# Patient Record
Sex: FEMALE | Race: BLACK OR AFRICAN AMERICAN | NOT HISPANIC OR LATINO | Employment: UNEMPLOYED | URBAN - METROPOLITAN AREA
[De-identification: names, ages, dates, MRNs, and addresses within clinical notes are randomized per-mention and may not be internally consistent; named-entity substitution may affect disease eponyms.]

---

## 2014-07-02 LAB
C TRACH DNA SPEC QL PROBE+SIG AMP: NEGATIVE
N GONORRHOEA DNA SPEC QL PROBE+SIG AMP: NEGATIVE
SPECIMEN DESCRIP: NORMAL
SPECIMEN DESCRIPTION: NORMAL

## 2017-01-27 ENCOUNTER — TELEPHONE (OUTPATIENT)
Dept: FAMILY MEDICINE | Facility: CLINIC | Age: 62
End: 2017-01-27

## 2017-01-27 ENCOUNTER — RADIANT APPOINTMENT (OUTPATIENT)
Dept: MAMMOGRAPHY | Facility: CLINIC | Age: 62
End: 2017-01-27
Attending: FAMILY MEDICINE
Payer: COMMERCIAL

## 2017-01-27 DIAGNOSIS — N76.0 BV (BACTERIAL VAGINOSIS): Primary | ICD-10-CM

## 2017-01-27 DIAGNOSIS — B96.89 BV (BACTERIAL VAGINOSIS): Primary | ICD-10-CM

## 2017-01-27 PROCEDURE — G0202 SCR MAMMO BI INCL CAD: HCPCS | Mod: TC

## 2017-02-06 ENCOUNTER — TELEPHONE (OUTPATIENT)
Dept: FAMILY MEDICINE | Facility: CLINIC | Age: 62
End: 2017-02-06

## 2017-02-06 DIAGNOSIS — E11.9 TYPE 2 DIABETES MELLITUS WITHOUT COMPLICATION, WITHOUT LONG-TERM CURRENT USE OF INSULIN (H): Primary | ICD-10-CM

## 2017-02-06 NOTE — TELEPHONE ENCOUNTER
Referred to nutrition. Please notify patient.     Romel Mccall MD  [facility], [place]

## 2017-02-06 NOTE — TELEPHONE ENCOUNTER
Patient calling to state that her per her psychologist-  She needs a referral to a diabetes educator that specializes in eating disorders.  The last DM stated that eating disorders was out of her scope   Patient went through the [facility] for eating disorders- 90 day program   feels that she has problems on occasion with her eating disorder.    Please advise,    Tessie Allred RN  [facility]  [phone]

## 2017-02-06 NOTE — TELEPHONE ENCOUNTER
Informed patient of message    If referral is not for appropriate office, will call back for more specific referral    LIBRA Benjamin

## 2017-02-06 NOTE — TELEPHONE ENCOUNTER
Reason for Call:  Pt wants to discuss referral with dietician for her eating disorder.     Detailed comments: wants a nurse to call her back.     Phone Number Patient can be reached at: Cell number on file:    Telephone Information:   Mobile [phone]       Best Time: anytime    Can we leave a detailed message on this number? YES    Call taken on 2/6/2017 at 8:28 AM by [facility] Bagley

## 2017-02-15 ENCOUNTER — OFFICE VISIT (OUTPATIENT)
Dept: OBGYN | Facility: CLINIC | Age: 62
End: 2017-02-15
Payer: COMMERCIAL

## 2017-02-15 VITALS
SYSTOLIC BLOOD PRESSURE: 122 MMHG | DIASTOLIC BLOOD PRESSURE: 80 MMHG | BODY MASS INDEX: 26.84 KG/M2 | HEIGHT: 67 IN | WEIGHT: 171 LBS

## 2017-02-15 DIAGNOSIS — N76.0 VAGINITIS AND VULVOVAGINITIS: Primary | ICD-10-CM

## 2017-02-15 PROCEDURE — 99203 OFFICE O/P NEW LOW 30 MIN: CPT | Performed by: OBSTETRICS & GYNECOLOGY

## 2017-02-15 PROCEDURE — 87205 SMEAR GRAM STAIN: CPT | Performed by: OBSTETRICS & GYNECOLOGY

## 2017-02-15 NOTE — MR AVS SNAPSHOT
"              After Visit Summary   2/15/2017    Grecia Kilgore    MRN: [id number]           Patient Information     Date Of Birth          1955        Visit Information        Provider Department      2/15/2017 1:45 PM Tristan Bryant MD [facility]        Today's Diagnoses     Vaginitis and vulvovaginitis    -  1       Follow-ups after your visit        Your next 10 appointments already scheduled     Feb 15, 2017  1:45 PM CST   SHORT with Tristan Bryant MD   [facility] ([facility])    [address]  [place] [address]   [phone]            Mar 08, 2017  1:45 PM CST   SHORT with Tristan Bryant MD   [facility] ([facility])    [address]  [place] [address] [phone]              Who to contact     If you have questions or need follow up information about today's clinic visit or your schedule please contact [facility] directly at [phone].  Normal or non-critical lab and imaging results will be communicated to you by Weiloshart, letter or phone within 4 business days after the clinic has received the results. If you do not hear from us within 7 days, please contact the clinic through Weiloshart or phone. If you have a critical or abnormal lab result, we will notify you by phone as soon as possible.  Submit refill requests through AudioBeta or call your pharmacy and they will forward the refill request to us. Please allow 3 business days for your refill to be completed.          Additional Information About Your Visit        WeilosharPorous Power Information     AudioBeta lets you send messages to your doctor, view your test results, renew your prescriptions, schedule appointments and more. To sign up, go to [email or web address] . Click on \"Log in\" on the left side of the screen, which will take you to the Welcome page. Then click on \"Sign " "up Now\" on the right side of the page.     You will be asked to enter the access code listed below, as well as some personal information. Please follow the directions to create your username and password.     Your access code is: [email or web address]  Expires: 2017  1:39 PM     Your access code will  in 90 days. If you need help or a new code, please call your [facility] or [phone].        Care EveryWhere ID     This is your Care EveryWhere ID. This could be used by other organizations to access your [place] medical records  [id number]        Your Vitals Were     Height Last Period BMI (Body Mass Index)             5' 7\" (1.702 m) 1985 26.78 kg/m2          Blood Pressure from Last 3 Encounters:   02/15/17 122/80   16 134/89   16 133/83    Weight from Last 3 Encounters:   02/15/17 171 lb (77.6 kg)   16 169 lb 12.8 oz (77 kg)   16 176 lb 12.8 oz (80.2 kg)              Today, you had the following     No orders found for display       Primary Care Provider Office Phone # Fax #    Romel Mccall [phone]       [facility] [address]  [place] [address]        Thank you!     Thank you for choosing [facility]  for your care. Our goal is always to provide you with excellent care. Hearing back from our patients is one way we can continue to improve our services. Please take a few minutes to complete the written survey that you may receive in the mail after your visit with us. Thank you!             Your Updated Medication List - Protect others around you: Learn how to safely use, store and throw away your medicines at www.disposemymeds.org.          This list is accurate as of: 2/15/17  1:39 PM.  Always use your most recent med list.                   Brand Name Dispense Instructions for use    albuterol 108 (90 BASE) MCG/ACT Inhaler    PROAIR HFA/PROVENTIL HFA/VENTOLIN HFA    1 Inhaler    Inhale 2 puffs into " the lungs every 6 hours as needed for shortness of breath / dyspnea       Azelastine HCl 0.15 % Soln     30 mL    Spray 2 sprays into both nostrils 2 times daily       DEPAKOTE PO      Take 500 mg by mouth 2 times daily       dextromethorphan 30 MG/5ML liquid    DELSYM    148 mL    Take 5 mLs (30 mg) by mouth 2 times daily       hydrOXYzine 25 MG tablet    ATARAX    30 tablet    Take 1 tablet (25 mg) by mouth every 6 hours as needed for itching (and nausea)       lisinopril 5 MG tablet    PRINIVIL/ZESTRIL    90 tablet    Take 1 tablet (5 mg) by mouth daily       metFORMIN 500 MG tablet    GLUCOPHAGE    270 tablet    Take 1 tablet (500 mg) by mouth 2 times daily (with meals)       omega 3 1000 MG Caps     90 capsule    Take 1 g by mouth daily       oxyCODONE-acetaminophen 5-325 MG per tablet    PERCOCET    50 tablet    Take 1-2 tablets by mouth every 4 hours as needed for pain (moderate to severe)       simvastatin 20 MG tablet    ZOCOR    90 tablet    Take 1 tablet (20 mg) by mouth At Bedtime

## 2017-02-16 LAB
BACTERIA SPEC CULT: NORMAL
GRAM STN SPEC: ABNORMAL
Lab: NORMAL
MICRO REPORT STATUS: ABNORMAL
MICRO REPORT STATUS: NORMAL
SPECIMEN SOURCE: ABNORMAL
SPECIMEN SOURCE: NORMAL

## 2017-02-21 RX ORDER — METRONIDAZOLE 250 MG/1
250 TABLET ORAL 3 TIMES DAILY
Qty: 21 TABLET | Refills: 0 | Status: SHIPPED | OUTPATIENT
Start: 2017-02-21 | End: 2017-03-23

## 2017-02-24 NOTE — TELEPHONE ENCOUNTER
Just as an update: scheduling has tried to reach out to Grecia 3 times since none of the diabetes educators specialize in eating disorders.  Unsure if she would like to be seen for only diabetes education.  Awaiting a return call from patient.    Chelsy Mcgill RD, LD, CDE

## 2017-02-24 NOTE — TELEPHONE ENCOUNTER
I would suggest waiting for her to call us back about that.    Romel Mccall MD  [facility], [place]

## 2017-03-08 ENCOUNTER — OFFICE VISIT (OUTPATIENT)
Dept: OBGYN | Facility: CLINIC | Age: 62
End: 2017-03-08
Payer: COMMERCIAL

## 2017-03-08 VITALS
BODY MASS INDEX: 26.53 KG/M2 | HEIGHT: 67 IN | WEIGHT: 169 LBS | SYSTOLIC BLOOD PRESSURE: 120 MMHG | DIASTOLIC BLOOD PRESSURE: 80 MMHG

## 2017-03-08 DIAGNOSIS — N76.0 VAGINITIS AND VULVOVAGINITIS: Primary | ICD-10-CM

## 2017-03-08 LAB
GRAM STN SPEC: NORMAL
MICRO REPORT STATUS: NORMAL
SPECIMEN SOURCE: NORMAL

## 2017-03-08 PROCEDURE — 87653 STREP B DNA AMP PROBE: CPT | Performed by: OBSTETRICS & GYNECOLOGY

## 2017-03-08 PROCEDURE — 99212 OFFICE O/P EST SF 10 MIN: CPT | Performed by: OBSTETRICS & GYNECOLOGY

## 2017-03-08 PROCEDURE — 87205 SMEAR GRAM STAIN: CPT | Performed by: OBSTETRICS & GYNECOLOGY

## 2017-03-08 NOTE — MR AVS SNAPSHOT
"              After Visit Summary   3/8/2017    Grecia Kilgore    MRN: [id number]           Patient Information     Date Of Birth          1955        Visit Information        Provider Department      3/8/2017 1:45 PM Tristan Bryant MD [facility]        Today's Diagnoses     Vaginitis and vulvovaginitis    -  1       Follow-ups after your visit        Who to contact     If you have questions or need follow up information about today's clinic visit or your schedule please contact [facility] directly at [phone].  Normal or non-critical lab and imaging results will be communicated to you by Pombaihart, letter or phone within 4 business days after the clinic has received the results. If you do not hear from us within 7 days, please contact the clinic through Pombaihart or phone. If you have a critical or abnormal lab result, we will notify you by phone as soon as possible.  Submit refill requests through [employer] or call your pharmacy and they will forward the refill request to us. Please allow 3 business days for your refill to be completed.          Additional Information About Your Visit        MyChart Information     [employer] lets you send messages to your doctor, view your test results, renew your prescriptions, schedule appointments and more. To sign up, go to [email or web address] . Click on \"Log in\" on the left side of the screen, which will take you to the Welcome page. Then click on \"Sign up Now\" on the right side of the page.     You will be asked to enter the access code listed below, as well as some personal information. Please follow the directions to create your username and password.     Your access code is: [email or web address]  Expires: 2017  1:39 PM     Your access code will  in 90 days. If you need help or a new code, please call your [facility] or [phone].        Care EveryWhere ID     This is your Care EveryWhere ID. This could be used " "by other organizations to access your [place] medical records  [id number]        Your Vitals Were     Height Last Period BMI (Body Mass Index)             5' 7\" (1.702 m) 01/01/1985 26.47 kg/m2          Blood Pressure from Last 3 Encounters:   03/08/17 120/80   02/15/17 122/80   12/19/16 134/89    Weight from Last 3 Encounters:   03/08/17 169 lb (76.7 kg)   02/15/17 171 lb (77.6 kg)   12/19/16 169 lb 12.8 oz (77 kg)              We Performed the Following     Gram stain     Group B strep PCR        Primary Care Provider Office Phone # Fax #    Romel Mccall [phone]       [facility] [address]  [place] [address]        Thank you!     Thank you for choosing [facility]  for your care. Our goal is always to provide you with excellent care. Hearing back from our patients is one way we can continue to improve our services. Please take a few minutes to complete the written survey that you may receive in the mail after your visit with us. Thank you!             Your Updated Medication List - Protect others around you: Learn how to safely use, store and throw away your medicines at www.disposemymeds.org.          This list is accurate as of: 3/8/17  3:29 PM.  Always use your most recent med list.                   Brand Name Dispense Instructions for use    albuterol 108 (90 BASE) MCG/ACT Inhaler    PROAIR HFA/PROVENTIL HFA/VENTOLIN HFA    1 Inhaler    Inhale 2 puffs into the lungs every 6 hours as needed for shortness of breath / dyspnea       Azelastine HCl 0.15 % Soln     30 mL    Spray 2 sprays into both nostrils 2 times daily       DEPAKOTE PO      Take 500 mg by mouth 2 times daily       dextromethorphan 30 MG/5ML liquid    DELSYM    148 mL    Take 5 mLs (30 mg) by mouth 2 times daily       hydrOXYzine 25 MG tablet    ATARAX    30 tablet    Take 1 tablet (25 mg) by mouth every 6 hours as needed for itching (and nausea)       lisinopril 5 MG " tablet    PRINIVIL/ZESTRIL    90 tablet    Take 1 tablet (5 mg) by mouth daily       metFORMIN 500 MG tablet    GLUCOPHAGE    270 tablet    Take 1 tablet (500 mg) by mouth 2 times daily (with meals)       metroNIDAZOLE 250 MG tablet    FLAGYL    21 tablet    Take 1 tablet (250 mg) by mouth 3 times daily       omega 3 1000 MG Caps     90 capsule    Take 1 g by mouth daily       oxyCODONE-acetaminophen 5-325 MG per tablet    PERCOCET    50 tablet    Take 1-2 tablets by mouth every 4 hours as needed for pain (moderate to severe)       simvastatin 20 MG tablet    ZOCOR    90 tablet    Take 1 tablet (20 mg) by mouth At Bedtime

## 2017-03-08 NOTE — PROGRESS NOTES
The patient is seen in follow-up at this time for recurrent vaginitis. She had a culture treated with antibiotics recently. She has a concern about anal incontinence and also the possibility of passing gas through the vagina. On previous examination we could not demonstrate any fistula. Examination today shows normally from genitalia and vaginal exam with no sign of fistula. There is no erythema or any stool material present. Vaginal culture is performed in follow-up at this time. We will contact her with results. We do refer her back to her colorectal surgeon for the anal incontinence issues and possible workup of the fistula.

## 2017-03-09 LAB
GP B STREP DNA SPEC QL NAA+PROBE: NORMAL
SPECIMEN SOURCE: NORMAL

## 2017-03-10 ENCOUNTER — TRANSFERRED RECORDS (OUTPATIENT)
Dept: HEALTH INFORMATION MANAGEMENT | Facility: CLINIC | Age: 62
End: 2017-03-10

## 2017-03-16 NOTE — TELEPHONE ENCOUNTER
Dr Mccall- Patient calling back about diabetes educator specializing in eating disorder. [facility] does not have any diabetes educators that specialize in eating disorder. Maybe she should check back with the [facility] program to see  If they offer this service. I do not know where else she could go.    Silvana Sauceda  Referral Coordinator

## 2017-03-16 NOTE — TELEPHONE ENCOUNTER
Please discuss it with her.    Romel Mccall MD  [facility], [place]

## 2017-03-22 NOTE — TELEPHONE ENCOUNTER
Called patient and gave her the number to the [facility] in [facility].  This is all I could find for out patient diabetic education specializing in eating disorder. I had to leave a VM told her to call me if she had questions.    Silvana Sauceda  Referral Coordinator

## 2017-03-23 ENCOUNTER — OFFICE VISIT (OUTPATIENT)
Dept: FAMILY MEDICINE | Facility: CLINIC | Age: 62
End: 2017-03-23
Payer: COMMERCIAL

## 2017-03-23 VITALS
HEIGHT: 67 IN | WEIGHT: 168 LBS | DIASTOLIC BLOOD PRESSURE: 82 MMHG | SYSTOLIC BLOOD PRESSURE: 118 MMHG | OXYGEN SATURATION: 100 % | HEART RATE: 60 BPM | TEMPERATURE: 98.2 F | BODY MASS INDEX: 26.37 KG/M2

## 2017-03-23 DIAGNOSIS — F41.0 PANIC DISORDER: Primary | ICD-10-CM

## 2017-03-23 DIAGNOSIS — J01.90 ACUTE SINUSITIS WITH SYMPTOMS > 10 DAYS: ICD-10-CM

## 2017-03-23 DIAGNOSIS — F32.1 MAJOR DEPRESSIVE DISORDER, SINGLE EPISODE, MODERATE (H): ICD-10-CM

## 2017-03-23 PROCEDURE — 99213 OFFICE O/P EST LOW 20 MIN: CPT | Performed by: FAMILY MEDICINE

## 2017-03-23 RX ORDER — AZITHROMYCIN 250 MG/1
TABLET, FILM COATED ORAL
Qty: 6 TABLET | Refills: 0 | Status: SHIPPED | OUTPATIENT
Start: 2017-03-23 | End: 2017-05-02

## 2017-03-23 ASSESSMENT — ANXIETY QUESTIONNAIRES
7. FEELING AFRAID AS IF SOMETHING AWFUL MIGHT HAPPEN: NEARLY EVERY DAY
IF YOU CHECKED OFF ANY PROBLEMS ON THIS QUESTIONNAIRE, HOW DIFFICULT HAVE THESE PROBLEMS MADE IT FOR YOU TO DO YOUR WORK, TAKE CARE OF THINGS AT HOME, OR GET ALONG WITH OTHER PEOPLE: VERY DIFFICULT
GAD7 TOTAL SCORE: 18
3. WORRYING TOO MUCH ABOUT DIFFERENT THINGS: NEARLY EVERY DAY
1. FEELING NERVOUS, ANXIOUS, OR ON EDGE: MORE THAN HALF THE DAYS
6. BECOMING EASILY ANNOYED OR IRRITABLE: MORE THAN HALF THE DAYS
2. NOT BEING ABLE TO STOP OR CONTROL WORRYING: NEARLY EVERY DAY
5. BEING SO RESTLESS THAT IT IS HARD TO SIT STILL: NEARLY EVERY DAY

## 2017-03-23 ASSESSMENT — PATIENT HEALTH QUESTIONNAIRE - PHQ9: 5. POOR APPETITE OR OVEREATING: MORE THAN HALF THE DAYS

## 2017-03-23 NOTE — PROGRESS NOTES
SUBJECTIVE:                                                    Grecia Kilgore is a 62 year old female who presents to clinic today for the following health issues:      Acute Illness   Acute illness concerns:  Cough, sinus pain   Onset:  One month     Fever: no     Chills/Sweats: no     Headache (location?): YES    Sinus Pressure:YES    Conjunctivitis:  no    Ear Pain: YES: left    Rhinorrhea: YES    Congestion: YES    Sore Throat: no      Cough: YES-productive of yellow sputum, productive of green sputum    Wheeze: YES    Decreased Appetite: YES    Nausea: YES    Vomiting: no     Diarrhea:  no     Dysuria/Freq.: no     Fatigue/Achiness: YES    Sick/Strep Exposure: no      Therapies Tried and outcome: inhalers       Patient said her psychologist and psychiatrist was no longer see her. She is not sure what happened. They got her started on his ability. She is on Depakote as well. They did not provide a referral either. Comes in today to get a referral to another psychology and psychiatry group for management of her mood disorder.     Problem list and histories reviewed & adjusted, as indicated.  Additional history: as documented    Patient Active Problem List   Diagnosis     Allergic rhinitis     External hemorrhoids     Vitamin D deficiency     Fibromyalgia     Osteoarthritis, knee     Positive PPD     Panic disorder     Genital herpes     Advanced directives, counseling/discussion     DCIS (ductal carcinoma in situ) of breast     Heart palpitations     Anxiety     Major depressive disorder, single episode, moderate (H)     Mild persistent asthma without complication     Hyperlipidemia LDL goal <100     Type 2 diabetes mellitus without complication, without long-term current use of insulin (H)     Past Surgical History:   Procedure Laterality Date     ARTHRODESIS TOE(S)  9/16/2013    Procedure: ARTHRODESIS TOE(S);  BILATERAL GREAT TOE FUSION (C-ARM);  Surgeon: Mary Guan MD;  Location: [facility]      ARTHRODESIS TOE(S) Left 12/19/2016    Procedure: ARTHRODESIS TOE(S);  Surgeon: Mary Guan MD;  Location: [facility]     ARTHROSCOPY KNEE Left 2/2/11     BIOPSY BREAST  2/7/2014    Procedure: BIOPSY BREAST;  Re-excision Left Breast Cavity for Margins ;  Surgeon: Lisset Amador DO;  Location: [facility] OR     BIOPSY BREAST SEED LOCALIZATION  1/22/2014    Procedure: BIOPSY BREAST SEED LOCALIZATION;  Left Breast Seed Localized Excisional Biopsy ;  Surgeon: Lisset Amador DO;  Location: [facility] OR     COLONOSCOPY  2005    nl - repeat 2015     FOOT SURGERY Bilateral 2013     HEMORRHOIDECTOMY BANDING      multiple times     HYSTERECTOMY  7/1996    fibroids     REMOVE HARDWARE FOOT Left 2015     REPAIR TENDON FOOT Left 12/19/2016    Procedure: REPAIR TENDON FOOT;  Surgeon: Mary Guan MD;  Location: [facility]       Social History   Substance Use Topics     Smoking status: Never Smoker     Smokeless tobacco: Never Used     Alcohol use No     Family History   Problem Relation Age of Onset     DIABETES Mother      Breast Cancer Mother      Alcohol/Drug Father      CANCER Father      DIABETES Maternal Grandmother      CEREBROVASCULAR DISEASE Maternal Grandmother      Cancer - colorectal Maternal Grandfather          Current Outpatient Prescriptions   Medication Sig Dispense Refill     azithromycin (ZITHROMAX) 250 MG tablet Two tablets first day, then one tablet daily for four days. 6 tablet 0     omega 3 1000 MG CAPS Take 1 g by mouth daily 90 capsule      metFORMIN (GLUCOPHAGE) 500 MG tablet Take 1 tablet (500 mg) by mouth 2 times daily (with meals) 270 tablet 3     albuterol (PROAIR HFA, PROVENTIL HFA, VENTOLIN HFA) 108 (90 BASE) MCG/ACT inhaler Inhale 2 puffs into the lungs every 6 hours as needed for shortness of breath / dyspnea 1 Inhaler 6     simvastatin (ZOCOR) 20 MG tablet Take 1 tablet (20 mg) by mouth At Bedtime 90 tablet 3     lisinopril (PRINIVIL,ZESTRIL) 5 MG tablet Take 1 tablet (5 mg) by mouth daily 90  "tablet 3     Azelastine HCl 0.15 % SOLN Spray 2 sprays into both nostrils 2 times daily 30 mL 11     Divalproex Sodium (DEPAKOTE PO) Take 500 mg by mouth 2 times daily       Allergies   Allergen Reactions     Codeine Nausea     Morphine Itching     Nitrofuran Derivatives Hives     Phenothiazines      sedation     Primatene Mist [Epinephrine Bitartrate] Itching     neck itch with primatene mist     Vicodin [Hydrocodone-Acetaminophen] Nausea     Latex Itching and Rash       Reviewed and updated as needed this visit by clinical staff  Allergies  Meds       Reviewed and updated as needed this visit by Provider         ROS:  INTEGUMENTARY/SKIN: NEGATIVE for worrisome rashes, moles or lesions  GI: NEGATIVE for nausea, abdominal pain, heartburn, or change in bowel habits    OBJECTIVE:                                                    /82 (BP Location: Left arm, Cuff Size: Adult Regular)  Pulse 60  Temp 98.2  F (36.8  C) (Oral)  Ht 5' 7\" (1.702 m)  Wt 168 lb (76.2 kg)  LMP 01/01/1985  SpO2 100%  BMI 26.31 kg/m2  Body mass index is 26.31 kg/(m^2).   GENERAL: healthy, alert, well nourished, well hydrated, no distress  HENT: ear canals- normal; TMs- normal; Nose- normal; right-sided maxillary and frontal sinus tenderness noted on exam. Mouth- no ulcers, no lesions  NECK: no tenderness, no adenopathy, no asymmetry, no masses, no stiffness; thyroid- normal to palpation  RESP: lungs clear to auscultation - no rales, no rhonchi, no wheezes  CV: regular rates and rhythm, normal S1 S2, no S3 or S4 and no murmur, no click or rub -  PSYCH: Alert and oriented times 3; speech- coherent , normal rate and volume; able to articulate logical thoughts, able to abstract reason, no tangential thoughts, no hallucinations or delusions, affect- normal         ASSESSMENT/PLAN:                                                      1. Panic disorder  Patient is given a referral to psychiatry  - MENTAL HEALTH REFERRAL    2. Major " depressive disorder, single episode, moderate (H)  As above  - MENTAL HEALTH REFERRAL    3. Acute sinusitis with symptoms > 10 days  Starting patient on azithromycin for acute sinus infection. Recommended to stay well hydrated. If symptoms are not improving in the next few days, instructed to notify us back  - azithromycin (ZITHROMAX) 250 MG tablet; Two tablets first day, then one tablet daily for four days.  Dispense: 6 tablet; Refill: 0      Follow up with Provider - as needed     Romel Mccall MD  [facility]

## 2017-03-23 NOTE — MR AVS SNAPSHOT
After Visit Summary   3/23/2017    Grecia Kilgore    MRN: [id number]           Patient Information     Date Of Birth          1955        Visit Information        Provider Department      3/23/2017 1:00 PM Romel Mccall MD [facility] [place]        Today's Diagnoses     Panic disorder    -  1    Major depressive disorder, single episode, moderate (H)           Follow-ups after your visit        Additional Services     MENTAL HEALTH REFERRAL       Your provider has referred you to: [facility]: Psychiatry Clinic [facility] [phone]   [email or web address]    All scheduling is subject to the client's specific insurance plan & benefits, provider/location availability, and provider clinical specialities.  Please arrive 15 minutes early for your first appointment and bring your completed paperwork.    Please be aware that coverage of these services is subject to the terms and limitations of your health insurance plan.  Call member services at your health plan with any benefit or coverage questions.                  Who to contact     If you have questions or need follow up information about today's clinic visit or your schedule please contact [facility] [place] directly at [phone].  Normal or non-critical lab and imaging results will be communicated to you by MyChart, letter or phone within 4 business days after the clinic has received the results. If you do not hear from us within 7 days, please contact the clinic through [employer] or phone. If you have a critical or abnormal lab result, we will notify you by phone as soon as possible.  Submit refill requests through [employer] or call your pharmacy and they will forward the refill request to us. Please allow 3 business days for your refill to be completed.          Additional Information About Your Visit        [employer] Information     [employer] lets you send messages to your doctor, view your  "test results, renew your prescriptions, schedule appointments and more. To sign up, go to [email or web address] . Click on \"Log in\" on the left side of the screen, which will take you to the Welcome page. Then click on \"Sign up Now\" on the right side of the page.     You will be asked to enter the access code listed below, as well as some personal information. Please follow the directions to create your username and password.     Your access code is: [email or web address]  Expires: 2017  2:39 PM     Your access code will  in 90 days. If you need help or a new code, please call your [facility] clinic or [phone].        Care EveryWhere ID     This is your Care EveryWhere ID. This could be used by other organizations to access your [facility] medical records  [id number]        Your Vitals Were     Pulse Temperature Height Last Period Pulse Oximetry BMI (Body Mass Index)    60 98.2  F (36.8  C) (Oral) 5' 7\" (1.702 m) 1985 100% 26.31 kg/m2       Blood Pressure from Last 3 Encounters:   17 118/82   17 120/80   02/15/17 122/80    Weight from Last 3 Encounters:   17 168 lb (76.2 kg)   17 169 lb (76.7 kg)   02/15/17 171 lb (77.6 kg)              We Performed the Following     MENTAL HEALTH REFERRAL        Primary Care Provider Office Phone # Fax #    Romel Mccall [phone]       [facility] [place] [address] [place] [address] [address]  [place] [address]        Thank you!     Thank you for choosing [facility]  for your care. Our goal is always to provide you with excellent care. Hearing back from our patients is one way we can continue to improve our services. Please take a few minutes to complete the written survey that you may receive in the mail after your visit with us. Thank you!             Your Updated Medication List - Protect others around you: Learn how to safely use, store and throw away your medicines at www.disposemymeds.org.          This " list is accurate as of: 3/23/17  1:22 PM.  Always use your most recent med list.                   Brand Name Dispense Instructions for use    albuterol 108 (90 BASE) MCG/ACT Inhaler    PROAIR HFA/PROVENTIL HFA/VENTOLIN HFA    1 Inhaler    Inhale 2 puffs into the lungs every 6 hours as needed for shortness of breath / dyspnea       Azelastine HCl 0.15 % Soln     30 mL    Spray 2 sprays into both nostrils 2 times daily       DEPAKOTE PO      Take 500 mg by mouth 2 times daily       dextromethorphan 30 MG/5ML liquid    DELSYM    148 mL    Take 5 mLs (30 mg) by mouth 2 times daily       hydrOXYzine 25 MG tablet    ATARAX    30 tablet    Take 1 tablet (25 mg) by mouth every 6 hours as needed for itching (and nausea)       lisinopril 5 MG tablet    PRINIVIL/ZESTRIL    90 tablet    Take 1 tablet (5 mg) by mouth daily       metFORMIN 500 MG tablet    GLUCOPHAGE    270 tablet    Take 1 tablet (500 mg) by mouth 2 times daily (with meals)       metroNIDAZOLE 250 MG tablet    FLAGYL    21 tablet    Take 1 tablet (250 mg) by mouth 3 times daily       omega 3 1000 MG Caps     90 capsule    Take 1 g by mouth daily       oxyCODONE-acetaminophen 5-325 MG per tablet    PERCOCET    50 tablet    Take 1-2 tablets by mouth every 4 hours as needed for pain (moderate to severe)       simvastatin 20 MG tablet    ZOCOR    90 tablet    Take 1 tablet (20 mg) by mouth At Bedtime

## 2017-03-24 ASSESSMENT — PATIENT HEALTH QUESTIONNAIRE - PHQ9: SUM OF ALL RESPONSES TO PHQ QUESTIONS 1-9: 21

## 2017-03-24 ASSESSMENT — ANXIETY QUESTIONNAIRES: GAD7 TOTAL SCORE: 18

## 2017-03-24 ASSESSMENT — ASTHMA QUESTIONNAIRES: ACT_TOTALSCORE: 20

## 2017-03-28 ENCOUNTER — TRANSFERRED RECORDS (OUTPATIENT)
Dept: FAMILY MEDICINE | Facility: CLINIC | Age: 62
End: 2017-03-28

## 2017-03-31 ENCOUNTER — TRANSFERRED RECORDS (OUTPATIENT)
Dept: FAMILY MEDICINE | Facility: CLINIC | Age: 62
End: 2017-03-31

## 2017-03-31 NOTE — PROGRESS NOTES
Records received from [facility] and given to Dr Mccall for review.  María Elena Vazquez,

## 2017-04-07 ENCOUNTER — HOSPITAL ENCOUNTER (EMERGENCY)
Facility: CLINIC | Age: 62
Discharge: HOME | End: 2017-04-07
Attending: EMERGENCY MEDICINE | Admitting: EMERGENCY MEDICINE
Payer: COMMERCIAL

## 2017-04-07 ENCOUNTER — TELEPHONE (OUTPATIENT)
Dept: NURSING | Facility: CLINIC | Age: 62
End: 2017-04-07

## 2017-04-07 ENCOUNTER — APPOINTMENT (OUTPATIENT)
Dept: CT IMAGING | Facility: CLINIC | Age: 62
End: 2017-04-07
Attending: EMERGENCY MEDICINE
Payer: COMMERCIAL

## 2017-04-07 VITALS
TEMPERATURE: 98.2 F | RESPIRATION RATE: 16 BRPM | OXYGEN SATURATION: 99 % | HEART RATE: 62 BPM | DIASTOLIC BLOOD PRESSURE: 90 MMHG | SYSTOLIC BLOOD PRESSURE: 142 MMHG

## 2017-04-07 DIAGNOSIS — R10.32 LLQ ABDOMINAL PAIN: ICD-10-CM

## 2017-04-07 DIAGNOSIS — R31.29 MICROSCOPIC HEMATURIA: ICD-10-CM

## 2017-04-07 LAB
ALBUMIN UR-MCNC: NEGATIVE MG/DL
ANION GAP SERPL CALCULATED.3IONS-SCNC: 7 MMOL/L (ref 3–14)
APPEARANCE UR: CLEAR
BASOPHILS # BLD AUTO: 0 10E9/L (ref 0–0.2)
BASOPHILS NFR BLD AUTO: 0.6 %
BILIRUB UR QL STRIP: NEGATIVE
BUN SERPL-MCNC: 11 MG/DL (ref 7–30)
CALCIUM SERPL-MCNC: 8.9 MG/DL (ref 8.5–10.1)
CHLORIDE SERPL-SCNC: 109 MMOL/L (ref 94–109)
CO2 SERPL-SCNC: 27 MMOL/L (ref 20–32)
COLOR UR AUTO: YELLOW
CREAT SERPL-MCNC: 0.99 MG/DL (ref 0.52–1.04)
DIFFERENTIAL METHOD BLD: NORMAL
EOSINOPHIL # BLD AUTO: 0.1 10E9/L (ref 0–0.7)
EOSINOPHIL NFR BLD AUTO: 1.6 %
ERYTHROCYTE [DISTWIDTH] IN BLOOD BY AUTOMATED COUNT: 12.2 % (ref 10–15)
GFR SERPL CREATININE-BSD FRML MDRD: 57 ML/MIN/1.7M2
GLUCOSE SERPL-MCNC: 97 MG/DL (ref 70–99)
GLUCOSE UR STRIP-MCNC: NEGATIVE MG/DL
HCT VFR BLD AUTO: 41.3 % (ref 35–47)
HGB BLD-MCNC: 13.3 G/DL (ref 11.7–15.7)
HGB UR QL STRIP: ABNORMAL
HYALINE CASTS #/AREA URNS LPF: 1 /LPF (ref 0–2)
IMM GRANULOCYTES # BLD: 0 10E9/L (ref 0–0.4)
IMM GRANULOCYTES NFR BLD: 0.3 %
KETONES UR STRIP-MCNC: NEGATIVE MG/DL
LEUKOCYTE ESTERASE UR QL STRIP: NEGATIVE
LYMPHOCYTES # BLD AUTO: 2.7 10E9/L (ref 0.8–5.3)
LYMPHOCYTES NFR BLD AUTO: 42.3 %
MCH RBC QN AUTO: 29.6 PG (ref 26.5–33)
MCHC RBC AUTO-ENTMCNC: 32.2 G/DL (ref 31.5–36.5)
MCV RBC AUTO: 92 FL (ref 78–100)
MONOCYTES # BLD AUTO: 0.4 10E9/L (ref 0–1.3)
MONOCYTES NFR BLD AUTO: 6 %
MUCOUS THREADS #/AREA URNS LPF: PRESENT /LPF
NEUTROPHILS # BLD AUTO: 3.2 10E9/L (ref 1.6–8.3)
NEUTROPHILS NFR BLD AUTO: 49.2 %
NITRATE UR QL: NEGATIVE
NRBC # BLD AUTO: 0 10*3/UL
NRBC BLD AUTO-RTO: 0 /100
PH UR STRIP: 8 PH (ref 5–7)
PLATELET # BLD AUTO: 204 10E9/L (ref 150–450)
POTASSIUM SERPL-SCNC: 4.1 MMOL/L (ref 3.4–5.3)
RBC # BLD AUTO: 4.5 10E12/L (ref 3.8–5.2)
RBC #/AREA URNS AUTO: 21 /HPF (ref 0–2)
SODIUM SERPL-SCNC: 143 MMOL/L (ref 133–144)
SP GR UR STRIP: 1.02 (ref 1–1.03)
SQUAMOUS #/AREA URNS AUTO: 1 /HPF (ref 0–1)
URN SPEC COLLECT METH UR: ABNORMAL
UROBILINOGEN UR STRIP-MCNC: 4 MG/DL (ref 0–2)
WBC # BLD AUTO: 6.5 10E9/L (ref 4–11)
WBC #/AREA URNS AUTO: 1 /HPF (ref 0–2)

## 2017-04-07 PROCEDURE — 96374 THER/PROPH/DIAG INJ IV PUSH: CPT

## 2017-04-07 PROCEDURE — 25000128 H RX IP 250 OP 636: Performed by: EMERGENCY MEDICINE

## 2017-04-07 PROCEDURE — 74176 CT ABD & PELVIS W/O CONTRAST: CPT

## 2017-04-07 PROCEDURE — 99285 EMERGENCY DEPT VISIT HI MDM: CPT | Mod: 25

## 2017-04-07 PROCEDURE — 85025 COMPLETE CBC W/AUTO DIFF WBC: CPT | Performed by: EMERGENCY MEDICINE

## 2017-04-07 PROCEDURE — 80048 BASIC METABOLIC PNL TOTAL CA: CPT | Performed by: EMERGENCY MEDICINE

## 2017-04-07 PROCEDURE — 81001 URINALYSIS AUTO W/SCOPE: CPT | Performed by: INTERNAL MEDICINE

## 2017-04-07 PROCEDURE — 96375 TX/PRO/DX INJ NEW DRUG ADDON: CPT

## 2017-04-07 PROCEDURE — 96361 HYDRATE IV INFUSION ADD-ON: CPT

## 2017-04-07 RX ORDER — OXYCODONE AND ACETAMINOPHEN 5; 325 MG/1; MG/1
1-2 TABLET ORAL EVERY 4 HOURS PRN
Qty: 15 TABLET | Refills: 0 | Status: SHIPPED | OUTPATIENT
Start: 2017-04-07 | End: 2017-05-02

## 2017-04-07 RX ORDER — ONDANSETRON 2 MG/ML
4 INJECTION INTRAMUSCULAR; INTRAVENOUS EVERY 30 MIN PRN
Status: DISCONTINUED | OUTPATIENT
Start: 2017-04-07 | End: 2017-04-07 | Stop reason: HOSPADM

## 2017-04-07 RX ORDER — LIDOCAINE 40 MG/G
CREAM TOPICAL
Status: DISCONTINUED | OUTPATIENT
Start: 2017-04-07 | End: 2017-04-07 | Stop reason: HOSPADM

## 2017-04-07 RX ORDER — ONDANSETRON 4 MG/1
4 TABLET, ORALLY DISINTEGRATING ORAL EVERY 8 HOURS PRN
Qty: 10 TABLET | Refills: 0 | Status: SHIPPED | OUTPATIENT
Start: 2017-04-07 | End: 2017-04-10

## 2017-04-07 RX ORDER — HYDROMORPHONE HYDROCHLORIDE 1 MG/ML
0.5 INJECTION, SOLUTION INTRAMUSCULAR; INTRAVENOUS; SUBCUTANEOUS
Status: COMPLETED | OUTPATIENT
Start: 2017-04-07 | End: 2017-04-07

## 2017-04-07 RX ORDER — SODIUM CHLORIDE 9 MG/ML
1000 INJECTION, SOLUTION INTRAVENOUS CONTINUOUS
Status: DISCONTINUED | OUTPATIENT
Start: 2017-04-07 | End: 2017-04-07 | Stop reason: HOSPADM

## 2017-04-07 RX ADMIN — HYDROMORPHONE HYDROCHLORIDE 0.5 MG: 1 INJECTION, SOLUTION INTRAMUSCULAR; INTRAVENOUS; SUBCUTANEOUS at 17:36

## 2017-04-07 RX ADMIN — SODIUM CHLORIDE 1000 ML: 9 INJECTION, SOLUTION INTRAVENOUS at 17:34

## 2017-04-07 RX ADMIN — ONDANSETRON 4 MG: 2 INJECTION INTRAMUSCULAR; INTRAVENOUS at 18:26

## 2017-04-07 NOTE — ED AVS SNAPSHOT
[facility] Emergency Department    [address]    [facility] [address]    Phone:  [phone]    Fax:  [phone]                                       Grecia Kilgore   MRN: [id number]    Department:  [facility] Emergency Department   Date of Visit:  4/7/2017           After Visit Summary Signature Page     I have received my discharge instructions, and my questions have been answered. I have discussed any challenges I see with this plan with the nurse or doctor.    ..........................................................................................................................................  Patient/Patient Representative Signature      ..........................................................................................................................................  Patient Representative Print Name and Relationship to Patient    ..................................................               ................................................  Date                                            Time    ..........................................................................................................................................  Reviewed by Signature/Title    ...................................................              ..............................................  Date                                                            Time

## 2017-04-07 NOTE — ED NOTES
Patient presents to the ED for evaluation of LLQ pain. Patient states that she experienced the pain 2 days ago, but it that the pain subsided after having a bm. The pain returned today. Patient states that she also feels some pressure in her vagina. Rates pain 3/10. No nausea or vomiting. ABCDs intact.

## 2017-04-07 NOTE — TELEPHONE ENCOUNTER
Call Type: Triage Call    Presenting Problem: Left side abdominal pain.  Second episode this  week.  Hx of diverticulitis. Hx of kidney stones. Is already on her  way to [facility] ER.  Triage Note:  Guideline Title: Abdominal or Pelvic Pain  Recommended Disposition: See ED Immediately  Original Inclination: Wanted to speak with a nurse  Override Disposition:  Intended Action: Follow advice given  Physician Contacted: No  Pain described as deep, boring, or  tearing ?  YES  Following a therapeutic OR elective  ? NO  New or worsening signs and symptoms that may indicate shock ? NO  Pregnant AND injury to abdomen ? NO  Pregnant AND abdominal pain/cramping OR back pain ? NO  Pregnant, less than 20 weeks gestation AND vaginal bleeding ? NO  Pregnant, more than 20 weeks gestation AND vaginal bleeding ? NO  Pregnant, gestation less than 20 weeks AND signs of labor ? NO  Pregnant, 20-37 weeks gestation AND signs of labor ? NO  Pregnant, gestation more than 37 weeks AND signs of labor ? NO  Pregnant AND heartburn ? NO  Following ingestion of toxic or caustic substance ? NO  Over 50 years of age AND new onset (first episode) unbearable back or abdominal  pain ? NO  Known abdominal aneurysm (swollen or ballooning aorta) AND sudden onset of  unbearable abdominal pain ? NO  Unbearable abdominal/pelvic pain ? NO  Food or foreign body feels stuck in esophagus. ? NO  GI bleeding, more than streaks or scant amount of blood or passing black tarry  stool(s) ? NO  Chest discomfort associated with shortness of breath, sweating, odd heartbeats or  different heart rate, nausea, vomiting, lightheadedness, or fainting lasting 5 or  more minutes now or within the last hour ? NO  Chest pain spreading to the shoulders, neck, jaw, in one or both arms, stomach or  back lasting 5 or more minutes now or within the last hour. Pain is NOT  associated with taking a deep breath or a productive cough, movement, or touch to  a localized area. ?  NO  Pressure, fullness, squeezing sensation or pain anywhere in the chest lasting 5 or  more minutes now or within the last hour. Pain is NOT associated with taking a  deep breath or a productive cough, movement, or touch to a localized area on the  chest. ? NO  Injury to abdomen or pelvis ? NO  Recent childbirth or miscarriage ? NO  Physician Instructions:  Care Advice: Another adult should drive.  Do not give the patient anything to eat or drink.  Do not push on abdomen.

## 2017-04-07 NOTE — ED AVS SNAPSHOT
[facility] Emergency Department    [address]    [facility] [address]    Phone:  [phone]    Fax:  [phone]                                       Grecia Kilgore   MRN: [id number]    Department:  [facility] Emergency Department   Date of Visit:  4/7/2017           Patient Information     Date Of Birth          1955        Your diagnoses for this visit were:     LLQ abdominal pain     Microscopic hematuria        You were seen by Mahsa Desir MD and Sammy Patten MD.      Follow-up Information     Follow up with Romel Mccall MD. Go in 3 days.    Specialty:  Family Practice    Contact information:    [facility] [address]  [place] [address]  [phone]          Follow up with [facility]. Go in 1 week.    Contact information:    [address]  [address]  [facility] [address] [phone]        Discharge Instructions         *Abdominal Pain, Unknown Cause (Female)    The exact cause of your abdominal (stomach) pain is not certain. This does not mean that this is something to worry about, or the right tests were not done. Everyone likes to know the exact cause of the problem, but sometimes with abdominal pain, there is no clear-cut cause, and this could be a good thing. The good news is that your symptoms can be treated, and you will feel better.   Your condition does not seem serious now; however, sometimes the signs of a serious problem may take more time to appear. For this reason, it is important for you to watch for any new symptoms, problems, or worsening of your condition.  Over the next few days, the abdominal pain may come and go, or be continuous. Other common symptoms can include nausea and vomiting. Sometimes it can be difficult to tell if you feel nauseous, you may just feel bad and not associate that feeling with nausea. Constipation, diarrhea, and a fever may go along  with the pain.  The pain may continue even if treated correctly over the following days. Depending on how things go, sometimes the cause can become clear and may require further or different treatment. Additional evaluations, medications, or tests may be needed.  Home care  Your health care provider may prescribe medications for pain, symptoms, or an infection.  Follow the health care provider's instructions for taking these medications.  General care    Rest until your next exam. No strenuous activities.    Try to find positions that ease discomfort. A small pillow placed on the abdomen may help relieve pain.    Something warm on your abdomen (such as a heating pad) may help, but be careful not to burn yourself.  Diet    Do not force yourself to eat, especially if having cramps, vomiting, or diarrhea.    Water is important so you do not get dehydrated. Soup may also be good. Sports drinks may also help, especially if they are not too acidic. Make sure you don't drink sugary drinks as this can make things worse. Take liquids in small amounts. Do not guzzle them.    Caffeine sometimes makes the pain and cramping worse.    Avoid dairy products if you have vomiting or diarrhea.    Don't eat large amounts at a time. Wait a few minutes between bites.    Eat a diet low in fiber (called a low-residue diet). Foods allowed include refined breads, white rice, fruit and vegetable juices without pulp, tender meats. These foods will pass more easily through the intestine.    Avoid fried or fatty foods, dairy, alcohol and spicy foods until your symptoms go away.  Follow-up care  Follow up with your health care provider as instructed, or if your pain does not begin to improve in the next 24 hours.  When to seek medical care  Seek prompt medical care if any of the following occur:    Pain gets worse or moves to the right lower abdomen    New or worsening vomiting or diarrhea    Swelling of the abdomen    Unable to pass stool for  more than three days    New fever over 101  F (38.3 C), or rising fever    Blood in vomit or bowel movements (dark red or black color)    Jaundice (yellow color of eyes and skin)    Weakness, dizziness    Chest, arm, back, neck or jaw pain    Unexpected vaginal bleeding or missed period  Call 911  Call emergency services if any of the following occur:    Trouble breathing    Confusion    Fainting or loss of consciousness    Rapid heart rate    Seizure    [id number] [facility], [address] [place] [address], [place] [address]. All rights reserved. This information is not intended as a substitute for professional medical care. Always follow your healthcare professional's instructions.      Opioid Medication Information    You have been given a prescription for an opioid (narcotic) pain medicine and/or have received a pain medicine while here in the Emergency Department. These medicines can make you drowsy or impaired. You must not drive, operate dangerous equipment, or engage in any other dangerous activities while taking these medications. If you drive while taking these medications, you could be arrested for DUI, or driving under the influence. Do not drink any alcohol while you are taking these medications.   Opioid pain medications can cause addiction. If you have a history of chemical dependency of any type, you are at a higher risk of becoming addicted to pain medications.  Only take these prescribed medications to treat your pain when all other options have been tried. Take it for as short a time and as few doses as possible. Store your pain pills in a secure place, as they are frequently stolen and provide a dangerous opportunity for children or visitors in your house to start abusing these powerful medications. We will not replace any lost or stolen medicine.  As soon as your pain is better, you should flush all your remaining medication.   Many prescription pain medications contain Tylenol  (acetaminophen),  including Vicodin , Tylenol #3 , Norco , Lortab , and Percocet .  You should not take any extra pills of Tylenol  if you are using these prescription medications or you can get very sick.  Do not ever take more than 4000 mg of acetaminophen in any 24 hour period.  All opioids tend to cause constipation. Drink plenty of water and eat foods that have a lot of fiber, such as fruits, vegetables, prune juice, apple juice and high fiber cereal.  Take a laxative if you don t move your bowels at least every other day. Miralax , Milk of Magnesia, Colace , or Senna  can be used to keep you regular.            24 Hour Appointment Hotline       To make an appointment at any [facility], call [phone] ([phone]). If you don't have a family doctor or clinic, we will help you find one. [facility] clinics are conveniently located to serve the needs of you and your family.             Review of your medicines      START taking        Dose / Directions Last dose taken    ondansetron 4 MG ODT tab   Commonly known as:  ZOFRAN ODT   Dose:  4 mg   Quantity:  10 tablet        Take 1 tablet (4 mg) by mouth every 8 hours as needed   Refills:  0        oxyCODONE-acetaminophen 5-325 MG per tablet   Commonly known as:  PERCOCET   Dose:  1-2 tablet   Quantity:  15 tablet        Take 1-2 tablets by mouth every 4 hours as needed for pain   Refills:  0          Our records show that you are taking the medicines listed below. If these are incorrect, please call your family doctor or clinic.        Dose / Directions Last dose taken    albuterol 108 (90 BASE) MCG/ACT Inhaler   Commonly known as:  PROAIR HFA/PROVENTIL HFA/VENTOLIN HFA   Dose:  2 puff   Quantity:  1 Inhaler        Inhale 2 puffs into the lungs every 6 hours as needed for shortness of breath / dyspnea   Refills:  6        Azelastine HCl 0.15 % Soln   Dose:  2 spray   Quantity:  30 mL        Spray 2 sprays into both nostrils 2 times daily   Refills:  11        azithromycin  250 MG tablet   Commonly known as:  ZITHROMAX   Quantity:  6 tablet        Two tablets first day, then one tablet daily for four days.   Refills:  0        DEPAKOTE PO   Dose:  500 mg        Take 500 mg by mouth 2 times daily   Refills:  0        lisinopril 5 MG tablet   Commonly known as:  PRINIVIL/ZESTRIL   Dose:  5 mg   Quantity:  90 tablet        Take 1 tablet (5 mg) by mouth daily   Refills:  3        metFORMIN 500 MG tablet   Commonly known as:  GLUCOPHAGE   Dose:  500 mg   Quantity:  270 tablet        Take 1 tablet (500 mg) by mouth 2 times daily (with meals)   Refills:  3        omega 3 1000 MG Caps   Dose:  1 g   Quantity:  90 capsule        Take 1 g by mouth daily   Refills:  0        simvastatin 20 MG tablet   Commonly known as:  ZOCOR   Dose:  20 mg   Quantity:  90 tablet        Take 1 tablet (20 mg) by mouth At Bedtime   Refills:  3                Prescriptions were sent or printed at these locations (2 Prescriptions)                   Other Prescriptions                Printed at Department/Unit printer (2 of 2)         oxyCODONE-acetaminophen (PERCOCET) 5-325 MG per tablet               ondansetron (ZOFRAN ODT) 4 MG ODT tab                Procedures and tests performed during your visit     Basic metabolic panel    CBC with platelets differential    CT Abdomen Pelvis w/o Contrast    Peripheral IV: Standard    Pulse oximetry nursing    UA with Microscopic      Orders Needing Specimen Collection     None      Pending Results     Date and Time Order Name Status Description    4/7/2017 1757 CT Abdomen Pelvis w/o Contrast Preliminary             Pending Culture Results     No orders found from 4/5/2017 to 4/8/2017.            Test Results From Your Hospital Stay        4/7/2017  5:01 PM      Component Results     Component Value Ref Range & Units Status    Color Urine Yellow  Final    Appearance Urine Clear  Final    Glucose Urine Negative NEG mg/dL Final    Bilirubin Urine Negative NEG Final    Ketones  Urine Negative NEG mg/dL Final    Specific Gravity Urine 1.016 1.003 - 1.035 Final    Blood Urine Small (A) NEG Final    pH Urine 8.0 (H) 5.0 - 7.0 pH Final    Protein Albumin Urine Negative NEG mg/dL Final    Urobilinogen mg/dL 4.0 (H) 0.0 - 2.0 mg/dL Final    Nitrite Urine Negative NEG Final    Leukocyte Esterase Urine Negative NEG Final    Source Midstream Urine  Final    WBC Urine 1 0 - 2 /HPF Final    RBC Urine 21 (H) 0 - 2 /HPF Final    Squamous Epithelial /HPF Urine 1 0 - 1 /HPF Final    Mucous Urine Present (A) NEG /LPF Final    Hyaline Casts 1 0 - 2 /LPF Final         4/7/2017  5:37 PM      Component Results     Component Value Ref Range & Units Status    WBC 6.5 4.0 - 11.0 10e9/L Final    RBC Count 4.50 3.8 - 5.2 10e12/L Final    Hemoglobin 13.3 11.7 - 15.7 g/dL Final    Hematocrit 41.3 35.0 - 47.0 % Final    MCV 92 78 - 100 fl Final    MCH 29.6 26.5 - 33.0 pg Final    MCHC 32.2 31.5 - 36.5 g/dL Final    RDW 12.2 10.0 - 15.0 % Final    Platelet Count 204 150 - 450 10e9/L Final    Diff Method Automated Method  Final    % Neutrophils 49.2 % Final    % Lymphocytes 42.3 % Final    % Monocytes 6.0 % Final    % Eosinophils 1.6 % Final    % Basophils 0.6 % Final    % Immature Granulocytes 0.3 % Final    Nucleated RBCs 0 0 /100 Final    Absolute Neutrophil 3.2 1.6 - 8.3 10e9/L Final    Absolute Lymphocytes 2.7 0.8 - 5.3 10e9/L Final    Absolute Monocytes 0.4 0.0 - 1.3 10e9/L Final    Absolute Eosinophils 0.1 0.0 - 0.7 10e9/L Final    Absolute Basophils 0.0 0.0 - 0.2 10e9/L Final    Abs Immature Granulocytes 0.0 0 - 0.4 10e9/L Final    Absolute Nucleated RBC 0.0  Final         4/7/2017  5:57 PM      Component Results     Component Value Ref Range & Units Status    Sodium 143 133 - 144 mmol/L Final    Potassium 4.1 3.4 - 5.3 mmol/L Final    Chloride 109 94 - 109 mmol/L Final    Carbon Dioxide 27 20 - 32 mmol/L Final    Anion Gap 7 3 - 14 mmol/L Final    Glucose 97 70 - 99 mg/dL Final    Urea Nitrogen 11 7 - 30 mg/dL  Final    Creatinine 0.99 0.52 - 1.04 mg/dL Final    GFR Estimate 57 (L) >60 mL/min/1.7m2 Final    Non  GFR Calc    GFR Estimate If Black 69 >60 mL/min/1.7m2 Final    African American GFR Calc    Calcium 8.9 8.5 - 10.1 mg/dL Final         4/7/2017  7:07 PM      Narrative     CT ABDOMEN AND PELVIS WITHOUT CONTRAST 4/7/2017 6:51 PM     HISTORY: Abdominal pain.    Radiation dose for this scan was reduced using automated exposure  control, adjustment of the mA and/or kV according to patient size, or  iterative reconstruction technique.    FINDINGS: There are probable right renal cysts, not well delineated  without contrast enhancement. Nonobstructing stone is noted in the mid  pole of the left kidney. No hydronephrosis or ureteral stone is  demonstrated. There is a normal-appearing appendix. No evidence of  bowel obstruction or pericolonic inflammatory stranding. Small hepatic  cyst is noted adjacent to the gallbladder fossa. The liver and spleen  are otherwise unremarkable for the unenhanced technique. Sigmoid  diverticulosis is noted without evidence of diverticulitis. Pelvic  contents appear within normal limits for the unenhanced technique.        Impression     IMPRESSION:  1. Left renal midpole papillary nonobstructing stone, unchanged from  10/26/2015. No apparent hydronephrosis or ureteral stone.  2. No unenhanced CT evidence of an acute inflammatory process in the  abdomen or pelvis.                Clinical Quality Measure: Blood Pressure Screening     Your blood pressure was checked while you were in the emergency department today. The last reading we obtained was  BP: (!) 140/99 . Please read the guidelines below about what these numbers mean and what you should do about them.  If your systolic blood pressure (the top number) is less than 120 and your diastolic blood pressure (the bottom number) is less than 80, then your blood pressure is normal. There is nothing more that you need to do about  "it.  If your systolic blood pressure (the top number) is 120-139 or your diastolic blood pressure (the bottom number) is 80-89, your blood pressure may be higher than it should be. You should have your blood pressure rechecked within a year by a primary care provider.  If your systolic blood pressure (the top number) is 140 or greater or your diastolic blood pressure (the bottom number) is 90 or greater, you may have high blood pressure. High blood pressure is treatable, but if left untreated over time it can put you at risk for heart attack, stroke, or kidney failure. You should have your blood pressure rechecked by a primary care provider within the next 4 weeks.  If your provider in the emergency department today gave you specific instructions to follow-up with your doctor or provider even sooner than that, you should follow that instruction and not wait for up to 4 weeks for your follow-up visit.        Thank you for choosing [facility]       Thank you for choosing [facility] for your care. Our goal is always to provide you with excellent care. Hearing back from our patients is one way we can continue to improve our services. Please take a few minutes to complete the written survey that you may receive in the mail after you visit with us. Thank you!        [employer] Information     [employer] lets you send messages to your doctor, view your test results, renew your prescriptions, schedule appointments and more. To sign up, go to [email or web address] . Click on \"Log in\" on the left side of the screen, which will take you to the Welcome page. Then click on \"Sign up Now\" on the right side of the page.     You will be asked to enter the access code listed below, as well as some personal information. Please follow the directions to create your username and password.     Your access code is: [email or web address]  Expires: 2017  2:39 PM     Your access code will  in 90 days. If you need help or a new code, please call " your [facility] clinic or [phone].        Care EveryWhere ID     This is your Care EveryWhere ID. This could be used by other organizations to access your [facility] medical records  [id number]        After Visit Summary       This is your record. Keep this with you and show to your community pharmacist(s) and doctor(s) at your next visit.

## 2017-04-08 NOTE — DISCHARGE INSTRUCTIONS
*Abdominal Pain, Unknown Cause (Female)    The exact cause of your abdominal (stomach) pain is not certain. This does not mean that this is something to worry about, or the right tests were not done. Everyone likes to know the exact cause of the problem, but sometimes with abdominal pain, there is no clear-cut cause, and this could be a good thing. The good news is that your symptoms can be treated, and you will feel better.   Your condition does not seem serious now; however, sometimes the signs of a serious problem may take more time to appear. For this reason, it is important for you to watch for any new symptoms, problems, or worsening of your condition.  Over the next few days, the abdominal pain may come and go, or be continuous. Other common symptoms can include nausea and vomiting. Sometimes it can be difficult to tell if you feel nauseous, you may just feel bad and not associate that feeling with nausea. Constipation, diarrhea, and a fever may go along with the pain.  The pain may continue even if treated correctly over the following days. Depending on how things go, sometimes the cause can become clear and may require further or different treatment. Additional evaluations, medications, or tests may be needed.  Home care  Your health care provider may prescribe medications for pain, symptoms, or an infection.  Follow the health care provider's instructions for taking these medications.  General care    Rest until your next exam. No strenuous activities.    Try to find positions that ease discomfort. A small pillow placed on the abdomen may help relieve pain.    Something warm on your abdomen (such as a heating pad) may help, but be careful not to burn yourself.  Diet    Do not force yourself to eat, especially if having cramps, vomiting, or diarrhea.    Water is important so you do not get dehydrated. Soup may also be good. Sports drinks may also help, especially if they are not too acidic. Make sure you  don't drink sugary drinks as this can make things worse. Take liquids in small amounts. Do not guzzle them.    Caffeine sometimes makes the pain and cramping worse.    Avoid dairy products if you have vomiting or diarrhea.    Don't eat large amounts at a time. Wait a few minutes between bites.    Eat a diet low in fiber (called a low-residue diet). Foods allowed include refined breads, white rice, fruit and vegetable juices without pulp, tender meats. These foods will pass more easily through the intestine.    Avoid fried or fatty foods, dairy, alcohol and spicy foods until your symptoms go away.  Follow-up care  Follow up with your health care provider as instructed, or if your pain does not begin to improve in the next 24 hours.  When to seek medical care  Seek prompt medical care if any of the following occur:    Pain gets worse or moves to the right lower abdomen    New or worsening vomiting or diarrhea    Swelling of the abdomen    Unable to pass stool for more than three days    New fever over 101  F (38.3 C), or rising fever    Blood in vomit or bowel movements (dark red or black color)    Jaundice (yellow color of eyes and skin)    Weakness, dizziness    Chest, arm, back, neck or jaw pain    Unexpected vaginal bleeding or missed period  Call 911  Call emergency services if any of the following occur:    Trouble breathing    Confusion    Fainting or loss of consciousness    Rapid heart rate    Seizure    [id number] [facility], [address] [place] [address], [place] [address]. All rights reserved. This information is not intended as a substitute for professional medical care. Always follow your healthcare professional's instructions.      Opioid Medication Information    You have been given a prescription for an opioid (narcotic) pain medicine and/or have received a pain medicine while here in the Emergency Department. These medicines can make you drowsy or impaired. You must not drive, operate dangerous  equipment, or engage in any other dangerous activities while taking these medications. If you drive while taking these medications, you could be arrested for DUI, or driving under the influence. Do not drink any alcohol while you are taking these medications.   Opioid pain medications can cause addiction. If you have a history of chemical dependency of any type, you are at a higher risk of becoming addicted to pain medications.  Only take these prescribed medications to treat your pain when all other options have been tried. Take it for as short a time and as few doses as possible. Store your pain pills in a secure place, as they are frequently stolen and provide a dangerous opportunity for children or visitors in your house to start abusing these powerful medications. We will not replace any lost or stolen medicine.  As soon as your pain is better, you should flush all your remaining medication.   Many prescription pain medications contain Tylenol  (acetaminophen), including Vicodin , Tylenol #3 , Norco , Lortab , and Percocet .  You should not take any extra pills of Tylenol  if you are using these prescription medications or you can get very sick.  Do not ever take more than 4000 mg of acetaminophen in any 24 hour period.  All opioids tend to cause constipation. Drink plenty of water and eat foods that have a lot of fiber, such as fruits, vegetables, prune juice, apple juice and high fiber cereal.  Take a laxative if you don t move your bowels at least every other day. Miralax , Milk of Magnesia, Colace , or Senna  can be used to keep you regular.

## 2017-04-08 NOTE — ED PROVIDER NOTES
CHIEF COMPLAINT:  Left lower quadrant pain.      HISTORY OF PRESENT ILLNESS:  The patient Grecia Kilgore is a 62-year-old female who presents with a 2-day history of intermittent left lower quadrant pain radiating to the left lower flank.  She notes that the pain started very briefly in the left lower quadrant on 04/05/2017.  It seemed to basically go away after she had a bowel movement.  She said the bowel movement looked normal.  There was no diarrhea, no nausea, vomiting, no fever and no blood in stools.  She did not have any pain or recurrence yesterday.  Today, however, the pain returned 30 minutes prior to arrival when she was pushing a box of DVDs on the floor.  She did not lift the box and did not have any trauma to the left lower quadrant.  After she pushed the box the pain returned in the same area.  She felt the urge to have a bowel movement and was having vaginal pressure, urgency and hesitancy.  She called the Nurse Line and was advised to come to the hospital.      After the patient checked into Triage she felt the urge to go to the bathroom and had a large bowel movement.  However, after this the pain did not go away, and she still had urinary urgency.  At this point on my examination she rates her pain as a 5 out of 10.  She has nausea, but no vomiting and denies fever.  She denies a history of kidney stones, although she has been told that she has a left-sided stone within her kidney.  She has no history of diverticulosis and no history of diverticulitis.  She denies other concerns at this point.      REVIEW OF SYSTEMS:  Please see History of Present Illness above, other systems negative.      ALLERGIES:     1.  Codeine.   2.  Morphine.   4.  Phenothiazine.   5.  Primatene Mist.   6.  Vicodin.   7.  Latex.      CURRENT MEDICATIONS:     1.  Albuterol.   2.  Depakote.   3.  Lisinopril.   4.  Metformin.   5.  Omega 3 tablets.   6.  Zocor.      PAST MEDICAL HISTORY:     1.  Allergic rhinitis.   2.   Migraines.   3.  High cholesterol.   4.  History of hepatitis A.   5.  Chronic back pain.   6.  External hemorrhoids.   7.  Depression.   8.  Asthma.   9.  Pancreatitis.   10.  Ovarian cyst.   11.  Liver nodule.   12.  Left DCIS.   13.  G6PD deficiency.   14.  Diverticulosis.   15.  Diabetes Type 2.   16.  Hypertension.      PAST SURGICAL HISTORY:     1.  Hysterectomy.   2.  Foot surgery.   3.  Hemorrhoidectomy.   4.  Knee arthroscopy.   5.  Breast biopsy.      SOCIAL HISTORY:  The patient denies smoking and denies the use of alcohol.  She is here with family.      PHYSICAL EXAMINATION:   VITAL SIGNS:  Blood pressure 157/109, pulse 62, respiratory rate 16, O2 saturations 100% on room air, temperature 98.2.   GENERAL:  The patient is sitting quietly and appears comfortable.   HEENT:  Pupils equal, round and reactive to light, extraocular movements intact, mucous membranes moist, oropharynx clear.   NECK:  Soft and supple, no lymphadenopathy.   RESPIRATORY:  Lungs clear to auscultation bilaterally.     CHEST:  No chest wall tenderness.   HEART:  Regular rate and rhythm, no murmurs, rubs or gallops.   ABDOMEN:  There is tenderness in the left lower quadrant to mild suprapubic pressure with good bowel sounds, no distention.   BACK:  No bilateral CVAT noted.   EXTREMITIES:  Warm and well-perfused, good distal pulses, no cyanosis, edema or rash.   NEUROLOGIC:  Alert and conversant, no focal deficits.      LABORATORY DATA/IMAGIN.  CBC within normal range.     2.  Electrolytes normal.     3.  Urine contains small blood, negative LE, negative nitrites, 1 WBC/hpf, 21 RBC/hpf.     4.  CT scan shows a left renal mid pole papillary nonobstructing stone unchanged from 10/21/2015, no hydronephrosis or ureteral stone.      EMERGENCY DEPARTMENT COURSE:  The patient had an IV started, and labs were sent.  Initially she did receive Dilaudid IV which did not help with her pain.  We planned to give her 1 mg of IV Dilaudid and 4  mg of IV Zofran.  However, the patient stated that her pain went away before the Nurse was able to give her those IV medications, so she did not receive them.  CT scan was unremarkable other than the finding of the known nonobstructing stone and diverticulosis.        I did discuss evaluation so far with the patient.  The only thing we had found was microscopic hematuria.  Because we could not find a natural reason for this I did recommend that the patient follow up with a Urologist to evaluate this.  She was comfortable and pain-free upon discharge with no reason for admission and further work-up.  She was comfortable with the plan.      The patient was sent home with Percocet and Zofran p.r.n.  I did ask that she stop the Motrin and Tylenol.  She is to push fluids.  The patient was advised to come back with increased fever, severe pain, bloody urine, bloody diarrhea or other concerns.  All her questions were answered, and she was given a copy of her diagnostic results.  She was discharged with her family.      EMERGENCY DEPARTMENT DIAGNOSES:     1.  Left lower quadrant pain, unknown etiology.   2.  Microscopic hematuria.         LIA NIEVES MD             D: 2017 19:32   T: 2017 20:52   MT: [id number]      Name:     EDIL STEVENSON   MRN:      [id number]        Account:      [id number]   :      1955           Visit Date:   2017      Document: [id number]       cc: Romel Mccall MD

## 2017-04-10 ENCOUNTER — TELEPHONE (OUTPATIENT)
Dept: FAMILY MEDICINE | Facility: CLINIC | Age: 62
End: 2017-04-10

## 2017-04-10 DIAGNOSIS — R31.9 HEMATURIA: Primary | ICD-10-CM

## 2017-04-10 NOTE — TELEPHONE ENCOUNTER
Patient informed with referral information    Your provider has referred you to: [facility] [place] [phone]   [email or web address]    Christiana Garcia RN

## 2017-04-10 NOTE — TELEPHONE ENCOUNTER
Name of caller: TEJAS  Relationship to Patient: SELF    Reason for Call:  PT WOULD LIKE TO SPEAK TO DR SALVADOR'S NURSE REGARDING HER LAST ER VISIT ON 4/7/2019.  PT WOULD LIKE A CALL BACK TODAY FROM NURSE.    Best phone number to reach pt at is: [phone]  Ok to leave a message with medical info? YES    Pharmacy preferred (if calling for a refill): [facility]

## 2017-04-12 ENCOUNTER — TRANSFERRED RECORDS (OUTPATIENT)
Dept: HEALTH INFORMATION MANAGEMENT | Facility: CLINIC | Age: 62
End: 2017-04-12

## 2017-05-02 ENCOUNTER — OFFICE VISIT (OUTPATIENT)
Dept: UROLOGY | Facility: CLINIC | Age: 62
End: 2017-05-02
Payer: COMMERCIAL

## 2017-05-02 VITALS — OXYGEN SATURATION: 98 % | HEIGHT: 67 IN | HEART RATE: 52 BPM | BODY MASS INDEX: 25.11 KG/M2 | WEIGHT: 160 LBS

## 2017-05-02 DIAGNOSIS — R31.29 MICROHEMATURIA: Primary | ICD-10-CM

## 2017-05-02 LAB
ALBUMIN UR-MCNC: ABNORMAL MG/DL
APPEARANCE UR: CLEAR
BILIRUB UR QL STRIP: NEGATIVE
COLOR UR AUTO: YELLOW
GLUCOSE UR STRIP-MCNC: NEGATIVE MG/DL
HGB UR QL STRIP: NEGATIVE
KETONES UR STRIP-MCNC: NEGATIVE MG/DL
LEUKOCYTE ESTERASE UR QL STRIP: NEGATIVE
NITRATE UR QL: NEGATIVE
PH UR STRIP: 7.5 PH (ref 5–7)
SP GR UR STRIP: 1.02 (ref 1–1.03)
URN SPEC COLLECT METH UR: ABNORMAL
UROBILINOGEN UR STRIP-ACNC: 2 EU/DL (ref 0.2–1)

## 2017-05-02 PROCEDURE — 99203 OFFICE O/P NEW LOW 30 MIN: CPT | Performed by: UROLOGY

## 2017-05-02 PROCEDURE — 81003 URINALYSIS AUTO W/O SCOPE: CPT | Performed by: UROLOGY

## 2017-05-02 ASSESSMENT — PAIN SCALES - GENERAL: PAINLEVEL: NO PAIN (1)

## 2017-05-02 NOTE — MR AVS SNAPSHOT
"              After Visit Summary   5/2/2017    Grecia Kilgore    MRN: [id number]           Patient Information     Date Of Birth          1955        Visit Information        Provider Department      5/2/2017 2:20 PM Prabhu Pacheco MD [facility]        Today's Diagnoses     Microhematuria    -  1       Follow-ups after your visit        Follow-up notes from your care team     Return for cystoscopy, cytology.      Your next 10 appointments already scheduled     Jun 13, 2017  1:20 PM CDT   Cystoscopy with Prabhu Pacheco MD, UB CYF   [facility] ([facility])    [address]  [address]  [facility] [address] [phone]              Who to contact     If you have questions or need follow up information about today's clinic visit or your schedule please contact [facility]OLOG[facility] directly at [phone].  Normal or non-critical lab and imaging results will be communicated to you by [employer], letter or phone within 4 business days after the clinic has received the results. If you do not hear from us within 7 days, please contact the clinic through [employer] or phone. If you have a critical or abnormal lab result, we will notify you by phone as soon as possible.  Submit refill requests through [employer] or call your pharmacy and they will forward the refill request to us. Please allow 3 business days for your refill to be completed.          Additional Information About Your Visit        [employer] Information     [employer] lets you send messages to your doctor, view your test results, renew your prescriptions, schedule appointments and more. To sign up, go to [email or web address] . Click on \"Log in\" on the left side of the screen, which will take you to the Welcome page. Then click on \"Sign up Now\" on the right side of the page.     You will be " "asked to enter the access code listed below, as well as some personal information. Please follow the directions to create your username and password.     Your access code is: [email or web address]  Expires: 2017  2:39 PM     Your access code will  in 90 days. If you need help or a new code, please call your [place] clinic or [phone].        Care EveryWhere ID     This is your Care EveryWhere ID. This could be used by other organizations to access your [place] medical records  [id number]        Your Vitals Were     Pulse Height Last Period Pulse Oximetry BMI (Body Mass Index)       52 1.702 m (5' 7\") 1985 98% 25.06 kg/m2        Blood Pressure from Last 3 Encounters:   17 142/90   17 118/82   17 120/80    Weight from Last 3 Encounters:   17 72.6 kg (160 lb)   17 76.2 kg (168 lb)   17 76.7 kg (169 lb)              We Performed the Following     UA without Microscopic          Today's Medication Changes          These changes are accurate as of: 17  3:18 PM.  If you have any questions, ask your nurse or doctor.               Stop taking these medicines if you haven't already. Please contact your care team if you have questions.     azithromycin 250 MG tablet   Commonly known as:  ZITHROMAX   Stopped by:  Prabhu Pacheco MD           oxyCODONE-acetaminophen 5-325 MG per tablet   Commonly known as:  PERCOCET   Stopped by:  Prabhu Pacheco MD                    Primary Care Provider Office Phone # Fax #    Romel Mccall [phone]       [facility] [place] [address] [place] [address] [address]  [place] [address]        Thank you!     Thank you for choosing [facility] [place]  for your care. Our goal is always to provide you with excellent care. Hearing back from our patients is one way we can continue to improve our services. Please take a few minutes to complete the written survey that you may receive " in the mail after your visit with us. Thank you!             Your Updated Medication List - Protect others around you: Learn how to safely use, store and throw away your medicines at www.disposemymeds.org.          This list is accurate as of: 5/2/17  3:18 PM.  Always use your most recent med list.                   Brand Name Dispense Instructions for use    albuterol 108 (90 BASE) MCG/ACT Inhaler    PROAIR HFA/PROVENTIL HFA/VENTOLIN HFA    1 Inhaler    Inhale 2 puffs into the lungs every 6 hours as needed for shortness of breath / dyspnea       Azelastine HCl 0.15 % Soln     30 mL    Spray 2 sprays into both nostrils 2 times daily       DEPAKOTE PO      Take 500 mg by mouth 2 times daily       lisinopril 5 MG tablet    PRINIVIL/ZESTRIL    90 tablet    Take 1 tablet (5 mg) by mouth daily       metFORMIN 500 MG tablet    GLUCOPHAGE    270 tablet    Take 1 tablet (500 mg) by mouth 2 times daily (with meals)       omega 3 1000 MG Caps     90 capsule    Take 1 g by mouth daily       simvastatin 20 MG tablet    ZOCOR    90 tablet    Take 1 tablet (20 mg) by mouth At Bedtime

## 2017-05-02 NOTE — NURSING NOTE
L side abd pain in April.  Pt had bm and pain went away.  Pt had CT.  Pt does have a stone on L side.  Pt had an eating disorder.  OMAR Lopez, CMA
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
## 2017-06-07 ENCOUNTER — TRANSFERRED RECORDS (OUTPATIENT)
Dept: HEALTH INFORMATION MANAGEMENT | Facility: CLINIC | Age: 62
End: 2017-06-07

## 2017-06-13 ENCOUNTER — OFFICE VISIT (OUTPATIENT)
Dept: UROLOGY | Facility: CLINIC | Age: 62
End: 2017-06-13
Payer: COMMERCIAL

## 2017-06-13 VITALS — HEART RATE: 58 BPM | OXYGEN SATURATION: 98 % | HEIGHT: 67 IN

## 2017-06-13 DIAGNOSIS — R31.9 HEMATURIA: Primary | ICD-10-CM

## 2017-06-13 LAB
ALBUMIN UR-MCNC: NEGATIVE MG/DL
APPEARANCE UR: CLEAR
BILIRUB UR QL STRIP: ABNORMAL
COLOR UR AUTO: YELLOW
GLUCOSE UR STRIP-MCNC: NEGATIVE MG/DL
HGB UR QL STRIP: NEGATIVE
KETONES UR STRIP-MCNC: NEGATIVE MG/DL
LEUKOCYTE ESTERASE UR QL STRIP: NEGATIVE
NITRATE UR QL: NEGATIVE
PH UR STRIP: 5.5 PH (ref 5–7)
SP GR UR STRIP: 1.02 (ref 1–1.03)
URN SPEC COLLECT METH UR: ABNORMAL
UROBILINOGEN UR STRIP-ACNC: 2 EU/DL (ref 0.2–1)

## 2017-06-13 PROCEDURE — 99212 OFFICE O/P EST SF 10 MIN: CPT | Mod: 25 | Performed by: UROLOGY

## 2017-06-13 PROCEDURE — 52000 CYSTOURETHROSCOPY: CPT | Performed by: UROLOGY

## 2017-06-13 PROCEDURE — 88112 CYTOPATH CELL ENHANCE TECH: CPT | Performed by: UROLOGY

## 2017-06-13 PROCEDURE — 81003 URINALYSIS AUTO W/O SCOPE: CPT | Performed by: UROLOGY

## 2017-06-13 RX ORDER — CIPROFLOXACIN 500 MG/1
500 TABLET, FILM COATED ORAL ONCE
Qty: 1 TABLET | Refills: 1 | Status: SHIPPED | OUTPATIENT
Start: 2017-06-13 | End: 2017-06-13

## 2017-06-13 ASSESSMENT — PAIN SCALES - GENERAL: PAINLEVEL: NO PAIN (0)
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
## 2017-06-13 NOTE — MR AVS SNAPSHOT
"              After Visit Summary   6/13/2017    Grecia Kilgore    MRN: [id number]           Patient Information     Date Of Birth          1955        Visit Information        Provider Department      6/13/2017 1:20 PM Prabhu Pacheco MD; UB CYF [facility] [place]        Today's Diagnoses     Hematuria    -  1      Care Instructions         AFTER YOUR CYSTOSCOPY         You have just completed a cystoscopy, or \"cysto\", which allowed your physician to learn more about your bladder (or to remove a stent placed after surgery). We suggest that you continue to avoid caffeine, fruit juice, and alcohol for the next 24 hours, however, you are encouraged to return to your normal activities.       A few things that are considered normal after your cystoscopy:    * small amount of bleeding (or spotting) that clears within the next 24 hours    * slight burning sensation with urination    * sensation to of needing to avoid more frequently    * the feeling of \"air\" in your urine    * mild discomfort that is relieved with Tylonol        Please contact our office promptly if you:    * develop a fever above 101 degrees    * are unable to urinate    * develop bright red blood that does not stop    * severe pain or swelling        And of course, please contact our office with any concerns or questions [phone]                Follow-ups after your visit        Who to contact     If you have questions or need follow up information about today's clinic visit or your schedule please contact [facility] [place] directly at [phone].  Normal or non-critical lab and imaging results will be communicated to you by MyChart, letter or phone within 4 business days after the clinic has received the results. If you do not hear from us within 7 days, please contact the clinic through MyChart or phone. If you have a critical or abnormal lab result, we " "will notify you by phone as soon as possible.  Submit refill requests through [employer] or call your pharmacy and they will forward the refill request to us. Please allow 3 business days for your refill to be completed.          Additional Information About Your Visit        [employer] Information     [employer] lets you send messages to your doctor, view your test results, renew your prescriptions, schedule appointments and more. To sign up, go to [email or web address] . Click on \"Log in\" on the left side of the screen, which will take you to the Welcome page. Then click on \"Sign up Now\" on the right side of the page.     You will be asked to enter the access code listed below, as well as some personal information. Please follow the directions to create your username and password.     Your access code is: [email or web address]  Expires: 2017  2:32 PM     Your access code will  in 90 days. If you need help or a new code, please call your [facility] clinic or [phone].        Care EveryWhere ID     This is your Care EveryWhere ID. This could be used by other organizations to access your [facility] medical records  [id number]        Your Vitals Were     Pulse Height Last Period Pulse Oximetry          58 1.702 m (5' 7\") 1985 98%         Blood Pressure from Last 3 Encounters:   17 142/90   17 118/82   17 120/80    Weight from Last 3 Encounters:   17 72.6 kg (160 lb)   17 76.2 kg (168 lb)   17 76.7 kg (169 lb)              We Performed the Following     Cytology non gyn [FTF0942]     UA without Microscopic        Primary Care Provider Office Phone # Fax #    Romel Mccall [phone]       [facility] [place] [address] [place] [address] [address]  [place] [address]        Thank you!     Thank you for choosing [facility] [place]  for your care. Our goal is always to provide you with excellent care. Hearing back from our patients " is one way we can continue to improve our services. Please take a few minutes to complete the written survey that you may receive in the mail after your visit with us. Thank you!             Your Updated Medication List - Protect others around you: Learn how to safely use, store and throw away your medicines at www.disposemymeds.org.          This list is accurate as of: 6/13/17  2:32 PM.  Always use your most recent med list.                   Brand Name Dispense Instructions for use    albuterol 108 (90 BASE) MCG/ACT Inhaler    PROAIR HFA/PROVENTIL HFA/VENTOLIN HFA    1 Inhaler    Inhale 2 puffs into the lungs every 6 hours as needed for shortness of breath / dyspnea       Azelastine HCl 0.15 % Soln     30 mL    Spray 2 sprays into both nostrils 2 times daily       DEPAKOTE PO      Take 500 mg by mouth 2 times daily       lisinopril 5 MG tablet    PRINIVIL/ZESTRIL    90 tablet    Take 1 tablet (5 mg) by mouth daily       metFORMIN 500 MG tablet    GLUCOPHAGE    270 tablet    Take 1 tablet (500 mg) by mouth 2 times daily (with meals)       omega 3 1000 MG Caps     90 capsule    Take 1 g by mouth daily       simvastatin 20 MG tablet    ZOCOR    90 tablet    Take 1 tablet (20 mg) by mouth At Bedtime       ZOLOFT PO      Take 25 mg by mouth daily

## 2017-06-13 NOTE — NURSING NOTE
Pt has had diahreirra for the last 2 days, possible from new meds.  Pt feels nauseous now.  Pt doesn't know wt..... Hx of eating disorder.  Pt is on a new depression med and she doesn't remember what it is called.  Prior to the start of the procedure and with procedural staff participation, I verbally confirmed the patient s identity using two indicators, relevant allergies, that the procedure was appropriate and matched the consent or emergent situation, and that the correct equipment/implants were available. Immediately prior to starting the procedure I conducted the Time Out with the procedural staff and re-confirmed the patient s name, procedure, and site/side. (The Joint Commission universal protocol was followed.)  Yes    Sedation (Moderate or Deep): None  Pt has signed the consent form stating that we will be doing a CYSTOSCOPY (with or without stent removal) today, and that it is the correct procedure. I verbally confirmed the patient s identity using two indicators, relevant allergies, and that the correct equipment was available. Post-op information given to the pt as needed at check-out. I have sent an appropriate antibiotic to the pharmacy in our building as recommended by the MD. OMAR Lopez CMA

## 2017-06-13 NOTE — PATIENT INSTRUCTIONS
"     AFTER YOUR CYSTOSCOPY         You have just completed a cystoscopy, or \"cysto\", which allowed your physician to learn more about your bladder (or to remove a stent placed after surgery). We suggest that you continue to avoid caffeine, fruit juice, and alcohol for the next 24 hours, however, you are encouraged to return to your normal activities.       A few things that are considered normal after your cystoscopy:    * small amount of bleeding (or spotting) that clears within the next 24 hours    * slight burning sensation with urination    * sensation to of needing to avoid more frequently    * the feeling of \"air\" in your urine    * mild discomfort that is relieved with Tylonol        Please contact our office promptly if you:    * develop a fever above 101 degrees    * are unable to urinate    * develop bright red blood that does not stop    * severe pain or swelling        And of course, please contact our office with any concerns or questions [phone]        "

## 2017-06-14 ENCOUNTER — TRANSFERRED RECORDS (OUTPATIENT)
Dept: HEALTH INFORMATION MANAGEMENT | Facility: CLINIC | Age: 62
End: 2017-06-14

## 2017-06-15 LAB — COPATH REPORT: NORMAL

## 2017-06-16 ENCOUNTER — HOSPITAL ENCOUNTER (EMERGENCY)
Facility: CLINIC | Age: 62
Discharge: LEFT BEFORE DISCHARGE | End: 2017-06-16
Admitting: EMERGENCY MEDICINE
Payer: COMMERCIAL

## 2017-06-16 ENCOUNTER — HOSPITAL ENCOUNTER (EMERGENCY)
Facility: CLINIC | Age: 62
Discharge: HOME | End: 2017-06-16
Attending: EMERGENCY MEDICINE | Admitting: EMERGENCY MEDICINE
Payer: COMMERCIAL

## 2017-06-16 VITALS
TEMPERATURE: 98.6 F | DIASTOLIC BLOOD PRESSURE: 96 MMHG | OXYGEN SATURATION: 99 % | RESPIRATION RATE: 16 BRPM | HEART RATE: 71 BPM | SYSTOLIC BLOOD PRESSURE: 139 MMHG

## 2017-06-16 VITALS
TEMPERATURE: 98.2 F | OXYGEN SATURATION: 100 % | RESPIRATION RATE: 18 BRPM | DIASTOLIC BLOOD PRESSURE: 104 MMHG | HEART RATE: 69 BPM | SYSTOLIC BLOOD PRESSURE: 156 MMHG

## 2017-06-16 DIAGNOSIS — J06.9 UPPER RESPIRATORY TRACT INFECTION, UNSPECIFIED TYPE: ICD-10-CM

## 2017-06-16 LAB
DEPRECATED S PYO AG THROAT QL EIA: NORMAL
MICRO REPORT STATUS: NORMAL
SPECIMEN SOURCE: NORMAL

## 2017-06-16 PROCEDURE — 99283 EMERGENCY DEPT VISIT LOW MDM: CPT

## 2017-06-16 PROCEDURE — 87081 CULTURE SCREEN ONLY: CPT | Performed by: EMERGENCY MEDICINE

## 2017-06-16 PROCEDURE — 25000125 ZZHC RX 250: Performed by: EMERGENCY MEDICINE

## 2017-06-16 PROCEDURE — 87880 STREP A ASSAY W/OPTIC: CPT | Performed by: EMERGENCY MEDICINE

## 2017-06-16 RX ORDER — PREDNISONE 20 MG/1
40 TABLET ORAL ONCE
Status: COMPLETED | OUTPATIENT
Start: 2017-06-16 | End: 2017-06-16

## 2017-06-16 RX ORDER — PREDNISONE 20 MG/1
40 TABLET ORAL DAILY
Qty: 10 TABLET | Refills: 0 | Status: SHIPPED | OUTPATIENT
Start: 2017-06-16 | End: 2017-06-21

## 2017-06-16 RX ADMIN — PREDNISONE 40 MG: 20 TABLET ORAL at 23:37

## 2017-06-16 ASSESSMENT — ENCOUNTER SYMPTOMS
FEVER: 0
DYSURIA: 0
DIARRHEA: 0
COUGH: 1
SORE THROAT: 1
RHINORRHEA: 1
HEMATURIA: 0

## 2017-06-16 NOTE — ED AVS SNAPSHOT
[facility] Emergency Department    [address]    [facility] [address]    Phone:  [phone]    Fax:  [phone]                                       Grecia Kilgore   MRN: [id number]    Department:  [facility] Emergency Department   Date of Visit:  6/16/2017           After Visit Summary Signature Page     I have received my discharge instructions, and my questions have been answered. I have discussed any challenges I see with this plan with the nurse or doctor.    ..........................................................................................................................................  Patient/Patient Representative Signature      ..........................................................................................................................................  Patient Representative Print Name and Relationship to Patient    ..................................................               ................................................  Date                                            Time    ..........................................................................................................................................  Reviewed by Signature/Title    ...................................................              ..............................................  Date                                                            Time

## 2017-06-16 NOTE — ED NOTES
Pt feels as tho there is something stuck in her throat. Pt c/o sore, scratchy throat, and feeling as it is difficult to swallow. Resp even and non-labored. Skin w/d. VSS. Pt is concerned about B/P at triage. History of HTN.

## 2017-06-16 NOTE — ED AVS SNAPSHOT
[facility]    [address] [place] [address]    Phone:  [phone]    Fax:  [phone]                                       Grecia Kilgore   MRN: [id number]    Department:  [facility] Emergency Department   Date of Visit:  6/16/2017           Patient Information     Date Of Birth          1955        Your diagnoses for this visit were:     Upper respiratory tract infection, unspecified type        You were seen by Colleen Simons MD.      Follow-up Information     Follow up with Romel Mccall MD In 3 days.    Specialty:  Family Practice    Contact information:    [facility] [place]  [address] [place] [address] [address]  [place] [address]  [phone]          Discharge Instructions       Please follow up closely with your regular physician. Please return to the ED if your symptoms worsen or if you develop new or concerning symptoms.     Discharge Instructions  Sore Throat  You were seen today for a sore throat.  Most sore throats are caused by a virus. Antibiotics do not help with viral infections, but you can fight off the virus on your own.  In this case, your sore throat would be treated with medications for your pain and fever.    Strep throat is a kind of sore throat caused by Group A streptococcus bacteria.  This type of sore throat is treated with antibiotics.  If you had a rapid test done today for strep throat and it did not show infection, we always do a culture. If the culture shows you have strep throat, we will call you and get you a prescription for antibiotics.    Return to the Emergency Department if:    If you have difficulty breathing.    If you are drooling because you are unable to swallow.    You become dehydrated due to difficulty drinking. Signs of dehydration include weakness, dry mouth, and urinating less than 3 times per day.    If you develop swelling of the neck or tongue.    If you develop a high fever with  "either headache or stiff neck.    Treatment:      Pain relief -- Non-prescription pain medications, such as Tylenol  (acetaminophen) or Motrin , Advil  (ibuprofen) are usually recommended for pain.  Do not use a medicine that you are allergic to, or if your doctor has told you not to use it.   If you have been given a narcotic such as Vicodin  (hydrocodone with acetaminophen), Percocet  (oxycodone with acetaminophen), codeine, do not drive for four hours after you have taken it.  If the narcotic contains Tylenol  (acetaminophen), do not take Tylenol  with it. All narcotics will cause constipation, so eat a high fiber diet.      If you have been placed on antibiotics, watch for signs of allergic reaction.  These include rash, lip swelling, difficulty breathing, wheezing, and dizziness.  If you develop any of these symptoms, stop the antibiotic immediately and go to an Emergency Department or Urgent Care for evaluation.    Probiotics: If you have been given an antibiotic, you may want to also take a probiotic pill or eat yogurt with live cultures. Probiotics have \"good bacteria\" to help your intestines stay healthy. Studies have shown that probiotics help prevent diarrhea and other intestine problems (including C. diff infection) when you take antibiotics. You can buy these without a prescription in the pharmacy section of the store.   If you were given a prescription for medicine here today, be sure to read all of the information (including the package insert) that comes with your prescription.  This will include important information about the medicine, its side effects, and any warnings that you need to know about.  The pharmacist who fills the prescription can provide more information and answer questions you may have about the medicine.  If you have questions or concerns that the pharmacist cannot address, please call or return to the Emergency Department.             Remember that you can always come back to the " Emergency Department if you are not able to see your regular doctor in the amount of time listed above, if you get any new symptoms, or if there is anything that worries you.            24 Hour Appointment Hotline       To make an appointment at any [facility], call [phone] ([phone]). If you don't have a family doctor or clinic, we will help you find one. [facility] clinics are conveniently located to serve the needs of you and your family.             Review of your medicines      START taking        Dose / Directions Last dose taken    predniSONE 20 MG tablet   Commonly known as:  DELTASONE   Dose:  40 mg   Quantity:  10 tablet        Take 2 tablets (40 mg) by mouth daily for 5 days   Refills:  0          Our records show that you are taking the medicines listed below. If these are incorrect, please call your family doctor or clinic.        Dose / Directions Last dose taken    albuterol 108 (90 BASE) MCG/ACT Inhaler   Commonly known as:  PROAIR HFA/PROVENTIL HFA/VENTOLIN HFA   Dose:  2 puff   Quantity:  1 Inhaler        Inhale 2 puffs into the lungs every 6 hours as needed for shortness of breath / dyspnea   Refills:  6        Azelastine HCl 0.15 % Soln   Dose:  2 spray   Quantity:  30 mL        Spray 2 sprays into both nostrils 2 times daily   Refills:  11        DEPAKOTE PO   Dose:  500 mg        Take 500 mg by mouth 2 times daily   Refills:  0        lisinopril 5 MG tablet   Commonly known as:  PRINIVIL/ZESTRIL   Dose:  5 mg   Quantity:  90 tablet        Take 1 tablet (5 mg) by mouth daily   Refills:  3        metFORMIN 500 MG tablet   Commonly known as:  GLUCOPHAGE   Dose:  500 mg   Quantity:  270 tablet        Take 1 tablet (500 mg) by mouth 2 times daily (with meals)   Refills:  3        omega 3 1000 MG Caps   Dose:  1 g   Quantity:  90 capsule        Take 1 g by mouth daily   Refills:  0        simvastatin 20 MG tablet   Commonly known as:  ZOCOR   Dose:  20 mg   Quantity:  90 tablet         Take 1 tablet (20 mg) by mouth At Bedtime   Refills:  3        ZOLOFT PO   Dose:  25 mg        Take 25 mg by mouth daily   Refills:  0                Prescriptions were sent or printed at these locations (1 Prescription)                   Other Prescriptions                Printed at Department/Unit printer (1 of 1)         predniSONE (DELTASONE) 20 MG tablet                Procedures and tests performed during your visit     Beta strep group A culture    Rapid strep screen      Orders Needing Specimen Collection     None      Pending Results     Date and Time Order Name Status Description    6/16/2017 2232 Beta strep group A culture In process             Pending Culture Results     Date and Time Order Name Status Description    6/16/2017 2232 Beta strep group A culture In process             Pending Results Instructions     If you had any lab results that were not finalized at the time of your Discharge, you can call the ED Lab Result RN at [phone]. You will be contacted by this team for any positive Lab results or changes in treatment. The nurses are available 7 days a week from 10A to 6:30P.  You can leave a message 24 hours per day and they will return your call.        Test Results From Your Hospital Stay        6/16/2017 10:44 PM      Component Results     Component    Specimen Description    Throat    Rapid Strep A Screen    NEGATIVE: No Group A streptococcal antigen detected by immunoassay, await   culture report.      Micro Report Status    FINAL 06/16/2017 6/16/2017 10:45 PM                Clinical Quality Measure: Blood Pressure Screening     Your blood pressure was checked while you were in the emergency department today. The last reading we obtained was  BP: (!) 139/96 . Please read the guidelines below about what these numbers mean and what you should do about them.  If your systolic blood pressure (the top number) is less than 120 and your diastolic blood pressure (the bottom number) is  "less than 80, then your blood pressure is normal. There is nothing more that you need to do about it.  If your systolic blood pressure (the top number) is 120-139 or your diastolic blood pressure (the bottom number) is 80-89, your blood pressure may be higher than it should be. You should have your blood pressure rechecked within a year by a primary care provider.  If your systolic blood pressure (the top number) is 140 or greater or your diastolic blood pressure (the bottom number) is 90 or greater, you may have high blood pressure. High blood pressure is treatable, but if left untreated over time it can put you at risk for heart attack, stroke, or kidney failure. You should have your blood pressure rechecked by a primary care provider within the next 4 weeks.  If your provider in the emergency department today gave you specific instructions to follow-up with your doctor or provider even sooner than that, you should follow that instruction and not wait for up to 4 weeks for your follow-up visit.        Thank you for choosing [facility]       Thank you for choosing [facility] for your care. Our goal is always to provide you with excellent care. Hearing back from our patients is one way we can continue to improve our services. Please take a few minutes to complete the written survey that you may receive in the mail after you visit with us. Thank you!        [employer] Information     [employer] lets you send messages to your doctor, view your test results, renew your prescriptions, schedule appointments and more. To sign up, go to [email or web address] . Click on \"Log in\" on the left side of the screen, which will take you to the Welcome page. Then click on \"Sign up Now\" on the right side of the page.     You will be asked to enter the access code listed below, as well as some personal information. Please follow the directions to create your username and password.     Your access code is: [email or web address]  Expires: 9/11/2017  " 2:32 PM     Your access code will  in 90 days. If you need help or a new code, please call your [facility] clinic or [phone].        Care EveryWhere ID     This is your Care EveryWhere ID. This could be used by other organizations to access your [facility] medical records  [id number]        After Visit Summary       This is your record. Keep this with you and show to your community pharmacist(s) and doctor(s) at your next visit.

## 2017-06-17 NOTE — DISCHARGE INSTRUCTIONS
Please follow up closely with your regular physician. Please return to the ED if your symptoms worsen or if you develop new or concerning symptoms.     Discharge Instructions  Sore Throat  You were seen today for a sore throat.  Most sore throats are caused by a virus. Antibiotics do not help with viral infections, but you can fight off the virus on your own.  In this case, your sore throat would be treated with medications for your pain and fever.    Strep throat is a kind of sore throat caused by Group A streptococcus bacteria.  This type of sore throat is treated with antibiotics.  If you had a rapid test done today for strep throat and it did not show infection, we always do a culture. If the culture shows you have strep throat, we will call you and get you a prescription for antibiotics.    Return to the Emergency Department if:    If you have difficulty breathing.    If you are drooling because you are unable to swallow.    You become dehydrated due to difficulty drinking. Signs of dehydration include weakness, dry mouth, and urinating less than 3 times per day.    If you develop swelling of the neck or tongue.    If you develop a high fever with either headache or stiff neck.    Treatment:      Pain relief -- Non-prescription pain medications, such as Tylenol  (acetaminophen) or Motrin , Advil  (ibuprofen) are usually recommended for pain.  Do not use a medicine that you are allergic to, or if your doctor has told you not to use it.   If you have been given a narcotic such as Vicodin  (hydrocodone with acetaminophen), Percocet  (oxycodone with acetaminophen), codeine, do not drive for four hours after you have taken it.  If the narcotic contains Tylenol  (acetaminophen), do not take Tylenol  with it. All narcotics will cause constipation, so eat a high fiber diet.      If you have been placed on antibiotics, watch for signs of allergic reaction.  These include rash, lip swelling, difficulty breathing,  "wheezing, and dizziness.  If you develop any of these symptoms, stop the antibiotic immediately and go to an Emergency Department or Urgent Care for evaluation.    Probiotics: If you have been given an antibiotic, you may want to also take a probiotic pill or eat yogurt with live cultures. Probiotics have \"good bacteria\" to help your intestines stay healthy. Studies have shown that probiotics help prevent diarrhea and other intestine problems (including C. diff infection) when you take antibiotics. You can buy these without a prescription in the pharmacy section of the store.   If you were given a prescription for medicine here today, be sure to read all of the information (including the package insert) that comes with your prescription.  This will include important information about the medicine, its side effects, and any warnings that you need to know about.  The pharmacist who fills the prescription can provide more information and answer questions you may have about the medicine.  If you have questions or concerns that the pharmacist cannot address, please call or return to the Emergency Department.             Remember that you can always come back to the Emergency Department if you are not able to see your regular doctor in the amount of time listed above, if you get any new symptoms, or if there is anything that worries you.          "

## 2017-06-17 NOTE — ED NOTES
Pt reports phlegm in her throat she is unable to clear for 3 days. Pt states it feels scratchy and sore. Pt states she has a cough starting last night as well.

## 2017-06-17 NOTE — ED PROVIDER NOTES
History     Chief Complaint:  Pharyngitis    HPI   Grecia CLAIRE Kilgore is a 62 year old female with history of asthma who presents to the emergency department today for evaluation of a sore throat that began two days ago. Patient also reports a new cough that began last night, in addition to concurrent runny nose, congestion and on-and-off ear pains. Patient notes that congestion typically precedes sore throat, stating that she has had very similar symptoms in the past, typically several times per year. With symptom onset in the past, she has followed with ENT for evaluation of this with unremarkable workup, per patient. Steroid course has provided relief. Patient reports poor relief with Benadryl at home and symptomatic care. She denies fevers, urinary or bowel movement symptoms, and recent known ill contacts. No other concerns were voiced at this time.      Allergies:  Morphine  Nitrofuran Derivatives  Phenothiazines  Primatene Mist  Vicodin  Latex      Medications:    Ativan  Allegra  Metformin  Albuterol  Lisinopril  Depakote  Mirtazapine  Zoloft      Past Medical History:    Allergic rhinitis             Migraine                      Pure hypercholesterolemia                 Hepatitis A                   Chronic back pain                   Laxative abuse                        External hemorrhoids              Depression  Mild persistent asthma                        Pancreatitis                 Ovarian cyst                Liver nodule                DCIS (ductal carcinoma in situ)                      G6PD deficiency  Nephrolithiasis                        Diverticulosis               Diabetes mellitus type 2   Panic Disorder  Osteoarthritis  Fibromyalgia     Past Surgical History:    Hysterectomy              Foot surgery                Hemorrhoidectomy banding                                     Arthroscopy knee                    Arthrodesis toe(s)                                                                                      Biopsy breast                                                                                   Family History:    DM  Breast cancer  CBVD  Cancer  Alcohol/Drug      Social History:  Relationship status: Single  Tobacco use: Negative  Alcohol use: Negative  The patient presents alone.    Review of Systems   Constitutional: Negative for fever.   HENT: Positive for congestion, ear pain, rhinorrhea and sore throat.    Respiratory: Positive for cough.    Gastrointestinal: Negative for diarrhea.   Genitourinary: Negative for dysuria and hematuria.   All other systems reviewed and are negative.    Physical Exam   First Vitals:  BP: (!) 138/98  Pulse: 71  Temp: 98.6  F (37  C)  Resp: 20  SpO2: 99 %    Physical Exam  Constitutional: The patient is oriented to person, place, and time. Alert and cooperative.  HENT:   Right Ear: External ear normal. TM normal appearing.  Left Ear: External ear normal. TM normal appearing.   Nose: Nasal congestion.  Mouth/Throat: Mild erythema in the posterior oropharynx. No tonsillar swelling or exudates. Uvula is midline.   Eyes: Conjunctivae, EOM and lids are normal. Pupils are equal, round, and reactive to light.   Neck: Trachea normal. Normal range of motion. Neck supple.   Cardiovascular: Normal rate, regular rhythm, normal heart sounds, and intact distal pulses.    Pulmonary/Chest: Effort normal and breath sounds equal bilaterally. No crackles or wheezing.   Abdominal: Soft. No tenderness. No rebound and no guarding.   Musculoskeletal: Normal range of motion.  No extremity tenderness or edema.  Neurological: Alert and Oriented. Moves all extremities equally.   Skin: Skin is dry. No rash noted.          Emergency Department Course     Laboratory:  Laboratory findings were communicated with the patient who voiced understanding of the findings.    Rapid strep screen: Negative  Beta strep group A culture: Pending    Interventions:  2330 Prednisone 40 mg Oral      Emergency Department Course:  Nursing notes and vitals reviewed.  I performed an exam of the patient as documented above.     At 2308 the patient was rechecked and we discussed labs and plan of care. Symptomatic care was discussed for home.    I personally reviewed the treatment plan with the Patient and answered all related questions prior to discharge.    Impression & Plan      Medical Decision Making:  Grecia Kilgore is a 62 year old female with a history of diabetes, hyperlipidemia, fibromyalgia, anxiety, and asthma who presents to the ED for evaluation of sore throat and cough. Upon presentation to the ED, the patient is nontoxic appearing. She is mildly hypertensive, though her vital signs are otherwise within normal limits and stable. On exam, she is well appearing. She is alert, oriented, and neuro exam is nonfocal. The posterior oropharynx is with mild erythema. There is no significant tonsillar swelling or exudates. Uvula is midline. Lungs are clear to auscultation bilaterally. Abdomen is soft and nontender throughout. The rest of her exam is as mentioned above. Rapid strep was obtained and is negative. The patient does note that she has had multiple episodes of these symptoms yearly. She states that she has been seen by ENT in the past without clear etiology of these symptoms. She does note that when these symptoms happen she is typically treated with steroids and this seems to resolve her symptoms. The patient has no findings to suggest bronchospasm on my evaluation. She is afebrile and with clear lungs bilaterally, therefore I have low suspicion for pneumonia and do not feel that chest xray is indicated at this time. Overall, I am suspicion for URI/bronchitis. Overall, given that the patient is well appearing, I do feel that the patient can be discharged to home. The patient was given a prescription for a burst of Prednisone. The patient was instructed to follow up closely with their PCP. The  patient states understanding and agreement with the plan. Return instructions were given. The patient was stable/improved at time of discharge.     Diagnosis:    ICD-10-CM    1. Upper respiratory tract infection, unspecified type J06.9        Disposition:   Discharged to home. Plan for follow up with Romel Mccall    Discharge Medications:  New Prescriptions    PREDNISONE (DELTASONE) 20 MG TABLET    Take 2 tablets (40 mg) by mouth daily for 5 days       Scribe Disclosure:  Brock RASHEED, am serving as a scribe at 10:27 PM on 6/16/2017 to document services personally performed by Colleen Simons MD, based on my observations and the provider's statements to me.  [facility] EMERGENCY DEPARTMENT       Colleen Simons MD  06/17/17 0580

## 2017-06-18 LAB
BACTERIA SPEC CULT: NORMAL
MICRO REPORT STATUS: NORMAL
SPECIMEN SOURCE: NORMAL

## 2017-06-19 ENCOUNTER — OFFICE VISIT (OUTPATIENT)
Dept: FAMILY MEDICINE | Facility: CLINIC | Age: 62
End: 2017-06-19
Payer: COMMERCIAL

## 2017-06-19 VITALS
TEMPERATURE: 98.1 F | DIASTOLIC BLOOD PRESSURE: 70 MMHG | WEIGHT: 165 LBS | BODY MASS INDEX: 25.9 KG/M2 | HEART RATE: 67 BPM | SYSTOLIC BLOOD PRESSURE: 144 MMHG | HEIGHT: 67 IN | OXYGEN SATURATION: 99 %

## 2017-06-19 DIAGNOSIS — J01.90 ACUTE RHINOSINUSITIS: Primary | ICD-10-CM

## 2017-06-19 DIAGNOSIS — E11.9 TYPE 2 DIABETES MELLITUS WITHOUT COMPLICATION, WITHOUT LONG-TERM CURRENT USE OF INSULIN (H): ICD-10-CM

## 2017-06-19 DIAGNOSIS — J45.30 MILD PERSISTENT ASTHMA WITHOUT COMPLICATION: ICD-10-CM

## 2017-06-19 LAB — HBA1C MFR BLD: 5.6 % (ref 4.3–6)

## 2017-06-19 PROCEDURE — 99214 OFFICE O/P EST MOD 30 MIN: CPT | Performed by: INTERNAL MEDICINE

## 2017-06-19 PROCEDURE — 83036 HEMOGLOBIN GLYCOSYLATED A1C: CPT | Performed by: INTERNAL MEDICINE

## 2017-06-19 PROCEDURE — 36415 COLL VENOUS BLD VENIPUNCTURE: CPT | Performed by: INTERNAL MEDICINE

## 2017-06-19 RX ORDER — LORAZEPAM 0.5 MG/1
TABLET ORAL
Refills: 0 | COMMUNITY
Start: 2017-05-31 | End: 2017-07-05

## 2017-06-19 NOTE — MR AVS SNAPSHOT
"              After Visit Summary   6/19/2017    Grecia Kilgore    MRN: [id number]           Patient Information     Date Of Birth          1955        Visit Information        Provider Department      6/19/2017 1:40 PM Angela Montaño MD [facility]        Today's Diagnoses     Screening for diabetic peripheral neuropathy    -  1      Care Instructions    Try using netti pot or clarence sinus med rinse twice daily, continue nasal sprays.  You can try afrin nasal spray for a couple days, this can elevate your blood pressure, so be aware of that.      You can take the albuterol inhaler 2 puffs every 4 hours as needed for coughing/wheezing to see if that helps.     It is okay to try an ativan if you are feeling very anxious.     If your symptoms have not improved by Friday, please give me a call.           Follow-ups after your visit        Who to contact     If you have questions or need follow up information about today's clinic visit or your schedule please contact [facility] directly at [phone].  Normal or non-critical lab and imaging results will be communicated to you by Sensewarehart, letter or phone within 4 business days after the clinic has received the results. If you do not hear from us within 7 days, please contact the clinic through GraphSQLt or phone. If you have a critical or abnormal lab result, we will notify you by phone as soon as possible.  Submit refill requests through orderTopia or call your pharmacy and they will forward the refill request to us. Please allow 3 business days for your refill to be completed.          Additional Information About Your Visit        Sensewarehart Information     orderTopia lets you send messages to your doctor, view your test results, renew your prescriptions, schedule appointments and more. To sign up, go to [email or web address] . Click on \"Log in\" on the left side of the screen, which will take you to the Welcome page. Then " "click on \"Sign up Now\" on the right side of the page.     You will be asked to enter the access code listed below, as well as some personal information. Please follow the directions to create your username and password.     Your access code is: [email or web address]  Expires: 2017  2:32 PM     Your access code will  in 90 days. If you need help or a new code, please call your [facility] clinic or [phone].        Care EveryWhere ID     This is your Care EveryWhere ID. This could be used by other organizations to access your [facility] medical records  [id number]        Your Vitals Were     Pulse Temperature Height Last Period Pulse Oximetry BMI (Body Mass Index)    67 98.1  F (36.7  C) (Tympanic) 5' 7\" (1.702 m) 1985 99% 25.84 kg/m2       Blood Pressure from Last 3 Encounters:   17 144/70   17 (!) 139/96   17 (!) 156/104    Weight from Last 3 Encounters:   17 165 lb (74.8 kg)   17 160 lb (72.6 kg)   17 168 lb (76.2 kg)              Today, you had the following     No orders found for display         Today's Medication Changes          These changes are accurate as of: 17  1:57 PM.  If you have any questions, ask your nurse or doctor.               Stop taking these medicines if you haven't already. Please contact your care team if you have questions.     DEPAKOTE PO   Stopped by:  Angela Montaño MD                    Primary Care Provider Office Phone # Fax #    Romel Mccall [phone]       [facility] [place] [address] [place] [address] [address]  [place] [address]        Thank you!     Thank you [facility]  [facility] your [facility]. Our goal is always to provide you with excellent care. Hearing back from our patients is one way we can continue to improve our services. Please take a few minutes to complete the written survey that you may receive in the mail after your visit with us. Thank you!             Your Updated " Medication List - Protect others around you: Learn how to safely use, store and throw away your medicines at www.disposemymeds.org.          This list is accurate as of: 6/19/17  1:57 PM.  Always use your most recent med list.                   Brand Name Dispense Instructions for use    albuterol 108 (90 BASE) MCG/ACT Inhaler    PROAIR HFA/PROVENTIL HFA/VENTOLIN HFA    1 Inhaler    Inhale 2 puffs into the lungs every 6 hours as needed for shortness of breath / dyspnea       Azelastine HCl 0.15 % Soln     30 mL    Spray 2 sprays into both nostrils 2 times daily       lisinopril 5 MG tablet    PRINIVIL/ZESTRIL    90 tablet    Take 1 tablet (5 mg) by mouth daily       LORazepam 0.5 MG tablet    ATIVAN     TAKE 1 TABLET DAILY AS NEEDED.       metFORMIN 500 MG tablet    GLUCOPHAGE    270 tablet    Take 1 tablet (500 mg) by mouth 2 times daily (with meals)       omega 3 1000 MG Caps     90 capsule    Take 1 g by mouth daily       predniSONE 20 MG tablet    DELTASONE    10 tablet    Take 2 tablets (40 mg) by mouth daily for 5 days       simvastatin 20 MG tablet    ZOCOR    90 tablet    Take 1 tablet (20 mg) by mouth At Bedtime       ZOLOFT PO      Take 25 mg by mouth daily

## 2017-06-19 NOTE — PATIENT INSTRUCTIONS
Try using netti pot or clarence sinus med rinse twice daily, continue nasal sprays.  You can try afrin nasal spray for a couple days, this can elevate your blood pressure, so be aware of that.      You can take the albuterol inhaler 2 puffs every 4 hours as needed for coughing/wheezing to see if that helps.     It is okay to try an ativan if you are feeling very anxious.     If your symptoms have not improved by Friday, please give me a call.

## 2017-06-19 NOTE — PROGRESS NOTES
"  SUBJECTIVE:                                                    Grecia Kilgore is a 62 year old female who presents to clinic today for the following health issues:      ED/UC Followup:    Facility:  [facility]   Date of visit: 6/16/17  Reason for visit: URI  Current Status: Not any better      Grecia was seen in the ED three days ago and diagnosed with likely URI and given 5-day course of prednisone for presumed asthma flare.  She is not feeling any better today. She has sinus congestion and drainage, R>L, but no sinus pain or headaches. Also a sore throat. She has a lot of postnasal drip and feels like mucus is getting caught in her throat making it hard to breath, feels like she is choking.  She gets very anxious about physical symptoms as well which makes it even harder to breath. She has used her albuterol inhaler only once during this illness and it didn't seem to help. She also uses a steroid nasal spray. She has ativan prescribed by her psychiatrist but hasn't taken any during this illness. No fevers or chills.     Reviewed and updated as needed this visit by clinical staff  Tobacco  Allergies  Meds         ROS:  Constitutional, HEENT, cardiovascular, pulmonary, and psych systems are negative, except as otherwise noted.    OBJECTIVE:                                                    /70 (BP Location: Left arm, Patient Position: Chair, Cuff Size: Adult Regular)  Pulse 67  Temp 98.1  F (36.7  C) (Tympanic)  Ht 5' 7\" (1.702 m)  Wt 165 lb (74.8 kg)  LMP 01/01/1985  SpO2 99%  BMI 25.84 kg/m2  Body mass index is 25.84 kg/(m^2).    Gen: anxious appearing, pleasant woman, no distress  HEENT: PERRL, no conjunctival injection, no posterior pharynx erythema, MMM.  TM normal b/l.  No sinus tenderness.   Neck: supple, no LAD  Pulm: good aeration, CTAB, no wheezes or rales  CV: RRR, normal S1 and S2, no murmurs         ASSESSMENT/PLAN:                                                      1. Acute " rhinosinusitis, h/o asthma   Symptoms have been going on for 5 days and without severe symptoms, does not meet criteria for bacterial sinusitis. She did not have wheezes in the ED and does not have any today, no improvement with the prednisone after three days so she can stop taking. Advised adding nasal saline irrigation. Continue nasal spray. Use afrin sparingly.  If symptoms not better by the end of this week let me know. Use albuterol. Okay to use ativan for panic symptoms.     2. Type 2 diabetes mellitus without complication, without long-term current use of insulin (H)  Due for a1c check.   - HEMOGLOBIN A1C    F/U as needed for persistent or worsening symptoms.       Angela Montaño MD  [facility]

## 2017-06-19 NOTE — LETTER
[facility]          [address] [facility] [place] [address]                            [phone]  Fax: [phone]  June 20, 2017     Grecia Kilgore  [id number]  [facility] [address]      Dear Grecia,    The results of your recent tests were reviewed and are as follows:    Results for orders placed or performed in visit on 06/19/17   HEMOGLOBIN A1C   Result Value Ref Range    Hemoglobin A1C 5.6 4.3 - 6.0 %     Diabetes is very well controlled, keep up the good work!  I will send this result to Dr. Mccall.       Thank you for choosing [facility].  We appreciate the opportunity to serve you and look forward to supporting your healthcare needs in the future.    If you have any questions or concerns, please call me or my staff at [phone].      Sincerely,    Angela Montaño MD

## 2017-06-20 ASSESSMENT — ASTHMA QUESTIONNAIRES: ACT_TOTALSCORE: 25

## 2017-06-28 ENCOUNTER — TRANSFERRED RECORDS (OUTPATIENT)
Dept: HEALTH INFORMATION MANAGEMENT | Facility: CLINIC | Age: 62
End: 2017-06-28

## 2017-06-29 ENCOUNTER — TELEPHONE (OUTPATIENT)
Dept: NURSING | Facility: CLINIC | Age: 62
End: 2017-06-29

## 2017-06-30 NOTE — TELEPHONE ENCOUNTER
Grecia has been having some heart racing sensation, she has been on prednisone, and  Knows she is sensitive to this medication. Since taking it she has  Been noticing the visual sensation of seeing her pulse more than she has noticed before . This makes her anxious . She has no feeling of pain, no shortness of breath, the feeling is intermittent . She  Stopped use of prednisone 3 days ago. Counted her pulse with her tonight, and got a HR of 100  In a minute, she was also anxious at the time. Right before she noticed the feeling she had climbed a flight of stairs. Of note she also has been using her Albuterol inhaler to help with her cough. She  Can sit down and wait a short time , and the  Feeling improves with sitting. Discussed symptoms that should prompt her to seek care. She should notice a decline in this  With time if related to the  Prednisone, as it  Diminishes in her system .

## 2017-07-05 ENCOUNTER — OFFICE VISIT (OUTPATIENT)
Dept: FAMILY MEDICINE | Facility: CLINIC | Age: 62
End: 2017-07-05
Payer: COMMERCIAL

## 2017-07-05 VITALS
HEIGHT: 67 IN | HEART RATE: 56 BPM | DIASTOLIC BLOOD PRESSURE: 80 MMHG | TEMPERATURE: 98.7 F | OXYGEN SATURATION: 100 % | BODY MASS INDEX: 25.58 KG/M2 | SYSTOLIC BLOOD PRESSURE: 136 MMHG | RESPIRATION RATE: 16 BRPM | WEIGHT: 163 LBS

## 2017-07-05 DIAGNOSIS — J45.20 MILD INTERMITTENT ASTHMA WITHOUT COMPLICATION: Primary | ICD-10-CM

## 2017-07-05 DIAGNOSIS — Z91.09 ENVIRONMENTAL ALLERGIES: ICD-10-CM

## 2017-07-05 PROCEDURE — 99213 OFFICE O/P EST LOW 20 MIN: CPT | Performed by: FAMILY MEDICINE

## 2017-07-05 RX ORDER — MONTELUKAST SODIUM 10 MG/1
10 TABLET ORAL AT BEDTIME
Qty: 90 TABLET | Refills: 3 | Status: SHIPPED | OUTPATIENT
Start: 2017-07-05 | End: 2017-11-15

## 2017-07-05 ASSESSMENT — ANXIETY QUESTIONNAIRES
GAD7 TOTAL SCORE: 17
6. BECOMING EASILY ANNOYED OR IRRITABLE: MORE THAN HALF THE DAYS
3. WORRYING TOO MUCH ABOUT DIFFERENT THINGS: NEARLY EVERY DAY
5. BEING SO RESTLESS THAT IT IS HARD TO SIT STILL: MORE THAN HALF THE DAYS
IF YOU CHECKED OFF ANY PROBLEMS ON THIS QUESTIONNAIRE, HOW DIFFICULT HAVE THESE PROBLEMS MADE IT FOR YOU TO DO YOUR WORK, TAKE CARE OF THINGS AT HOME, OR GET ALONG WITH OTHER PEOPLE: VERY DIFFICULT
2. NOT BEING ABLE TO STOP OR CONTROL WORRYING: NEARLY EVERY DAY
7. FEELING AFRAID AS IF SOMETHING AWFUL MIGHT HAPPEN: SEVERAL DAYS
1. FEELING NERVOUS, ANXIOUS, OR ON EDGE: NEARLY EVERY DAY

## 2017-07-05 ASSESSMENT — PATIENT HEALTH QUESTIONNAIRE - PHQ9: 5. POOR APPETITE OR OVEREATING: NEARLY EVERY DAY

## 2017-07-05 NOTE — PROGRESS NOTES
SUBJECTIVE:                                                    Grecia Kilgore is a 62 year old female who presents to clinic today for the following health issues:    Asthma Follow-Up      ACT Total Scores 6/19/2017   ACT TOTAL SCORE (Goal Greater than or Equal to 20) 25   In the past 12 months, how many times did you visit the emergency room for your asthma without being admitted to the hospital? 0   In the past 12 months, how many times were you hospitalized overnight because of your asthma? 0    Patient reports that her allergies are not controlled. Reports of itchy eyes and nose. Reports of running nose and runny eyes. She has been taking a lot of Benadryl without much improvement.      Problem list and histories reviewed & adjusted, as indicated.  Additional history: as documented    Patient Active Problem List   Diagnosis     Allergic rhinitis     External hemorrhoids     Vitamin D deficiency     Fibromyalgia     Osteoarthritis, knee     Positive PPD     Panic disorder     Genital herpes     Advanced directives, counseling/discussion     DCIS (ductal carcinoma in situ) of breast     Heart palpitations     Anxiety     Major depressive disorder, single episode, moderate (H)     Hyperlipidemia LDL goal <100     Type 2 diabetes mellitus without complication, without long-term current use of insulin (H)     Mild intermittent asthma without complication     Past Surgical History:   Procedure Laterality Date     ARTHRODESIS TOE(S)  9/16/2013    Procedure: ARTHRODESIS TOE(S);  BILATERAL GREAT TOE FUSION (C-ARM);  Surgeon: Mary Guan MD;  Location: [facility]     ARTHRODESIS TOE(S) Left 12/19/2016    Procedure: ARTHRODESIS TOE(S);  Surgeon: Mary Guan MD;  Location: [facility]     ARTHROSCOPY KNEE Left 2/2/11     BIOPSY BREAST  2/7/2014    Procedure: BIOPSY BREAST;  Re-excision Left Breast Cavity for Margins ;  Surgeon: Lisset Amador DO;  Location: [facility] OR     BIOPSY BREAST SEED LOCALIZATION   1/22/2014    Procedure: BIOPSY BREAST SEED LOCALIZATION;  Left Breast Seed Localized Excisional Biopsy ;  Surgeon: Lisset Amador DO;  Location: [facility] OR     COLONOSCOPY  2005    nl - repeat 2015     FOOT SURGERY Bilateral 2013     HEMORRHOIDECTOMY BANDING      multiple times     HYSTERECTOMY  7/1996    fibroids     REMOVE HARDWARE FOOT Left 2015     REPAIR TENDON FOOT Left 12/19/2016    Procedure: REPAIR TENDON FOOT;  Surgeon: Mary Guan MD;  Location:  [facility]       Social History   Substance Use Topics     Smoking status: Never Smoker     Smokeless tobacco: Never Used     Alcohol use No     Family History   Problem Relation Age of Onset     DIABETES Mother      Breast Cancer Mother      Alcohol/Drug Father      CANCER Father      DIABETES Maternal Grandmother      CEREBROVASCULAR DISEASE Maternal Grandmother      Cancer - colorectal Maternal Grandfather          Current Outpatient Prescriptions   Medication Sig Dispense Refill     montelukast (SINGULAIR) 10 MG tablet Take 1 tablet (10 mg) by mouth At Bedtime 90 tablet 3     omega 3 1000 MG CAPS Take 1 g by mouth daily 90 capsule      metFORMIN (GLUCOPHAGE) 500 MG tablet Take 1 tablet (500 mg) by mouth 2 times daily (with meals) 270 tablet 3     albuterol (PROAIR HFA, PROVENTIL HFA, VENTOLIN HFA) 108 (90 BASE) MCG/ACT inhaler Inhale 2 puffs into the lungs every 6 hours as needed for shortness of breath / dyspnea 1 Inhaler 6     simvastatin (ZOCOR) 20 MG tablet Take 1 tablet (20 mg) by mouth At Bedtime 90 tablet 3     lisinopril (PRINIVIL,ZESTRIL) 5 MG tablet Take 1 tablet (5 mg) by mouth daily 90 tablet 3     Azelastine HCl 0.15 % SOLN Spray 2 sprays into both nostrils 2 times daily 30 mL 11     Allergies   Allergen Reactions     Codeine Nausea     Morphine Itching     Nitrofuran Derivatives Hives     Phenothiazines      sedation     Primatene Mist [Epinephrine Bitartrate] Itching     neck itch with primatene mist     Vicodin [Hydrocodone-Acetaminophen]  "Nausea     Latex Itching and Rash       Reviewed and updated as needed this visit by clinical staff  Tobacco  Allergies  Meds  Soc Hx      Reviewed and updated as needed this visit by Provider         ROS:  C: NEGATIVE for fever, chills, change in weight  CV: NEGATIVE for chest pain, palpitations or peripheral edema    OBJECTIVE:                                                    /80 (Cuff Size: Adult Regular)  Pulse 56  Temp 98.7  F (37.1  C) (Tympanic)  Resp 16  Ht 5' 7\" (1.702 m)  Wt 163 lb (73.9 kg)  LMP 01/01/1985  SpO2 100%  BMI 25.53 kg/m2  Body mass index is 25.53 kg/(m^2).   GENERAL: healthy, alert, well nourished, well hydrated, no distress  Eyes: No injection. Teary left eye noted..  HENT: ear canals- normal; TMs- normal; Nose- clear discharge bilaterally. Mouth- no ulcers, no lesions  NECK: no tenderness, no adenopathy, no asymmetry, no masses, no stiffness; thyroid- normal to palpation  RESP: lungs clear to auscultation - no rales, no rhonchi, no wheezes  CV: regular rates and rhythm, normal S1 S2, no S3 or S4 and no murmur, no click or rub -  ABDOMEN: soft, no tenderness, no  hepatosplenomegaly, no masses, normal bowel sounds         ASSESSMENT/PLAN:                                                        ICD-10-CM    1. Mild intermittent asthma without complication J45.20 montelukast (SINGULAIR) 10 MG tablet     Asthma is well controlled. Symptoms of allergies are not well controlled. Recommending to use Singulair 10 mg daily for asthma maintenance and better allergy control. Follow-up in 2-4 weeks if no improvement noted    Follow up with Provider - as needed     Romel Mccall MD  [facility]    "

## 2017-07-05 NOTE — MR AVS SNAPSHOT
After Visit Summary   7/5/2017    Grecia Kilgore    MRN: [id number]           Patient Information     Date Of Birth          1955        Visit Information        Provider Department      7/5/2017 1:20 PM Romel Mccall MD [facility] [place]        Today's Diagnoses     Mild intermittent asthma without complication    -  1      Care Instructions      Preventive Health Recommendations  Female Ages 50 - 64    Yearly exam: See your health care provider every year in order to  o Review health changes.   o Discuss preventive care.    o Review your medicines if your doctor has prescribed any.      Get a Pap test every three years (unless you have an abnormal result and your provider advises testing more often).    If you get Pap tests with HPV test, you only need to test every 5 years, unless you have an abnormal result.     You do not need a Pap test if your uterus was removed (hysterectomy) and you have not had cancer.    You should be tested each year for STDs (sexually transmitted diseases) if you're at risk.     Have a mammogram every 1 to 2 years.    Have a colonoscopy at age 50, or have a yearly FIT test (stool test). These exams screen for colon cancer.      Have a cholesterol test every 5 years, or more often if advised.    Have a diabetes test (fasting glucose) every three years. If you are at risk for diabetes, you should have this test more often.     If you are at risk for osteoporosis (brittle bone disease), think about having a bone density scan (DEXA).    Shots: Get a flu shot each year. Get a tetanus shot every 10 years.    Nutrition:     Eat at least 5 servings of fruits and vegetables each day.    Eat whole-grain bread, whole-wheat pasta and brown rice instead of white grains and rice.    Talk to your provider about Calcium and Vitamin D.     Lifestyle    Exercise at least 150 minutes a week (30 minutes a day, 5 days a week). This will help you control your weight and  "prevent disease.    Limit alcohol to one drink per day.    No smoking.     Wear sunscreen to prevent skin cancer.     See your dentist every six months for an exam and cleaning.    See your eye doctor every 1 to 2 years.            Follow-ups after your visit        Follow-up notes from your care team     Return in 4 months (on 2017) for Physical Exam.      Who to contact     If you have questions or need follow up information about today's clinic visit or your schedule please contact [facility] directly at [phone].  Normal or non-critical lab and imaging results will be communicated to you by [employer], letter or phone within 4 business days after the clinic has received the results. If you do not hear from us within 7 days, please contact the clinic through [employer] or phone. If you have a critical or abnormal lab result, we will notify you by phone as soon as possible.  Submit refill requests through "[employer] or call your pharmacy and they will forward the refill request to us. Please allow 3 business days for your refill to be completed.          Additional Information About Your Visit        [employer] Information     "[employer] lets you send messages to your doctor, view your test results, renew your prescriptions, schedule appointments and more. To sign up, go to [email or web address] . Click on \"Log in\" on the left side of the screen, which will take you to the Welcome page. Then click on \"Sign up Now\" on the right side of the page.     You will be asked to enter the access code listed below, as well as some personal information. Please follow the directions to create your username and password.     Your access code is: [email or web address]  Expires: 2017  2:32 PM     Your access code will  in 90 days. If you need help or a new code, please call your [facility] or [phone].        Care EveryWhere ID     This is your Care EveryWhere ID. This could be used by other " "organizations to access your [facility] medical records  [id number]        Your Vitals Were     Pulse Temperature Respirations Height Last Period Pulse Oximetry    56 98.7  F (37.1  C) (Tympanic) 16 5' 7\" (1.702 m) 01/01/1985 100%    BMI (Body Mass Index)                   25.53 kg/m2            Blood Pressure from Last 3 Encounters:   07/05/17 136/80   06/19/17 144/70   06/16/17 (!) 139/96    Weight from Last 3 Encounters:   07/05/17 163 lb (73.9 kg)   06/19/17 165 lb (74.8 kg)   05/02/17 160 lb (72.6 kg)              We Performed the Following     DEPRESSION ACTION PLAN (DAP)          Today's Medication Changes          These changes are accurate as of: 7/5/17  1:31 PM.  If you have any questions, ask your nurse or doctor.               Start taking these medicines.        Dose/Directions    montelukast 10 MG tablet   Commonly known as:  SINGULAIR   Used for:  Mild intermittent asthma without complication   Started by:  Romel Mccall MD        Dose:  10 mg   Take 1 tablet (10 mg) by mouth At Bedtime   Quantity:  90 tablet   Refills:  3         Stop taking these medicines if you haven't already. Please contact your care team if you have questions.     LORazepam 0.5 MG tablet   Commonly known as:  ATIVAN   Stopped by:  Romel Mccall MD           ZOLOFT PO   Stopped by:  Romel Mccall MD                Where to get your medicines      These medications were [facility] to [facility] [place], [address] [place] [address]     Phone:  [phone]     montelukast 10 MG tablet                Primary Care Provider Office Phone # Fax #    Romel Mccall [phone]       [address]  [place] [address]        Equal Access to Services     BRANNON MCKEON AH: Nai Romero, waaxda luqadaha, qaybta kaalmada iraida, aron jordan ah. So [facility] " [phone].    ATENCIÓN: Si lucrecia mccallum, tiene a patel disposición servicios gratuitos de asistencia lingüística. Roland emanuel [phone].    We comply with applicable federal civil rights laws and [place] laws. We do not discriminate on the basis of race, color, national origin, age, disability sex, sexual orientation or gender identity.            Thank you!     Thank you for choosing [facility] [place]  for your care. Our goal is always to provide you with excellent care. Hearing back from our patients is one way we can continue to improve our services. Please take a few minutes to complete the written survey that you may receive in the mail after your visit with us. Thank you!             Your Updated Medication List - Protect others around you: Learn how to safely use, store and throw away your medicines at www.disposemymeds.org.          This list is accurate as of: 7/5/17  1:31 PM.  Always use your most recent med list.                   Brand Name Dispense Instructions for use Diagnosis    albuterol 108 (90 BASE) MCG/ACT Inhaler    PROAIR HFA/PROVENTIL HFA/VENTOLIN HFA    1 Inhaler    Inhale 2 puffs into the lungs every 6 hours as needed for shortness of breath / dyspnea    Mild intermittent asthma without complication       Azelastine HCl 0.15 % Soln     30 mL    Spray 2 sprays into both nostrils 2 times daily    Chronic rhinitis       lisinopril 5 MG tablet    PRINIVIL/ZESTRIL    90 tablet    Take 1 tablet (5 mg) by mouth daily    Type 2 diabetes mellitus without complication, without long-term current use of insulin (H)       metFORMIN 500 MG tablet    GLUCOPHAGE    270 tablet    Take 1 tablet (500 mg) by mouth 2 times daily (with meals)    Type 2 diabetes mellitus without complication, without long-term current use of insulin (H)       montelukast 10 MG tablet    SINGULAIR    90 tablet    Take 1 tablet (10 mg) by mouth At Bedtime    Mild intermittent asthma without complication       omega 3  1000 MG Caps     90 capsule    Take 1 g by mouth daily        simvastatin 20 MG tablet    ZOCOR    90 tablet    Take 1 tablet (20 mg) by mouth At Bedtime    Hyperlipidemia LDL goal <100

## 2017-07-05 NOTE — NURSING NOTE
"Chief Complaint   Patient presents with     Physical       Initial /80 (Cuff Size: Adult Regular)  Pulse 56  Temp 98.7  F (37.1  C) (Tympanic)  Resp 16  Ht 5' 7\" (1.702 m)  Wt 163 lb (73.9 kg)  LMP 01/01/1985  SpO2 100%  BMI 25.53 kg/m2 Estimated body mass index is 25.53 kg/(m^2) as calculated from the following:    Height as of this encounter: 5' 7\" (1.702 m).    Weight as of this encounter: 163 lb (73.9 kg).  Medication Reconciliation: complete   Lyn Dudley, CMA    "

## 2017-07-06 ASSESSMENT — ANXIETY QUESTIONNAIRES: GAD7 TOTAL SCORE: 17

## 2017-07-06 ASSESSMENT — PATIENT HEALTH QUESTIONNAIRE - PHQ9: SUM OF ALL RESPONSES TO PHQ QUESTIONS 1-9: 17

## 2017-07-31 ENCOUNTER — HOSPITAL ENCOUNTER (EMERGENCY)
Facility: CLINIC | Age: 62
Discharge: HOME | End: 2017-08-01
Attending: EMERGENCY MEDICINE | Admitting: EMERGENCY MEDICINE
Payer: COMMERCIAL

## 2017-07-31 DIAGNOSIS — R44.2 HYPNAGOGIC HALLUCINATIONS: ICD-10-CM

## 2017-07-31 PROCEDURE — 99283 EMERGENCY DEPT VISIT LOW MDM: CPT

## 2017-07-31 NOTE — ED AVS SNAPSHOT
[facility] Emergency Department    [address] [place] [address]    Phone:  [phone]    Fax:  [phone]                                       Grecia Kilgore   MRN: [id number]    Department:  [facility] Emergency Department   Date of Visit:  7/31/2017           Patient Information     Date Of Birth          1955        Your diagnoses for this visit were:     Hypnagogic hallucinations        You were seen by Prabhu Kahn MD.      Follow-up Information     Follow up with PCP and Neurology as needed.        Discharge Instructions       See print off for hypnagognic hallucinations.      [email or web address]    Future Appointments        Provider Department Dept Phone Center    11/15/2017 1:00 PM [place]el Mccall [place] [facility] [place] [phone]       24 Hour Appointment Hotline       To make an appointment at any [facility], call [phone] ([phone]). If you don't have a family doctor or clinic, we will help you find one. [facility] are conveniently located to serve the needs of you and your family.             Review of your medicines      Our records show that you are taking the medicines listed below. If these are incorrect, please call your family doctor or clinic.        Dose / Directions Last dose taken    albuterol 108 (90 BASE) MCG/ACT Inhaler   Commonly known as:  PROAIR HFA/PROVENTIL HFA/VENTOLIN HFA   Dose:  2 puff   Quantity:  1 Inhaler        Inhale 2 puffs into the lungs every 6 hours as needed for shortness of breath / dyspnea   Refills:  6        Azelastine HCl 0.15 % Soln   Dose:  2 spray   Quantity:  30 mL        Spray 2 sprays into both nostrils 2 times daily   Refills:  11        lisinopril 5 MG tablet   Commonly known as:  PRINIVIL/ZESTRIL   Dose:  5 mg   Quantity:  90 tablet        Take 1 tablet (5 mg) by mouth daily   Refills:  3        metFORMIN 500 MG tablet   Commonly  known as:  GLUCOPHAGE   Dose:  500 mg   Quantity:  270 tablet        Take 1 tablet (500 mg) by mouth 2 times daily (with meals)   Refills:  3        montelukast 10 MG tablet   Commonly known as:  SINGULAIR   Dose:  10 mg   Quantity:  90 tablet        Take 1 tablet (10 mg) by mouth At Bedtime   Refills:  3        omega 3 1000 MG Caps   Dose:  1 g   Quantity:  90 capsule        Take 1 g by mouth daily   Refills:  0        simvastatin 20 MG tablet   Commonly known as:  ZOCOR   Dose:  20 mg   Quantity:  90 tablet        Take 1 tablet (20 mg) by mouth At Bedtime   Refills:  3                Orders Needing Specimen Collection     None      Pending Results     No orders found for last 3 day(s).            Pending Culture Results     No orders found for last 3 day(s).            Pending Results Instructions     If you had any lab results that were not finalized at the time of your Discharge, you can call the ED Lab Result RN at [phone]. You will be contacted by this team for any positive Lab results or changes in treatment. The nurses are available 7 days a week from 10A to 6:30P.  You can leave a message 24 hours per day and they will return your call.        Test Results From Your Hospital Stay               Clinical Quality Measure: Blood Pressure Screening     Your blood pressure was checked while you were in the emergency department today. The last reading we obtained was  BP: (!) 163/100 . Please read the guidelines below about what these numbers mean and what you should do about them.  If your systolic blood pressure (the top number) is less than 120 and your diastolic blood pressure (the bottom number) is less than 80, then your blood pressure is normal. There is nothing more that you need to do about it.  If your systolic blood pressure (the top number) is 120-139 or your diastolic blood pressure (the bottom number) is 80-89, your blood pressure may be higher than it should be. You should have your blood  "pressure rechecked within a year by a primary care provider.  If your systolic blood pressure (the top number) is 140 or greater or your diastolic blood pressure (the bottom number) is 90 or greater, you may have high blood pressure. High blood pressure is treatable, but if left untreated over time it can put you at risk for heart attack, stroke, or kidney failure. You should have your blood pressure rechecked by a primary care provider within the next 4 weeks.  If your provider in the emergency department today gave you specific instructions to follow-up with your doctor or provider even sooner than that, you should follow that instruction and not wait for up to 4 weeks for your follow-up visit.        Thank you for choosing [facility]       Thank you for choosing [facility] for your care. Our goal is always to provide you with excellent care. Hearing back from our patients is one way we can continue to improve our services. Please take a few minutes to complete the written survey that you may receive in the mail after you visit with us. Thank you!        [facility] [employer] [employer] Information     [employer] lets you send messages to your doctor, view your test results, renew your prescriptions, schedule appointments and more. To sign up, go to [email or web address] . Click on \"Log in\" on the left side of the screen, which will take you to the Welcome page. Then click on \"Sign up Now\" on the right side of the page.     You will be asked to enter the access code listed below, as well as some personal information. Please follow the directions to create your username and password.     Your access code is: [email or web address]  Expires: 2017  2:32 PM     Your access code will  in 90 days. If you need help or a new code, please call your [facility] clinic or [phone].        Care EveryWhere ID     This is your Care EveryWhere ID. This could be used by other organizations to access your [facility] medical records  [id number]        Equal " Access to Services     BRANNON [facility] Hospital[facility]OSÉ : Nai Romero, howard salinas, qaaron moore. So [facility] [phone].    ATENCIÓN: Si habla español, tiene a patel disposición servicios gratuitos de asistencia lingüística. Llame al [phone].    We comply with applicable federal civil rights laws and [place] laws. We do not discriminate on the basis of race, color, national origin, age, disability sex, sexual orientation or gender identity.            After Visit Summary       This is your record. Keep this with you and show to your community pharmacist(s) and doctor(s) at your next visit.

## 2017-07-31 NOTE — ED AVS SNAPSHOT
[facility] Emergency Department    [address]    [facility] [address]    Phone:  [phone]    Fax:  [phone]                                       Grecia Kilgore   MRN: [id number]    Department:  [facility] Emergency Department   Date of Visit:  7/31/2017           After Visit Summary Signature Page     I have received my discharge instructions, and my questions have been answered. I have discussed any challenges I see with this plan with the nurse or doctor.    ..........................................................................................................................................  Patient/Patient Representative Signature      ..........................................................................................................................................  Patient Representative Print Name and Relationship to Patient    ..................................................               ................................................  Date                                            Time    ..........................................................................................................................................  Reviewed by Signature/Title    ...................................................              ..............................................  Date                                                            Time

## 2017-08-01 VITALS
SYSTOLIC BLOOD PRESSURE: 140 MMHG | HEART RATE: 70 BPM | TEMPERATURE: 97.3 F | OXYGEN SATURATION: 98 % | RESPIRATION RATE: 18 BRPM | DIASTOLIC BLOOD PRESSURE: 80 MMHG

## 2017-08-01 ASSESSMENT — ENCOUNTER SYMPTOMS
FEVER: 0
NAUSEA: 0
VOMITING: 0

## 2017-08-01 NOTE — ED PROVIDER NOTES
History     Chief Complaint:  Tingling    HPI   Grecia CLAIRE Kilgore is a 62 year old female who presents with facial tingling. The patient states that she had just fallen asleep when she heard several loud banging noises that jolted her awake. She states that the left side of her face started tingling and went up into her scalp. She states that this has happened 3 times before in the past two months. She has concerns for a neurologic problem. Denies fever, vomiting, trauma.     Allergies:  Morphine  Nitrofuran Derivatives  Phenothiazines  Primatene Mist  Vicodin  Latex      Medications:    Ativan  Allegra  Metformin  Albuterol  Lisinopril  Depakote  Mirtazapine  Zoloft      Past Medical History:    Allergic rhinitis             Chronic back pain                   DCIS (ductal carcinoma in situ)                      Depression  Diabetes mellitus type 2   Diverticulosis               External hemorrhoids              Fibromyalgia  G6PD deficiency  Hepatitis A                   Laxative abuse                        Liver nodule                Migraine                      Mild persistent asthma                        Nephrolithiasis                        Osteoarthritis  Ovarian cyst                Pancreatitis                 Panic Disorder  Pure hypercholesterolemia                     Past Surgical History:    Hysterectomy              Foot surgery                Hemorrhoidectomy banding                                     Arthroscopy knee                    Arthrodesis toe(s)                                                                                     Biopsy breast                                                                                    Family History:    DM  Breast cancer  CBVD  Cancer  Alcohol/Drug      Social History:  Relationship status: Single  Tobacco use: Negative  Alcohol use: Negative  The patient presents alone.    Review of Systems   Constitutional: Negative for fever.    Gastrointestinal: Negative for nausea and vomiting.   Neurological:        Positive for facial tingling.    All other systems reviewed and are negative.    Physical Exam     Patient Vitals for the past 24 hrs:   BP Temp Temp src Pulse Resp SpO2   08/01/17 0023 140/80 - - - - -   08/01/17 0015 - - - - - 98 %   08/01/17 0011 (!) 163/100 - - - - 99 %   07/31/17 2340 (!) 155/104 97.3  F (36.3  C) Temporal 70 18 100 %           Physical Exam  Nursing note and vitals reviewed.  Constitutional: Cooperative. Sitting up comfortably on the edge of the bed.   HENT:   Mouth/Throat: Mucous membranes are normal.   Eyes: Pupils are equal, round, and reactive to light. EOMI  Cardiovascular: Normal rate, regular rhythm and normal heart sounds.  No murmur.  Pulmonary/Chest: Effort normal and breath sounds normal. No respiratory distress.  Abd: Soft, nontender. Normal appearance.   Musculoskeletal: Normal range of motion.   Neurological: Alert. GCS 15. Oriented x4. Cranial nerves II-XII intact. Normal finger nose finger. 2 + patellar reflexes. Strength and sensation is normal. Gait normal.   Skin: Skin is warm and dry.   Psychiatric: Normal mood and affect.      Emergency Department Course     Emergency Department Course:  Nursing notes and vitals reviewed.  I performed an exam of the patient as documented above.  Findings and plan explained to the patient. Patient discharged home with instructions regarding supportive care, medications, and reasons to return. The importance of close follow-up was reviewed.    Impression & Plan      Medical Decision Making:  Grecia Kilgore is a 62 year old female who presents with a constellation of symptoms consistent with hypnagogic hallucinations with auditory sensation and tactile sensations that occurred in the setting of going to sleep. These are very short-lived and have resolved. She is quite anxious about this with a mildly elevated blood pressure. This has improved without intervention.   No abnormal neurologic findings on exam. No concern for infectious etiology. I doubt acute neurologic event such as stroke, intracranial mass lesion, or intracranial hemorrhage. No evidence of intoxication or metabolic abnormality based on exam and history. No indication for further laboratory evaluation or advanced imaging. Plan of care will be supportive with reassurance. She will follow up with her PCP as well as neurology if needed.     Diagnosis:    ICD-10-CM    1. Hypnagogic hallucinations R44.2      Disposition:  discharged to home    Scribe disclosure:  I, Ben Molina, am serving as a scribe on 8/1/2017 at 12:05 AM to personally document services performed by Dr. Kahn based on my observations and the provider's statements to me.    [facility] EMERGENCY DEPARTMENT       Prabhu Kahn MD  08/01/17 0048

## 2017-08-01 NOTE — ED NOTES
Numbness in left face on and off for 1 month but tonight had it go ont the left side of head as welol.    Pt A&O x 3, CMS x 3, ABCD's adequate in triage

## 2017-08-01 NOTE — DISCHARGE INSTRUCTIONS
See print off for hypnagognic hallucinations.      [email or web address]

## 2017-08-03 ENCOUNTER — TRANSFERRED RECORDS (OUTPATIENT)
Dept: HEALTH INFORMATION MANAGEMENT | Facility: CLINIC | Age: 62
End: 2017-08-03

## 2017-08-10 ENCOUNTER — OFFICE VISIT (OUTPATIENT)
Dept: OBGYN | Facility: CLINIC | Age: 62
End: 2017-08-10
Payer: COMMERCIAL

## 2017-08-10 VITALS — BODY MASS INDEX: 26.78 KG/M2 | WEIGHT: 171 LBS | DIASTOLIC BLOOD PRESSURE: 76 MMHG | SYSTOLIC BLOOD PRESSURE: 150 MMHG

## 2017-08-10 DIAGNOSIS — Z11.8 SCREENING FOR CHLAMYDIAL DISEASE: ICD-10-CM

## 2017-08-10 DIAGNOSIS — N89.8 VAGINAL DISCHARGE: Primary | ICD-10-CM

## 2017-08-10 DIAGNOSIS — B96.89 BV (BACTERIAL VAGINOSIS): ICD-10-CM

## 2017-08-10 DIAGNOSIS — R10.2 PELVIC PAIN IN FEMALE: ICD-10-CM

## 2017-08-10 DIAGNOSIS — Z11.3 SCREEN FOR STD (SEXUALLY TRANSMITTED DISEASE): ICD-10-CM

## 2017-08-10 DIAGNOSIS — N76.0 BV (BACTERIAL VAGINOSIS): ICD-10-CM

## 2017-08-10 LAB
ALBUMIN UR-MCNC: NEGATIVE MG/DL
APPEARANCE UR: CLEAR
BACTERIA #/AREA URNS HPF: ABNORMAL /HPF
BILIRUB UR QL STRIP: NEGATIVE
COLOR UR AUTO: YELLOW
GLUCOSE UR STRIP-MCNC: NEGATIVE MG/DL
HGB UR QL STRIP: NEGATIVE
KETONES UR STRIP-MCNC: NEGATIVE MG/DL
LEUKOCYTE ESTERASE UR QL STRIP: NEGATIVE
NITRATE UR QL: NEGATIVE
NON-SQ EPI CELLS #/AREA URNS LPF: ABNORMAL /LPF
PH UR STRIP: 7 PH (ref 5–7)
RBC #/AREA URNS AUTO: ABNORMAL /HPF (ref 0–2)
SP GR UR STRIP: 1.02 (ref 1–1.03)
URN SPEC COLLECT METH UR: ABNORMAL
UROBILINOGEN UR STRIP-ACNC: 2 EU/DL (ref 0.2–1)
WBC #/AREA URNS AUTO: ABNORMAL /HPF (ref 0–2)

## 2017-08-10 PROCEDURE — 99213 OFFICE O/P EST LOW 20 MIN: CPT | Performed by: OBSTETRICS & GYNECOLOGY

## 2017-08-10 PROCEDURE — 87102 FUNGUS ISOLATION CULTURE: CPT | Performed by: OBSTETRICS & GYNECOLOGY

## 2017-08-10 PROCEDURE — 86780 TREPONEMA PALLIDUM: CPT | Performed by: OBSTETRICS & GYNECOLOGY

## 2017-08-10 PROCEDURE — 36415 COLL VENOUS BLD VENIPUNCTURE: CPT | Performed by: OBSTETRICS & GYNECOLOGY

## 2017-08-10 PROCEDURE — 87491 CHLMYD TRACH DNA AMP PROBE: CPT | Performed by: OBSTETRICS & GYNECOLOGY

## 2017-08-10 PROCEDURE — 87389 HIV-1 AG W/HIV-1&-2 AB AG IA: CPT | Performed by: OBSTETRICS & GYNECOLOGY

## 2017-08-10 PROCEDURE — 81001 URINALYSIS AUTO W/SCOPE: CPT | Performed by: OBSTETRICS & GYNECOLOGY

## 2017-08-10 PROCEDURE — 86803 HEPATITIS C AB TEST: CPT | Performed by: OBSTETRICS & GYNECOLOGY

## 2017-08-10 PROCEDURE — 87653 STREP B DNA AMP PROBE: CPT | Performed by: OBSTETRICS & GYNECOLOGY

## 2017-08-10 PROCEDURE — 87205 SMEAR GRAM STAIN: CPT | Performed by: OBSTETRICS & GYNECOLOGY

## 2017-08-10 PROCEDURE — 87591 N.GONORRHOEAE DNA AMP PROB: CPT | Performed by: OBSTETRICS & GYNECOLOGY

## 2017-08-10 RX ORDER — SERTRALINE HYDROCHLORIDE 25 MG/1
TABLET, FILM COATED ORAL
Refills: 0 | COMMUNITY
Start: 2017-06-20 | End: 2017-08-10

## 2017-08-10 RX ORDER — MELOXICAM 15 MG/1
TABLET ORAL
Refills: 0 | COMMUNITY
Start: 2017-01-05 | End: 2019-02-19

## 2017-08-10 NOTE — MR AVS SNAPSHOT
"              After Visit Summary   8/10/2017    Grecia Kilgore    MRN: [id number]           Patient Information     Date Of Birth          1955        Visit Information        Provider Department      8/10/2017 3:15 PM Tristan Bryant MD [facility]        Today's Diagnoses     Vaginal discharge    -  1    Screen for STD (sexually transmitted disease)        Screening for chlamydial disease        Pelvic pain in female           Follow-ups after your visit        Your next 10 appointments already scheduled     Nov 15, 2017  1:00 PM CST   PHYSICAL with Romel Mccall MD   [facility] ([facility])    [address] [place] [id number]   [phone]              Who to contact     If you have questions or need follow up information about today's clinic visit or your schedule please contact [facility] directly at [phone].  Normal or non-critical lab and imaging results will be communicated to you by MyChart, letter or phone within 4 business days after the clinic has received the results. If you do not hear from us within 7 days, please contact the clinic through [employer] or phone. If you have a critical or abnormal lab result, we will notify you by phone as soon as possible.  Submit refill requests through [employer] or call your pharmacy and they will forward the refill request to us. Please allow 3 business days for your refill to be completed.          Additional Information About Your Visit        [employer] Information     [employer] lets you send messages to your doctor, view your test results, renew your prescriptions, schedule appointments and more. To sign up, go to [email or web address] . Click on \"Log in\" on the left side of the screen, which will take you to the Welcome page. Then click on \"Sign up Now\" on the right side of the page.     You will be asked to enter the access code listed below, as well as " some personal information. Please follow the directions to create your username and password.     Your access code is: [email or web address]  Expires: 2017  2:32 PM     Your access code will  in 90 days. If you need help or a new code, please call your [place] clinic or [phone].        Care EveryWhere ID     This is your Care EveryWhere ID. This could be used by other organizations to access your [place] medical records  [id number]        Your Vitals Were     Last Period Breastfeeding? BMI (Body Mass Index)             1985 No 26.78 kg/m2          Blood Pressure from Last 3 Encounters:   08/10/17 150/76   17 140/80   17 136/80    Weight from Last 3 Encounters:   08/10/17 77.6 kg (171 lb)   17 73.9 kg (163 lb)   17 74.8 kg (165 lb)              We Performed the Following     Anti Treponema     CHLAMYDIA TRACHOMATIS PCR     Gram stain     Group B strep PCR     Hepatitis C Antibody     HIV Antigen Antibody Combo     NEISSERIA GONORRHOEA PCR     UA with Microscopic     Yeast culture        Primary Care Provider Office Phone # Fax #    Romel Mccall [phone]       [address] [place] [address]        Equal Access to Services     BRANNON MCKEON AH: Hadii aad ku hadasho Soomaali, waaxda luqadaha, qaybta kaalmada adeegyada, waxay idiin hayderekn orly jordan . So [facility] [phone].    ATENCIÓN: Si habla español, tiene a patel disposición servicios gratuitos de asistencia lingüística. Llame al [phone].    We comply with applicable federal civil rights laws and [place] laws. We do not discriminate on the basis of race, color, national origin, age, disability sex, sexual orientation or gender identity.            Thank you!     Thank you for choosing [facility]  for your care. Our goal is always to provide you with excellent care. Hearing back from our patients is one way we can continue to improve our services. Please take a  few minutes to complete the written survey that you may receive in the mail after your visit with us. Thank you!             Your Updated Medication List - Protect others around you: Learn how to safely use, store and throw away your medicines at www.disposemymeds.org.          This list is accurate as of: 8/10/17  3:54 PM.  Always use your most recent med list.                   Brand Name Dispense Instructions for use Diagnosis    albuterol 108 (90 BASE) MCG/ACT Inhaler    PROAIR HFA/PROVENTIL HFA/VENTOLIN HFA    1 Inhaler    Inhale 2 puffs into the lungs every 6 hours as needed for shortness of breath / dyspnea    Mild intermittent asthma without complication       Azelastine HCl 0.15 % Soln     30 mL    Spray 2 sprays into both nostrils 2 times daily    Chronic rhinitis       lisinopril 5 MG tablet    PRINIVIL/ZESTRIL    90 tablet    Take 1 tablet (5 mg) by mouth daily    Type 2 diabetes mellitus without complication, without long-term current use of insulin (H)       meloxicam 15 MG tablet    MOBIC     TAKE 1 TABLET BY MOUTH DAILY AS NEEDED.        metFORMIN 500 MG tablet    GLUCOPHAGE    270 tablet    Take 1 tablet (500 mg) by mouth 2 times daily (with meals)    Type 2 diabetes mellitus without complication, without long-term current use of insulin (H)       montelukast 10 MG tablet    SINGULAIR    90 tablet    Take 1 tablet (10 mg) by mouth At Bedtime    Mild intermittent asthma without complication       omega 3 1000 MG Caps     90 capsule    Take 1 g by mouth daily        simvastatin 20 MG tablet    ZOCOR    90 tablet    Take 1 tablet (20 mg) by mouth At Bedtime    Hyperlipidemia LDL goal <100

## 2017-08-10 NOTE — PROGRESS NOTES
SUBJECTIVE:                                                   Grecia Kilgore is a 62 year old female who presents to clinic today for the following health issue(s):  Patient presents with:  Vaginal Discharge: also having left sided pelvic pain      HPI: The patient is seen at this time for abnormal vaginal discharge and left-sided pelvic pain. The patient had some dyspareunia. She is concerned about recent contacts and cultures will be taken.      Patient's last menstrual period was 1985..   Patient is sexually active, .  Using hysterectomy for contraception.    reports that she has never smoked. She has never used smokeless tobacco.      STD testing offered?  Accepted    Health maintenance updated:  yes    Today's PHQ-2 Score:   PHQ-2 (  [employer]) 2017   Q1: Little interest or pleasure in doing things 3   Q2: Feeling down, depressed or hopeless 3   PHQ-2 Score 6     Today's PHQ-9 Score:   PHQ-9 SCORE 2017   Total Score -   Total Score 17     Today's WILL-7 Score:   WILL-7 SCORE 2017   Total Score -   Total Score 17       Problem list and histories reviewed & adjusted, as indicated.  Additional history: as documented.    Patient Active Problem List   Diagnosis     Allergic rhinitis     External hemorrhoids     Vitamin D deficiency     Fibromyalgia     Osteoarthritis, knee     Positive PPD     Panic disorder     Genital herpes     Advanced directives, counseling/discussion     DCIS (ductal carcinoma in situ) of breast     Heart palpitations     Anxiety     Major depressive disorder, single episode, moderate (H)     Hyperlipidemia LDL goal <100     Type 2 diabetes mellitus without complication, without long-term current use of insulin (H)     Mild intermittent asthma without complication     Past Surgical History:   Procedure Laterality Date     ARTHRODESIS TOE(S)  2013    Procedure: ARTHRODESIS TOE(S);  BILATERAL GREAT TOE FUSION (C-ARM);  Surgeon: Mary Guan MD;   Location: [facility]     ARTHRODESIS TOE(S) Left 12/19/2016    Procedure: ARTHRODESIS TOE(S);  Surgeon: Mary Guan MD;  Location: [facility]     ARTHROSCOPY KNEE Left 2/2/11     BIOPSY BREAST  2/7/2014    Procedure: BIOPSY BREAST;  Re-excision Left Breast Cavity for Margins ;  Surgeon: Lisset Amador DO;  Location: [facility]     BIOPSY BREAST SEED LOCALIZATION  1/22/2014    Procedure: BIOPSY BREAST SEED LOCALIZATION;  Left Breast Seed Localized Excisional Biopsy ;  Surgeon: Lisset Amador DO;  Location: [facility] OR     COLONOSCOPY  2005    nl - repeat 2015     FOOT SURGERY Bilateral 2013     HEMORRHOIDECTOMY BANDING      multiple times     HYSTERECTOMY  7/1996    fibroids     REMOVE HARDWARE FOOT Left 2015     REPAIR TENDON FOOT Left 12/19/2016    Procedure: REPAIR TENDON FOOT;  Surgeon: Mary Guan MD;  Location: [facility]      Social History   Substance Use Topics     Smoking status: Never Smoker     Smokeless tobacco: Never Used     Alcohol use No      Problem (# of Occurrences) Relation (Name,Age of Onset)    Alcohol/Drug (1) Father    Breast Cancer (1) Mother    CANCER (1) Father    CEREBROVASCULAR DISEASE (1) Maternal Grandmother    Cancer - colorectal (1) Maternal Grandfather    DIABETES (2) Mother, Maternal Grandmother            Current Outpatient Prescriptions   Medication Sig     meloxicam (MOBIC) 15 MG tablet TAKE 1 TABLET BY MOUTH DAILY AS NEEDED.     sertraline (ZOLOFT) 25 MG tablet TAKE 0.5 TAB BY MOUTH EVERY DAY FOR 7 DAYS THEN TAKE 1 TAB DAILY     montelukast (SINGULAIR) 10 MG tablet Take 1 tablet (10 mg) by mouth At Bedtime     omega 3 1000 MG CAPS Take 1 g by mouth daily     metFORMIN (GLUCOPHAGE) 500 MG tablet Take 1 tablet (500 mg) by mouth 2 times daily (with meals)     albuterol (PROAIR HFA, PROVENTIL HFA, VENTOLIN HFA) 108 (90 BASE) MCG/ACT inhaler Inhale 2 puffs into the lungs every 6 hours as needed for shortness of breath / dyspnea     simvastatin (ZOCOR) 20 MG tablet Take 1 tablet (20  mg) by mouth At Bedtime     lisinopril (PRINIVIL,ZESTRIL) 5 MG tablet Take 1 tablet (5 mg) by mouth daily     Azelastine HCl 0.15 % SOLN Spray 2 sprays into both nostrils 2 times daily     No current facility-administered medications for this visit.      Allergies   Allergen Reactions     Codeine Nausea     Morphine Itching     Nitrofuran Derivatives Hives     Phenothiazines      sedation     Primatene Mist [Epinephrine Bitartrate] Itching     neck itch with primatene mist     Vicodin [Hydrocodone-Acetaminophen] Nausea     Latex Itching and Rash       ROS:  12 point review of systems negative other than symptoms noted below.  Genitourinary: No Periods and Painful Cecilia  Neurologic: Headaches  Psychiatric: Anxiety, Depression and Difficulty Sleeping    OBJECTIVE:     Wt 171 lb (77.6 kg)  LMP 01/01/1985  Breastfeeding? No  BMI 26.78 kg/m2  Body mass index is 26.78 kg/(m^2).    Exam:  Constitutional:  Appearance: Well nourished, well developed alert, in no acute distress  Gastrointestinal:  Abdominal Examination:  Abdomen nontender to palpation, tone normal without rigidity or guarding, no masses present, umbilicus without lesions; Liver/Spleen:  No hepatomegaly present, liver nontender to palpation; Hernias:  No hernias present  Lymphatic: Lymph Nodes:  No other lymphadenopathy present  Skin:General Inspection:  No rashes present, no lesions present, no areas of discoloration; Genitalia and Groin:  No rashes present, no lesions present, no areas of discoloration, no masses present.  Neurologic/Psychiatric:  Mental Status:  Oriented X3   Pelvic Exam:  External Genitalia:     Normal appearance for age, no discharge present, no tenderness present, no inflammatory lesions present, color normal  Vagina:     Normal vaginal vault without central or paravaginal defects, no discharge present, no inflammatory lesions present, no masses present  Bladder:     Nontender to palpation  Urethra:   Urethral Body:  Urethra  palpation normal, urethra structural support normal   Urethral Meatus:  No erythema or lesions present  Cervix:     Surgically absent  Uterus:     Surgically absent  Adnexa:     No adnexal tenderness present, no adnexal masses present  Perineum:     Perineum within normal limits, no evidence of trauma, no rashes or skin lesions present  Anus:     Anus within normal limits, no hemorrhoids present  Inguinal Lymph Nodes:     No lymphadenopathy present  Pubic Hair:     Normal pubic hair distribution for age  Genitalia and Groin:     No rashes present, no lesions present, no areas of discoloration, no masses present       In-Clinic Test Results:      ASSESSMENT/PLAN:                                                        ICD-10-CM    1. Vaginal discharge N89.8 Gram stain     Yeast culture     Group B strep PCR       The patient is very concerned about recent exposure. She had multiple questions about a diagnosis of herpes in the past. There is no sign of vaginal infection at this time. There is no sign of herpes. We have asked her to use some tub soaks and we'll convey all of her results when available.        Tristan Bryant MD  [facility]

## 2017-08-11 ENCOUNTER — TRANSFERRED RECORDS (OUTPATIENT)
Dept: HEALTH INFORMATION MANAGEMENT | Facility: CLINIC | Age: 62
End: 2017-08-11

## 2017-08-11 LAB
GRAM STN SPEC: NORMAL
HCV AB SERPL QL IA: NORMAL
HIV 1+2 AB+HIV1 P24 AG SERPL QL IA: NORMAL
Lab: NORMAL
MICRO REPORT STATUS: NORMAL
SPECIMEN SOURCE: NORMAL

## 2017-08-12 ENCOUNTER — TRANSFERRED RECORDS (OUTPATIENT)
Dept: HEALTH INFORMATION MANAGEMENT | Facility: CLINIC | Age: 62
End: 2017-08-12

## 2017-08-12 LAB
GP B STREP DNA SPEC QL NAA+PROBE: NORMAL
SPECIMEN SOURCE: NORMAL
T PALLIDUM IGG+IGM SER QL: NEGATIVE

## 2017-08-13 LAB
C TRACH DNA SPEC QL NAA+PROBE: NORMAL
N GONORRHOEA DNA SPEC QL NAA+PROBE: NORMAL
SPECIMEN SOURCE: NORMAL
SPECIMEN SOURCE: NORMAL

## 2017-08-15 LAB
Lab: NORMAL
MICRO REPORT STATUS: NORMAL
SPECIMEN SOURCE: NORMAL
YEAST SPEC QL CULT: NORMAL

## 2017-08-16 RX ORDER — METRONIDAZOLE 500 MG/1
500 TABLET ORAL 2 TIMES DAILY
Qty: 14 TABLET | Refills: 0 | Status: SHIPPED | OUTPATIENT
Start: 2017-08-16 | End: 2017-10-09

## 2017-09-06 ENCOUNTER — TRANSFERRED RECORDS (OUTPATIENT)
Dept: HEALTH INFORMATION MANAGEMENT | Facility: CLINIC | Age: 62
End: 2017-09-06

## 2017-09-11 ENCOUNTER — TRANSFERRED RECORDS (OUTPATIENT)
Dept: FAMILY MEDICINE | Facility: CLINIC | Age: 62
End: 2017-09-11

## 2017-09-11 NOTE — PROGRESS NOTES
Records received from [facility].  Placed in MD blanka Chamberlain for review.  Kimmy Bragg TC

## 2017-10-02 ENCOUNTER — TRANSFERRED RECORDS (OUTPATIENT)
Dept: HEALTH INFORMATION MANAGEMENT | Facility: CLINIC | Age: 62
End: 2017-10-02

## 2017-10-09 ENCOUNTER — OFFICE VISIT (OUTPATIENT)
Dept: FAMILY MEDICINE | Facility: CLINIC | Age: 62
End: 2017-10-09
Payer: COMMERCIAL

## 2017-10-09 VITALS
DIASTOLIC BLOOD PRESSURE: 72 MMHG | OXYGEN SATURATION: 100 % | HEART RATE: 57 BPM | BODY MASS INDEX: 27.1 KG/M2 | SYSTOLIC BLOOD PRESSURE: 120 MMHG | WEIGHT: 173 LBS | TEMPERATURE: 98.7 F

## 2017-10-09 DIAGNOSIS — J01.00 ACUTE MAXILLARY SINUSITIS, RECURRENCE NOT SPECIFIED: Primary | ICD-10-CM

## 2017-10-09 PROBLEM — G47.33 OSA (OBSTRUCTIVE SLEEP APNEA): Status: ACTIVE | Noted: 2017-10-09

## 2017-10-09 PROCEDURE — 99213 OFFICE O/P EST LOW 20 MIN: CPT | Performed by: FAMILY MEDICINE

## 2017-10-09 RX ORDER — AZITHROMYCIN 250 MG/1
TABLET, FILM COATED ORAL
Qty: 6 TABLET | Refills: 0 | Status: SHIPPED | OUTPATIENT
Start: 2017-10-09 | End: 2017-11-15

## 2017-10-09 RX ORDER — MIRTAZAPINE 15 MG/1
TABLET, FILM COATED ORAL
Refills: 2 | COMMUNITY
Start: 2017-09-12 | End: 2019-10-01

## 2017-10-09 NOTE — MR AVS SNAPSHOT
"              After Visit Summary   10/9/2017    Grecia Kilgore    MRN: [id number]           Patient Information     Date Of Birth          1955        Visit Information        Provider Department      10/9/2017 12:40 PM Romel Mccall MD [facility]        Today's Diagnoses     Acute maxillary sinusitis, recurrence not specified    -  1       Follow-ups after your visit        Follow-up notes from your care team     Return in about 5 days (around 10/14/2017), or if symptoms worsen or fail to improve.      Your next 10 appointments already scheduled     Nov 15, 2017  1:00 PM CST   PHYSICAL with Romel Mccall MD   [facility]    [address] [place] [id number] [phone]              Who to contact     If you have questions or need follow up information about today's clinic visit or your schedule please contact [facility] directly at [phone].  Normal or non-critical lab and imaging results will be communicated to you by [employer], letter or phone within 4 business days after the clinic has received the results. If you do not hear from us within 7 days, please contact the clinic through [employer] or phone. If you have a critical or abnormal lab result, we will notify you by phone as soon as possible.  Submit refill requests through [employer] or call your pharmacy and they will forward the refill request to us. Please allow 3 business days for your refill to be completed.          Additional Information About Your Visit        [employer] Information     [employer] lets you send messages to your doctor, view your test results, renew your prescriptions, schedule appointments and more. To sign up, go to [email or web address] . Click on \"Log in\" on the left side of the screen, which will take you to the Welcome page. Then click on \"Sign up Now\" on the right side of the page.     You will be asked to enter the " access code listed below, as well as some personal information. Please follow the directions to create your username and password.     Your access code is: [email or web address]  Expires: 2018 12:59 PM     Your access code will  in 90 days. If you need help or a new code, please call your [facility] clinic or [phone].        Care EveryWhere ID     This is your Care EveryWhere ID. This could be used by other organizations to access your [facility] medical records  [id number]        Your Vitals Were     Pulse Temperature Last Period Pulse Oximetry BMI (Body Mass Index)       57 98.7  F (37.1  C) (Tympanic) 1985 100% 27.1 kg/m2        Blood Pressure from Last 3 Encounters:   10/09/17 120/72   08/10/17 150/76   17 140/80    Weight from Last 3 Encounters:   10/09/17 173 lb (78.5 kg)   08/10/17 171 lb (77.6 kg)   17 163 lb (73.9 kg)              Today, you had the following     No orders found for display         Today's Medication Changes          These changes are accurate as of: 10/9/17 12:59 PM.  If you have any questions, ask your nurse or doctor.               Start taking these medicines.        Dose/Directions    azithromycin 250 MG tablet   Commonly known as:  ZITHROMAX   Used for:  Acute maxillary sinusitis, recurrence not specified   Started by:  Rmoel Mccall MD        Two tablets first day, then one tablet daily for four days.   Quantity:  6 tablet   Refills:  0         Stop taking these medicines if you haven't already. Please contact your care team if you have questions.     metroNIDAZOLE 500 MG tablet   Commonly known as:  FLAGYL   Stopped by:  Romel Mccall MD                Where to get your medicines      These medications were sent to [facility], [place] [id number] [address]     Phone:  [phone]     azithromycin 250 MG tablet                Primary Care Provider Office Phone # Fax #    Romel  MD Cal [phone]       [address]  [place] [address]        Equal Access to Services     BRANNON MCKEON : Hadestela aad ku hadaramishenry Nick, wameganda luqandi, qaybta kaalmada iraida, aron bebejaimee hu latreyalphonso sendy. So [facility] [phone].    ATENCIÓN: Si habla español, tiene a patel disposición servicios gratuitos de asistencia lingüística. Llame al [phone].    We comply with applicable federal civil rights laws and [place] laws. We do not discriminate on the basis of race, color, national origin, age, disability, sex, sexual orientation, or gender identity.            Thank you!     Thank you for choosing [facility] [place]  for your care. Our goal is always to provide you with excellent care. Hearing back from our patients is one way we can continue to improve our services. Please take a few minutes to complete the written survey that you may receive in the mail after your visit with us. Thank you!             Your Updated Medication List - Protect others around you: Learn how to safely use, store and throw away your medicines at www.disposemymeds.org.          This list is accurate as of: 10/9/17 12:59 PM.  Always use your most recent med list.                   Brand Name Dispense Instructions for use Diagnosis    albuterol 108 (90 BASE) MCG/ACT Inhaler    PROAIR HFA/PROVENTIL HFA/VENTOLIN HFA    1 Inhaler    Inhale 2 puffs into the lungs every 6 hours as needed for shortness of breath / dyspnea    Mild intermittent asthma without complication       Azelastine HCl 0.15 % Soln     30 mL    Spray 2 sprays into both nostrils 2 times daily    Chronic rhinitis       azithromycin 250 MG tablet    ZITHROMAX    6 tablet    Two tablets first day, then one tablet daily for four days.    Acute maxillary sinusitis, recurrence not specified       lisinopril 5 MG tablet    PRINIVIL/ZESTRIL    90 tablet    Take 1 tablet (5 mg) by mouth daily    Type 2 diabetes mellitus  without complication, without long-term current use of insulin (H)       meloxicam 15 MG tablet    MOBIC     TAKE 1 TABLET BY MOUTH DAILY AS NEEDED.        metFORMIN 500 MG tablet    GLUCOPHAGE    270 tablet    Take 1 tablet (500 mg) by mouth 2 times daily (with meals)    Type 2 diabetes mellitus without complication, without long-term current use of insulin (H)       mirtazapine 15 MG tablet    REMERON          montelukast 10 MG tablet    SINGULAIR    90 tablet    Take 1 tablet (10 mg) by mouth At Bedtime    Mild intermittent asthma without complication       omega 3 1000 MG Caps     90 capsule    Take 1 g by mouth daily        simvastatin 20 MG tablet    ZOCOR    90 tablet    Take 1 tablet (20 mg) by mouth At Bedtime    Hyperlipidemia LDL goal <100

## 2017-10-09 NOTE — NURSING NOTE
"Chief Complaint   Patient presents with     Cough       Initial /72  Pulse 57  Temp 98.7  F (37.1  C) (Tympanic)  Wt 173 lb (78.5 kg)  LMP 01/01/1985  SpO2 100%  BMI 27.1 kg/m2 Estimated body mass index is 27.1 kg/(m^2) as calculated from the following:    Height as of 7/5/17: 5' 7\" (1.702 m).    Weight as of this encounter: 173 lb (78.5 kg).  Medication Reconciliation: complete   Lisa Jacob CMA      "

## 2017-10-09 NOTE — PROGRESS NOTES
SUBJECTIVE:   Grecia Kilgore is a 62 year old female who presents to clinic today for the following health issues:      Acute Illness   Acute illness concerns: uri  Onset: x 4-5 days    Fever: no    Chills/Sweats: no    Headache (location?): YES    Sinus Pressure:YES    Conjunctivitis:  no    Ear Pain: no    Rhinorrhea: no    Congestion: YES    Sore Throat: no     Cough: YES    Wheeze: no    Decreased Appetite: no    Nausea: no    Vomiting: no    Diarrhea:  no    Dysuria/Freq.: no    Fatigue/Achiness: no    Sick/Strep Exposure: no     Therapies Tried and outcome: lemon honey teas cough drops        Problem list and histories reviewed & adjusted, as indicated.  Additional history: as documented    Patient Active Problem List   Diagnosis     Allergic rhinitis     External hemorrhoids     Vitamin D deficiency     Fibromyalgia     Osteoarthritis, knee     Positive PPD     Panic disorder     Genital herpes     Advanced directives, counseling/discussion     DCIS (ductal carcinoma in situ) of breast     Heart palpitations     Anxiety     Major depressive disorder, single episode, moderate (H)     Hyperlipidemia LDL goal <100     Type 2 diabetes mellitus without complication, without long-term current use of insulin (H)     Mild intermittent asthma without complication     BOO (obstructive sleep apnea)     Past Surgical History:   Procedure Laterality Date     ARTHRODESIS TOE(S)  9/16/2013    Procedure: ARTHRODESIS TOE(S);  BILATERAL GREAT TOE FUSION (C-ARM);  Surgeon: Mary Guan MD;  Location: [facility]     ARTHRODESIS TOE(S) Left 12/19/2016    Procedure: ARTHRODESIS TOE(S);  Surgeon: Mary Guan MD;  Location: [facility]     ARTHROSCOPY KNEE Left 2/2/11     BIOPSY BREAST  2/7/2014    Procedure: BIOPSY BREAST;  Re-excision Left Breast Cavity for Margins ;  Surgeon: Lisset Amador DO;  Location:  OR     BIOPSY BREAST SEED LOCALIZATION  1/22/2014    Procedure: BIOPSY BREAST SEED LOCALIZATION;  Left  Breast Seed Localized Excisional Biopsy ;  Surgeon: Lisset Amador DO;  Location: [facility] OR     COLONOSCOPY  2005    nl - repeat 2015     FOOT SURGERY Bilateral 2013     HEMORRHOIDECTOMY BANDING      multiple times     HYSTERECTOMY  7/1996    fibroids     REMOVE HARDWARE FOOT Left 2015     REPAIR TENDON FOOT Left 12/19/2016    Procedure: REPAIR TENDON FOOT;  Surgeon: Mary Guan MD;  [facility]       Social History   Substance Use Topics     Smoking status: Never Smoker     Smokeless tobacco: Never Used     Alcohol use No     Family History   Problem Relation Age of Onset     DIABETES Mother      Breast Cancer Mother      Alcohol/Drug Father      CANCER Father      DIABETES Maternal Grandmother      CEREBROVASCULAR DISEASE Maternal Grandmother      Cancer - colorectal Maternal Grandfather          Current Outpatient Prescriptions   Medication Sig Dispense Refill     mirtazapine (REMERON) 15 MG tablet   2     azithromycin (ZITHROMAX) 250 MG tablet Two tablets first day, then one tablet daily for four days. 6 tablet 0     meloxicam (MOBIC) 15 MG tablet TAKE 1 TABLET BY MOUTH DAILY AS NEEDED.  0     montelukast (SINGULAIR) 10 MG tablet Take 1 tablet (10 mg) by mouth At Bedtime 90 tablet 3     omega 3 1000 MG CAPS Take 1 g by mouth daily 90 capsule      metFORMIN (GLUCOPHAGE) 500 MG tablet Take 1 tablet (500 mg) by mouth 2 times daily (with meals) 270 tablet 3     albuterol (PROAIR HFA, PROVENTIL HFA, VENTOLIN HFA) 108 (90 BASE) MCG/ACT inhaler Inhale 2 puffs into the lungs every 6 hours as needed for shortness of breath / dyspnea 1 Inhaler 6     simvastatin (ZOCOR) 20 MG tablet Take 1 tablet (20 mg) by mouth At Bedtime 90 tablet 3     lisinopril (PRINIVIL,ZESTRIL) 5 MG tablet Take 1 tablet (5 mg) by mouth daily 90 tablet 3     Azelastine HCl 0.15 % SOLN Spray 2 sprays into both nostrils 2 times daily 30 mL 11     Allergies   Allergen Reactions     Codeine Nausea     Morphine Itching     Nitrofuran  Derivatives Hives     Phenothiazines      sedation     Primatene Mist [Epinephrine Bitartrate] Itching     neck itch with primatene mist     Vicodin [Hydrocodone-Acetaminophen] Nausea     Latex Itching and Rash         Reviewed and updated as needed this visit by clinical staff     Reviewed and updated as needed this visit by Provider         ROS:  INTEGUMENTARY/SKIN: NEGATIVE for worrisome rashes, moles or lesions  GI: NEGATIVE for nausea, abdominal pain, heartburn, or change in bowel habits    OBJECTIVE:                                                    /72  Pulse 57  Temp 98.7  F (37.1  C) (Tympanic)  Wt 173 lb (78.5 kg)  LMP 01/01/1985  SpO2 100%  BMI 27.1 kg/m2  Body mass index is 27.1 kg/(m^2).   GENERAL: healthy, alert, well nourished, well hydrated, no distress  HENT: ear canals- normal; TMs- normal; Nose- congested turbinates b/l.  maxillary sinus tenderness noted on the right. Mouth- no ulcers, no lesions  NECK: no tenderness, no adenopathy, no asymmetry, no masses, no stiffness; thyroid- normal to palpation  RESP: lungs clear to auscultation - no rales, no rhonchi, no wheezes  CV: regular rates and rhythm, normal S1 S2, no S3 or S4 and no murmur, no click or rub -         ASSESSMENT/PLAN:                                                        ICD-10-CM    1. Acute maxillary sinusitis, recurrence not specified J01.00 azithromycin (ZITHROMAX) 250 MG tablet     Recommended to start Z-pack. Stay well hydrated. Use Astelin nasal spray.   Follow up with Provider - if not improving in a few days.      Romel Mccall MD  [facility]

## 2017-10-18 ENCOUNTER — TRANSFERRED RECORDS (OUTPATIENT)
Dept: HEALTH INFORMATION MANAGEMENT | Facility: CLINIC | Age: 62
End: 2017-10-18

## 2017-11-13 ENCOUNTER — TRANSFERRED RECORDS (OUTPATIENT)
Dept: HEALTH INFORMATION MANAGEMENT | Facility: CLINIC | Age: 62
End: 2017-11-13

## 2017-11-15 ENCOUNTER — OFFICE VISIT (OUTPATIENT)
Dept: FAMILY MEDICINE | Facility: CLINIC | Age: 62
End: 2017-11-15
Payer: COMMERCIAL

## 2017-11-15 VITALS
WEIGHT: 160.4 LBS | SYSTOLIC BLOOD PRESSURE: 143 MMHG | OXYGEN SATURATION: 99 % | DIASTOLIC BLOOD PRESSURE: 90 MMHG | BODY MASS INDEX: 25.18 KG/M2 | TEMPERATURE: 97.4 F | HEART RATE: 63 BPM | HEIGHT: 67 IN

## 2017-11-15 DIAGNOSIS — Z00.00 ROUTINE GENERAL MEDICAL EXAMINATION AT A HEALTH CARE FACILITY: Primary | ICD-10-CM

## 2017-11-15 DIAGNOSIS — N89.8 VAGINAL DISCHARGE: ICD-10-CM

## 2017-11-15 DIAGNOSIS — B96.89 BV (BACTERIAL VAGINOSIS): ICD-10-CM

## 2017-11-15 DIAGNOSIS — Z23 NEED FOR PROPHYLACTIC VACCINATION AND INOCULATION AGAINST INFLUENZA: ICD-10-CM

## 2017-11-15 DIAGNOSIS — J45.20 MILD INTERMITTENT ASTHMA WITHOUT COMPLICATION: ICD-10-CM

## 2017-11-15 DIAGNOSIS — E78.5 HYPERLIPIDEMIA LDL GOAL <100: ICD-10-CM

## 2017-11-15 DIAGNOSIS — E11.9 TYPE 2 DIABETES MELLITUS WITHOUT COMPLICATION, WITHOUT LONG-TERM CURRENT USE OF INSULIN (H): ICD-10-CM

## 2017-11-15 DIAGNOSIS — N76.0 BV (BACTERIAL VAGINOSIS): ICD-10-CM

## 2017-11-15 LAB
ALBUMIN UR-MCNC: ABNORMAL MG/DL
APPEARANCE UR: CLEAR
BACTERIA #/AREA URNS HPF: ABNORMAL /HPF
BILIRUB UR QL STRIP: NEGATIVE
COLOR UR AUTO: YELLOW
GLUCOSE UR STRIP-MCNC: NEGATIVE MG/DL
HBA1C MFR BLD: 6 % (ref 4.3–6)
HGB BLD-MCNC: 12.8 G/DL (ref 11.7–15.7)
HGB UR QL STRIP: NEGATIVE
KETONES UR STRIP-MCNC: NEGATIVE MG/DL
LEUKOCYTE ESTERASE UR QL STRIP: NEGATIVE
MUCOUS THREADS #/AREA URNS LPF: PRESENT /LPF
NITRATE UR QL: NEGATIVE
NON-SQ EPI CELLS #/AREA URNS LPF: ABNORMAL /LPF
PH UR STRIP: 5.5 PH (ref 5–7)
RBC #/AREA URNS AUTO: ABNORMAL /HPF
SOURCE: ABNORMAL
SP GR UR STRIP: 1.02 (ref 1–1.03)
SPECIMEN SOURCE: ABNORMAL
UROBILINOGEN UR STRIP-ACNC: 1 EU/DL (ref 0.2–1)
WBC #/AREA URNS AUTO: ABNORMAL /HPF
WET PREP SPEC: ABNORMAL

## 2017-11-15 PROCEDURE — 80061 LIPID PANEL: CPT | Performed by: FAMILY MEDICINE

## 2017-11-15 PROCEDURE — 85018 HEMOGLOBIN: CPT | Performed by: FAMILY MEDICINE

## 2017-11-15 PROCEDURE — 82043 UR ALBUMIN QUANTITATIVE: CPT | Performed by: FAMILY MEDICINE

## 2017-11-15 PROCEDURE — 90471 IMMUNIZATION ADMIN: CPT | Performed by: FAMILY MEDICINE

## 2017-11-15 PROCEDURE — 83036 HEMOGLOBIN GLYCOSYLATED A1C: CPT | Performed by: FAMILY MEDICINE

## 2017-11-15 PROCEDURE — 81001 URINALYSIS AUTO W/SCOPE: CPT | Performed by: FAMILY MEDICINE

## 2017-11-15 PROCEDURE — 87210 SMEAR WET MOUNT SALINE/INK: CPT | Performed by: FAMILY MEDICINE

## 2017-11-15 PROCEDURE — 99396 PREV VISIT EST AGE 40-64: CPT | Mod: 25 | Performed by: FAMILY MEDICINE

## 2017-11-15 PROCEDURE — 84443 ASSAY THYROID STIM HORMONE: CPT | Performed by: FAMILY MEDICINE

## 2017-11-15 PROCEDURE — 90686 IIV4 VACC NO PRSV 0.5 ML IM: CPT | Performed by: FAMILY MEDICINE

## 2017-11-15 PROCEDURE — 36415 COLL VENOUS BLD VENIPUNCTURE: CPT | Performed by: FAMILY MEDICINE

## 2017-11-15 PROCEDURE — 80053 COMPREHEN METABOLIC PANEL: CPT | Performed by: FAMILY MEDICINE

## 2017-11-15 RX ORDER — METRONIDAZOLE 500 MG/1
500 TABLET ORAL 2 TIMES DAILY
Qty: 14 TABLET | Refills: 0 | Status: SHIPPED | OUTPATIENT
Start: 2017-11-15 | End: 2017-11-15

## 2017-11-15 RX ORDER — LORAZEPAM 0.5 MG/1
0.25-0.5 TABLET ORAL EVERY 8 HOURS PRN
COMMUNITY
Start: 2017-11-15 | End: 2019-09-11

## 2017-11-15 RX ORDER — METRONIDAZOLE 250 MG/1
500 TABLET ORAL 2 TIMES DAILY
Qty: 28 TABLET | Refills: 0 | Status: SHIPPED | OUTPATIENT
Start: 2017-11-15 | End: 2017-11-22

## 2017-11-15 RX ORDER — MONTELUKAST SODIUM 10 MG/1
10 TABLET ORAL AT BEDTIME
Qty: 90 TABLET | Refills: 3 | Status: SHIPPED | OUTPATIENT
Start: 2017-11-15 | End: 2018-11-23

## 2017-11-15 RX ORDER — ALBUTEROL SULFATE 90 UG/1
2 AEROSOL, METERED RESPIRATORY (INHALATION) EVERY 6 HOURS PRN
Qty: 1 INHALER | Refills: 6 | Status: SHIPPED | OUTPATIENT
Start: 2017-11-15 | End: 2018-11-23

## 2017-11-15 RX ORDER — LISINOPRIL 5 MG/1
5 TABLET ORAL DAILY
Qty: 90 TABLET | Refills: 3 | Status: SHIPPED | OUTPATIENT
Start: 2017-11-15 | End: 2018-08-09

## 2017-11-15 RX ORDER — SIMVASTATIN 20 MG
20 TABLET ORAL AT BEDTIME
Qty: 90 TABLET | Refills: 3 | Status: SHIPPED | OUTPATIENT
Start: 2017-11-15 | End: 2017-11-17

## 2017-11-15 ASSESSMENT — ANXIETY QUESTIONNAIRES
6. BECOMING EASILY ANNOYED OR IRRITABLE: MORE THAN HALF THE DAYS
GAD7 TOTAL SCORE: 18
1. FEELING NERVOUS, ANXIOUS, OR ON EDGE: NEARLY EVERY DAY
7. FEELING AFRAID AS IF SOMETHING AWFUL MIGHT HAPPEN: NEARLY EVERY DAY
3. WORRYING TOO MUCH ABOUT DIFFERENT THINGS: MORE THAN HALF THE DAYS
2. NOT BEING ABLE TO STOP OR CONTROL WORRYING: NEARLY EVERY DAY
5. BEING SO RESTLESS THAT IT IS HARD TO SIT STILL: NEARLY EVERY DAY
IF YOU CHECKED OFF ANY PROBLEMS ON THIS QUESTIONNAIRE, HOW DIFFICULT HAVE THESE PROBLEMS MADE IT FOR YOU TO DO YOUR WORK, TAKE CARE OF THINGS AT HOME, OR GET ALONG WITH OTHER PEOPLE: VERY DIFFICULT

## 2017-11-15 ASSESSMENT — PATIENT HEALTH QUESTIONNAIRE - PHQ9
SUM OF ALL RESPONSES TO PHQ QUESTIONS 1-9: 17
5. POOR APPETITE OR OVEREATING: MORE THAN HALF THE DAYS

## 2017-11-15 NOTE — LETTER
November 17, 2017      Grecia P Kilgore  [id number]  [place] [address]        Dear ,    I have reviewed your recent labs. Here are the results:    -Liver and gallbladder tests are normal. (ALT,AST, Alk phos, bilirubin), kidney function is normal (Cr, GFR), Sodium is normal, Potassium is normal, Calcium is normal, Glucose is normal (diabetes screening test).     Cholesterol profile suggests that we should increase the dose of simvastatin from 20-40 mg daily. I will order the new dose to the pharmacy. Please follow up with me in 3 months for recheck    -TSH (thyroid stimulating hormone) level is normal which indicates normal thyroid function.  -Hemoglobin is normal.  There is no evidence of anemia.  -A1C (test of diabetes control the last 2-3 months) is a bit higher than before but still at your goal!   Continue the same dose of metformin once a day.  Please recheck your A1C test in 3 months.     -Microalbumin (urine protein) test is normal.  ADVISE: recheck annually  -Urine is normal.      If you have any questions or concerns, please call the clinic at the number listed above.       Sincerely,        Romel Mccall MD

## 2017-11-15 NOTE — NURSING NOTE
"Chief Complaint   Patient presents with     Physical       Initial LMP 01/01/1985 Estimated body mass index is 27.1 kg/(m^2) as calculated from the following:    Height as of 7/5/17: 5' 7\" (1.702 m).    Weight as of 10/9/17: 173 lb (78.5 kg).  Medication Reconciliation: complete   Sabrina Carbajal CMA    "
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
## 2017-11-15 NOTE — MR AVS SNAPSHOT
After Visit Summary   11/15/2017    Grecia Kilgore    MRN: [id number]           Patient Information     Date Of Birth          1955        Visit Information        Provider Department      11/15/2017 1:00 PM Romel Mccall MD [facility]        Today's Diagnoses     Routine general medical examination at a health care facility    -  1    Need for prophylactic vaccination and inoculation against influenza        Type 2 diabetes mellitus without complication, without long-term current use of insulin (H)        Hyperlipidemia LDL goal <100        Mild intermittent asthma without complication          Care Instructions      Preventive Health Recommendations  Female Ages 50 - 64    Yearly exam: See your health care provider every year in order to  o Review health changes.   o Discuss preventive care.    o Review your medicines if your doctor has prescribed any.      Get a Pap test every three years (unless you have an abnormal result and your provider advises testing more often).    If you get Pap tests with HPV test, you only need to test every 5 years, unless you have an abnormal result.     You do not need a Pap test if your uterus was removed (hysterectomy) and you have not had cancer.    You should be tested each year for STDs (sexually transmitted diseases) if you're at risk.     Have a mammogram every 1 to 2 years.    Have a colonoscopy at age 50, or have a yearly FIT test (stool test). These exams screen for colon cancer.      Have a cholesterol test every 5 years, or more often if advised.    Have a diabetes test (fasting glucose) every three years. If you are at risk for diabetes, you should have this test more often.     If you are at risk for osteoporosis (brittle bone disease), think about having a bone density scan (DEXA).    Shots: Get a flu shot each year. Get a tetanus shot every 10 years.    Nutrition:     Eat at least 5 servings of fruits and vegetables each  "day.    Eat whole-grain bread, whole-wheat pasta and brown rice instead of white grains and rice.    Talk to your provider about Calcium and Vitamin D.     Lifestyle    Exercise at least 150 minutes a week (30 minutes a day, 5 days a week). This will help you control your weight and prevent disease.    Limit alcohol to one drink per day.    No smoking.     Wear sunscreen to prevent skin cancer.     See your dentist every six months for an exam and cleaning.    See your eye doctor every 1 to 2 years.            Follow-ups after your visit        Follow-up notes from your care team     Return in about 6 months (around 5/15/2018) for Diabetes Recheck.      Who to contact     If you have questions or need follow up information about today's clinic visit or your schedule please contact [facility] [place] directly at [phone].  Normal or non-critical lab and imaging results will be communicated to you by citysocializerhart, letter or phone within 4 business days after the clinic has received the results. If you do not hear from us within 7 days, please contact the clinic through citysocializerhart or phone. If you have a critical or abnormal lab result, we will notify you by phone as soon as possible.  Submit refill requests through [employer] or call your pharmacy and they will forward the refill request to us. Please allow 3 business days for your refill to be completed.          Additional Information About Your Visit        [employer] Information     [employer] lets you send messages to your doctor, view your test results, renew your prescriptions, schedule appointments and more. To sign up, go to [email or web address] . Click on \"Log in\" on the left side of the screen, which will take you to the Welcome page. Then click on \"Sign up Now\" on the right side of the page.     You will be asked to enter the access code listed below, as well as some personal information. Please follow the directions to create your username and " "password.     Your access code is: [email or web address]  Expires: 2018 11:59 AM     Your access code will  in 90 days. If you need help or a new code, please call your [place] clinic or [phone].        Care EveryWhere ID     This is your Care EveryWhere ID. This could be used by other organizations to access your [place] medical records  [id number]        Your Vitals Were     Pulse Temperature Height Last Period Pulse Oximetry BMI (Body Mass Index)    63 97.4  F (36.3  C) (Tympanic) 5' 7\" (1.702 m) 1985 99% 25.12 kg/m2       Blood Pressure from Last 3 Encounters:   11/15/17 143/90   10/09/17 120/72   08/10/17 150/76    Weight from Last 3 Encounters:   11/15/17 160 lb 6.4 oz (72.8 kg)   10/09/17 173 lb (78.5 kg)   08/10/17 171 lb (77.6 kg)              We Performed the Following     Albumin Random Urine Quantitative with Creat Ratio     Comprehensive metabolic panel     FLU VAC, SPLIT VIRUS IM > 3 YO (QUADRIVALENT) [93085]     FOOT EXAM     Hemoglobin A1c     Hemoglobin     Lipid Profile     TSH with free T4 reflex     UA reflex to Microscopic     Vaccine Administration, Initial [91447]          Today's Medication Changes          These changes are accurate as of: 11/15/17  1:49 PM.  If you have any questions, ask your nurse or doctor.               These medicines have changed or have updated prescriptions.        Dose/Directions    metFORMIN 500 MG tablet   Commonly known as:  GLUCOPHAGE   This may have changed:  when to take this   Used for:  Type 2 diabetes mellitus without complication, without long-term current use of insulin (H)   Changed by:  Romel Mccall MD        Dose:  500 mg   Take 1 tablet (500 mg) by mouth daily (with breakfast)   Quantity:  90 tablet   Refills:  3         Stop taking these medicines if you haven't already. Please contact your care team if you have questions.     Azelastine HCl 0.15 % Soln   Stopped by:  Romel Mccall MD           omega 3 1000 MG Caps   Stopped by:  " Romel Mccall MD                Where to get your medicines      These medications were sent to [facility] - [place] - [address] AT [address] [facility]  [address] [place] [address]     Phone:  [phone]     albuterol 108 (90 BASE) MCG/ACT Inhaler    lisinopril 5 MG tablet    metFORMIN 500 MG tablet    montelukast 10 MG tablet    simvastatin 20 MG tablet                Primary Care Provider Office Phone # Fax #    Romel Mccall [phone]       [address]  [place] [address]        Equal Access to Services     [facility] Center[facility] : Hadii aad ku hadasho Soomaali, waaxda luqadaha, qaybta kaalmada adeegyada, waxzbigniew spencein tate kaba. So [facility] [phone].    ATENCIÓN: Si habla español, tiene a patel disposición servicios gratuitos de asistencia lingüística. Llame al [phone].    We comply with applicable federal civil rights laws and [place] laws. We do not discriminate on the basis of race, color, national origin, age, disability, sex, sexual orientation, or gender identity.            Thank you!     [facility] you [facility]  [facility]. Our goal is always to provide you with excellent care. Hearing back from our patients is one way we can continue to improve our services. Please take a few minutes to complete the written survey that you may receive in the mail after your visit with us. Thank you!             Your Updated Medication List - Protect others around you: Learn how to safely use, store and throw away your medicines at www.disposemymeds.org.          This list is accurate as of: 11/15/17  1:49 PM.  Always use your most recent med list.                   Brand Name Dispense Instructions for use Diagnosis    albuterol 108 (90 BASE) MCG/ACT Inhaler    PROAIR HFA/PROVENTIL HFA/VENTOLIN HFA    1 Inhaler    Inhale 2 puffs into the lungs every 6 hours as needed for shortness of  breath / dyspnea    Mild intermittent asthma without complication       ATIVAN 0.5 MG tablet   Generic drug:  LORazepam      Take 0.5-1 tablets (0.25-0.5 mg) by mouth every 8 hours as needed for anxiety Take 30 minutes prior to departure.  Do not operate a vehicle after taking this medication        lisinopril 5 MG tablet    PRINIVIL/ZESTRIL    90 tablet    Take 1 tablet (5 mg) by mouth daily    Type 2 diabetes mellitus without complication, without long-term current use of insulin (H)       meloxicam 15 MG tablet    MOBIC     TAKE 1 TABLET BY MOUTH DAILY AS NEEDED.        metFORMIN 500 MG tablet    GLUCOPHAGE    90 tablet    Take 1 tablet (500 mg) by mouth daily (with breakfast)    Type 2 diabetes mellitus without complication, without long-term current use of insulin (H)       mirtazapine 15 MG tablet    REMERON          montelukast 10 MG tablet    SINGULAIR    90 tablet    Take 1 tablet (10 mg) by mouth At Bedtime    Mild intermittent asthma without complication       simvastatin 20 MG tablet    ZOCOR    90 tablet    Take 1 tablet (20 mg) by mouth At Bedtime    Hyperlipidemia LDL goal <100

## 2017-11-15 NOTE — Clinical Note
Please call the patient and notified that her wet prep shows bacterial vaginosis. Metronidazole ordered Also notify her that she left the exam room before the nurse could recheck the blood pressure. Please have her come back for nurse only blood pressure recheck in 1-2 weeks.

## 2017-11-15 NOTE — Clinical Note
Please abstract the following data from this visit with this patient into the appropriate field in Epic:  Diabetic eye exam : Normal

## 2017-11-16 ENCOUNTER — TRANSFERRED RECORDS (OUTPATIENT)
Dept: HEALTH INFORMATION MANAGEMENT | Facility: CLINIC | Age: 62
End: 2017-11-16

## 2017-11-16 LAB
ALBUMIN SERPL-MCNC: 3.9 G/DL (ref 3.4–5)
ALP SERPL-CCNC: 88 U/L (ref 40–150)
ALT SERPL W P-5'-P-CCNC: 42 U/L (ref 0–50)
ANION GAP SERPL CALCULATED.3IONS-SCNC: 9 MMOL/L (ref 3–14)
AST SERPL W P-5'-P-CCNC: 27 U/L (ref 0–45)
BILIRUB SERPL-MCNC: 0.6 MG/DL (ref 0.2–1.3)
BUN SERPL-MCNC: 12 MG/DL (ref 7–30)
CALCIUM SERPL-MCNC: 9.2 MG/DL (ref 8.5–10.1)
CHLORIDE SERPL-SCNC: 112 MMOL/L (ref 94–109)
CHOLEST SERPL-MCNC: 196 MG/DL
CO2 SERPL-SCNC: 23 MMOL/L (ref 20–32)
CREAT SERPL-MCNC: 0.93 MG/DL (ref 0.52–1.04)
CREAT UR-MCNC: 236 MG/DL
GFR SERPL CREATININE-BSD FRML MDRD: 61 ML/MIN/1.7M2
GLUCOSE SERPL-MCNC: 95 MG/DL (ref 70–99)
HDLC SERPL-MCNC: 44 MG/DL
LDLC SERPL CALC-MCNC: 123 MG/DL
MICROALBUMIN UR-MCNC: 27 MG/L
MICROALBUMIN/CREAT UR: 11.4 MG/G CR (ref 0–25)
NONHDLC SERPL-MCNC: 152 MG/DL
POTASSIUM SERPL-SCNC: 3.9 MMOL/L (ref 3.4–5.3)
PROT SERPL-MCNC: 7.5 G/DL (ref 6.8–8.8)
SODIUM SERPL-SCNC: 144 MMOL/L (ref 133–144)
TRIGL SERPL-MCNC: 146 MG/DL
TSH SERPL DL<=0.005 MIU/L-ACNC: 0.66 MU/L (ref 0.4–4)

## 2017-11-16 ASSESSMENT — ASTHMA QUESTIONNAIRES: ACT_TOTALSCORE: 25

## 2017-11-16 ASSESSMENT — ANXIETY QUESTIONNAIRES: GAD7 TOTAL SCORE: 18

## 2017-11-17 RX ORDER — SIMVASTATIN 40 MG
40 TABLET ORAL AT BEDTIME
Qty: 90 TABLET | Refills: 1 | Status: SHIPPED | OUTPATIENT
Start: 2017-11-17 | End: 2018-08-09

## 2018-01-04 ENCOUNTER — TELEPHONE (OUTPATIENT)
Dept: NURSING | Facility: CLINIC | Age: 63
End: 2018-01-04

## 2018-01-04 NOTE — TELEPHONE ENCOUNTER
Hi  As you may be aware [facility] is conducting a personalized medicine hypertension clinical trial (PGEN.)  We are enrolling patients with new onset hypertension or uncontrolled hypertension on one blood pressure medicine.       During a recent office visit this patient was identified as a potential candidate via a BPA alert  that detects multiple high blood pressure readings at recent clinic visits.     Would you be ok with our team will be reaching out to this patient to invite her to participate?  At that visit the patient would see a pharmacist or provider and have blood pressure retested.  If blood pressure is high again at that visit we would make a diagnosis of hypertension and we would offer enrollment in the study.       Details of there study can be found by typing <dot>[email or web address].  For your convenience I have copied and pasted the materials below      Please respond to this message with your preference and we would perform all the outreach and scheduling.  No other action would be needed on your part after you send us back your response.     Thanks     Zoraida TurkD     and     Viki Leon MD  [facility] principle investigator  =========================================  PGen Hypertension Study Summary     Thank you for your interest in the [facility] hypertension research study. This study is designed to test whether genetically guided blood pressure medication management is better than the standard of care.  Both groups will use medications that have been used for many years to treat high blood pressure ( diuretics, beta blockers, calcium channel blockers, AceI /ARB).  The ORDER of medications chosen may be different however. All participants will receive the genetic testing for free along with free research related visits.  Participants will not be given the results of their genetic test results until the END of the study.Participants will also receive compensation totaling about $100 for completing  all study visits and surveys.      If you would like to enroll or know more about using your genetics to determine which hypertensive medications may work best for you please contact the research coordinator as [phone] or email [email or web address]    Who may qualify for the study:  1) New diagnosis of high blood pressure but not yet on blood pressure medication.  2) Existing diagnosis of hypertension but blood pressure is  uncontrolled with 1 or less class of medications.   3) Age between 30 and 70  4) BMI between 19-50    Who should NOT be in the study:    1) All patient taking more than 1 class of hypertensive medication are excluded.   2) Documented instance of following conditions    A. Cardiac Disease  i. ICD-10 Code for Coronary Artery Disease - CAD: 410.* -414.*, I25.*  ii. ICD-10 Code for Heart Failure - 428.*, I50.*  iii. ICD-10 Code for Congenital Cardiac Disease - 745.*, -747.*, Q20.*, -Q28.*   B. Kidney Disease        i. ICD-10 Code for Chronic Kidney Disease - CKD:ICD9 585.*and ICD10 N18.*   C. Vascular Disease        i. ICD-10 Code for Peripheral Vascular Disease - 443.9, I73.9        ii. ICD-10 Code for Pulmonary Hypertension - 416.0, 416.8, I27.0, I27.2   D. Secondary Hypertension                     i. ICD-10 Code for Hypertension Secondary to Other Diseases - I15.0-2, I15.8-9  3) Any patient who has chronic medical conditions that  their doctor/provider feels would make them unsafe to participate in a research study where initial choice of blood pressure  medication could be from 1 of these 4 BP classes of medicines: diuretics, beta blockers, calcium channel blockers, AceI /ARB.    Study visit occur at the following clinic locations  [place]:  1) [place]  2) [place]  3) [place]  4) [place]  5) [place]  6) [place]  7) [place]    [place]:  1) [facility])  2) [place]  3) [place]    [place]:  1) [place]    Patient Expectations:    1) Take Hypertension medications  2)  Attend scheduled study visits  3) Complete online surveys sent between visits     If enrolled patient is asked to attend 5 to 8 study visits over the next 12 months.

## 2018-01-05 NOTE — TELEPHONE ENCOUNTER
I would be okay with your team reaching out to the patient.    Romel Mccall MD  [facility] [place], [place]

## 2018-01-26 ENCOUNTER — NURSE TRIAGE (OUTPATIENT)
Dept: NURSING | Facility: CLINIC | Age: 63
End: 2018-01-26

## 2018-01-26 ENCOUNTER — TELEPHONE (OUTPATIENT)
Dept: FAMILY MEDICINE | Facility: CLINIC | Age: 63
End: 2018-01-26

## 2018-01-26 NOTE — TELEPHONE ENCOUNTER
Huddle with provider would advise patient be seen due to history.  Patient notified and agreed.  Will go to Urgent care today.  Montse Wharton RN - Triage  [facility]

## 2018-01-26 NOTE — TELEPHONE ENCOUNTER
"Clinic Action Needed:Yes, Patient would like a call back today.   [facility] Triage Call  Presenting Problem:    \"I want to come in and do a swab tomorrow when I have my mammogram.\" Has mammogram appointment tomorrow at 11am.   States that she has been having white watery vaginal discharge for 3 weeks.   \"I know what this is. I get the swab and then I get metronidazole. I get this all the time.\"   Denies fever, abdominal pain, back pain.   Denies any vaginal itching, rash  \"I just want to come in and do the swab.\"     Protocol and care advice reviewed.   Caller states understanding of the recommended disposition. Transferred caller to scheduling to make clinic appointment for today, no appointments available.  Caller wants a message sent to the clinic to see if she can get an order for a vaginal swab. States that she can come in today to do the swab \"I've done it myself in the bathroom.\" Or she would prefer to do the swab tomorrow when she has her mammogram.   Will send a message to the clinic per Patient request.     Guideline Used:Vaginal symptoms    Routed to:Dr. Mccall/nurse jordan.    Mary Chase RN/ [facility]        "

## 2018-01-27 ENCOUNTER — RADIANT APPOINTMENT (OUTPATIENT)
Dept: MAMMOGRAPHY | Facility: CLINIC | Age: 63
End: 2018-01-27
Attending: FAMILY MEDICINE
Payer: COMMERCIAL

## 2018-01-27 ENCOUNTER — OFFICE VISIT (OUTPATIENT)
Dept: URGENT CARE | Facility: URGENT CARE | Age: 63
End: 2018-01-27
Payer: COMMERCIAL

## 2018-01-27 VITALS
SYSTOLIC BLOOD PRESSURE: 114 MMHG | OXYGEN SATURATION: 100 % | HEART RATE: 60 BPM | WEIGHT: 162 LBS | BODY MASS INDEX: 25.37 KG/M2 | DIASTOLIC BLOOD PRESSURE: 62 MMHG | TEMPERATURE: 98 F

## 2018-01-27 DIAGNOSIS — H66.91 ACUTE OTITIS MEDIA, RIGHT: ICD-10-CM

## 2018-01-27 DIAGNOSIS — B96.89 BACTERIAL VAGINOSIS: ICD-10-CM

## 2018-01-27 DIAGNOSIS — N30.00 ACUTE CYSTITIS WITHOUT HEMATURIA: ICD-10-CM

## 2018-01-27 DIAGNOSIS — Z12.31 VISIT FOR SCREENING MAMMOGRAM: ICD-10-CM

## 2018-01-27 DIAGNOSIS — N89.8 VAGINAL DISCHARGE: Primary | ICD-10-CM

## 2018-01-27 DIAGNOSIS — N76.0 BACTERIAL VAGINOSIS: ICD-10-CM

## 2018-01-27 LAB
ALBUMIN UR-MCNC: 30 MG/DL
AMORPH CRY #/AREA URNS HPF: ABNORMAL /HPF
APPEARANCE UR: CLEAR
BACTERIA #/AREA URNS HPF: ABNORMAL /HPF
BILIRUB UR QL STRIP: ABNORMAL
COLOR UR AUTO: YELLOW
GLUCOSE UR STRIP-MCNC: NEGATIVE MG/DL
HGB UR QL STRIP: NEGATIVE
KETONES UR STRIP-MCNC: ABNORMAL MG/DL
LEUKOCYTE ESTERASE UR QL STRIP: NEGATIVE
MUCOUS THREADS #/AREA URNS LPF: PRESENT /LPF
NITRATE UR QL: NEGATIVE
NON-SQ EPI CELLS #/AREA URNS LPF: ABNORMAL /LPF
PH UR STRIP: 5.5 PH (ref 5–7)
RBC #/AREA URNS AUTO: ABNORMAL /HPF
SOURCE: ABNORMAL
SP GR UR STRIP: >1.03 (ref 1–1.03)
SPECIMEN SOURCE: ABNORMAL
UROBILINOGEN UR STRIP-ACNC: 1 EU/DL (ref 0.2–1)
WBC #/AREA URNS AUTO: ABNORMAL /HPF
WET PREP SPEC: ABNORMAL

## 2018-01-27 PROCEDURE — 87210 SMEAR WET MOUNT SALINE/INK: CPT | Performed by: PHYSICIAN ASSISTANT

## 2018-01-27 PROCEDURE — 77067 SCR MAMMO BI INCL CAD: CPT | Mod: TC

## 2018-01-27 PROCEDURE — 81001 URINALYSIS AUTO W/SCOPE: CPT | Performed by: PHYSICIAN ASSISTANT

## 2018-01-27 PROCEDURE — 77063 BREAST TOMOSYNTHESIS BI: CPT | Mod: TC

## 2018-01-27 PROCEDURE — 99214 OFFICE O/P EST MOD 30 MIN: CPT | Performed by: PHYSICIAN ASSISTANT

## 2018-01-27 RX ORDER — LACTOBACILLUS RHAMNOSUS GG 10B CELL
1 CAPSULE ORAL DAILY
Qty: 28 CAPSULE | Refills: 0 | Status: SHIPPED | OUTPATIENT
Start: 2018-01-27 | End: 2019-02-19

## 2018-01-27 RX ORDER — METRONIDAZOLE 250 MG/1
500 TABLET ORAL 2 TIMES DAILY
Qty: 28 TABLET | Refills: 0 | Status: SHIPPED | OUTPATIENT
Start: 2018-01-27 | End: 2018-02-03

## 2018-01-27 NOTE — PATIENT INSTRUCTIONS
With distilled water 2-3x daily   Increase fluids, rest  Tylenol or ibuprofen for pain  Cool mist humidifier  Bacterial Vaginosis    You have a vaginal infection called bacterial vaginosis (BV). Both good and bad bacteria are present in a healthy vagina. BV occurs when these bacteria get out of balance. The number of bad bacteria increase. And the number of good bacteria decrease.  BV may or may not cause symptoms. If symptoms do occur, they can include:    Thin, gray, milky-white, or sometimes green discharge    Unpleasant odor or  fishy  smell    Itching, burning, or pain in or around the vagina  It is not known what causes BV, but certain factors can make the problem more likely. This can include:    Douching    Having sex with a new partner    Having sex with more than one partner  BV will sometimes go away on its own. But treatment is usually recommended. This is because untreated BV can increase the risk of more serious health problems such as:    Pelvic inflammatory disease (PID)     delivery (giving birth to a baby early if you re pregnant)    HIV and certain other sexually transmitted diseases (STDs)    Infection after surgery on the reproductive organs  Home care  General care    BV is most often treated with medicines called antibiotics. These may be given as pills or as a vaginal cream. If antibiotics are prescribed, be sure to use them exactly as directed. Also, be sure to complete all of the medicine, even if your symptoms go away.    Avoid douching or having sex during treatment.    If you have sex with a female partner, ask your healthcare provider if she should also be treated.  Prevention    Limit or avoid douching.    Avoid having sex. If you do have sex, then take steps to lower your risk:    Use condoms when having sex.    Limit the number of partners you have sex with.  Follow-up care  Follow up with your healthcare provider, or as advised.  When to seek medical advice  Call your  healthcare provider right away if:    You have a fever of 100.4 F (38 C) or higher, or as directed by your provider.    Your symptoms worsen, or they don t go away within a few days of starting treatment.    You have new pain in the lower belly or pelvic region.    You have side effects that bother you or a reaction to the pills or cream you re prescribed.    You or any partners you have sex with have new symptoms, such as a rash, joint pain, or sores.  Date Last Reviewed: 7/30/2015 2000-2017 [employer]. [address] [place] [address]. All rights reserved. This information is not intended as a substitute for professional medical care. Always follow your healthcare professional's instructions.        Middle Ear Infection (Adult)  You have an infection of the middle ear, the space behind the eardrum. This is also called acute otitis media (AOM). Sometimes it is caused by the common cold. This is because congestion can block the internal passage (eustachian tube) that drains fluid from the middle ear. When the middle ear fills with fluid, bacteria can grow there and cause an infection. Oral antibiotics are used to treat this illness, not ear drops. Symptoms usually start to improve within 1 to 2 days of treatment.    Home care  The following are general care guidelines:    Finish all of the antibiotic medicine given, even though you may feel better after the first few days.    You may use over-the-counter medicine, such as acetaminophen or ibuprofen, to control pain and fever, unless something else was prescribed. If you have chronic liver or kidney disease or have ever had a stomach ulcer or gastrointestinal bleeding, talk with your healthcare provider before using these medicines. Do not give aspirin to anyone under 18 years of age who has a fever. It may cause severe illness or death.  Follow-up care  Follow up with your healthcare provider, or as advised, in 2 weeks if all symptoms have  "not gotten better, or if hearing doesn't go back to normal within 1 month.  When to seek medical advice  Call your healthcare provider right away if any of these occur:    Ear pain gets worse or does not improve after 3 days of treatment    Unusual drowsiness or confusion    Neck pain, stiff neck, or headache    Fluid or blood draining from the ear canal    Fever of 100.4 F (38 C) or as advised     Seizure  Date Last Reviewed: 6/1/2016 2000-2017 [employer]. [address] [place] [address]. All rights reserved. This information is not intended as a substitute for professional medical care. Always follow your healthcare professional's instructions.           * BLADDER INFECTION,Female (Adult)    A bladder infection (\"cystitis\" or \"UTI\") usually causes a constant urge to urinate and a burning when passing urine. Urine may be cloudy, smelly or dark. There may be pain in the lower abdomen. A bladder infection occurs when bacteria from the vaginal area enter the bladder opening (urethra). This can occur from sexual intercourse, wearing tight clothing, dehydration and other factors.  HOME CARE:  1. Drink lots of fluids (at least 6-8 glasses a day, unless you must restrict fluids for other medical reasons). This will force the medicine into your urinary system and flush the bacteria out of your body. Cranberry juice has been shown to help clear out the bacteria.  2. Avoid sexual intercourse until your symptoms are gone.  3. A bladder infection is treated with antibiotics. You may also be given Pyridium (generic = phenazopyridine) to reduce the burning sensation. This medicine will cause your urine to become a bright orange color. The orange urine may stain clothing. You may wear a pad or panty-liner to protect clothing.  PREVENTING FUTURE INFECTIONS:  1. Always wipe from front to back after a bowel movement.  2. Keep the genital area clean and dry.  3. Drink plenty of fluids each day to avoid " dehydration.  4. Urinate right after intercourse to flush out the bladder.  5. Wear cotton underwear and cotton-lined panty hose; avoid tight-fitting pants.  6. If you are on birth control pills and are having frequent bladder infections, discuss with your doctor.  FOLLOW UP: Return to this facility or see your doctor if ALL symptoms are not gone after three days of treatment.  GET PROMPT MEDICAL ATTENTION if any of the following occur:    Fever over 101 F (38.3 C)    No improvement by the third day of treatment    Increasing back or abdominal pain    Repeated vomiting; unable to keep medicine down    Weakness, dizziness or fainting    Vaginal discharge    Pain, redness or swelling in the labia (outer vaginal area)    4082-7709 [employer]. [address] [place] [address], [place] [address]. All rights reserved. This information is not intended as a substitute for professional medical care. Always follow your healthcare professional's instructions.  This information has been modified by your health care provider with permission from the publisher.    Patient Education    Butylparaben, Cetyl Alcohol, Methylparaben, Propylene Glycol, Propylparaben, Sodium Lauryl Sulfate, Stearyl Alcohol, Water Topical emulsion, Metronidazole Topical gel    Metronidazole Oral capsule    Metronidazole Oral tablet    Metronidazole Oral tablet, extended-release    Metronidazole Solution for injection    Metronidazole Topical cream    Metronidazole Topical gel    Metronidazole Topical lotion    Metronidazole Vaginal gel  Metronidazole Oral tablet  What is this medicine?  METRONIDAZOLE (me troe NI da zole) is an antiinfective. It is used to treat certain kinds of bacterial and protozoal infections. It will not work for colds, flu, or other viral infections.  This medicine may be used for other purposes; ask your health care provider or pharmacist if you have questions.  What should I tell my health care provider before I take this  medicine?  They need to know if you have any of these conditions:    anemia or other blood disorders    disease of the nervous system    fungal or yeast infection    if you drink alcohol containing drinks    liver disease    seizures    an unusual or allergic reaction to metronidazole, or other medicines, foods, dyes, or preservatives    pregnant or trying to get pregnant    breast-feeding  How should I use this medicine?  Take this medicine by mouth with a full glass of water. Follow the directions on the prescription label. Take your medicine at regular intervals. Do not take your medicine more often than directed. Take all of your medicine as directed even if you think you are better. Do not skip doses or stop your medicine early.  Talk to your pediatrician regarding the use of this medicine in children. Special care may be needed.  Overdosage: If you think you have taken too much of this medicine contact a poison control center or emergency room at once.  NOTE: This medicine is only for you. Do not share this medicine with others.  What if I miss a dose?  If you miss a dose, take it as soon as you can. If it is almost time for your next dose, take only that dose. Do not take double or extra doses.  What may interact with this medicine?  Do not take this medicine with any of the following medications:    alcohol or any product that contains alcohol    amprenavir oral solution    cisapride    disulfiram    dofetilide    dronedarone    paclitaxel injection    pimozide    ritonavir oral solution    sertraline oral solution    sulfamethoxazole-trimethoprim injection    thioridazine    ziprasidone  This medicine may also interact with the following medications:    birth control pills    cimetidine    lithium    other medicines that prolong the QT interval (cause an abnormal heart rhythm)    phenobarbital    phenytoin    warfarin  This list may not describe all possible interactions. Give your health care provider a  list of all the medicines, herbs, non-prescription drugs, or dietary supplements you use. Also tell them if you smoke, drink alcohol, or use illegal drugs. Some items may interact with your medicine.  What should I watch for while using this medicine?  Tell your doctor or health care professional if your symptoms do not improve or if they get worse.  You may get drowsy or dizzy. Do not drive, use machinery, or do anything that needs mental alertness until you know how this medicine affects you. Do not stand or sit up quickly, especially if you are an older patient. This reduces the risk of dizzy or fainting spells.  Avoid alcoholic drinks while you are taking this medicine and for three days afterward. Alcohol may make you feel dizzy, sick, or flushed.  If you are being treated for a sexually transmitted disease, avoid sexual contact until you have finished your treatment. Your sexual partner may also need treatment.  What side effects may I notice from receiving this medicine?  Side effects that you should report to your doctor or health care professional as soon as possible:    allergic reactions like skin rash or hives, swelling of the face, lips, or tongue    confusion, clumsiness    difficulty speaking    discolored or sore mouth    dizziness    fever, infection    numbness, tingling, pain or weakness in the hands or feet    trouble passing urine or change in the amount of urine    redness, blistering, peeling or loosening of the skin, including inside the mouth    seizures    unusually weak or tired    vaginal irritation, dryness, or discharge  Side effects that usually do not require medical attention (report to your doctor or health care professional if they continue or are bothersome):    diarrhea    headache    irritability    metallic taste    nausea    stomach pain or cramps    trouble sleeping  This list may not describe all possible side effects. Call your doctor for medical advice about side effects.  You may report side effects to FDA at 6-745-VGH-4498.  Where should I keep my medicine?  Keep out of the reach of children.  Store at room temperature below 25 degrees C (77 degrees F). Protect from light. Keep container tightly closed. Throw away any unused medicine after the expiration date.  NOTE:This sheet is a summary. It may not cover all possible information. If you have questions about this medicine, talk to your doctor, pharmacist, or health care provider. Copyright  2016 [employer]
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
## 2018-01-27 NOTE — MR AVS SNAPSHOT
After Visit Summary   2018    Grecia Kilgore    MRN: [id number]           Patient Information     Date Of Birth          1955        Visit Information        Provider Department      2018 9:20 AM Stuart rGacia PA-C [facility]        Today's Diagnoses     Vaginal discharge    -  1    Acute cystitis without hematuria        Acute otitis media, right        Bacterial vaginosis          Care Instructions        With distilled water 2-3x daily   Increase fluids, rest  Tylenol or ibuprofen for pain  Cool mist humidifier  Bacterial Vaginosis    You have a vaginal infection called bacterial vaginosis (BV). Both good and bad bacteria are present in a healthy vagina. BV occurs when these bacteria get out of balance. The number of bad bacteria increase. And the number of good bacteria decrease.  BV may or may not cause symptoms. If symptoms do occur, they can include:    Thin, gray, milky-white, or sometimes green discharge    Unpleasant odor or  fishy  smell    Itching, burning, or pain in or around the vagina  It is not known what causes BV, but certain factors can make the problem more likely. This can include:    Douching    Having sex with a new partner    Having sex with more than one partner  BV will sometimes go away on its own. But treatment is usually recommended. This is because untreated BV can increase the risk of more serious health problems such as:    Pelvic inflammatory disease (PID)     delivery (giving birth to a baby early if you re pregnant)    HIV and certain other sexually transmitted diseases (STDs)    Infection after surgery on the reproductive organs  Home care  General care    BV is most often treated with medicines called antibiotics. These may be given as pills or as a vaginal cream. If antibiotics are prescribed, be sure to use them exactly as directed. Also, be sure to complete all of the medicine, even if your symptoms go  away.    Avoid douching or having sex during treatment.    If you have sex with a female partner, ask your healthcare provider if she should also be treated.  Prevention    Limit or avoid douching.    Avoid having sex. If you do have sex, then take steps to lower your risk:    Use condoms when having sex.    Limit the number of partners you have sex with.  Follow-up care  Follow up with your healthcare provider, or as advised.  When to seek medical advice  Call your healthcare provider right away if:    You have a fever of 100.4 F (38 C) or higher, or as directed by your provider.    Your symptoms worsen, or they don t go away within a few days of starting treatment.    You have new pain in the lower belly or pelvic region.    You have side effects that bother you or a reaction to the pills or cream you re prescribed.    You or any partners you have sex with have new symptoms, such as a rash, joint pain, or sores.  Date Last Reviewed: 7/30/2015 2000-2017 [employer]. [address] [place] [address]. All rights reserved. This information is not intended as a substitute for professional medical care. Always follow your healthcare professional's instructions.        Middle Ear Infection (Adult)  You have an infection of the middle ear, the space behind the eardrum. This is also called acute otitis media (AOM). Sometimes it is caused by the common cold. This is because congestion can block the internal passage (eustachian tube) that drains fluid from the middle ear. When the middle ear fills with fluid, bacteria can grow there and cause an infection. Oral antibiotics are used to treat this illness, not ear drops. Symptoms usually start to improve within 1 to 2 days of treatment.    Home care  The following are general care guidelines:    Finish all of the antibiotic medicine given, even though you may feel better after the first few days.    You may use over-the-counter medicine, such as  "acetaminophen or ibuprofen, to control pain and fever, unless something else was prescribed. If you have chronic liver or kidney disease or have ever had a stomach ulcer or gastrointestinal bleeding, talk with your healthcare provider before using these medicines. Do not give aspirin to anyone under 18 years of age who has a fever. It may cause severe illness or death.  Follow-up care  Follow up with your healthcare provider, or as advised, in 2 weeks if all symptoms have not gotten better, or if hearing doesn't go back to normal within 1 month.  When to seek medical advice  Call your healthcare provider right away if any of these occur:    Ear pain gets worse or does not improve after 3 days of treatment    Unusual drowsiness or confusion    Neck pain, stiff neck, or headache    Fluid or blood draining from the ear canal    Fever of 100.4 F (38 C) or as advised     Seizure  Date Last Reviewed: 6/1/2016 2000-2017 [employer]. [address] [place] [address]. All rights reserved. This information is not intended as a substitute for professional medical care. Always follow your healthcare professional's instructions.           * BLADDER INFECTION,Female (Adult)    A bladder infection (\"cystitis\" or \"UTI\") usually causes a constant urge to urinate and a burning when passing urine. Urine may be cloudy, smelly or dark. There may be pain in the lower abdomen. A bladder infection occurs when bacteria from the vaginal area enter the bladder opening (urethra). This can occur from sexual intercourse, wearing tight clothing, dehydration and other factors.  HOME CARE:  1. Drink lots of fluids (at least 6-8 glasses a day, unless you must restrict fluids for other medical reasons). This will force the medicine into your urinary system and flush the bacteria out of your body. Cranberry juice has been shown to help clear out the bacteria.  2. Avoid sexual intercourse until your symptoms are gone.  3. A " bladder infection is treated with antibiotics. You may also be given Pyridium (generic = phenazopyridine) to reduce the burning sensation. This medicine will cause your urine to become a bright orange color. The orange urine may stain clothing. You may wear a pad or panty-liner to protect clothing.  PREVENTING FUTURE INFECTIONS:  1. Always wipe from front to back after a bowel movement.  2. Keep the genital area clean and dry.  3. Drink plenty of fluids each day to avoid dehydration.  4. Urinate right after intercourse to flush out the bladder.  5. Wear cotton underwear and cotton-lined panty hose; avoid tight-fitting pants.  6. If you are on birth control pills and are having frequent bladder infections, discuss with your doctor.  FOLLOW UP: Return to this facility or see your doctor if ALL symptoms are not gone after three days of treatment.  GET PROMPT MEDICAL ATTENTION if any of the following occur:    Fever over 101 F (38.3 C)    No improvement by the third day of treatment    Increasing back or abdominal pain    Repeated vomiting; unable to keep medicine down    Weakness, dizziness or fainting    Vaginal discharge    Pain, redness or swelling in the labia (outer vaginal area)    1717-6437 [employer]. [address] [place] [address]. All rights reserved. This information is not intended as a substitute for professional medical care. Always follow your healthcare professional's instructions.  This information has been modified by your health care provider with permission from the publisher.    Patient Education    Butylparaben, Cetyl Alcohol, Methylparaben, Propylene Glycol, Propylparaben, Sodium Lauryl Sulfate, Stearyl Alcohol, Water Topical emulsion, Metronidazole Topical gel    Metronidazole Oral capsule    Metronidazole Oral tablet    Metronidazole Oral tablet, extended-release    Metronidazole Solution for injection    Metronidazole Topical cream    Metronidazole Topical  gel    Metronidazole Topical lotion    Metronidazole Vaginal gel  Metronidazole Oral tablet  What is this medicine?  METRONIDAZOLE (me troe NI da zole) is an antiinfective. It is used to treat certain kinds of bacterial and protozoal infections. It will not work for colds, flu, or other viral infections.  This medicine may be used for other purposes; ask your health care provider or pharmacist if you have questions.  What should I tell my health care provider before I take this medicine?  They need to know if you have any of these conditions:    anemia or other blood disorders    disease of the nervous system    fungal or yeast infection    if you drink alcohol containing drinks    liver disease    seizures    an unusual or allergic reaction to metronidazole, or other medicines, foods, dyes, or preservatives    pregnant or trying to get pregnant    breast-feeding  How should I use this medicine?  Take this medicine by mouth with a full glass of water. Follow the directions on the prescription label. Take your medicine at regular intervals. Do not take your medicine more often than directed. Take all of your medicine as directed even if you think you are better. Do not skip doses or stop your medicine early.  Talk to your pediatrician regarding the use of this medicine in children. Special care may be needed.  Overdosage: If you think you have taken too much of this medicine contact a poison control center or emergency room at once.  NOTE: This medicine is only for you. Do not share this medicine with others.  What if I miss a dose?  If you miss a dose, take it as soon as you can. If it is almost time for your next dose, take only that dose. Do not take double or extra doses.  What may interact with this medicine?  Do not take this medicine with any of the following medications:    alcohol or any product that contains alcohol    amprenavir oral  solution    cisapride    disulfiram    dofetilide    dronedarone    paclitaxel injection    pimozide    ritonavir oral solution    sertraline oral solution    sulfamethoxazole-trimethoprim injection    thioridazine    ziprasidone  This medicine may also interact with the following medications:    birth control pills    cimetidine    lithium    other medicines that prolong the QT interval (cause an abnormal heart rhythm)    phenobarbital    phenytoin    warfarin  This list may not describe all possible interactions. Give your health care provider a list of all the medicines, herbs, non-prescription drugs, or dietary supplements you use. Also tell them if you smoke, drink alcohol, or use illegal drugs. Some items may interact with your medicine.  What should I watch for while using this medicine?  Tell your doctor or health care professional if your symptoms do not improve or if they get worse.  You may get drowsy or dizzy. Do not drive, use machinery, or do anything that needs mental alertness until you know how this medicine affects you. Do not stand or sit up quickly, especially if you are an older patient. This reduces the risk of dizzy or fainting spells.  Avoid alcoholic drinks while you are taking this medicine and for three days afterward. Alcohol may make you feel dizzy, sick, or flushed.  If you are being treated for a sexually transmitted disease, avoid sexual contact until you have finished your treatment. Your sexual partner may also need treatment.  What side effects may I notice from receiving this medicine?  Side effects that you should report to your doctor or health care professional as soon as possible:    allergic reactions like skin rash or hives, swelling of the face, lips, or tongue    confusion, clumsiness    difficulty speaking    discolored or sore mouth    dizziness    fever, infection    numbness, tingling, pain or weakness in the hands or feet    trouble passing urine or change in the  amount of urine    redness, blistering, peeling or loosening of the skin, including inside the mouth    seizures    unusually weak or tired    vaginal irritation, dryness, or discharge  Side effects that usually do not require medical attention (report to your doctor or health care professional if they continue or are bothersome):    diarrhea    headache    irritability    metallic taste    nausea    stomach pain or cramps    trouble sleeping  This list may not describe all possible side effects. Call your doctor for medical advice about side effects. You may report side effects to FDA at 3-287-OVK-0561.  Where should I keep my medicine?  Keep out of the reach of children.  Store at room temperature below 25 degrees C (77 degrees F). Protect from light. Keep container tightly closed. Throw away any unused medicine after the expiration date.  NOTE:This sheet is a summary. It may not cover all possible information. If you have questions about this medicine, talk to your doctor, pharmacist, or health care provider. Copyright  2016 [employer]                Follow-ups after your visit        Your next 10 appointments already scheduled     Jan 27, 2018 11:30 AM CST   (Arrive by 11:15 AM)   MA SCREENING DIGITAL BILATERAL with EAMA1   [facility] ([facility]    [address] ,[address]  [facility] [place] [address] [phone]           Do not use any powder, lotion or deodorant under your arms or on your breast. If you do, we will ask you to remove it before your exam.  Wear comfortable, two-piece clothing.  If you have any allergies, tell your care team.  Bring any previous mammograms from other facilities or have them mailed to the breast center. Three-dimensional (3D) mammograms are available at [facility] locations in [facility], [facility], [facility], [place], [place], and [place]. [facility] locations include [place] [facility] in [place].  "Benefits of 3D mammograms include: - Improved rate of cancer detection - Decreases your chance of having to go back for more tests, which means fewer: - \"False-positive\" results (This means that there is an abnormal area but it isn't cancer.) - Invasive testing procedures, such as a biopsy or surgery - Can provide clearer images of the breast if you have dense breast tissue. 3D mammography is an optional exam that anyone can have with a 2D mammogram. It doesn't replace or take the place of a 2D mammogram. 2D mammograms remain an effective screening test for all women.  Not all insurance companies cover the cost of a 3D mammogram. Check with your insurance.              Who to contact     If you have questions or need follow up information about today's clinic visit or your schedule please contact [facility] directly at [phone].  Normal or non-critical lab and imaging results will be communicated to you by [employer], letter or phone within 4 business days after the clinic has received the results. If you do not hear from us within 7 days, please contact the clinic through [employer] or phone. If you have a critical or abnormal lab result, we will notify you by phone as soon as possible.  Submit refill requests through [employer] or call your pharmacy and they will forward the refill request to us. Please allow 3 business days for your refill to be completed.          Additional Information About Your Visit        [employer] Information     [employer] lets you send messages to your doctor, view your test results, renew your prescriptions, schedule appointments and more. To sign up, go to [email or web address] . Click on \"Log in\" on the left side of the screen, which will take you to the Welcome page. Then click on \"Sign up Now\" on the right side of the page.     You will be asked to enter the access code listed below, as well as some personal information. Please follow the directions to create your username " and password.     Your access code is: [email or web address]  Expires: 2018 10:50 AM     Your access code will  in 90 days. If you need help or a new code, please call your [facility] clinic or [phone].        Care EveryWhere ID     This is your Care EveryWhere ID. This could be used by other organizations to access your [facility] medical records  [id number]        Your Vitals Were     Pulse Temperature Last Period Pulse Oximetry BMI (Body Mass Index)       60 98  F (36.7  C) (Oral) 1985 100% 25.37 kg/m2        Blood Pressure from Last 3 Encounters:   18 114/62   11/15/17 143/90   10/09/17 120/72    Weight from Last 3 Encounters:   18 162 lb (73.5 kg)   11/15/17 160 lb 6.4 oz (72.8 kg)   10/09/17 173 lb (78.5 kg)              We Performed the Following     UA reflex to Microscopic     Urine Microscopic     Wet prep          Today's Medication Changes          These changes are accurate as of 18 10:50 AM.  If you have any questions, ask your nurse or doctor.               Start taking these medicines.        Dose/Directions    amoxicillin-clavulanate 875-125 MG per tablet   Commonly known as:  AUGMENTIN   Used for:  Acute cystitis without hematuria, Acute otitis media, right   Started by:  Stuart Gracia PA-C        Dose:  1 tablet   Take 1 tablet by mouth 2 times daily for 7 days   Quantity:  14 tablet   Refills:  0       [facility] Caps   Used for:  Bacterial vaginosis, Acute cystitis without hematuria, Acute otitis media, right   Started by:  Stuart Gracia PA-C        Dose:  1 tablet   Take 1 tablet by mouth daily for 28 days   Quantity:  28 capsule   Refills:  0       metroNIDAZOLE 250 MG tablet   Commonly known as:  FLAGYL   Used for:  Bacterial vaginosis   Started by:  Stuart Gracia PA-C        Dose:  500 mg   Take 2 tablets (500 mg) by mouth 2 times daily for 7 days   Quantity:  28 tablet   Refills:  0            Where to get  your medicines      These medications were sent to [id number] - [place] - [address]  [address] [place] [address]     Phone:  [phone]     amoxicillin-clavulanate 875-125 MG per tablet    [facility] Caps    metroNIDAZOLE 250 MG tablet                Primary Care Provider Office Phone # Fax #    Romel Mccall [phone]       [address]  [place] [address]        Equal Access to Services     [facility]: Hadii aad ku hadasho Soomaali, waaxda luqadaha, qaybta kaalmada adeegyada, waxay bebein hayderekn adejayce jordan . So [facility] [phone].    ATENCIÓN: Si habla español, tiene a patel disposición servicios gratuitos de asistencia lingüística. [facility] [phone].    We comply with applicable federal civil rights laws and [place] laws. We do not discriminate on the basis of race, color, national origin, age, disability, sex, sexual orientation, or gender identity.            Thank you!     Thank you for choosing [facility]  for your care. Our goal is always to provide you with excellent care. Hearing back from our patients is one way we can continue to improve our services. Please take a few minutes to complete the written survey that you may receive in the mail after your visit with us. Thank you!             Your Updated Medication List - Protect others around you: Learn how to safely use, store and throw away your medicines at www.disposemymeds.org.          This list is accurate as of 1/27/18 10:50 AM.  Always use your most recent med list.                   Brand Name Dispense Instructions for use Diagnosis    albuterol 108 (90 BASE) MCG/ACT Inhaler    PROAIR HFA/PROVENTIL HFA/VENTOLIN HFA    1 Inhaler    Inhale 2 puffs into the lungs every 6 hours as needed for shortness of breath / dyspnea    Mild intermittent asthma without complication        amoxicillin-clavulanate 875-125 MG per tablet    AUGMENTIN    14 tablet    Take 1 tablet by mouth 2 times daily for 7 days    Acute cystitis without hematuria, Acute otitis media, right       ATIVAN 0.5 MG tablet   Generic drug:  LORazepam      Take 0.5-1 tablets (0.25-0.5 mg) by mouth every 8 hours as needed for anxiety Take 30 minutes prior to departure.  Do not operate a vehicle after taking this medication        [facility] Caps     28 capsule    Take 1 tablet by mouth daily for 28 days    Bacterial vaginosis, Acute cystitis without hematuria, Acute otitis media, right       lisinopril 5 MG tablet    PRINIVIL/ZESTRIL    90 tablet    Take 1 tablet (5 mg) by mouth daily    Type 2 diabetes mellitus without complication, without long-term current use of insulin (H)       meloxicam 15 MG tablet    MOBIC     TAKE 1 TABLET BY MOUTH DAILY AS NEEDED.        metFORMIN 500 MG tablet    GLUCOPHAGE    90 tablet    Take 1 tablet (500 mg) by mouth daily (with breakfast)    Type 2 diabetes mellitus without complication, without long-term current use of insulin (H)       metroNIDAZOLE 250 MG tablet    FLAGYL    28 tablet    Take 2 tablets (500 mg) by mouth 2 times daily for 7 days    Bacterial vaginosis       mirtazapine 15 MG tablet    REMERON          montelukast 10 MG tablet    SINGULAIR    90 tablet    Take 1 tablet (10 mg) by mouth At Bedtime    Mild intermittent asthma without complication       simvastatin 40 MG tablet    ZOCOR    90 tablet    Take 1 tablet (40 mg) by mouth At Bedtime    Hyperlipidemia LDL goal <100

## 2018-01-30 ENCOUNTER — HOSPITAL ENCOUNTER (EMERGENCY)
Facility: CLINIC | Age: 63
Discharge: HOME | End: 2018-01-30
Attending: EMERGENCY MEDICINE | Admitting: EMERGENCY MEDICINE
Payer: COMMERCIAL

## 2018-01-30 ENCOUNTER — APPOINTMENT (OUTPATIENT)
Dept: GENERAL RADIOLOGY | Facility: CLINIC | Age: 63
End: 2018-01-30
Attending: EMERGENCY MEDICINE
Payer: COMMERCIAL

## 2018-01-30 VITALS
BODY MASS INDEX: 25.9 KG/M2 | TEMPERATURE: 98 F | DIASTOLIC BLOOD PRESSURE: 100 MMHG | HEART RATE: 62 BPM | HEIGHT: 67 IN | WEIGHT: 165 LBS | RESPIRATION RATE: 16 BRPM | SYSTOLIC BLOOD PRESSURE: 149 MMHG | OXYGEN SATURATION: 100 %

## 2018-01-30 DIAGNOSIS — L03.011 CELLULITIS OF FINGER OF RIGHT HAND: ICD-10-CM

## 2018-01-30 PROCEDURE — 76882 US LMTD JT/FCL EVL NVASC XTR: CPT

## 2018-01-30 PROCEDURE — 73140 X-RAY EXAM OF FINGER(S): CPT | Mod: RT

## 2018-01-30 PROCEDURE — 69210 REMOVE IMPACTED EAR WAX UNI: CPT | Mod: RT

## 2018-01-30 PROCEDURE — 25000132 ZZH RX MED GY IP 250 OP 250 PS 637: Performed by: EMERGENCY MEDICINE

## 2018-01-30 PROCEDURE — 99284 EMERGENCY DEPT VISIT MOD MDM: CPT | Mod: 25

## 2018-01-30 RX ORDER — SULFAMETHOXAZOLE/TRIMETHOPRIM 800-160 MG
1 TABLET ORAL 2 TIMES DAILY
Qty: 20 TABLET | Refills: 0 | Status: SHIPPED | OUTPATIENT
Start: 2018-01-30 | End: 2018-02-09

## 2018-01-30 RX ORDER — SULFAMETHOXAZOLE/TRIMETHOPRIM 800-160 MG
1 TABLET ORAL ONCE
Status: COMPLETED | OUTPATIENT
Start: 2018-01-30 | End: 2018-01-30

## 2018-01-30 RX ADMIN — SULFAMETHOXAZOLE AND TRIMETHOPRIM 1 TABLET: 800; 160 TABLET ORAL at 04:05

## 2018-01-30 ASSESSMENT — ENCOUNTER SYMPTOMS
WOUND: 1
COLOR CHANGE: 1

## 2018-01-30 NOTE — ED NOTES
Was picking up what she thought was styrofoam but was porcelain   Now area on finger swelling noted and red  Right pointer  Also needs right ear looked at  Was in urgent care  Yesterday but they tried several things and did not work   Here for eval

## 2018-01-30 NOTE — ED PROVIDER NOTES
History     Chief Complaint:  Hand Injury    The history is provided by the patient.      Grecia Kilgore is a 63 year old female who presents with a hand injury. Patient was cleaning up storage unit 3 days ago, she went to  what she thought was styrofoam but now believes it was porcelain because it seemed like a small piece went into her finger. Since that time has had pain and has been squeezing it hoping to get something out. She has redness and the finger became much more swollen this evening prompting visit to the emergency department. Currently rates pain at 6/10 in severity, worse when touching the area. Patient also notes when she was at urgent care 1/27/18 they irrigated her right ear because they believed the cerumen was scabbed to the ear drum. They were unable to get all the wax out and recommended patient use drops at home; she wants the ear rechecked. She denies other complaint. Tetanus last updated 2010.     Allergies:  Codeine  Morphine  Nitrofuran Derivatives  Phenothiazines  Primatene Mist [Epinephrine Bitartrate]  Vicodin [Hydrocodone-Acetaminophen]  Latex     Medications:    Augmentin  Flagyl  Lactobacillus  Simvastatin  Lisinopril  Albuterol inhaler  Metformin  Singulair   Ativan   Remeron   Mobic     Past Medical History:    Allergic rhinitis  Anxiety  Chronic back pain  Ductal carcinoma in situ  Depression  Diabetes mellitus type 2  Diverticulosis   Eating disorder  External hemorrhoids  G6PD deficiency   Genital herpes  Hepatitis A  HTN  HLD  Laxative abuse   Liver nodule  Migraine  Asthma  Nephrolithiasis   BOO  Ovarian cyst  Panic disorder   Pancreatitis   PTSD  Pure hypercholesterolemia   STD   OA, knee  Positive PPD    Past Surgical History:    Arthrodesis toe, bilateral great toe  Arthrodesis toe, left  Arthroscopy knee, left  Biopsy breast seed localization  Colonoscopy   Foot surgery   Hemorrhoidectomy banding, multiple  Hysterectomy   Remove hardware foot, left   Repair  "tendon foot, left     Family History:    Diabetes  Breast cancer  Alcohol/drug  Cancer    Social History:  Presents alone   Tobacco use: Never  Alcohol use: Negative   PCP: Romel Mccall    Marital Status:  Single    Review of Systems   HENT: Positive for ear discharge and ear pain.    Skin: Positive for color change and wound.   All other systems reviewed and are negative.    Physical Exam     Patient Vitals for the past 24 hrs:   BP Temp Temp src Pulse Resp SpO2 Height Weight   01/30/18 0110 (!) 149/100 98  F (36.7  C) Temporal 62 16 100 % 1.702 m (5' 7\") 74.8 kg (165 lb)      Physical Exam  Constitutional:  Appears well-developed and well-nourished. Alert. Conversant. Non toxic.       HENT:   Head: Atraumatic.   Right ear: Pinna is normal.  Canal occluded by whitish light flaky cerumen.  I removed this carefully using a ear curette.  The canal was then clear.  No foreign body.  TM visualized in its entirety and normal, other than minimal pink erythema with some clear serous fluid behind the eardrum..  No perforation or bleeding.  Left ear: Pinna, canal, TM are normal.  Nose: Nose unremarkable   Mouth/Throat: Oropharynx is clear and moist.   Eyes: Conjunctivae normal. EOM are grossly normal. Pupils are equal, round, and reactive to light. No scleral icterus.   Neck: Normal range of motion. No tracheal deviation present.   Cardiovascular: Normal rate, regular rhythm. Symmetric radial artery pulses.  Pulmonary/Chest: Effort normal. No stridor. No respiratory distress.  Musculoskeletal: Unremarkable except for right index finger. No other abnormality noted.   Right hand, second digit: There is soft tissue swelling over the radial border of the finger pad of the distal phalanges.  Intact flexion and extension of the DIP, PIP MCP.  Intact flexor and extensor function function.  Nail plate is intact.  There is a 1 mm defect in the skin on the radial side of the finger pad where the patient says she has been dealing with a " needle to remove a small piece of porcelain became lodged under the.  There is no visualized foreign body.  No purulent drainage.  No palpable fluctuance.  No vesicles or rashes.  No ascending lymphangitis.  Neurological: Alert and oriented to person, place, and time. Normal strength. CN II-VII intact. No sensory deficit. GCS eye subscore is 4. GCS verbal subscore is 5. GCS motor subscore is 6. Normal coordination . Intact distal sensory and motor function.  Skin: Skin is warm and dry. No rash noted. No pallor. Normal capillary refill.  Psychiatric:  normal mood and affect.    Emergency Department Course   Imaging:  Radiographic findings were communicated with the patient who voiced understanding of the findings.    XR Fingers, 2-3 views, right:  IMPRESSION: No acute fracture or dislocation. Small ossific fragment at the dorsum of the DIP joint may be from an old injury. No radiopaque foreign body.    Imaging independently reviewed and agree with radiologist interpretation.      Limited Bedside ED Ultrasound of Soft Tissue:    PROCEDURE: PERFORMED BY: Dr. Dillon Diop  INDICATIONS/SYMPTOM: Skin redness, evaluate for abscess, cellulitis or foreign body  PROBE: High frequency linear probe  BODY LOCATION: Soft tissue located on extremity- right index finger pad     FINDINGS: no hyperechoic FB, or hypoechoic fluid collection  INTERPRETATION:  The soft tissue and muscle layers were evaluated.     Findings indicate no FB    IMAGE DOCUMENTATION: Images were archived to PACs system.    Procedures:    Cerumen Disimpaction Procedure Note:     Procedure:  Cerumen disimpaction  Indications: Otalgia, cerumen impaction  Physician: Dillon Diop MD  Consent:  Informed verbal consent.  Location: Right ear  Procedure note:  The patient was placed in the right lateral decubitus position.  Using direct visualization the cerumen was slowly removed piecemeal from the ear using an ear curette.  Patients hearing was improved.   I was able to get all wax removed.  The TM was visualized and normal.             Interventions:  0405: Bactrim DS/Septra -160 1 tablet PO      Emergency Department Course:  Past medical records, nursing notes, and vitals reviewed.  0132: I performed an exam of the patient and obtained history, as documented above.   Cerumen disimpaction as above.  The patient was sent for a XR while in the emergency department, findings above.   Bedside US as above.   0340: I rechecked the patient. Findings and plan explained to the Patient. Patient discharged home with instructions regarding supportive care, medications, and reasons to return. The importance of close follow-up was reviewed.      Impression & Plan    Medical Decision Making:  Grecia Kilgore is a 63 year old female who presents for evaluation of wound with concern for possible foreign body after puncturing finger with a piece of porcelain.  She says she has been digging at the wound with a tweezers and there is a small 1 mm defect in the skin present.  The history, physical exam and supporting data are consistent with cellulitis.  X-ray and bedside ultrasound both negative for any retained foreign body. There do not appear at this time to be any complication of cellulitis including necrotizing fascitis, lymphangitis, lymphadenitis, abscess, osteomyelitis, sepsis, or shock.  This does indicate swelling on the radial side of her finger pad, at this point no evidence for a felon, paronychia, herpetic vita.  The patient is not immunosuppressed.  Supportive outpatient management is indicated with antibiotics.  Close follow-up of primary care physician to ensure no progression and rapid resolution.  Area of cellulitis was outlined.  Cellulitis precautions for home.       Patient also complained of impacted cerumen. See procedure note above for specifics of cerumen removal.  No evidence for otitis externa, foreign body in the ear canal, TM perforation, otitis  media.    Diagnosis:    ICD-10-CM    1. Cellulitis of finger of right hand L03.011        Disposition:  Discharged to home with plan as outlined.    Discharge Medications:  Discharge Medication List as of 1/30/2018  3:46 AM      START taking these medications    Details   sulfamethoxazole-trimethoprim (BACTRIM DS) 800-160 MG per tablet Take 1 tablet by mouth 2 times daily for 10 days, Disp-20 tablet, R-0, Local Print               I, Jaswant Davison, stuart serving as a scribe at 1:32 AM on 1/30/2018 to document services personally performed by Dillon Diop MD based on my observations and the provider's statements to me.    1/30/2018   [facility] EMERGENCY DEPARTMENT       Dillon Diop MD  01/30/18 0721

## 2018-01-30 NOTE — DISCHARGE INSTRUCTIONS
Cellulitis  Cellulitis is an infection of the deep layers of skin. A break in the skin, such as a cut or scratch, can let bacteria under the skin. If the bacteria get to deep layers of the skin, it can be serious. If not treated, cellulitis can get into the bloodstream and lymph nodes. The infection can then spread throughout the body. This causes serious illness.  Cellulitis causes the affected skin to become red, swollen, warm, and sore. The reddened areas have a visible border. An open sore may leak fluid (pus). You may have a fever, chills, and pain.  Cellulitis is treated with antibiotics taken for 7 to 10 days. An open sore may be cleaned and covered with cool wet gauze. Symptoms should get better 1 to 2 days after treatment is started. Make sure to take all the antibiotics for the full number of days until they are gone. Keep taking the medicine even if your symptoms go away.  Home care  Follow these tips:    Limit the use of the part of your body with cellulitis.     If the infection is on your leg, keep your leg raised while sitting. This will help to reduce swelling.    Take all of the antibiotic medicine exactly as directed until it is gone. Do not miss any doses, especially during the first 7 days. Don t stop taking the medicine when your symptoms get better.    Keep the affected area clean and dry.    Wash your hands with soap and warm water before and after touching your skin. Anyone else who touches your skin should also wash his or her hands. Don't share towels.  Follow-up care  Follow up with your healthcare provider, or as advised. If your infection does not go away on the first antibiotic, your healthcare provider will prescribe a different one.  When to seek medical advice  Call your healthcare provider right away if any of these occur:    Red areas that spread    Swelling or pain that gets worse    Fluid leaking from the skin (pus)    Fever higher of 100.4  F (38.0  C) or higher after 2 days  on antibiotics  Date Last Reviewed: 9/1/2016 2000-2017 [employer]. [address] [place] [address], [place] [address]. All rights reserved. This information is not intended as a substitute for professional medical care. Always follow your healthcare professional's instructions.

## 2018-01-30 NOTE — ED AVS SNAPSHOT
[facility] Emergency Department    [address]    [facility] [address]    Phone:  [phone]    Fax:  [phone]                                       Grecia Kilgore   MRN: [id number]    Department:  [facility] Emergency Department   Date of Visit:  1/30/2018           After Visit Summary Signature Page     I have received my discharge instructions, and my questions have been answered. I have discussed any challenges I see with this plan with the nurse or doctor.    ..........................................................................................................................................  Patient/Patient Representative Signature      ..........................................................................................................................................  Patient Representative Print Name and Relationship to Patient    ..................................................               ................................................  Date                                            Time    ..........................................................................................................................................  Reviewed by Signature/Title    ...................................................              ..............................................  Date                                                            Time

## 2018-01-30 NOTE — ED AVS SNAPSHOT
[facility] Emergency Department    [address]    [facility] [address]    Phone:  [phone]    Fax:  [phone]                                       Grecia Kilgroe   MRN: [id number]    Department:  [facility] Emergency Department   Date of Visit:  1/30/2018           Patient Information     Date Of Birth          1955        Your diagnoses for this visit were:     Cellulitis of finger of right hand        You were seen by Dillon Diop MD.      Follow-up Information     Follow up with Kaylyn Lopez MD In 2 days.    Specialty:  Orthopedics    Why:  if your finger is not completely improved    Contact information:    [facility]THOP[facility]  [address]  [facility] [address]  [phone]          Discharge Instructions         Cellulitis  Cellulitis is an infection of the deep layers of skin. A break in the skin, such as a cut or scratch, can let bacteria under the skin. If the bacteria get to deep layers of the skin, it can be serious. If not treated, cellulitis can get into the bloodstream and lymph nodes. The infection can then spread throughout the body. This causes serious illness.  Cellulitis causes the affected skin to become red, swollen, warm, and sore. The reddened areas have a visible border. An open sore may leak fluid (pus). You may have a fever, chills, and pain.  Cellulitis is treated with antibiotics taken for 7 to 10 days. An open sore may be cleaned and covered with cool wet gauze. Symptoms should get better 1 to 2 days after treatment is started. Make sure to take all the antibiotics for the full number of days until they are gone. Keep taking the medicine even if your symptoms go away.  Home care  Follow these tips:    Limit the use of the part of your body with cellulitis.     If the infection is on your leg, keep your leg raised while sitting. This will help to reduce swelling.    Take all of the antibiotic medicine  exactly as directed until it is gone. Do not miss any doses, especially during the first 7 days. Don t stop taking the medicine when your symptoms get better.    Keep the affected area clean and dry.    Wash your hands with soap and warm water before and after touching your skin. Anyone else who touches your skin should also wash his or her hands. Don't share towels.  Follow-up care  Follow up with your healthcare provider, or as advised. If your infection does not go away on the first antibiotic, your healthcare provider will prescribe a different one.  When to seek medical advice  Call your healthcare provider right away if any of these occur:    Red areas that spread    Swelling or pain that gets worse    Fluid leaking from the skin (pus)    Fever higher of 100.4  F (38.0  C) or higher after 2 days on antibiotics  Date Last Reviewed: 9/1/2016 2000-2017 [employer]. [address] [place] [address]. All rights reserved. This information is not intended as a substitute for professional medical care. Always follow your healthcare professional's instructions.          24 Hour Appointment Hotline       To make an appointment at any [facility], call [phone] ([phone]). If you don't have a family doctor or clinic, we will help you find one. [facility] clinics are conveniently located to serve the needs of you and your family.             Review of your medicines      START taking        Dose / Directions Last dose taken    sulfamethoxazole-trimethoprim 800-160 MG per tablet   Commonly known as:  BACTRIM DS   Dose:  1 tablet   Quantity:  20 tablet        Take 1 tablet by mouth 2 times daily for 10 days   Refills:  0          Our records show that you are taking the medicines listed below. If these are incorrect, please call your family doctor or clinic.        Dose / Directions Last dose taken    albuterol 108 (90 BASE) MCG/ACT Inhaler   Commonly known as:  PROAIR HFA/PROVENTIL  HFA/VENTOLIN HFA   Dose:  2 puff   Quantity:  1 Inhaler        Inhale 2 puffs into the lungs every 6 hours as needed for shortness of breath / dyspnea   Refills:  6        amoxicillin-clavulanate 875-125 MG per tablet   Commonly known as:  AUGMENTIN   Dose:  1 tablet   Quantity:  14 tablet        Take 1 tablet by mouth 2 times daily for 7 days   Refills:  0        ATIVAN 0.5 MG tablet   Dose:  0.25-0.5 mg   Generic drug:  LORazepam        Take 0.5-1 tablets (0.25-0.5 mg) by mouth every 8 hours as needed for anxiety Take 30 minutes prior to departure.  Do not operate a vehicle after taking this medication   Refills:  0        CULTUREE DIGESTIVE HEALTH Caps   Dose:  1 tablet   Quantity:  28 capsule        Take 1 tablet by mouth daily for 28 days   Refills:  0        lisinopril 5 MG tablet   Commonly known as:  PRINIVIL/ZESTRIL   Dose:  5 mg   Quantity:  90 tablet        Take 1 tablet (5 mg) by mouth daily   Refills:  3        meloxicam 15 MG tablet   Commonly known as:  MOBIC        TAKE 1 TABLET BY MOUTH DAILY AS NEEDED.   Refills:  0        metFORMIN 500 MG tablet   Commonly known as:  GLUCOPHAGE   Dose:  500 mg   Quantity:  90 tablet        Take 1 tablet (500 mg) by mouth daily (with breakfast)   Refills:  3        metroNIDAZOLE 250 MG tablet   Commonly known as:  FLAGYL   Dose:  500 mg   Quantity:  28 tablet        Take 2 tablets (500 mg) by mouth 2 times daily for 7 days   Refills:  0        mirtazapine 15 MG tablet   Commonly known as:  REMERON        Refills:  2        montelukast 10 MG tablet   Commonly known as:  SINGULAIR   Dose:  10 mg   Quantity:  90 tablet        Take 1 tablet (10 mg) by mouth At Bedtime   Refills:  3        simvastatin 40 MG tablet   Commonly known as:  ZOCOR   Dose:  40 mg   Quantity:  90 tablet        Take 1 tablet (40 mg) by mouth At Bedtime   Refills:  1                Prescriptions were sent or printed at these locations (1 Prescription)                   Other Prescriptions                 Printed at Department/Unit printer (1 of 1)         sulfamethoxazole-trimethoprim (BACTRIM DS) 800-160 MG per tablet                Procedures and tests performed during your visit     Fingers XR, 2-3 views, right    POC US SOFT TISSUE      Orders Needing Specimen Collection     None      Pending Results     Date and Time Order Name Status Description    1/30/2018 0301 POC US SOFT TISSUE In process     1/30/2018 0155 Fingers XR, 2-3 views, right Preliminary             Pending Culture Results     No orders found from 1/28/2018 to 1/31/2018.            Pending Results Instructions     If you had any lab results that were not finalized at the time of your Discharge, you can call the ED Lab Result RN at [phone]. You will be contacted by this team for any positive Lab results or changes in treatment. The nurses are available 7 days a week from 10A to 6:30P.  You can leave a message 24 hours per day and they will return your call.        Test Results From Your Hospital Stay        1/30/2018  2:26 AM      Narrative     XR FINGER RIGHT 3 VIEWS   1/30/2018 2:22 AM     HISTORY: Razor blade to radial finger pad, now swelling.    COMPARISON: None.         Impression     IMPRESSION: No acute fracture or dislocation. Small ossific fragment  at the dorsum of the DIP joint may be from an old injury. No  radiopaque foreign body.         1/30/2018  3:01 AM      Result not yet available     Exam Begun                Clinical Quality Measure: Blood Pressure Screening     Your blood pressure was checked while you were in the emergency department today. The last reading we obtained was  BP: (!) 149/100 . Please read the guidelines below about what these numbers mean and what you should do about them.  If your systolic blood pressure (the top number) is less than 120 and your diastolic blood pressure (the bottom number) is less than 80, then your blood pressure is normal. There is nothing more that you need to do about  "it.  If your systolic blood pressure (the top number) is 120-139 or your diastolic blood pressure (the bottom number) is 80-89, your blood pressure may be higher than it should be. You should have your blood pressure rechecked within a year by a primary care provider.  If your systolic blood pressure (the top number) is 140 or greater or your diastolic blood pressure (the bottom number) is 90 or greater, you may have high blood pressure. High blood pressure is treatable, but if left untreated over time it can put you at risk for heart attack, stroke, or kidney failure. You should have your blood pressure rechecked by a primary care provider within the next 4 weeks.  If your provider in the emergency department today gave you specific instructions to follow-up with your doctor or provider even sooner than that, you should follow that instruction and not wait for up to 4 weeks for your follow-up visit.        Thank you for choosing [facility]       Thank you for choosing [facility] for your care. Our goal is always to provide you with excellent care. Hearing back from our patients is one way we can continue to improve our services. Please take a few minutes to complete the written survey that you may receive in the mail after you visit with us. Thank you!        [employer] Information     [employer] lets you send messages to your doctor, view your test results, renew your prescriptions, schedule appointments and more. To sign up, go to [email or web address] . Click on \"Log in\" on the left side of the screen, which will take you to the Welcome page. Then click on \"Sign up Now\" on the right side of the page.     You will be asked to enter the access code listed below, as well as some personal information. Please follow the directions to create your username and password.     Your access code is: [email or web address]  Expires: 2018 10:50 AM     Your access code will  in 90 days. If you need help or a new code, please call " your [facility] clinic or [phone].        Care EveryWhere ID     This is your Care EveryWhere ID. This could be used by other organizations to access your [facility] medical records  [id number]        Equal Access to Services     BRANNON KABA: Nai Romero, wameganda luqadaha, qaybta kaalmada iraida, aron kaba. So [facility] [phone].    ATENCIÓN: Si habla español, tiene a patel disposición servicios gratuitos de asistencia lingüística. Llame al [phone].    We comply with applicable federal civil rights laws and [place] laws. We do not discriminate on the basis of race, color, national origin, age, disability, sex, sexual orientation, or gender identity.            After Visit Summary       This is your record. Keep this with you and show to your community pharmacist(s) and doctor(s) at your next visit.

## 2018-02-02 DIAGNOSIS — E11.9 TYPE 2 DIABETES MELLITUS WITHOUT COMPLICATION, WITHOUT LONG-TERM CURRENT USE OF INSULIN (H): ICD-10-CM

## 2018-02-02 RX ORDER — SIMVASTATIN 20 MG
TABLET ORAL
Qty: 90 TABLET | Refills: 1 | OUTPATIENT
Start: 2018-02-02

## 2018-02-02 RX ORDER — LISINOPRIL 5 MG/1
TABLET ORAL
Qty: 90 TABLET | Refills: 1 | OUTPATIENT
Start: 2018-02-02

## 2018-02-02 NOTE — TELEPHONE ENCOUNTER
"Requested Prescriptions   Pending Prescriptions Disp Refills     lisinopril (PRINIVIL/ZESTRIL) 5 MG tablet [Pharmacy Med Name: LISINOPRIL 5 MG TABLET]  Last Written Prescription Date:  11-  Last Fill Quantity: 90 tablet,  # refills: 3   [place] [facility] [place]  11-   Future Office Visit:      90 tablet 1     Sig: TAKE 1 TABLET (5 MG) BY MOUTH DAILY    ACE Inhibitors (Including Combos) Protocol Failed    2/2/2018  1:15 AM       Failed - Blood pressure under 140/90    BP Readings from Last 3 Encounters:   01/30/18 (!) 149/100   01/27/18 114/62   11/15/17 143/90                Passed - Recent or future visit with authorizing provider's specialty    Patient had office visit in the last year or has a visit in the next 30 days with authorizing provider.  See \"Patient Info\" tab in inbasket, or \"Choose Columns\" in Meds & Orders section of the refill encounter.            Passed - Patient is age 18 or older       Passed - No active pregnancy on record       Passed - Normal serum creatinine on file in past 12 months    Recent Labs   Lab Test  11/15/17   1357   CR  0.93            Passed - Normal serum potassium on file in past 12 months    Recent Labs   Lab Test  11/15/17   1357   POTASSIUM  3.9            Passed - No positive pregnancy test in past 12 months             .     simvastatin (ZOCOR) 20 MG tablet [Pharmacy Med Name: SIMVASTATIN 20 MG TABLET]  Last Written Prescription Date:  11-  Last Fill Quantity: 90 tablet,  # refills: 1   [place] [facility] [place]   11-  Future Office Visit:     3 90 tablet 1     Sig: TAKE 1 TABLET (20 MG) BY MOUTH AT BEDTIME    Statins Protocol Passed    2/2/2018  1:15 AM       Passed - LDL on file in past 12 months    Recent Labs   Lab Test  11/15/17   1357   LDL  123*            Passed - No abnormal creatine kinase in past 12 months    No lab results found.         Passed - Recent or future visit with authorizing provider    Patient had " "office visit in the last year or has a visit in the next 30 days with authorizing provider.  See \"Patient Info\" tab in inbasket, or \"Choose Columns\" in Meds & Orders section of the refill encounter.            Passed - Patient is age 18 or older       Passed - No active pregnancy on record       Passed - No positive pregnancy test in past 12 months          "

## 2018-02-24 ENCOUNTER — APPOINTMENT (OUTPATIENT)
Dept: GENERAL RADIOLOGY | Facility: CLINIC | Age: 63
End: 2018-02-24
Attending: EMERGENCY MEDICINE
Payer: COMMERCIAL

## 2018-02-24 ENCOUNTER — HOSPITAL ENCOUNTER (EMERGENCY)
Facility: CLINIC | Age: 63
Discharge: HOME | End: 2018-02-24
Attending: EMERGENCY MEDICINE | Admitting: EMERGENCY MEDICINE
Payer: COMMERCIAL

## 2018-02-24 VITALS
SYSTOLIC BLOOD PRESSURE: 130 MMHG | DIASTOLIC BLOOD PRESSURE: 90 MMHG | TEMPERATURE: 97.4 F | HEIGHT: 67 IN | RESPIRATION RATE: 16 BRPM | WEIGHT: 162 LBS | HEART RATE: 74 BPM | OXYGEN SATURATION: 100 % | BODY MASS INDEX: 25.43 KG/M2
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
## 2018-02-24 DIAGNOSIS — S43.402A SPRAIN OF LEFT SHOULDER, UNSPECIFIED SHOULDER SPRAIN TYPE, INITIAL ENCOUNTER: ICD-10-CM

## 2018-02-24 PROCEDURE — 99284 EMERGENCY DEPT VISIT MOD MDM: CPT

## 2018-02-24 PROCEDURE — 73562 X-RAY EXAM OF KNEE 3: CPT | Mod: 50

## 2018-02-24 PROCEDURE — 73030 X-RAY EXAM OF SHOULDER: CPT | Mod: LT

## 2018-02-24 PROCEDURE — 25000132 ZZH RX MED GY IP 250 OP 250 PS 637: Performed by: EMERGENCY MEDICINE

## 2018-02-24 RX ORDER — ACETAMINOPHEN 325 MG/1
650 TABLET ORAL ONCE
Status: COMPLETED | OUTPATIENT
Start: 2018-02-24 | End: 2018-02-24

## 2018-02-24 RX ORDER — IBUPROFEN 600 MG/1
600 TABLET, FILM COATED ORAL ONCE
Status: COMPLETED | OUTPATIENT
Start: 2018-02-24 | End: 2018-02-24

## 2018-02-24 RX ADMIN — ACETAMINOPHEN 650 MG: 325 TABLET, FILM COATED ORAL at 19:57

## 2018-02-24 RX ADMIN — IBUPROFEN 600 MG: 600 TABLET ORAL at 19:57

## 2018-02-24 ASSESSMENT — ENCOUNTER SYMPTOMS
COUGH: 0
ARTHRALGIAS: 1
FEVER: 0
HEADACHES: 1
BACK PAIN: 1

## 2018-02-24 NOTE — ED AVS SNAPSHOT
[facility] Emergency Department    [address] [place] [address]    Phone:  [phone]    Fax:  [phone]                                       Grecia Kilgore   MRN: [id number]    Department:  [facility] Emergency Department   Date of Visit:  2/24/2018           Patient Information     Date Of Birth          1955        Your diagnoses for this visit were:     Fall, initial encounter     Sprain of left shoulder, unspecified shoulder sprain type, initial encounter        You were seen by Mahsa Desir MD.      Follow-up Information     Follow up with Romel Mccall MD. Schedule an appointment as soon as possible for a visit in 2 days.    Specialty:  Family Practice    Contact information:    [address] [place] [address] [address]  [place] [address]  [phone]          Discharge Instructions       Get extra rest and drink plenty of fluids. You may take acetaminophen or ibuprofen according to package directions, with food, for fevers/aches.     If you have any worsening/severe symptoms seek medical care right away.       Mechanical Fall  You have had a fall today. It appears that the cause is what we call mechanical. That means that you slipped, tripped or lost your balance. If your fall had been due to fainting or a seizure, other tests might be required.  It is normal to feel sore and tight in your muscles and back the next day, and not just the muscles you injured at first. Remember, all the parts of your body are connected, so while initially one area hurts, the next day another may hurt. Also, when you injure yourself, it causes inflammation, which then causes the muscles to tighten up and hurt more. After the initial worsening, it should gradually improve over the next few days. However, report more severe pain.  Even without a definite head injury, you can still get a concussion from your head suddenly jerking forward, backward or sideways when falling.  Concussions and even bleeding can still occur, especially if you have had a recent injury or take blood thinner medicine. It is not unusual to have a mild headache and feel tired and even nauseous or dizzy.   Home care  1. Rest today and resume your normal activities when you are feeling back to normal.  2. If you were injured during the fall, follow the advice from your doctor regarding care of your injury.  3. At first, do not try to stretch out the sore spots. If there is a strain, stretching may make it worse.  Massage may help relax the muscles without stretching them.  4. You can use an ice pack or cold compress on and off to the sore spots 10 to 20 at a time, as often as you feel comfortable. This may help reduce the inflammation, swelling and pain.  5. If you have any scrapes or abrasions, they usually heal within 10 days. It is important to keep the abrasions clean while they initially start to heal. However, an infection may occur even with proper care, so watch for early signs of infection.  Medications    Talk to your doctor before taking new medicines, especially if you have other medical problems or are taking other medicines.    If you need anything for pain, you can take acetaminophen or ibuprofen, unless you were given a different pain medicine to use. Talk with your doctor before using these medicines if you have chronic liver or kidney disease, or ever had a stomach ulcer or gastrointestinal bleeding, or are taking blood thinner medicines.    Be careful if you are given prescription pain medicines, narcotics, or medicine for muscle spasm. They can make you sleepy, dizzy and can affect your coordination, reflexes and judgment. Do not drive or do work where you can injure yourself when taking them.  Fall prevention    Was there anything that caused your fall that can be fixed, removed, or replaced?    Make your home safe by keeping walkways clear of objects you may trip over.    Use non-slip pads  under rugs. Don't use small area rugs or throw rugs.    Do not walk in poorly lit areas.    Do not stand on chairs or wobbly ladders.    Use caution when reaching overhead or looking upward. This position can cause a loss of balance.    Be sure your shoes fit properly, have non-slip bottoms and are in good condition.    Be cautious when going up and down curbs, and walking on uneven sidewalks.    If your balance is poor, consider using a cane or walker.    Stay as active as you can. Balance, flexibility, strength, and endurance all come from exercise. They all play a role in preventing falls.    If you have pets, know where they are before you stand up or walk so you don't trip over them.    Limit alcohol intake. Alcohol can cause balance problems and increase the risk of falls.    Use night lights.  Follow-up  Follow up with your doctor or as advised by our staff. If X-rays or CT scans were done, you will be notified if there is a change in the reading, especially if it affects treatment.  Call 911  Call 911 if any of these occur:    Trouble breathing    Confused or difficulty arousing    Fainting or loss of consciousness    Rapid or very slow heart rate    Seizure    Difficulty with speech or vision, weakness of an arm or leg    Difficulty walking or talking, loss of balance, numbness or weakness in one side of your body, facial droop  When to seek medical advice  Call your healthcare provider right away if any of these occur:    Repeated mechanical falls, or unexplained falls    Dizziness    Severe headache    Blood in vomit, stools (black or red color)  Date Last Reviewed: 11/5/2015 2000-2017 [employer]. [address] [place] [address], [place] [address]. All rights reserved. This information is not intended as a substitute for professional medical care. Always follow your healthcare professional's instructions.      Shoulder Sprain  A sprain is a stretching or tearing of the ligaments that hold a joint  together. A sprain may take up to 8 weeks to fully heal, depending on how severe it is. Moderate to severe shoulder sprains are treated with a sling or shoulder immobilizer. Minor sprains can be treated without any special support.  Home care  The following guidelines will help you care for your injury at home:    If a sling was given to you, leave it in place for the time advised by your healthcare provider. If you aren t sure how long to wear it, ask for advice. If the sling becomes loose, adjust it so that your forearm is level with the ground. Your shoulder should feel well supported.    Put an ice pack on the injured area for 20 minutes every 1 to 2 hours the first day. You can make your own ice pack by putting ice cubes in a plastic bag. A bag of frozen peas or something similar works well too. Wrap the bag in a thin towel. Continue with ice packs 3 to 4 times a day for the next 2 to 3 days. Then use the pack as needed to ease pain and swelling.    You may use acetaminophen or ibuprofen to control pain, unless another pain medicine was prescribed. If you have chronic liver or kidney disease, talk with your healthcare provider before using these medicines. Also talk with your provider if you ve had a stomach ulcer or gastrointestinal bleeding.    Shoulder joints become stiff if left in a sling for too long. You should start range of motion exercises about 7 to 10 days after the injury. Talk with your provider to find out what type of exercises to do and how soon to start.  Follow-up care  Follow up with your healthcare provider, or as advised.  Any X-rays you had today don t show any broken bones, breaks, or fractures. Sometimes fractures don t show up on the first X-ray. Bruises and sprains can sometimes hurt as much as a fracture. These injuries can take time to heal completely. If your symptoms don t improve or they get worse, talk with your provider. You may need a repeat X-ray or other treatments.  When to  seek medical advice  Call your healthcare provider right away if any of these occur:    Shoulder pain or swelling in your arm that gets worse    Fingers become cold, blue, numb, or tingly    Large amount of bruising of the shoulder or upper arm    Fever or chills  Date Last Reviewed: 8/1/2016 2000-2017 [employer]. [address] [place] [address]. All rights reserved. This information is not intended as a substitute for professional medical care. Always follow your healthcare professional's instructions.            24 Hour Appointment Hotline       To make an appointment at any [facility], call [phone] ([phone]). If you don't have a family doctor or clinic, we will help you find one. [facility] clinics are conveniently located to serve the needs of you and your family.             Review of your medicines      Our records show that you are taking the medicines listed below. If these are incorrect, please call your family doctor or clinic.        Dose / Directions Last dose taken    albuterol 108 (90 BASE) MCG/ACT Inhaler   Commonly known as:  PROAIR HFA/PROVENTIL HFA/VENTOLIN HFA   Dose:  2 puff   Quantity:  1 Inhaler        Inhale 2 puffs into the lungs every 6 hours as needed for shortness of breath / dyspnea   Refills:  6        ATIVAN 0.5 MG tablet   Dose:  0.25-0.5 mg   Generic drug:  LORazepam        Take 0.5-1 tablets (0.25-0.5 mg) by mouth every 8 hours as needed for anxiety Take 30 minutes prior to departure.  Do not operate a vehicle after taking this medication   Refills:  0        CULTUREE [facility] HEALTH Caps   Dose:  1 tablet   Quantity:  28 capsule        Take 1 tablet by mouth daily for 28 days   Refills:  0        lisinopril 5 MG tablet   Commonly known as:  PRINIVIL/ZESTRIL   Dose:  5 mg   Quantity:  90 tablet        Take 1 tablet (5 mg) by mouth daily   Refills:  3        meloxicam 15 MG tablet   Commonly known as:  MOBIC        TAKE 1 TABLET BY MOUTH  DAILY AS NEEDED.   Refills:  0        metFORMIN 500 MG tablet   Commonly known as:  GLUCOPHAGE   Dose:  500 mg   Quantity:  90 tablet        Take 1 tablet (500 mg) by mouth daily (with breakfast)   Refills:  3        mirtazapine 15 MG tablet   Commonly known as:  REMERON        Refills:  2        montelukast 10 MG tablet   Commonly known as:  SINGULAIR   Dose:  10 mg   Quantity:  90 tablet        Take 1 tablet (10 mg) by mouth At Bedtime   Refills:  3        simvastatin 40 MG tablet   Commonly known as:  ZOCOR   Dose:  40 mg   Quantity:  90 tablet        Take 1 tablet (40 mg) by mouth At Bedtime   Refills:  1                Procedures and tests performed during your visit     XR Knee Bilateral 3 Views    XR Shoulder Left G/E 3 Views      Orders Needing Specimen Collection     None      Pending Results     Date and Time Order Name Status Description    2/24/2018 1949 XR Knee Bilateral 3 Views Preliminary     2/24/2018 1949 XR Shoulder Left G/E 3 Views Preliminary             Pending Culture Results     No orders found from 2/22/2018 to 2/25/2018.            Pending Results Instructions     If you had any lab results that were not finalized at the time of your Discharge, you can call the ED Lab Result RN at [phone]. You will be contacted by this team for any positive Lab results or changes in treatment. The nurses are available 7 days a week from 10A to 6:30P.  You can leave a message 24 hours per day and they will return your call.        Test Results From Your Hospital Stay        2/24/2018  9:04 PM      Narrative     SHOULDER LEFT THREE OR MORE VIEWS   2/24/2018 8:41 PM     HISTORY: Trauma, pain.     COMPARISON: None.     FINDINGS:   There is no fracture.  The humeral head is well located  within the glenoid fossa.  Glenohumeral, acromioclavicular, and  coracoclavicular spaces are well maintained.  Visualized portions of  the adjacent lung are clear. Radiopaque linear foreign body is  projected over the left  side of the chest on the externally rotated  view of the shoulder likely representing a surgical clip. Another  radiopaque linear foreign body is projected over the soft tissues just  medial to the shoulder anteriorly on the axillary Y-view. This may or  may not be within the patient and could be external to the patient.        Impression     IMPRESSION:   1. Radiopaque foreign bodies as described are likely of no clinical  significance. Recommend clinical correlation.  2. Otherwise negative left shoulder x-rays. No fracture or  malalignment.         2/24/2018  8:55 PM      Narrative     KNEE BILATERAL THREE VIEW   2/24/2018 8:42 PM     HISTORY: Trauma, pain.     COMPARISON: None.    FINDINGS: No fracture is identified. Joint spaces are grossly  well-maintained. Ossified intra-articular free bodies are projected  over the bilateral knees with the ossified intra-articular free body  in the right knee projected over the posterior intercondylar notch.  One ossified intra-articular free body in the left knee is projected  over the intercondylar notch posteriorly and another is projected over  the patellofemoral compartment of the knee laterally. Small  osteophytes are seen in the patellofemoral compartments of the  bilateral knees.        Impression     IMPRESSION:  1. Ossified intra-articular free bodies are seen in the bilateral  knees.  2. No fracture or malalignment.  3. Mild degenerative changes of the bilateral patellofemoral  compartments of the knees, worse on the left knee.                Clinical Quality Measure: Blood Pressure Screening     Your blood pressure was checked while you were in the emergency department today. The last reading we obtained was  BP: (!) 163/113 . Please read the guidelines below about what these numbers mean and what you should do about them.  If your systolic blood pressure (the top number) is less than 120 and your diastolic blood pressure (the bottom number) is less than 80, then  "your blood pressure is normal. There is nothing more that you need to do about it.  If your systolic blood pressure (the top number) is 120-139 or your diastolic blood pressure (the bottom number) is 80-89, your blood pressure may be higher than it should be. You should have your blood pressure rechecked within a year by a primary care provider.  If your systolic blood pressure (the top number) is 140 or greater or your diastolic blood pressure (the bottom number) is 90 or greater, you may have high blood pressure. High blood pressure is treatable, but if left untreated over time it can put you at risk for heart attack, stroke, or kidney failure. You should have your blood pressure rechecked by a primary care provider within the next 4 weeks.  If your provider in the emergency department today gave you specific instructions to follow-up with your doctor or provider even sooner than that, you should follow that instruction and not wait for up to 4 weeks for your follow-up visit.        Thank you for choosing [facility]       Thank you for choosing [facility] for your care. Our goal is always to provide you with excellent care. Hearing back from our patients is one way we can continue to improve our services. Please take a few minutes to complete the written survey that you may receive in the mail after you visit with us. Thank you!        [employer] Information     [employer] lets you send messages to your doctor, view your test results, renew your prescriptions, schedule appointments and more. To sign up, go to [email or web address] . Click on \"Log in\" on the left side of the screen, which will take you to the Welcome page. Then click on \"Sign up Now\" on the right side of the page.     You will be asked to enter the access code listed below, as well as some personal information. Please follow the directions to create your username and password.     Your access code is: [email or web address]  Expires: 4/27/2018 10:50 AM     Your " access code will  in 90 days. If you need help or a new code, please call your [facility] clinic or [phone].        Care EveryWhere ID     This is your Care EveryWhere ID. This could be used by other organizations to access your [facility] medical records  [id number]        Equal Access to Services     BRANNON MCKEON : Hadii aad ku hadasho Sokelleyali, waaxda luqadaha, qaybta kaalmada iraida, aron kaba. So [facility] [phone].    ATENCIÓN: Si habla español, tiene a patel disposición servicios gratuitos de asistencia lingüística. Llame al [phone].    We comply with applicable federal civil rights laws and [place] laws. We do not discriminate on the basis of race, color, national origin, age, disability, sex, sexual orientation, or gender identity.            After Visit Summary       This is your record. Keep this with you and show to your community pharmacist(s) and doctor(s) at your next visit.

## 2018-02-24 NOTE — ED AVS SNAPSHOT
[facility] Emergency Department    [address]    [facility] [address]    Phone:  [phone]    Fax:  [phone]                                       Grecia Kilgore   MRN: [id number]    Department:  [facility] Emergency Department   Date of Visit:  2/24/2018           After Visit Summary Signature Page     I have received my discharge instructions, and my questions have been answered. I have discussed any challenges I see with this plan with the nurse or doctor.    ..........................................................................................................................................  Patient/Patient Representative Signature      ..........................................................................................................................................  Patient Representative Print Name and Relationship to Patient    ..................................................               ................................................  Date                                            Time    ..........................................................................................................................................  Reviewed by Signature/Title    ...................................................              ..............................................  Date                                                            Time

## 2018-02-25 NOTE — DISCHARGE INSTRUCTIONS
Get extra rest and drink plenty of fluids. You may take acetaminophen or ibuprofen according to package directions, with food, for fevers/aches.     If you have any worsening/severe symptoms seek medical care right away.       Mechanical Fall  You have had a fall today. It appears that the cause is what we call mechanical. That means that you slipped, tripped or lost your balance. If your fall had been due to fainting or a seizure, other tests might be required.  It is normal to feel sore and tight in your muscles and back the next day, and not just the muscles you injured at first. Remember, all the parts of your body are connected, so while initially one area hurts, the next day another may hurt. Also, when you injure yourself, it causes inflammation, which then causes the muscles to tighten up and hurt more. After the initial worsening, it should gradually improve over the next few days. However, report more severe pain.  Even without a definite head injury, you can still get a concussion from your head suddenly jerking forward, backward or sideways when falling. Concussions and even bleeding can still occur, especially if you have had a recent injury or take blood thinner medicine. It is not unusual to have a mild headache and feel tired and even nauseous or dizzy.   Home care  1. Rest today and resume your normal activities when you are feeling back to normal.  2. If you were injured during the fall, follow the advice from your doctor regarding care of your injury.  3. At first, do not try to stretch out the sore spots. If there is a strain, stretching may make it worse.  Massage may help relax the muscles without stretching them.  4. You can use an ice pack or cold compress on and off to the sore spots 10 to 20 at a time, as often as you feel comfortable. This may help reduce the inflammation, swelling and pain.  5. If you have any scrapes or abrasions, they usually heal within 10 days. It is important to keep  the abrasions clean while they initially start to heal. However, an infection may occur even with proper care, so watch for early signs of infection.  Medications    Talk to your doctor before taking new medicines, especially if you have other medical problems or are taking other medicines.    If you need anything for pain, you can take acetaminophen or ibuprofen, unless you were given a different pain medicine to use. Talk with your doctor before using these medicines if you have chronic liver or kidney disease, or ever had a stomach ulcer or gastrointestinal bleeding, or are taking blood thinner medicines.    Be careful if you are given prescription pain medicines, narcotics, or medicine for muscle spasm. They can make you sleepy, dizzy and can affect your coordination, reflexes and judgment. Do not drive or do work where you can injure yourself when taking them.  Fall prevention    Was there anything that caused your fall that can be fixed, removed, or replaced?    Make your home safe by keeping walkways clear of objects you may trip over.    Use non-slip pads under rugs. Don't use small area rugs or throw rugs.    Do not walk in poorly lit areas.    Do not stand on chairs or wobbly ladders.    Use caution when reaching overhead or looking upward. This position can cause a loss of balance.    Be sure your shoes fit properly, have non-slip bottoms and are in good condition.    Be cautious when going up and down curbs, and walking on uneven sidewalks.    If your balance is poor, consider using a cane or walker.    Stay as active as you can. Balance, flexibility, strength, and endurance all come from exercise. They all play a role in preventing falls.    If you have pets, know where they are before you stand up or walk so you don't trip over them.    Limit alcohol intake. Alcohol can cause balance problems and increase the risk of falls.    Use night lights.  Follow-up  Follow up with your doctor or as advised by  our staff. If X-rays or CT scans were done, you will be notified if there is a change in the reading, especially if it affects treatment.  Call 911  Call 911 if any of these occur:    Trouble breathing    Confused or difficulty arousing    Fainting or loss of consciousness    Rapid or very slow heart rate    Seizure    Difficulty with speech or vision, weakness of an arm or leg    Difficulty walking or talking, loss of balance, numbness or weakness in one side of your body, facial droop  When to seek medical advice  Call your healthcare provider right away if any of these occur:    Repeated mechanical falls, or unexplained falls    Dizziness    Severe headache    Blood in vomit, stools (black or red color)  Date Last Reviewed: 11/5/2015 2000-2017 [employer]. [address] [place] [address]. All rights reserved. This information is not intended as a substitute for professional medical care. Always follow your healthcare professional's instructions.      Shoulder Sprain  A sprain is a stretching or tearing of the ligaments that hold a joint together. A sprain may take up to 8 weeks to fully heal, depending on how severe it is. Moderate to severe shoulder sprains are treated with a sling or shoulder immobilizer. Minor sprains can be treated without any special support.  Home care  The following guidelines will help you care for your injury at home:    If a sling was given to you, leave it in place for the time advised by your healthcare provider. If you aren t sure how long to wear it, ask for advice. If the sling becomes loose, adjust it so that your forearm is level with the ground. Your shoulder should feel well supported.    Put an ice pack on the injured area for 20 minutes every 1 to 2 hours the first day. You can make your own ice pack by putting ice cubes in a plastic bag. A bag of frozen peas or something similar works well too. Wrap the bag in a thin towel. Continue with ice packs  3 to 4 times a day for the next 2 to 3 days. Then use the pack as needed to ease pain and swelling.    You may use acetaminophen or ibuprofen to control pain, unless another pain medicine was prescribed. If you have chronic liver or kidney disease, talk with your healthcare provider before using these medicines. Also talk with your provider if you ve had a stomach ulcer or gastrointestinal bleeding.    Shoulder joints become stiff if left in a sling for too long. You should start range of motion exercises about 7 to 10 days after the injury. Talk with your provider to find out what type of exercises to do and how soon to start.  Follow-up care  Follow up with your healthcare provider, or as advised.  Any X-rays you had today don t show any broken bones, breaks, or fractures. Sometimes fractures don t show up on the first X-ray. Bruises and sprains can sometimes hurt as much as a fracture. These injuries can take time to heal completely. If your symptoms don t improve or they get worse, talk with your provider. You may need a repeat X-ray or other treatments.  When to seek medical advice  Call your healthcare provider right away if any of these occur:    Shoulder pain or swelling in your arm that gets worse    Fingers become cold, blue, numb, or tingly    Large amount of bruising of the shoulder or upper arm    Fever or chills  Date Last Reviewed: 8/1/2016 2000-2017 [employer]. [address] [place] [address], [place] [address]. All rights reserved. This information is not intended as a substitute for professional medical care. Always follow your healthcare professional's instructions.

## 2018-02-25 NOTE — ED PROVIDER NOTES
History     Chief Complaint:  Fall    HPI   Grecia Kilgore is a 63 year old female who presents to the emergency department today with fall. The patient slipped and fell on ice at [employer]. She hit the 7-up machine with her right face and fell onto her right knee. The patient laid all the way down. She then noticed right and left knee pain, left shoulder pain, and right and left hip. Later she noticed a headache and lower back pain. She rates her pain as 8/10. She has but ice packs on her back, but has not had pain medication. Of note, she sometimes takes aspirin. She denies alcohol use on a daily basis. She denies cough or cold symptoms. The patient denies dental pain or jaw malalignment. She denies losing consciousness.      On review symptoms, she notes recent treatment of UTI and ear infections, which have completely resolved.     Allergies:  Codeine  Morphine  Nitrofuran Derivatives  Phenothiazines  Primatene Mist   Vicodin   Latex     Medications:    Culturella  Zocor  Prinivil  Albuterol  Glucophage  Singulair  Ativan  Remeron  Mobic    Past Medical History:    Allergic rhinitis  Anxiety  Chronic back pain  DCIS  Depression  Diabetes type 2  Diverticulitis  Eating disorder  External hemorrhoids  G6PD deficiency   Hepatitis A   High cholesterol  Laxative abuse  Liver nodule  Migraine  Mild persistent asthma  Nephrolithiasis  Ovarian cyst  Pancreatitis  PTSD  Pure hypercholesterolemia  STD Genital herpes    Past Surgical History:    Arthrodesis toe  Arthroscopy knee  Biopsy breast  Breast seed localization  Colonoscopy  Foot surgery  Hemorrhoidectomy  Remove hardware foot  Repair tendon foot    Family History:    Diabetes  Breast cancer  Alcohol  Cancer  Diabetes  Cardiovascular disease    Social History:  The patient was alone.  Smoking Status: Never smoker  Smokeless Tobacco: Never  Alcohol Use: No   Marital Status:  Single    Review of Systems   Constitutional: Negative for fever.   HENT: Negative for  "congestion and dental problem.    Respiratory: Negative for cough.    Musculoskeletal: Positive for arthralgias (bilatral knee pain, lleft shoulder pain, neck, bilateral hip, and right lower leg pain) and back pain (lower).   Neurological: Positive for headaches (due to hitting her head).   All other systems reviewed and are negative.    Physical Exam     Patient Vitals for the past 24 hrs:   BP Temp Pulse Resp SpO2 Height Weight   02/24/18 2136 - - - 16 - - -   02/24/18 2115 130/90 - - - - - -   02/24/18 1917 (!) 163/113 97.4  F (36.3  C) 74 16 100 % 1.702 m (5' 7\") 73.5 kg (162 lb)      Physical Exam  Constitutional:  Well developed, well nourished, anxious, GCS = 15   Eyes:  PERRL, conjunctiva normal, EOMI  HENT:  No focal tenderness over face, no tooth sensitivity or malocclusion. No contusions, abrasions or other gross deformities noted over the scalp or face.  Respiratory:  No respiratory distress, normal breath sounds, no wheezing.   vb Cardiovascular:  RRR, no murmur.    GI:  Abdomen is nondistended, soft, nontender to palpation  Musculoskeletal: C-spine: no midline tenderness to palpation or pain with range of motion. L-spine had bilateral Paraspinous muscle tenderness. No midline Lumbar tenderness. Left shoulder: Left trapezius and deltoid tenderness, mild pain with abduction, no gross deformity, range of motion intact. Bilateral knees: no gross deformities, full flexion and straight leg raise intact, mild pain with range of motion, left greater than right, no focal tenderness to palpation. Bilateral hips: no gross deformities, no pain with passive range of motion, mild lateral soft tissue tenderness over hips left greater than right, pelvis stable.    Integument: No contusions, abrasions.   Neurologic: Alert & oriented x 3, CN 2-12 normal, normal motor function, normal sensory function, no focal deficits noted   Psychiatric:  Normal affect.   Anxious, otherwise comfortable appearing   Emergency Department " Course   Imaging:  Radiology findings were communicated with the patient who voiced understanding of the findings.  XR Knee Bilateral 3 Views  IMPRESSION:  1. Ossified intra-articular free bodies are seen in the bilateral knees.  2. No fracture or malalignment.  3. Mild degenerative changes of the bilateral patellofemoral compartments of the knees, worse on the left knee.  Report per radiology      XR Shoulder Left G/E 3 views  IMPRESSION:   1. Radiopaque foreign bodies as described are likely of no clinical significance. Recommend clinical correlation.  2. Otherwise negative left shoulder x-rays. No fracture or malalignment.  Report per radiology      Interventions:  1957: Tylenol 650 mg PO  1957: Advil 600mg PO     Emergency Department Course:  Nursing notes and vitals reviewed.  1933: I performed an exam of the patient as documented above.   The patient was sent for a Bilateral Knee XR, Left shoulder XR while in the emergency department, results above.    2118: Patient rechecked and updated the patient on her imaging results.   2125: Findings and plan explained to the Patient. Patient discharged home with instructions regarding supportive care, medications, and reasons to return. The importance of close follow-up was reviewed.   I personally reviewed the imaging results with the Patient and answered all related questions prior to discharge.   Impression & Plan    Medical Decision Making:  Grecia Kilgore is a 63 year old female who presents for evaluation of a fall.  This was clearly a mechanical fall, not syncope.  There were no prodromal symptoms so I doubt stroke, cardiac arrhythmia or other serious etiology.  Detailed exam shows no gross deformities, main areas of pain in her left shoulder and bilateral knees.  Her areas of pain appeared consistent with muscle strain. based on this I did not do basic blood work and urinalysis.  Results as above.  I had the tech ambulate the patient and she did well.  Based  on this I would send home for outpatient management.  I would not do workup for syncope, stroke, ACS, PE at this point.  I doubt serious underlying fractures, intracerebral issues, spinal fractures.  Discussed supportive measures at length, close follow-up, indications return.  She was comfortable with plan.    Diagnosis:    ICD-10-CM    1. Fall, initial encounter W19.XXXA    2. Sprain of left shoulder, unspecified shoulder sprain type, initial encounter S43.402A        Disposition:  discharged to home    Scribe Disclosure:  I, Gabbie Brody, am serving as a scribe at 7:33 PM on 2/24/2018 to document services personally performed by Mahsa Desir MD based on my observations and the provider's statements to me.    2/24/2018   [facility] EMERGENCY DEPARTMENT       Mahsa Desir MD  02/24/18 [id number]

## 2018-02-25 NOTE — ED NOTES
Patient slipped and fell at [employer].   States she landed on her bilateral knees and then hit forehead on a soda machine.  Complaining of pain to low back, bilateral knees, head, left shoulder, neck, right hip, and right lower leg.       ABCs intact.  Alert and oriented x 3.

## 2018-03-01 ENCOUNTER — OFFICE VISIT (OUTPATIENT)
Dept: OBGYN | Facility: CLINIC | Age: 63
End: 2018-03-01
Payer: COMMERCIAL

## 2018-03-01 VITALS
BODY MASS INDEX: 26.21 KG/M2 | SYSTOLIC BLOOD PRESSURE: 118 MMHG | WEIGHT: 167 LBS | TEMPERATURE: 98.5 F | HEIGHT: 67 IN | DIASTOLIC BLOOD PRESSURE: 76 MMHG

## 2018-03-01 DIAGNOSIS — N89.8 VAGINAL DISCHARGE: ICD-10-CM

## 2018-03-01 DIAGNOSIS — L29.3 PRURITUS OF GENITALIA: ICD-10-CM

## 2018-03-01 DIAGNOSIS — N89.8 ITCHING OF VAGINA: ICD-10-CM

## 2018-03-01 DIAGNOSIS — N76.0 BV (BACTERIAL VAGINOSIS): ICD-10-CM

## 2018-03-01 DIAGNOSIS — B96.89 BV (BACTERIAL VAGINOSIS): ICD-10-CM

## 2018-03-01 DIAGNOSIS — R39.9 UTI SYMPTOMS: Primary | ICD-10-CM

## 2018-03-01 DIAGNOSIS — Z11.3 SCREEN FOR STD (SEXUALLY TRANSMITTED DISEASE): ICD-10-CM

## 2018-03-01 LAB
ALBUMIN UR-MCNC: NEGATIVE MG/DL
APPEARANCE UR: CLEAR
BILIRUB UR QL STRIP: NEGATIVE
COLOR UR AUTO: YELLOW
GLUCOSE UR STRIP-MCNC: NEGATIVE MG/DL
GRAM STN SPEC: ABNORMAL
HGB UR QL STRIP: NEGATIVE
KETONES UR STRIP-MCNC: NEGATIVE MG/DL
LEUKOCYTE ESTERASE UR QL STRIP: NEGATIVE
NITRATE UR QL: NEGATIVE
PH UR STRIP: 7 PH (ref 5–7)
SOURCE: NORMAL
SP GR UR STRIP: 1.02 (ref 1–1.03)
SPECIMEN SOURCE: ABNORMAL
UROBILINOGEN UR STRIP-ACNC: 0.2 EU/DL (ref 0.2–1)

## 2018-03-01 PROCEDURE — 87205 SMEAR GRAM STAIN: CPT | Performed by: OBSTETRICS & GYNECOLOGY

## 2018-03-01 PROCEDURE — 99213 OFFICE O/P EST LOW 20 MIN: CPT | Performed by: OBSTETRICS & GYNECOLOGY

## 2018-03-01 PROCEDURE — 87102 FUNGUS ISOLATION CULTURE: CPT | Performed by: OBSTETRICS & GYNECOLOGY

## 2018-03-01 PROCEDURE — 87653 STREP B DNA AMP PROBE: CPT | Performed by: OBSTETRICS & GYNECOLOGY

## 2018-03-01 PROCEDURE — 81003 URINALYSIS AUTO W/O SCOPE: CPT | Performed by: OBSTETRICS & GYNECOLOGY

## 2018-03-01 ASSESSMENT — ANXIETY QUESTIONNAIRES
1. FEELING NERVOUS, ANXIOUS, OR ON EDGE: NEARLY EVERY DAY
2. NOT BEING ABLE TO STOP OR CONTROL WORRYING: NEARLY EVERY DAY
7. FEELING AFRAID AS IF SOMETHING AWFUL MIGHT HAPPEN: NEARLY EVERY DAY
6. BECOMING EASILY ANNOYED OR IRRITABLE: MORE THAN HALF THE DAYS
GAD7 TOTAL SCORE: 19
3. WORRYING TOO MUCH ABOUT DIFFERENT THINGS: NEARLY EVERY DAY
5. BEING SO RESTLESS THAT IT IS HARD TO SIT STILL: MORE THAN HALF THE DAYS

## 2018-03-01 ASSESSMENT — PATIENT HEALTH QUESTIONNAIRE - PHQ9: 5. POOR APPETITE OR OVEREATING: NEARLY EVERY DAY

## 2018-03-01 NOTE — PROGRESS NOTES
SUBJECTIVE:                                                   Grecia Kilgore is a 63 year old female who presents to clinic today for the following health issue(s):  Patient presents with:  UTI: has been having pressure and urinary frequency.   Vaginal Problem: has been having discharge        HPI: The patient has a very complicated history of both vaginal and urinary tract infections with urgent care visits antibiotic treatment and self treatment at home.  She is very confused by her symptomatology at this time and is frustrated.  Unfortunately she uses many over-the-counter remedies for the vagina.      Patient's last menstrual period was 1985..   Patient is sexually active, .  Using hysterectomy for contraception.    reports that she has never smoked. She has never used smokeless tobacco.    STD testing offered?  Accepted    Health maintenance updated:  yes    Today's PHQ-2 Score:   PHQ-2 (  [employer]) 3/1/2018   Q1: Little interest or pleasure in doing things 3   Q2: Feeling down, depressed or hopeless 3   PHQ-2 Score 6     Today's PHQ-9 Score:   PHQ-9 SCORE 3/1/2018   Total Score -   Total Score 20     Today's WILL-7 Score:   WILL-7 SCORE 3/1/2018   Total Score -   Total Score 19       Problem list and histories reviewed & adjusted, as indicated.  Additional history: as documented.    Patient Active Problem List   Diagnosis     Allergic rhinitis     External hemorrhoids     Vitamin D deficiency     Fibromyalgia     Osteoarthritis, knee     Positive PPD     Panic disorder     Genital herpes     Advanced directives, counseling/discussion     DCIS (ductal carcinoma in situ) of breast     Heart palpitations     Anxiety     Major depressive disorder, single episode, moderate (H)     Hyperlipidemia LDL goal <100     Type 2 diabetes mellitus without complication, without long-term current use of insulin (H)     Mild intermittent asthma without complication     BOO (obstructive sleep apnea)     Past  Surgical History:   Procedure Laterality Date     ARTHRODESIS TOE(S)  9/16/2013    Procedure: ARTHRODESIS TOE(S);  BILATERAL GREAT TOE FUSION (C-ARM);  Surgeon: Mary Guan MD;  Location: [facility]     ARTHRODESIS TOE(S) Left 12/19/2016    Procedure: ARTHRODESIS TOE(S);  Surgeon: Mary Guan MD;  Location: [facility]     ARTHROSCOPY KNEE Left 2/2/11     BIOPSY BREAST  2/7/2014    Procedure: BIOPSY BREAST;  Re-excision Left Breast Cavity for Margins ;  Surgeon: Lisset Amador DO;  Location: [facility] OR     BIOPSY BREAST SEED LOCALIZATION  1/22/2014    Procedure: BIOPSY BREAST SEED LOCALIZATION;  Left Breast Seed Localized Excisional Biopsy ;  Surgeon: Lisset Amador DO;  Location: [facility] OR     COLONOSCOPY  2005    nl - repeat 2015     FOOT SURGERY Bilateral 2013     HEMORRHOIDECTOMY BANDING      multiple times     HYSTERECTOMY  7/1996    fibroids     REMOVE HARDWARE FOOT Left 2015     REPAIR TENDON FOOT Left 12/19/2016    Procedure: REPAIR TENDON FOOT;  Surgeon: Mary Guan MD;  Location: [facility]      Social History   Substance Use Topics     Smoking status: Never Smoker     Smokeless tobacco: Never Used     Alcohol use No      Problem (# of Occurrences) Relation (Name,Age of Onset)    Alcohol/Drug (1) Father    Breast Cancer (1) Mother    CANCER (1) Father    CEREBROVASCULAR DISEASE (1) Maternal Grandmother    Cancer - colorectal (1) Maternal Grandfather    DIABETES (2) Mother, Maternal Grandmother            Current Outpatient Prescriptions   Medication Sig     simvastatin (ZOCOR) 40 MG tablet Take 1 tablet (40 mg) by mouth At Bedtime     lisinopril (PRINIVIL/ZESTRIL) 5 MG tablet Take 1 tablet (5 mg) by mouth daily     albuterol (PROAIR HFA/PROVENTIL HFA/VENTOLIN HFA) 108 (90 BASE) MCG/ACT Inhaler Inhale 2 puffs into the lungs every 6 hours as needed for shortness of breath / dyspnea     metFORMIN (GLUCOPHAGE) 500 MG tablet Take 1 tablet (500 mg) by mouth daily (with breakfast)     montelukast  "(SINGULAIR) 10 MG tablet Take 1 tablet (10 mg) by mouth At Bedtime     LORazepam (ATIVAN) 0.5 MG tablet Take 0.5-1 tablets (0.25-0.5 mg) by mouth every 8 hours as needed for anxiety Take 30 minutes prior to departure.  Do not operate a vehicle after taking this medication     mirtazapine (REMERON) 15 MG tablet      meloxicam (MOBIC) 15 MG tablet TAKE 1 TABLET BY MOUTH DAILY AS NEEDED.     No current facility-administered medications for this visit.      Allergies   Allergen Reactions     Codeine Nausea     Morphine Itching     Nitrofuran Derivatives Hives     Phenothiazines      sedation     Primatene Mist [Epinephrine Bitartrate] Itching     neck itch with primatene mist     Vicodin [Hydrocodone-Acetaminophen] Nausea     Latex Itching and Rash       ROS:  12 point review of systems negative other than symptoms noted below.  Constitutional: Weight Loss  Genitourinary: Hot Flashes and Vaginal Discharge  Psychiatric: Anxiety, Depression, Difficulty Sleeping and Negron    OBJECTIVE:     /76  Temp 98.5  F (36.9  C)  Ht 5' 7\" (1.702 m)  Wt 167 lb (75.8 kg)  LMP 01/01/1985  BMI 26.16 kg/m2  Body mass index is 26.16 kg/(m^2).    Exam:  Constitutional:  Appearance: Well nourished, well developed alert, in no acute distress  Gastrointestinal:  Abdominal Examination:  Abdomen nontender to palpation, tone normal without rigidity or guarding, no masses present, umbilicus without lesions; Liver/Spleen:  No hepatomegaly present, liver nontender to palpation; Hernias:  No hernias present  Lymphatic: Lymph Nodes:  No other lymphadenopathy present  Skin:General Inspection:  No rashes present, no lesions present, no areas of discoloration; Genitalia and Groin:  No rashes present, no lesions present, no areas of discoloration, no masses present.  Neurologic/Psychiatric:  Mental Status:  Oriented X3   Pelvic Exam:  External Genitalia:     Normal appearance for age, no discharge present, no tenderness present, no inflammatory " lesions present, color normal  Vagina:     Normal vaginal vault without central or paravaginal defects, no discharge present, no inflammatory lesions present, no masses present  Bladder:     Nontender to palpation  Urethra:   Urethral Body:  Urethra palpation normal, urethra structural support normal   Urethral Meatus:  No erythema or lesions present  Cervix:     Surgically absent  Uterus:     Surgically absent  Adnexa:     Surgically absent  Perineum:     Perineum within normal limits, no evidence of trauma, no rashes or skin lesions present  Anus:     Anus within normal limits, no hemorrhoids present  Inguinal Lymph Nodes:     No lymphadenopathy present     In-Clinic Test Results:  Results for orders placed or performed in visit on 03/01/18 (from the past 24 hour(s))   UA without Microscopic   Result Value Ref Range    Color Urine Yellow     Appearance Urine Clear     Glucose Urine Negative NEG^Negative mg/dL    Bilirubin Urine Negative NEG^Negative    Ketones Urine Negative NEG^Negative mg/dL    Specific Gravity Urine 1.020 1.003 - 1.035    Blood Urine Negative NEG^Negative    pH Urine 7.0 5.0 - 7.0 pH    Protein Albumin Urine Negative NEG^Negative mg/dL    Urobilinogen Urine 0.2 0.2 - 1.0 EU/dL    Nitrite Urine Negative NEG^Negative    Leukocyte Esterase Urine Negative NEG^Negative    Source Midstream Urine        ASSESSMENT/PLAN:                                                        ICD-10-CM    1. UTI symptoms R39.9 UA without Microscopic     CANCELED: UA without Microscopic       The patient has a completely clean urinalysis at this time.  We will have her continue to soak and we will follow-up her vaginal culture when available.  I have asked her to curtail the use of all of her over-the-counter medications.        Tristan Bryant MD  [facility] [place]

## 2018-03-01 NOTE — MR AVS SNAPSHOT
"              After Visit Summary   3/1/2018    Grecia Kilgore    MRN: [id number]           Patient Information     Date Of Birth          1955        Visit Information        Provider Department      3/1/2018 2:30 PM Tristan Bryant MD [facility]        Today's Diagnoses     UTI symptoms    -  1    Screen for STD (sexually transmitted disease)        Vaginal discharge           Follow-ups after your visit        Your next 10 appointments already scheduled     Mar 05, 2018 11:00 AM CST   Office Visit with Romel Mccall MD   [facility]    [address] [place] [id number] [phone]           Bring a current list of meds and any records pertaining to this visit. For Physicals, please bring immunization records and any forms needing to be filled out. Please arrive 10 minutes early to complete paperwork.              Who to contact     If you have questions or need follow up information about today's clinic visit or your schedule please contact [facility] directly at [phone].  Normal or non-critical lab and imaging results will be communicated to you by Mobile Theoryhart, letter or phone within 4 business days after the clinic has received the results. If you do not hear from us within 7 days, please contact the clinic through Smartdatet or phone. If you have a critical or abnormal lab result, we will notify you by phone as soon as possible.  Submit refill requests through Appfolio or call your pharmacy and they will forward the refill request to us. Please allow 3 business days for your refill to be completed.          Additional Information About Your Visit        MyChart Information     Appfolio lets you send messages to your doctor, view your test results, renew your prescriptions, schedule appointments and more. To sign up, go to [email or web address] . Click on \"Log in\" on the left side of the " "screen, which will take you to the Welcome page. Then click on \"Sign up Now\" on the right side of the page.     You will be asked to enter the access code listed below, as well as some personal information. Please follow the directions to create your username and password.     Your access code is: [email or web address]  Expires: 2018 10:50 AM     Your access code will  in 90 days. If you need help or a new code, please call your [facility] clinic or [phone].        Care EveryWhere ID     This is your Care EveryWhere ID. This could be used by other organizations to access your [facility] medical records  [id number]        Your Vitals Were     Temperature Height Last Period BMI (Body Mass Index)          98.5  F (36.9  C) 5' 7\" (1.702 m) 1985 26.16 kg/m2         Blood Pressure from Last 3 Encounters:   18 118/76   18 130/90   18 (!) 149/100    Weight from Last 3 Encounters:   18 167 lb (75.8 kg)   18 162 lb (73.5 kg)   18 165 lb (74.8 kg)              We Performed the Following     Gram stain     Group B strep PCR     UA without Microscopic     Yeast culture        Primary Care Provider Office Phone # Fax #    Romel Mccall [phone]       [address]  [place] [address]        Equal Access to Services     [facility]: Hadii aad ku hadasho Sokelleyali, waaxda luqadaha, qaybta kaalmada adejayceyastorm, aron jordan . So [facility] [phone].    ATENCIÓN: Si habla meloañol, tiene a patel disposición servicios gratuitos de asistencia lingüística. Llame al [phone].    We comply with applicable federal civil rights laws and [place] laws. We do not discriminate on the basis of race, color, national origin, age, disability, sex, sexual orientation, or gender identity.            Thank you!     Thank you for choosing [facility]  for your care. Our goal is always to provide you with excellent care. Hearing " back from our patients is one way we can continue to improve our services. Please take a few minutes to complete the written survey that you may receive in the mail after your visit with us. Thank you!             Your Updated Medication List - Protect others around you: Learn how to safely use, store and throw away your medicines at www.disposemymeds.org.          This list is accurate as of 3/1/18 11:59 PM.  Always use your most recent med list.                   Brand Name Dispense Instructions for use Diagnosis    albuterol 108 (90 BASE) MCG/ACT Inhaler    PROAIR HFA/PROVENTIL HFA/VENTOLIN HFA    1 Inhaler    Inhale 2 puffs into the lungs every 6 hours as needed for shortness of breath / dyspnea    Mild intermittent asthma without complication       ATIVAN 0.5 MG tablet   Generic drug:  LORazepam      Take 0.5-1 tablets (0.25-0.5 mg) by mouth every 8 hours as needed for anxiety Take 30 minutes prior to departure.  Do not operate a vehicle after taking this medication        lisinopril 5 MG tablet    PRINIVIL/ZESTRIL    90 tablet    Take 1 tablet (5 mg) by mouth daily    Type 2 diabetes mellitus without complication, without long-term current use of insulin (H)       meloxicam 15 MG tablet    MOBIC     TAKE 1 TABLET BY MOUTH DAILY AS NEEDED.        metFORMIN 500 MG tablet    GLUCOPHAGE    90 tablet    Take 1 tablet (500 mg) by mouth daily (with breakfast)    Type 2 diabetes mellitus without complication, without long-term current use of insulin (H)       mirtazapine 15 MG tablet    REMERON          montelukast 10 MG tablet    SINGULAIR    90 tablet    Take 1 tablet (10 mg) by mouth At Bedtime    Mild intermittent asthma without complication       simvastatin 40 MG tablet    ZOCOR    90 tablet    Take 1 tablet (40 mg) by mouth At Bedtime    Hyperlipidemia LDL goal <100

## 2018-03-02 LAB
GP B STREP DNA SPEC QL NAA+PROBE: NEGATIVE
SPECIMEN SOURCE: NORMAL

## 2018-03-02 ASSESSMENT — PATIENT HEALTH QUESTIONNAIRE - PHQ9: SUM OF ALL RESPONSES TO PHQ QUESTIONS 1-9: 20

## 2018-03-02 ASSESSMENT — ANXIETY QUESTIONNAIRES: GAD7 TOTAL SCORE: 19

## 2018-03-05 LAB
SPECIMEN SOURCE: NORMAL
YEAST SPEC QL CULT: NORMAL

## 2018-03-05 NOTE — PROGRESS NOTES
SUBJECTIVE:   Grecia Kilgore is a 63 year old female who presents to clinic today for the following health issues:      Concern - Fall   Onset: 02/24/17    Description: Patient slipped at [employer]     Body aches - trouble sleeping during the night - discomfort - headache comes and goes - Throbbing sensation on the lower back - face hit the 7 up machine - swelling on bilateral knee -     Pt state that she noticed on the left upper quadrant that there is a lump - big and moving -never noticed it previously.  Wonders if it is related to the fall.    Intensity: 8/10- constant     Progression of Symptoms:  worsening    Accompanying Signs & Symptoms:  Denies blurry vision - nausea - vomiting -     Previous history of similar problem:   No     Precipitating factors:   Worsened by: Walking, sleeping, and sitting     Alleviating factors:  Improved by: None     Therapies Tried and outcome: Ice - shows mild improvement         Patient has history of type 2 diabetes.  Well controlled.   Lab Results   Component Value Date    A1C 6.0 11/15/2017    A1C 5.6 06/19/2017    A1C 5.8 11/11/2016    A1C 5.9 07/08/2016    A1C 5.4 03/16/2016       Patient has history of eating disorder and depression for which she sees a psychiatrist at [facility].    PHQ-9 SCORE 11/15/2017 3/1/2018 3/6/2018   Total Score - - -   Total Score 17 20 21             Problem list and histories reviewed & adjusted, as indicated.  Additional history: as documented     Patient Active Problem List   Diagnosis     Allergic rhinitis     External hemorrhoids     Vitamin D deficiency     Fibromyalgia     Osteoarthritis, knee     Positive PPD     Panic disorder     Genital herpes     Advanced directives, counseling/discussion     DCIS (ductal carcinoma in situ) of breast     Heart palpitations     Anxiety     Major depressive disorder, single episode, moderate (H)     Hyperlipidemia LDL goal <100     Type 2 diabetes mellitus without complication,  without long-term current use of insulin (H)     Mild intermittent asthma without complication     BOO (obstructive sleep apnea)     Past Surgical History:   Procedure Laterality Date     ARTHRODESIS TOE(S)  9/16/2013    Procedure: ARTHRODESIS TOE(S);  BILATERAL GREAT TOE FUSION (C-ARM);  Surgeon: Mary Guan MD;  Location: [facility]     ARTHRODESIS TOE(S) Left 12/19/2016    Procedure: ARTHRODESIS TOE(S);  Surgeon: Mary Guan MD;  Location: [facility]     ARTHROSCOPY KNEE Left 2/2/11     BIOPSY BREAST  2/7/2014    Procedure: BIOPSY BREAST;  Re-excision Left Breast Cavity for Margins ;  Surgeon: Lisset Amador DO;  Location: [facility] OR     BIOPSY BREAST SEED LOCALIZATION  1/22/2014    Procedure: BIOPSY BREAST SEED LOCALIZATION;  Left Breast Seed Localized Excisional Biopsy ;  Surgeon: Lisset Amador DO;  Location:  OR     COLONOSCOPY  2005    nl - repeat 2015     FOOT SURGERY Bilateral 2013     HEMORRHOIDECTOMY BANDING      multiple times     HYSTERECTOMY  7/1996    fibroids     REMOVE HARDWARE FOOT Left 2015     REPAIR TENDON FOOT Left 12/19/2016    Procedure: REPAIR TENDON FOOT;  Surgeon: Mary Guan MD;  Location: [facility]       Social History   Substance Use Topics     Smoking status: Never Smoker     Smokeless tobacco: Never Used     Alcohol use No     Family History   Problem Relation Age of Onset     DIABETES Mother      Breast Cancer Mother      Alcohol/Drug Father      CANCER Father      DIABETES Maternal Grandmother      CEREBROVASCULAR DISEASE Maternal Grandmother      Cancer - colorectal Maternal Grandfather          Current Outpatient Prescriptions   Medication Sig Dispense Refill     metroNIDAZOLE (FLAGYL) 250 MG tablet Take 2 tablets (500 mg) by mouth 2 times daily 28 tablet 0     fluconazole (DIFLUCAN) 150 MG tablet Take 1 tablet (150 mg) by mouth every 3 days 3 tablet 0     nystatin (MYCOSTATIN) 463123 UNIT/GM POWD Apply topically 3 times daily as needed To groin area 60 g 1      "simvastatin (ZOCOR) 40 MG tablet Take 1 tablet (40 mg) by mouth At Bedtime 90 tablet 1     lisinopril (PRINIVIL/ZESTRIL) 5 MG tablet Take 1 tablet (5 mg) by mouth daily 90 tablet 3     albuterol (PROAIR HFA/PROVENTIL HFA/VENTOLIN HFA) 108 (90 BASE) MCG/ACT Inhaler Inhale 2 puffs into the lungs every 6 hours as needed for shortness of breath / dyspnea 1 Inhaler 6     metFORMIN (GLUCOPHAGE) 500 MG tablet Take 1 tablet (500 mg) by mouth daily (with breakfast) 90 tablet 3     montelukast (SINGULAIR) 10 MG tablet Take 1 tablet (10 mg) by mouth At Bedtime 90 tablet 3     LORazepam (ATIVAN) 0.5 MG tablet Take 0.5-1 tablets (0.25-0.5 mg) by mouth every 8 hours as needed for anxiety Take 30 minutes prior to departure.  Do not operate a vehicle after taking this medication       mirtazapine (REMERON) 15 MG tablet   2     meloxicam (MOBIC) 15 MG tablet TAKE 1 TABLET BY MOUTH DAILY AS NEEDED.  0     Allergies   Allergen Reactions     Codeine Nausea     Morphine Itching     Nitrofuran Derivatives Hives     Phenothiazines      sedation     Primatene Mist [Epinephrine Bitartrate] Itching     neck itch with primatene mist     Vicodin [Hydrocodone-Acetaminophen] Nausea     Latex Itching and Rash       Reviewed and updated as needed this visit by clinical staff  Tobacco  Allergies  Meds  Med Hx  Surg Hx  Fam Hx  Soc Hx      Reviewed and updated as needed this visit by Provider         ROS:  CONSTITUTIONAL: NEGATIVE for fever, chills, change in weight  ENT/MOUTH: NEGATIVE for ear, mouth and throat problems  RESP: NEGATIVE for significant cough or SOB  CV: NEGATIVE for chest pain, palpitations or peripheral edema    OBJECTIVE:                                                    /66 (BP Location: Left arm, Patient Position: Chair, Cuff Size: Adult Regular)  Pulse 71  Ht 5' 7\" (1.702 m)  Wt 162 lb 12.8 oz (73.8 kg)  LMP 01/01/1985  SpO2 96%  Breastfeeding? No  BMI 25.5 kg/m2  Body mass index is 25.5 kg/(m^2).   GENERAL: " healthy, alert, well nourished, well hydrated, no distress  NECK: no tenderness, no adenopathy, no asymmetry, no masses, no stiffness; thyroid- normal to palpation  RESP: lungs clear to auscultation - no rales, no rhonchi, no wheezes  CV: regular rates and rhythm, normal S1 S2, no S3 or S4 and no murmur, no click or rub -  Chest wall: Left anterior lower chest wall with a palpable lump in the subdermal area without any erythema.  No tenderness.  It is mobile.  ABDOMEN: soft, no tenderness, no  hepatosplenomegaly, no masses, normal bowel sounds         ASSESSMENT/PLAN:                                                      1. Fall, subsequent encounter  Patient is gradually improving from the fall.  Recommended to use ibuprofen as needed for discomfort    2. Chest wall mass  Likely a lipoma.  Patient wanted ultrasound to confirm it does not blood collection related to the recent fall.  Chest wall ultrasound ordered and was done today.  I was notified that it is a lipoma.  Patient was notified.  No follow-up needed  - US Chest Wall; Future    3. Major depressive disorder, single episode, moderate (H)  Managed by [facility].  Not well controlled    4. Type 2 diabetes mellitus without complication, without long-term current use of insulin (H)  Well-controlled      Total time spent was 25 minutes, more than half the time was spent in counseling the patient about the disease pathogenesis, treatment plan and coordinating care.      Romel Mccall MD  [facility]

## 2018-03-06 ENCOUNTER — TRANSFERRED RECORDS (OUTPATIENT)
Dept: HEALTH INFORMATION MANAGEMENT | Facility: CLINIC | Age: 63
End: 2018-03-06

## 2018-03-06 ENCOUNTER — TELEPHONE (OUTPATIENT)
Dept: FAMILY MEDICINE | Facility: CLINIC | Age: 63
End: 2018-03-06

## 2018-03-06 ENCOUNTER — OFFICE VISIT (OUTPATIENT)
Dept: FAMILY MEDICINE | Facility: CLINIC | Age: 63
End: 2018-03-06
Payer: COMMERCIAL

## 2018-03-06 VITALS
DIASTOLIC BLOOD PRESSURE: 66 MMHG | SYSTOLIC BLOOD PRESSURE: 118 MMHG | OXYGEN SATURATION: 96 % | BODY MASS INDEX: 25.55 KG/M2 | HEIGHT: 67 IN | HEART RATE: 71 BPM | WEIGHT: 162.8 LBS

## 2018-03-06 DIAGNOSIS — E11.9 TYPE 2 DIABETES MELLITUS WITHOUT COMPLICATION, WITHOUT LONG-TERM CURRENT USE OF INSULIN (H): ICD-10-CM

## 2018-03-06 DIAGNOSIS — R22.2 CHEST WALL MASS: ICD-10-CM

## 2018-03-06 DIAGNOSIS — F32.1 MAJOR DEPRESSIVE DISORDER, SINGLE EPISODE, MODERATE (H): ICD-10-CM
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
## 2018-03-06 PROCEDURE — 99214 OFFICE O/P EST MOD 30 MIN: CPT | Performed by: FAMILY MEDICINE

## 2018-03-06 RX ORDER — METRONIDAZOLE 250 MG/1
500 TABLET ORAL 2 TIMES DAILY
Qty: 28 TABLET | Refills: 0 | Status: SHIPPED | OUTPATIENT
Start: 2018-03-06 | End: 2018-05-07

## 2018-03-06 RX ORDER — NYSTATIN 100000 [USP'U]/G
POWDER TOPICAL 3 TIMES DAILY PRN
Qty: 60 G | Refills: 1 | Status: SHIPPED | OUTPATIENT
Start: 2018-03-06 | End: 2018-05-23

## 2018-03-06 RX ORDER — FLUCONAZOLE 150 MG/1
150 TABLET ORAL
Qty: 3 TABLET | Refills: 0 | Status: SHIPPED | OUTPATIENT
Start: 2018-03-06 | End: 2018-05-23

## 2018-03-06 ASSESSMENT — ANXIETY QUESTIONNAIRES
5. BEING SO RESTLESS THAT IT IS HARD TO SIT STILL: NEARLY EVERY DAY
7. FEELING AFRAID AS IF SOMETHING AWFUL MIGHT HAPPEN: MORE THAN HALF THE DAYS
6. BECOMING EASILY ANNOYED OR IRRITABLE: SEVERAL DAYS
3. WORRYING TOO MUCH ABOUT DIFFERENT THINGS: NEARLY EVERY DAY
2. NOT BEING ABLE TO STOP OR CONTROL WORRYING: NEARLY EVERY DAY
GAD7 TOTAL SCORE: 18
1. FEELING NERVOUS, ANXIOUS, OR ON EDGE: NEARLY EVERY DAY
IF YOU CHECKED OFF ANY PROBLEMS ON THIS QUESTIONNAIRE, HOW DIFFICULT HAVE THESE PROBLEMS MADE IT FOR YOU TO DO YOUR WORK, TAKE CARE OF THINGS AT HOME, OR GET ALONG WITH OTHER PEOPLE: VERY DIFFICULT

## 2018-03-06 ASSESSMENT — PATIENT HEALTH QUESTIONNAIRE - PHQ9: 5. POOR APPETITE OR OVEREATING: NEARLY EVERY DAY

## 2018-03-06 NOTE — PROGRESS NOTES
Called patient with results. Rare clue cells. Pt persistent to treat BV. Needs small pills of flagyl. Also has groin itching. Started probiotics due to so many abx. Encouraged sitz baths.

## 2018-03-06 NOTE — TELEPHONE ENCOUNTER
Please call the patient to notify that the ultrasound shows a lipoma, which is a benign collection of fat.  No further testing needed.    Romel Mccall MD  [facility] [place]

## 2018-03-06 NOTE — PATIENT INSTRUCTIONS
Your physician has ordered a Radiology exam for you to be done at  [facility]  [address] [place] [address] , [address], [place]  [phone]    Your exam is scheduled for:  Today at 2:20PM  Type of exam: US Chest Wall    Special Instructions for your exam include : NONE    *Please check in with the  staff 15 minutes prior to your scheduled exam.*

## 2018-03-06 NOTE — TELEPHONE ENCOUNTER
Patient given result message from Dr. Mccall.     Patient states that it needs to have the lump (lipoma) separate from her fall claim since it is not related to her fall.   The lipoma charge needs to be billed to [employer] instead of with her fall bill. Questions, please call the patient.    Patient is concerned because she does not want to get stuck paying for the lipoma bill.  Clarissa Roman RN

## 2018-03-06 NOTE — MR AVS SNAPSHOT
After Visit Summary   3/6/2018    Grecia Kilgore    MRN: [id number]           Patient Information     Date Of Birth          1955        Visit Information        Provider Department      3/6/2018 1:00 PM Romel Mccall MD [facility] [place]        Today's Diagnoses     Chest wall mass    -  1      Care Instructions      Your physician has ordered a Radiology exam for you to be done at  [facility]  [address] [place] [address] Dr, [address], [place]  [phone]    Your exam is scheduled for:  Today at 2:20PM  Type of exam: US Chest Wall    Special Instructions for your exam include : NONE    *Please check in with the  staff 15 minutes prior to your scheduled exam.*                    Follow-ups after your visit        Follow-up notes from your care team     Return in about 2 months (around 5/6/2018) for Diabetes Recheck.      Your next 10 appointments already scheduled     May 07, 2018  1:20 PM CDT   Office Visit with Romel Mccall MD   [facility] [place] ([facility] [place])    [address] [place] [id number] [phone]           Bring a current list of meds and any records pertaining to this visit. For Physicals, please bring immunization records and any forms needing to be filled out. Please arrive 10 minutes early to complete paperwork.              Future tests that were ordered for you today     Open Future Orders        Priority Expected Expires Ordered    US Chest Wall Routine  3/6/2019 3/6/2018            Who to contact     If you have questions or need follow up information about today's clinic visit or your schedule please contact [facility] [place] directly at [phone].  Normal or non-critical lab and imaging results will be communicated to you by MyChart, letter or phone within 4 business days after the clinic has received the results. If you do not hear from us within 7 days, please  "contact the clinic through Whiphand or phone. If you have a critical or abnormal lab result, we will notify you by phone as soon as possible.  Submit refill requests through Whiphand or call your pharmacy and they will forward the refill request to us. Please allow 3 business days for your refill to be completed.          Additional Information About Your Visit        Deer[employer] Energy[employer]Greenplum Software Information     Whiphand lets you send messages to your doctor, view your test results, renew your prescriptions, schedule appointments and more. To sign up, go to [email or web address] . Click on \"Log in\" on the left side of the screen, which will take you to the Welcome page. Then click on \"Sign up Now\" on the right side of the page.     You will be asked to enter the access code listed below, as well as some personal information. Please follow the directions to create your username and password.     Your access code is: [email or web address]  Expires: 2018 10:50 AM     Your access code will  in 90 days. If you need help or a new code, please call your [facility] clinic or [phone].        Care EveryWhere ID     This is your Care EveryWhere ID. This could be used by other organizations to access your [place] medical records  [id number]        Your Vitals Were     Pulse Height Last Period Pulse Oximetry Breastfeeding? BMI (Body Mass Index)    71 5' 7\" (1.702 m) 1985 96% No 25.5 kg/m2       Blood Pressure from Last 3 Encounters:   18 118/66   18 118/76   18 130/90    Weight from Last 3 Encounters:   18 162 lb 12.8 oz (73.8 kg)   18 167 lb (75.8 kg)   18 162 lb (73.5 kg)               Primary Care Provider Office Phone # Fax #    Romel Mccall [phone]       [address] [place] [facility] [place] [address]        Equal Access to Services     [facility]: Hadii brook Romero, howard luoneal, qawendieta kaalaron ardon. So [facility] " [phone].    ATENCIÓN: Si lucrecia mccallum, tiene a patel disposición servicios gratuitos de asistencia lingüística. Roland emanuel [phone].    We comply with applicable federal civil rights laws and [place] laws. We do not discriminate on the basis of race, color, national origin, age, disability, sex, sexual orientation, or gender identity.            Thank you!     Thank you for choosing [facility] [place]  for your care. Our goal is always to provide you with excellent care. Hearing back from our patients is one way we can continue to improve our services. Please take a few minutes to complete the written survey that you may receive in the mail after your visit with us. Thank you!             Your Updated Medication List - Protect others around you: Learn how to safely use, store and throw away your medicines at www.disposemymeds.org.          This list is accurate as of 3/6/18  1:59 PM.  Always use your most recent med list.                   Brand Name Dispense Instructions for use Diagnosis    albuterol 108 (90 BASE) MCG/ACT Inhaler    PROAIR HFA/PROVENTIL HFA/VENTOLIN HFA    1 Inhaler    Inhale 2 puffs into the lungs every 6 hours as needed for shortness of breath / dyspnea    Mild intermittent asthma without complication       ATIVAN 0.5 MG tablet   Generic drug:  LORazepam      Take 0.5-1 tablets (0.25-0.5 mg) by mouth every 8 hours as needed for anxiety Take 30 minutes prior to departure.  Do not operate a vehicle after taking this medication        fluconazole 150 MG tablet    DIFLUCAN    3 tablet    Take 1 tablet (150 mg) by mouth every 3 days    Itching of vagina       lisinopril 5 MG tablet    PRINIVIL/ZESTRIL    90 tablet    Take 1 tablet (5 mg) by mouth daily    Type 2 diabetes mellitus without complication, without long-term current use of insulin (H)       meloxicam 15 MG tablet    MOBIC     TAKE 1 TABLET BY MOUTH DAILY AS NEEDED.        metFORMIN 500 MG tablet    GLUCOPHAGE    90 tablet     Take 1 tablet (500 mg) by mouth daily (with breakfast)    Type 2 diabetes mellitus without complication, without long-term current use of insulin (H)       metroNIDAZOLE 250 MG tablet    FLAGYL    28 tablet    Take 2 tablets (500 mg) by mouth 2 times daily    BV (bacterial vaginosis)       mirtazapine 15 MG tablet    REMERON          montelukast 10 MG tablet    SINGULAIR    90 tablet    Take 1 tablet (10 mg) by mouth At Bedtime    Mild intermittent asthma without complication       nystatin 191266 UNIT/GM Powd    MYCOSTATIN    60 g    Apply topically 3 times daily as needed To groin area    Pruritus of genitalia       simvastatin 40 MG tablet    ZOCOR    90 tablet    Take 1 tablet (40 mg) by mouth At Bedtime    Hyperlipidemia LDL goal <100

## 2018-03-06 NOTE — TELEPHONE ENCOUNTER
Radiologist called to let Provider know that patient's US Chest Wall  Shows a lipoma size [id number] mm    Radiologist did not want to wait for PCP so TC took message  Kimmy Bragg TC

## 2018-03-06 NOTE — NURSING NOTE
"Chief Complaint   Patient presents with     Fall       Initial /66 (BP Location: Left arm, Patient Position: Chair, Cuff Size: Adult Regular)  Pulse 71  Ht 5' 7\" (1.702 m)  Wt 162 lb 12.8 oz (73.8 kg)  LMP 01/01/1985  SpO2 96%  Breastfeeding? No  BMI 25.5 kg/m2 Estimated body mass index is 25.5 kg/(m^2) as calculated from the following:    Height as of this encounter: 5' 7\" (1.702 m).    Weight as of this encounter: 162 lb 12.8 oz (73.8 kg).  Medication Reconciliation: complete     Dunia Cannon MA     "

## 2018-03-06 NOTE — TELEPHONE ENCOUNTER
Left non detailed message for patient to return call.  Sharron Pham RN   [facility] - Triage

## 2018-03-07 ASSESSMENT — ANXIETY QUESTIONNAIRES: GAD7 TOTAL SCORE: 18

## 2018-03-07 ASSESSMENT — PATIENT HEALTH QUESTIONNAIRE - PHQ9: SUM OF ALL RESPONSES TO PHQ QUESTIONS 1-9: 21

## 2018-03-09 NOTE — TELEPHONE ENCOUNTER
From a coding perspective, per documentation, the patient requested the ultrasound to confirm that the lump was not a blood collection related to the fall.  This should bill out under third party initially, because if it is related to the fall, it may be paid by third party.  If third party does not deem the test was necessary as part of the accident/injury work-up, they will deny and then then it can be billed to the patient's personal insurance.

## 2018-03-09 NOTE — TELEPHONE ENCOUNTER
Patient given message from [employer] [facility] regarding coding. Reminded that the patient pay attention of all communication from her insurances. Patient agrees with plan. Clarissa Roman RN

## 2018-04-16 ENCOUNTER — TRANSFERRED RECORDS (OUTPATIENT)
Dept: HEALTH INFORMATION MANAGEMENT | Facility: CLINIC | Age: 63
End: 2018-04-16

## 2018-04-17 ENCOUNTER — OFFICE VISIT (OUTPATIENT)
Dept: FAMILY MEDICINE | Facility: CLINIC | Age: 63
End: 2018-04-17
Payer: COMMERCIAL

## 2018-04-17 ENCOUNTER — TELEPHONE (OUTPATIENT)
Dept: FAMILY MEDICINE | Facility: CLINIC | Age: 63
End: 2018-04-17

## 2018-04-17 VITALS
OXYGEN SATURATION: 99 % | HEART RATE: 68 BPM | SYSTOLIC BLOOD PRESSURE: 128 MMHG | DIASTOLIC BLOOD PRESSURE: 68 MMHG | HEIGHT: 67 IN | WEIGHT: 161.6 LBS | BODY MASS INDEX: 25.36 KG/M2

## 2018-04-17 DIAGNOSIS — L50.9 HIVES: Primary | ICD-10-CM

## 2018-04-17 PROCEDURE — 99213 OFFICE O/P EST LOW 20 MIN: CPT | Performed by: FAMILY MEDICINE

## 2018-04-17 RX ORDER — METHYLPREDNISOLONE 4 MG
TABLET, DOSE PACK ORAL
Qty: 21 TABLET | Refills: 0 | Status: SHIPPED | OUTPATIENT
Start: 2018-04-17 | End: 2018-05-07

## 2018-04-17 RX ORDER — CALAMINE
LOTION (ML) TOPICAL PRN
Qty: 240 ML | Refills: 0 | Status: SHIPPED | OUTPATIENT
Start: 2018-04-17 | End: 2018-05-23

## 2018-04-17 RX ORDER — CETIRIZINE HYDROCHLORIDE 10 MG/1
10 TABLET ORAL EVERY EVENING
Qty: 90 TABLET | Refills: 1 | Status: SHIPPED | OUTPATIENT
Start: 2018-04-17 | End: 2018-11-23

## 2018-04-17 NOTE — MR AVS SNAPSHOT
After Visit Summary   4/17/2018    Grecia Kilgore    MRN: [id number]           Patient Information     Date Of Birth          1955        Visit Information        Provider Department      4/17/2018 4:20 PM Romel Mccall MD [facility] [place]        Today's Diagnoses     Hives    -  1       Follow-ups after your visit        Follow-up notes from your care team     Return in about 3 weeks (around 5/7/2018) for Diabetes Recheck, Cholesterol Recheck.      Your next 10 appointments already scheduled     Apr 17, 2018  4:20 PM CDT   Office Visit with Romel Mccall MD   [facility] ([facility])    [address] [place] [id number] [phone]           Bring a current list of meds and any records pertaining to this visit. For Physicals, please bring immunization records and any forms needing to be filled out. Please arrive 10 minutes early to complete paperwork.            May 07, 2018  1:20 PM CDT   Office Visit with Romel Mccall MD   [facility] [place] ([facility])    [address] [place] [id number] [phone]           Bring a current list of meds and any records pertaining to this visit. For Physicals, please bring immunization records and any forms needing to be filled out. Please arrive 10 minutes early to complete paperwork.              Who to contact     If you have questions or need follow up information about today's clinic visit or your schedule please contact [facility] [place] directly at [phone].  Normal or non-critical lab and imaging results will be communicated to you by MyChart, letter or phone within 4 business days after the clinic has received the results. If you do not hear from us within 7 days, please contact the clinic through MyChart or phone. If you have a critical or abnormal lab result, we will notify you by phone as soon as  "possible.  Submit refill requests through [employer] or call your pharmacy and they will forward the refill request to us. Please allow 3 business days for your refill to be completed.          Additional Information About Your Visit        [employer] Information     [employer] lets you send messages to your doctor, view your test results, renew your prescriptions, schedule appointments and more. To sign up, go to [email or web address] . Click on \"Log in\" on the left side of the screen, which will take you to the Welcome page. Then click on \"Sign up Now\" on the right side of the page.     You will be asked to enter the access code listed below, as well as some personal information. Please follow the directions to create your username and password.     Your access code is: [email or web address]  Expires: 2018 11:50 AM     Your access code will  in 90 days. If you need help or a new code, please call your [place] clinic or [phone].        Care EveryWhere ID     This is your Care EveryWhere ID. This could be used by other organizations to access your [place] medical records  [id number]        Your Vitals Were     Pulse Height Last Period Pulse Oximetry Breastfeeding? BMI (Body Mass Index)    68 5' 7\" (1.702 m) 1985 99% No 25.31 kg/m2       Blood Pressure from Last 3 Encounters:   18 128/68   18 118/66   18 118/76    Weight from Last 3 Encounters:   18 161 lb 9.6 oz (73.3 kg)   18 162 lb 12.8 oz (73.8 kg)   18 167 lb (75.8 kg)              Today, you had the following     No orders found for display         Today's Medication Changes          These changes are accurate as of 18  3:51 PM.  If you have any questions, ask your nurse or doctor.               Start taking these medicines.        Dose/Directions    calamine lotion   Used for:  Hives   Started by:  Romel Mccall MD        Apply topically as needed for itching   Quantity:  240 mL   Refills:  0       cetirizine " 10 MG tablet   Commonly known as:  zyrTEC   Used for:  Hives   Started by:  Romel Mccall MD        Dose:  10 mg   Take 1 tablet (10 mg) by mouth every evening   Quantity:  90 tablet   Refills:  1       methylPREDNISolone 4 MG tablet   Commonly known as:  MEDROL DOSEPAK   Used for:  Hives   Started by:  Romel Mccall MD        Follow package instructions   Quantity:  21 tablet   Refills:  0       ranitidine 150 MG tablet   Commonly known as:  ZANTAC   Used for:  Hives   Started by:  Romel Mccall MD        Dose:  150 mg   Take 1 tablet (150 mg) by mouth 2 times daily   Quantity:  60 tablet   Refills:  1            Where to get your medicines      These medications were sent to [facility] [address] [facility] [address] [facility] [address] [place] [address] & [facility]  [address] [facility] [address]     Phone:  [phone]     calamine lotion    cetirizine 10 MG tablet    methylPREDNISolone 4 MG tablet    ranitidine 150 MG tablet                Primary Care Provider Office Phone # Fax #    Romel Mccall [phone]       [address]  [place] [address]        Equal Access to Services     [facility]: Hadii aad ku hadasho Soomaali, waaxda luqadaha, qaybta kaalmada adeegyada, waxay bebein hayderekn orly kaba. So [facility] [phone].    ATENCIÓN: Si habla español, tiene a patel disposición servicios gratuitos de asistencia lingüística. LlSelect Medical [facility] [phone].    We comply with applicable federal civil rights laws and [place] laws. We do not discriminate on the basis of race, color, national origin, age, disability, sex, sexual orientation, or gender identity.            Thank you!     Thank you for choosing [facility] [place]  for your care. Our goal is always to provide you with excellent care. Hearing back from our patients is one way we can continue to improve our services. Please take a few minutes to complete the written survey that you may  receive in the mail after your visit with us. Thank you!             Your Updated Medication List - Protect others around you: Learn how to safely use, store and throw away your medicines at www.disposemymeds.org.          This list is accurate as of 4/17/18  3:51 PM.  Always use your most recent med list.                   Brand Name Dispense Instructions for use Diagnosis    albuterol 108 (90 Base) MCG/ACT Inhaler    PROAIR HFA/PROVENTIL HFA/VENTOLIN HFA    1 Inhaler    Inhale 2 puffs into the lungs every 6 hours as needed for shortness of breath / dyspnea    Mild intermittent asthma without complication       ATIVAN 0.5 MG tablet   Generic drug:  LORazepam      Take 0.5-1 tablets (0.25-0.5 mg) by mouth every 8 hours as needed for anxiety Take 30 minutes prior to departure.  Do not operate a vehicle after taking this medication        calamine lotion     240 mL    Apply topically as needed for itching    Hives       cetirizine 10 MG tablet    zyrTEC    90 tablet    Take 1 tablet (10 mg) by mouth every evening    Hives       fluconazole 150 MG tablet    DIFLUCAN    3 tablet    Take 1 tablet (150 mg) by mouth every 3 days    Itching of vagina       lisinopril 5 MG tablet    PRINIVIL/ZESTRIL    90 tablet    Take 1 tablet (5 mg) by mouth daily    Type 2 diabetes mellitus without complication, without long-term current use of insulin (H)       meloxicam 15 MG tablet    MOBIC     TAKE 1 TABLET BY MOUTH DAILY AS NEEDED.        metFORMIN 500 MG tablet    GLUCOPHAGE    90 tablet    Take 1 tablet (500 mg) by mouth daily (with breakfast)    Type 2 diabetes mellitus without complication, without long-term current use of insulin (H)       methylPREDNISolone 4 MG tablet    MEDROL DOSEPAK    21 tablet    Follow package instructions    Hives       metroNIDAZOLE 250 MG tablet    FLAGYL    28 tablet    Take 2 tablets (500 mg) by mouth 2 times daily    BV (bacterial vaginosis)       mirtazapine 15 MG tablet    REMERON           montelukast 10 MG tablet    SINGULAIR    90 tablet    Take 1 tablet (10 mg) by mouth At Bedtime    Mild intermittent asthma without complication       nystatin 194942 UNIT/GM Powd    MYCOSTATIN    60 g    Apply topically 3 times daily as needed To groin area    Pruritus of genitalia       ranitidine 150 MG tablet    ZANTAC    60 tablet    Take 1 tablet (150 mg) by mouth 2 times daily    Hives       simvastatin 40 MG tablet    ZOCOR    90 tablet    Take 1 tablet (40 mg) by mouth At Bedtime    Hyperlipidemia LDL goal <100

## 2018-04-17 NOTE — NURSING NOTE
"Chief Complaint   Patient presents with     Derm Problem       Initial /90 (BP Location: Right arm, Patient Position: Chair, Cuff Size: Adult Regular)  Pulse 68  Ht 5' 7\" (1.702 m)  Wt 161 lb 9.6 oz (73.3 kg)  LMP 01/01/1985  SpO2 99%  Breastfeeding? No  BMI 25.31 kg/m2 Estimated body mass index is 25.31 kg/(m^2) as calculated from the following:    Height as of this encounter: 5' 7\" (1.702 m).    Weight as of this encounter: 161 lb 9.6 oz (73.3 kg).  Medication Reconciliation: complete     Dunia Cannon MA     "

## 2018-04-17 NOTE — PROGRESS NOTES
SUBJECTIVE:   Grecia Kilgore is a 63 year old female who presents to clinic today for the following health issues:    Rash  Onset: x 1 month - off and on.    Description:   Location: All over the body   Character: round, blotchy, red  Itching (Pruritis): YES    Progression of Symptoms:  Worsening     Accompanying Signs & Symptoms:  Fever: no   Body aches or joint pain: no   Sore throat symptoms: no   Recent cold symptoms: YES    History: Patient has been seeing allergist for allergy shots.  For the last 1 month after she gets her allergy shot, she gets hives.  Allergist tells that it is not due to the allergy shots but the patient does not think so.  Previous similar rash: YES    Precipitating factors:   Exposure to similar rash: no   New exposures: None   Recent travel: no     Alleviating factors:      Therapies Tried and outcome: None              Problem list and histories reviewed & adjusted, as indicated.  Additional history: as documented    Patient Active Problem List   Diagnosis     Allergic rhinitis     External hemorrhoids     Vitamin D deficiency     Fibromyalgia     Osteoarthritis, knee     Positive PPD     Panic disorder     Genital herpes     Advanced directives, counseling/discussion     DCIS (ductal carcinoma in situ) of breast     Heart palpitations     Anxiety     Major depressive disorder, single episode, moderate (H)     Hyperlipidemia LDL goal <100     Type 2 diabetes mellitus without complication, without long-term current use of insulin (H)     Mild intermittent asthma without complication     BOO (obstructive sleep apnea)     Past Surgical History:   Procedure Laterality Date     ARTHRODESIS TOE(S)  9/16/2013    Procedure: ARTHRODESIS TOE(S);  BILATERAL GREAT TOE FUSION (C-ARM);  Surgeon: Mary Guan MD;  Location: [facility]     ARTHRODESIS TOE(S) Left 12/19/2016    Procedure: ARTHRODESIS TOE(S);  Surgeon: Mary Guan MD;  Location: [facility]     ARTHROSCOPY KNEE Left 2/2/11      BIOPSY BREAST  2/7/2014    Procedure: BIOPSY BREAST;  Re-excision Left Breast Cavity for Margins ;  Surgeon: Lisset Amador DO;  Location: [facility]     BIOPSY BREAST SEED LOCALIZATION  1/22/2014    Procedure: BIOPSY BREAST SEED LOCALIZATION;  Left Breast Seed Localized Excisional Biopsy ;  Surgeon: Lisset Amador DO;  Location: [facility] OR     COLONOSCOPY  2005    nl - repeat 2015     FOOT SURGERY Bilateral 2013     HEMORRHOIDECTOMY BANDING      multiple times     HYSTERECTOMY  7/1996    fibroids     REMOVE HARDWARE FOOT Left 2015     REPAIR TENDON FOOT Left 12/19/2016    Procedure: REPAIR TENDON FOOT;  Surgeon: Mary Guan MD;  Location: [facility]       Social History   Substance Use Topics     Smoking status: Never Smoker     Smokeless tobacco: Never Used     Alcohol use No     Family History   Problem Relation Age of Onset     DIABETES Mother      Breast Cancer Mother      Alcohol/Drug Father      CANCER Father      DIABETES Maternal Grandmother      CEREBROVASCULAR DISEASE Maternal Grandmother      Cancer - colorectal Maternal Grandfather          Current Outpatient Prescriptions   Medication Sig Dispense Refill     albuterol (PROAIR HFA/PROVENTIL HFA/VENTOLIN HFA) 108 (90 BASE) MCG/ACT Inhaler Inhale 2 puffs into the lungs every 6 hours as needed for shortness of breath / dyspnea 1 Inhaler 6     calamine lotion Apply topically as needed for itching 240 mL 0     cetirizine (ZYRTEC) 10 MG tablet Take 1 tablet (10 mg) by mouth every evening 90 tablet 1     fluconazole (DIFLUCAN) 150 MG tablet Take 1 tablet (150 mg) by mouth every 3 days 3 tablet 0     lisinopril (PRINIVIL/ZESTRIL) 5 MG tablet Take 1 tablet (5 mg) by mouth daily 90 tablet 3     LORazepam (ATIVAN) 0.5 MG tablet Take 0.5-1 tablets (0.25-0.5 mg) by mouth every 8 hours as needed for anxiety Take 30 minutes prior to departure.  Do not operate a vehicle after taking this medication       meloxicam (MOBIC) 15 MG tablet TAKE 1 TABLET BY MOUTH DAILY  "AS NEEDED.  0     metFORMIN (GLUCOPHAGE) 500 MG tablet Take 1 tablet (500 mg) by mouth daily (with breakfast) 90 tablet 3     methylPREDNISolone (MEDROL DOSEPAK) 4 MG tablet Follow package instructions 21 tablet 0     metroNIDAZOLE (FLAGYL) 250 MG tablet Take 2 tablets (500 mg) by mouth 2 times daily 28 tablet 0     mirtazapine (REMERON) 15 MG tablet   2     montelukast (SINGULAIR) 10 MG tablet Take 1 tablet (10 mg) by mouth At Bedtime 90 tablet 3     nystatin (MYCOSTATIN) 134724 UNIT/GM POWD Apply topically 3 times daily as needed To groin area 60 g 1     ranitidine (ZANTAC) 150 MG tablet Take 1 tablet (150 mg) by mouth 2 times daily 60 tablet 1     simvastatin (ZOCOR) 40 MG tablet Take 1 tablet (40 mg) by mouth At Bedtime 90 tablet 1     Allergies   Allergen Reactions     Codeine Nausea     Morphine Itching     Nitrofuran Derivatives Hives     Phenothiazines      sedation     Primatene Mist [Epinephrine Bitartrate] Itching     neck itch with primatene mist     Vicodin [Hydrocodone-Acetaminophen] Nausea     Latex Itching and Rash       Reviewed and updated as needed this visit by clinical staff  Tobacco  Allergies  Meds  Med Hx  Surg Hx  Fam Hx  Soc Hx      Reviewed and updated as needed this visit by Provider         ROS:  CONSTITUTIONAL: NEGATIVE for fever, chills, change in weight  ENT/MOUTH: NEGATIVE for ear, mouth and throat problems  RESP: NEGATIVE for significant cough or SOB  CV: NEGATIVE for chest pain, palpitations or peripheral edema    OBJECTIVE:                                                    /68 (BP Location: Right arm, Patient Position: Chair, Cuff Size: Adult Regular)  Pulse 68  Ht 5' 7\" (1.702 m)  Wt 161 lb 9.6 oz (73.3 kg)  LMP 01/01/1985  SpO2 99%  Breastfeeding? No  BMI 25.31 kg/m2  Body mass index is 25.31 kg/(m^2).   GENERAL: healthy, alert, well nourished, well hydrated, no distress  RESP: lungs clear to auscultation - no rales, no rhonchi, no wheezes  CV: regular rates " and rhythm, normal S1 S2, no S3 or S4 and no murmur, no click or rub -  SKIN: Multiple hives and signs of excoriation noted on the arms bilaterally         ASSESSMENT/PLAN:                                                        ICD-10-CM    1. Hives L50.9 cetirizine (ZYRTEC) 10 MG tablet     ranitidine (ZANTAC) 150 MG tablet     methylPREDNISolone (MEDROL DOSEPAK) 4 MG tablet     calamine lotion     Questionable etiology.  Recommending to use Zyrtec 10 mg daily along with Zantac 150 milligrams twice daily and Medrol Dosepak.  Calamine lotion recommended.  Oatmeal bath recommended for soothing.  If symptoms are still not improving or getting worse, she needs to discuss this further with her allergist.  If symptomatic worsening noted, instructed to go to the emergency room.  Patient verbalized understanding and agreement to the plan.      Romel Mccall MD  [facility]

## 2018-04-17 NOTE — TELEPHONE ENCOUNTER
Reason for Call:  Medication or medication refill:    Do you use a [facility]?  Name of the pharmacy and phone number for the current request:  [facility] [place]    Name of the medication requested: Zpack    Other request: Has hives super bad for one month. Please let pt know asap if she can get this right away    Can we leave a detailed message on this number? YES    Phone number patient can be reached at: Cell number on file:    Telephone Information:   Mobile [phone]       Best Time: any    Call taken on 4/17/2018 at 12:02 PM by Rosi Norman

## 2018-04-17 NOTE — TELEPHONE ENCOUNTER
Patient calling, states she has idiopathic urticaria and once her allergist gave her a zpak which treated her hives. States she has not had any issues since then and about a month ago when receiving allergy injections she developed hives again. They backed off on her shots temporarily but is not requesting another zpak. Advised her that antibiotics do not treat hives, asked her to have her allergist order this for her if they have done this in the past. They told her she would need to contact her PCP for this request. OV scheduled for today with PCP. Routing as MARCELLA.   Sharron Pham RN   [facility] - Triage

## 2018-04-18 ASSESSMENT — ASTHMA QUESTIONNAIRES: ACT_TOTALSCORE: 22

## 2018-05-07 ENCOUNTER — OFFICE VISIT (OUTPATIENT)
Dept: FAMILY MEDICINE | Facility: CLINIC | Age: 63
End: 2018-05-07
Payer: COMMERCIAL

## 2018-05-07 VITALS
TEMPERATURE: 98.6 F | SYSTOLIC BLOOD PRESSURE: 114 MMHG | DIASTOLIC BLOOD PRESSURE: 72 MMHG | HEIGHT: 67 IN | HEART RATE: 76 BPM

## 2018-05-07 DIAGNOSIS — N94.9 VAGINAL SYMPTOM: ICD-10-CM

## 2018-05-07 DIAGNOSIS — E11.9 TYPE 2 DIABETES MELLITUS WITHOUT COMPLICATION, WITHOUT LONG-TERM CURRENT USE OF INSULIN (H): Primary | ICD-10-CM

## 2018-05-07 DIAGNOSIS — N76.0 BV (BACTERIAL VAGINOSIS): ICD-10-CM

## 2018-05-07 DIAGNOSIS — B96.89 BV (BACTERIAL VAGINOSIS): ICD-10-CM

## 2018-05-07 DIAGNOSIS — E78.5 HYPERLIPIDEMIA LDL GOAL <100: ICD-10-CM

## 2018-05-07 LAB
HBA1C MFR BLD: 6.1 % (ref 0–5.6)
SPECIMEN SOURCE: ABNORMAL
WET PREP SPEC: ABNORMAL

## 2018-05-07 PROCEDURE — 80053 COMPREHEN METABOLIC PANEL: CPT | Performed by: FAMILY MEDICINE

## 2018-05-07 PROCEDURE — 99214 OFFICE O/P EST MOD 30 MIN: CPT | Performed by: FAMILY MEDICINE

## 2018-05-07 PROCEDURE — 83036 HEMOGLOBIN GLYCOSYLATED A1C: CPT | Performed by: FAMILY MEDICINE

## 2018-05-07 PROCEDURE — 87210 SMEAR WET MOUNT SALINE/INK: CPT | Performed by: FAMILY MEDICINE

## 2018-05-07 PROCEDURE — 36415 COLL VENOUS BLD VENIPUNCTURE: CPT | Performed by: FAMILY MEDICINE

## 2018-05-07 PROCEDURE — 80061 LIPID PANEL: CPT | Performed by: FAMILY MEDICINE

## 2018-05-07 RX ORDER — METRONIDAZOLE 500 MG/1
500 TABLET ORAL 2 TIMES DAILY
Qty: 14 TABLET | Refills: 0 | Status: SHIPPED | OUTPATIENT
Start: 2018-05-07 | End: 2019-02-19

## 2018-05-07 NOTE — MR AVS SNAPSHOT
"              After Visit Summary   5/7/2018    Grecia Kilgore    MRN: [id number]           Patient Information     Date Of Birth          1955        Visit Information        Provider Department      5/7/2018 1:20 PM Romel Mccall MD [facility]        Today's Diagnoses     Type 2 diabetes mellitus without complication, without long-term current use of insulin (H)    -  1    Hyperlipidemia LDL goal <100        Vaginal symptom           Follow-ups after your visit        Follow-up notes from your care team     Return in about 6 months (around 11/16/2018) for Annual Physical Exam.      Your next 10 appointments already scheduled     Nov 23, 2018  3:00 PM CST   PHYSICAL with Romel Mccall MD   [facility] ([facility])    [address] [place] [id number] [phone]              Who to contact     If you have questions or need follow up information about today's clinic visit or your schedule please contact [facility] directly at [phone].  Normal or non-critical lab and imaging results will be communicated to you by [employer], letter or phone within 4 business days after the clinic has received the results. If you do not hear from us within 7 days, please contact the clinic through [employer] or phone. If you have a critical or abnormal lab result, we will notify you by phone as soon as possible.  Submit refill requests through [employer] or call your pharmacy and they will forward the refill request to us. Please allow 3 business days for your refill to be completed.          Additional Information About Your Visit        MyChart Information     [employer] lets you send messages to your doctor, view your test results, renew your prescriptions, schedule appointments and more. To sign up, go to [email or web address] . Click on \"Log in\" on the left side of the screen, which will take you to the Welcome page. Then click on " "\"Sign up Now\" on the right side of the page.     You will be asked to enter the access code listed below, as well as some personal information. Please follow the directions to create your username and password.     Your access code is: [email or web address]  Expires: 2018  1:44 PM     Your access code will  in 90 days. If you need help or a new code, please call your [place] clinic or [phone].        Care EveryWhere ID     This is your Care EveryWhere ID. This could be used by other organizations to access your [place] medical records  [id number]        Your Vitals Were     Pulse Temperature Height Last Period          76 98.6  F (37  C) (Tympanic) 5' 7\" (1.702 m) 1985         Blood Pressure from Last 3 Encounters:   18 114/72   18 128/68   18 118/66    Weight from Last 3 Encounters:   18 161 lb 9.6 oz (73.3 kg)   18 162 lb 12.8 oz (73.8 kg)   18 167 lb (75.8 kg)              We Performed the Following     Comprehensive metabolic panel     Hemoglobin A1c     Lipid Profile     Wet prep        Primary Care Provider Office Phone # Fax #    Romel Mccall [phone]       [address]  [place] [address]        Equal Access to Services     [facility]: Hadii aad ku hadasho Soomaali, waaxda luqadaha, qaybta kaalmada adeegyada, aron jordan . So [facility] [phone].    ATENCIÓN: Si habla español, tiene a patel disposición servicios gratuitos de asistencia lingüística. Llame al [phone].    We comply with applicable federal civil rights laws and [place] laws. We do not discriminate on the basis of race, color, national origin, age, disability, sex, sexual orientation, or gender identity.            Thank you!     Thank you for choosing [facility] [place]  for your care. Our goal is always to provide you with excellent care. Hearing back from our patients is one way we can continue to improve our " services. Please take a few minutes to complete the written survey that you may receive in the mail after your visit with us. Thank you!             Your Updated Medication List - Protect others around you: Learn how to safely use, store and throw away your medicines at www.disposemymeds.org.          This list is accurate as of 5/7/18  1:44 PM.  Always use your most recent med list.                   Brand Name Dispense Instructions for use Diagnosis    albuterol 108 (90 Base) MCG/ACT Inhaler    PROAIR HFA/PROVENTIL HFA/VENTOLIN HFA    1 Inhaler    Inhale 2 puffs into the lungs every 6 hours as needed for shortness of breath / dyspnea    Mild intermittent asthma without complication       ATIVAN 0.5 MG tablet   Generic drug:  LORazepam      Take 0.5-1 tablets (0.25-0.5 mg) by mouth every 8 hours as needed for anxiety Take 30 minutes prior to departure.  Do not operate a vehicle after taking this medication        calamine lotion     240 mL    Apply topically as needed for itching    Hives       cetirizine 10 MG tablet    zyrTEC    90 tablet    Take 1 tablet (10 mg) by mouth every evening    Hives       fluconazole 150 MG tablet    DIFLUCAN    3 tablet    Take 1 tablet (150 mg) by mouth every 3 days    Itching of vagina       lisinopril 5 MG tablet    PRINIVIL/ZESTRIL    90 tablet    Take 1 tablet (5 mg) by mouth daily    Type 2 diabetes mellitus without complication, without long-term current use of insulin (H)       meloxicam 15 MG tablet    MOBIC     TAKE 1 TABLET BY MOUTH DAILY AS NEEDED.        metFORMIN 500 MG tablet    GLUCOPHAGE    90 tablet    Take 1 tablet (500 mg) by mouth daily (with breakfast)    Type 2 diabetes mellitus without complication, without long-term current use of insulin (H)       mirtazapine 15 MG tablet    REMERON          montelukast 10 MG tablet    SINGULAIR    90 tablet    Take 1 tablet (10 mg) by mouth At Bedtime    Mild intermittent asthma without complication       nystatin [id number]  UNIT/GM Powd    MYCOSTATIN    60 g    Apply topically 3 times daily as needed To groin area    Pruritus of genitalia       ranitidine 150 MG tablet    ZANTAC    60 tablet    Take 1 tablet (150 mg) by mouth 2 times daily    Hives       simvastatin 40 MG tablet    ZOCOR    90 tablet    Take 1 tablet (40 mg) by mouth At Bedtime    Hyperlipidemia LDL goal <100

## 2018-05-07 NOTE — PROGRESS NOTES
SUBJECTIVE:   Grecia Kilgore is a 63 year old female who presents to clinic today for the following health issues:      Diabetes Follow-up      Patient is checking blood sugars: not at all    Diabetic concerns: None     Symptoms of hypoglycemia (low blood sugar): none     Paresthesias (numbness or burning in feet) or sores: No     Date of last diabetic eye exam:         BP Readings from Last 2 Encounters:   05/07/18 114/72   04/17/18 128/68     Hemoglobin A1C (%)   Date Value   05/07/2018 6.1 (H)   11/15/2017 6.0     LDL Cholesterol Calculated (mg/dL)   Date Value   11/15/2017 123 (H)   11/11/2016 104 (H)       Amount of exercise or physical activity: 2-3 days/week for an average of 30-45 minutes    Problems taking medications regularly: No    Medication side effects: none    Diet: regular (no restrictions)        Hyperlipidemia Follow-Up      Rate your low fat/cholesterol diet?: good    Taking statin?  Yes, no muscle aches from statin.  Dose of statin was increased previously.  She is fasting today for labs.    Other lipid medications/supplements?:  none        Complains of vaginal discharge.  Reports for follow order.  Symptoms started a week or 2 ago.  Problem list and histories reviewed & adjusted, as indicated.  Additional history: as documented    Patient Active Problem List   Diagnosis     Allergic rhinitis     External hemorrhoids     Vitamin D deficiency     Fibromyalgia     Osteoarthritis, knee     Positive PPD     Panic disorder     Genital herpes     Advanced directives, counseling/discussion     DCIS (ductal carcinoma in situ) of breast     Heart palpitations     Anxiety     Major depressive disorder, single episode, moderate (H)     Hyperlipidemia LDL goal <100     Type 2 diabetes mellitus without complication, without long-term current use of insulin (H)     Mild intermittent asthma without complication     BOO (obstructive sleep apnea)     Past Surgical History:   Procedure Laterality Date      ARTHRODESIS TOE(S)  9/16/2013    Procedure: ARTHRODESIS TOE(S);  BILATERAL GREAT TOE FUSION (C-ARM);  Surgeon: Mary Guan MD;  Location: [facility]     ARTHRODESIS TOE(S) Left 12/19/2016    Procedure: ARTHRODESIS TOE(S);  Surgeon: Mary Guan MD;  Location: [facility]     ARTHROSCOPY KNEE Left 2/2/11     BIOPSY BREAST  2/7/2014    Procedure: BIOPSY BREAST;  Re-excision Left Breast Cavity for Margins ;  Surgeon: Lisset Amador DO;  Location: [facility] OR     BIOPSY BREAST SEED LOCALIZATION  1/22/2014    Procedure: BIOPSY BREAST SEED LOCALIZATION;  Left Breast Seed Localized Excisional Biopsy ;  Surgeon: Lisset Amador DO;  Location: [facility] OR     COLONOSCOPY  2005    nl - repeat 2015     FOOT SURGERY Bilateral 2013     HEMORRHOIDECTOMY BANDING      multiple times     HYSTERECTOMY  7/1996    fibroids     REMOVE HARDWARE FOOT Left 2015     REPAIR TENDON FOOT Left 12/19/2016    Procedure: REPAIR TENDON FOOT;  Surgeon: Mary Guan MD;  Location: [facility]       Social History   Substance Use Topics     Smoking status: Never Smoker     Smokeless tobacco: Never Used     Alcohol use No     Family History   Problem Relation Age of Onset     DIABETES Mother      Breast Cancer Mother      Alcohol/Drug Father      CANCER Father      DIABETES Maternal Grandmother      CEREBROVASCULAR DISEASE Maternal Grandmother      Cancer - colorectal Maternal Grandfather          Current Outpatient Prescriptions   Medication Sig Dispense Refill     albuterol (PROAIR HFA/PROVENTIL HFA/VENTOLIN HFA) 108 (90 BASE) MCG/ACT Inhaler Inhale 2 puffs into the lungs every 6 hours as needed for shortness of breath / dyspnea 1 Inhaler 6     calamine lotion Apply topically as needed for itching 240 mL 0     cetirizine (ZYRTEC) 10 MG tablet Take 1 tablet (10 mg) by mouth every evening 90 tablet 1     fluconazole (DIFLUCAN) 150 MG tablet Take 1 tablet (150 mg) by mouth every 3 days 3 tablet 0     lisinopril (PRINIVIL/ZESTRIL) 5 MG tablet Take 1  "tablet (5 mg) by mouth daily 90 tablet 3     LORazepam (ATIVAN) 0.5 MG tablet Take 0.5-1 tablets (0.25-0.5 mg) by mouth every 8 hours as needed for anxiety Take 30 minutes prior to departure.  Do not operate a vehicle after taking this medication       meloxicam (MOBIC) 15 MG tablet TAKE 1 TABLET BY MOUTH DAILY AS NEEDED.  0     metFORMIN (GLUCOPHAGE) 500 MG tablet Take 1 tablet (500 mg) by mouth daily (with breakfast) 90 tablet 3     metroNIDAZOLE (FLAGYL) 500 MG tablet Take 1 tablet (500 mg) by mouth 2 times daily for 7 days 14 tablet 0     mirtazapine (REMERON) 15 MG tablet   2     montelukast (SINGULAIR) 10 MG tablet Take 1 tablet (10 mg) by mouth At Bedtime 90 tablet 3     nystatin (MYCOSTATIN) 341400 UNIT/GM POWD Apply topically 3 times daily as needed To groin area 60 g 1     ranitidine (ZANTAC) 150 MG tablet Take 1 tablet (150 mg) by mouth 2 times daily 60 tablet 1     simvastatin (ZOCOR) 40 MG tablet Take 1 tablet (40 mg) by mouth At Bedtime 90 tablet 1     Allergies   Allergen Reactions     Codeine Nausea     Morphine Itching     Nitrofuran Derivatives Hives     Phenothiazines      sedation     Primatene Mist [Epinephrine Bitartrate] Itching     neck itch with primatene mist     Vicodin [Hydrocodone-Acetaminophen] Nausea     Latex Itching and Rash       Reviewed and updated as needed this visit by clinical staff  Tobacco  Allergies  Meds  Med Hx  Surg Hx  Fam Hx  Soc Hx      Reviewed and updated as needed this visit by Provider         ROS:  CONSTITUTIONAL: NEGATIVE for fever, chills, change in weight  ENT/MOUTH: NEGATIVE for ear, mouth and throat problems  RESP: NEGATIVE for significant cough or SOB  CV: NEGATIVE for chest pain, palpitations or peripheral edema    OBJECTIVE:                                                    /72  Pulse 76  Temp 98.6  F (37  C) (Tympanic)  Ht 5' 7\" (1.702 m)  LMP 01/01/1985  There is no height or weight on file to calculate BMI.   GENERAL: healthy, alert, " well nourished, well hydrated, no distress  HENT: ear canals- normal; TMs- normal; Nose- normal; Mouth- no ulcers, no lesions  NECK: no tenderness, no adenopathy, no asymmetry, no masses, no stiffness; thyroid- normal to palpation  RESP: lungs clear to auscultation - no rales, no rhonchi, no wheezes  CV: regular rates and rhythm, normal S1 S2, no S3 or S4 and no murmur, no click or rub -  ABDOMEN: soft, no tenderness, no  hepatosplenomegaly, no masses, normal bowel sounds  MS: extremities- no gross deformities noted, no edema    Results for orders placed or performed in visit on 05/07/18   Hemoglobin A1c   Result Value Ref Range    Hemoglobin A1C 6.1 (H) 0 - 5.6 %   Wet prep   Result Value Ref Range    Specimen Description Vagina     Wet Prep Clue cells seen (A)     Wet Prep No Trichomonas seen     Wet Prep No yeast seen           ASSESSMENT/PLAN:                                                      1. Type 2 diabetes mellitus without complication, without long-term current use of insulin (H)  Diabetes is well managed  With the use of metformin.  - Comprehensive metabolic panel  - Hemoglobin A1c    2. Hyperlipidemia LDL goal <100  Previously not well controlled.  Await the test results.  For now resume simvastatin 40 mg daily  - Comprehensive metabolic panel  - Lipid Profile    3. Vaginal symptom  Wet prep shows vaginal infection.  - Wet prep    4. BV (bacterial vaginosis)  Metronidazole ordered for bacterial vaginosis.  - metroNIDAZOLE (FLAGYL) 500 MG tablet; Take 1 tablet (500 mg) by mouth 2 times daily for 7 days  Dispense: 14 tablet; Refill: 0      Romel Mccall MD  [facility] [place], [place]

## 2018-05-07 NOTE — LETTER
May 8, 2018      Grecia CLAIRE Jame  [address]   [facility] [address]        Dear ,    I am glad to notify that your blood work shows normal liver and kidney functions.  Your diabetes and cholesterol is well controlled.  Plan to follow-up in 6 months for a recheck on diabetes control.  Cholesterol check will be checked done again in 1 year.        If you have any questions or concerns, please call the clinic at the number listed above.       Sincerely,        Romel Mccall MD

## 2018-05-08 LAB
ALBUMIN SERPL-MCNC: 3.6 G/DL (ref 3.4–5)
ALP SERPL-CCNC: 80 U/L (ref 40–150)
ALT SERPL W P-5'-P-CCNC: 37 U/L (ref 0–50)
ANION GAP SERPL CALCULATED.3IONS-SCNC: 9 MMOL/L (ref 3–14)
AST SERPL W P-5'-P-CCNC: 21 U/L (ref 0–45)
BILIRUB SERPL-MCNC: 0.9 MG/DL (ref 0.2–1.3)
BUN SERPL-MCNC: 10 MG/DL (ref 7–30)
CALCIUM SERPL-MCNC: 8.8 MG/DL (ref 8.5–10.1)
CHLORIDE SERPL-SCNC: 113 MMOL/L (ref 94–109)
CHOLEST SERPL-MCNC: 150 MG/DL
CO2 SERPL-SCNC: 22 MMOL/L (ref 20–32)
CREAT SERPL-MCNC: 0.89 MG/DL (ref 0.52–1.04)
GFR SERPL CREATININE-BSD FRML MDRD: 64 ML/MIN/1.7M2
GLUCOSE SERPL-MCNC: 103 MG/DL (ref 70–99)
HDLC SERPL-MCNC: 41 MG/DL
LDLC SERPL CALC-MCNC: 83 MG/DL
NONHDLC SERPL-MCNC: 109 MG/DL
POTASSIUM SERPL-SCNC: 4 MMOL/L (ref 3.4–5.3)
PROT SERPL-MCNC: 7.1 G/DL (ref 6.8–8.8)
SODIUM SERPL-SCNC: 144 MMOL/L (ref 133–144)
TRIGL SERPL-MCNC: 132 MG/DL

## 2018-05-23 ENCOUNTER — OFFICE VISIT (OUTPATIENT)
Dept: FAMILY MEDICINE | Facility: CLINIC | Age: 63
End: 2018-05-23
Payer: COMMERCIAL

## 2018-05-23 VITALS
SYSTOLIC BLOOD PRESSURE: 118 MMHG | DIASTOLIC BLOOD PRESSURE: 78 MMHG | HEIGHT: 67 IN | TEMPERATURE: 97.5 F | RESPIRATION RATE: 16 BRPM | BODY MASS INDEX: 26.06 KG/M2 | WEIGHT: 166 LBS | HEART RATE: 61 BPM | OXYGEN SATURATION: 100 %

## 2018-05-23 DIAGNOSIS — R82.90 NONSPECIFIC FINDING ON EXAMINATION OF URINE: ICD-10-CM

## 2018-05-23 DIAGNOSIS — N30.00 ACUTE CYSTITIS WITHOUT HEMATURIA: Primary | ICD-10-CM

## 2018-05-23 DIAGNOSIS — N89.8 VAGINAL DISCHARGE: ICD-10-CM

## 2018-05-23 DIAGNOSIS — N76.0 BACTERIAL VAGINOSIS: ICD-10-CM

## 2018-05-23 DIAGNOSIS — B96.89 BACTERIAL VAGINOSIS: ICD-10-CM

## 2018-05-23 LAB
ALBUMIN UR-MCNC: ABNORMAL MG/DL
APPEARANCE UR: ABNORMAL
BACTERIA #/AREA URNS HPF: ABNORMAL /HPF
BILIRUB UR QL STRIP: NEGATIVE
COLOR UR AUTO: YELLOW
GLUCOSE UR STRIP-MCNC: NEGATIVE MG/DL
HGB UR QL STRIP: ABNORMAL
KETONES UR STRIP-MCNC: NEGATIVE MG/DL
LEUKOCYTE ESTERASE UR QL STRIP: ABNORMAL
MUCOUS THREADS #/AREA URNS LPF: PRESENT /LPF
NITRATE UR QL: NEGATIVE
NON-SQ EPI CELLS #/AREA URNS LPF: ABNORMAL /LPF
PH UR STRIP: 7 PH (ref 5–7)
RBC #/AREA URNS AUTO: ABNORMAL /HPF
SOURCE: ABNORMAL
SP GR UR STRIP: 1.02 (ref 1–1.03)
SPECIMEN SOURCE: ABNORMAL
UROBILINOGEN UR STRIP-ACNC: 4 EU/DL (ref 0.2–1)
WBC #/AREA URNS AUTO: ABNORMAL /HPF
WET PREP SPEC: ABNORMAL

## 2018-05-23 PROCEDURE — 87088 URINE BACTERIA CULTURE: CPT | Performed by: PHYSICIAN ASSISTANT

## 2018-05-23 PROCEDURE — 87186 SC STD MICRODIL/AGAR DIL: CPT | Performed by: PHYSICIAN ASSISTANT

## 2018-05-23 PROCEDURE — 99213 OFFICE O/P EST LOW 20 MIN: CPT | Performed by: PHYSICIAN ASSISTANT

## 2018-05-23 PROCEDURE — 81001 URINALYSIS AUTO W/SCOPE: CPT | Performed by: PHYSICIAN ASSISTANT

## 2018-05-23 PROCEDURE — 87086 URINE CULTURE/COLONY COUNT: CPT | Performed by: PHYSICIAN ASSISTANT

## 2018-05-23 PROCEDURE — 87210 SMEAR WET MOUNT SALINE/INK: CPT | Performed by: PHYSICIAN ASSISTANT

## 2018-05-23 RX ORDER — CIPROFLOXACIN 250 MG/1
250 TABLET, FILM COATED ORAL 2 TIMES DAILY
Qty: 6 TABLET | Refills: 0 | Status: SHIPPED | OUTPATIENT
Start: 2018-05-23 | End: 2018-06-22

## 2018-05-23 RX ORDER — METRONIDAZOLE 250 MG/1
500 TABLET ORAL 2 TIMES DAILY
Qty: 28 TABLET | Refills: 0 | Status: SHIPPED | OUTPATIENT
Start: 2018-05-23 | End: 2019-02-19

## 2018-05-23 NOTE — MR AVS SNAPSHOT
After Visit Summary   5/23/2018    Grecia Kilgore    MRN: [id number]           Patient Information     Date Of Birth          1955        Visit Information        Provider Department      5/23/2018 2:30 PM Lidia Florence PA-C [facility]        Today's Diagnoses     Acute cystitis without hematuria    -  1    Nonspecific finding on examination of urine        Bacterial vaginosis        Vaginal discharge           Follow-ups after your visit        Follow-up notes from your care team     Return in about 6 months (around 11/23/2018) for Physical Exam.      Your next 10 appointments already scheduled     May 23, 2018  2:30 PM CDT   Office Visit with Lidia Florence PA-C   [facility] ([facility]    [address] [facility] [address] [phone]           Bring a current list of meds and any records pertaining to this visit. For Physicals, please bring immunization records and any forms needing to be filled out. Please arrive 10 minutes early to complete paperwork.            Nov 23, 2018  3:00 PM CST   PHYSICAL with Romel Mccall MD   [facility] ([facility])    [address] [place] [address] [phone]              Who to contact     If you have questions or need follow up information about today's clinic visit or your schedule please contact [facility] directly at [phone].  Normal or non-critical lab and imaging results will be communicated to you by MyChart, letter or phone within 4 business days after the clinic has received the results. If you do not hear from us within 7 days, please contact the clinic through MyChart or phone. If you have a critical or abnormal lab result, we will notify you by phone as soon as possible.  Submit refill requests through [employer] or call your pharmacy and they will forward the refill request to us.  "Please allow 3 business days for your refill to be completed.          Additional Information About Your Visit        MyChart Information     [employer] lets you send messages to your doctor, view your test results, renew your prescriptions, schedule appointments and more. To sign up, go to [email or web address] . Click on \"Log in\" on the left side of the screen, which will take you to the Welcome page. Then click on \"Sign up Now\" on the right side of the page.     You will be asked to enter the access code listed below, as well as some personal information. Please follow the directions to create your username and password.     Your access code is: [email or web address]  Expires: 2018  1:44 PM     Your access code will  in 90 days. If you need help or a new code, please call your [place] clinic or [phone].        Care EveryWhere ID     This is your Care EveryWhere ID. This could be used by other organizations to access your [place] medical records  [id number]        Your Vitals Were     Pulse Temperature Respirations Height Last Period Pulse Oximetry    61 97.5  F (36.4  C) (Oral) 16 5' 7\" (1.702 m) 1985 100%    Breastfeeding? BMI (Body Mass Index)                No 26 kg/m2           Blood Pressure from Last 3 Encounters:   18 118/78   18 114/72   18 128/68    Weight from Last 3 Encounters:   18 166 lb (75.3 kg)   18 161 lb 9.6 oz (73.3 kg)   18 162 lb 12.8 oz (73.8 kg)              We Performed the Following     *UA reflex to Microscopic and Culture ([place] [facility] (except [place] and [place])     Urine Culture Aerobic Bacterial     Urine Microscopic     Wet prep          Today's Medication Changes          These changes are accurate as of 18  2:28 PM.  If you have any questions, ask your nurse or doctor.               Start taking these medicines.        Dose/Directions    ciprofloxacin 250 MG tablet   Commonly known as:  CIPRO   Used for:  " Acute cystitis without hematuria   Started by:  Lidia Florence PA-C        Dose:  250 mg   Take 1 tablet (250 mg) by mouth 2 times daily   Quantity:  6 tablet   Refills:  0       metroNIDAZOLE 250 MG tablet   Commonly known as:  FLAGYL   Used for:  Bacterial vaginosis   Started by:  Lidia Florence PA-C        Dose:  500 mg   Take 2 tablets (500 mg) by mouth 2 times daily for 7 days   Quantity:  28 tablet   Refills:  0            Where to get your medicines      These medications were [place] [facility] [place] [address]  [address] [place] [address]     Phone:  [phone]     ciprofloxacin 250 MG tablet    metroNIDAZOLE 250 MG tablet                Primary Care Provider Office Phone # Fax #    Romel Mccall [phone] [address]  [place] [address]        Equal Access to Services     [facility]: Hadii brook cruz hadaramiso Sojuan, waaxda luqadaha, qaybta kaalmada adeegyada, aron jordan . So [facility] [phone].    ATENCIÓN: Si rosannala español, tiene a patel disposición servicios gratuitos de asistencia lingüística. [facility] [phone].    We comply with applicable federal civil rights laws and [place] laws. We do not discriminate on the basis of race, color, national origin, age, disability, sex, sexual orientation, or gender identity.            Thank you!     Thank you for choosing [facility]  for your care. Our goal is always to provide you with excellent care. Hearing back from our patients is one way we can continue to improve our services. Please take a few minutes to complete the written survey that you may receive in the mail after your visit with us. Thank you!             Your Updated Medication List - Protect others around you: Learn how to safely use, store and throw away your medicines at www.disposemymeds.org.          This list is accurate as of 5/23/18   2:28 PM.  Always use your most recent med list.                   Brand Name Dispense Instructions for use Diagnosis    albuterol 108 (90 Base) MCG/ACT Inhaler    PROAIR HFA/PROVENTIL HFA/VENTOLIN HFA    1 Inhaler    Inhale 2 puffs into the lungs every 6 hours as needed for shortness of breath / dyspnea    Mild intermittent asthma without complication       ATIVAN 0.5 MG tablet   Generic drug:  LORazepam      Take 0.5-1 tablets (0.25-0.5 mg) by mouth every 8 hours as needed for anxiety Take 30 minutes prior to departure.  Do not operate a vehicle after taking this medication        cetirizine 10 MG tablet    zyrTEC    90 tablet    Take 1 tablet (10 mg) by mouth every evening    Hives       ciprofloxacin 250 MG tablet    CIPRO    6 tablet    Take 1 tablet (250 mg) by mouth 2 times daily    Acute cystitis without hematuria       lisinopril 5 MG tablet    PRINIVIL/ZESTRIL    90 tablet    Take 1 tablet (5 mg) by mouth daily    Type 2 diabetes mellitus without complication, without long-term current use of insulin (H)       meloxicam 15 MG tablet    MOBIC     TAKE 1 TABLET BY MOUTH DAILY AS NEEDED.        metFORMIN 500 MG tablet    GLUCOPHAGE    90 tablet    Take 1 tablet (500 mg) by mouth daily (with breakfast)    Type 2 diabetes mellitus without complication, without long-term current use of insulin (H)       metroNIDAZOLE 250 MG tablet    FLAGYL    28 tablet    Take 2 tablets (500 mg) by mouth 2 times daily for 7 days    Bacterial vaginosis       mirtazapine 15 MG tablet    REMERON          montelukast 10 MG tablet    SINGULAIR    90 tablet    Take 1 tablet (10 mg) by mouth At Bedtime    Mild intermittent asthma without complication       ranitidine 150 MG tablet    ZANTAC    60 tablet    Take 1 tablet (150 mg) by mouth 2 times daily    Hives       simvastatin 40 MG tablet    ZOCOR    90 tablet    Take 1 tablet (40 mg) by mouth At Bedtime    Hyperlipidemia LDL goal <100

## 2018-05-24 RX ORDER — METRONIDAZOLE 250 MG/1
500 TABLET ORAL 2 TIMES DAILY
Qty: 28 TABLET | Refills: 0 | Status: CANCELLED | OUTPATIENT
Start: 2018-05-24

## 2018-05-24 NOTE — TELEPHONE ENCOUNTER
Requested Prescriptions   Pending Prescriptions Disp Refills     metroNIDAZOLE (FLAGYL) 250 MG tablet 28 tablet 0     Sig: Take 2 tablets (500 mg) by mouth 2 times daily    There is no refill protocol information for this order        Last Written Prescription Date:  5/23/18  Last Fill Quantity: 28,  # refills: 0   Last office visit: 5/23/2018 with prescribing provider:  5/23/2018     Future Office Visit:

## 2018-05-25 LAB
BACTERIA SPEC CULT: ABNORMAL
SPECIMEN SOURCE: ABNORMAL

## 2018-05-30 NOTE — TELEPHONE ENCOUNTER
Called [facility] pharmacy, they transferred prescription to [facility]. Removed refill request.    Notes from LOV 5/23/18  Bacterial vaginosis: New problem, wet prep + for BV.  Will treat with Flagyl BID x 7 days.  F/U if symptoms worsen or do not improve.  - metroNIDAZOLE (FLAGYL) 250 MG tablet; Take 2 tablets (500 mg) by mouth 2 times daily for 7 days  Dispense: 28 tablet; Refill: 0    Vaughn Sung RN

## 2018-06-21 ENCOUNTER — OFFICE VISIT (OUTPATIENT)
Dept: FAMILY MEDICINE | Facility: CLINIC | Age: 63
End: 2018-06-21
Payer: COMMERCIAL

## 2018-06-21 VITALS
TEMPERATURE: 99 F | OXYGEN SATURATION: 99 % | SYSTOLIC BLOOD PRESSURE: 114 MMHG | WEIGHT: 166 LBS | BODY MASS INDEX: 26.06 KG/M2 | HEART RATE: 64 BPM | HEIGHT: 67 IN | DIASTOLIC BLOOD PRESSURE: 74 MMHG

## 2018-06-21 DIAGNOSIS — R30.0 DYSURIA: ICD-10-CM

## 2018-06-21 DIAGNOSIS — B96.89 BACTERIAL VAGINOSIS: Primary | ICD-10-CM

## 2018-06-21 DIAGNOSIS — N76.0 BACTERIAL VAGINOSIS: Primary | ICD-10-CM

## 2018-06-21 LAB
ALBUMIN UR-MCNC: NEGATIVE MG/DL
APPEARANCE UR: CLEAR
BILIRUB UR QL STRIP: NEGATIVE
COLOR UR AUTO: YELLOW
GLUCOSE UR STRIP-MCNC: NEGATIVE MG/DL
HGB UR QL STRIP: ABNORMAL
KETONES UR STRIP-MCNC: ABNORMAL MG/DL
LEUKOCYTE ESTERASE UR QL STRIP: NEGATIVE
MUCOUS THREADS #/AREA URNS LPF: PRESENT /LPF
NITRATE UR QL: NEGATIVE
NON-SQ EPI CELLS #/AREA URNS LPF: ABNORMAL /LPF
PH UR STRIP: 6 PH (ref 5–7)
RBC #/AREA URNS AUTO: ABNORMAL /HPF
SOURCE: ABNORMAL
SP GR UR STRIP: >1.03 (ref 1–1.03)
SPECIMEN SOURCE: ABNORMAL
UROBILINOGEN UR STRIP-ACNC: 1 EU/DL (ref 0.2–1)
WBC #/AREA URNS AUTO: ABNORMAL /HPF
WET PREP SPEC: ABNORMAL

## 2018-06-21 PROCEDURE — 81001 URINALYSIS AUTO W/SCOPE: CPT | Performed by: PHYSICIAN ASSISTANT

## 2018-06-21 PROCEDURE — 87210 SMEAR WET MOUNT SALINE/INK: CPT | Performed by: PHYSICIAN ASSISTANT

## 2018-06-21 PROCEDURE — 99213 OFFICE O/P EST LOW 20 MIN: CPT | Performed by: PHYSICIAN ASSISTANT

## 2018-06-21 RX ORDER — METRONIDAZOLE 250 MG/1
500 TABLET ORAL 2 TIMES DAILY
Qty: 40 TABLET | Refills: 0 | Status: SHIPPED | OUTPATIENT
Start: 2018-06-21 | End: 2019-02-19
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
## 2018-06-21 NOTE — MR AVS SNAPSHOT
"              After Visit Summary   6/21/2018    Grecia Kilgore    MRN: [id number]           Patient Information     Date Of Birth          1955        Visit Information        Provider Department      6/21/2018 4:30 PM Annalisa Michelle PA-C [facility]        Today's Diagnoses     Bacterial vaginosis    -  1    Dysuria           Follow-ups after your visit        Your next 10 appointments already scheduled     Nov 23, 2018  3:00 PM CST   PHYSICAL with Romel Mccall MD   [facility] ([facility])    [address] [place] [address]   [phone]              Who to contact     If you have questions or need follow up information about today's clinic visit or your schedule please contact [facility] directly at [phone].  Normal or non-critical lab and imaging results will be communicated to you by MyChart, letter or phone within 4 business days after the clinic has received the results. If you do not hear from us within 7 days, please contact the clinic through MyChart or phone. If you have a critical or abnormal lab result, we will notify you by phone as soon as possible.  Submit refill requests through [employer] or call your pharmacy and they will forward the refill request to us. Please allow 3 business days for your refill to be completed.          Additional Information About Your Visit        MyChart Information     [employer] lets you send messages to your doctor, view your test results, renew your prescriptions, schedule appointments and more. To sign up, go to [email or web address] . Click on \"Log in\" on the left side of the screen, which will take you to the Welcome page. Then click on \"Sign up Now\" on the right side of the page.     You will be asked to enter the access code listed below, as well as some personal information. Please follow the directions to create your username and password.     Your access " "code is: [email or web address]  Expires: 2018  1:44 PM     Your access code will  in 90 days. If you need help or a new code, please call your [facility] clinic or [phone].        Care EveryWhere ID     This is your Care EveryWhere ID. This could be used by other organizations to access your [facility] medical records  [id number]        Your Vitals Were     Pulse Temperature Height Last Period Pulse Oximetry BMI (Body Mass Index)    64 99  F (37.2  C) (Oral) 5' 7\" (1.702 m) 1985 99% 26 kg/m2       Blood Pressure from Last 3 Encounters:   18 114/74   18 118/78   18 114/72    Weight from Last 3 Encounters:   18 166 lb (75.3 kg)   18 166 lb (75.3 kg)   18 161 lb 9.6 oz (73.3 kg)              We Performed the Following     *UA reflex to Microscopic and Culture ([place] and [facility] (except [place] and [place])     Urine Microscopic     Wet prep          Today's Medication Changes          These changes are accurate as of 18 11:59 PM.  If you have any questions, ask your nurse or doctor.               Start taking these medicines.        Dose/Directions    metroNIDAZOLE 250 MG tablet   Commonly known as:  FLAGYL   Used for:  Bacterial vaginosis   Started by:  Annalisa Michelle PA-C        Dose:  500 mg   Take 2 tablets (500 mg) by mouth 2 times daily for 10 days   Quantity:  40 tablet   Refills:  0            Where to get your medicines      These medications were sent to [facility] [address] [place] [id number] [facility] [address] [facility]  [address] [facility] [address]     Phone:  [phone]     metroNIDAZOLE 250 MG tablet                Primary Care Provider Office Phone # Fax #    Romel Mccall [phone]       [address]  [place] [address]        Equal Access to Services     [facility] AH: Nai Romero, howard salinas, aron westin " tate jordan ah. So [facility] [phone].    ATENCIÓN: Si rosannala alessio, tiene a patel disposición servicios gratuitos de asistencia lingüística. Roland emanuel [phone].    We comply with applicable federal civil rights laws and [place] laws. We do not discriminate on the basis of race, color, national origin, age, disability, sex, sexual orientation, or gender identity.            Thank you!     Thank you for choosing [facility]  for your care. Our goal is always to provide you with excellent care. Hearing back from our patients is one way we can continue to improve our services. Please take a few minutes to complete the written survey that you may receive in the mail after your visit with us. Thank you!             Your Updated Medication List - Protect others around you: Learn how to safely use, store and throw away your medicines at www.disposemymeds.org.          This list is accurate as of 6/21/18 11:59 PM.  Always use your most recent med list.                   Brand Name Dispense Instructions for use Diagnosis    albuterol 108 (90 Base) MCG/ACT Inhaler    PROAIR HFA/PROVENTIL HFA/VENTOLIN HFA    1 Inhaler    Inhale 2 puffs into the lungs every 6 hours as needed for shortness of breath / dyspnea    Mild intermittent asthma without complication       ATIVAN 0.5 MG tablet   Generic drug:  LORazepam      Take 0.5-1 tablets (0.25-0.5 mg) by mouth every 8 hours as needed for anxiety Take 30 minutes prior to departure.  Do not operate a vehicle after taking this medication        cetirizine 10 MG tablet    zyrTEC    90 tablet    Take 1 tablet (10 mg) by mouth every evening    Hives       lisinopril 5 MG tablet    PRINIVIL/ZESTRIL    90 tablet    Take 1 tablet (5 mg) by mouth daily    Type 2 diabetes mellitus without complication, without long-term current use of insulin (H)       meloxicam 15 MG tablet    MOBIC     TAKE 1 TABLET BY MOUTH DAILY AS NEEDED.        metFORMIN 500 MG tablet     GLUCOPHAGE    90 tablet    Take 1 tablet (500 mg) by mouth daily (with breakfast)    Type 2 diabetes mellitus without complication, without long-term current use of insulin (H)       metroNIDAZOLE 250 MG tablet    FLAGYL    40 tablet    Take 2 tablets (500 mg) by mouth 2 times daily for 10 days    Bacterial vaginosis       mirtazapine 15 MG tablet    REMERON          montelukast 10 MG tablet    SINGULAIR    90 tablet    Take 1 tablet (10 mg) by mouth At Bedtime    Mild intermittent asthma without complication       ranitidine 150 MG tablet    ZANTAC    60 tablet    Take 1 tablet (150 mg) by mouth 2 times daily    Hives       simvastatin 40 MG tablet    ZOCOR    90 tablet    Take 1 tablet (40 mg) by mouth At Bedtime    Hyperlipidemia LDL goal <100

## 2018-06-22 ASSESSMENT — ENCOUNTER SYMPTOMS
NAUSEA: 0
ABDOMINAL PAIN: 0
FEVER: 0
CHILLS: 0
FREQUENCY: 0
DIARRHEA: 0
VOMITING: 0
FLANK PAIN: 0
DYSURIA: 1
HEADACHES: 0

## 2018-06-27 ENCOUNTER — TRANSFERRED RECORDS (OUTPATIENT)
Dept: HEALTH INFORMATION MANAGEMENT | Facility: CLINIC | Age: 63
End: 2018-06-27

## 2018-07-20 ENCOUNTER — OFFICE VISIT (OUTPATIENT)
Dept: FAMILY MEDICINE | Facility: CLINIC | Age: 63
End: 2018-07-20
Payer: COMMERCIAL

## 2018-07-20 VITALS
TEMPERATURE: 98.9 F | HEART RATE: 56 BPM | BODY MASS INDEX: 25.69 KG/M2 | SYSTOLIC BLOOD PRESSURE: 118 MMHG | DIASTOLIC BLOOD PRESSURE: 72 MMHG | WEIGHT: 164 LBS | OXYGEN SATURATION: 100 %

## 2018-07-20 DIAGNOSIS — N89.8 VAGINAL DISCHARGE: ICD-10-CM

## 2018-07-20 DIAGNOSIS — B96.89 BV (BACTERIAL VAGINOSIS): ICD-10-CM

## 2018-07-20 DIAGNOSIS — N76.0 BV (BACTERIAL VAGINOSIS): ICD-10-CM

## 2018-07-20 DIAGNOSIS — K64.4 EXTERNAL HEMORRHOIDS: ICD-10-CM

## 2018-07-20 DIAGNOSIS — J45.20 MILD INTERMITTENT ASTHMA WITHOUT COMPLICATION: ICD-10-CM

## 2018-07-20 DIAGNOSIS — Z01.818 PREOP GENERAL PHYSICAL EXAM: Primary | ICD-10-CM

## 2018-07-20 DIAGNOSIS — E78.5 HYPERLIPIDEMIA LDL GOAL <100: ICD-10-CM

## 2018-07-20 DIAGNOSIS — E11.9 TYPE 2 DIABETES MELLITUS WITHOUT COMPLICATION, WITHOUT LONG-TERM CURRENT USE OF INSULIN (H): ICD-10-CM

## 2018-07-20 LAB
SPECIMEN SOURCE: ABNORMAL
WET PREP SPEC: ABNORMAL

## 2018-07-20 PROCEDURE — 87210 SMEAR WET MOUNT SALINE/INK: CPT | Performed by: FAMILY MEDICINE

## 2018-07-20 PROCEDURE — 99214 OFFICE O/P EST MOD 30 MIN: CPT | Performed by: FAMILY MEDICINE

## 2018-07-20 RX ORDER — CLINDAMYCIN HCL 300 MG
300 CAPSULE ORAL 2 TIMES DAILY
Qty: 14 CAPSULE | Refills: 0 | Status: SHIPPED | OUTPATIENT
Start: 2018-07-20 | End: 2019-02-19

## 2018-07-20 NOTE — MR AVS SNAPSHOT
After Visit Summary   7/20/2018    Grecia Kilgore    MRN: [id number]           Patient Information     Date Of Birth          1955        Visit Information        Provider Department      7/20/2018 1:00 PM Romel Mccall MD [facility] [place]        Today's Diagnoses     Vaginal discharge    -  1    Preop general physical exam        BV (bacterial vaginosis)          Care Instructions      Before Your Surgery      Call your surgeon if there is any change in your health. This includes signs of a cold or flu (such as a sore throat, runny nose, cough, rash or fever).    Do not smoke, drink alcohol or take over the counter medicine (unless your surgeon or primary care doctor tells you to) for the 24 hours before and after surgery.    If you take prescribed drugs: Follow your doctor s orders about which medicines to take and which to stop until after surgery.    Eating and drinking prior to surgery: follow the instructions from your surgeon    Take a shower or bath the night before surgery. Use the soap your surgeon gave you to gently clean your skin. If you do not have soap from your surgeon, use your regular soap. Do not shave or scrub the surgery site.  Wear clean pajamas and have clean sheets on your bed.           Follow-ups after your visit        Follow-up notes from your care team     Return in about 4 months (around 11/15/2018) for Annual Physical Exam.      Your next 10 appointments already scheduled     Jul 24, 2018   Procedure with Lisa Patrick MD   [facility] (--)    [address] [facility] [address]   [phone]            Nov 23, 2018  3:00 PM CST   PHYSICAL with Romel Mccall MD   [facility] [place] [facility]    [address] [address]   [phone]              Who to contact     If you have questions or need follow up information about today's clinic visit or your  schedule please contact [facility] [place] directly at [phone].  Normal or non-critical lab and imaging results will be communicated to you by MyChart, letter or phone within 4 business days after the clinic has received the results. If you do not hear from us within 7 days, please contact the clinic through MyChart or phone. If you have a critical or abnormal lab result, we will notify you by phone as soon as possible.  Submit refill requests through [employer] or call your pharmacy and they will forward the refill request to us. Please allow 3 business days for your refill to be completed.          Additional Information About Your Visit        Care EveryWhere ID     This is your Care EveryWhere ID. This could be used by other organizations to access your [facility] medical records  [id number]        Your Vitals Were     Pulse Temperature Last Period Pulse Oximetry BMI (Body Mass Index)       56 98.9  F (37.2  C) (Oral) 01/01/1985 100% 25.69 kg/m2        Blood Pressure from Last 3 Encounters:   07/20/18 118/72   06/21/18 114/74   05/23/18 118/78    Weight from Last 3 Encounters:   07/20/18 164 lb (74.4 kg)   06/21/18 166 lb (75.3 kg)   05/23/18 166 lb (75.3 kg)              We Performed the Following     Wet prep          Today's Medication Changes          These changes are accurate as of 7/20/18  1:44 PM.  If you have any questions, ask your nurse or doctor.               Start taking these medicines.        Dose/Directions    clindamycin 300 MG capsule   Commonly known as:  CLEOCIN   Used for:  BV (bacterial vaginosis)   Started by:  Romel Mccall MD        Dose:  300 mg   Take 1 capsule (300 mg) by mouth 2 times daily for 7 days   Quantity:  14 capsule   Refills:  0            Where to get your medicines      These medications were sent to [facility] [place] [facility] [place] - [address]  [address], [place] [address]     Phone:  [phone]      clindamycin 300 MG capsule                Primary Care Provider Office Phone # Fax #    Romel Mccall [phone]       [address]  [place] [address]        Equal Access to Services     BRANNON MCKEON : Hadii aad ku hadaramiso Soomaali, waaxda luqadaha, qaybta kaalmada adeegyada, waxay idiin hayderekn adejayce hu laAlexandrabeatrice kaba. So [facility] [phone].    ATENCIÓN: Si habla español, tiene a patel disposición servicios gratuitos de asistencia lingüística. Llame al [phone].    We comply with applicable federal civil rights laws and [place] laws. We do not discriminate on the basis of race, color, national origin, age, disability, sex, sexual orientation, or gender identity.            Thank you!     Thank you for choosing [facility] [place]  for your care. Our goal is always to provide you with excellent care. Hearing back from our patients is one way we can continue to improve our services. Please take a few minutes to complete the written survey that you may receive in the mail after your visit with us. Thank you!             Your Updated Medication List - Protect others around you: Learn how to safely use, store and throw away your medicines at www.disposemymeds.org.          This list is accurate as of 7/20/18  1:44 PM.  Always use your most recent med list.                   Brand Name Dispense Instructions for use Diagnosis    albuterol 108 (90 Base) MCG/ACT Inhaler    PROAIR HFA/PROVENTIL HFA/VENTOLIN HFA    1 Inhaler    Inhale 2 puffs into the lungs every 6 hours as needed for shortness of breath / dyspnea    Mild intermittent asthma without complication       ATIVAN 0.5 MG tablet   Generic drug:  LORazepam      Take 0.5-1 tablets (0.25-0.5 mg) by mouth every 8 hours as needed for anxiety Take 30 minutes prior to departure.  Do not operate a vehicle after taking this medication        cetirizine 10 MG tablet    zyrTEC    90 tablet    Take 1 tablet (10 mg) by mouth every evening     Hives       clindamycin 300 MG capsule    CLEOCIN    14 capsule    Take 1 capsule (300 mg) by mouth 2 times daily for 7 days    BV (bacterial vaginosis)       lisinopril 5 MG tablet    PRINIVIL/ZESTRIL    90 tablet    Take 1 tablet (5 mg) by mouth daily    Type 2 diabetes mellitus without complication, without long-term current use of insulin (H)       meloxicam 15 MG tablet    MOBIC     TAKE 1 TABLET BY MOUTH DAILY AS NEEDED.        metFORMIN 500 MG tablet    GLUCOPHAGE    90 tablet    Take 1 tablet (500 mg) by mouth daily (with breakfast)    Type 2 diabetes mellitus without complication, without long-term current use of insulin (H)       mirtazapine 15 MG tablet    REMERON          montelukast 10 MG tablet    SINGULAIR    90 tablet    Take 1 tablet (10 mg) by mouth At Bedtime    Mild intermittent asthma without complication       ranitidine 150 MG tablet    ZANTAC    60 tablet    Take 1 tablet (150 mg) by mouth 2 times daily    Hives       simvastatin 40 MG tablet    ZOCOR    90 tablet    Take 1 tablet (40 mg) by mouth At Bedtime    Hyperlipidemia LDL goal <100
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
## 2018-07-24 ENCOUNTER — ANESTHESIA (OUTPATIENT)
Dept: SURGERY | Facility: CLINIC | Age: 63
End: 2018-07-24
Payer: COMMERCIAL

## 2018-07-24 ENCOUNTER — NURSE TRIAGE (OUTPATIENT)
Dept: NURSING | Facility: CLINIC | Age: 63
End: 2018-07-24

## 2018-07-24 ENCOUNTER — ANESTHESIA EVENT (OUTPATIENT)
Dept: SURGERY | Facility: CLINIC | Age: 63
End: 2018-07-24
Payer: COMMERCIAL

## 2018-07-24 ENCOUNTER — SURGERY (OUTPATIENT)
Age: 63
End: 2018-07-24

## 2018-07-24 ENCOUNTER — HOSPITAL ENCOUNTER (OUTPATIENT)
Facility: CLINIC | Age: 63
Discharge: HOME | End: 2018-07-24
Attending: COLON & RECTAL SURGERY | Admitting: COLON & RECTAL SURGERY
Payer: COMMERCIAL

## 2018-07-24 VITALS
TEMPERATURE: 98.4 F | RESPIRATION RATE: 16 BRPM | OXYGEN SATURATION: 98 % | DIASTOLIC BLOOD PRESSURE: 92 MMHG | BODY MASS INDEX: 25.68 KG/M2 | SYSTOLIC BLOOD PRESSURE: 142 MMHG | WEIGHT: 163.6 LBS | HEIGHT: 67 IN

## 2018-07-24 DIAGNOSIS — K64.4 EXTERNAL HEMORRHOIDS: Primary | ICD-10-CM

## 2018-07-24 LAB
CREAT SERPL-MCNC: 0.98 MG/DL (ref 0.52–1.04)
GFR SERPL CREATININE-BSD FRML MDRD: 58 ML/MIN/1.7M2
GLUCOSE BLDC GLUCOMTR-MCNC: 100 MG/DL (ref 70–99)
POTASSIUM SERPL-SCNC: 3.8 MMOL/L (ref 3.4–5.3)

## 2018-07-24 PROCEDURE — 93010 ELECTROCARDIOGRAM REPORT: CPT | Performed by: INTERNAL MEDICINE

## 2018-07-24 PROCEDURE — 25000128 H RX IP 250 OP 636: Performed by: COLON & RECTAL SURGERY

## 2018-07-24 PROCEDURE — 36000052 ZZH SURGERY LEVEL 2 EA 15 ADDTL MIN: Performed by: COLON & RECTAL SURGERY

## 2018-07-24 PROCEDURE — 27210794 ZZH OR GENERAL SUPPLY STERILE: Performed by: COLON & RECTAL SURGERY

## 2018-07-24 PROCEDURE — 40000306 ZZH STATISTIC PRE PROC ASSESS II: Performed by: COLON & RECTAL SURGERY

## 2018-07-24 PROCEDURE — 88304 TISSUE EXAM BY PATHOLOGIST: CPT | Mod: 26 | Performed by: COLON & RECTAL SURGERY

## 2018-07-24 PROCEDURE — 37000008 ZZH ANESTHESIA TECHNICAL FEE, 1ST 30 MIN: Performed by: COLON & RECTAL SURGERY

## 2018-07-24 PROCEDURE — 84132 ASSAY OF SERUM POTASSIUM: CPT | Performed by: ANESTHESIOLOGY

## 2018-07-24 PROCEDURE — 36000050 ZZH SURGERY LEVEL 2 1ST 30 MIN: Performed by: COLON & RECTAL SURGERY

## 2018-07-24 PROCEDURE — 27210995 ZZH RX 272: Performed by: COLON & RECTAL SURGERY

## 2018-07-24 PROCEDURE — 88304 TISSUE EXAM BY PATHOLOGIST: CPT | Performed by: COLON & RECTAL SURGERY

## 2018-07-24 PROCEDURE — 25000128 H RX IP 250 OP 636

## 2018-07-24 PROCEDURE — 37000009 ZZH ANESTHESIA TECHNICAL FEE, EACH ADDTL 15 MIN: Performed by: COLON & RECTAL SURGERY

## 2018-07-24 PROCEDURE — 25000132 ZZH RX MED GY IP 250 OP 250 PS 637: Performed by: ANESTHESIOLOGY

## 2018-07-24 PROCEDURE — 71000027 ZZH RECOVERY PHASE 2 EACH 15 MINS: Performed by: COLON & RECTAL SURGERY

## 2018-07-24 PROCEDURE — 82962 GLUCOSE BLOOD TEST: CPT

## 2018-07-24 PROCEDURE — 25000125 ZZHC RX 250: Performed by: COLON & RECTAL SURGERY

## 2018-07-24 PROCEDURE — 25000128 H RX IP 250 OP 636: Performed by: ANESTHESIOLOGY

## 2018-07-24 PROCEDURE — 82565 ASSAY OF CREATININE: CPT | Performed by: ANESTHESIOLOGY

## 2018-07-24 PROCEDURE — 25000125 ZZHC RX 250

## 2018-07-24 PROCEDURE — 36415 COLL VENOUS BLD VENIPUNCTURE: CPT | Performed by: ANESTHESIOLOGY

## 2018-07-24 RX ORDER — OXYCODONE AND ACETAMINOPHEN 5; 325 MG/1; MG/1
1-2 TABLET ORAL EVERY 4 HOURS PRN
Qty: 12 TABLET | Refills: 0 | Status: SHIPPED | OUTPATIENT
Start: 2018-07-24 | End: 2019-01-02

## 2018-07-24 RX ORDER — BUPIVACAINE HYDROCHLORIDE AND EPINEPHRINE 2.5; 5 MG/ML; UG/ML
INJECTION, SOLUTION EPIDURAL; INFILTRATION; INTRACAUDAL; PERINEURAL PRN
Status: DISCONTINUED | OUTPATIENT
Start: 2018-07-24 | End: 2018-07-24 | Stop reason: HOSPADM

## 2018-07-24 RX ORDER — DEXAMETHASONE SODIUM PHOSPHATE 4 MG/ML
INJECTION, SOLUTION INTRA-ARTICULAR; INTRALESIONAL; INTRAMUSCULAR; INTRAVENOUS; SOFT TISSUE PRN
Status: DISCONTINUED | OUTPATIENT
Start: 2018-07-24 | End: 2018-07-24

## 2018-07-24 RX ORDER — SODIUM CHLORIDE, SODIUM LACTATE, POTASSIUM CHLORIDE, CALCIUM CHLORIDE 600; 310; 30; 20 MG/100ML; MG/100ML; MG/100ML; MG/100ML
INJECTION, SOLUTION INTRAVENOUS CONTINUOUS
Status: DISCONTINUED | OUTPATIENT
Start: 2018-07-24 | End: 2018-07-24 | Stop reason: HOSPADM

## 2018-07-24 RX ORDER — LIDOCAINE 40 MG/G
CREAM TOPICAL
Status: DISCONTINUED | OUTPATIENT
Start: 2018-07-24 | End: 2018-07-24 | Stop reason: HOSPADM

## 2018-07-24 RX ORDER — HYDRALAZINE HYDROCHLORIDE 20 MG/ML
2.5-5 INJECTION INTRAMUSCULAR; INTRAVENOUS EVERY 10 MIN PRN
Status: DISCONTINUED | OUTPATIENT
Start: 2018-07-24 | End: 2018-07-24 | Stop reason: HOSPADM

## 2018-07-24 RX ORDER — MAGNESIUM HYDROXIDE 1200 MG/15ML
LIQUID ORAL PRN
Status: DISCONTINUED | OUTPATIENT
Start: 2018-07-24 | End: 2018-07-24 | Stop reason: HOSPADM

## 2018-07-24 RX ORDER — NALOXONE HYDROCHLORIDE 0.4 MG/ML
.1-.4 INJECTION, SOLUTION INTRAMUSCULAR; INTRAVENOUS; SUBCUTANEOUS
Status: DISCONTINUED | OUTPATIENT
Start: 2018-07-24 | End: 2018-07-24 | Stop reason: HOSPADM

## 2018-07-24 RX ORDER — KETOROLAC TROMETHAMINE 30 MG/ML
30 INJECTION, SOLUTION INTRAMUSCULAR; INTRAVENOUS EVERY 6 HOURS PRN
Status: DISCONTINUED | OUTPATIENT
Start: 2018-07-24 | End: 2018-07-24 | Stop reason: HOSPADM

## 2018-07-24 RX ORDER — FENTANYL CITRATE 50 UG/ML
25-50 INJECTION, SOLUTION INTRAMUSCULAR; INTRAVENOUS EVERY 5 MIN PRN
Status: DISCONTINUED | OUTPATIENT
Start: 2018-07-24 | End: 2018-07-24 | Stop reason: HOSPADM

## 2018-07-24 RX ORDER — FENTANYL CITRATE 50 UG/ML
INJECTION, SOLUTION INTRAMUSCULAR; INTRAVENOUS PRN
Status: DISCONTINUED | OUTPATIENT
Start: 2018-07-24 | End: 2018-07-24

## 2018-07-24 RX ORDER — ONDANSETRON 2 MG/ML
4 INJECTION INTRAMUSCULAR; INTRAVENOUS EVERY 30 MIN PRN
Status: DISCONTINUED | OUTPATIENT
Start: 2018-07-24 | End: 2018-07-24 | Stop reason: HOSPADM

## 2018-07-24 RX ORDER — LIDOCAINE HYDROCHLORIDE 10 MG/ML
INJECTION, SOLUTION INFILTRATION; PERINEURAL PRN
Status: DISCONTINUED | OUTPATIENT
Start: 2018-07-24 | End: 2018-07-24

## 2018-07-24 RX ORDER — ALBUTEROL SULFATE 0.83 MG/ML
2.5 SOLUTION RESPIRATORY (INHALATION) EVERY 4 HOURS PRN
Status: DISCONTINUED | OUTPATIENT
Start: 2018-07-24 | End: 2018-07-24 | Stop reason: HOSPADM

## 2018-07-24 RX ORDER — OXYCODONE HYDROCHLORIDE 5 MG/1
5 TABLET ORAL EVERY 4 HOURS PRN
Status: DISCONTINUED | OUTPATIENT
Start: 2018-07-24 | End: 2018-07-24 | Stop reason: HOSPADM

## 2018-07-24 RX ORDER — HYDROMORPHONE HYDROCHLORIDE 1 MG/ML
.3-.5 INJECTION, SOLUTION INTRAMUSCULAR; INTRAVENOUS; SUBCUTANEOUS EVERY 10 MIN PRN
Status: DISCONTINUED | OUTPATIENT
Start: 2018-07-24 | End: 2018-07-24 | Stop reason: HOSPADM

## 2018-07-24 RX ORDER — ONDANSETRON 2 MG/ML
INJECTION INTRAMUSCULAR; INTRAVENOUS PRN
Status: DISCONTINUED | OUTPATIENT
Start: 2018-07-24 | End: 2018-07-24

## 2018-07-24 RX ORDER — ONDANSETRON 4 MG/1
4 TABLET, ORALLY DISINTEGRATING ORAL EVERY 30 MIN PRN
Status: DISCONTINUED | OUTPATIENT
Start: 2018-07-24 | End: 2018-07-24 | Stop reason: HOSPADM

## 2018-07-24 RX ORDER — KETAMINE HYDROCHLORIDE 10 MG/ML
INJECTION INTRAMUSCULAR; INTRAVENOUS PRN
Status: DISCONTINUED | OUTPATIENT
Start: 2018-07-24 | End: 2018-07-24

## 2018-07-24 RX ORDER — PROPOFOL 10 MG/ML
INJECTION, EMULSION INTRAVENOUS CONTINUOUS PRN
Status: DISCONTINUED | OUTPATIENT
Start: 2018-07-24 | End: 2018-07-24

## 2018-07-24 RX ORDER — LIDOCAINE 50 MG/G
OINTMENT TOPICAL PRN
Qty: 35.44 G | Refills: 0 | Status: SHIPPED | OUTPATIENT
Start: 2018-07-24 | End: 2019-02-19

## 2018-07-24 RX ORDER — FENTANYL CITRATE 50 UG/ML
25-50 INJECTION, SOLUTION INTRAMUSCULAR; INTRAVENOUS
Status: DISCONTINUED | OUTPATIENT
Start: 2018-07-24 | End: 2018-07-24 | Stop reason: HOSPADM

## 2018-07-24 RX ORDER — MEPERIDINE HYDROCHLORIDE 25 MG/ML
12.5 INJECTION INTRAMUSCULAR; INTRAVENOUS; SUBCUTANEOUS
Status: DISCONTINUED | OUTPATIENT
Start: 2018-07-24 | End: 2018-07-24 | Stop reason: HOSPADM

## 2018-07-24 RX ORDER — METOPROLOL TARTRATE 1 MG/ML
1-2 INJECTION, SOLUTION INTRAVENOUS EVERY 5 MIN PRN
Status: DISCONTINUED | OUTPATIENT
Start: 2018-07-24 | End: 2018-07-24 | Stop reason: HOSPADM

## 2018-07-24 RX ADMIN — SODIUM BICARBONATE 34 ML: 84 INJECTION INTRAVENOUS at 15:27

## 2018-07-24 RX ADMIN — BUPIVACAINE HYDROCHLORIDE AND EPINEPHRINE BITARTRATE 30 ML: 2.5; .0091 INJECTION, SOLUTION EPIDURAL; INFILTRATION; INTRACAUDAL; PERINEURAL at 15:05

## 2018-07-24 RX ADMIN — PROPOFOL 120 MCG/KG/MIN: 10 INJECTION, EMULSION INTRAVENOUS at 14:45

## 2018-07-24 RX ADMIN — SODIUM CHLORIDE, POTASSIUM CHLORIDE, SODIUM LACTATE AND CALCIUM CHLORIDE: 600; 310; 30; 20 INJECTION, SOLUTION INTRAVENOUS at 14:09

## 2018-07-24 RX ADMIN — KETAMINE HYDROCHLORIDE 20 MG: 10 INJECTION, SOLUTION INTRAMUSCULAR; INTRAVENOUS at 14:48

## 2018-07-24 RX ADMIN — DEXAMETHASONE SODIUM PHOSPHATE 4 MG: 4 INJECTION, SOLUTION INTRA-ARTICULAR; INTRALESIONAL; INTRAMUSCULAR; INTRAVENOUS; SOFT TISSUE at 14:48

## 2018-07-24 RX ADMIN — MIDAZOLAM 1 MG: 1 INJECTION INTRAMUSCULAR; INTRAVENOUS at 14:30

## 2018-07-24 RX ADMIN — OXYCODONE HYDROCHLORIDE 5 MG: 5 TABLET ORAL at 16:18

## 2018-07-24 RX ADMIN — HYDROCODONE BITARTRATE AND ACETAMINOPHEN 10 ML: 500; 5 TABLET ORAL at 15:38

## 2018-07-24 RX ADMIN — LIDOCAINE HYDROCHLORIDE 40 MG: 10 INJECTION, SOLUTION INFILTRATION; PERINEURAL at 14:45

## 2018-07-24 RX ADMIN — ONDANSETRON 4 MG: 2 INJECTION INTRAMUSCULAR; INTRAVENOUS at 14:46

## 2018-07-24 RX ADMIN — MIDAZOLAM 1 MG: 1 INJECTION INTRAMUSCULAR; INTRAVENOUS at 14:45

## 2018-07-24 RX ADMIN — FENTANYL CITRATE 25 MCG: 50 INJECTION, SOLUTION INTRAMUSCULAR; INTRAVENOUS at 14:45

## 2018-07-24 NOTE — IP AVS SNAPSHOT
[facility]    [address]    [facility] [address]    Phone:  [phone]    Fax:  [phone]                                       After Visit Summary   7/24/2018    Grecia Kilgore    MRN: [id number]           After Visit Summary Signature Page     I have received my discharge instructions, and my questions have been answered. I have discussed any challenges I see with this plan with the nurse or doctor.    ..........................................................................................................................................  Patient/Patient Representative Signature      ..........................................................................................................................................  Patient Representative Print Name and Relationship to Patient    ..................................................               ................................................  Date                                            Time    ..........................................................................................................................................  Reviewed by Signature/Title    ...................................................              ..............................................  Date                                                            Time

## 2018-07-24 NOTE — OP NOTE
Procedure Date: 07/24/2018      PREOPERATIVE DIAGNOSIS:  Mixed hemorrhoids.      POSTOPERATIVE DIAGNOSIS:  Mixed hemorrhoids.      PROCEDURE:  Three quadrant hemorrhoidectomy.      SURGEON:  Lisa Patrick MD      ASSISTANT:  Jeff Bass MD, [facility] Fellow.      ANESTHESIA:  Monitored anesthesia care plus 30 mL of 1% lidocaine and 30 mL of 0.25% Marcaine with 1:200,000 epinephrine.      INDICATIONS:  Grecia is a 63-year-old woman who has had problems with hemorrhoids and constipation.  Her constipation is markedly improved and now is wishing to have her hemorrhoids corrected.  She has noted repeated vaginal infections and some urinary tract infections, which she relates to her hemorrhoids.  We discussed at length that although that is possible, those symptoms may not be improved with a hemorrhoidectomy.  We discussed risks of bleeding, infection, alteration of bowel control, scarring, recurrent hemorrhoids, prolonged wound healing and recovery related to hemorrhoid surgery.  She understood and wished to proceed.      FINDINGS:  There was a very prominent left-sided hemorrhoidal complex and a right anterior and posterior smaller hemorrhoidal complex.  No other unexpected pathology was noted.      DESCRIPTION OF PROCEDURE:  After informed consent was obtained, the patient was taken to the operating room.  She was positioned on the operating table in the prone jackknife position and sedated.  Perianal region was taped, prepped and draped in the usual fashion.  After appropriate timeout, we began inspecting the area.  There were some prominent hemorrhoidal skin tags and on gentle traction, the internal component could be seen to prolapse out in the 3 quadrants mentioned above.  Digital rectal exam did not reveal any evidence of stenosis, mass or fluctuance.  Steve bivalve was inserted and circumferential evaluation revealed some mildly irritated hemorrhoids in 3 quadrants, with some  intervening smaller hemorrhoids.  There was no evidence of inflammation or infection.  No fissure.      We began on the larger side, positioning the large Hill-Sanabria retractor, placing gentle tension on the external component of the left lateral.  An ellipse of skin was elevated using electrocautery and this was elevated up off of the sphincter complex.  Dissection was carried down into the anal canal to the apex of the hemorrhoidal complex laterally.  The apex was clamped and secured with a 3-0 Vicryl tie.  We elevated slight flaps on each side and then reapproximated the mucosa using a running locking 3-0 Vicryl.  The skin was reapproximated in a continuous fashion using the same running 3-0 Vicryl.  This was secured and nicely hemostatic.  A 2nd hemorrhoid, which was somewhat smaller in the right posterior, was treated in the similar fashion, ellipsing the skin, elevating the hemorrhoidal complex off of the sphincter complex and then reapproximating the mucosa and skin using 3-0 Vicryl.  The 3rd anterior right was much smaller and a smaller amount of skin was removed, as well as the internal component with the apex clamped and secured as similar to the prior 2 hemorrhoidal complexes.  At the completion of this, the perianal skin was flat and there was adequate anoderm for evacuation without undue tension.  We reinspected for hemostasis, which was excellent.  The 3 hemorrhoidal complexes were sent as a single specimen labeled hemorrhoids.  Dry gauze dressing was placed externally.  The patient was returned to the [facility] and taken to recovery in stable condition.        Of note, prior to the intervention, we did inject 30 mL of lidocaine and 30 mL of 0.25% Marcaine with 1:200,000 epinephrine to provide a block.      ESTIMATED BLOOD LOSS:  5 mL      COMPLICATIONS:  No immediate complications.         JOSE A GARCIA MD             D: 07/24/2018   T: 07/24/2018   MT: DANIEL      Name:     GRAHAM EDIL   MRN:       [id number]        Account:        [id number]   :      1955           Procedure Date: 2018      Document: [id number]       cc: Romel Patrick MD

## 2018-07-24 NOTE — ANESTHESIA PREPROCEDURE EVALUATION
Anesthesia Evaluation     . Pt has had prior anesthetic. Type: General    No history of anesthetic complications          ROS/MED HX    ENT/Pulmonary:     (+)sleep apnea, asthma , . .    Neurologic:     (+)migraines,     Cardiovascular:     (+) Dyslipidemia, ----. : . . . :. .       METS/Exercise Tolerance:     Hematologic:         Musculoskeletal:   (+) arthritis, , , -       GI/Hepatic:     (+) GERD hepatitis type A,       Renal/Genitourinary:         Endo:     (+) type II DM .      Psychiatric:     (+) psychiatric history anxiety and depression      Infectious Disease:         Malignancy:   (+) Malignancy History of Breast          Other:                     Physical Exam      Airway   Mallampati: II  TM distance: >3 FB  Neck ROM: full    Dental     Cardiovascular   Rhythm and rate: regular and normal      Pulmonary    breath sounds clear to auscultation                    Anesthesia Plan      History & Physical Review  History and physical reviewed and following examination; no interval change.    ASA Status:  3 .    NPO Status:  > 8 hours    Plan for MAC with Intravenous and Propofol induction. Maintenance will be TIVA.  Reason for MAC:  Deep or markedly invasive procedure (G8)  PONV prophylaxis:  Ondansetron (or other 5HT-3)       Postoperative Care  Postoperative pain management:  IV analgesics, Oral pain medications and Multi-modal analgesia.      Consents  Anesthetic plan, risks, benefits and alternatives discussed with:  Patient..                          .

## 2018-07-24 NOTE — ANESTHESIA CARE TRANSFER NOTE
Patient: Grecia P Kilgore    Procedure(s):  hemorrhoidectomy, internal and external - Wound Class: II-Clean Contaminated    Diagnosis: hemorrhoids  Diagnosis Additional Information: No value filed.    Anesthesia Type:   MAC     Note:  Airway :Room Air  Patient transferred to:Phase II  Comments: Pt to phase 2 recovery, VSS, reoprt to /RN      Vitals: (Last set prior to Anesthesia Care Transfer)    CRNA VITALS  7/24/2018 1456 - 7/24/2018 1535      7/24/2018             Pulse: 79    SpO2: 100 %                Electronically Signed By: RALPH Montalvo CRNA  July 24, 2018  3:35 PM

## 2018-07-24 NOTE — IP AVS SNAPSHOT
MRN:[id number]                      After Visit Summary   7/24/2018    Grecia Kilgore    MRN: [id number]           Thank you!     Thank you for choosing [facility] for your care. Our goal is always to provide you with excellent care. Hearing back from our patients is one way we can continue to improve our services. Please take a few minutes to complete the written survey that you may receive in the mail after you visit. If you would like to speak to someone directly about your visit please contact Patient Relations at [phone]. Thank you!          Patient Information     Date Of Birth          1955        About your hospital stay     You were admitted on:  July 24, 2018 You last received care in the:  [facility] PreOP/PostOP    You were discharged on:  July 24, 2018       Who to Call     For medical emergencies, please call [phone].  For non-urgent questions about your medical care, please call your primary care provider or clinic, [phone]  For questions related to your surgery, please call your surgery clinic        Attending Provider     Provider Specialty    Lisa Patrick MD Colon and Rectal Surgery       Primary Care Provider Office Phone # Fax #    Romel Mccall [phone]      After Care Instructions     Discharge Instructions       Follow up with Dr. Patrick or her PA in 3-4 weeks.  Call [phone] if you have increased pain, fevers, swelling, heavy bleeding or constipation.  For pain:  Okay to use ibuprofen as directed  Percocet was prescribed, do not use additional tylenol containing products if you are taking the percocet.  Minimize narcotics (percocet) as possible as these can be constipating.   Lidocaine 4 % over the counter can be used 3-4 times a day.   Take metamucil 1 tablespoon daily in 8 oz of fluid. If stools still hard, use Miralax 1 capful daily.  Regular diet.                  Your next 10 appointments already scheduled      Nov 23, 2018  3:00 PM CST   PHYSICAL with Romel Mccall [place]   [facility] [place]    [address] [id number] [phone]              Further instructions from your care team       DR. JOSE A GARCIA M.D.  CLINIC PHONE NUMBER:  [phone]     .        GENERAL ANESTHESIA OR SEDATION ADULT DISCHARGE INSTRUCTIONS   SPECIAL PRECAUTIONS FOR 24 HOURS AFTER SURGERY    IT IS NOT UNUSUAL TO FEEL LIGHT-HEADED OR FAINT, UP TO 24 HOURS AFTER SURGERY OR WHILE TAKING PAIN MEDICATION.  IF YOU HAVE THESE SYMPTOMS; SIT FOR A FEW MINUTES BEFORE STANDING AND HAVE SOMEONE ASSIST YOU WHEN YOU GET UP TO WALK OR USE THE BATHROOM.    YOU SHOULD REST AND RELAX FOR THE NEXT 24 HOURS AND YOU MUST MAKE ARRANGEMENTS TO HAVE SOMEONE STAY WITH YOU FOR AT LEAST 24 HOURS AFTER YOUR DISCHARGE.  AVOID HAZARDOUS AND STRENUOUS ACTIVITIES.  DO NOT MAKE IMPORTANT DECISIONS FOR 24 HOURS.    DO NOT DRIVE ANY VEHICLE OR OPERATE MECHANICAL EQUIPMENT FOR 24 HOURS FOLLOWING THE END OF YOUR SURGERY.  EVEN THOUGH YOU MAY FEEL NORMAL, YOUR REACTIONS MAY BE AFFECTED BY THE MEDICATION YOU HAVE RECEIVED.    DO NOT DRINK ALCOHOLIC BEVERAGES FOR 24 HOURS FOLLOWING YOUR SURGERY.    DRINK CLEAR LIQUIDS (APPLE JUICE, GINGER ALE, 7-UP, BROTH, ETC.).  PROGRESS TO YOUR REGULAR DIET AS YOU FEEL ABLE.    YOU MAY HAVE A DRY MOUTH, A SORE THROAT, MUSCLES ACHES OR TROUBLE SLEEPING.  THESE SHOULD GO AWAY AFTER 24 HOURS.    CALL YOUR DOCTOR FOR ANY OF THE FOLLOWING:  SIGNS OF INFECTION (FEVER, GROWING TENDERNESS AT THE SURGERY SITE, A LARGE AMOUNT OF DRAINAGE OR BLEEDING, SEVERE PAIN, FOUL-SMELLING DRAINAGE, REDNESS OR SWELLING.    IT HAS BEEN OVER 8 TO 10 HOURS SINCE SURGERY AND YOU ARE STILL NOT ABLE TO URINATE (PASS WATER).       Pending Results     Date and Time Order Name Status Description    7/24/2018 1514 Surgical pathology exam In process             Admission Information     Date & Time Provider  "Department Dept. Phone    7/24/2018 Lisa Patrick MD [facility] PreOP/PostOP [phone]      Your Vitals Were     Blood Pressure Temperature Respirations Height Weight Last Period    149/81 98.4  F (36.9  C) (Temporal) 16 1.702 m (5' 7\") 74.2 kg (163 lb 9.6 oz) [birth year]1985    Pulse Oximetry BMI (Body Mass Index)                98% 25.62 kg/m2          Care EveryWhere ID     This is your Care EveryWhere ID. This could be used by other organizations to access your [place] medical records  [id number]        Equal Access to Services     BRANNON MCKEON : Hadestela Romero, wamartha salinas, qaybjesse kaalmada iraida, aron kaba. So [facility] [phone].    ATENCIÓN: Si habla español, tiene a patel disposición servicios gratuitos de asistencia lingüística. [facility] [phone].    We comply with applicable federal civil rights laws and [place] laws. We do not discriminate on the basis of race, color, national origin, age, disability, sex, sexual orientation, or gender identity.               Review of your medicines      START taking        Dose / Directions    lidocaine 5 % ointment   Commonly known as:  XYLOCAINE   Used for:  External hemorrhoids        Apply topically as needed for moderate pain   Quantity:  35.44 g   Refills:  0       oxyCODONE-acetaminophen 5-325 MG per tablet   Commonly known as:  PERCOCET   Used for:  External hemorrhoids        Dose:  1-2 tablet   Take 1-2 tablets by mouth every 4 hours as needed for pain (moderate to severe)   Quantity:  12 tablet   Refills:  0         CONTINUE these medicines which have NOT CHANGED        Dose / Directions    albuterol 108 (90 Base) MCG/ACT Inhaler   Commonly known as:  PROAIR HFA/PROVENTIL HFA/VENTOLIN HFA   Used for:  Mild intermittent asthma without complication        Dose:  2 puff   Inhale 2 puffs into the lungs every 6 hours as needed for shortness of breath / dyspnea   Quantity:  1 Inhaler   Refills:  6 "       ATIVAN 0.5 MG tablet   Generic drug:  LORazepam        Dose:  0.25-0.5 mg   Take 0.5-1 tablets (0.25-0.5 mg) by mouth every 8 hours as needed for anxiety Take 30 minutes prior to departure.  Do not operate a vehicle after taking this medication   Refills:  0       cetirizine 10 MG tablet   Commonly known as:  zyrTEC   Used for:  Hives        Dose:  10 mg   Take 1 tablet (10 mg) by mouth every evening   Quantity:  90 tablet   Refills:  1       clindamycin 300 MG capsule   Commonly known as:  CLEOCIN   Used for:  BV (bacterial vaginosis)        Dose:  300 mg   Take 1 capsule (300 mg) by mouth 2 times daily for 7 days   Quantity:  14 capsule   Refills:  0       lisinopril 5 MG tablet   Commonly known as:  PRINIVIL/ZESTRIL   Used for:  Type 2 diabetes mellitus without complication, without long-term current use of insulin (H)        Dose:  5 mg   Take 1 tablet (5 mg) by mouth daily   Quantity:  90 tablet   Refills:  3       meloxicam 15 MG tablet   Commonly known as:  MOBIC        TAKE 1 TABLET BY MOUTH DAILY AS NEEDED.   Refills:  0       metFORMIN 500 MG tablet   Commonly known as:  GLUCOPHAGE   Used for:  Type 2 diabetes mellitus without complication, without long-term current use of insulin (H)        Dose:  500 mg   Take 1 tablet (500 mg) by mouth daily (with breakfast)   Quantity:  90 tablet   Refills:  3       mirtazapine 15 MG tablet   Commonly known as:  REMERON        nightly as needed   Refills:  2       montelukast 10 MG tablet   Commonly known as:  SINGULAIR   Used for:  Mild intermittent asthma without complication        Dose:  10 mg   Take 1 tablet (10 mg) by mouth At Bedtime   Quantity:  90 tablet   Refills:  3       ranitidine 150 MG tablet   Commonly known as:  ZANTAC   Used for:  Hives        Dose:  150 mg   Take 1 tablet (150 mg) by mouth 2 times daily   Quantity:  60 tablet   Refills:  1       simvastatin 40 MG tablet   Commonly known as:  ZOCOR   Used for:  Hyperlipidemia LDL goal <100         Dose:  40 mg   Take 1 tablet (40 mg) by mouth At Bedtime   Quantity:  90 tablet   Refills:  1            Where to get your medicines      These medications were sent to [facility] [place], [place] - [address] [address]  [address] [address] [address]     Phone:  [phone]     lidocaine 5 % ointment         Some of these will need a paper prescription and others can be bought over the counter. Ask your nurse if you have questions.     Bring a paper prescription for each of these medications     oxyCODONE-acetaminophen 5-325 MG per tablet                Protect others around you: Learn how to safely use, store and throw away your medicines at www.disposemymeds.org.        Information about OPIOIDS     PRESCRIPTION OPIOIDS: WHAT YOU NEED TO KNOW   We gave you an opioid (narcotic) pain medicine. It is important to manage your pain, but opioids are not always the best choice. You should first try all the other options your care team gave you. Take this medicine for as short a time (and as few doses) as possible.     These medicines have risks:    DO NOT drive when on new or higher doses of pain medicine. These medicines can affect your alertness and reaction times, and you could be arrested for driving under the influence (DUI). If you need to use opioids long-term, talk to your care team about driving.    DO NOT operate heave machinery    DO NOT do any other dangerous activities while taking these medicines.     DO NOT drink any alcohol while taking these medicines.      If the opioid prescribed includes acetaminophen, DO NOT take with any other medicines that contain acetaminophen. Read all labels carefully. Look for the word  acetaminophen  or  Tylenol.  Ask your pharmacist if you have questions or are unsure.    You can get addicted to pain medicines, especially if you have a history of addiction (chemical, alcohol or substance dependence). Talk to your care team about ways to  reduce this risk.    Store your pills in a secure place, locked if possible. We will not replace any lost or stolen medicine. If you don t finish your medicine, please throw away (dispose) as directed by your pharmacist. The [place] Pollution Control Agency has more information about safe disposal: https://www.pca.[facility].[place].us/living-green/managing-unwanted-medications.     All opioids tend to cause constipation. Drink plenty of water and eat foods that have a lot of fiber, such as fruits, vegetables, prune juice, apple juice and high-fiber cereal. Take a laxative (Miralax, milk of magnesia, Colace, Senna) if you don t move your bowels at least every other day.              Medication List: This is a list of all your medications and when to take them. Check marks below indicate your daily home schedule. Keep this list as a reference.      Medications           Morning Afternoon Evening Bedtime As Needed    albuterol 108 (90 Base) MCG/ACT Inhaler   Commonly known as:  PROAIR HFA/PROVENTIL HFA/VENTOLIN HFA   Inhale 2 puffs into the lungs every 6 hours as needed for shortness of breath / dyspnea                                ATIVAN 0.5 MG tablet   Take 0.5-1 tablets (0.25-0.5 mg) by mouth every 8 hours as needed for anxiety Take 30 minutes prior to departure.  Do not operate a vehicle after taking this medication   Generic drug:  LORazepam                                cetirizine 10 MG tablet   Commonly known as:  zyrTEC   Take 1 tablet (10 mg) by mouth every evening                                clindamycin 300 MG capsule   Commonly known as:  CLEOCIN   Take 1 capsule (300 mg) by mouth 2 times daily for 7 days                                lidocaine 5 % ointment   Commonly known as:  XYLOCAINE   Apply topically as needed for moderate pain                                lisinopril 5 MG tablet   Commonly known as:  PRINIVIL/ZESTRIL   Take 1 tablet (5 mg) by mouth daily                                meloxicam 15 MG  tablet   Commonly known as:  MOBIC   TAKE 1 TABLET BY MOUTH DAILY AS NEEDED.                                metFORMIN 500 MG tablet   Commonly known as:  GLUCOPHAGE   Take 1 tablet (500 mg) by mouth daily (with breakfast)                                mirtazapine 15 MG tablet   Commonly known as:  REMERON   nightly as needed                                montelukast 10 MG tablet   Commonly known as:  SINGULAIR   Take 1 tablet (10 mg) by mouth At Bedtime                                oxyCODONE-acetaminophen 5-325 MG per tablet   Commonly known as:  PERCOCET   Take 1-2 tablets by mouth every 4 hours as needed for pain (moderate to severe)                                ranitidine 150 MG tablet   Commonly known as:  ZANTAC   Take 1 tablet (150 mg) by mouth 2 times daily                                simvastatin 40 MG tablet   Commonly known as:  ZOCOR   Take 1 tablet (40 mg) by mouth At Bedtime

## 2018-07-24 NOTE — ANESTHESIA POSTPROCEDURE EVALUATION
Patient: Grecia RANGEL Kilgore    Procedure(s):  hemorrhoidectomy, internal and external - Wound Class: II-Clean Contaminated    Diagnosis:hemorrhoids  Diagnosis Additional Information: Mixed hemorrhoids    Anesthesia Type:  MAC    Note:  Anesthesia Post Evaluation    Patient location during evaluation: Phase 2  Patient participation: Able to fully participate in evaluation  Level of consciousness: awake and alert  Pain management: adequate  Airway patency: patent  Cardiovascular status: acceptable  Respiratory status: acceptable  Hydration status: acceptable  PONV: none     Anesthetic complications: None          Last vitals:  Vitals:    07/24/18 1308 07/24/18 1530   BP: 147/90 149/81   Resp: 16 16   Temp: 96.9  F (36.1  C) 98.4  F (36.9  C)   SpO2: 100% 98%         Electronically Signed By: Roman Tsang MD  July 24, 2018  3:53 PM

## 2018-07-24 NOTE — TELEPHONE ENCOUNTER
"\"I am scheduled to have surgery tomorrow at 2pm and don't know when I am supposed to do my enema.\"  Writer unable to find specific information in pt's chart.      The answering service for [facility] was used to page on-call Dr. Velasquez to pt directly at [phone].  Advised pt to call FNA back if she does not hear from the provider within 20 minutes.  She stated her understanding.    Ashleigh Craig RN/FNA    " English

## 2018-07-24 NOTE — BRIEF OP NOTE
[facility] Brief Operative Note    Pre-operative diagnosis: hemorrhoids   Post-operative diagnosis Mixed hemorrhoids   Procedure: Procedure(s):  hemorrhoidectomy, internal and external - Wound Class: II-Clean Contaminated   Surgeon(s): Surgeon(s) and Role:     * Lisa Patrick MD - Primary  Dr. Bass, [facility] CRS Fellow   Estimated blood loss: 5cc    Specimens:   ID Type Source Tests Collected by Time Destination   A : Hemorrhoid Tissue Hemorrhoid SURGICAL PATHOLOGY EXAM Lisa Patrick MD 7/24/2018  3:13 PM       Findings: Prominent 3 quadrant mixed hemorrhoids

## 2018-07-24 NOTE — DISCHARGE INSTRUCTIONS
DR. JOSE A GARCIA M.D.  [facility] PHONE NUMBER:  [phone]     .        GENERAL ANESTHESIA OR SEDATION ADULT DISCHARGE INSTRUCTIONS   SPECIAL PRECAUTIONS FOR 24 HOURS AFTER SURGERY    IT IS NOT UNUSUAL TO FEEL LIGHT-HEADED OR FAINT, UP TO 24 HOURS AFTER SURGERY OR WHILE TAKING PAIN MEDICATION.  IF YOU HAVE THESE SYMPTOMS; SIT FOR A FEW MINUTES BEFORE STANDING AND HAVE SOMEONE ASSIST YOU WHEN YOU GET UP TO WALK OR USE THE BATHROOM.    YOU SHOULD REST AND RELAX FOR THE NEXT 24 HOURS AND YOU MUST MAKE ARRANGEMENTS TO HAVE SOMEONE STAY WITH YOU FOR AT LEAST 24 HOURS AFTER YOUR DISCHARGE.  AVOID HAZARDOUS AND STRENUOUS ACTIVITIES.  DO NOT MAKE IMPORTANT DECISIONS FOR 24 HOURS.    DO NOT DRIVE ANY VEHICLE OR OPERATE MECHANICAL EQUIPMENT FOR 24 HOURS FOLLOWING THE END OF YOUR SURGERY.  EVEN THOUGH YOU MAY FEEL NORMAL, YOUR REACTIONS MAY BE AFFECTED BY THE MEDICATION YOU HAVE RECEIVED.    DO NOT DRINK ALCOHOLIC BEVERAGES FOR 24 HOURS FOLLOWING YOUR SURGERY.    DRINK CLEAR LIQUIDS (APPLE JUICE, GINGER ALE, 7-UP, BROTH, ETC.).  PROGRESS TO YOUR REGULAR DIET AS YOU FEEL ABLE.    YOU MAY HAVE A DRY MOUTH, A SORE THROAT, MUSCLES ACHES OR TROUBLE SLEEPING.  THESE SHOULD GO AWAY AFTER 24 HOURS.    CALL YOUR DOCTOR FOR ANY OF THE FOLLOWING:  SIGNS OF INFECTION (FEVER, GROWING TENDERNESS AT THE SURGERY SITE, A LARGE AMOUNT OF DRAINAGE OR BLEEDING, SEVERE PAIN, FOUL-SMELLING DRAINAGE, REDNESS OR SWELLING.    IT HAS BEEN OVER 8 TO 10 HOURS SINCE SURGERY AND YOU ARE STILL NOT ABLE TO URINATE (PASS WATER).

## 2018-07-25 LAB
COPATH REPORT: NORMAL
INTERPRETATION ECG - MUSE: NORMAL

## 2018-08-03 ENCOUNTER — TELEPHONE (OUTPATIENT)
Dept: FAMILY MEDICINE | Facility: CLINIC | Age: 63
End: 2018-08-03

## 2018-08-03 NOTE — TELEPHONE ENCOUNTER
Patient has updated PHQ-9 for Panel Management:  PHQ-9 SCORE 3/1/2018 3/6/2018 8/3/2018   Total Score - - -   Total Score 20 21 22       Patient reports taking Remeron 15 mg as as needed and not every night because she has a hard time waking up.  So when she cant sleep for 2-3 days she will use it.    Denies any concerns with medications.  States that historically was on a an antidepressant but was taken off it because she was allergic to it and put on Remeron.    LT disability ended in July.  Working on Resume to and having situational issues.  Can't afford to seek care on regular basis.      Montse Wharton RN - Triage  [facility]

## 2018-08-03 NOTE — TELEPHONE ENCOUNTER
Non-detailed message left to return our call.  Montse Wharton RN - Triage  [facility]

## 2018-08-03 NOTE — TELEPHONE ENCOUNTER
Pt is due now to update PHQ9.  Please call pt. Follow up dates 7/6 - 11/6.   PHQ-9 SCORE 11/15/2017 3/1/2018 3/6/2018   Total Score - - -   Total Score 17 20 21     Bradford HESS, CMA

## 2018-08-04 ASSESSMENT — PATIENT HEALTH QUESTIONNAIRE - PHQ9: SUM OF ALL RESPONSES TO PHQ QUESTIONS 1-9: 22

## 2018-08-09 DIAGNOSIS — E11.9 TYPE 2 DIABETES MELLITUS WITHOUT COMPLICATION, WITHOUT LONG-TERM CURRENT USE OF INSULIN (H): ICD-10-CM

## 2018-08-09 DIAGNOSIS — E78.5 HYPERLIPIDEMIA LDL GOAL <100: ICD-10-CM

## 2018-08-09 RX ORDER — SIMVASTATIN 40 MG
40 TABLET ORAL AT BEDTIME
Qty: 90 TABLET | Refills: 1 | Status: SHIPPED | OUTPATIENT
Start: 2018-08-09 | End: 2018-11-23

## 2018-08-09 RX ORDER — LISINOPRIL 5 MG/1
5 TABLET ORAL DAILY
Qty: 90 TABLET | Refills: 3 | Status: SHIPPED | OUTPATIENT
Start: 2018-08-09 | End: 2018-11-23

## 2018-08-09 NOTE — TELEPHONE ENCOUNTER
"Requested Prescriptions   Pending Prescriptions Disp Refills     metFORMIN (GLUCOPHAGE) 500 MG tablet  Last Written Prescription Date:  11-  Last Fill Quantity: 90 tablet,  # refills: 3   Last office visit: 7/20/2018 with prescribing provider:     Future Office Visit:     90 tablet 3     Sig: Take 1 tablet (500 mg) by mouth daily (with breakfast)    Biguanide Agents Failed    8/9/2018  8:11 AM       Failed - Blood pressure less than 140/90 in past 6 months    BP Readings from Last 3 Encounters:   07/24/18 (!) 142/92   07/20/18 118/72   06/21/18 114/74                Passed - Patient has documented LDL within the past 12 mos.    Recent Labs   Lab Test  05/07/18   1346   LDL  83            Passed - Patient has had a Microalbumin in the past 12 mos.    Recent Labs   Lab Test  11/15/17   1345   MICROL  27   UMALCR  11.40            Passed - Patient is age 10 or older       Passed - Patient has documented A1c within the specified period of time.    If HgbA1C is 8 or greater, it needs to be on file within the past 3 months.  If less than 8, must be on file within the past 6 months.     Recent Labs   Lab Test  05/07/18   1346   A1C  6.1*            Passed - Patient's CR is NOT>1.4 OR Patient's EGFR is NOT<45 within past 12 mos.    Recent Labs   Lab Test  07/24/18   1315   GFRESTIMATED  58*   GFRESTBLACK  70       Recent Labs   Lab Test  07/24/18   1315   CR  0.98            Passed - Patient does NOT have a diagnosis of CHF.       Passed - Patient is not pregnant       Passed - Patient has not had a positive pregnancy test within the past 12 mos.        Passed - Recent (6 mo) or future (30 days) visit within the authorizing provider's specialty    Patient had office visit in the last 6 months or has a visit in the next 30 days with authorizing provider or within the authorizing provider's specialty.  See \"Patient Info\" tab in inbasket, or \"Choose Columns\" in Meds & Orders section of the refill encounter.     " "               lisinopril (PRINIVIL/ZESTRIL) 5 MG tablet  Last Written Prescription Date:  11-  Last Fill Quantity: 90 tablet,  # refills: 3   Last office visit: 7/20/2018 with prescribing provider:     Future Office Visit:     90 tablet 3     Sig: Take 1 tablet (5 mg) by mouth daily    ACE Inhibitors (Including Combos) Protocol Failed    8/9/2018  8:11 AM       Failed - Blood pressure under 140/90 in past 12 months    BP Readings from Last 3 Encounters:   07/24/18 (!) 142/92   07/20/18 118/72   06/21/18 114/74                Passed - Recent (12 mo) or future (30 days) visit within the authorizing provider's specialty    Patient had office visit in the last 12 months or has a visit in the next 30 days with authorizing provider or within the authorizing provider's specialty.  See \"Patient Info\" tab in inbasket, or \"Choose Columns\" in Meds & Orders section of the refill encounter.           Passed - Patient is age 18 or older       Passed - No active pregnancy on record       Passed - Normal serum creatinine on file in past 12 months    Recent Labs   Lab Test  07/24/18   1315   CR  0.98            Passed - Normal serum potassium on file in past 12 months    Recent Labs   Lab Test  07/24/18   1315   POTASSIUM  3.8            Passed - No positive pregnancy test in past 12 months              simvastatin (ZOCOR) 40 MG tablet  Last Written Prescription Date:  11-  Last Fill Quantity: 90 tablet,  # refills: 1   Last office visit: 7/20/2018 with prescribing provider:     Future Office Visit:     90 tablet 1     Sig: Take 1 tablet (40 mg) by mouth At Bedtime    Statins Protocol Passed    8/9/2018  8:11 AM       Passed - LDL on file in past 12 months    Recent Labs   Lab Test  05/07/18   1346   LDL  83            Passed - No abnormal creatine kinase in past 12 months    No lab results found.            Passed - Recent (12 mo) or future (30 days) visit within the authorizing provider's specialty    Patient had " "office visit in the last 12 months or has a visit in the next 30 days with authorizing provider or within the authorizing provider's specialty.  See \"Patient Info\" tab in inbasket, or \"Choose Columns\" in Meds & Orders section of the refill encounter.           Passed - Patient is age 18 or older       Passed - No active pregnancy on record       Passed - No positive pregnancy test in past 12 months          "

## 2018-08-09 NOTE — TELEPHONE ENCOUNTER
Simvastatin approved.    Metformin and lisinopril:   Routing refill request to provider for review/approval because:  Labs out of range:  /92    Maria T Hendrickson RN  EP Triage

## 2018-08-10 NOTE — TELEPHONE ENCOUNTER
Prescription (s) of metformin and lisinopril were  Faxed to [facility]  Date:August 10, 2018  Signature:Kimmy GALINDO

## 2018-08-13 NOTE — TELEPHONE ENCOUNTER
Prescription of simvastatin was faxed to [facility]  Date:August 13, 2018  Signature:Kimmy Bragg TC

## 2018-08-16 ENCOUNTER — OFFICE VISIT (OUTPATIENT)
Dept: FAMILY MEDICINE | Facility: CLINIC | Age: 63
End: 2018-08-16
Payer: COMMERCIAL

## 2018-08-16 VITALS
DIASTOLIC BLOOD PRESSURE: 74 MMHG | SYSTOLIC BLOOD PRESSURE: 112 MMHG | RESPIRATION RATE: 16 BRPM | BODY MASS INDEX: 25.25 KG/M2 | HEART RATE: 75 BPM | OXYGEN SATURATION: 98 % | TEMPERATURE: 97.4 F | WEIGHT: 161.2 LBS

## 2018-08-16 DIAGNOSIS — B96.89 BACTERIAL VAGINOSIS: Primary | ICD-10-CM

## 2018-08-16 DIAGNOSIS — N76.0 BACTERIAL VAGINOSIS: Primary | ICD-10-CM

## 2018-08-16 LAB
ALBUMIN UR-MCNC: NEGATIVE MG/DL
APPEARANCE UR: CLEAR
BILIRUB UR QL STRIP: NEGATIVE
COLOR UR AUTO: YELLOW
GLUCOSE UR STRIP-MCNC: NEGATIVE MG/DL
HGB UR QL STRIP: NEGATIVE
KETONES UR STRIP-MCNC: NEGATIVE MG/DL
LEUKOCYTE ESTERASE UR QL STRIP: NEGATIVE
NITRATE UR QL: NEGATIVE
PH UR STRIP: 8 PH (ref 5–7)
SOURCE: ABNORMAL
SP GR UR STRIP: 1.02 (ref 1–1.03)
SPECIMEN SOURCE: ABNORMAL
UROBILINOGEN UR STRIP-ACNC: 1 EU/DL (ref 0.2–1)
WET PREP SPEC: ABNORMAL

## 2018-08-16 PROCEDURE — 87210 SMEAR WET MOUNT SALINE/INK: CPT | Performed by: NURSE PRACTITIONER

## 2018-08-16 PROCEDURE — 99213 OFFICE O/P EST LOW 20 MIN: CPT | Performed by: NURSE PRACTITIONER

## 2018-08-16 PROCEDURE — 81003 URINALYSIS AUTO W/O SCOPE: CPT | Performed by: NURSE PRACTITIONER

## 2018-08-16 RX ORDER — CLINDAMYCIN HCL 300 MG
300 CAPSULE ORAL 2 TIMES DAILY
Qty: 14 CAPSULE | Refills: 0 | Status: SHIPPED | OUTPATIENT
Start: 2018-08-16 | End: 2019-02-19

## 2018-08-16 NOTE — PROGRESS NOTES
SUBJECTIVE:   Grecia Kilgore is a 63 year old female who presents to clinic today for the following health issues:      Vaginal Symptoms      Duration: 3 weeks    Description  vaginal discharge - bubbly, white, chunky    Intensity:  severe    Accompanying signs and symptoms (fever/dysuria/abdominal or back pain): None    History  Sexually active: yes, single partner, contraception not required  Possibility of pregnancy: No  Recent antibiotic use: YES    Precipitating or alleviating factors: None    Therapies tried and outcome: Patient states she has had frequent vaginal infection since Jan. Patient states that Metronidazole is no longer effective.    Long hx of BV.  Recurrent every month.  Metronidazole not effective.  Used clindamycin last with good results but had to dc as she was having surgery.  She now notes a return of symptoms.      Problem list and histories reviewed & adjusted, as indicated.  Additional history: as documented    Patient Active Problem List   Diagnosis     Allergic rhinitis     External hemorrhoids     Vitamin D deficiency     Fibromyalgia     Osteoarthritis, knee     Positive PPD     Panic disorder     Genital herpes     Advanced directives, counseling/discussion     DCIS (ductal carcinoma in situ) of breast     Heart palpitations     Anxiety     Major depressive disorder, single episode, moderate (H)     Hyperlipidemia LDL goal <100     Type 2 diabetes mellitus without complication, without long-term current use of insulin (H)     Mild intermittent asthma without complication     BOO (obstructive sleep apnea)     Past Surgical History:   Procedure Laterality Date     ARTHRODESIS TOE(S)  9/16/2013    Procedure: ARTHRODESIS TOE(S);  BILATERAL GREAT TOE FUSION (C-ARM);  Surgeon: Mary Guan MD;  Location: [facility]     ARTHRODESIS TOE(S) Left 12/19/2016    Procedure: ARTHRODESIS TOE(S);  Surgeon: Mary Guan MD;  Location: [facility]     ARTHROSCOPY KNEE Left 2/2/11     BIOPSY  BREAST  2/7/2014    Procedure: BIOPSY BREAST;  Re-excision Left Breast Cavity for Margins ;  Surgeon: Lisset Amador DO;  [facility]     BIOPSY BREAST SEED LOCALIZATION  1/22/2014    Procedure: BIOPSY BREAST SEED LOCALIZATION;  Left Breast Seed Localized Excisional Biopsy ;  Surgeon: Lisset Amador DO;  Location: [facility]     COLONOSCOPY  2005    nl - repeat 2015     FOOT SURGERY Bilateral 2013     HEMORRHOIDECTOMY BANDING      multiple times     HEMORRHOIDECTOMY INTERNAL N/A 7/24/2018    Procedure: HEMORRHOIDECTOMY INTERNAL;  three quadrant hemorrhoidectomy;  Surgeon: Lisa Patrick MD;  Location: [facility]     HYSTERECTOMY  7/1996    fibroids     REMOVE HARDWARE FOOT Left 2015     REPAIR TENDON FOOT Left 12/19/2016    Procedure: REPAIR TENDON FOOT;  Surgeon: Mary Guan MD;  [facility]       Social History   Substance Use Topics     Smoking status: Never Smoker     Smokeless tobacco: Never Used     Alcohol use No     Family History   Problem Relation Age of Onset     Diabetes Mother      Breast Cancer Mother      Alcohol/Drug Father      Cancer Father      Diabetes Maternal Grandmother      Cerebrovascular Disease Maternal Grandmother      Cancer - colorectal Maternal Grandfather            Reviewed and updated as needed this visit by clinical staff       Reviewed and updated as needed this visit by Provider         ROS:  SEE HPI.    OBJECTIVE:     /74 (BP Location: Right arm, Patient Position: Chair, Cuff Size: Adult Regular)  Pulse 75  Temp 97.4  F (36.3  C) (Tympanic)  Resp 16  Wt 161 lb 3.2 oz (73.1 kg)  LMP 01/01/1985  SpO2 98%  BMI 25.25 kg/m2  Body mass index is 25.25 kg/(m^2).  GENERAL: healthy, alert and no distress  PSYCH: mentation appears normal, affect normal/bright    Diagnostic Test Results:  Results for orders placed or performed in visit on 08/16/18 (from the past 24 hour(s))   Wet prep   Result Value Ref Range    Specimen Description Vagina     Wet Prep Clue  cells seen (A)     Wet Prep No Trichomonas seen     Wet Prep No yeast seen    *UA reflex to Microscopic and Culture ([place] and [facility] (except [place] and [facility])   Result Value Ref Range    Color Urine Yellow     Appearance Urine Clear     Glucose Urine Negative NEG^Negative mg/dL    Bilirubin Urine Negative NEG^Negative    Ketones Urine Negative NEG^Negative mg/dL    Specific Gravity Urine 1.020 1.003 - 1.035    Blood Urine Negative NEG^Negative    pH Urine 8.0 (H) 5.0 - 7.0 pH    Protein Albumin Urine Negative NEG^Negative mg/dL    Urobilinogen Urine 1.0 0.2 - 1.0 EU/dL    Nitrite Urine Negative NEG^Negative    Leukocyte Esterase Urine Negative NEG^Negative    Source Midstream Urine        ASSESSMENT/PLAN:   1. Bacterial vaginosis  + clue cells.  Restart clindamycin.  Take probiotic.  See PCP for follow up lab to check for resolution after completed.  - Wet prep  - *UA reflex to Microscopic and Culture ([place] and [facility] (except [place] and [facility])  - clindamycin (CLEOCIN) 300 MG capsule; Take 1 capsule (300 mg) by mouth 2 times daily for 7 days  Dispense: 14 capsule; Refill: 0    Pt interested in seeing a provider at our clinic.  She is only interested in an MD.  Gave names of MDs at our clinic.    RALPH Mace Ra, CNP  [facility]

## 2018-08-16 NOTE — MR AVS SNAPSHOT
After Visit Summary   8/16/2018    Grecia Kilgore    MRN: [id number]           Patient Information     Date Of Birth          1955        Visit Information        Provider Department      8/16/2018 2:20 PM Jodi Hassan Ra, APRN CNP [facility]        Today's Diagnoses     Bacterial vaginosis    -  1      Care Instructions    There are 2 doctors in this clinic currently.  Dr. Lopez and Dr. Puckett.          Follow-ups after your visit        Follow-up notes from your care team     Return in about 4 weeks (around 9/13/2018) for follow up.      Your next 10 appointments already scheduled     Nov 23, 2018  3:00 PM CST   PHYSICAL with Romel Mccall MD   [facility] ([facility]    [address] [place] [address] [phone]              Who to contact     If you have questions or need follow up information about today's clinic visit or your schedule please contact [facility] directly at [phone].  Normal or non-critical lab and imaging results will be communicated to you by MyChart, letter or phone within 4 business days after the clinic has received the results. If you do not hear from us within 7 days, please contact the clinic through MyChart or phone. If you have a critical or abnormal lab result, we will notify you by phone as soon as possible.  Submit refill requests through mWater or call your pharmacy and they will forward the refill request to us. Please allow 3 business days for your refill to be completed.          Additional Information About Your Visit        Care EveryWhere ID     This is your Care EveryWhere ID. This could be used by other organizations to access your [place] medical records  [id number]        Your Vitals Were     Pulse Temperature Respirations Last Period Pulse Oximetry BMI (Body Mass Index)    75 97.4  F (36.3  C) (Tympanic) 16 01/01/1985 98% 25.25 kg/m2       Blood  Pressure from Last 3 Encounters:   08/16/18 112/74   07/24/18 (!) 142/92   07/20/18 118/72    Weight from Last 3 Encounters:   08/16/18 161 lb 3.2 oz (73.1 kg)   07/24/18 163 lb 9.6 oz (74.2 kg)   07/20/18 164 lb (74.4 kg)              We Performed the Following     *UA reflex to Microscopic and Culture ([place] and [facility] (except [place] and [place])     Wet prep          Today's Medication Changes          These changes are accurate as of 8/16/18  3:05 PM.  If you have any questions, ask your nurse or doctor.               Start taking these medicines.        Dose/Directions    clindamycin 300 MG capsule   Commonly known as:  CLEOCIN   Used for:  Bacterial vaginosis   Started by:  Jodi Hassan Ra, APRN CNP        Dose:  300 mg   Take 1 capsule (300 mg) by mouth 2 times daily for 7 days   Quantity:  14 capsule   Refills:  0            Where to get your medicines      These medications were sent to [facility] [place], [place] - [address]  [address] [place] [address]     Phone:  [phone]     clindamycin 300 MG capsule                Primary Care Provider Office Phone # Fax #    Romel Mccall [phone]       [address]  [place] [address]        Equal Access to Services     [facility]: Hadii aad ku hadasho Soomaali, waaxda luqadaha, qaybta kaalmada adeegyada, aron jordan . So [facility] [phone].    ATENCIÓN: Si habla español, tiene a patel disposición servicios gratuitos de asistencia lingüística. Roland al [phone].    We comply with applicable federal civil rights laws and [place] laws. We do not discriminate on the basis of race, color, national origin, age, disability, sex, sexual orientation, or gender identity.            Thank you!     Thank you for choosing [facility]  for your care. Our goal is always to provide you with excellent care. Hearing back from our patients is one  way we can continue to improve our services. Please take a few minutes to complete the written survey that you may receive in the mail after your visit with us. Thank you!             Your Updated Medication List - Protect others around you: Learn how to safely use, store and throw away your medicines at www.disposemymeds.org.          This list is accurate as of 8/16/18  3:05 PM.  Always use your most recent med list.                   Brand Name Dispense Instructions for use Diagnosis    albuterol 108 (90 Base) MCG/ACT inhaler    PROAIR HFA/PROVENTIL HFA/VENTOLIN HFA    1 Inhaler    Inhale 2 puffs into the lungs every 6 hours as needed for shortness of breath / dyspnea    Mild intermittent asthma without complication       ATIVAN 0.5 MG tablet   Generic drug:  LORazepam      Take 0.5-1 tablets (0.25-0.5 mg) by mouth every 8 hours as needed for anxiety Take 30 minutes prior to departure.  Do not operate a vehicle after taking this medication        cetirizine 10 MG tablet    zyrTEC    90 tablet    Take 1 tablet (10 mg) by mouth every evening    Hives       clindamycin 300 MG capsule    CLEOCIN    14 capsule    Take 1 capsule (300 mg) by mouth 2 times daily for 7 days    Bacterial vaginosis       lidocaine 5 % ointment    XYLOCAINE    35.44 g    Apply topically as needed for moderate pain    External hemorrhoids       lisinopril 5 MG tablet    PRINIVIL/ZESTRIL    90 tablet    Take 1 tablet (5 mg) by mouth daily    Type 2 diabetes mellitus without complication, without long-term current use of insulin (H)       meloxicam 15 MG tablet    MOBIC     TAKE 1 TABLET BY MOUTH DAILY AS NEEDED.        metFORMIN 500 MG tablet    GLUCOPHAGE    90 tablet    Take 1 tablet (500 mg) by mouth daily (with breakfast)    Type 2 diabetes mellitus without complication, without long-term current use of insulin (H)       mirtazapine 15 MG tablet    REMERON     nightly as needed        montelukast 10 MG tablet    SINGULAIR    90 tablet     Take 1 tablet (10 mg) by mouth At Bedtime    Mild intermittent asthma without complication       oxyCODONE-acetaminophen 5-325 MG per tablet    PERCOCET    12 tablet    Take 1-2 tablets by mouth every 4 hours as needed for pain (moderate to severe)    External hemorrhoids       ranitidine 150 MG tablet    ZANTAC    60 tablet    Take 1 tablet (150 mg) by mouth 2 times daily    Hives       simvastatin 40 MG tablet    ZOCOR    90 tablet    Take 1 tablet (40 mg) by mouth At Bedtime    Hyperlipidemia LDL goal <100

## 2018-08-20 NOTE — TELEPHONE ENCOUNTER
Script of simvastatin was faxed for a 2nd time to [facility] source  Not sure why [facility] is requesting this  Kimmy GALINDO

## 2018-08-22 ENCOUNTER — TRANSFERRED RECORDS (OUTPATIENT)
Dept: HEALTH INFORMATION MANAGEMENT | Facility: CLINIC | Age: 63
End: 2018-08-22

## 2018-09-04 DIAGNOSIS — J30.9 CHRONIC ALLERGIC RHINITIS, UNSPECIFIED SEASONALITY, UNSPECIFIED TRIGGER: Primary | ICD-10-CM

## 2018-09-04 RX ORDER — FLUTICASONE PROPIONATE 50 MCG
1 SPRAY, SUSPENSION (ML) NASAL DAILY
Qty: 1 BOTTLE | Refills: 5 | Status: SHIPPED | OUTPATIENT
Start: 2018-09-04 | End: 2019-10-01

## 2018-09-04 NOTE — PROGRESS NOTES
Prescription of fluticasone was faxed to [facility]  Date:September 4, 2018  Signature:Kimmy GALINDO

## 2018-09-14 ENCOUNTER — TELEPHONE (OUTPATIENT)
Dept: FAMILY MEDICINE | Facility: CLINIC | Age: 63
End: 2018-09-14

## 2018-09-14 NOTE — TELEPHONE ENCOUNTER
Pt calling wondering if she can get a referral to see neurologist for numbness in toes that started about 2 weeks ago while working [place] [employer] and being on feet for a long time.  She is diabetic.  Had a massage yesterday and states her back and legs had a lot of knots in them.  Her IT band felt like a metal bar but after the massage and today the toes feel better.  Would like to be seen to be safe due to diabetes.  If she does need to be seen she has an appt already scheduled for Monday 9/17 at the [facility] but is on a limited income now and trying to cut down on doctor's appts.    Message to Dr. Mccall:  Does pt need to be seen by md before referral to neuro?    Pt can be reached at [phone].    Maria T Hendrickson RN  EP Triage

## 2018-09-19 ENCOUNTER — OFFICE VISIT (OUTPATIENT)
Dept: FAMILY MEDICINE | Facility: CLINIC | Age: 63
End: 2018-09-19
Payer: COMMERCIAL

## 2018-09-19 VITALS
WEIGHT: 162.7 LBS | DIASTOLIC BLOOD PRESSURE: 82 MMHG | HEART RATE: 65 BPM | SYSTOLIC BLOOD PRESSURE: 134 MMHG | OXYGEN SATURATION: 100 % | RESPIRATION RATE: 16 BRPM | BODY MASS INDEX: 25.48 KG/M2 | TEMPERATURE: 97.8 F

## 2018-09-19 DIAGNOSIS — R20.0 NUMBNESS OF TOES: Primary | ICD-10-CM

## 2018-09-19 PROCEDURE — 99213 OFFICE O/P EST LOW 20 MIN: CPT | Performed by: NURSE PRACTITIONER

## 2018-09-19 NOTE — MR AVS SNAPSHOT
After Visit Summary   9/19/2018    Grecia Kilgore    MRN: [id number]           Patient Information     Date Of Birth          1955        Visit Information        Provider Department      9/19/2018 2:20 PM Jodi Hassan Ra, APRN CNP [facility]        Today's Diagnoses     Numbness of toes    -  1      Care Instructions    Monitor your symptoms.  If they persist you should schedule with podiatry.          Follow-ups after your visit        Follow-up notes from your care team     Return in about 2 weeks (around 10/3/2018) for follow up.      Your next 10 appointments already scheduled     Nov 23, 2018  3:00 PM CST   PHYSICAL with Romel Mccall MD   [facility]    [address] [place] [address] [phone]              Who to contact     If you have questions or need follow up information about today's clinic visit or your schedule please contact [facility] directly at [phone].  Normal or non-critical lab and imaging results will be communicated to you by MyChart, letter or phone within 4 business days after the clinic has received the results. If you do not hear from us within 7 days, please contact the clinic through MyChart or phone. If you have a critical or abnormal lab result, we will notify you by phone as soon as possible.  Submit refill requests through [email or web address] or call your pharmacy and they will forward the refill request to us. Please allow 3 business days for your refill to be completed.          Additional Information About Your Visit        Care EveryWhere ID     This is your Care EveryWhere ID. This could be used by other organizations to access your [facility] [place] medical records  [id number]        Your Vitals Were     Pulse Temperature Respirations Last Period Pulse Oximetry BMI (Body Mass Index)    65 97.8  F (36.6  C) (Tympanic) 16 01/01/1985 100% 25.48 kg/m2       Blood  Pressure from Last 3 Encounters:   09/19/18 134/82   08/16/18 112/74   07/24/18 (!) 142/92    Weight from Last 3 Encounters:   09/19/18 162 lb 11.2 oz (73.8 kg)   08/16/18 161 lb 3.2 oz (73.1 kg)   07/24/18 163 lb 9.6 oz (74.2 kg)              Today, you had the following     No orders found for display       Primary Care Provider Office Phone # Fax #    Romel Mccall [phone]       [address] [place] [facility] [place] [address]        Equal Access to Services     [facility]: Hadii aad nancy hadasho Sojuan, waaxda luqadaha, qaybta kaalmada iraida, aron jordan . So [facility] [phone].    ATENCIÓN: Si habla español, tiene a patel disposición servicios gratuitos de asistencia lingüística. [facility] [phone].    We comply with applicable federal civil rights laws and [place] laws. We do not discriminate on the basis of race, color, national origin, age, disability, sex, sexual orientation, or gender identity.            Thank you!     Thank you for choosing [facility]  for your care. Our goal is always to provide you with excellent care. Hearing back from our patients is one way we can continue to improve our services. Please take a few minutes to complete the written survey that you may receive in the mail after your visit with us. Thank you!             Your Updated Medication List - Protect others around you: Learn how to safely use, store and throw away your medicines at www.disposemymeds.org.          This list is accurate as of 9/19/18 11:59 PM.  Always use your most recent med list.                   Brand Name Dispense Instructions for use Diagnosis    albuterol 108 (90 Base) MCG/ACT inhaler    PROAIR HFA/PROVENTIL HFA/VENTOLIN HFA    1 Inhaler    Inhale 2 puffs into the lungs every 6 hours as needed for shortness of breath / dyspnea    Mild intermittent asthma without complication       ATIVAN 0.5 MG tablet   Generic drug:  LORazepam      Take  0.5-1 tablets (0.25-0.5 mg) by mouth every 8 hours as needed for anxiety Take 30 minutes prior to departure.  Do not operate a vehicle after taking this medication        cetirizine 10 MG tablet    zyrTEC    90 tablet    Take 1 tablet (10 mg) by mouth every evening    Hives       fluticasone 50 MCG/ACT spray    FLONASE    1 Bottle    Spray 1 spray into both nostrils daily    Chronic allergic rhinitis, unspecified seasonality, unspecified trigger       lidocaine 5 % ointment    XYLOCAINE    35.44 g    Apply topically as needed for moderate pain    External hemorrhoids       lisinopril 5 MG tablet    PRINIVIL/ZESTRIL    90 tablet    Take 1 tablet (5 mg) by mouth daily    Type 2 diabetes mellitus without complication, without long-term current use of insulin (H)       meloxicam 15 MG tablet    MOBIC     TAKE 1 TABLET BY MOUTH DAILY AS NEEDED.        metFORMIN 500 MG tablet    GLUCOPHAGE    90 tablet    Take 1 tablet (500 mg) by mouth daily (with breakfast)    Type 2 diabetes mellitus without complication, without long-term current use of insulin (H)       mirtazapine 15 MG tablet    REMERON     nightly as needed        montelukast 10 MG tablet    SINGULAIR    90 tablet    Take 1 tablet (10 mg) by mouth At Bedtime    Mild intermittent asthma without complication       oxyCODONE-acetaminophen 5-325 MG per tablet    PERCOCET    12 tablet    Take 1-2 tablets by mouth every 4 hours as needed for pain (moderate to severe)    External hemorrhoids       ranitidine 150 MG tablet    ZANTAC    60 tablet    Take 1 tablet (150 mg) by mouth 2 times daily    Hives       simvastatin 40 MG tablet    ZOCOR    90 tablet    Take 1 tablet (40 mg) by mouth At Bedtime    Hyperlipidemia LDL goal <100

## 2018-09-19 NOTE — PROGRESS NOTES
SUBJECTIVE:   Grecai Kilgore is a 63 year old female who presents to clinic today for the following health issues:      Musculoskeletal problem/pain      Duration: intermittent for months     Description  Location: 2nd and 3rd left toes    Intensity:  mild    Accompanying signs and symptoms: numbness and tingling    History  Previous similar problem: no   Previous evaluation:  none    Precipitating or alleviating factors:  Trauma or overuse: no   Aggravating factors include: none    Therapies tried and outcome: nothing    Pt here today concerned with L foot 2nd and 3rd toe concerns.  She first noticed the issue after wearing shoes.  Had some pain at the distal end of her toes.  Realized her toenails were too long and were hitting her shoes.    She trimmed them and felt immediate relief.  However, intermittently she has had some tingling in the toes since that time.  No other pain.  No other areas of numbness or tingling.    Problem list and histories reviewed & adjusted, as indicated.  Additional history: as documented    Patient Active Problem List   Diagnosis     Allergic rhinitis     External hemorrhoids     Vitamin D deficiency     Fibromyalgia     Osteoarthritis, knee     Positive PPD     Panic disorder     Genital herpes     Advanced directives, counseling/discussion     DCIS (ductal carcinoma in situ) of breast     Heart palpitations     Anxiety     Major depressive disorder, single episode, moderate (H)     Hyperlipidemia LDL goal <100     Type 2 diabetes mellitus without complication, without long-term current use of insulin (H)     Mild intermittent asthma without complication     OBO (obstructive sleep apnea)     Past Surgical History:   Procedure Laterality Date     ARTHRODESIS TOE(S)  9/16/2013    Procedure: ARTHRODESIS TOE(S);  BILATERAL GREAT TOE FUSION (C-ARM);  Surgeon: Mary Guan MD;  Location: [facility]     ARTHRODESIS TOE(S) Left 12/19/2016    Procedure: ARTHRODESIS TOE(S);  Surgeon:  Mary Guan MD;  Location: [facility]     ARTHROSCOPY KNEE Left 2/2/11     BIOPSY BREAST  2/7/2014    Procedure: BIOPSY BREAST;  Re-excision Left Breast Cavity for Margins ;  Surgeon: Lisset Amador DO;  [facility]: [facility]     BIOPSY BREAST SEED LOCALIZATION  1/22/2014    Procedure: BIOPSY BREAST SEED LOCALIZATION;  Left Breast Seed Localized Excisional Biopsy ;  Surgeon: Lisset Amador DO;  Location: [facility]     COLONOSCOPY  2005    nl - repeat 2015     FOOT SURGERY Bilateral 2013     HEMORRHOIDECTOMY BANDING      multiple times     HEMORRHOIDECTOMY INTERNAL N/A 7/24/2018    Procedure: HEMORRHOIDECTOMY INTERNAL;  three quadrant hemorrhoidectomy;  Surgeon: Lisa Patrick MD;  Location: [facility]     HYSTERECTOMY  7/1996    fibroids     REMOVE HARDWARE FOOT Left 2015     REPAIR TENDON FOOT Left 12/19/2016    Procedure: REPAIR TENDON FOOT;  Surgeon: Mary Guan MD;  Location: [facility]       Social History   Substance Use Topics     Smoking status: Never Smoker     Smokeless tobacco: Never Used     Alcohol use No     Family History   Problem Relation Age of Onset     Diabetes Mother      Breast Cancer Mother      Alcohol/Drug Father      Cancer Father      Diabetes Maternal Grandmother      Cerebrovascular Disease Maternal Grandmother      Cancer - colorectal Maternal Grandfather            Reviewed and updated as needed this visit by clinical staff       Reviewed and updated as needed this visit by Provider         ROS:  SEE HPI.    OBJECTIVE:     /82 (BP Location: Right arm, Patient Position: Chair, Cuff Size: Adult Regular)  Pulse 65  Temp 97.8  F (36.6  C) (Tympanic)  Resp 16  Wt 162 lb 11.2 oz (73.8 kg)  LMP 01/01/1985  SpO2 100%  BMI 25.48 kg/m2  Body mass index is 25.48 kg/(m^2).  GENERAL: healthy, alert and no distress  MS: No redness, bruising, swelling of the toes.  Normal capillary refill L foot toes.  Normal pedal and PT pulses.    PSYCH: mentation appears normal, affect  normal/bright    Diagnostic Test Results:  Lab Results   Component Value Date    A1C 6.1 05/07/2018    A1C 6.0 11/15/2017    A1C 5.6 06/19/2017    A1C 5.8 11/11/2016    A1C 5.9 07/08/2016         ASSESSMENT/PLAN:   1. Numbness of toes  63 y.o. Female, hx of well controlled DM, here today regarding some numbness of her toes that occurred after trauma.  She was concerned this could be related to blood sugars.  I explained that in this instance it is likely due to the trauma.  Monitor.  If symptoms persist there is a possibility there could be something else causing her symptoms in which case she would benefit from a consult with podiatry for any underlying nodule that could be contributing.  Pt agrees with plan and verbalized understanding.    RALPH Mace Ra [facility]

## 2018-09-20 ASSESSMENT — ASTHMA QUESTIONNAIRES: ACT_TOTALSCORE: 25

## 2018-11-23 ENCOUNTER — OFFICE VISIT (OUTPATIENT)
Dept: FAMILY MEDICINE | Facility: CLINIC | Age: 63
End: 2018-11-23
Payer: COMMERCIAL

## 2018-11-23 VITALS
TEMPERATURE: 97.6 F | DIASTOLIC BLOOD PRESSURE: 70 MMHG | HEIGHT: 67 IN | OXYGEN SATURATION: 100 % | HEART RATE: 57 BPM | SYSTOLIC BLOOD PRESSURE: 138 MMHG | BODY MASS INDEX: 25.58 KG/M2 | WEIGHT: 163 LBS

## 2018-11-23 DIAGNOSIS — Z23 NEED FOR PROPHYLACTIC VACCINATION AND INOCULATION AGAINST INFLUENZA: ICD-10-CM

## 2018-11-23 DIAGNOSIS — E78.5 HYPERLIPIDEMIA LDL GOAL <100: ICD-10-CM

## 2018-11-23 DIAGNOSIS — L50.9 HIVES: ICD-10-CM

## 2018-11-23 DIAGNOSIS — N89.8 VAGINAL DISCHARGE: ICD-10-CM

## 2018-11-23 DIAGNOSIS — E11.9 TYPE 2 DIABETES MELLITUS WITHOUT COMPLICATION, WITHOUT LONG-TERM CURRENT USE OF INSULIN (H): ICD-10-CM

## 2018-11-23 DIAGNOSIS — N76.0 BV (BACTERIAL VAGINOSIS): ICD-10-CM

## 2018-11-23 DIAGNOSIS — J45.20 MILD INTERMITTENT ASTHMA WITHOUT COMPLICATION: ICD-10-CM

## 2018-11-23 DIAGNOSIS — Z00.00 ANNUAL PHYSICAL EXAM: Primary | ICD-10-CM

## 2018-11-23 DIAGNOSIS — J31.0 CHRONIC RHINITIS: ICD-10-CM

## 2018-11-23 DIAGNOSIS — B96.89 BV (BACTERIAL VAGINOSIS): ICD-10-CM

## 2018-11-23 LAB
HBA1C MFR BLD: 6 % (ref 0–5.6)
HGB BLD-MCNC: 12.7 G/DL (ref 11.7–15.7)
SPECIMEN SOURCE: ABNORMAL
WET PREP SPEC: ABNORMAL

## 2018-11-23 PROCEDURE — 90471 IMMUNIZATION ADMIN: CPT | Performed by: FAMILY MEDICINE

## 2018-11-23 PROCEDURE — 87210 SMEAR WET MOUNT SALINE/INK: CPT | Performed by: FAMILY MEDICINE

## 2018-11-23 PROCEDURE — 99396 PREV VISIT EST AGE 40-64: CPT | Mod: 25 | Performed by: FAMILY MEDICINE

## 2018-11-23 PROCEDURE — 83036 HEMOGLOBIN GLYCOSYLATED A1C: CPT | Performed by: FAMILY MEDICINE

## 2018-11-23 PROCEDURE — 80048 BASIC METABOLIC PNL TOTAL CA: CPT | Performed by: FAMILY MEDICINE

## 2018-11-23 PROCEDURE — 99213 OFFICE O/P EST LOW 20 MIN: CPT | Mod: 25 | Performed by: FAMILY MEDICINE

## 2018-11-23 PROCEDURE — 85018 HEMOGLOBIN: CPT | Performed by: FAMILY MEDICINE

## 2018-11-23 PROCEDURE — 90686 IIV4 VACC NO PRSV 0.5 ML IM: CPT | Performed by: FAMILY MEDICINE

## 2018-11-23 PROCEDURE — 82043 UR ALBUMIN QUANTITATIVE: CPT | Performed by: FAMILY MEDICINE

## 2018-11-23 PROCEDURE — 36415 COLL VENOUS BLD VENIPUNCTURE: CPT | Performed by: FAMILY MEDICINE

## 2018-11-23 RX ORDER — LISINOPRIL 5 MG/1
5 TABLET ORAL DAILY
Qty: 90 TABLET | Refills: 3 | Status: SHIPPED | OUTPATIENT
Start: 2018-11-23 | End: 2019-09-11

## 2018-11-23 RX ORDER — SIMVASTATIN 40 MG
40 TABLET ORAL AT BEDTIME
Qty: 90 TABLET | Refills: 1 | Status: SHIPPED | OUTPATIENT
Start: 2018-11-23 | End: 2019-07-30

## 2018-11-23 RX ORDER — ALBUTEROL SULFATE 90 UG/1
2 AEROSOL, METERED RESPIRATORY (INHALATION) EVERY 6 HOURS PRN
Qty: 1 INHALER | Refills: 6 | Status: SHIPPED | OUTPATIENT
Start: 2018-11-23 | End: 2019-09-11

## 2018-11-23 RX ORDER — MONTELUKAST SODIUM 10 MG/1
10 TABLET ORAL AT BEDTIME
Qty: 90 TABLET | Refills: 3 | Status: SHIPPED | OUTPATIENT
Start: 2018-11-23 | End: 2019-09-11

## 2018-11-23 RX ORDER — METRONIDAZOLE 250 MG/1
500 TABLET ORAL 2 TIMES DAILY
Qty: 28 TABLET | Refills: 0 | Status: SHIPPED | OUTPATIENT
Start: 2018-11-23 | End: 2018-11-23

## 2018-11-23 RX ORDER — CETIRIZINE HYDROCHLORIDE 10 MG/1
10 TABLET ORAL EVERY EVENING
Qty: 90 TABLET | Refills: 3 | Status: SHIPPED | OUTPATIENT
Start: 2018-11-23 | End: 2019-09-11

## 2018-11-23 RX ORDER — METRONIDAZOLE 500 MG/1
500 TABLET ORAL 2 TIMES DAILY
Qty: 14 TABLET | Refills: 0 | Status: SHIPPED | OUTPATIENT
Start: 2018-11-23 | End: 2019-02-19

## 2018-11-23 ASSESSMENT — ANXIETY QUESTIONNAIRES
5. BEING SO RESTLESS THAT IT IS HARD TO SIT STILL: SEVERAL DAYS
3. WORRYING TOO MUCH ABOUT DIFFERENT THINGS: MORE THAN HALF THE DAYS
7. FEELING AFRAID AS IF SOMETHING AWFUL MIGHT HAPPEN: SEVERAL DAYS
1. FEELING NERVOUS, ANXIOUS, OR ON EDGE: SEVERAL DAYS
IF YOU CHECKED OFF ANY PROBLEMS ON THIS QUESTIONNAIRE, HOW DIFFICULT HAVE THESE PROBLEMS MADE IT FOR YOU TO DO YOUR WORK, TAKE CARE OF THINGS AT HOME, OR GET ALONG WITH OTHER PEOPLE: VERY DIFFICULT
2. NOT BEING ABLE TO STOP OR CONTROL WORRYING: MORE THAN HALF THE DAYS
6. BECOMING EASILY ANNOYED OR IRRITABLE: SEVERAL DAYS
GAD7 TOTAL SCORE: 11

## 2018-11-23 ASSESSMENT — PATIENT HEALTH QUESTIONNAIRE - PHQ9
SUM OF ALL RESPONSES TO PHQ QUESTIONS 1-9: 12
5. POOR APPETITE OR OVEREATING: NEARLY EVERY DAY

## 2018-11-23 NOTE — LETTER
November 26, 2018      Grecia CLAIRE Jame  [address]   [facility] [address]        Dear ,    I have reviewed your recent labs.   I am glad to notify that your diabetes is well controlled.  Hemoglobin is normal.  Blood work suggests that you may have mild dehydration.  Drink more water.  Urine testing also came back normal.    Plan to revisit in 6 months again for recheck        If you have any questions or concerns, please call the clinic at the number listed above.       Sincerely,        Romel Mccall MD

## 2018-11-23 NOTE — MR AVS SNAPSHOT
After Visit Summary   11/23/2018    Grecia Kilgore    MRN: [id number]           Patient Information     Date Of Birth          1955        Visit Information        Provider Department      11/23/2018 3:00 PM Romel Mccall MD [facility]        Today's Diagnoses     Annual physical exam    -  1    Vaginal discharge        Hyperlipidemia LDL goal <100        Mild intermittent asthma without complication        Hives        Type 2 diabetes mellitus without complication, without long-term current use of insulin (H)        Chronic rhinitis        BV (bacterial vaginosis)          Care Instructions      Preventive Health Recommendations  Female Ages 50 - 64    Yearly exam: See your health care provider every year in order to  o Review health changes.   o Discuss preventive care.    o Review your medicines if your doctor has prescribed any.      Get a Pap test every three years (unless you have an abnormal result and your provider advises testing more often).    If you get Pap tests with HPV test, you only need to test every 5 years, unless you have an abnormal result.     You do not need a Pap test if your uterus was removed (hysterectomy) and you have not had cancer.    You should be tested each year for STDs (sexually transmitted diseases) if you're at risk.     Have a mammogram every 1 to 2 years.    Have a colonoscopy at age 50, or have a yearly FIT test (stool test). These exams screen for colon cancer.      Have a cholesterol test every 5 years, or more often if advised.    Have a diabetes test (fasting glucose) every three years. If you are at risk for diabetes, you should have this test more often.     If you are at risk for osteoporosis (brittle bone disease), think about having a bone density scan (DEXA).    Shots: Get a flu shot each year. Get a tetanus shot every 10 years.    Nutrition:     Eat at least 5 servings of fruits and vegetables each day.    Eat whole-grain  "bread, whole-wheat pasta and brown rice instead of white grains and rice.    Get adequate Calcium and Vitamin D.     Lifestyle    Exercise at least 150 minutes a week (30 minutes a day, 5 days a week). This will help you control your weight and prevent disease.    Limit alcohol to one drink per day.    No smoking.     Wear sunscreen to prevent skin cancer.     See your dentist every six months for an exam and cleaning.    See your eye doctor every 1 to 2 years.            Follow-ups after your visit        Follow-up notes from your care team     Return in about 6 months (around 5/23/2019) for Diabetes Recheck, Cholesterol Recheck.      Your next 10 appointments already scheduled     Nov 23, 2018  3:00 PM CST   PHYSICAL with Romel Mccall MD   [facility] ([address] [id number] [phone]              Who to contact     If you have questions or need follow up information about today's clinic visit or your schedule please contact [facility] directly at [phone].  Normal or non-critical lab and imaging results will be communicated to you by MyChart, letter or phone within 4 business days after the clinic has received the results. If you do not hear from us within 7 days, please contact the clinic through [facility] or phone. If you have a critical or abnormal lab result, we will notify you by phone as soon as possible.  Submit refill requests through [employer] or call your pharmacy and they will forward the refill request to us. Please allow 3 business days for your refill to be completed.          Additional Information About Your Visit        [facility] Information     [employer] lets you send messages to your doctor, view your test results, renew your prescriptions, schedule appointments and more. To sign up, go to [email or web address] . Click on \"Log in\" on the left side of the screen, which will take you to the " "Welcome page. Then click on \"Sign up Now\" on the right side of the page.     You will be asked to enter the access code listed below, as well as some personal information. Please follow the directions to create your username and password.     Your access code is: [email or web address]  Expires: 2019  1:41 PM     Your access code will  in 90 days. If you need help or a new code, please call your [facility] clinic or [phone].        Care EveryWhere ID     This is your Care EveryWhere ID. This could be used by other organizations to access your [facility] medical records  [id number]        Your Vitals Were     Pulse Temperature Height Last Period Pulse Oximetry BMI (Body Mass Index)    57 97.6  F (36.4  C) (Tympanic) 5' 7\" (1.702 m) 1985 100% 25.53 kg/m2       Blood Pressure from Last 3 Encounters:   18 138/70   18 134/82   18 112/74    Weight from Last 3 Encounters:   18 163 lb (73.9 kg)   18 162 lb 11.2 oz (73.8 kg)   18 161 lb 3.2 oz (73.1 kg)              We Performed the Following     Albumin Random Urine Quantitative with Creat Ratio     Basic metabolic panel     Hemoglobin A1c     Hemoglobin     Wet prep          Today's Medication Changes          These changes are accurate as of 18  1:41 PM.  If you have any questions, ask your nurse or doctor.               Start taking these medicines.        Dose/Directions    metroNIDAZOLE 250 MG tablet   Commonly known as:  FLAGYL   Used for:  BV (bacterial vaginosis)   Started by:  Romel Mccall MD        Dose:  500 mg   Take 2 tablets (500 mg) by mouth 2 times daily for 7 days   Quantity:  28 tablet   Refills:  0            Where to get your medicines      These medications [facility] [place] [place] [address] [facility] [address]     Phone:  [phone]     metroNIDAZOLE 250 MG tablet         [facility] [address]  [place], [place] - [address] [facility]  [address] [place] [address]     Phone:  [phone]     albuterol 108 (90 Base) MCG/ACT inhaler    cetirizine 10 MG tablet    lisinopril 5 MG tablet    metFORMIN 500 MG tablet    montelukast 10 MG tablet    ranitidine 150 MG tablet    simvastatin 40 MG tablet                Primary Care Provider Office Phone # Fax #    Romel Mccall [phone]       [address]  [place] [address]        Equal Access to Services     [facility]: Hadii aad ku hadasho Soomaali, waaxda luqadaha, qaybta kaalmada adeegyada, waxay bebein hayaan orly jordan . So [facility] [phone].    ATENCIÓN: Si habla español, tiene a patel disposición servicios gratuitos de asistencia lingüística. [facility] [phone].    We comply with applicable federal civil rights laws and [place] laws. We do not discriminate on the basis of race, color, national origin, age, disability, sex, sexual orientation, or gender identity.            Thank you!     Thank you for choosing [facility] [place]  for your care. Our goal is always to provide you with excellent care. Hearing back from our patients is one way we can continue to improve our services. Please take a few minutes to complete the written survey that you may receive in the mail after your visit with us. Thank you!             Your Updated Medication List - Protect others around you: Learn how to safely use, store and throw away your medicines at www.disposemymeds.org.          This list is accurate as of 11/23/18  1:41 PM.  Always use your most recent med list.                   Brand Name Dispense Instructions for use Diagnosis    albuterol 108 (90 Base) MCG/ACT inhaler    PROAIR HFA/PROVENTIL HFA/VENTOLIN HFA    1 Inhaler    Inhale 2 puffs into the lungs every 6 hours as needed for shortness of breath / dyspnea    Mild intermittent asthma without  complication       ATIVAN 0.5 MG tablet   Generic drug:  LORazepam      Take 0.5-1 tablets (0.25-0.5 mg) by mouth every 8 hours as needed for anxiety Take 30 minutes prior to departure.  Do not operate a vehicle after taking this medication        cetirizine 10 MG tablet    zyrTEC    90 tablet    Take 1 tablet (10 mg) by mouth every evening    Hives       fluticasone 50 MCG/ACT spray    FLONASE    1 Bottle    Spray 1 spray into both nostrils daily    Chronic allergic rhinitis, unspecified seasonality, unspecified trigger       lidocaine 5 % ointment    XYLOCAINE    35.44 g    Apply topically as needed for moderate pain    External hemorrhoids       lisinopril 5 MG tablet    PRINIVIL/ZESTRIL    90 tablet    Take 1 tablet (5 mg) by mouth daily    Type 2 diabetes mellitus without complication, without long-term current use of insulin (H)       meloxicam 15 MG tablet    MOBIC     TAKE 1 TABLET BY MOUTH DAILY AS NEEDED.        metFORMIN 500 MG tablet    GLUCOPHAGE    90 tablet    Take 1 tablet (500 mg) by mouth daily (with breakfast)    Type 2 diabetes mellitus without complication, without long-term current use of insulin (H)       metroNIDAZOLE 250 MG tablet    FLAGYL    28 tablet    Take 2 tablets (500 mg) by mouth 2 times daily for 7 days    BV (bacterial vaginosis)       mirtazapine 15 MG tablet    REMERON     nightly as needed        montelukast 10 MG tablet    SINGULAIR    90 tablet    Take 1 tablet (10 mg) by mouth At Bedtime    Mild intermittent asthma without complication       oxyCODONE-acetaminophen 5-325 MG tablet    PERCOCET    12 tablet    Take 1-2 tablets by mouth every 4 hours as needed for pain (moderate to severe)    External hemorrhoids       ranitidine 150 MG tablet    ZANTAC    180 tablet    Take 1 tablet (150 mg) by mouth 2 times daily    Hives       simvastatin 40 MG tablet    ZOCOR    90 tablet    Take 1 tablet (40 mg) by mouth At Bedtime    Hyperlipidemia LDL goal <100

## 2018-11-24 LAB
ANION GAP SERPL CALCULATED.3IONS-SCNC: 6 MMOL/L (ref 3–14)
BUN SERPL-MCNC: 14 MG/DL (ref 7–30)
CALCIUM SERPL-MCNC: 9.2 MG/DL (ref 8.5–10.1)
CHLORIDE SERPL-SCNC: 109 MMOL/L (ref 94–109)
CO2 SERPL-SCNC: 26 MMOL/L (ref 20–32)
CREAT SERPL-MCNC: 0.99 MG/DL (ref 0.52–1.04)
CREAT UR-MCNC: 202 MG/DL
GFR SERPL CREATININE-BSD FRML MDRD: 57 ML/MIN/1.7M2
GLUCOSE SERPL-MCNC: 102 MG/DL (ref 70–99)
MICROALBUMIN UR-MCNC: 9 MG/L
MICROALBUMIN/CREAT UR: 4.32 MG/G CR (ref 0–25)
POTASSIUM SERPL-SCNC: 3.9 MMOL/L (ref 3.4–5.3)
SODIUM SERPL-SCNC: 141 MMOL/L (ref 133–144)

## 2018-11-24 ASSESSMENT — ASTHMA QUESTIONNAIRES: ACT_TOTALSCORE: 25

## 2018-11-24 ASSESSMENT — ANXIETY QUESTIONNAIRES: GAD7 TOTAL SCORE: 11

## 2018-12-12 ENCOUNTER — TELEPHONE (OUTPATIENT)
Dept: FAMILY MEDICINE | Facility: CLINIC | Age: 63
End: 2018-12-12

## 2018-12-12 DIAGNOSIS — J06.9 UPPER RESPIRATORY TRACT INFECTION, UNSPECIFIED TYPE: Primary | ICD-10-CM

## 2018-12-12 NOTE — TELEPHONE ENCOUNTER
RESPIRATORY SYMPTOMS      Duration: 3 weeks- patient states that she was seen by Dr. Mccall for a physical and discussed this then, but nothing was prescribed- symptoms have not resolved and are worsening    Description      nasal congestion, rhinorrhea, cough, ear pain bilateral feels plugged headache, fatigue/malaise, hoarse voice, conjunctival irritation and stomach ache  Denies sob/discolored mucous    Severity: mild    Accompanying signs and symptoms: None    History (predisposing factors):  asthma    Precipitating or alleviating factors: None    Therapies tried and outcome:  nonesingulair and zyrtec fluticasone    patient states that she wants a prescription antibiotic sent in- she refuses to try home remedies or otc medications because they do not work and she does not want to flare her asthma    patient cannot take steroids due to heart fluttering  Advised to  drink 6-8 glasses of water daily  Warm mist in a hot shower or breathing through a hot wet cloth  Elevate the head of bed at night during sleep  Drink hot tea with 1/4 tsp honey and 1/4 tsp lemon to sooth cough  Breath cool air, use a humidifier at night for cough   If congested, avoid milk products  Take over the counter medications as needed: for a wet cough use a decongestant; for a dry cough use an expectorant during the day and a suppressant at night-ask the pharmacist for suggestions      patient wants a script sent in for URI    Please advise,    Tessie AllredRN BSN  [facility]  [phone]

## 2018-12-14 RX ORDER — AZITHROMYCIN 250 MG/1
TABLET, FILM COATED ORAL
Qty: 6 TABLET | Refills: 0 | Status: SHIPPED | OUTPATIENT
Start: 2018-12-14 | End: 2019-02-19

## 2018-12-14 NOTE — TELEPHONE ENCOUNTER
S/w pt and advised of md reply and rx sent to [facility] in [place].  Advised if not better after abx should be seen in clinic.  Pt agrees with abx and being seen if not better.    Maria T BATISTA RN  EP Triage

## 2018-12-14 NOTE — TELEPHONE ENCOUNTER
patient calling back- want to know why we haven't called her back- advised patient that we discussed that she was calling on a wed later and provider had already left for the day- and she was off on Thursday   this am- states that her cough is now producing grey phlegm   constantly having nasal drainage- has some blood in the sputum  Eating hot soups and cayenne peppers to thin the secretions along with all of the home cares   States the symptoms are worsening and colds are not simple due to her asthma  Advised again that if this is a virus- antibiotics are not going to help- patient states that she wants something prescribed now for this    Please advise    Tessie AllredRN BSN  [facility]  [phone]

## 2018-12-26 ENCOUNTER — OFFICE VISIT (OUTPATIENT)
Dept: URGENT CARE | Facility: URGENT CARE | Age: 63
End: 2018-12-26
Payer: COMMERCIAL

## 2018-12-26 VITALS
OXYGEN SATURATION: 99 % | DIASTOLIC BLOOD PRESSURE: 68 MMHG | TEMPERATURE: 98.9 F | SYSTOLIC BLOOD PRESSURE: 126 MMHG | WEIGHT: 170 LBS | BODY MASS INDEX: 26.63 KG/M2 | RESPIRATION RATE: 16 BRPM | HEART RATE: 68 BPM

## 2018-12-26 DIAGNOSIS — R05.8 DRY COUGH: ICD-10-CM

## 2018-12-26 DIAGNOSIS — N89.8 VAGINAL DISCHARGE: Primary | ICD-10-CM

## 2018-12-26 DIAGNOSIS — R09.81 CONGESTION OF PARANASAL SINUS: ICD-10-CM

## 2018-12-26 DIAGNOSIS — B96.89 BV (BACTERIAL VAGINOSIS): ICD-10-CM

## 2018-12-26 DIAGNOSIS — N76.0 BV (BACTERIAL VAGINOSIS): ICD-10-CM

## 2018-12-26 LAB
SPECIMEN SOURCE: ABNORMAL
WET PREP SPEC: ABNORMAL

## 2018-12-26 PROCEDURE — 99214 OFFICE O/P EST MOD 30 MIN: CPT | Performed by: FAMILY MEDICINE

## 2018-12-26 PROCEDURE — 87210 SMEAR WET MOUNT SALINE/INK: CPT | Performed by: FAMILY MEDICINE

## 2018-12-26 RX ORDER — CLINDAMYCIN PHOSPHATE 20 MG/G
1 CREAM VAGINAL AT BEDTIME
Qty: 40 G | Refills: 0 | Status: SHIPPED | OUTPATIENT
Start: 2018-12-26 | End: 2019-02-19

## 2018-12-26 RX ORDER — AMOXICILLIN 500 MG/1
500 CAPSULE ORAL 3 TIMES DAILY
Qty: 30 CAPSULE | Refills: 0 | Status: SHIPPED | OUTPATIENT
Start: 2018-12-26 | End: 2019-02-19
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
## 2019-01-01 ENCOUNTER — TRANSFERRED RECORDS (OUTPATIENT)
Dept: MULTI SPECIALTY CLINIC | Facility: CLINIC | Age: 64
End: 2019-01-01

## 2019-01-02 ENCOUNTER — OFFICE VISIT (OUTPATIENT)
Dept: OBGYN | Facility: CLINIC | Age: 64
End: 2019-01-02
Payer: COMMERCIAL

## 2019-01-02 VITALS
WEIGHT: 171 LBS | SYSTOLIC BLOOD PRESSURE: 110 MMHG | BODY MASS INDEX: 26.84 KG/M2 | DIASTOLIC BLOOD PRESSURE: 68 MMHG | HEIGHT: 67 IN

## 2019-01-02 DIAGNOSIS — N89.8 VAGINAL DISCHARGE: ICD-10-CM

## 2019-01-02 DIAGNOSIS — F32.1 MAJOR DEPRESSIVE DISORDER, SINGLE EPISODE, MODERATE (H): ICD-10-CM

## 2019-01-02 DIAGNOSIS — R39.89 POSSIBLE URINARY TRACT INFECTION: Primary | ICD-10-CM

## 2019-01-02 DIAGNOSIS — E11.9 TYPE 2 DIABETES MELLITUS WITHOUT COMPLICATION, WITHOUT LONG-TERM CURRENT USE OF INSULIN (H): ICD-10-CM

## 2019-01-02 LAB
ALBUMIN UR-MCNC: NEGATIVE MG/DL
APPEARANCE UR: CLEAR
BILIRUB UR QL STRIP: NEGATIVE
COLOR UR AUTO: YELLOW
GLUCOSE UR STRIP-MCNC: NEGATIVE MG/DL
HGB UR QL STRIP: NEGATIVE
KETONES UR STRIP-MCNC: NEGATIVE MG/DL
LEUKOCYTE ESTERASE UR QL STRIP: NEGATIVE
NITRATE UR QL: NEGATIVE
PH UR STRIP: 5.5 PH (ref 5–7)
SOURCE: NORMAL
SP GR UR STRIP: >1.03 (ref 1–1.03)
UROBILINOGEN UR STRIP-ACNC: 1 EU/DL (ref 0.2–1)

## 2019-01-02 PROCEDURE — 99213 OFFICE O/P EST LOW 20 MIN: CPT | Performed by: OBSTETRICS & GYNECOLOGY

## 2019-01-02 PROCEDURE — 87106 FUNGI IDENTIFICATION YEAST: CPT | Performed by: OBSTETRICS & GYNECOLOGY

## 2019-01-02 PROCEDURE — 87102 FUNGUS ISOLATION CULTURE: CPT | Performed by: OBSTETRICS & GYNECOLOGY

## 2019-01-02 PROCEDURE — 87653 STREP B DNA AMP PROBE: CPT | Performed by: OBSTETRICS & GYNECOLOGY

## 2019-01-02 PROCEDURE — 87205 SMEAR GRAM STAIN: CPT | Performed by: OBSTETRICS & GYNECOLOGY

## 2019-01-02 PROCEDURE — 81003 URINALYSIS AUTO W/O SCOPE: CPT | Performed by: OBSTETRICS & GYNECOLOGY

## 2019-01-02 ASSESSMENT — MIFFLIN-ST. JEOR: SCORE: 1373.28

## 2019-01-02 NOTE — PROGRESS NOTES
SUBJECTIVE:                                                   Grecia Kilgore is a 63 year old female who presents to clinic today for the following health issue(s):  Patient presents with:  Vaginal Bleeding: patient states she is having symptoms of a bacterial infection, no irritation, just white heavy discharge. states recurring, was on metronidazole and doesn't think the medication is working, still has symptoms      HPI: The patient is seen at this time for evaluation of recurring vaginal irritation and infection.  She has had wet preps and treatment with Flagyl as recently as 1 week ago.  She has persistent abnormal discharge and irritation at this time.  She occasionally has urinary symptoms      Patient's last menstrual period was 1985..   Patient is sexually active, .  Using hysterectomy for contraception.    reports that  has never smoked. she has never used smokeless tobacco.    STD testing offered?  Declined    Health maintenance updated:  yes    Today's PHQ-2 Score:   PHQ-2 (  [employer]) 2018   Q1: Little interest or pleasure in doing things 1   Q2: Feeling down, depressed or hopeless 1   PHQ-2 Score 2     Today's PHQ-9 Score:   PHQ-9 SCORE 2018   PHQ-9 Total Score -   PHQ-9 Total Score 12     Today's WILL-7 Score:   WILL-7 SCORE 2018   Total Score -   Total Score 11       Problem list and histories reviewed & adjusted, as indicated.  Additional history: as documented.    Patient Active Problem List   Diagnosis     Allergic rhinitis     External hemorrhoids     Vitamin D deficiency     Fibromyalgia     Osteoarthritis, knee     Positive PPD     Panic disorder     Genital herpes     Advanced directives, counseling/discussion     DCIS (ductal carcinoma in situ) of breast     Heart palpitations     Anxiety     Major depressive disorder, single episode, moderate (H)     Hyperlipidemia LDL goal <100     Type 2 diabetes mellitus without complication, without long-term current  use of insulin (H)     Mild intermittent asthma without complication     BOO (obstructive sleep apnea)     Past Surgical History:   Procedure Laterality Date     ARTHRODESIS TOE(S)  9/16/2013    Procedure: ARTHRODESIS TOE(S);  BILATERAL GREAT TOE FUSION (C-ARM);  Surgeon: Mary Guan MD;  Location: [facility]     ARTHRODESIS TOE(S) Left 12/19/2016    Procedure: ARTHRODESIS TOE(S);  Surgeon: Mary Guna MD;  Location: [facility]     ARTHROSCOPY KNEE Left 2/2/11     BIOPSY BREAST  2/7/2014    Procedure: BIOPSY BREAST;  Re-excision Left Breast Cavity for Margins ;  Surgeon: Lisset Amador DO;  Location: [facility]     BIOPSY BREAST SEED LOCALIZATION  1/22/2014    Procedure: BIOPSY BREAST SEED LOCALIZATION;  Left Breast Seed Localized Excisional Biopsy ;  Surgeon: Lisset Amador DO;  Location: [facility] OR     COLONOSCOPY  2005    nl - repeat 2015     FOOT SURGERY Bilateral 2013     HEMORRHOIDECTOMY BANDING      multiple times     HEMORRHOIDECTOMY INTERNAL N/A 7/24/2018    Procedure: HEMORRHOIDECTOMY INTERNAL;  three quadrant hemorrhoidectomy;  Surgeon: Lisa Patrick MD;  Location: [facility]     HYSTERECTOMY  7/1996    fibroids     REMOVE HARDWARE FOOT Left 2015     REPAIR TENDON FOOT Left 12/19/2016    Procedure: REPAIR TENDON FOOT;  Surgeon: Mary Guan MD;  Location: [facility]      Social History     Tobacco Use     Smoking status: Never Smoker     Smokeless tobacco: Never Used   Substance Use Topics     Alcohol use: No     Alcohol/week: 0.0 oz      Problem (# of Occurrences) Relation (Name,Age of Onset)    Alcohol/Drug (1) Father    Breast Cancer (1) Mother    Cancer (1) Father    Cancer - colorectal (1) Maternal Grandfather    Cerebrovascular Disease (1) Maternal Grandmother    Diabetes (2) Mother, Maternal Grandmother            Current Outpatient Medications   Medication Sig     albuterol (PROAIR HFA/PROVENTIL HFA/VENTOLIN HFA) 108 (90 Base) MCG/ACT inhaler Inhale 2 puffs into the lungs every 6 hours  "as needed for shortness of breath / dyspnea     amoxicillin (AMOXIL) 500 MG capsule Take 1 capsule (500 mg) by mouth 3 times daily for 10 days     azithromycin (ZITHROMAX) 250 MG tablet Two tablets first day, then one tablet daily for four days.     cetirizine (ZYRTEC) 10 MG tablet Take 1 tablet (10 mg) by mouth every evening     clindamycin (CLEOCIN) 2 % vaginal cream Place 1 applicator vaginally At Bedtime for 7 days     fluticasone (FLONASE) 50 MCG/ACT spray Spray 1 spray into both nostrils daily     lidocaine (XYLOCAINE) 5 % ointment Apply topically as needed for moderate pain     lisinopril (PRINIVIL/ZESTRIL) 5 MG tablet Take 1 tablet (5 mg) by mouth daily     LORazepam (ATIVAN) 0.5 MG tablet Take 0.5-1 tablets (0.25-0.5 mg) by mouth every 8 hours as needed for anxiety Take 30 minutes prior to departure.  Do not operate a vehicle after taking this medication     meloxicam (MOBIC) 15 MG tablet TAKE 1 TABLET BY MOUTH DAILY AS NEEDED.     metFORMIN (GLUCOPHAGE) 500 MG tablet Take 1 tablet (500 mg) by mouth daily (with breakfast)     mirtazapine (REMERON) 15 MG tablet nightly as needed      montelukast (SINGULAIR) 10 MG tablet Take 1 tablet (10 mg) by mouth At Bedtime     ranitidine (ZANTAC) 150 MG tablet Take 1 tablet (150 mg) by mouth 2 times daily     simvastatin (ZOCOR) 40 MG tablet Take 1 tablet (40 mg) by mouth At Bedtime     No current facility-administered medications for this visit.      Allergies   Allergen Reactions     Codeine Nausea     Morphine Itching     Nitrofuran Derivatives Hives     Phenothiazines      sedation     Primatene Mist [Epinephrine Bitartrate] Itching     neck itch with primatene mist     Vicodin [Hydrocodone-Acetaminophen] Nausea     Latex Itching and Rash       ROS:  12 point review of systems negative other than symptoms noted below.    OBJECTIVE:     /68   Ht 1.702 m (5' 7\")   Wt 77.6 kg (171 lb)   LMP 01/01/1985   BMI 26.78 kg/m    Body mass index is 26.78 " kg/m .    Exam:  Constitutional:  Appearance: Well nourished, well developed alert, in no acute distress  Gastrointestinal:  Abdominal Examination:  Abdomen nontender to palpation, tone normal without rigidity or guarding, no masses present, umbilicus without lesions; Liver/Spleen:  No hepatomegaly present, liver nontender to palpation; Hernias:  No hernias present  Lymphatic: Lymph Nodes:  No other lymphadenopathy present  Skin:General Inspection:  No rashes present, no lesions present, no areas of discoloration; Genitalia and Groin:  No rashes present, no lesions present, no areas of discoloration, no masses present.  Neurologic/Psychiatric:  Mental Status:  Oriented X3   Pelvic Exam:  External Genitalia:     Normal appearance for age, no discharge present, no tenderness present, no inflammatory lesions present, color normal  Vagina:     Normal vaginal vault without central or paravaginal defects, no discharge present, no inflammatory lesions present, no masses present  Bladder:     Nontender to palpation  Urethra:   Urethral Body:  Urethra palpation normal, urethra structural support normal   Urethral Meatus:  No erythema or lesions present  Cervix:     Appearance healthy, no lesions present, nontender to palpation, no bleeding present  Uterus:     Uterus: firm, normal sized and nontender, midplane in position.   Adnexa:     No adnexal tenderness present, no adnexal masses present  Perineum:     Perineum within normal limits, no evidence of trauma, no rashes or skin lesions present  Anus:     Anus within normal limits, no hemorrhoids present  Inguinal Lymph Nodes:     No lymphadenopathy present  Pubic Hair:     Normal pubic hair distribution for age  Genitalia and Groin:     No rashes present, no lesions present, no areas of discoloration, no masses present       In-Clinic Test Results: Urinalysis is unremarkable.      ASSESSMENT/PLAN:                                                        ICD-10-CM    1.  Possible urinary tract infection R39.89 UA without Microscopic       Patient is seen at this time for cultures rather than a wet prep alone for recurrent vaginal infection.  I see no sign of yeast.  She does have some mild atrophy at the level of the vagina and that may be what is going on.  She denies any recent change in sexual contacts.  We will respond only to positive culture with antibiotics.  We did discuss estrogen replacement at the level of the vagina.        Tristan Bryant MD  [facility] [place]

## 2019-01-03 LAB
GRAM STN SPEC: NORMAL
Lab: NORMAL
SPECIMEN SOURCE: NORMAL

## 2019-01-04 LAB
GP B STREP DNA SPEC QL NAA+PROBE: NEGATIVE
SPECIMEN SOURCE: NORMAL

## 2019-01-07 ENCOUNTER — TELEPHONE (OUTPATIENT)
Dept: OBGYN | Facility: CLINIC | Age: 64
End: 2019-01-07

## 2019-01-07 DIAGNOSIS — B37.31 YEAST VAGINITIS: Primary | ICD-10-CM

## 2019-01-07 LAB
Lab: ABNORMAL
SPECIMEN SOURCE: ABNORMAL
YEAST SPEC QL CULT: ABNORMAL

## 2019-01-07 RX ORDER — FLUCONAZOLE 150 MG/1
150 TABLET ORAL DAILY
Qty: 3 TABLET | Refills: 1 | Status: SHIPPED | OUTPATIENT
Start: 2019-01-07 | End: 2019-02-19

## 2019-01-16 ENCOUNTER — TELEPHONE (OUTPATIENT)
Dept: OBGYN | Facility: CLINIC | Age: 64
End: 2019-01-16

## 2019-01-16 DIAGNOSIS — B96.89 BV (BACTERIAL VAGINOSIS): Primary | ICD-10-CM

## 2019-01-16 DIAGNOSIS — N76.0 BV (BACTERIAL VAGINOSIS): Primary | ICD-10-CM

## 2019-01-16 RX ORDER — METRONIDAZOLE 500 MG/1
500 TABLET ORAL 2 TIMES DAILY
Qty: 14 TABLET | Refills: 0 | Status: SHIPPED | OUTPATIENT
Start: 2019-01-16 | End: 2019-02-19

## 2019-01-16 NOTE — TELEPHONE ENCOUNTER
"Pt calling that she recently saw Dr Bryant for a vaginal infection on 1/2/19.   Pt is requesting anther dose of medication- The itching is gone but she is still having a white chalky discharge. \"I don't think 3 days of the medication helped.\" (Diflucan)  I dont want to have to come in for another visit- That would be my 3rd visit for this problem.\"    Routing to Dr Bryant to advise-  [facility] in [place] on [facility].      "

## 2019-01-25 ENCOUNTER — ANCILLARY PROCEDURE (OUTPATIENT)
Dept: MAMMOGRAPHY | Facility: CLINIC | Age: 64
End: 2019-01-25
Payer: COMMERCIAL

## 2019-01-25 DIAGNOSIS — Z12.31 VISIT FOR SCREENING MAMMOGRAM: ICD-10-CM

## 2019-01-25 PROCEDURE — 77063 BREAST TOMOSYNTHESIS BI: CPT | Mod: TC

## 2019-01-25 PROCEDURE — 77067 SCR MAMMO BI INCL CAD: CPT | Mod: TC

## 2019-02-13 ENCOUNTER — TELEPHONE (OUTPATIENT)
Dept: FAMILY MEDICINE | Facility: CLINIC | Age: 64
End: 2019-02-13

## 2019-02-13 NOTE — TELEPHONE ENCOUNTER
Left a non detailed message for patient to return call.     Zahida ROTHMANN, RN   [facility]

## 2019-02-13 NOTE — TELEPHONE ENCOUNTER
Reason for call:  Patient reporting a symptom    Symptom or request: Pulsating in foot. Patient diabetic and concerned. Video taped this happening.     Duration (how long have symptoms been present): One month off and on.     Have you been treated for this before? No    Phone Number patient can be reached at:  Home number on file [phone] (home)    Best Time:  Any     Can we leave a detailed message on this number:  YES    Call taken on 2/13/2019 at 2:23 PM by Deborah Acosta

## 2019-02-14 NOTE — TELEPHONE ENCOUNTER
Spoke with patient, states yesterday she could see a pulsating in her right foot. There was no pain, change in temperature of the skin, or change in color. She states the episode last a few seconds. She was able to catch it on video. She states she noticed this about a month ago and will sporadically happen 1-2 times per week. She reports she was laying in bed when it happened and her foot was not in a strange position. She is wondering if this is normal? I advised it is likely normal and something to monitor but will check with provider.     Sharron Pham RN   [facility] - Triage

## 2019-02-14 NOTE — TELEPHONE ENCOUNTER
Unable to leave a message just started to beep once ringing was finished.     Zahida ROTHMANN, RN   [facility]

## 2019-02-15 NOTE — TELEPHONE ENCOUNTER
Left detailed message informing of below. Encouraged patient to call with any questions or concerns.    Sharron Pham RN   [facility] - Triage

## 2019-02-15 NOTE — TELEPHONE ENCOUNTER
This should be evaluated in clinic.    Romel Mccall MD  [facility], [place]

## 2019-02-19 ENCOUNTER — OFFICE VISIT (OUTPATIENT)
Dept: FAMILY MEDICINE | Facility: CLINIC | Age: 64
End: 2019-02-19
Payer: COMMERCIAL

## 2019-02-19 VITALS
HEART RATE: 64 BPM | TEMPERATURE: 98.4 F | HEIGHT: 67 IN | BODY MASS INDEX: 26.68 KG/M2 | OXYGEN SATURATION: 98 % | SYSTOLIC BLOOD PRESSURE: 122 MMHG | WEIGHT: 170 LBS | DIASTOLIC BLOOD PRESSURE: 74 MMHG

## 2019-02-19 PROCEDURE — 99213 OFFICE O/P EST LOW 20 MIN: CPT | Performed by: FAMILY MEDICINE

## 2019-02-19 ASSESSMENT — ANXIETY QUESTIONNAIRES
7. FEELING AFRAID AS IF SOMETHING AWFUL MIGHT HAPPEN: MORE THAN HALF THE DAYS
6. BECOMING EASILY ANNOYED OR IRRITABLE: NEARLY EVERY DAY
IF YOU CHECKED OFF ANY PROBLEMS ON THIS QUESTIONNAIRE, HOW DIFFICULT HAVE THESE PROBLEMS MADE IT FOR YOU TO DO YOUR WORK, TAKE CARE OF THINGS AT HOME, OR GET ALONG WITH OTHER PEOPLE: EXTREMELY DIFFICULT
3. WORRYING TOO MUCH ABOUT DIFFERENT THINGS: NEARLY EVERY DAY
GAD7 TOTAL SCORE: 19
1. FEELING NERVOUS, ANXIOUS, OR ON EDGE: NEARLY EVERY DAY
2. NOT BEING ABLE TO STOP OR CONTROL WORRYING: NEARLY EVERY DAY
5. BEING SO RESTLESS THAT IT IS HARD TO SIT STILL: MORE THAN HALF THE DAYS

## 2019-02-19 ASSESSMENT — PATIENT HEALTH QUESTIONNAIRE - PHQ9
5. POOR APPETITE OR OVEREATING: NEARLY EVERY DAY
SUM OF ALL RESPONSES TO PHQ QUESTIONS 1-9: 19

## 2019-02-19 ASSESSMENT — MIFFLIN-ST. JEOR: SCORE: 1363.74

## 2019-02-19 NOTE — PROGRESS NOTES
SUBJECTIVE:   Grecia Kilgore is a 64 year old female who presents to clinic today for the following health issues:      Concern - pulsating in foot  Onset: 90 days    Description:   Pulsating noted off and on in her foot which makes her little toe move with the pulsation. No pain with that. She often notices when she is resting. No injury. No other concerns    Therapies Tried and outcome:         Problem list and histories reviewed & adjusted, as indicated.  Additional history: as documented    Patient Active Problem List   Diagnosis     Allergic rhinitis     External hemorrhoids     Vitamin D deficiency     Fibromyalgia     Osteoarthritis, knee     Positive PPD     Panic disorder     Genital herpes     Advanced directives, counseling/discussion     DCIS (ductal carcinoma in situ) of breast     Heart palpitations     Anxiety     Major depressive disorder, single episode, moderate (H)     Hyperlipidemia LDL goal <100     Type 2 diabetes mellitus without complication, without long-term current use of insulin (H)     Mild intermittent asthma without complication     BOO (obstructive sleep apnea)     Past Surgical History:   Procedure Laterality Date     ARTHRODESIS TOE(S)  9/16/2013    Procedure: ARTHRODESIS TOE(S);  BILATERAL GREAT TOE FUSION (C-ARM);  Surgeon: Mary Guan MD;  Location: [facility]     ARTHRODESIS TOE(S) Left 12/19/2016    Procedure: ARTHRODESIS TOE(S);  Surgeon: Mary Guan MD;  Location: [facility]     ARTHROSCOPY KNEE Left 2/2/11     BIOPSY BREAST  2/7/2014    Procedure: BIOPSY BREAST;  Re-excision Left Breast Cavity for Margins ;  Surgeon: Lisset Amador DO;  Location: [facility]     BIOPSY BREAST SEED LOCALIZATION  1/22/2014    Procedure: BIOPSY BREAST SEED LOCALIZATION;  Left Breast Seed Localized Excisional Biopsy ;  Surgeon: Lisset Amador DO;  Location:  OR     COLONOSCOPY  2005    nl - repeat 2015     FOOT SURGERY Bilateral 2013     HEMORRHOIDECTOMY BANDING      multiple  times     HEMORRHOIDECTOMY INTERNAL N/A 7/24/2018    Procedure: HEMORRHOIDECTOMY INTERNAL;  three quadrant hemorrhoidectomy;  Surgeon: Lisa Patrick MD;  Location: [facility]     HYSTERECTOMY  7/1996    fibroids     REMOVE HARDWARE FOOT Left 2015     REPAIR TENDON FOOT Left 12/19/2016    Procedure: REPAIR TENDON FOOT;  Surgeon: Mary Guan MD;  Location: [facility]       Social History     Tobacco Use     Smoking status: Never Smoker     Smokeless tobacco: Never Used   Substance Use Topics     Alcohol use: No     Alcohol/week: 0.0 oz     Family History   Problem Relation Age of Onset     Diabetes Mother      Breast Cancer Mother      Alcohol/Drug Father      Cancer Father      Diabetes Maternal Grandmother      Cerebrovascular Disease Maternal Grandmother      Cancer - colorectal Maternal Grandfather          Current Outpatient Medications   Medication Sig Dispense Refill     albuterol (PROAIR HFA/PROVENTIL HFA/VENTOLIN HFA) 108 (90 Base) MCG/ACT inhaler Inhale 2 puffs into the lungs every 6 hours as needed for shortness of breath / dyspnea 1 Inhaler 6     cetirizine (ZYRTEC) 10 MG tablet Take 1 tablet (10 mg) by mouth every evening 90 tablet 3     fluticasone (FLONASE) 50 MCG/ACT spray Spray 1 spray into both nostrils daily 1 Bottle 5     lisinopril (PRINIVIL/ZESTRIL) 5 MG tablet Take 1 tablet (5 mg) by mouth daily 90 tablet 3     LORazepam (ATIVAN) 0.5 MG tablet Take 0.5-1 tablets (0.25-0.5 mg) by mouth every 8 hours as needed for anxiety Take 30 minutes prior to departure.  Do not operate a vehicle after taking this medication       metFORMIN (GLUCOPHAGE) 500 MG tablet Take 1 tablet (500 mg) by mouth daily (with breakfast) 90 tablet 3     mirtazapine (REMERON) 15 MG tablet nightly as needed   2     montelukast (SINGULAIR) 10 MG tablet Take 1 tablet (10 mg) by mouth At Bedtime 90 tablet 3     ranitidine (ZANTAC) 150 MG tablet Take 1 tablet (150 mg) by mouth 2 times daily 180 tablet 3     simvastatin (ZOCOR)  "40 MG tablet Take 1 tablet (40 mg) by mouth At Bedtime 90 tablet 1     Allergies   Allergen Reactions     Codeine Nausea     Morphine Itching     Nitrofuran Derivatives Hives     Phenothiazines      sedation     Primatene Mist [Epinephrine Bitartrate] Itching     neck itch with primatene mist     Vicodin [Hydrocodone-Acetaminophen] Nausea     Latex Itching and Rash       Reviewed and updated as needed this visit by clinical staff       Reviewed and updated as needed this visit by Provider         ROS:  CONSTITUTIONAL: NEGATIVE for fever, chills, change in weight  ENT/MOUTH: NEGATIVE for ear, mouth and throat problems  RESP: NEGATIVE for significant cough or SOB  CV: NEGATIVE for chest pain, palpitations or peripheral edema    OBJECTIVE:                                                    /74   Pulse 64   Temp 98.4  F (36.9  C) (Tympanic)   Ht 1.702 m (5' 7\")   Wt 77.1 kg (170 lb)   LMP 01/01/1985   SpO2 98%   BMI 26.63 kg/m    Body mass index is 26.63 kg/m .   GENERAL: healthy, alert, well nourished, well hydrated, no distress  HENT: ear canals- normal; TMs- normal; Nose- normal; Mouth- no ulcers, no lesions  NECK: no tenderness, no adenopathy, no asymmetry, no masses, no stiffness; thyroid- normal to palpation  RESP: lungs clear to auscultation - no rales, no rhonchi, no wheezes  CV: regular rates and rhythm, normal S1 S2, no S3 or S4 and no murmur, no click or rub -  ABDOMEN: soft, no tenderness, no  hepatosplenomegaly, no masses, normal bowel sounds  MS: extremities- no gross deformities noted, no edema  No abnormal foot pulsation noted right now.        ASSESSMENT/PLAN:                                                        ICD-10-CM    1. Foot symptom R29.898 VASCULAR REFERRAL     Patient brought in a video which shows the foot pulsation. It is not causing any discomfort. Unclear etiology. Recommended to see vascular medicine.    Referral given to the patient.     Romel Mccall MD  [facility] " [place]

## 2019-02-20 ENCOUNTER — TELEPHONE (OUTPATIENT)
Dept: OTHER | Facility: CLINIC | Age: 64
End: 2019-02-20

## 2019-02-20 ASSESSMENT — ANXIETY QUESTIONNAIRES: GAD7 TOTAL SCORE: 19

## 2019-02-20 ASSESSMENT — ASTHMA QUESTIONNAIRES: ACT_TOTALSCORE: 25

## 2019-02-20 NOTE — TELEPHONE ENCOUNTER
Pt referred to [facility] by Romel Mccall MD for intermittent pulsation in foot.    As of note, referring provider notes not completed.  Will await for notes to determine appropriate referral route.    MARCOS Brown RN

## 2019-02-21 NOTE — TELEPHONE ENCOUNTER
"Attempted to call the patient, phone number listed in chart states \"the number or code you have dialed is incorrect\", unable to leave a voicemail.   "

## 2019-02-21 NOTE — TELEPHONE ENCOUNTER
As of note, no notes available from referring provider.  Per chart review, pulsation has been occurring for approximately one month, and happens 1-2 times/week.  No pain, change in temp/color of skin.      Contacted staff at Dr. Mccall office, requesting notes from 2/14/19 appointment.    Pt needs to be scheduled for consult with Vascular Medicine.  Will route to scheduling to coordinate an appointment within 1 week.    MARCOS Brown RN

## 2019-02-27 ENCOUNTER — OFFICE VISIT (OUTPATIENT)
Dept: OTHER | Facility: CLINIC | Age: 64
End: 2019-02-27
Attending: INTERNAL MEDICINE
Payer: COMMERCIAL

## 2019-02-27 VITALS
DIASTOLIC BLOOD PRESSURE: 90 MMHG | WEIGHT: 170.8 LBS | BODY MASS INDEX: 26.75 KG/M2 | SYSTOLIC BLOOD PRESSURE: 140 MMHG | OXYGEN SATURATION: 98 % | HEART RATE: 77 BPM

## 2019-02-27 DIAGNOSIS — I73.9 PAD (PERIPHERAL ARTERY DISEASE) (H): Primary | ICD-10-CM

## 2019-02-27 PROCEDURE — G0463 HOSPITAL OUTPT CLINIC VISIT: HCPCS

## 2019-02-27 PROCEDURE — 99204 OFFICE O/P NEW MOD 45 MIN: CPT | Mod: ZP | Performed by: INTERNAL MEDICINE
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
## 2019-02-27 NOTE — NURSING NOTE
"Grecia Kilgore is a 64 year old female who presents for:  Chief Complaint   Patient presents with     RECHECK     New - referred by Dr. Mccall. Per chart review, pulsation has been occurring for approximately one month, and happens 1-2 times/week.  No pain, change in temp/color of skin. Contacted staff at Dr. Mccall office, requesting notes from 2/14/19 appointment.        Vitals:    Vitals:    02/27/19 1414 02/27/19 1415   BP: 130/80 140/90   BP Location: Left arm Right arm   Patient Position: Chair Chair   Cuff Size: Adult Regular Adult Regular   Pulse: 78 77   SpO2: 98%    Weight: 170 lb 12.8 oz (77.5 kg)        BMI:  Estimated body mass index is 26.75 kg/m  as calculated from the following:    Height as of 2/19/19: 5' 7\" (1.702 m).    Weight as of this encounter: 170 lb 12.8 oz (77.5 kg).    Pain Score:  Data Unavailable        Nicole Ly MA    "

## 2019-03-04 ENCOUNTER — HOSPITAL ENCOUNTER (OUTPATIENT)
Dept: ULTRASOUND IMAGING | Facility: CLINIC | Age: 64
Discharge: HOME | End: 2019-03-04
Attending: INTERNAL MEDICINE | Admitting: INTERNAL MEDICINE
Payer: COMMERCIAL

## 2019-03-04 ENCOUNTER — OFFICE VISIT (OUTPATIENT)
Dept: OTHER | Facility: CLINIC | Age: 64
End: 2019-03-04
Attending: INTERNAL MEDICINE
Payer: COMMERCIAL

## 2019-03-04 VITALS
OXYGEN SATURATION: 98 % | HEART RATE: 77 BPM | RESPIRATION RATE: 18 BRPM | BODY MASS INDEX: 26.21 KG/M2 | DIASTOLIC BLOOD PRESSURE: 82 MMHG | SYSTOLIC BLOOD PRESSURE: 124 MMHG | HEIGHT: 67 IN | WEIGHT: 167 LBS

## 2019-03-04 DIAGNOSIS — M48.062 SPINAL STENOSIS OF LUMBAR REGION WITH NEUROGENIC CLAUDICATION: Primary | ICD-10-CM

## 2019-03-04 DIAGNOSIS — I73.9 PAD (PERIPHERAL ARTERY DISEASE) (H): ICD-10-CM

## 2019-03-04 PROCEDURE — G0463 HOSPITAL OUTPT CLINIC VISIT: HCPCS | Mod: 25

## 2019-03-04 PROCEDURE — 93924 LWR XTR VASC STDY BILAT: CPT

## 2019-03-04 PROCEDURE — 99214 OFFICE O/P EST MOD 30 MIN: CPT | Mod: ZP | Performed by: INTERNAL MEDICINE

## 2019-03-04 ASSESSMENT — MIFFLIN-ST. JEOR: SCORE: 1350.14

## 2019-03-04 NOTE — PROGRESS NOTES
Vascular Medicine Progress Note     Grecia Kilgore is a 64 year old female who who is here for follow-up on CHAVO resting and post exercise    Interval History   CHAVO resting and post exercise showed normal CHAVO resting and normal response to exercise, wave form patterns were triphasic bilaterally no evidence of PAD  Patient's back pain and left foot pain could possibly be due to spinal stenosis intact pulses    Physical Exam       BP: 124/82 Pulse: 77   Resp: 18 SpO2: 98 %      Vitals:    03/04/19 1458   Weight: 167 lb (75.8 kg)     Vital Signs with Ranges  Pulse:  [77] 77  Resp:  [18] 18  BP: (124)/(82) 124/82  SpO2:  [98 %] 98 %  [unfilled]    Constitutional: awake, alert, cooperative, no apparent distress, and appears stated age  Eyes: Lids and lashes normal, pupils equal, round and reactive to light, extra ocular muscles intact, sclera clear, conjunctiva normal  ENT: normocepalic, without obvious abnormality, oropharynx pink and moist  Hematologic / Lymphatic: no lymphadenopathy  Respiratory: No increased work of breathing, good air exchange, clear to auscultation bilaterally, no crackles or wheezing  Cardiovascular: regular rate and rhythm, normal S1 and S2 and no murmur noted  GI: Normal bowel sounds, soft, non-distended, non-tender  Skin: no redness, warmth, or swelling, no rashes and no lesions  Musculoskeletal: There is no redness, warmth, or swelling of the joints.  Full range of motion noted.  Motor strength is 5 out of 5 all extremities bilaterally.  Tone is normal.  Neurologic: Awake, alert, oriented to name, place and time.  Cranial nerves II-XII are grossly intact.  Motor is 5 out of 5 bilaterally.    Neuropsychiatric:  Normal affect, memory, insight.  Pulses: Intact pulses  . No carotid bruits appreciated.     Medications         Data   Recent Results (from the past 24 hour(s))   US CHAVO Doppler with Exercise Bilateral    Narrative    ULTRASOUND ANKLE-BRACHIAL INDEX DOPPLER WITH EXERCISE  BILATERAL  3/4/2019 2:56 PM    HISTORY: 64-year-old woman with twitching of both lower extremities,  request made for evaluation of arterial insufficiency.    COMPARISON: None    FINDINGS: Resting CHAVO of the right lower extremity is 1.01 and 1.06 in  the left lower extremity. Distal arterial waveforms are triphasic  bilaterally. Patient exercised on a treadmill for 5 minutes at 1.5 mph  and 10% incline. No symptoms reported. Postexercise CHAVO values are  1.13 on the right and 1.16 on the left.      Impression    IMPRESSION: Normal CHAVO examination.       Assessment & Plan   No diagnosis found.      Summary: Right lower extremity pain not secondary to PAD, will obtain MRI lumbosacral spine    Derick Masters MD

## 2019-03-04 NOTE — PROGRESS NOTES
"Grecia Kilgore is a 64 year old female who presents for:  Chief Complaint   Patient presents with     RECHECK     (2:15 VHC, 3:20 E) follow up to ultrasound        Vitals:    Vitals:    03/04/19 1458   BP: 124/82   BP Location: Right arm   Patient Position: Chair   Cuff Size: Adult Regular   Pulse: 77   Resp: 18   SpO2: 98%   Weight: 167 lb (75.8 kg)   Height: 5' 7\" (1.702 m)       BMI:  Estimated body mass index is 26.16 kg/m  as calculated from the following:    Height as of this encounter: 5' 7\" (1.702 m).    Weight as of this encounter: 167 lb (75.8 kg).    Pain Score:  Data Unavailable        Gisella Noble MA    "

## 2019-03-07 ENCOUNTER — HOSPITAL ENCOUNTER (OUTPATIENT)
Dept: MRI IMAGING | Facility: CLINIC | Age: 64
Discharge: HOME | End: 2019-03-07
Attending: INTERNAL MEDICINE | Admitting: INTERNAL MEDICINE
Payer: COMMERCIAL

## 2019-03-07 DIAGNOSIS — M48.062 SPINAL STENOSIS OF LUMBAR REGION WITH NEUROGENIC CLAUDICATION: ICD-10-CM

## 2019-03-07 PROCEDURE — 72148 MRI LUMBAR SPINE W/O DYE: CPT

## 2019-03-19 ENCOUNTER — OFFICE VISIT (OUTPATIENT)
Dept: SURGERY | Facility: CLINIC | Age: 64
End: 2019-03-19
Payer: COMMERCIAL

## 2019-03-19 VITALS
HEART RATE: 74 BPM | WEIGHT: 167 LBS | RESPIRATION RATE: 16 BRPM | DIASTOLIC BLOOD PRESSURE: 76 MMHG | SYSTOLIC BLOOD PRESSURE: 128 MMHG | BODY MASS INDEX: 26.21 KG/M2 | HEIGHT: 67 IN | OXYGEN SATURATION: 98 %

## 2019-03-19 DIAGNOSIS — F41.9 ANXIETY: Primary | ICD-10-CM

## 2019-03-19 DIAGNOSIS — R25.3 FASCICULATION OF LOWER EXTREMITY: ICD-10-CM

## 2019-03-19 PROCEDURE — 99214 OFFICE O/P EST MOD 30 MIN: CPT | Performed by: INTERNAL MEDICINE

## 2019-03-19 ASSESSMENT — MIFFLIN-ST. JEOR: SCORE: 1350.14

## 2019-03-19 NOTE — PROGRESS NOTES
Vascular Medicine Progress Note     Grecia Kilgore is a 64 year old female is here for follow-up on MRI results    Interval History   MRI results showed no disc herniation or significant stenoses  No intra-abdominal or pelvic abnormalities    Physical Exam       BP: 128/76 Pulse: 74   Resp: 16 SpO2: 98 %      Vitals:    03/19/19 1538   Weight: 75.8 kg (167 lb)     Vital Signs with Ranges  Pulse:  [74] 74  Resp:  [16] 16  BP: (128)/(76) 128/76  SpO2:  [98 %] 98 %  [unfilled]    Constitutional: awake, alert, cooperative, no apparent distress, and appears stated age  Eyes: Lids and lashes normal, pupils equal, round and reactive to light, extra ocular muscles intact, sclera clear, conjunctiva normal  ENT: normocepalic, without obvious abnormality, oropharynx pink and moist  Hematologic / Lymphatic: no lymphadenopathy  Respiratory: No increased work of breathing, good air exchange, clear to auscultation bilaterally, no crackles or wheezing  Cardiovascular: regular rate and rhythm, normal S1 and S2 and no murmur noted  GI: Normal bowel sounds, soft, non-distended, non-tender  Skin: no redness, warmth, or swelling, no rashes and no lesions  Musculoskeletal: There is no redness, warmth, or swelling of the joints.  Full range of motion noted.  Motor strength is 5 out of 5 all extremities bilaterally.  Tone is normal.  Neurologic: Awake, alert, oriented to name, place and time.  Cranial nerves II-XII are grossly intact.  Motor is 5 out of 5 bilaterally.    Neuropsychiatric:  Normal affect, memory, insight.  Pulses: Intact pulses. No carotid bruits appreciated.     Medications         Data   No results found for this or any previous visit (from the past 24 hour(s)).  IMPRESSION:  1. Mild multilevel degenerative disc and facet disease.  2. No disc herniation or significant stenosis.    Assessment & Plan   No diagnosis found.      Summary: Patient has no evidence of PAD, no obvious cause that can explain her lower  extremity fasciculations  We will referred the patient to neurology service    Derick Masters MD

## 2019-03-21 ENCOUNTER — TELEPHONE (OUTPATIENT)
Dept: OTHER | Facility: CLINIC | Age: 64
End: 2019-03-21

## 2019-03-21 ENCOUNTER — TELEPHONE (OUTPATIENT)
Dept: FAMILY MEDICINE | Facility: CLINIC | Age: 64
End: 2019-03-21

## 2019-03-21 NOTE — TELEPHONE ENCOUNTER
Grecia called stating that she got a call from neuroology clinic wanting to make an appointment with her and she did not understand why.    I explained that Dr. Masters put in a neurology referral for lower extremity fasciculations because he does not feel she has evidence of PAD and no obvious cause that can explain her symptoms. Grecia verbalized understanding.    Mary Goodman BSN, RN

## 2019-03-21 NOTE — TELEPHONE ENCOUNTER
Patient calling into the clinic stating she needs a referral for Neurology . [facility] in [place]. When complete please call the patient to advise [phone]      .Josette CNOROY    [facility]

## 2019-03-21 NOTE — TELEPHONE ENCOUNTER
TC called patient to advise order has been placed and TC will fax to facility.      .Josette CONROY    [facility] [place]       no

## 2019-03-25 ENCOUNTER — HOSPITAL ENCOUNTER (EMERGENCY)
Facility: CLINIC | Age: 64
Discharge: HOME | End: 2019-03-25
Payer: COMMERCIAL

## 2019-03-25 VITALS
OXYGEN SATURATION: 100 % | HEIGHT: 67 IN | HEART RATE: 59 BPM | RESPIRATION RATE: 18 BRPM | TEMPERATURE: 97.5 F | BODY MASS INDEX: 26.21 KG/M2 | WEIGHT: 167 LBS

## 2019-03-25 LAB — INTERPRETATION ECG - MUSE: NORMAL

## 2019-03-25 ASSESSMENT — MIFFLIN-ST. JEOR: SCORE: 1350.14

## 2019-04-24 ENCOUNTER — OFFICE VISIT (OUTPATIENT)
Dept: OBGYN | Facility: CLINIC | Age: 64
End: 2019-04-24
Payer: COMMERCIAL

## 2019-04-24 VITALS
HEIGHT: 67 IN | BODY MASS INDEX: 25.9 KG/M2 | WEIGHT: 165 LBS | DIASTOLIC BLOOD PRESSURE: 76 MMHG | HEART RATE: 80 BPM | SYSTOLIC BLOOD PRESSURE: 122 MMHG

## 2019-04-24 DIAGNOSIS — Z12.4 SCREENING FOR CERVICAL CANCER: ICD-10-CM

## 2019-04-24 DIAGNOSIS — Z11.3 SCREEN FOR STD (SEXUALLY TRANSMITTED DISEASE): Primary | ICD-10-CM

## 2019-04-24 DIAGNOSIS — Z11.8 SCREENING FOR CHLAMYDIAL DISEASE: ICD-10-CM

## 2019-04-24 DIAGNOSIS — N89.8 VAGINAL DISCHARGE: ICD-10-CM

## 2019-04-24 LAB
GRAM STN SPEC: NORMAL
Lab: NORMAL
SPECIMEN SOURCE: NORMAL

## 2019-04-24 PROCEDURE — 87653 STREP B DNA AMP PROBE: CPT | Performed by: OBSTETRICS & GYNECOLOGY

## 2019-04-24 PROCEDURE — 99213 OFFICE O/P EST LOW 20 MIN: CPT | Performed by: OBSTETRICS & GYNECOLOGY

## 2019-04-24 PROCEDURE — 87491 CHLMYD TRACH DNA AMP PROBE: CPT | Performed by: OBSTETRICS & GYNECOLOGY

## 2019-04-24 PROCEDURE — 87205 SMEAR GRAM STAIN: CPT | Performed by: OBSTETRICS & GYNECOLOGY

## 2019-04-24 PROCEDURE — 87102 FUNGUS ISOLATION CULTURE: CPT | Performed by: OBSTETRICS & GYNECOLOGY

## 2019-04-24 PROCEDURE — 87624 HPV HI-RISK TYP POOLED RSLT: CPT | Performed by: OBSTETRICS & GYNECOLOGY

## 2019-04-24 PROCEDURE — G0145 SCR C/V CYTO,THINLAYER,RESCR: HCPCS | Performed by: OBSTETRICS & GYNECOLOGY

## 2019-04-24 PROCEDURE — G0476 HPV COMBO ASSAY CA SCREEN: HCPCS | Performed by: OBSTETRICS & GYNECOLOGY

## 2019-04-24 ASSESSMENT — MIFFLIN-ST. JEOR: SCORE: 1341.07

## 2019-04-24 NOTE — LETTER
May 3, 2019    Greciaomar Kilgore  [address]   [place] [address]    Dear ,  This letter is regarding your recent Pap smear (cervical cancer screening) and Human Papillomavirus (HPV) test.  We are happy to inform you that your Pap smear result is normal. Cervical cancer is closely linked with certain types of HPV. Your results showed no evidence of high-risk HPV.  Therefore we recommend you return in 3 years for your next pap smear.  You will still need to return to the clinic every year for an annual exam and other preventive tests.  If you have additional questions regarding this result, please call our registered nurse, Kaylyn at [phone].  Sincerely,    Tristan Bryant MD/osorio

## 2019-04-24 NOTE — PROGRESS NOTES
SUBJECTIVE:                                                   Grecia Kilgore is a 64 year old female who presents to clinic today for the following health issue(s):  Patient presents with:  Vaginal Problem: complains of vaginal discharge and external itching         HPI: The patient is seen at this time for a persistent vaginal discharge.  She underwent multiple hemorrhoidectomies earlier this year.  The healing process has been challenging for her.  She complains of rare pain with intercourse.  She has had a previous hysterectomy.  Pap smear is planned today.      Patient's last menstrual period was 1985..   Patient is sexually active, .  Using hysterectomy for contraception.    reports that she has never smoked. She has never used smokeless tobacco.    STD testing offered?  Accepted    Health maintenance updated:  yes    Today's PHQ-2 Score:   PHQ-2 (  [employer]) 2018   Q1: Little interest or pleasure in doing things 1   Q2: Feeling down, depressed or hopeless 1   PHQ-2 Score 2     Today's PHQ-9 Score:   PHQ-9 SCORE 2019   PHQ-9 Total Score -   PHQ-9 Total Score 19     Today's WILL-7 Score:   WILL-7 SCORE 2019   Total Score -   Total Score 19       Problem list and histories reviewed & adjusted, as indicated.  Additional history: as documented.    Patient Active Problem List   Diagnosis     Allergic rhinitis     External hemorrhoids     Vitamin D deficiency     Fibromyalgia     Osteoarthritis, knee     Positive PPD     Panic disorder     Genital herpes     Advanced directives, counseling/discussion     DCIS (ductal carcinoma in situ) of breast     Heart palpitations     Anxiety     Major depressive disorder, single episode, moderate (H)     Hyperlipidemia LDL goal <100     Type 2 diabetes mellitus without complication, without long-term current use of insulin (H)     Mild intermittent asthma without complication     BOO (obstructive sleep apnea)     Past Surgical History:    Procedure Laterality Date     ARTHRODESIS TOE(S)  9/16/2013    Procedure: ARTHRODESIS TOE(S);  BILATERAL GREAT TOE FUSION (C-ARM);  Surgeon: Mary Guan MD;  Location: [facility]     ARTHRODESIS TOE(S) Left 12/19/2016    Procedure: ARTHRODESIS TOE(S);  Surgeon: Mary Guan MD;  Location: [facility]     ARTHROSCOPY KNEE Left 2/2/11     BIOPSY BREAST  2/7/2014    Procedure: BIOPSY BREAST;  Re-excision Left Breast Cavity for Margins ;  Surgeon: Lisset Amador DO;  Location: [facility]     BIOPSY BREAST SEED LOCALIZATION  1/22/2014    Procedure: BIOPSY BREAST SEED LOCALIZATION;  Left Breast Seed Localized Excisional Biopsy ;  Surgeon: Lisset Amador DO;  Location: [facility] OR     COLONOSCOPY  2005    nl - repeat 2015     FOOT SURGERY Bilateral 2013     HEMORRHOIDECTOMY BANDING      multiple times     HEMORRHOIDECTOMY INTERNAL N/A 7/24/2018    Procedure: HEMORRHOIDECTOMY INTERNAL;  three quadrant hemorrhoidectomy;  Surgeon: Lisa Patrick MD;  Location: [facility] OR     HYSTERECTOMY  7/1996    fibroids     REMOVE HARDWARE FOOT Left 2015     REPAIR TENDON FOOT Left 12/19/2016    Procedure: REPAIR TENDON FOOT;  Surgeon: Mary Guan MD;  Location: [facility]      Social History     Tobacco Use     Smoking status: Never Smoker     Smokeless tobacco: Never Used   Substance Use Topics     Alcohol use: No     Alcohol/week: 0.0 oz      Problem (# of Occurrences) Relation (Name,Age of Onset)    Alcohol/Drug (1) Father    Breast Cancer (1) Mother    Cancer (1) Father    Cancer - colorectal (1) Maternal Grandfather    Cerebrovascular Disease (1) Maternal Grandmother    Diabetes (2) Mother, Maternal Grandmother            Current Outpatient Medications   Medication Sig     albuterol (PROAIR HFA/PROVENTIL HFA/VENTOLIN HFA) 108 (90 Base) MCG/ACT inhaler Inhale 2 puffs into the lungs every 6 hours as needed for shortness of breath / dyspnea     cetirizine (ZYRTEC) 10 MG tablet Take 1 tablet (10 mg) by mouth every evening      "fluticasone (FLONASE) 50 MCG/ACT spray Spray 1 spray into both nostrils daily     lisinopril (PRINIVIL/ZESTRIL) 5 MG tablet Take 1 tablet (5 mg) by mouth daily     LORazepam (ATIVAN) 0.5 MG tablet Take 0.5-1 tablets (0.25-0.5 mg) by mouth every 8 hours as needed for anxiety Take 30 minutes prior to departure.  Do not operate a vehicle after taking this medication     metFORMIN (GLUCOPHAGE) 500 MG tablet Take 1 tablet (500 mg) by mouth daily (with breakfast)     mirtazapine (REMERON) 15 MG tablet nightly as needed      montelukast (SINGULAIR) 10 MG tablet Take 1 tablet (10 mg) by mouth At Bedtime     ranitidine (ZANTAC) 150 MG tablet Take 1 tablet (150 mg) by mouth 2 times daily     simvastatin (ZOCOR) 40 MG tablet Take 1 tablet (40 mg) by mouth At Bedtime     No current facility-administered medications for this visit.      Allergies   Allergen Reactions     Codeine Nausea     Morphine Itching     Nitrofuran Derivatives Hives     Phenothiazines      sedation     Primatene Mist [Epinephrine Bitartrate] Itching     neck itch with primatene mist     Vicodin [Hydrocodone-Acetaminophen] Nausea     Latex Itching and Rash       ROS:  12 point review of systems negative other than symptoms noted below.  Genitourinary: Hot Flashes, Pelvic Pain and Vaginal Discharge  Psychiatric: Anxiety, Depression and Difficulty Sleeping    OBJECTIVE:     /76   Pulse 80   Ht 1.702 m (5' 7\")   Wt 74.8 kg (165 lb)   LMP 01/01/1985   BMI 25.84 kg/m    Body mass index is 25.84 kg/m .    Exam:  Constitutional:  Appearance: Well nourished, well developed alert, in no acute distress  Gastrointestinal:  Abdominal Examination:  Abdomen nontender to palpation, tone normal without rigidity or guarding, no masses present, umbilicus without lesions; Liver/Spleen:  No hepatomegaly present, liver nontender to palpation; Hernias:  No hernias present  Lymphatic: Lymph Nodes:  No other lymphadenopathy present  Skin:General Inspection:  No rashes " present, no lesions present, no areas of discoloration; Genitalia and Groin:  No rashes present, no lesions present, no areas of discoloration, no masses present.  Neurologic/Psychiatric:  Mental Status:  Oriented X3   Pelvic Exam:  External Genitalia:     Normal appearance for age, no discharge present, no tenderness present, no inflammatory lesions present, color normal  Vagina:     Normal vaginal vault without central or paravaginal defects, no discharge present, no inflammatory lesions present, no masses present  Bladder:     Nontender to palpation  Urethra:   Urethral Body:  Urethra palpation normal, urethra structural support normal   Urethral Meatus:  No erythema or lesions present  Cervix:     Surgically absent  Uterus:     Surgically absent  Adnexa:     Surgically absent  Perineum:     Perineum within normal limits, no evidence of trauma, no rashes or skin lesions present  Anus:     Anus within normal limits, no hemorrhoids present  Inguinal Lymph Nodes:     No lymphadenopathy present     In-Clinic Test Results:      ASSESSMENT/PLAN:                                                      64-year-old patient who had a hysterectomy 24 years ago.  She has persistent vaginal discharge but does not feel that it is in any way related to sexual activity.  She has been with the same sexual partner for 22 years and does not feel that there is any concern there.  She was treated with metronidazole 3 weeks ago with some brief improvement.  Vaginal culture is taken today.  She also had a update of her Pap as she has not had a yearly documented in our chart since 2013.  She has past history of breast cancer and had her mammogram that was negative in January.        Tristan Bryant MD  [facility] [place]

## 2019-04-25 DIAGNOSIS — B96.89 BV (BACTERIAL VAGINOSIS): Primary | ICD-10-CM

## 2019-04-25 DIAGNOSIS — N76.0 BV (BACTERIAL VAGINOSIS): Primary | ICD-10-CM

## 2019-04-25 LAB
GP B STREP DNA SPEC QL NAA+PROBE: NEGATIVE
SPECIMEN SOURCE: NORMAL

## 2019-04-25 RX ORDER — METRONIDAZOLE 7.5 MG/G
1 GEL VAGINAL AT BEDTIME
Qty: 70 G | Refills: 0 | Status: SHIPPED | OUTPATIENT
Start: 2019-04-25 | End: 2019-08-19

## 2019-04-26 ENCOUNTER — OFFICE VISIT (OUTPATIENT)
Dept: PEDIATRICS | Facility: CLINIC | Age: 64
End: 2019-04-26
Payer: COMMERCIAL

## 2019-04-26 VITALS
TEMPERATURE: 98 F | DIASTOLIC BLOOD PRESSURE: 68 MMHG | HEART RATE: 53 BPM | OXYGEN SATURATION: 99 % | SYSTOLIC BLOOD PRESSURE: 112 MMHG

## 2019-04-26 DIAGNOSIS — R30.0 DYSURIA: Primary | ICD-10-CM

## 2019-04-26 LAB
ALBUMIN UR-MCNC: NEGATIVE MG/DL
APPEARANCE UR: CLEAR
BACTERIA #/AREA URNS HPF: ABNORMAL /HPF
BILIRUB UR QL STRIP: NEGATIVE
COLOR UR AUTO: YELLOW
GLUCOSE UR STRIP-MCNC: NEGATIVE MG/DL
HGB UR QL STRIP: ABNORMAL
KETONES UR STRIP-MCNC: NEGATIVE MG/DL
LEUKOCYTE ESTERASE UR QL STRIP: NEGATIVE
NITRATE UR QL: NEGATIVE
NON-SQ EPI CELLS #/AREA URNS LPF: ABNORMAL /LPF
PH UR STRIP: 5.5 PH (ref 5–7)
RBC #/AREA URNS AUTO: ABNORMAL /HPF
SOURCE: ABNORMAL
SP GR UR STRIP: >1.03 (ref 1–1.03)
UROBILINOGEN UR STRIP-ACNC: 4 EU/DL (ref 0.2–1)
WBC #/AREA URNS AUTO: ABNORMAL /HPF

## 2019-04-26 PROCEDURE — 87086 URINE CULTURE/COLONY COUNT: CPT | Performed by: NURSE PRACTITIONER

## 2019-04-26 PROCEDURE — 81001 URINALYSIS AUTO W/SCOPE: CPT | Performed by: NURSE PRACTITIONER

## 2019-04-26 PROCEDURE — 99213 OFFICE O/P EST LOW 20 MIN: CPT | Performed by: NURSE PRACTITIONER

## 2019-04-26 RX ORDER — SULFAMETHOXAZOLE/TRIMETHOPRIM 800-160 MG
1 TABLET ORAL 2 TIMES DAILY
Qty: 14 TABLET | Refills: 0 | Status: SHIPPED | OUTPATIENT
Start: 2019-04-26 | End: 2019-06-26

## 2019-04-26 RX ORDER — PHENAZOPYRIDINE HYDROCHLORIDE 200 MG/1
200 TABLET, FILM COATED ORAL 3 TIMES DAILY PRN
Qty: 30 TABLET | Refills: 0 | Status: SHIPPED | OUTPATIENT
Start: 2019-04-26 | End: 2019-09-11

## 2019-04-26 NOTE — LETTER
My Asthma Action Plan  Name: Grecia Kilgore   YOB: 1955  Date: 4/26/2019   My doctor: RALPH Chino CNP   My clinic: [facility]        My Control Medicine: Montelukast (Singulair) -  10 mg .  My Rescue Medicine: Albuterol (Proair/Ventolin/Proventil) inhaler .   My Asthma Severity: intermittent  Avoid your asthma triggers: upper respiratory infections  None            GREEN ZONE   Good Control    I feel good    No cough or wheeze    Can work, sleep and play without asthma symptoms       Take your asthma control medicine every day.     1. If exercise triggers your asthma, take your rescue medication    15 minutes before exercise or sports, and    During exercise if you have asthma symptoms  2. Spacer to use with inhaler: If you have a spacer, make sure to use it with your inhaler             YELLOW ZONE Getting Worse  I have ANY of these:    I do not feel good    Cough or wheeze    Chest feels tight    Wake up at night   1. Keep taking your Green Zone medications  2. Start taking your rescue medicine:    every 20 minutes for up to 1 hour. Then every 4 hours for 24-48 hours.  3. If you stay in the Yellow Zone for more than 12-24 hours, contact your doctor.  4. If you do not return to the Green Zone in 12-24 hours or you get worse, start taking your oral steroid medicine if prescribed by your provider.           RED ZONE Medical Alert - Get Help  I have ANY of these:    I feel awful    Medicine is not helping    Breathing getting harder    Trouble walking or talking    Nose opens wide to breathe       1. Take your rescue medicine NOW  2. If your provider has prescribed an oral steroid medicine, start taking it NOW  3. Call your doctor NOW  4. If you are still in the Red Zone after 20 minutes and you have not reached your doctor:    Take your rescue medicine again and    Call 911 or go to the emergency room right away    See your regular doctor within 2 weeks of an Emergency  Room or Urgent Care visit for follow-up treatment.          Annual Reminders:  Meet with Asthma Educator,  Flu Shot in the Fall, consider Pneumonia Vaccination for patients with asthma (aged 19 and older).    [facility] [id number] [facility] [id number] [address] [facility] [address] [facility]                      Asthma Triggers  How To Control Things That Make Your Asthma Worse    Triggers are things that make your asthma worse.  Look at the list below to help you find your triggers and what you can do about them.  You can help prevent asthma flare-ups by staying away from your triggers.      Trigger                                                          What you can do   Cigarette Smoke  Tobacco smoke can make asthma worse. Do not allow smoking in your home, car or around you.  Be sure no one smokes at a child s day care or school.  If you smoke, ask your health care provider for ways to help you quit.  Ask family members to quit too.  Ask your health care provider for a referral to Quit Plan to help you quit smoking, or call 2-260-570-PLAN.     Colds, Flu, Bronchitis  These are common triggers of asthma. Wash your hands often.  Don t touch your eyes, nose or mouth.  Get a flu shot every year.     Dust Mites  These are tiny bugs that live in cloth or carpet. They are too small to see. Wash sheets and blankets in hot water every week.   Encase pillows and mattress in dust mite proof covers.  Avoid having carpet if you can. If you have carpet, vacuum weekly.   Use a dust mask and HEPA vacuum.   Pollen and Outdoor Mold  Some people are allergic to trees, grass, or weed pollen, or molds. Try to keep your windows closed.  Limit time out doors when pollen count is high.   Ask you health care provider about taking medicine during allergy season.     Animal Dander  Some people are allergic to skin flakes, urine or saliva from pets with fur or feathers. Keep pets with fur or feathers out of your  home.    If you can t keep the pet outdoors, then keep the pet out of your bedroom.  Keep the bedroom door closed.  Keep pets off cloth furniture and away from stuffed toys.     Mice, Rats, and Cockroaches  Some people are allergic to the waste from these pests.   Cover food and garbage.  Clean up spills and food crumbs.  Store grease in the refrigerator.   Keep food out of the bedroom.   Indoor Mold  This can be a trigger if your home has high moisture. Fix leaking faucets, pipes, or other sources of water.   Clean moldy surfaces.  Dehumidify basement if it is damp and smelly.   Smoke, Strong Odors, and Sprays  These can reduce air quality. Stay away from strong odors and sprays, such as perfume, powder, hair spray, paints, smoke incense, paint, cleaning products, candles and new carpet.   Exercise or Sports  Some people with asthma have this trigger. Be active!  Ask your doctor about taking medicine before sports or exercise to prevent symptoms.    Warm up for 5-10 minutes before and after sports or exercise.     Other Triggers of Asthma  Cold air:  Cover your nose and mouth with a scarf.  Sometimes laughing or crying can be a trigger.  Some medicines and food can trigger asthma.

## 2019-04-26 NOTE — PROGRESS NOTES
Urine cul  SUBJECTIVE:   Grecia Kilgore is a 64 year old female who presents to clinic today for the following health issues:    URINARY TRACT SYMPTOMS      Duration: this morning    Description  dysuria, frequency, urgency, hematuria, odor and hesitancy    Intensity:  moderate    Accompanying signs and symptoms:  Fever/chills: no  Flank pain no   Nausea and vomiting: no   Vaginal symptoms: discharge - has metrogel  Abdominal/Pelvic Pain: no     History  History of frequent UTI's: YES  History of kidney stones: no   Sexually Active: no   Possibility of pregnancy: No    Precipitating or alleviating factors: None    Therapies tried and outcome: none   Outcome: n/a  Patient was prescribed metrogel on 4/25/19    ROS: const/gi/gu/gyn otherwise negative     OBJECTIVE:  /68 (BP Location: Right arm)   Pulse 53   Temp 98  F (36.7  C) (Tympanic)   LMP 01/01/1985   SpO2 99%   CONSTITUTIONAL: Alert, well-nourished, well-groomed, NAD  RESP: Lungs CTA. No wheeze, rhonchi, rales.  CV: HRRR S1 S2 No MRG. No peripheral edema  GI: Abdomen flat. BS x 4. No TTP. No HSM or masses. No CVAT      ASSESSMENT/PLAN:  (R30.0) Dysuria  (primary encounter diagnosis)  Comment: Patient with dysuria and antoine hematuria. History of UTI and BV. Currently on treatment for BV and sx of BV are improving. She had to leave for an appointment before her results were back, so I empirically started her on Bactrim. However, then her UA and culture came back with hematuria but no infection. She has no signs of kidney stone.   Plan: UA reflex to Microscopic and Culture          -Had RN call patient to ask about flank pain, weight loss, etc. If all normal will have her repeat UA. May need to see urology. If signs of stone, etc may need CT.       Mary Ley, [facility].

## 2019-04-26 NOTE — PATIENT INSTRUCTIONS
Start bactrim twice a day for urinary tract infection (I'll call later to verify)    Ok to use pyridium.    Continue metrogel

## 2019-04-28 LAB
BACTERIA SPEC CULT: NO GROWTH
SPECIMEN SOURCE: NORMAL

## 2019-04-29 DIAGNOSIS — R30.0 DYSURIA: ICD-10-CM

## 2019-04-29 LAB
ALBUMIN UR-MCNC: NEGATIVE MG/DL
APPEARANCE UR: CLEAR
BILIRUB UR QL STRIP: NEGATIVE
COLOR UR AUTO: YELLOW
COPATH REPORT: NORMAL
GLUCOSE UR STRIP-MCNC: NEGATIVE MG/DL
HGB UR QL STRIP: NEGATIVE
KETONES UR STRIP-MCNC: NEGATIVE MG/DL
LEUKOCYTE ESTERASE UR QL STRIP: NEGATIVE
Lab: NORMAL
NITRATE UR QL: NEGATIVE
PAP: NORMAL
PH UR STRIP: 5.5 PH (ref 5–7)
SOURCE: NORMAL
SP GR UR STRIP: >1.03 (ref 1–1.03)
SPECIMEN SOURCE: NORMAL
UROBILINOGEN UR STRIP-ACNC: 1 EU/DL (ref 0.2–1)
YEAST SPEC QL CULT: NORMAL

## 2019-04-29 PROCEDURE — 81003 URINALYSIS AUTO W/O SCOPE: CPT | Performed by: NURSE PRACTITIONER

## 2019-04-30 LAB
FINAL DIAGNOSIS: NORMAL
HPV HR 12 DNA CVX QL NAA+PROBE: NEGATIVE
HPV16 DNA SPEC QL NAA+PROBE: NEGATIVE
HPV18 DNA SPEC QL NAA+PROBE: NEGATIVE
SPECIMEN DESCRIPTION: NORMAL
SPECIMEN SOURCE CVX/VAG CYTO: NORMAL

## 2019-06-26 ENCOUNTER — HOSPITAL ENCOUNTER (EMERGENCY)
Facility: CLINIC | Age: 64
Discharge: HOME | End: 2019-06-26
Attending: EMERGENCY MEDICINE | Admitting: EMERGENCY MEDICINE
Payer: COMMERCIAL

## 2019-06-26 VITALS
WEIGHT: 160 LBS | RESPIRATION RATE: 16 BRPM | TEMPERATURE: 98.1 F | SYSTOLIC BLOOD PRESSURE: 137 MMHG | OXYGEN SATURATION: 100 % | HEART RATE: 54 BPM | BODY MASS INDEX: 25.06 KG/M2 | DIASTOLIC BLOOD PRESSURE: 87 MMHG

## 2019-06-26 DIAGNOSIS — F41.0 PANIC ATTACK: ICD-10-CM

## 2019-06-26 LAB — INTERPRETATION ECG - MUSE: NORMAL

## 2019-06-26 PROCEDURE — 99283 EMERGENCY DEPT VISIT LOW MDM: CPT

## 2019-06-26 PROCEDURE — 93005 ELECTROCARDIOGRAM TRACING: CPT

## 2019-06-26 NOTE — ED TRIAGE NOTES
"Pt states became anxious and nauseated after eating tonight. EMS states pt appeared anxious and was hyperventilating on their arrival, EMS reports Pt's condition improved after administration of 2mg of Ativan IVP. Pt states \"I feel calm now but I don't feel like I have the Ativan in me. I think I need some more.\" ABCs intact GCS 15  "

## 2019-06-26 NOTE — ED PROVIDER NOTES
History     Chief Complaint:  Anxiety    HPI   Grecia CLAIRE Kilgore is a 64 year old female with a history of anxiety, depression, PTSD, and exploding head syndrome who presents to the emergency department today for evaluation of anxiety and racing palpitations. She has had episodes of this before due to her anxiety. It feels no different than normal. She received 2 mg Ativan IV via EMS and this helped her symptoms though she is now very sleepy. She has had no fevers or vomiting. She did feel nauseous at one point.     Allergies:  Codeine  Morphine  Nitrofuran Derivatives  Phenothiazines  Primatene Mist [Epinephrine Bitartrate]  Vicodin [Hydrocodone-Acetaminophen]  Latex     Medications:    albuterol (PROAIR HFA/PROVENTIL HFA/VENTOLIN HFA) 108 (90 Base) MCG/ACT inhaler  cetirizine (ZYRTEC) 10 MG tablet  fluticasone (FLONASE) 50 MCG/ACT spray  lisinopril (PRINIVIL/ZESTRIL) 5 MG tablet  LORazepam (ATIVAN) 0.5 MG tablet  metFORMIN (GLUCOPHAGE) 500 MG tablet  mirtazapine (REMERON) 15 MG tablet  montelukast (SINGULAIR) 10 MG tablet  phenazopyridine (PYRIDIUM) 200 MG tablet  ranitidine (ZANTAC) 150 MG tablet  simvastatin (ZOCOR) 40 MG tablet    Past Medical History:    Anxiety   Depression   Diabetes mellitus type 2 in nonobese  Diverticulosis   Eating disorder   Exploding head syndrome   External hemorrhoids   G6PD deficiency  Gastroesophageal reflux disease   Hepatitis A   Laxative abuse   Migraine   Mild persistent asthma   Ovarian cyst   PTSD  Pure hypercholesterolemia     Past Surgical History:    History reviewed. No pertinent surgical history.    Family History:    CVD Maternal Grandmother    Social History:  The patient was accompanied to the ED by EMS.  Smoking Status: Never Smoker  Smokeless Tobacco: Never Used  Alcohol Use: Negative    Marital Status:  Single      Review of Systems   All other systems reviewed and are negative.    Physical Exam     Patient Vitals for the past 24 hrs:   BP Temp Temp src Pulse  Heart Rate Resp SpO2 Weight   06/26/19 0630 (!) 137/91 -- -- 60 -- -- 99 % --   06/26/19 0600 (!) 131/93 -- -- 101 -- -- 100 % --   06/26/19 0530 132/85 -- -- 62 -- -- 98 % --   06/26/19 0500 117/77 -- -- 63 -- -- 97 % --   06/26/19 0430 135/84 -- -- 68 -- -- 97 % --   06/26/19 0400 (!) 136/93 -- -- 68 -- -- 96 % --   06/26/19 0330 (!) 169/95 -- -- 63 -- -- 96 % --   06/26/19 0319 (!) 165/95 98.1  F (36.7  C) Oral -- 64 16 97 % 72.6 kg (160 lb)      Physical Exam  Nursing note and vitals reviewed.  Constitutional: Cooperative. Tired appearing.   HENT:   Mouth/Throat: Mucous membranes are normal.   Cardiovascular: Normal rate, regular rhythm and normal heart sounds.  No murmur.  Pulmonary/Chest: Effort normal and breath sounds normal. No respiratory distress. No wheezes. No rales.   Abdominal: Soft. Normal appearance and bowel sounds are normal. No distension. There is no tenderness. There is no rigidity and no guarding.   Neurological: Alert.   Skin: Skin is warm and dry.   Psychiatric: Normal mood and affect.      Emergency Department Course     ECG:  ECG taken at 0400, ECG read at 0405  Normal sinus rhythm  Rate 63 bpm. WI interval 152 ms. QRS duration 76 ms. QT/QTc 394/403 ms. P-R-T axes 16 -15 24.    Emergency Department Course:    0340 Nursing notes and vitals reviewed.    0350 I performed an exam of the patient as documented above.     0410 I personally reviewed the results with the patient and answered all related questions prior to discharge.    Impression & Plan      Medical Decision Making:  Grecia Kilgore is a 64 year old female with a history of panic attacks and anxiety who presents to the emergency department today with a classic story for panic attack. Following ativan administration via EMS she feels completely back to baseline. Screening EKG shows sinus rhythm. I have a low clinical suspicion for cardiac etiologies, malignant arrhythmias, PE, or any life threatening presentations. She will be  observed a little longer to allow metabolism of the ativan. She will be discharged home once a safe and sober ride can be arranged.     Diagnosis:    ICD-10-CM    1. Panic attack F41.0      Disposition:   Discharge     Scribe Disclosure:  I, Tad Peng, am serving as a scribe at 3:53 AM on 6/26/2019 to document services personally performed by Prabhu Kahn MD based on my observations and the provider's statements to me.    [facility] EMERGENCY DEPARTMENT       Prabhu Kahn MD  06/26/19 0656

## 2019-06-26 NOTE — DISCHARGE INSTRUCTIONS
Discharge Instructions  Mental Health Concerns    You were seen today for mental health concerns, such as depression, anxiety, or suicidal thinking. Your provider feels that you do not require hospitalization at this time. However, your symptoms may become worse, and you may need to return to the Emergency Department. Most treatments of depression and suicidal thoughts are a process rather than a single intervention.  Medications and counseling can take several weeks or more to help.    Generally, every Emergency Department visit should have a follow-up clinic visit with either a primary or a specialty clinic/provider. Please follow-up as instructed by your emergency provider today.    By accepting these discharge instructions:  You promise to not harm yourself or others.  You agree that if you feel you are becoming unable to keep that promise, you will do something to help yourself before you do anything to harm yourself or others.   You agree to keep any safety plan arranged on your visit here today.  You agree to take any medication prescribed or recommended by your provider.  If you are getting worse, you can contact a friend or a family member, contact your counselor or family provider, contact a crisis line, or other options discussed with the provider or therapist today.  At any time, you can call 911 and return to the Emergency Department for more help.  You understand that follow-up is essential to your treatment, and you will make and keep appointments recommended on your visit today.    How to improve your mental health and prevent suicide:  Involve others by letting family, friends, counselors know.  Do not isolate yourself.  Avoid alcohol or drugs. Remove weapons, poisons from your home.  Try to stick to routines for eating, sleeping and getting regular exercise.    Try to get into sunlight. Bright natural light not only treats seasonal affective disorder but also depression.  Increase safe activities  that you enjoy.    If you feel worse, contact 1-800-suicide (1-315.249.4607), or call 911, or your primary provider/counselor for additional assistance.    If you were given a prescription for medicine here today, be sure to read all of the information (including the package insert) that comes with your prescription.  This will include important information about the medicine, its side effects, and any warnings that you need to know about.  The pharmacist who fills the prescription can provide more information and answer questions you may have about the medicine.  If you have questions or concerns that the pharmacist cannot address, please call or return to the Emergency Department.   Remember that you can always come back to the Emergency Department if you are not able to see your regular provider in the amount of time listed above, if you get any new symptoms, or if there is anything that worries you.

## 2019-06-26 NOTE — ED NOTES
Bed: ED10  Expected date: 6/26/19  Expected time: 3:05 AM  Means of arrival: Ambulance  Comments:  HE  64y/oF  Anxiety

## 2019-06-26 NOTE — ED PROVIDER NOTES
Patient was alert and oriented when I went to assess her.  She seemed to be sober from the medications.  Her daughter was sending a ride for her.  Unfortunately patient eloped prior to being discharged.     Mabel Mo MD  06/26/19 0936

## 2019-07-11 ENCOUNTER — TELEPHONE (OUTPATIENT)
Dept: OBGYN | Facility: CLINIC | Age: 64
End: 2019-07-11

## 2019-07-11 NOTE — TELEPHONE ENCOUNTER
1st attempt. No consent to communicate PHI. Called patient regarding overdue lab. Upon call back please ask patient if she would still like to have the HIV screening lab done. The lab was futured out and I am not sure if the patient left clinic without having done but I would like to know If she would like to have this done or should we just cancel the lab thanks. Lab extended 3 months.

## 2019-07-12 ENCOUNTER — NURSE TRIAGE (OUTPATIENT)
Dept: NURSING | Facility: CLINIC | Age: 64
End: 2019-07-12

## 2019-07-13 NOTE — TELEPHONE ENCOUNTER
63 y/o female calls about, lying in bed discomfort in left wrist,  burning sensation, arm looks like it is pink around the wrist arm, some pinkness going up the forearm.   She is allergic to bannans, took bite of ban william tonight, wrist swelling and under arm pit itching her today... She does not have Benadryl but has certazine,  No swelling of lips or tongue or face, no sob, no chest pain, no fever     RN reviewed protocols for hand swelling, advised her that she should follow up with tPCP regarding this in next 4 days, but she can take a tab of the certazine now and some tyleol to see if this helps, might be reaction to bananas, but if this does not respond to anti-histamine, if swelling worsens, call back.    Krystal Major RN - [place] Nurse Advisor  07/12/2019    11:45PM    Additional Information    Negative: Severe difficulty breathing (e.g., struggling for each breath, speaks in single words)    Negative: Sounds like a life-threatening emergency to the triager    Negative: Difficulty breathing at rest    Negative: Looks like a broken bone or dislocated joint (e.g., crooked or deformed)    Negative: Entire hand is cool or blue in comparison to other side    Negative: [1] Can't use hand or can barely use hand AND [2] new onset    Negative: [1] Difficulty breathing with exertion (e.g., walking) AND [2] new onset or worsening    Negative: [1] Red area or streak AND [2] fever    Negative: [1] Swelling is painful to touch AND [2] fever    Negative: [1] Cast arm AND [2] now increased pain    Negative: [1] Postpartum (i.e. < 6 weeks since delivery) AND [2] new blurred vision or vision changes    Negative: [1] Postpartum (i.e. < 6 weeks since delivery) AND [2] face swelling    Negative: Patient sounds very sick or weak to the triager    Negative: [1] Pregnant > 20 weeks AND [2] face swelling    Negative: [1] Pregnant > 20 weeks AND [2] new blurred vision or vision changes    Negative: SEVERE hand swelling (e.g.,  swelling of entire hand and up into forearm)    Negative: [1] Red area or streak [2] large (> 2 in. or 5 cm)    Negative: MODERATE hand swelling (e.g., visible swelling of hand and fingers; pitting edema)    Negative: [1] MILD swelling (puffiness) of both hands AND [2] new onset or worsening  (Exception: caused by hot weather or normal pregnancy swelling)    Negative: Looks like a boil, infected sore, deep ulcer or other infected rash (spreading redness, pus)    Negative: Face swelling    [1] Small area of LOCALIZED swelling AND [2] itchy    Negative: [1] MILD swelling (puffiness) of both hands and fingers AND [2] caused by hot weather    Negative: [1] MILD swelling (puffiness) of both hands and fingers AND [2] caused by pregnancy    Negative: [1] Swelling (puffiness of hand or hands) [2] is a chronic symptom (recurrent or ongoing AND present > 4 weeks)    Negative: [1] Small non-tender ball or lump AND [2] is a chronic symptom (recurrent or ongoing AND present > 4 weeks)    Negative: [1] MILD swelling (puffiness) of both hands AND [2] not better after 3 days    Negative: [1] Small area of LOCALIZED swelling AND [2] not better after 3 days    Protocols used: HAND SWELLING-A-AH

## 2019-07-16 ENCOUNTER — TELEPHONE (OUTPATIENT)
Dept: FAMILY MEDICINE | Facility: CLINIC | Age: 64
End: 2019-07-16

## 2019-07-16 DIAGNOSIS — M79.605 PAIN IN BOTH LOWER EXTREMITIES: Primary | ICD-10-CM

## 2019-07-16 DIAGNOSIS — M79.604 PAIN IN BOTH LOWER EXTREMITIES: Primary | ICD-10-CM

## 2019-07-16 NOTE — TELEPHONE ENCOUNTER
Called patient to state that she needs to come in for an office visit. Patient stated that when she visited vascular on 2/27/19 for leg weakness that her Vascular MD stated PT would be recommended, but the referral needs to come from the PCP. Patient states she does not think it is necessary to come in for an office visit for a referral. Her Chiropractor stated and recommended PT for patient.    Recommendations are for the [facility] PT w/ Nya Valentin.    Routing to PCP. Thanks.      Tequila Ryan RN, BSN  [facility]

## 2019-07-16 NOTE — TELEPHONE ENCOUNTER
Dr Mccall- Patient has been seeing a chiropractor. He suggested that patient should have some physical therapy   On her legs and knees. Her legs are week with movement especially her L/leg and knee. Should would like to do therapy on both knees. She would like to do therapy at [facility] physical therapy with Nya Valentin.  [phone]. Not sure if you have seen patient for this problem. Does patient need appt first? Please advise? Thanks    Silvana Sauceda  Referral Coordinator

## 2019-07-16 NOTE — TELEPHONE ENCOUNTER
Please have the patient follow-up for an office visit for evaluation    Romel Mccall MD  [facility], [place]

## 2019-07-16 NOTE — TELEPHONE ENCOUNTER
I do not see any documentation recommending physical therapy in the vascular surgery notes.  I was hoping to look at getting x-rays.   I have placed the order for physical therapy of both legs.  Notify patient.    Romel Mccall MD  [facility], [place]

## 2019-07-30 DIAGNOSIS — E78.5 HYPERLIPIDEMIA LDL GOAL <100: ICD-10-CM

## 2019-07-30 RX ORDER — SIMVASTATIN 40 MG
TABLET ORAL
Qty: 30 TABLET | Refills: 0 | Status: SHIPPED | OUTPATIENT
Start: 2019-07-30 | End: 2019-09-03

## 2019-07-30 NOTE — TELEPHONE ENCOUNTER
"Requested Prescriptions   Pending Prescriptions Disp Refills     simvastatin (ZOCOR) 40 MG tablet [Pharmacy Med Name: SIMVASTATIN 40 MG Tablet]  Last Written Prescription Date:  11/23/2018  Last Fill Quantity: 90 tabs,  # refills: 1   Last office visit: 2/19/2019 with prescribing provider:  Cal   Future Office Visit:     90 tablet 1     Sig: TAKE 1 TABLET AT BEDTIME       Statins Protocol Failed - 7/30/2019  4:42 PM        Failed - LDL on file in past 12 months     Recent Labs   Lab Test 05/07/18  1346   LDL 83             Passed - No abnormal creatine kinase in past 12 months     No lab results found.             Passed - Recent (12 mo) or future (30 days) visit within the authorizing provider's specialty     Patient had office visit in the last 12 months or has a visit in the next 30 days with authorizing provider or within the authorizing provider's specialty.  See \"Patient Info\" tab in inbasket, or \"Choose Columns\" in Meds & Orders section of the refill encounter.              Passed - Medication is active on med list        Passed - Patient is age 18 or older        Passed - No active pregnancy on record        Passed - No positive pregnancy test in past 12 months          "

## 2019-07-30 NOTE — TELEPHONE ENCOUNTER
Due for an Office visit for further refills, only fill for 30 days     Michaelle Mitchell RN, BSN  [facility]

## 2019-08-19 ENCOUNTER — OFFICE VISIT (OUTPATIENT)
Dept: OBGYN | Facility: CLINIC | Age: 64
End: 2019-08-19
Payer: COMMERCIAL

## 2019-08-19 ENCOUNTER — TELEPHONE (OUTPATIENT)
Dept: OBGYN | Facility: CLINIC | Age: 64
End: 2019-08-19

## 2019-08-19 VITALS — WEIGHT: 163 LBS | BODY MASS INDEX: 25.53 KG/M2 | SYSTOLIC BLOOD PRESSURE: 124 MMHG | DIASTOLIC BLOOD PRESSURE: 76 MMHG

## 2019-08-19 DIAGNOSIS — B37.31 YEAST VAGINITIS: ICD-10-CM

## 2019-08-19 DIAGNOSIS — N95.2 ATROPHIC VAGINITIS: Primary | ICD-10-CM

## 2019-08-19 LAB
ALBUMIN UR-MCNC: NEGATIVE MG/DL
APPEARANCE UR: CLEAR
BILIRUB UR QL STRIP: NEGATIVE
COLOR UR AUTO: YELLOW
GLUCOSE UR STRIP-MCNC: NEGATIVE MG/DL
HGB UR QL STRIP: NEGATIVE
KETONES UR STRIP-MCNC: NEGATIVE MG/DL
LEUKOCYTE ESTERASE UR QL STRIP: NEGATIVE
NITRATE UR QL: NEGATIVE
PH UR STRIP: 6 PH (ref 5–7)
SOURCE: NORMAL
SP GR UR STRIP: 1.01 (ref 1–1.03)
UROBILINOGEN UR STRIP-ACNC: 1 EU/DL (ref 0.2–1)

## 2019-08-19 PROCEDURE — 87102 FUNGUS ISOLATION CULTURE: CPT | Performed by: OBSTETRICS & GYNECOLOGY

## 2019-08-19 PROCEDURE — 87653 STREP B DNA AMP PROBE: CPT | Performed by: OBSTETRICS & GYNECOLOGY

## 2019-08-19 PROCEDURE — 99213 OFFICE O/P EST LOW 20 MIN: CPT | Performed by: OBSTETRICS & GYNECOLOGY

## 2019-08-19 PROCEDURE — 81003 URINALYSIS AUTO W/O SCOPE: CPT | Performed by: OBSTETRICS & GYNECOLOGY

## 2019-08-19 PROCEDURE — 87205 SMEAR GRAM STAIN: CPT | Performed by: OBSTETRICS & GYNECOLOGY

## 2019-08-19 RX ORDER — FLUCONAZOLE 150 MG/1
150 TABLET ORAL DAILY
Qty: 4 TABLET | Refills: 0 | Status: SHIPPED | OUTPATIENT
Start: 2019-08-19 | End: 2019-09-11

## 2019-08-19 NOTE — PROGRESS NOTES
SUBJECTIVE:                                                   Grecia Kilgore is a 64 year old female who presents to clinic today for the following health issue(s):  Patient presents with:  Vaginal Problem: possible BV, heavy, chalky white discharge, unsure if theres odor, denies itching  Urinary Problem      HPI: The patient is seen at this time for thick white discharge.  She took metronidazole last week and now has a presumed yeast infection.      Patient's last menstrual period was 1985..   Patient is sexually active, .  Using hysterectomy for contraception.    reports that she has never smoked. She has never used smokeless tobacco.     STD testing offered?  Accepted     Health maintenance updated:  yes       Problem list and histories reviewed & adjusted, as indicated.  Additional history: as documented.    Patient Active Problem List   Diagnosis     Allergic rhinitis     External hemorrhoids     Vitamin D deficiency     Fibromyalgia     Osteoarthritis, knee     Positive PPD     Panic disorder     Genital herpes     Advanced directives, counseling/discussion     DCIS (ductal carcinoma in situ) of breast     Heart palpitations     S/P hysterectomy - fibroids     Anxiety     Major depressive disorder, single episode, moderate (H)     Hyperlipidemia LDL goal <100     Type 2 diabetes mellitus without complication, without long-term current use of insulin (H)     Mild intermittent asthma without complication     BOO (obstructive sleep apnea)     Past Surgical History:   Procedure Laterality Date     ARTHRODESIS TOE(S)  2013    Procedure: ARTHRODESIS TOE(S);  BILATERAL GREAT TOE FUSION (C-ARM);  Surgeon: Mary Guan MD;  Location: [facility]     ARTHRODESIS TOE(S) Left 2016    Procedure: ARTHRODESIS TOE(S);  Surgeon: Mary Guan MD;  Location: [facility]     ARTHROSCOPY KNEE Left 11     BIOPSY BREAST  2014    Procedure: BIOPSY BREAST;  Re-excision Left Breast Cavity for  Margins ;  Surgeon: Lisset mAador DO;  Location: [facility] OR     BIOPSY BREAST SEED LOCALIZATION  1/22/2014    Procedure: BIOPSY BREAST SEED LOCALIZATION;  Left Breast Seed Localized Excisional Biopsy ;  Surgeon: Lisset Amador DO;  Location: [facility] OR     COLONOSCOPY  2005    nl - repeat 2015     FOOT SURGERY Bilateral 2013     HEMORRHOIDECTOMY BANDING      multiple times     HEMORRHOIDECTOMY INTERNAL N/A 7/24/2018    Procedure: HEMORRHOIDECTOMY INTERNAL;  three quadrant hemorrhoidectomy;  Surgeon: Lisa Patrick MD;  Location: [facility] OR     HYSTERECTOMY  7/1996    fibroids     REMOVE HARDWARE FOOT Left 2015     REPAIR TENDON FOOT Left 12/19/2016    Procedure: REPAIR TENDON FOOT;  Surgeon: Mary Guan MD;  [facility]      Social History     Tobacco Use     Smoking status: Never Smoker     Smokeless tobacco: Never Used   Substance Use Topics     Alcohol use: No     Alcohol/week: 0.0 oz      Problem (# of Occurrences) Relation (Name,Age of Onset)    Alcohol/Drug (1) Father    Breast Cancer (1) Mother    Cancer (1) Father    Cancer - colorectal (1) Maternal Grandfather    Cerebrovascular Disease (1) Maternal Grandmother    Diabetes (2) Mother, Maternal Grandmother            Current Outpatient Medications   Medication Sig     albuterol (PROAIR HFA/PROVENTIL HFA/VENTOLIN HFA) 108 (90 Base) MCG/ACT inhaler Inhale 2 puffs into the lungs every 6 hours as needed for shortness of breath / dyspnea     cetirizine (ZYRTEC) 10 MG tablet Take 1 tablet (10 mg) by mouth every evening     fluticasone (FLONASE) 50 MCG/ACT spray Spray 1 spray into both nostrils daily     lisinopril (PRINIVIL/ZESTRIL) 5 MG tablet Take 1 tablet (5 mg) by mouth daily     LORazepam (ATIVAN) 0.5 MG tablet Take 0.5-1 tablets (0.25-0.5 mg) by mouth every 8 hours as needed for anxiety Take 30 minutes prior to departure.  Do not operate a vehicle after taking this medication     metFORMIN (GLUCOPHAGE) 500 MG tablet Take 1 tablet (500 mg) by  mouth daily (with breakfast)     mirtazapine (REMERON) 15 MG tablet nightly as needed      montelukast (SINGULAIR) 10 MG tablet Take 1 tablet (10 mg) by mouth At Bedtime     phenazopyridine (PYRIDIUM) 200 MG tablet Take 1 tablet (200 mg) by mouth 3 times daily as needed for irritation     ranitidine (ZANTAC) 150 MG tablet Take 1 tablet (150 mg) by mouth 2 times daily     simvastatin (ZOCOR) 40 MG tablet TAKE 1 TABLET AT BEDTIME     No current facility-administered medications for this visit.      Allergies   Allergen Reactions     Codeine Nausea     Morphine Itching     Nitrofuran Derivatives Hives     Phenothiazines      sedation     Primatene Mist [Epinephrine Bitartrate] Itching     neck itch with primatene mist     Vicodin [Hydrocodone-Acetaminophen] Nausea     Latex Itching and Rash       ROS:  12 point review of systems negative other than symptoms noted below.  Genitourinary: Vaginal Discharge    OBJECTIVE:     /76   Wt 73.9 kg (163 lb)   LMP 01/01/1985   Breastfeeding? No   BMI 25.53 kg/m    Body mass index is 25.53 kg/m .    Exam:  Constitutional:  Appearance: Well nourished, well developed alert, in no acute distress  Skin:General Inspection:  No rashes present, no lesions present, no areas of discoloration; Genitalia and Groin:  No rashes present, no lesions present, no areas of discoloration, no masses present.  Neurologic/Psychiatric:  Mental Status:  Oriented X3   Pelvic Exam:  External Genitalia:     Normal appearance for age, no discharge present, no tenderness present, no inflammatory lesions present, color normal  Vagina:     Normal vaginal vault without central or paravaginal defects, no discharge present, no inflammatory lesions present, no masses present  Bladder:     Nontender to palpation  Urethra:   Urethral Body:  Urethra palpation normal, urethra structural support normal   Urethral Meatus:  No erythema or lesions present  Cervix:     Appearance healthy, no lesions present,  nontender to palpation, no bleeding present  Uterus:     Uterus: firm, normal sized and nontender, midplane in position.   Adnexa:     No adnexal tenderness present, no adnexal masses present  Perineum:     Perineum within normal limits, no evidence of trauma, no rashes or skin lesions present  Anus:     Anus within normal limits, no hemorrhoids present  Inguinal Lymph Nodes:     No lymphadenopathy present  Pubic Hair:     Normal pubic hair distribution for age  Genitalia and Groin:     No rashes present, no lesions present, no areas of discoloration, no masses present       In-Clinic Test Results:  No results found for this or any previous visit (from the past 24 hour(s)).    ASSESSMENT/PLAN:                                                          Yeast vaginitis.  We will call in Danna.        Tristan Bryant MD  [facility] [place]

## 2019-08-19 NOTE — TELEPHONE ENCOUNTER
Reason for call:  Other   Patient called regarding (reason for call): Pt wants an appt today with only this provider, no avail appts until 9/4  Additional comments:     Phone number to reach patient:  Home number on file [phone] (home)    Best Time:  any    Can we leave a detailed message on this number?  NO

## 2019-08-19 NOTE — TELEPHONE ENCOUNTER
Pt calling   Reoccurring BV  Sx's: Heavier, messing than ever  Doesn't itch, unsure if there is an odor  Chalky, white and curdy    No apts today per scheduling.  Discussed with Manny and he will fit her in at 1600 today.  Pt will come in at that time.

## 2019-08-20 LAB
GRAM STN SPEC: ABNORMAL
Lab: ABNORMAL
SPECIMEN SOURCE: ABNORMAL

## 2019-08-21 LAB
GP B STREP DNA SPEC QL NAA+PROBE: NEGATIVE
SPECIMEN SOURCE: NORMAL

## 2019-08-26 LAB
Lab: ABNORMAL
SPECIMEN SOURCE: ABNORMAL
YEAST SPEC QL CULT: ABNORMAL

## 2019-09-03 DIAGNOSIS — E78.5 HYPERLIPIDEMIA LDL GOAL <100: ICD-10-CM

## 2019-09-03 NOTE — TELEPHONE ENCOUNTER
Reason for Call:  Medication or medication refill:    Do you use a [facility]?  Name of the pharmacy and phone number for the current request:  [facility]    Name of the medication requested: simvastatin 40mg    Other request: pt is wondering if she will need to make an office visit or if Dr. Mccall will just fill it. She is on her last refill. Please advise patient. Thank you.    Can we leave a detailed message on this number? YES    Phone number patient can be reached at: Cell number on file:    Telephone Information:   Mobile [phone]       Best Time: any    Call taken on 9/3/2019 at 2:05 PM by Irina Doyle

## 2019-09-03 NOTE — TELEPHONE ENCOUNTER
"Last Written Prescription Date:  7/30/19  Last Fill Quantity: 30 tablets,  # refills: 0   Last office visit: 2/19/2019 with prescribing provider:  Cal   Future Office Visit:      Requested Prescriptions   Pending Prescriptions Disp Refills     simvastatin (ZOCOR) 40 MG tablet 30 tablet 0     Sig: Take 1 tablet (40 mg) by mouth At Bedtime       Statins Protocol Failed - 9/3/2019  2:07 PM        Failed - LDL on file in past 12 months     Recent Labs   Lab Test 05/07/18  1346   LDL 83             Passed - No abnormal creatine kinase in past 12 months     No lab results found.             Passed - Recent (12 mo) or future (30 days) visit within the authorizing provider's specialty     Patient had office visit in the last 12 months or has a visit in the next 30 days with authorizing provider or within the authorizing provider's specialty.  See \"Patient Info\" tab in inbasket, or \"Choose Columns\" in Meds & Orders section of the refill encounter.              Passed - Medication is active on med list        Passed - Patient is age 18 or older        Passed - No active pregnancy on record        Passed - No positive pregnancy test in past 12 months          "

## 2019-09-04 NOTE — TELEPHONE ENCOUNTER
Patient was given 30 days and did not follow-up. Routing to team to schedule patient. Once scheduled, route back to triage to see if another small refill can be given.   Sharron Pham RN   [facility] - Triage

## 2019-09-05 NOTE — TELEPHONE ENCOUNTER
Josette Olguin contacted [facility] on 09/05/19 and left a message. If patient calls back please schedule appointment as soon as possible.    .Josette CONROY    [facility] [place]

## 2019-09-06 NOTE — TELEPHONE ENCOUNTER
Josette Olguin contacted [facility] on 09/06/19 and left a message. If patient calls back please schedule appointment as soon as possible.     .Josette CONROY    [facility] [place]

## 2019-09-10 ENCOUNTER — TELEPHONE (OUTPATIENT)
Dept: FAMILY MEDICINE | Facility: CLINIC | Age: 64
End: 2019-09-10
Payer: COMMERCIAL

## 2019-09-10 PROCEDURE — 99207 C PAF COMPLETED  NO CHARGE: CPT | Performed by: FAMILY MEDICINE

## 2019-09-10 RX ORDER — SIMVASTATIN 40 MG
40 TABLET ORAL AT BEDTIME
Qty: 15 TABLET | Refills: 0 | Status: SHIPPED | OUTPATIENT
Start: 2019-09-10 | End: 2019-09-11

## 2019-09-10 NOTE — TELEPHONE ENCOUNTER
Called patient. Gave patient small refill. Patient states she is completely out of her medication patient scheduled an appt for Wed 9/11 w/ Cal.    Patient will be in tomorrow to address further refills.     Tequila Ryan RN, BSN  [facility] [place]

## 2019-09-10 NOTE — TELEPHONE ENCOUNTER
[employer] PAF Project printed and given to PCP or MA for completion at patient's on 09/11/2019 appointment  Routing to Provider to sign EPIC order (pended in this encounter) once appointment is complete  TC to fax to [phone] to process once it has been signed    .Josette CONROY    [facility] [place]

## 2019-09-11 ENCOUNTER — TELEPHONE (OUTPATIENT)
Dept: FAMILY MEDICINE | Facility: CLINIC | Age: 64
End: 2019-09-11

## 2019-09-11 ENCOUNTER — OFFICE VISIT (OUTPATIENT)
Dept: FAMILY MEDICINE | Facility: CLINIC | Age: 64
End: 2019-09-11
Payer: COMMERCIAL

## 2019-09-11 VITALS
HEART RATE: 60 BPM | OXYGEN SATURATION: 100 % | HEIGHT: 67 IN | SYSTOLIC BLOOD PRESSURE: 136 MMHG | WEIGHT: 160 LBS | TEMPERATURE: 97.9 F | DIASTOLIC BLOOD PRESSURE: 80 MMHG | BODY MASS INDEX: 25.11 KG/M2

## 2019-09-11 DIAGNOSIS — E78.5 HYPERLIPIDEMIA LDL GOAL <100: ICD-10-CM

## 2019-09-11 DIAGNOSIS — N18.30 CKD (CHRONIC KIDNEY DISEASE) STAGE 3, GFR 30-59 ML/MIN (H): ICD-10-CM

## 2019-09-11 DIAGNOSIS — L50.9 HIVES: ICD-10-CM

## 2019-09-11 DIAGNOSIS — J45.20 MILD INTERMITTENT ASTHMA WITHOUT COMPLICATION: ICD-10-CM

## 2019-09-11 DIAGNOSIS — E11.9 TYPE 2 DIABETES MELLITUS WITHOUT COMPLICATION, WITHOUT LONG-TERM CURRENT USE OF INSULIN (H): Primary | ICD-10-CM

## 2019-09-11 DIAGNOSIS — Z23 NEED FOR PROPHYLACTIC VACCINATION AND INOCULATION AGAINST INFLUENZA: ICD-10-CM

## 2019-09-11 DIAGNOSIS — M25.50 ARTHRALGIA, UNSPECIFIED JOINT: ICD-10-CM

## 2019-09-11 DIAGNOSIS — R10.9 LEFT FLANK PAIN: ICD-10-CM

## 2019-09-11 LAB
ALBUMIN UR-MCNC: NEGATIVE MG/DL
APPEARANCE UR: CLEAR
BILIRUB UR QL STRIP: NEGATIVE
COLOR UR AUTO: YELLOW
GLUCOSE UR STRIP-MCNC: NEGATIVE MG/DL
HBA1C MFR BLD: 5.7 % (ref 0–5.6)
HGB BLD-MCNC: 13 G/DL (ref 11.7–15.7)
HGB UR QL STRIP: NEGATIVE
KETONES UR STRIP-MCNC: NEGATIVE MG/DL
LEUKOCYTE ESTERASE UR QL STRIP: NEGATIVE
NITRATE UR QL: NEGATIVE
PH UR STRIP: 6.5 PH (ref 5–7)
SOURCE: ABNORMAL
SP GR UR STRIP: 1.02 (ref 1–1.03)
UROBILINOGEN UR STRIP-ACNC: 4 EU/DL (ref 0.2–1)

## 2019-09-11 PROCEDURE — 36415 COLL VENOUS BLD VENIPUNCTURE: CPT | Performed by: FAMILY MEDICINE

## 2019-09-11 PROCEDURE — 99214 OFFICE O/P EST MOD 30 MIN: CPT | Mod: 25 | Performed by: FAMILY MEDICINE

## 2019-09-11 PROCEDURE — 84439 ASSAY OF FREE THYROXINE: CPT | Performed by: FAMILY MEDICINE

## 2019-09-11 PROCEDURE — 85018 HEMOGLOBIN: CPT | Performed by: FAMILY MEDICINE

## 2019-09-11 PROCEDURE — 82043 UR ALBUMIN QUANTITATIVE: CPT | Performed by: FAMILY MEDICINE

## 2019-09-11 PROCEDURE — 84443 ASSAY THYROID STIM HORMONE: CPT | Performed by: FAMILY MEDICINE

## 2019-09-11 PROCEDURE — 81003 URINALYSIS AUTO W/O SCOPE: CPT | Performed by: FAMILY MEDICINE

## 2019-09-11 PROCEDURE — 83036 HEMOGLOBIN GLYCOSYLATED A1C: CPT | Performed by: FAMILY MEDICINE

## 2019-09-11 PROCEDURE — 80053 COMPREHEN METABOLIC PANEL: CPT | Performed by: FAMILY MEDICINE

## 2019-09-11 PROCEDURE — G0008 ADMIN INFLUENZA VIRUS VAC: HCPCS | Performed by: FAMILY MEDICINE

## 2019-09-11 PROCEDURE — 80061 LIPID PANEL: CPT | Performed by: FAMILY MEDICINE

## 2019-09-11 PROCEDURE — 90686 IIV4 VACC NO PRSV 0.5 ML IM: CPT | Performed by: FAMILY MEDICINE

## 2019-09-11 RX ORDER — CETIRIZINE HYDROCHLORIDE 10 MG/1
10 TABLET ORAL EVERY EVENING
Qty: 90 TABLET | Refills: 3 | Status: SHIPPED | OUTPATIENT
Start: 2019-09-11 | End: 2020-05-04

## 2019-09-11 RX ORDER — SIMVASTATIN 40 MG
40 TABLET ORAL AT BEDTIME
Qty: 15 TABLET | Refills: 0 | Status: SHIPPED | OUTPATIENT
Start: 2019-09-11 | End: 2019-10-03

## 2019-09-11 RX ORDER — LISINOPRIL 5 MG/1
5 TABLET ORAL DAILY
Qty: 90 TABLET | Refills: 3 | Status: SHIPPED | OUTPATIENT
Start: 2019-09-11 | End: 2019-10-03

## 2019-09-11 RX ORDER — MONTELUKAST SODIUM 10 MG/1
10 TABLET ORAL AT BEDTIME
Qty: 90 TABLET | Refills: 3 | Status: SHIPPED | OUTPATIENT
Start: 2019-09-11 | End: 2019-10-01

## 2019-09-11 RX ORDER — ALBUTEROL SULFATE 90 UG/1
2 AEROSOL, METERED RESPIRATORY (INHALATION) EVERY 6 HOURS PRN
Qty: 1 INHALER | Refills: 6 | Status: SHIPPED | OUTPATIENT
Start: 2019-09-11 | End: 2020-09-18

## 2019-09-11 ASSESSMENT — ANXIETY QUESTIONNAIRES
7. FEELING AFRAID AS IF SOMETHING AWFUL MIGHT HAPPEN: SEVERAL DAYS
IF YOU CHECKED OFF ANY PROBLEMS ON THIS QUESTIONNAIRE, HOW DIFFICULT HAVE THESE PROBLEMS MADE IT FOR YOU TO DO YOUR WORK, TAKE CARE OF THINGS AT HOME, OR GET ALONG WITH OTHER PEOPLE: VERY DIFFICULT
1. FEELING NERVOUS, ANXIOUS, OR ON EDGE: NEARLY EVERY DAY
5. BEING SO RESTLESS THAT IT IS HARD TO SIT STILL: SEVERAL DAYS
GAD7 TOTAL SCORE: 12
2. NOT BEING ABLE TO STOP OR CONTROL WORRYING: MORE THAN HALF THE DAYS
3. WORRYING TOO MUCH ABOUT DIFFERENT THINGS: MORE THAN HALF THE DAYS
6. BECOMING EASILY ANNOYED OR IRRITABLE: SEVERAL DAYS

## 2019-09-11 ASSESSMENT — MIFFLIN-ST. JEOR: SCORE: 1318.39

## 2019-09-11 ASSESSMENT — PATIENT HEALTH QUESTIONNAIRE - PHQ9
SUM OF ALL RESPONSES TO PHQ QUESTIONS 1-9: 14
5. POOR APPETITE OR OVEREATING: MORE THAN HALF THE DAYS

## 2019-09-11 NOTE — PROGRESS NOTES
Subjective     Grecia CLAIRE Kilgore is a 64 year old female who presents to clinic today for the following health issues:    HPI   Hyperlipidemia Follow-Up      Are you having any of the following symptoms? (Select all that apply)  No complaints of shortness of breath, chest pain or pressure.  No increased sweating or nausea with activity.  No left-sided neck or arm pain.  No complaints of pain in calves when walking 1-2 blocks.    Are you regularly taking any medication or supplement to lower your cholesterol?   No    Are you having muscle aches or other side effects that you think could be caused by your cholesterol lowering medication?  No          How many servings of fruits and vegetables do you eat daily?  2-3    On average, how many sweetened beverages do you drink each day (soda, juice, sweet tea, etc)?   0    How many days per week do you miss taking your medication? 0        Diabetes Follow-up      What symptoms do you notice when your blood sugar is low?  None    What concerns do you have today about your diabetes? None     Do you have any of these symptoms? (Select all that apply)  No numbness or tingling in feet.  No redness, sores or blisters on feet.  No complaints of excessive thirst.  No reports of blurry vision.  No significant changes to weight.     Have you had a diabetic eye exam in the last 12 months? Yes- Date of last eye exam: Jan 2019    BP Readings from Last 2 Encounters:   09/11/19 136/80   08/19/19 124/76     Hemoglobin A1C (%)   Date Value   09/11/2019 5.7 (H)   11/23/2018 6.0 (H)     LDL Cholesterol Calculated (mg/dL)   Date Value   05/07/2018 83   11/15/2017 123 (H)       Diabetes Management Resources  Asthma Follow-Up    Was ACT completed today?    Yes    ACT Total Scores 2/19/2019   ACT TOTAL SCORE -   ASTHMA ER VISITS -   ASTHMA HOSPITALIZATIONS -   ACT TOTAL SCORE (Goal Greater than or Equal to 20) 25   In the past 12 months, how many times did you visit the emergency room for your  asthma without being admitted to the hospital? 0   In the past 12 months, how many times were you hospitalized overnight because of your asthma? 0       How many days per week do you miss taking your asthma controller medication?  I do not have an asthma controller medication    Please describe any recent triggers for your asthma: None    Have you had any Emergency Room Visits, Urgent Care Visits, or Hospital Admissions since your last office visit?  No    Patient needs a refill on hives medication.  She uses Zyrtec and Zantac when hives and itching started.  Symptoms are off and on.    Reports of left flank pain that started yesterday.  She tells that she is been very stressed out lately because of her son's situation.  She thinks she has not been drinking enough water.  Denies any hematuria or frequency or dysuria.      Would like orders for physical therapy.  Currently getting physical therapy for leg pain.  She also wants physical therapy for her back including upper and lower, neck pain management, on shoulder pain management bilaterally and bilateral feet pain.      Chronic Kidney Disease Follow-up      Current NSAID use?  No      Patient Active Problem List   Diagnosis     Allergic rhinitis     External hemorrhoids     Vitamin D deficiency     Fibromyalgia     Osteoarthritis, knee     Positive PPD     Panic disorder     Genital herpes     Advanced directives, counseling/discussion     DCIS (ductal carcinoma in situ) of breast     Heart palpitations     S/P hysterectomy - fibroids     Anxiety     Major depressive disorder, single episode, moderate (H)     Hyperlipidemia LDL goal <100     Type 2 diabetes mellitus without complication, without long-term current use of insulin (H)     Mild intermittent asthma without complication     BOO (obstructive sleep apnea)     CKD (chronic kidney disease) stage 3, GFR 30-59 ml/min (H)     Past Surgical History:   Procedure Laterality Date     ARTHRODESIS TOE(S)  9/16/2013     Procedure: ARTHRODESIS TOE(S);  BILATERAL GREAT TOE FUSION (C-ARM);  Surgeon: Mary Guan MD;  Location: [facility]     ARTHRODESIS TOE(S) Left 12/19/2016    Procedure: ARTHRODESIS TOE(S);  Surgeon: Mary Guan MD;  Location: [facility]     ARTHROSCOPY KNEE Left 2/2/11     BIOPSY BREAST  2/7/2014    Procedure: BIOPSY BREAST;  Re-excision Left Breast Cavity for Margins ;  Surgeon: Lisset Amador DO;  Location: [facility]     BIOPSY BREAST SEED LOCALIZATION  1/22/2014    Procedure: BIOPSY BREAST SEED LOCALIZATION;  Left Breast Seed Localized Excisional Biopsy ;  Surgeon: Lisset Amador DO;  Location: [facility] OR     COLONOSCOPY  2005    nl - repeat 2015     FOOT SURGERY Bilateral 2013     HEMORRHOIDECTOMY BANDING      multiple times     HEMORRHOIDECTOMY INTERNAL N/A 7/24/2018    Procedure: HEMORRHOIDECTOMY INTERNAL;  three quadrant hemorrhoidectomy;  Surgeon: Lisa Patrick MD;  Location: [facility]     HYSTERECTOMY  7/1996    fibroids     REMOVE HARDWARE FOOT Left 2015     REPAIR TENDON FOOT Left 12/19/2016    Procedure: REPAIR TENDON FOOT;  Surgeon: Mary Guan MD;  Location: [facility]       Social History     Tobacco Use     Smoking status: Never Smoker     Smokeless tobacco: Never Used   Substance Use Topics     Alcohol use: No     Alcohol/week: 0.0 oz     Family History   Problem Relation Age of Onset     Diabetes Mother      Breast Cancer Mother      Alcohol/Drug Father      Cancer Father      Diabetes Maternal Grandmother      Cerebrovascular Disease Maternal Grandmother      Cancer - colorectal Maternal Grandfather          Current Outpatient Medications   Medication Sig Dispense Refill     albuterol (PROAIR HFA/PROVENTIL HFA/VENTOLIN HFA) 108 (90 Base) MCG/ACT inhaler Inhale 2 puffs into the lungs every 6 hours as needed for shortness of breath / dyspnea 1 Inhaler 6     cetirizine (ZYRTEC) 10 MG tablet Take 1 tablet (10 mg) by mouth every evening 90 tablet 3     fluticasone (FLONASE) 50 MCG/ACT  spray Spray 1 spray into both nostrils daily 1 Bottle 5     lisinopril (PRINIVIL/ZESTRIL) 5 MG tablet Take 1 tablet (5 mg) by mouth daily 90 tablet 3     metFORMIN (GLUCOPHAGE) 500 MG tablet Take 1 tablet (500 mg) by mouth daily (with breakfast) 90 tablet 3     mirtazapine (REMERON) 15 MG tablet nightly as needed   2     montelukast (SINGULAIR) 10 MG tablet Take 1 tablet (10 mg) by mouth At Bedtime 90 tablet 3     ranitidine (ZANTAC) 150 MG tablet Take 1 tablet (150 mg) by mouth 2 times daily 180 tablet 3     simvastatin (ZOCOR) 40 MG tablet Take 1 tablet (40 mg) by mouth At Bedtime 15 tablet 0     Allergies   Allergen Reactions     Codeine Nausea     Morphine Itching     Nitrofuran Derivatives Hives     Phenothiazines      sedation     Primatene Mist [Epinephrine Bitartrate] Itching     neck itch with primatene mist     Vicodin [Hydrocodone-Acetaminophen] Nausea     Latex Itching and Rash     Recent Labs   Lab Test 09/11/19  1235 11/23/18  1352 07/24/18  1315 05/07/18  1346 11/15/17  1357  11/11/16  0907   A1C 5.7* 6.0*  --  6.1* 6.0   < > 5.8   LDL  --   --   --  83 123*  --  104*   HDL  --   --   --  41* 44*  --  44*   TRIG  --   --   --  132 146  --  133   ALT  --   --   --  37 42  --  15   CR  --  0.99 0.98 0.89 0.93   < > 0.93   GFRESTIMATED  --  57* 58* 64 61   < > 61   GFRESTBLACK  --  69 70 78 74   < > 74   POTASSIUM  --  3.9 3.8 4.0 3.9   < > 4.4   TSH  --   --   --   --  0.66  --  1.46    < > = values in this interval not displayed.      BP Readings from Last 3 Encounters:   09/11/19 136/80   08/19/19 124/76   06/26/19 137/87    Wt Readings from Last 3 Encounters:   09/11/19 72.6 kg (160 lb)   08/19/19 73.9 kg (163 lb)   06/26/19 72.6 kg (160 lb)                      Reviewed and updated as needed this visit by Provider  Allergies         Review of Systems   ROS COMP: Constitutional, HEENT, cardiovascular, pulmonary, gi and gu systems are negative, except as otherwise noted.      Objective    /80   " Pulse 60   Temp 97.9  F (36.6  C) (Tympanic)   Ht 1.702 m (5' 7\")   Wt 72.6 kg (160 lb)   LMP 01/01/1985   SpO2 100%   BMI 25.06 kg/m    Body mass index is 25.06 kg/m .  Physical Exam   GENERAL: healthy, alert and no distress  NECK: no adenopathy, no asymmetry, masses, or scars and thyroid normal to palpation  RESP: lungs clear to auscultation - no rales, rhonchi or wheezes  CV: regular rate and rhythm, normal S1 S2, no S3 or S4, no murmur, click or rub, no peripheral edema and peripheral pulses strong  ABDOMEN: soft, nontender, no hepatosplenomegaly, no masses and bowel sounds normal  MS: no gross musculoskeletal defects noted, no edema          Results for orders placed or performed in visit on 09/11/19   *UA reflex to Microscopic   Result Value Ref Range    Color Urine Yellow     Appearance Urine Clear     Glucose Urine Negative NEG^Negative mg/dL    Bilirubin Urine Negative NEG^Negative    Ketones Urine Negative NEG^Negative mg/dL    Specific Gravity Urine 1.025 1.003 - 1.035    Blood Urine Negative NEG^Negative    pH Urine 6.5 5.0 - 7.0 pH    Protein Albumin Urine Negative NEG^Negative mg/dL    Urobilinogen Urine 4.0 (H) 0.2 - 1.0 EU/dL    Nitrite Urine Negative NEG^Negative    Leukocyte Esterase Urine Negative NEG^Negative    Source Midstream Urine    Hemoglobin A1c   Result Value Ref Range    Hemoglobin A1C 5.7 (H) 0 - 5.6 %   Hemoglobin   Result Value Ref Range    Hemoglobin 13.0 11.7 - 15.7 g/dL         Assessment & Plan     1. Type 2 diabetes mellitus without complication, without long-term current use of insulin (H)  Diabetes is well controlled.  Resume lisinopril to prevent nephropathy.  - metFORMIN (GLUCOPHAGE) 500 MG tablet; Take 1 tablet (500 mg) by mouth daily (with breakfast)  Dispense: 90 tablet; Refill: 3  - lisinopril (PRINIVIL/ZESTRIL) 5 MG tablet; Take 1 tablet (5 mg) by mouth daily  Dispense: 90 tablet; Refill: 3  - TSH with free T4 reflex  - Hemoglobin A1c  - Albumin Random Urine " Quantitative with Creat Ratio    2. Hyperlipidemia LDL goal <100  Previously well controlled.  Questionable control right now.  Last blood work was from May 2018.  Refill on medication ordered.  Blood work ordered and it is pending.  - simvastatin (ZOCOR) 40 MG tablet; Take 1 tablet (40 mg) by mouth At Bedtime  Dispense: 15 tablet; Refill: 0  - Lipid panel reflex to direct LDL Fasting  - Comprehensive metabolic panel    3. Hives  Patient continued to stay creatinine and Zyrtec for hives and use it as needed.  - ranitidine (ZANTAC) 150 MG tablet; Take 1 tablet (150 mg) by mouth 2 times daily  Dispense: 180 tablet; Refill: 3  - cetirizine (ZYRTEC) 10 MG tablet; Take 1 tablet (10 mg) by mouth every evening  Dispense: 90 tablet; Refill: 3    4. CKD (chronic kidney disease) stage 3, GFR 30-59 ml/min (H)  Previously stable.  Blood work repeated today.  Await the results    5. Mild intermittent asthma without complication  Well-controlled.  - montelukast (SINGULAIR) 10 MG tablet; Take 1 tablet (10 mg) by mouth At Bedtime  Dispense: 90 tablet; Refill: 3  - albuterol (PROAIR HFA/PROVENTIL HFA/VENTOLIN HFA) 108 (90 Base) MCG/ACT inhaler; Inhale 2 puffs into the lungs every 6 hours as needed for shortness of breath / dyspnea  Dispense: 1 Inhaler; Refill: 6    6. Left flank pain  No signs of urinary tract infection.  Recommended to stay hydrated.  Pain could also be muscular in nature.  - *UA reflex to Microscopic  - Hemoglobin    7. Need for prophylactic vaccination and inoculation against influenza  Recommending to get shingles vaccine as well  -  FLU VAC PRESRV FREE QUAD SPLIT VIR > 6 MONTHS IM [76043]  - Vaccine Administration, Initial [73965]    8. Arthralgia, unspecified joint  Patient requested physical therapy done for shoulders, back upper and lower back, neck and feet.  Currently getting physical therapy for her legs.  - PHYSICAL THERAPY REFERRAL; Future     BMI:   Estimated body mass index is 25.06 kg/m  as  "calculated from the following:    Height as of this encounter: 1.702 m (5' 7\").    Weight as of this encounter: 72.6 kg (160 lb).         Romel Mccall MD  [facility] [place]      "

## 2019-09-11 NOTE — LETTER
September 13, 2019      Grecia CLAIRE Jame  [address]   [place] [address]        Dear ,      I have reviewed your recent labs. Here are the results:    Urine test shows no signs of infection.    Cholesterol is well controlled.  Diabetes is well controlled.    Kidney and liver functions are normal.    Thyroid functions are normal.  Hemoglobin is normal.        If you have any questions or concerns, please call the clinic at the number listed above.       Sincerely,        Romel Mccall MD

## 2019-09-11 NOTE — Clinical Note
Please abstract the following data from this visit with this patient into the appropriate field in Epic:Tests that can be patient reported without a hard copy:Eye exam with ophthalmology on this date: 1/2019 normal/negativeOther Tests found in the patient's chart through Chart Review/Care Everywhere:Note to Abstraction: If this section is blank, no results were found via Chart Review/Care Everywhere.

## 2019-09-11 NOTE — TELEPHONE ENCOUNTER
Reason for Call:  Other     Detailed comments: pt called to update the pharmacy where her prescription need to be send except Cetirizine      Pharmacy- [facility]   Phone # [phone]    Phone Number Patient can be reached at: Cell number on file:    Telephone Information:   Mobile [phone]       Best Time: anytime     Can we leave a detailed message on this number? YES    Call taken on 9/11/2019 at 6:24 PM by QUENTIN GREER

## 2019-09-12 LAB
ALBUMIN SERPL-MCNC: 4 G/DL (ref 3.4–5)
ALP SERPL-CCNC: 93 U/L (ref 40–150)
ALT SERPL W P-5'-P-CCNC: 25 U/L (ref 0–50)
ANION GAP SERPL CALCULATED.3IONS-SCNC: 7 MMOL/L (ref 3–14)
AST SERPL W P-5'-P-CCNC: 18 U/L (ref 0–45)
BILIRUB SERPL-MCNC: 0.4 MG/DL (ref 0.2–1.3)
BUN SERPL-MCNC: 10 MG/DL (ref 7–30)
CALCIUM SERPL-MCNC: 9.1 MG/DL (ref 8.5–10.1)
CHLORIDE SERPL-SCNC: 111 MMOL/L (ref 94–109)
CHOLEST SERPL-MCNC: 157 MG/DL
CO2 SERPL-SCNC: 24 MMOL/L (ref 20–32)
CREAT SERPL-MCNC: 0.84 MG/DL (ref 0.52–1.04)
CREAT UR-MCNC: 245 MG/DL
GFR SERPL CREATININE-BSD FRML MDRD: 74 ML/MIN/{1.73_M2}
GLUCOSE SERPL-MCNC: 114 MG/DL (ref 70–99)
HDLC SERPL-MCNC: 42 MG/DL
LDLC SERPL CALC-MCNC: 93 MG/DL
MICROALBUMIN UR-MCNC: 16 MG/L
MICROALBUMIN/CREAT UR: 6.37 MG/G CR (ref 0–25)
NONHDLC SERPL-MCNC: 115 MG/DL
POTASSIUM SERPL-SCNC: 4 MMOL/L (ref 3.4–5.3)
PROT SERPL-MCNC: 7.7 G/DL (ref 6.8–8.8)
SODIUM SERPL-SCNC: 142 MMOL/L (ref 133–144)
T4 FREE SERPL-MCNC: 0.95 NG/DL (ref 0.76–1.46)
TRIGL SERPL-MCNC: 109 MG/DL
TSH SERPL DL<=0.005 MIU/L-ACNC: 0.33 MU/L (ref 0.4–4)

## 2019-09-12 ASSESSMENT — ASTHMA QUESTIONNAIRES: ACT_TOTALSCORE: 25

## 2019-09-12 ASSESSMENT — ANXIETY QUESTIONNAIRES: GAD7 TOTAL SCORE: 12

## 2019-09-12 NOTE — TELEPHONE ENCOUNTER
Spoke with TC. Patient just needs pharmacy updated to be [facility]. Pharmacy has been updated.     Tequila Ryan RN, BSN  [facility] [place]

## 2019-09-20 ENCOUNTER — ANCILLARY PROCEDURE (OUTPATIENT)
Dept: GENERAL RADIOLOGY | Facility: CLINIC | Age: 64
End: 2019-09-20
Attending: PHYSICIAN ASSISTANT
Payer: COMMERCIAL

## 2019-09-20 ENCOUNTER — OFFICE VISIT (OUTPATIENT)
Dept: FAMILY MEDICINE | Facility: CLINIC | Age: 64
End: 2019-09-20
Payer: COMMERCIAL

## 2019-09-20 VITALS
OXYGEN SATURATION: 100 % | BODY MASS INDEX: 25.06 KG/M2 | SYSTOLIC BLOOD PRESSURE: 132 MMHG | TEMPERATURE: 98.3 F | RESPIRATION RATE: 16 BRPM | DIASTOLIC BLOOD PRESSURE: 78 MMHG | HEART RATE: 57 BPM | WEIGHT: 160 LBS

## 2019-09-20 DIAGNOSIS — M79.674 PAIN OF TOE OF RIGHT FOOT: Primary | ICD-10-CM

## 2019-09-20 DIAGNOSIS — M79.674 PAIN OF TOE OF RIGHT FOOT: ICD-10-CM

## 2019-09-20 PROCEDURE — 73660 X-RAY EXAM OF TOE(S): CPT | Mod: RT

## 2019-09-20 PROCEDURE — 99213 OFFICE O/P EST LOW 20 MIN: CPT | Performed by: PHYSICIAN ASSISTANT

## 2019-09-20 NOTE — PROGRESS NOTES
Subjective     Greciaomar Kilgore is a 64 year old female who presents to clinic today for the following health issues:    HPI   Musculoskeletal problem/pain      Duration: 3 weeks ago; pain gets worse and better off and on    Description  Location: right pinky toe    Intensity:  Mild-moderate    Accompanying signs and symptoms: swelling    History  Previous similar problem: no   Previous evaluation:  chiro looked at it, advised getting checked    Precipitating or alleviating factors:  Trauma or overuse: YES- was sitting on the bed and she jumped up, hit her toe on the metal bed frame  Aggravating factors include: walking, climbing stairs, exercise and overuse    Therapies tried and outcome: has tried resting at times but has an active life so finds she doesn't make time to rest; started wearing her special shoes with inserts that she has for diabetes; thinks it is clenching her toe more    Ice and elevation most days helps, but gets worse when she stands up again    Patient is here today for evaluation of right little toe pain  Ongoing for 3 weeks  Hit her foot on the bedframe  Notes her toe swells, is tender and seems to get worse with activity  She has tried Naproxen, ice and is wearing her diabetic shoes without relief  Does go to PT for other reasons, unsure if she needs to take a break        Patient Active Problem List   Diagnosis     Allergic rhinitis     External hemorrhoids     Vitamin D deficiency     Fibromyalgia     Osteoarthritis, knee     Positive PPD     Panic disorder     Genital herpes     Advanced directives, counseling/discussion     DCIS (ductal carcinoma in situ) of breast     Heart palpitations     S/P hysterectomy - fibroids     Anxiety     Major depressive disorder, single episode, moderate (H)     Hyperlipidemia LDL goal <100     Type 2 diabetes mellitus without complication, without long-term current use of insulin (H)     Mild intermittent asthma without complication     BOO (obstructive  sleep apnea)     CKD (chronic kidney disease) stage 3, GFR 30-59 ml/min (H)     Past Surgical History:   Procedure Laterality Date     ARTHRODESIS TOE(S)  9/16/2013    Procedure: ARTHRODESIS TOE(S);  BILATERAL GREAT TOE FUSION (C-ARM);  Surgeon: Mary Guan MD;  Location: [facility]     ARTHRODESIS TOE(S) Left 12/19/2016    Procedure: ARTHRODESIS TOE(S);  Surgeon: Mary Guan MD;  Location: [facility]     ARTHROSCOPY KNEE Left 2/2/11     BIOPSY BREAST  2/7/2014    Procedure: BIOPSY BREAST;  Re-excision Left Breast Cavity for Margins ;  Surgeon: Lisset Amador DO;  Location: [facility]     BIOPSY BREAST SEED LOCALIZATION  1/22/2014    Procedure: BIOPSY BREAST SEED LOCALIZATION;  Left Breast Seed Localized Excisional Biopsy ;  Surgeon: Lisset Amador DO;  Location: [facility]     COLONOSCOPY  2005    nl - repeat 2015     FOOT SURGERY Bilateral 2013     HEMORRHOIDECTOMY BANDING      multiple times     HEMORRHOIDECTOMY INTERNAL N/A 7/24/2018    Procedure: HEMORRHOIDECTOMY INTERNAL;  three quadrant hemorrhoidectomy;  Surgeon: Lisa Patrick MD;  Location: [facility]     HYSTERECTOMY  7/1996    fibroids     REMOVE HARDWARE FOOT Left 2015     REPAIR TENDON FOOT Left 12/19/2016    Procedure: REPAIR TENDON FOOT;  Surgeon: Mary Guan MD;  Location: [facility]       Social History     Tobacco Use     Smoking status: Never Smoker     Smokeless tobacco: Never Used   Substance Use Topics     Alcohol use: No     Alcohol/week: 0.0 oz     Family History   Problem Relation Age of Onset     Diabetes Mother      Breast Cancer Mother      Alcohol/Drug Father      Cancer Father      Diabetes Maternal Grandmother      Cerebrovascular Disease Maternal Grandmother      Cancer - colorectal Maternal Grandfather          Current Outpatient Medications   Medication Sig Dispense Refill     albuterol (PROAIR HFA/PROVENTIL HFA/VENTOLIN HFA) 108 (90 Base) MCG/ACT inhaler Inhale 2 puffs into the lungs every 6 hours as needed for  shortness of breath / dyspnea 1 Inhaler 6     cetirizine (ZYRTEC) 10 MG tablet Take 1 tablet (10 mg) by mouth every evening 90 tablet 3     fluticasone (FLONASE) 50 MCG/ACT spray Spray 1 spray into both nostrils daily 1 Bottle 5     lisinopril (PRINIVIL/ZESTRIL) 5 MG tablet Take 1 tablet (5 mg) by mouth daily 90 tablet 3     metFORMIN (GLUCOPHAGE) 500 MG tablet Take 1 tablet (500 mg) by mouth daily (with breakfast) 90 tablet 3     mirtazapine (REMERON) 15 MG tablet nightly as needed   2     montelukast (SINGULAIR) 10 MG tablet Take 1 tablet (10 mg) by mouth At Bedtime 90 tablet 3     ranitidine (ZANTAC) 150 MG tablet Take 1 tablet (150 mg) by mouth 2 times daily 180 tablet 3     simvastatin (ZOCOR) 40 MG tablet Take 1 tablet (40 mg) by mouth At Bedtime 15 tablet 0     Allergies   Allergen Reactions     Codeine Nausea     Morphine Itching     Nitrofuran Derivatives Hives     Phenothiazines      sedation     Primatene Mist [Epinephrine Bitartrate] Itching     neck itch with primatene mist     Vicodin [Hydrocodone-Acetaminophen] Nausea     Latex Itching and Rash       Reviewed and updated as needed this visit by Provider  Tobacco  Allergies  Meds  Problems  Med Hx  Surg Hx  Fam Hx         Review of Systems   ROS COMP: Constitutional, HEENT, cardiovascular, pulmonary, gi and gu systems are negative, except as otherwise noted.      Objective    /78 (BP Location: Right arm, Patient Position: Chair, Cuff Size: Adult Regular)   Pulse 57   Temp 98.3  F (36.8  C) (Oral)   Resp 16   Wt 72.6 kg (160 lb)   LMP 01/01/1985   SpO2 100%   Breastfeeding? No   BMI 25.06 kg/m    Body mass index is 25.06 kg/m .  Physical Exam   GENERAL: healthy, alert and no distress  NECK: no adenopathy, no asymmetry, masses, or scars and thyroid normal to palpation  RESP: lungs clear to auscultation - no rales, rhonchi or wheezes  CV: regular rate and rhythm, normal S1 S2, no S3 or S4, no murmur, click or rub, no peripheral edema  and peripheral pulses strong  MS: little toe pain on right foot- swollen and tender to palpation  SKIN: no suspicious lesions or rashes    Diagnostic Test Results:  Xray - negative for fracture        Assessment & Plan     1. Pain of toe of right foot  New problem, xray negative.  Toe is tender on exam, recommend regularly scheduled ibuprofen, ice and rest. Continue wearing supportive shoe. Follow up with podiatry if symptoms worsen or do not improve  - XR Toe Right G/E 2 Views; Future  - PODIATRY/FOOT & ANKLE SURGERY REFERRAL     Risks, benefits and alternatives were discussed with patient. Agreeable to the plan of care.      Return in about 2 weeks (around 10/4/2019) for If symptoms worsen or fail to improve.    Lidia Florence PA-C  [facility]

## 2019-09-25 ENCOUNTER — APPOINTMENT (OUTPATIENT)
Age: 64
Setting detail: DERMATOLOGY
End: 2019-09-25

## 2019-09-25 VITALS — HEIGHT: 67 IN | WEIGHT: 160 LBS

## 2019-09-25 DIAGNOSIS — L72.8 OTHER FOLLICULAR CYSTS OF THE SKIN AND SUBCUTANEOUS TISSUE: ICD-10-CM

## 2019-09-25 PROCEDURE — 99202 OFFICE O/P NEW SF 15 MIN: CPT | Mod: 25

## 2019-09-25 PROCEDURE — OTHER COUNSELING: OTHER

## 2019-09-25 PROCEDURE — OTHER INCISION AND DRAINAGE: OTHER

## 2019-09-25 PROCEDURE — 10060 I&D ABSCESS SIMPLE/SINGLE: CPT

## 2019-09-25 ASSESSMENT — LOCATION ZONE DERM: LOCATION ZONE: AXILLAE

## 2019-09-25 ASSESSMENT — LOCATION DETAILED DESCRIPTION DERM: LOCATION DETAILED: LEFT AXILLARY VAULT

## 2019-09-25 ASSESSMENT — LOCATION SIMPLE DESCRIPTION DERM: LOCATION SIMPLE: LEFT AXILLARY VAULT

## 2019-09-25 NOTE — PROCEDURE: INCISION AND DRAINAGE
Dressing: dry sterile dressing
Wound Care: Petrolatum
Detail Level: Detailed
Preparation Text: The area was prepped in the usual clean fashion.
Curette Text (Optional): After the contents were expressed a curette was used to partially remove the cyst wall.
Post-Care Instructions: I reviewed with the patient in detail post-care instructions. Patient should keep wound covered and call the office should any redness, pain, swelling or worsening occur.
Method: 11 blade
Curette: No
Anesthesia Type: 1% lidocaine with epinephrine
Lesion Type: Cyst
Consent was obtained and risks were reviewed including but not limited to delayed wound healing, infection, need for multiple I and D's, and pain.
Size Of Lesion In Cm (Optional But May Be Required For Some Insurances): 0
Epidermal Closure: simple interrupted
Epidermal Sutures: 4-0 Ethilon
Suture Text: The incision was partially closed with

## 2019-10-01 DIAGNOSIS — E11.9 TYPE 2 DIABETES MELLITUS WITHOUT COMPLICATION, WITHOUT LONG-TERM CURRENT USE OF INSULIN (H): ICD-10-CM

## 2019-10-01 NOTE — TELEPHONE ENCOUNTER
"Requested Prescriptions   Pending Prescriptions Disp Refills     metFORMIN (GLUCOPHAGE) 500 MG tablet [Pharmacy Med Name: METFORMIN HYDROCHLORIDE 500 MG Tablet] 90 tablet 3     Sig: TAKE 1 TABLET EVERY DAY WITH BREAKFAST  Last Written Prescription Date:  9/11/19  Last Fill Quantity: 90,  # refills: 3   Last office visit: 9/20/2019 with prescribing provider:  Naman   Future Office Visit:           Biguanide Agents Passed - 10/1/2019  3:18 PM        Passed - Blood pressure less than 140/90 in past 6 months     BP Readings from Last 3 Encounters:   09/20/19 132/78   09/11/19 136/80   08/19/19 124/76                 Passed - Patient has documented LDL within the past 12 mos.     Recent Labs   Lab Test 09/11/19  1235   LDL 93             Passed - Patient has had a Microalbumin in the past 15 mos.     Recent Labs   Lab Test 09/11/19  1259   MICROL 16   UMALCR 6.37             Passed - Patient is age 10 or older        Passed - Patient has documented A1c within the specified period of time.     If HgbA1C is 8 or greater, it needs to be on file within the past 3 months.  If less than 8, must be on file within the past 6 months.     Recent Labs   Lab Test 09/11/19  1235   A1C 5.7*             Passed - Patient's CR is NOT>1.4 OR Patient's EGFR is NOT<45 within past 12 mos.     Recent Labs   Lab Test 09/11/19  1235   GFRESTIMATED 74   GFRESTBLACK 85       Recent Labs   Lab Test 09/11/19  1235   CR 0.84             Passed - Patient does NOT have a diagnosis of CHF.        Passed - Medication is active on med list        Passed - Patient is not pregnant        Passed - Patient has not had a positive pregnancy test within the past 12 mos.         Passed - Recent (6 mo) or future (30 days) visit within the authorizing provider's specialty     Patient had office visit in the last 6 months or has a visit in the next 30 days with authorizing provider or within the authorizing provider's specialty.  See \"Patient Info\" tab in " "inbasket, or \"Choose Columns\" in Meds & Orders section of the refill encounter.              "

## 2019-10-02 ENCOUNTER — ANCILLARY PROCEDURE (OUTPATIENT)
Dept: GENERAL RADIOLOGY | Facility: CLINIC | Age: 64
End: 2019-10-02
Attending: PODIATRIST
Payer: COMMERCIAL

## 2019-10-02 ENCOUNTER — OFFICE VISIT (OUTPATIENT)
Dept: PODIATRY | Facility: CLINIC | Age: 64
End: 2019-10-02
Payer: COMMERCIAL

## 2019-10-02 VITALS — WEIGHT: 162 LBS | DIASTOLIC BLOOD PRESSURE: 76 MMHG | BODY MASS INDEX: 25.37 KG/M2 | SYSTOLIC BLOOD PRESSURE: 138 MMHG

## 2019-10-02 DIAGNOSIS — M25.571 ACUTE RIGHT ANKLE PAIN: ICD-10-CM

## 2019-10-02 DIAGNOSIS — M21.41 PES PLANUS OF BOTH FEET: ICD-10-CM

## 2019-10-02 DIAGNOSIS — M79.7 FIBROMYALGIA: ICD-10-CM

## 2019-10-02 DIAGNOSIS — M25.571 ACUTE RIGHT ANKLE PAIN: Primary | ICD-10-CM

## 2019-10-02 DIAGNOSIS — E11.42 DIABETIC POLYNEUROPATHY ASSOCIATED WITH TYPE 2 DIABETES MELLITUS (H): ICD-10-CM

## 2019-10-02 DIAGNOSIS — M21.42 PES PLANUS OF BOTH FEET: ICD-10-CM

## 2019-10-02 PROCEDURE — 73610 X-RAY EXAM OF ANKLE: CPT | Mod: RT

## 2019-10-02 PROCEDURE — 99203 OFFICE O/P NEW LOW 30 MIN: CPT | Performed by: PODIATRIST

## 2019-10-02 NOTE — PATIENT INSTRUCTIONS
"Thank you for choosing [facility]!    DR. KLEIN'S CLINIC SCHEDULE  MONDAY AM - [place] TUESDAY - [place]   [address] [address] [address] [address] [place] [address]   [phone] / [phone] / [phone]       WEDNESDAY - [place] FRIDAY AM - [facility]   [address]   [place] [address] [place] [address]   [phone] / [phone]       FRIDAY PM - [place] SCHEDULE SURGERY: [phone]   [address] BILLING QUESTIONS: [phone]   [place] [address] AFTER HOURS: [phone] / [phone] APPOINTMENTS: [phone]     Consumer Price Line (CPL) [phone]       DIABETES AND YOUR FEET  Diabetes can result in several problems in the feet including ulcers (open sores) and amputations. Two of the most important reasons why people develop foot problems when they have diabetes is : 1. Neuropathy (loss of feeling)  2. Vascular disease (loss or decrease of blood flow).    Neuropathy is a term used to describe a loss of nerve function.  Patients with diabetes are at risk of developing neuropathy if their sugars continue to run high and are above the normal value. One theory for neuropathy is that the \"extra\" sugar in the body enters the nerves and is broken down. These by-products build up in the nerve causing it to swell and impairing nerve function. Often times, this can be prevented by controlling your sugars, dieting and exercise.    When a person develops neuropathy, they usually begin to feel numbness or tingling in their feet and sometime in their legs.  Other symptoms may include painful burning or hot feet, tingling or feeling like insects or ants are crawling on your feet or legs.  If the diabetes is sever and the sugars run high for long periods of time, neuropathy can also occur in the hands.    Vascular disease  is a term used to describe a " loss or decrease in circulation (blood flow). There is a problem in getting blood and oxygen to areas that need it. Similar to neuropathy, sugars can build up in the walls of the arteries (blood vessels) and cause them to become swollen, thickened and hardened. This decreases the amount of blood that can go to an area that needs it. Though this is common in the legs of diabetic patients, it can also affect other arteries (blood vessels) in the body such as in the heart and eyes.    In the legs, vascular disease usually results in cramping. Patients who develop leg cramps after walking the same distance every time (i.e. One block, half a mile, ect.) need to let their doctors know so that their circulation may be checked. Cramps causing severe pain in the feet and/or legs while sleeping and the cramps go away when you stand or hang your legs off the side of the bed, may also be a sign of poor blood circulation.  Occasional cramping in cold weather or on rare occasions with activity may not be due to poor circulation, but you should inform your doctor.    PREVENTION OF THESE DISEASES  The key to prevention is good blood sugar control. Poor blood sugar control is a big reason many of these problems start. Physical activity (exercise) is a very good way to help decrease your blood sugars. Exercise can lower your blood sugar, blood pressure, and cholesterol. It also reduces your risk for heart disease and stroke, relieves stress, and strengthens your heart, muscles and bones.  In addition, regular activity helps insulin work better, improves your blood circulation, and keeps your joints flexible. If you're trying to lose weight, a combination of exercise and wise food choices can help you reach your target weight and maintain it.      PAIN MANAGEMENT  1.Blood Sugar Control - Most important  2. Medications such as:  Amytriptylline, duloxetine, gabapentin, lyrica, tramadol  3. Nutritional therapy:  Vitamin B6 (100mg daily),  Vitamin B12 (75mcg daily), Vitamin D 2000 IU daily), Alpha-Lipoic Acid (600-1800mg daily), Acetyl-L-Carnitine (500-1000mg TID, L-methyl folate (1500mcg daily)    ** Metformin can block Vitamin B6 and B12 so it is important to supplement**    FOOT CARE RECOMMENDATIONS   1. Wash your feet with lukewarm water and a mild soap and then dry them thoroughly, especially between the toes.     2. Examine your feet daily looking for cuts, corns, blisters, cracks, ect, especially after wearing new shoes. Make sure to look between your toes. If you cannot see the bottom of your feet, set a mirror on the floor and hold your foot over it, or ask a spouse, friend or family member to examine your feet for you. Contact your doctor immediately if new problems are noted or if sores are not healing.     3. Immediately apply moisturizer to the tops and bottoms of your feet, avoiding areas between the toes. Hand lotion (Intesive Care, Yas, Eucerin, Neutrogena, Curel, ect) is sufficient unless your doctor prescribes a medicated lotion. Apply sunscreen to your feet when going swimming outside.     4. Use clean comfortable shoes, wear white socks (if you have any bleeding or drainage, you will see it on white socks). Socks should not have thick seams or cut off the circulation around the leg. Break in new shoes slowly and rotate with older shoes until broken in. Check the inside of your shoes with your hand to look for areas of irritation or objects that may have fallen into your shoes.       5. Keep slippers by the side of your bed for use during the night.     6.  Shoes should be fitted by a professional and should not cause areas of irritation.  Check your feet regularly when wearing a new pair of shoes and replace them as needed.     7.  Talk to your doctor about proper exercise. Exercise and stretching stimulate blood flow to your feet and maintain proper glucose levels.     8.  Monitor your blood glucose level as instructed by your  doctor. Notify your doctor immediately if your blood sugar is abnormally high or low.    9. Cut your nails straight across, but then gently round any sharp edges with a cardboard nail file. If you have neuropathy, peripheral vascular disease or cannot see that well to trim your own toenails contact [employer] ([phone]) or [facility] ([phone]).      THINGS TO AVOID DOING   1.  Do not soak your feet if you have an open sore. Use only lukewarm water and always check the temperature with your hand as hot water can easily burn your feet.       2.  Never use a hot water bottle or heating pad on your feet. Also do not apply cold compresses to your feet. With decreased sensation, you could burn or freeze your feet.       3.  Do not apply any of these to your feet:    -  Over the counter medicine for corns or warts    -  Harsh chemicals like boric acid    -  Do not self-treat corns, cuts, blisters or infections. Always consult your doctor.       4.  Do not wear sandals, slippers or walk barefoot, especially on hot sand or concrete or other harsh surfaces.     5.  If you smoke, stop!!!            BODY WEIGHT AND YOUR FEET  The following information is included in the after visit summary for all patients. Body weight can be a sensitive issue to discuss in clinic, but we think the following information is very important. Although we focus on the feet and ankles, we do support the overall health of our patients.     Many things can cause foot and ankle problems. Foot structure, activity level, foot mechanics and injuries are common causes of pain. One very important issue that often goes unmentioned, is body weight. Extra weight can cause increased stress on muscles, ligaments, bones and tendons. Sometimes just a few extra pounds is all it takes to put one over her/his threshold. Without reducing that stress, it can be difficult to alleviate pain. As Foot & Ankle specialists, our job is addressing the lower extremity  problem and possible causes. Regarding extra body weight, we encourage patients to discuss diet and weight management plans with their primary care doctors. It is this team approach that gives you the best opportunity for pain relief and getting you back on your feet.      [facility] has a Comprehensive Weight Management Program. This program includes counseling, education, non-surgical and surgical approaches to weight loss. If you are interested in learning more either talk to you primary care provider or call [phone].

## 2019-10-02 NOTE — LETTER
10/2/2019         RE: Grecia Kilgore  [address]  [facility] [address]        Dear Colleague,    Thank you for referring your patient, Grecia Kilgore, to the [facility]. Please see a copy of my visit note below.    Podiatry / Foot and Ankle Surgery Progress Note    October 2, 2019    Subject: Patient was seen for pain the right 5th toe, top of right foot and right ankle. Notes she bumped the toe 2 months ago on the bedframe. Had xrays and were negative for fracture but continuing to have pain. Also notes history of ankle fracture on right ankle and pain to area is similar. It has been swollen for past week and not sure why. Pain is 8/10 to foot when it occurs. Notes it is intermittent. Burning type pain. She is diabetic. Wondering what is causing the pain and what can be done for it.     Objective:  Vitals: /76 (BP Location: Right arm, Patient Position: Chair, Cuff Size: Adult Regular)   Wt 73.5 kg (162 lb)   LMP 01/01/1985   BMI 25.37 kg/m     BMI= Body mass index is 25.37 kg/m .    A1C: 5.7 (9/2019)    General:  Patient is alert and orientated.  NAD  Vascular:  DP and PT pulses are palpable.  No varicosities noted  CFT's < 3secs.  Skin temp is normal  Neuro:  Light and gross touch sensation intact to digits, dorsum, and plantar aspects of the feet.  Derm:  Skin is supple.  No rashes, lesions, or ulcerations noted.  Musculoskeletal:  Notes pain on palpation to the right 5th toe and over lateral right ankle joint.  Decrease arch height.       Imaging: right ankle xrays - I have looked at and reviewed xrays personally.  Joint space congruent. No fractures noted.     Right foot xray - Soft tissue swelling in the right small toe. No fracture, erosion,  or joint malalignment .    Assessment:    Acute right ankle pain  Diabetic polyneuropathy associated with type 2 diabetes mellitus (H)  Fibromyalgia    Plan:  xrays do not show any fractures or acute pathology.  Concerned about neuropathy or possible fibromyalgia. Will refer to neurology. Was  Put in short cam boot to see if this would help with pain and given order for topical pain cream. If pain continues could try MRI but given the burning type pain, think it is more nerve related.     Olivia Miramontes DPM, Podiatry/Foot and Ankle Surgery    Weight management plan: Patient was referred to their PCP to discuss a diet and exercise plan.    Recommended to Grecia Kilgore to follow up with Primary Care provider regarding elevated blood pressure.      Again, thank you for allowing me to participate in the care of your patient.        Sincerely,        Olivia Miramontes DPM, Podiatry/Foot and Ankle Surgery

## 2019-10-02 NOTE — TELEPHONE ENCOUNTER
Changed from local to mail order-  Sent remaining refills to mail order- called and canceled remaining refills at [facility]    Tessie CHASE RN BSN  [facility]  [phone]

## 2019-10-03 DIAGNOSIS — E11.9 TYPE 2 DIABETES MELLITUS WITHOUT COMPLICATION, WITHOUT LONG-TERM CURRENT USE OF INSULIN (H): ICD-10-CM

## 2019-10-03 DIAGNOSIS — E78.5 HYPERLIPIDEMIA LDL GOAL <100: ICD-10-CM

## 2019-10-03 RX ORDER — LISINOPRIL 5 MG/1
5 TABLET ORAL DAILY
Qty: 90 TABLET | Refills: 3 | Status: SHIPPED | OUTPATIENT
Start: 2019-10-03 | End: 2020-09-18

## 2019-10-03 RX ORDER — SIMVASTATIN 40 MG
40 TABLET ORAL AT BEDTIME
Qty: 90 TABLET | Refills: 3 | Status: SHIPPED | OUTPATIENT
Start: 2019-10-03 | End: 2020-09-18

## 2019-10-03 NOTE — TELEPHONE ENCOUNTER
"Requested Prescriptions   Pending Prescriptions Disp Refills     simvastatin (ZOCOR) 40 MG tablet 15 tablet 0     Sig: Take 1 tablet (40 mg) by mouth At Bedtime       Statins Protocol Passed - 10/3/2019  3:19 PM        Passed - LDL on file in past 12 months     Recent Labs   Lab Test 09/11/19  1235   LDL 93             Passed - No abnormal creatine kinase in past 12 months     No lab results found.             Passed - Recent (12 mo) or future (30 days) visit within the authorizing provider's specialty     Patient has had an office visit with the authorizing provider or a provider within the authorizing providers department within the previous 12 mos or has a future within next 30 days. See \"Patient Info\" tab in inbasket, or \"Choose Columns\" in Meds & Orders section of the refill encounter.              Passed - Medication is active on med list        Passed - Patient is age 18 or older        Passed - No active pregnancy on record        Passed - No positive pregnancy test in past 12 months   Last Written Prescription Date:  9/11/19  Last Fill Quantity: 15,  # refills: 0   Last office visit: 9/11/2019 with prescribing provider:  Dr. Mccall   Future Office Visit:    Prescription approved per [facility] Refill Protocol.       lisinopril (PRINIVIL/ZESTRIL) 5 MG tablet 90 tablet 3     Sig: Take 1 tablet (5 mg) by mouth daily       ACE Inhibitors (Including Combos) Protocol Passed - 10/3/2019  3:19 PM        Passed - Blood pressure under 140/90 in past 12 months     BP Readings from Last 3 Encounters:   10/02/19 138/76   09/20/19 132/78   09/11/19 136/80                 Passed - Recent (12 mo) or future (30 days) visit within the authorizing provider's specialty     Patient has had an office visit with the authorizing provider or a provider within the authorizing providers department within the previous 12 mos or has a future within next 30 days. See \"Patient Info\" tab in inbasket, or \"Choose Columns\" in Meds & Orders section of " the refill encounter.              Passed - Medication is active on med list        Passed - Patient is age 18 or older        Passed - No active pregnancy on record        Passed - Normal serum creatinine on file in past 12 months     Recent Labs   Lab Test 09/11/19  1235   CR 0.84             Passed - Normal serum potassium on file in past 12 months     Recent Labs   Lab Test 09/11/19  1235   POTASSIUM 4.0             Passed - No positive pregnancy test within past 12 months        Last Written Prescription Date:  9/11/19  Last Fill Quantity: 90,  # refills: 3  Last office visit: 9/11/2019 with prescribing provider:  Dr. Mccall   Future Office Visit:    Prescription cancelled at [facility] (spoke with Rj GARCIA) and reordered.  Clarissa Roman RN

## 2019-10-04 ENCOUNTER — TELEPHONE (OUTPATIENT)
Dept: FAMILY MEDICINE | Facility: CLINIC | Age: 64
End: 2019-10-04

## 2019-10-04 DIAGNOSIS — F41.9 ANXIETY: Primary | ICD-10-CM

## 2019-10-04 RX ORDER — HYDROXYZINE HYDROCHLORIDE 25 MG/1
25 TABLET, FILM COATED ORAL 3 TIMES DAILY PRN
Qty: 20 TABLET | Refills: 1 | Status: SHIPPED | OUTPATIENT
Start: 2019-10-04 | End: 2020-09-18

## 2019-10-04 NOTE — TELEPHONE ENCOUNTER
LORazepam (ATIVAN) 1 MG tablet (Discontinued) [facility] [phone]

## 2019-10-04 NOTE — TELEPHONE ENCOUNTER
I would like her to avoid using lorazepam.  It is a benzodiazepine and has a lot of side effects.      I would rather have her try hydroxyzine 25 mg up to 3 times a day as needed for anxiety.  Medication has been ordered.    Romel Mccall MD,MD  [facility], [place]

## 2019-10-04 NOTE — TELEPHONE ENCOUNTER
Last Written Prescription Date:  11/15/17  Last Fill Quantity: na,  # refills: na   Last office visit: 9/20/2019 with prescribing provider:     Future Office Visit:    Lorazepam was discontinued Dr. Dr. Mccall

## 2019-10-04 NOTE — TELEPHONE ENCOUNTER
Called patient to inform her of MD response below. Patient verbalized understanding and agrees with plan.     Tequila Ryan RN, BSN  [facility]

## 2019-10-30 ENCOUNTER — TRANSFERRED RECORDS (OUTPATIENT)
Dept: HEALTH INFORMATION MANAGEMENT | Facility: CLINIC | Age: 64
End: 2019-10-30

## 2019-11-23 ENCOUNTER — HOSPITAL ENCOUNTER (EMERGENCY)
Facility: CLINIC | Age: 64
Discharge: HOME | End: 2019-11-23
Attending: EMERGENCY MEDICINE | Admitting: EMERGENCY MEDICINE
Payer: COMMERCIAL

## 2019-11-23 ENCOUNTER — NURSE TRIAGE (OUTPATIENT)
Dept: NURSING | Facility: CLINIC | Age: 64
End: 2019-11-23

## 2019-11-23 VITALS
BODY MASS INDEX: 25.11 KG/M2 | OXYGEN SATURATION: 100 % | WEIGHT: 160 LBS | DIASTOLIC BLOOD PRESSURE: 94 MMHG | TEMPERATURE: 98 F | HEIGHT: 67 IN | SYSTOLIC BLOOD PRESSURE: 167 MMHG | RESPIRATION RATE: 20 BRPM

## 2019-11-23 DIAGNOSIS — J06.9 VIRAL URI: ICD-10-CM

## 2019-11-23 DIAGNOSIS — R09.81 NASAL CONGESTION: ICD-10-CM

## 2019-11-23 PROCEDURE — 99282 EMERGENCY DEPT VISIT SF MDM: CPT

## 2019-11-23 ASSESSMENT — MIFFLIN-ST. JEOR: SCORE: 1318.39

## 2019-11-23 NOTE — ED AVS SNAPSHOT
[facility] Emergency Department  [address]  [facility] [address]  Phone:  [phone]  Fax:  [phone]                                    Grecia Kilgore   MRN: [id number]    Department:  [facility] Emergency Department   Date of Visit:  11/23/2019           After Visit Summary Signature Page    I have received my discharge instructions, and my questions have been answered. I have discussed any challenges I see with this plan with the nurse or doctor.    ..........................................................................................................................................  Patient/Patient Representative Signature      ..........................................................................................................................................  Patient Representative Print Name and Relationship to Patient    ..................................................               ................................................  Date                                   Time    ..........................................................................................................................................  Reviewed by Signature/Title    ...................................................              ..............................................  Date                                               Time          22EPIC Rev 08/18

## 2019-11-23 NOTE — TELEPHONE ENCOUNTER
Caller states she has some chest and nasal congestion along with some shortness of breath. Caller states she used her albuterol inhaler twice today and that she rarely uses it. Caller states she has been taking medication for congestion with no relief. Caller denies any fever. Triage guidelines recommend to go to ED. Caller verbalized and understands directives.    Reason for Disposition    [1] MODERATE difficulty breathing (e.g., speaks in phrases, SOB even at rest, pulse 100-120) AND [2] NEW-onset or WORSE than normal    Additional Information    Negative: [1] Breathing stopped AND [2] hasn't returned    Negative: Choking on something    Negative: Severe difficulty breathing (e.g., struggling for each breath, speaks in single words)    Negative: Bluish (or gray) lips or face now    Negative: Difficult to awaken or acting confused (e.g., disoriented, slurred speech)    Negative: Passed out (i.e., lost consciousness, collapsed and was not responding)    Negative: Wheezing started suddenly after medicine, an allergic food or bee sting    Negative: Stridor    Negative: Slow, shallow and weak breathing    Negative: Sounds like a life-threatening emergency to the triager    Negative: Chest pain    Negative: [1] Wheezing (high pitched whistling sound) AND [2] previous asthma attacks or use of asthma medicines    Negative: [1] Difficulty breathing AND [2] only present when coughing    Negative: [1] Difficulty breathing AND [2] only from stuffy or runny nose    Protocols used: BREATHING DIFFICULTY-A-AH

## 2019-11-23 NOTE — ED PROVIDER NOTES
History     Chief Complaint:  Nasal Congestion    The history is provided by the patient.      Grecia Kilgore is a 64 year old diabetic female, with history of asthma amongst others as noted below, who presents alone for evaluation of nasal congestion for the last two days with associated mild shortness of breath, cough and wheezing. Patient has been trying Tylenol, Albuterol inhaler and decongestants with minimal relief, thus called Nurse Triage Line, who recommended patient present due to age and history of asthma.  Here, patient denies any sinus pain or fever.     Allergies:  Codeine  Morphine  Nitrofuran derivatives  Phenothiazines  Primatene mist  Vicodin  Latex     Medications:    Albuterol inhaler  Zyrtec  Flonase  Atarax  Lisinopril  Mobic  Metformin  Remeron  Singulair  Zantac  Zocor     Past Medical History:    Allergic rhinitis  Anemia  Anxiety  Chronic back pain  CKD, Stage III  Ductal carcinoma in situ  Depression  Diabetes type II  Diverticulosis  Eating disorder  External hemorrhoids  Fibromyalgia  G6PD deficiency  GERD  Hepatitis A  Hypercholesterolemia  Hyperlipidemia  Laxative abuse  Liver nodule  Migraine  Mild persistent asthma  Nephrolithiasis  Ovarian cyst  Pancreatitis  PTSD    Past Surgical History:    Arthrodesis toe(s) x2  Arthroscopy knee - left  Biopsy breast - left  Biopsy breast seed localization  Colonoscopy  Foot surgery - bilateral  Hemorrhoidectomy banding - multiple times  Hemorrhoidectomy internal  Hysterectomy - fibroids  Remove hardware foot - left  Repair tendon foot - left    Family History:    Mother - diabetes, breast cancer  Father - alcohol/drug, cancer    Social History:  The patient was accompanied to the ED alone.  Smoking Status: No  Smokeless Tobacco: No  Alcohol Use: No  Drug Use: No     Review of Systems   All other systems reviewed and are negative.    Physical Exam     Patient Vitals for the past 24 hrs:   BP Temp Temp src Heart Rate Resp SpO2 Height Weight  "  11/23/19 0313 (!) 167/94 -- -- -- -- -- -- --   11/23/19 0311 -- 98  F (36.7  C) Oral 54 20 100 % 1.702 m (5' 7\") 72.6 kg (160 lb)       Physical Exam  Nursing note and vitals reviewed.  Constitutional: Cooperative.   HENT:   Mouth/Throat: Mucous membranes are normal. TM's normal  Cardiovascular: Normal rate, regular rhythm and normal heart sounds.  No murmur.  Pulmonary/Chest: Effort normal and breath sounds normal. No respiratory distress. No wheezes. No rales.   Abdominal: Soft. Normal appearance. There is no tenderness.  Neurological: Alert.   Skin: Skin is warm and dry.    Psychiatric: Normal mood and affect.      Emergency Department Course     Emergency Department Course:  Past medical records, nursing notes, and vitals reviewed.    (5695)   I performed an exam of the patient as documented above. History obtained from patient. Discussed physical exam findings with patient and patient can be safely discharged. Plan of care discussed.     (5890)   Spoke with patient about f/u questions she had.     Findings and plan explained to the Patient. Patient discharged home with instructions regarding supportive care, medications, and reasons to return. The importance of close follow-up was reviewed. I personally answered all related questions prior to discharge.     Impression & Plan     Medical Decision Making:  Grecia Kilgore is a 64 year old female, with history of asthma, presents for evaluation for a cough and nasal congestion for the last two days. She has tried decongestants, Tylenol and her inhaler with minimal relief and was referred here to the ED by Nurse Triage Line. The patient was not febrile in the ED. The patient's workup did not reveal a bacterial source for infection. I have encouraged symptomatic management with analgesics, nasal suctioning, and cool mist humidifiers. The patient's exam was unremarkable. At this time the patient is stable for discharge and should follow up with her primary care " physician in the outpatient setting. Anticipatory guidance given prior to discharge.     Discharge Diagnosis:    ICD-10-CM    1. Viral URI J06.9    2. Nasal congestion R09.81        Disposition:  Discharged to home.    Scribe Disclosure:  I, Robyn Azevedo, am serving as a scribe at 4:01 AM on 11/23/2019 to document services personally performed by Prabhu Kahn MD based on my observations and the provider's statements to me.    11/23/2019   [facility] EMERGENCY DEPARTMENT       Prabhu Kahn MD  11/23/19 0526

## 2019-11-29 DIAGNOSIS — L50.9 HIVES: ICD-10-CM

## 2019-11-29 RX ORDER — CETIRIZINE HYDROCHLORIDE 10 MG/1
TABLET ORAL
Qty: 90 TABLET | Refills: 0 | OUTPATIENT
Start: 2019-11-29

## 2019-11-29 NOTE — TELEPHONE ENCOUNTER
"Requested Prescriptions   Pending Prescriptions Disp Refills     cetirizine (ZYRTEC) 10 MG tablet [Pharmacy Med Name: CETIRIZINE 10MG TABLETS] 90 tablet 0     Sig: TAKE 1 TABLET(10 MG) BY MOUTH EVERY EVENING   Last Written Prescription Date:  9/11/19  Last Fill Quantity: 90,  # refills: 3   Last office visit: 9/20/2019 with prescribing provider:  Naman   Future Office Visit:        Antihistamines Protocol Passed - 11/29/2019  3:17 AM        Passed - Patient is 3-64 years of age     Apply weight-based dosing for peds patients age 3 - 12 years of age.    Forward request to provider for patients under the age of 3 or over the age of 64.          Passed - Recent (12 mo) or future (30 days) visit within the authorizing provider's specialty     Patient has had an office visit with the authorizing provider or a provider within the authorizing providers department within the previous 12 mos or has a future within next 30 days. See \"Patient Info\" tab in inbasket, or \"Choose Columns\" in Meds & Orders section of the refill encounter.              Passed - Medication is active on med list          "

## 2019-12-03 ENCOUNTER — OFFICE VISIT (OUTPATIENT)
Dept: OBGYN | Facility: CLINIC | Age: 64
End: 2019-12-03
Payer: COMMERCIAL

## 2019-12-03 ENCOUNTER — TELEPHONE (OUTPATIENT)
Dept: OBGYN | Facility: CLINIC | Age: 64
End: 2019-12-03

## 2019-12-03 VITALS — WEIGHT: 167.4 LBS | DIASTOLIC BLOOD PRESSURE: 70 MMHG | BODY MASS INDEX: 26.22 KG/M2 | SYSTOLIC BLOOD PRESSURE: 100 MMHG

## 2019-12-03 DIAGNOSIS — N89.8 ITCHING OF VAGINA: Primary | ICD-10-CM

## 2019-12-03 PROCEDURE — 87102 FUNGUS ISOLATION CULTURE: CPT | Performed by: OBSTETRICS & GYNECOLOGY

## 2019-12-03 PROCEDURE — 99213 OFFICE O/P EST LOW 20 MIN: CPT | Performed by: OBSTETRICS & GYNECOLOGY

## 2019-12-03 PROCEDURE — 87653 STREP B DNA AMP PROBE: CPT | Performed by: OBSTETRICS & GYNECOLOGY

## 2019-12-03 PROCEDURE — 87186 SC STD MICRODIL/AGAR DIL: CPT | Performed by: OBSTETRICS & GYNECOLOGY

## 2019-12-03 PROCEDURE — 87205 SMEAR GRAM STAIN: CPT | Performed by: OBSTETRICS & GYNECOLOGY

## 2019-12-03 RX ORDER — METRONIDAZOLE 250 MG/1
500 TABLET ORAL 2 TIMES DAILY
Qty: 28 TABLET | Refills: 1 | Status: SHIPPED | OUTPATIENT
Start: 2019-12-03 | End: 2019-12-10

## 2019-12-03 NOTE — TELEPHONE ENCOUNTER
Flagyl called to pharmacy-did not go through electronically  Silva Strickland RN on 12/3/2019 at 5:07 PM

## 2019-12-03 NOTE — PROGRESS NOTES
SUBJECTIVE:                                                   Grecia Kilgore is a 64 year old female who presents to clinic today for the following health issue(s):  Patient presents with:  Vaginal Problem    HPI: The patient is seen at this time for recurrent vaginal irritation with profuse yellow discharge and itching.  She has not taken any antibiotics recently.      Patient's last menstrual period was 1985..     Patient is sexually active, .  Using none for contraception.    reports that she has never smoked. She has never used smokeless tobacco.    STD testing offered?  Declined    Health maintenance updated:  yes    Today's PHQ-2 Score:   PHQ-2 (  [employer]) 2018   Q1: Little interest or pleasure in doing things 1   Q2: Feeling down, depressed or hopeless 1   PHQ-2 Score 2     Today's PHQ-9 Score:   PHQ-9 SCORE 2019   PHQ-9 Total Score -   PHQ-9 Total Score 14     Today's WILL-7 Score:   WILL-7 SCORE 2019   Total Score -   Total Score 12       Problem list and histories reviewed & adjusted, as indicated.  Additional history: as documented.    Patient Active Problem List   Diagnosis     Allergic rhinitis     External hemorrhoids     Vitamin D deficiency     Fibromyalgia     Osteoarthritis, knee     Positive PPD     Panic disorder     Genital herpes     Advanced directives, counseling/discussion     DCIS (ductal carcinoma in situ) of breast     Heart palpitations     S/P hysterectomy - fibroids     Anxiety     Major depressive disorder, single episode, moderate (H)     Hyperlipidemia LDL goal <100     Type 2 diabetes mellitus without complication, without long-term current use of insulin (H)     Mild intermittent asthma without complication     BOO (obstructive sleep apnea)     CKD (chronic kidney disease) stage 3, GFR 30-59 ml/min (H)     Past Surgical History:   Procedure Laterality Date     ARTHRODESIS TOE(S)  2013    Procedure: ARTHRODESIS TOE(S);  BILATERAL GREAT TOE  FUSION (C-ARM);  Surgeon: Mary Guan MD;  Location: [facility]     ARTHRODESIS TOE(S) Left 12/19/2016    Procedure: ARTHRODESIS TOE(S);  Surgeon: Mary Guan MD;  Location: [facility]     ARTHROSCOPY KNEE Left 2/2/11     BIOPSY BREAST  2/7/2014    Procedure: BIOPSY BREAST;  Re-excision Left Breast Cavity for Margins ;  Surgeon: Lisset Amador DO;  Location: [facility]     BIOPSY BREAST SEED LOCALIZATION  1/22/2014    Procedure: BIOPSY BREAST SEED LOCALIZATION;  Left Breast Seed Localized Excisional Biopsy ;  Surgeon: Lisset Amador DO;  Location: [facility] OR     COLONOSCOPY  2005    nl - repeat 2015     FOOT SURGERY Bilateral 2013     HEMORRHOIDECTOMY BANDING      multiple times     HEMORRHOIDECTOMY INTERNAL N/A 7/24/2018    Procedure: HEMORRHOIDECTOMY INTERNAL;  three quadrant hemorrhoidectomy;  Surgeon: Lisa Patrick MD;  Location: [facility]     HYSTERECTOMY  7/1996    fibroids     REMOVE HARDWARE FOOT Left 2015     REPAIR TENDON FOOT Left 12/19/2016    Procedure: REPAIR TENDON FOOT;  Surgeon: Mary Guan MD;  Location: [facility]      Social History     Tobacco Use     Smoking status: Never Smoker     Smokeless tobacco: Never Used   Substance Use Topics     Alcohol use: No     Alcohol/week: 0.0 standard drinks      Problem (# of Occurrences) Relation (Name,Age of Onset)    Alcohol/Drug (1) Father    Breast Cancer (1) Mother    Cancer (1) Father    Cancer - colorectal (1) Maternal Grandfather    Cerebrovascular Disease (1) Maternal Grandmother    Diabetes (2) Mother, Maternal Grandmother            Current Outpatient Medications   Medication Sig     albuterol (PROAIR HFA/PROVENTIL HFA/VENTOLIN HFA) 108 (90 Base) MCG/ACT inhaler Inhale 2 puffs into the lungs every 6 hours as needed for shortness of breath / dyspnea     cetirizine (ZYRTEC) 10 MG tablet Take 1 tablet (10 mg) by mouth every evening     diclofenac (VOLTAREN) 1 % topical gel Place 2 g onto the skin 4 times daily     fluticasone (FLONASE) 50  MCG/ACT nasal spray USE 1 SPRAY IN EACH NOSTRIL EVERY DAY     hydrOXYzine (ATARAX) 25 MG tablet Take 1 tablet (25 mg) by mouth 3 times daily as needed for anxiety     lisinopril (PRINIVIL/ZESTRIL) 5 MG tablet Take 1 tablet (5 mg) by mouth daily     meloxicam (MOBIC) 15 MG tablet TAKE 1 TABLET EVERY DAY AS NEEDED FOR PAIN     metFORMIN (GLUCOPHAGE) 500 MG tablet TAKE 1 TABLET EVERY DAY WITH BREAKFAST     mirtazapine (REMERON) 15 MG tablet TAKE 1 TABLET EVERY NIGHT AS NEEDED FOR SLEEP     montelukast (SINGULAIR) 10 MG tablet TAKE 1 TABLET EVERY DAY AT BEDTIME     ranitidine (ZANTAC) 150 MG tablet Take 1 tablet (150 mg) by mouth 2 times daily     simvastatin (ZOCOR) 40 MG tablet Take 1 tablet (40 mg) by mouth At Bedtime     No current facility-administered medications for this visit.      Allergies   Allergen Reactions     Codeine Nausea     Morphine Itching     Nitrofuran Derivatives Hives     Phenothiazines      sedation     Primatene Mist [Epinephrine Bitartrate] Itching     neck itch with primatene mist     Vicodin [Hydrocodone-Acetaminophen] Nausea     Latex Itching and Rash       ROS:  12 point review of systems negative other than symptoms noted below or in the HPI.  No urinary frequency or dysuria, bladder or kidney problems      OBJECTIVE:     /70   Wt 75.9 kg (167 lb 6.4 oz)   LMP 01/01/1985   Breastfeeding No   BMI 26.22 kg/m    Body mass index is 26.22 kg/m .    Exam:  Constitutional:  Appearance: Well nourished, well developed alert, in no acute distress  Lymphatic: Lymph Nodes:  No other lymphadenopathy present  Skin: General Inspection:  No rashes present, no lesions present, no areas of discoloration.  Neurologic:  Mental Status:  Oriented X3.  Normal strength and tone, sensory exam grossly normal, mentation intact and speech normal.    Psychiatric:  Mentation appears normal and affect normal/bright.  Pelvic Exam:  External Genitalia:     Normal appearance for age, no discharge present, no  tenderness present, no inflammatory lesions present, color normal  Vagina:     Normal vaginal vault without central or paravaginal defects, no discharge present, no inflammatory lesions present, no masses present  Bladder:     Nontender to palpation  Urethra:   Urethral Body:  Urethra palpation normal, urethra structural support normal   Urethral Meatus:  No erythema or lesions present  Cervix:     Appearance healthy, no lesions present, nontender to palpation, no bleeding present  Uterus:     Uterus: firm, normal sized and nontender, midplane in position.   Adnexa:     No adnexal tenderness present, no adnexal masses present  Perineum:     Perineum within normal limits, no evidence of trauma, no rashes or skin lesions present  Anus:     Anus within normal limits, no hemorrhoids present  Inguinal Lymph Nodes:     No lymphadenopathy present  Pubic Hair:     Normal pubic hair distribution for age  Genitalia and Groin:     No rashes present, no lesions present, no areas of discoloration, no masses present       In-Clinic Test Results: Vaginal culture pending      ASSESSMENT/PLAN:                                                        ICD-10-CM    1. Itching of vagina N89.8 Wet prep     Gram stain     Strep, Group B by PCR     Yeast culture     The patient's examination was compromised by some cream that she used 2 nights ago.  There was not much erythema or thick white discharge and we will treat this as a bacterial infection with some metronidazole.  Her culture is pending.        Tristan Bryant MD  [facility] [place]

## 2019-12-04 LAB
GRAM STN SPEC: ABNORMAL
SPECIMEN SOURCE: ABNORMAL

## 2019-12-05 LAB
GP B STREP DNA SPEC QL NAA+PROBE: POSITIVE
SPECIMEN SOURCE: ABNORMAL

## 2019-12-09 LAB
BACTERIA SPEC CULT: ABNORMAL
SPECIMEN SOURCE: ABNORMAL

## 2019-12-10 DIAGNOSIS — T36.95XA ANTIBIOTIC-INDUCED YEAST INFECTION: ICD-10-CM

## 2019-12-10 DIAGNOSIS — A49.1 GBS (GROUP B STREPTOCOCCUS) INFECTION: Primary | ICD-10-CM

## 2019-12-10 DIAGNOSIS — B37.9 ANTIBIOTIC-INDUCED YEAST INFECTION: ICD-10-CM

## 2019-12-10 LAB
SPECIMEN SOURCE: ABNORMAL
YEAST SPEC QL CULT: ABNORMAL

## 2019-12-10 RX ORDER — FLUCONAZOLE 150 MG/1
150 TABLET ORAL
Qty: 4 TABLET | Refills: 0 | Status: SHIPPED | OUTPATIENT
Start: 2019-12-10 | End: 2020-07-16

## 2019-12-10 RX ORDER — AMOXICILLIN 500 MG/1
500 CAPSULE ORAL 2 TIMES DAILY
Qty: 14 CAPSULE | Refills: 0 | Status: SHIPPED | OUTPATIENT
Start: 2019-12-10 | End: 2020-07-16

## 2019-12-21 ENCOUNTER — TELEPHONE (OUTPATIENT)
Dept: FAMILY MEDICINE | Facility: CLINIC | Age: 64
End: 2019-12-21

## 2019-12-21 NOTE — TELEPHONE ENCOUNTER
Patient would like a call back regarding appointment with Dr. Bryant only for breast tenderness and pain. Per patient she has history of breast cancer.Presently no openings for week of 12/23/2019. She can be reached at [phone]

## 2019-12-23 ENCOUNTER — OFFICE VISIT (OUTPATIENT)
Dept: URGENT CARE | Facility: URGENT CARE | Age: 64
End: 2019-12-23
Payer: COMMERCIAL

## 2019-12-23 ENCOUNTER — NURSE TRIAGE (OUTPATIENT)
Dept: FAMILY MEDICINE | Facility: CLINIC | Age: 64
End: 2019-12-23

## 2019-12-23 VITALS
SYSTOLIC BLOOD PRESSURE: 150 MMHG | WEIGHT: 169.4 LBS | OXYGEN SATURATION: 100 % | DIASTOLIC BLOOD PRESSURE: 70 MMHG | BODY MASS INDEX: 26.53 KG/M2 | HEART RATE: 63 BPM | TEMPERATURE: 98.6 F

## 2019-12-23 DIAGNOSIS — N64.4 BREAST PAIN, LEFT: Primary | ICD-10-CM

## 2019-12-23 PROCEDURE — 99214 OFFICE O/P EST MOD 30 MIN: CPT | Performed by: PHYSICIAN ASSISTANT

## 2019-12-23 PROCEDURE — 93000 ELECTROCARDIOGRAM COMPLETE: CPT | Performed by: PHYSICIAN ASSISTANT

## 2019-12-23 RX ORDER — OXYCODONE AND ACETAMINOPHEN 5; 325 MG/1; MG/1
.5-1 TABLET ORAL EVERY 6 HOURS PRN
Qty: 3 TABLET | Refills: 0 | Status: SHIPPED | OUTPATIENT
Start: 2019-12-23 | End: 2019-12-26

## 2019-12-23 NOTE — TELEPHONE ENCOUNTER
"Patient calling because she has left breast pain. States that she has history of breast cancer and fluid filled cysts.   Patient states that the pain was severe last night, but not this morning. Denies fever or redness of breast. \"maybe a little red from the ice pack.\"  Patient wants to be seen today. No appointments available. Patient agrees to go to .    Additional Information    Negative: Chest pain    Negative: Breastfeeding questions    Negative: Postpartum breast pain and swelling, is breastfeeding    Negative: Postpartum breast pain and swelling, not breastfeeding    Negative: Small spot, skin growth or mole    Negative: SEVERE breast pain and fever > 103 F (39.4 C)    Negative: Patient sounds very sick or weak to the triager    Negative: Breast looks infected (spreading redness, feels hot or painful to touch) and fever    Negative: Breast looks infected (spreading redness, feels hot or painful to touch) and no fever    Negative: Painful rash and multiple small blisters grouped together (i.e., dermatomal distribution or \"band\" or \"stripe\")    Negative: Cuts, burns, or bruises of breasts and suspicious history for the injury    Patient wants to be seen    Answer Assessment - Initial Assessment Questions  1. SYMPTOM: \"What's the main symptom you're concerned about?\"  (e.g., lump, pain, rash, nipple discharge)      Pain left breast  2. LOCATION: \"Where is the pain located?\"      Behind left breast and left nipple. States history of stage 0 cancer and lumpectomy in that breast in 2014  3. ONSET: \"When did breast pain  start?\"      Noticed last night when took off her bra.  4. PRIOR HISTORY: \"Do you have any history of prior problems with your breasts?\" (e.g., lumps, cancer, fibrocystic breast disease)      Breast cancer and fluid filled cysts  5. CAUSE: \"What do you think is causing this symptom?\"      Not sure. Worried about cancer.   6. OTHER SYMPTOMS: \"Do you have any other symptoms?\" (e.g., fever, breast " "pain, redness or rash, nipple discharge)      Breast pain and no lump  7. PREGNANCY-BREASTFEEDING: \"Is there any chance you are pregnant?\" \"When was your last menstrual period?\" \"Are you breastfeeding?\"      NA    Protocols used: BREAST SYMPTOMS-A-[place]    Clarissa Roman RN    "
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
## 2019-12-24 ENCOUNTER — HOSPITAL ENCOUNTER (OUTPATIENT)
Dept: ULTRASOUND IMAGING | Facility: CLINIC | Age: 64
End: 2019-12-24
Attending: PHYSICIAN ASSISTANT
Payer: COMMERCIAL

## 2019-12-24 ENCOUNTER — HOSPITAL ENCOUNTER (OUTPATIENT)
Dept: MAMMOGRAPHY | Facility: CLINIC | Age: 64
Discharge: HOME | End: 2019-12-24
Attending: PHYSICIAN ASSISTANT | Admitting: PHYSICIAN ASSISTANT
Payer: COMMERCIAL

## 2019-12-24 DIAGNOSIS — N64.4 BREAST PAIN, LEFT: ICD-10-CM

## 2019-12-24 PROCEDURE — 76642 ULTRASOUND BREAST LIMITED: CPT | Mod: LT

## 2019-12-24 PROCEDURE — G0279 TOMOSYNTHESIS, MAMMO: HCPCS

## 2019-12-24 PROCEDURE — 77066 DX MAMMO INCL CAD BI: CPT

## 2019-12-30 ENCOUNTER — TRANSFERRED RECORDS (OUTPATIENT)
Dept: HEALTH INFORMATION MANAGEMENT | Facility: CLINIC | Age: 64
End: 2019-12-30

## 2019-12-30 LAB — RETINOPATHY: NEGATIVE

## 2020-01-27 DIAGNOSIS — E78.5 HYPERLIPIDEMIA LDL GOAL <100: ICD-10-CM

## 2020-01-27 NOTE — TELEPHONE ENCOUNTER
Reason for Call:  Other prescription    Detailed comments: Pt called this afternoon and would like a refill on her simvastatin. Please refill this and give pt a call if there are any questions-- the pt also would like a TC working with Dr. Mccall to give her a call back on her cell phone to see if we have received her diabetic eye check from [facility]. Please give pt a call back when you can. Thank you.    Phone Number Patient can be reached at: Home number on file [phone] (home)    Best Time:     Can we leave a detailed message on this number? YES    Call taken on 1/27/2020 at 3:54 PM by Ruby Ortiz

## 2020-01-28 RX ORDER — SIMVASTATIN 40 MG
40 TABLET ORAL AT BEDTIME
Qty: 90 TABLET | Refills: 3 | OUTPATIENT
Start: 2020-01-28

## 2020-01-28 NOTE — TELEPHONE ENCOUNTER
Patient has refills remaining at pharmacy.  Routing to  regarding message below.    STEPHAN DevineN, RN  [employer] [facility] [facility]

## 2020-01-28 NOTE — TELEPHONE ENCOUNTER
"Requested Prescriptions   Pending Prescriptions Disp Refills     simvastatin (ZOCOR) 40 MG tablet 90 tablet 3     Sig: Take 1 tablet (40 mg) by mouth At Bedtime  Last Written Prescription Date:  10/03/2019  Last Fill Quantity: 90,  # refills: 3   Last office visit: 9/20/2019 with prescribing provider:  Dr. Mccall   Future Office Visit:           Statins Protocol Passed - 1/27/2020  3:57 PM        Passed - LDL on file in past 12 months     Recent Labs   Lab Test 09/11/19  1235   LDL 93             Passed - No abnormal creatine kinase in past 12 months     No lab results found.             Passed - Recent (12 mo) or future (30 days) visit within the authorizing provider's specialty     Patient has had an office visit with the authorizing provider or a provider within the authorizing providers department within the previous 12 mos or has a future within next 30 days. See \"Patient Info\" tab in inbasket, or \"Choose Columns\" in Meds & Orders section of the refill encounter.              Passed - Medication is active on med list        Passed - Patient is age 18 or older        Passed - No active pregnancy on record        Passed - No positive pregnancy test in past 12 months          "

## 2020-01-29 NOTE — TELEPHONE ENCOUNTER
TC called patient and advised.      .Josette CONROY    [facility] Somervell

## 2020-03-08 ENCOUNTER — NURSE TRIAGE (OUTPATIENT)
Dept: NURSING | Facility: CLINIC | Age: 65
End: 2020-03-08

## 2020-03-08 ENCOUNTER — OFFICE VISIT (OUTPATIENT)
Dept: URGENT CARE | Facility: URGENT CARE | Age: 65
End: 2020-03-08
Payer: COMMERCIAL

## 2020-03-08 VITALS
SYSTOLIC BLOOD PRESSURE: 140 MMHG | DIASTOLIC BLOOD PRESSURE: 68 MMHG | OXYGEN SATURATION: 97 % | BODY MASS INDEX: 27.55 KG/M2 | TEMPERATURE: 98.9 F | HEART RATE: 70 BPM | WEIGHT: 175.9 LBS

## 2020-03-08 DIAGNOSIS — J01.40 ACUTE NON-RECURRENT PANSINUSITIS: Primary | ICD-10-CM

## 2020-03-08 PROCEDURE — 99213 OFFICE O/P EST LOW 20 MIN: CPT | Performed by: PHYSICIAN ASSISTANT

## 2020-03-08 RX ORDER — GUAIFENESIN 600 MG/1
1200 TABLET, EXTENDED RELEASE ORAL 2 TIMES DAILY
Qty: 28 TABLET | Refills: 0 | Status: SHIPPED | OUTPATIENT
Start: 2020-03-08 | End: 2020-07-16

## 2020-03-08 RX ORDER — AZITHROMYCIN 250 MG/1
TABLET, FILM COATED ORAL
Qty: 6 TABLET | Refills: 0 | Status: SHIPPED | OUTPATIENT
Start: 2020-03-08 | End: 2020-07-16

## 2020-03-08 NOTE — PATIENT INSTRUCTIONS
Patient Education     Acute Sinusitis    Acute sinusitis is irritation and swelling of the sinuses. It is usually caused by a viral infection after a common cold. Your doctor can help you find relief.  What is acute sinusitis?  Sinuses are air-filled spaces in the skull behind the face. They are kept moist and clean by a lining of mucosa. Things such as pollen, smoke, and chemical fumes can irritate the mucosa. It can then swell up. As a response to irritation, the mucosa makes more mucus and other fluids. Tiny hairlike cilia cover the mucosa. Cilia help carry mucus toward the opening of the sinus. Too much mucus may cause the cilia to stop working. This blocks the sinus opening. A buildup of fluid in the sinuses then causes pain and pressure. It can also encourage bacteria to grow in the sinuses.  Common symptoms of acute sinusitis  You may have:    Facial soreness pain    Headache    Fever    Fluid draining in the back of the throat (postnasal drip)    Congestion    Drainage that is thick and colored, instead of clear    Cough  Diagnosing acute sinusitis  Your doctor will ask about your symptoms and health history. He or she will look at your ear, nose, and throat. You usually won't need to have X-rays taken.    The doctor may take a sample of mucus to check for bacteria. If you have sinusitis that keeps coming back, you may need imaging tests such as X-rays or CAT scans. This will help your doctor check for a structural problem that may be causing the infection.  Treating acute sinusitis  Treatment is aimed at unblocking the sinus opening and helping the cilia work again. You may need to take antihistamine and decongestant medicine. These can reduce inflammation and decrease the amount of fluid your sinuses make. If you have a bacterial infection, you will need to take antibiotic medicine for 10 to 14 days. Take this medicine until it is gone, even if you feel better.  Date Last Reviewed: 10/1/2016    [id number]  [employer]. [address] [place] [address], [place] [address]. All rights reserved. This information is not intended as a substitute for professional medical care. Always follow your healthcare professional's instructions.
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
## 2020-03-08 NOTE — TELEPHONE ENCOUNTER
"Patient states she woke up today with a sore throat. Describes as \"5\" on a 1-10 pain scale. Able to swallow fluids. States does not know if has been exposed to strep.  Currently afebrile by report. Also reports occasional cough and nasal congestion x 2-3 days.  Denies difficulty breathing.   History of asthma and diabetes by report.    Protocol-  Sore Throat  Care advice reviewed.   Disposition-  See Physician within 24 hours  Caller states understanding of the recommended disposition.   Plans to go to [facility] today. Hours and location reviewed.  Advised to call back if further questions or concerns.     STEPHAN DeckerN RN  [facility]     Reason for Disposition    Diabetes mellitus or weak immune system (e.g., HIV positive, cancer chemo, splenectomy, organ transplant)    Additional Information    Negative: Severe difficulty breathing (e.g., struggling for each breath, speaks in single words, stridor)    Negative: Sounds like a life-threatening emergency to the triager    Negative: [1] Drooling or spitting out saliva (because can't swallow) AND [2] normal breathing    Negative: Unable to open mouth completely    Negative: [1] Difficulty breathing AND [2] not severe    Negative: Fever > 104 F (40 C)    Negative: [1] Refuses to drink anything AND [2] for > 12 hours    Negative: [1] Drinking very little AND [2] dehydration suspected (e.g., no urine > 12 hours, very dry mouth, very lightheaded)    Negative: Patient sounds very sick or weak to the triager    Negative: SEVERE (e.g., excruciating) throat pain    Negative: [1] Pus on tonsils (back of throat) AND [2]  fever AND [3] swollen neck lymph nodes (\"glands\")    Negative: [1] Rash AND [2] widespread (especially chest and abdomen)    Negative: Earache also present    Negative: Fever present > 3 days (72 hours)    Protocols used: SORE THROAT-A-AH    "

## 2020-03-30 DIAGNOSIS — L50.9 HIVES: ICD-10-CM

## 2020-03-30 NOTE — TELEPHONE ENCOUNTER
Prescription approved per [facility] Refill Protocol.  Pharmacy change.     Tequila Ryan RN, BSN  [facility]

## 2020-03-30 NOTE — TELEPHONE ENCOUNTER
"Last Written Prescription Date:  9/11/19  Last Fill Quantity: 180,  # refills: 3   Last office visit: 9/20/2019 with prescribing provider:   Future Office Visit:      Requested Prescriptions   Pending Prescriptions Disp Refills     ranitidine (ZANTAC) 150 MG tablet [Pharmacy Med Name: RANITIDINE HYDROCHLORIDE 150 MG Tablet] 180 tablet 3     Sig: TAKE 1 TABLET TWICE DAILY       H2 Blockers Protocol Passed - 3/30/2020  2:32 PM        Passed - Patient is age 12 or older        Passed - Recent (12 mo) or future (30 days) visit within the authorizing provider's specialty     Patient has had an office visit with the authorizing provider or a provider within the authorizing providers department within the previous 12 mos or has a future within next 30 days. See \"Patient Info\" tab in inbasket, or \"Choose Columns\" in Meds & Orders section of the refill encounter.              Passed - Medication is active on med list             "

## 2020-04-07 DIAGNOSIS — L50.9 HIVES: ICD-10-CM

## 2020-04-08 NOTE — TELEPHONE ENCOUNTER
Requested Prescriptions   Pending Prescriptions Disp Refills     ranitidine (ZANTAC) 150 MG tablet 180 tablet 1     Sig: Take 1 tablet (150 mg) by mouth 2 times daily       There is no refill protocol information for this order

## 2020-06-22 DIAGNOSIS — E11.9 TYPE 2 DIABETES MELLITUS WITHOUT COMPLICATION, WITHOUT LONG-TERM CURRENT USE OF INSULIN (H): ICD-10-CM

## 2020-06-22 NOTE — TELEPHONE ENCOUNTER
Maxime is due for a DM check.  Please have her schedule this prior to her next fill.  #30 days given

## 2020-06-22 NOTE — LETTER
July 3, 2020      Grecia Kilgore  [address]   [place] [address]        Dear Grecia,    I care about your health and have reviewed your health plan. I have reviewed your medical conditions, medication list, and lab results and am making recommendations based on this review, to better manage your health.    You are in particular need of attention regarding:  -Diabetes    I am recommending that you:  -Schedule an appointment    Here is a list of Health Maintenance topics that are due now or due soon:  Health Maintenance Due   Topic Date Due     Migraine Action Plan  1955     Zoster (Shingles) Vaccine (1 of 2) 01/09/2007     Discuss Advance Care Planning  07/30/2017     Diabetic Foot Exam  11/15/2018     Annual Wellness Visit  11/23/2019     A1C Lab  03/11/2020     Anxiety Assessment  03/11/2020     Asthma Control Test  03/11/2020     Depression Assessment  03/11/2020     Asthma Action Plan - yearly  04/26/2020       Please call us at [phone] (or use [employer]) to address the above recommendations.     Thank you for trusting [facility] and we appreciate the opportunity to serve you.  We look forward to supporting your healthcare needs in the future.    Healthy Regards,    Romel Mccall MD

## 2020-06-22 NOTE — TELEPHONE ENCOUNTER
Routing refill request to provider for review/approval because:  Labs not current:  A1C    Tequila Ryan RN, BSN  [facility]

## 2020-06-25 NOTE — TELEPHONE ENCOUNTER
Josette Olguin contacted [facility] on 06/25/20 and left a message. If patient calls back please schedule appointment as soon as possible.    .Josette CONROY    [facility] [place]

## 2020-07-03 NOTE — TELEPHONE ENCOUNTER
Josette Olguin contacted [facility] on 07/03/20 and left a message. If patient calls back please schedule appointment as soon as possible.  Letter sent.    .Josette CONROY    [facility] [place]

## 2020-07-13 ENCOUNTER — TELEPHONE (OUTPATIENT)
Dept: FAMILY MEDICINE | Facility: CLINIC | Age: 65
End: 2020-07-13

## 2020-07-13 NOTE — TELEPHONE ENCOUNTER
She has a history of diverticulosis from what I can tell.  Has she had diverticulitis?    She is not due for colonoscopy so I do not see why she needs to see gastroenterology at this time.    I would recommend that she do a video visit with me to review her symptoms  and plan management.    Romel Mccall MD  [facility] [place]

## 2020-07-13 NOTE — TELEPHONE ENCOUNTER
TC tried to reach out to patient by phone call. Number in patients chart is disconnected, unable to reach. If patient calls back please relay message from Dr. Mccall.    .Josette CONROY    [facility] [place]

## 2020-07-13 NOTE — TELEPHONE ENCOUNTER
Routing to team to inform and assist with scheduling. Thank you very much.     Tequila Ryan RN, BSN  [facility] [place]

## 2020-07-13 NOTE — TELEPHONE ENCOUNTER
Reason for Call:  Other call back    Detailed comments: Pt received a letter and is calling to see what they need to come in for. Pt would prefer to not have multiple appointments since coverage is a concern (e.i. come in for a med check now and then have to come in for a px in the fall).    Pt also has had ongoing stomach pain on L side for months. Believes it is diverticulosis as they have had these sxs before. Pt is wondering which specialist they need to go to. Is established at specialist and wondering who they need to see. Would prefer not to be seen at Family Practice if they will need to be sent elsewhere after.      Phone Number Patient can be reached at: Cell number on file:    Telephone Information:   Mobile [phone]       Best Time: any    Can we leave a detailed message on this number? YES    Call taken on 7/13/2020 at 10:23 AM by Josephine Card

## 2020-07-13 NOTE — TELEPHONE ENCOUNTER
Called patient to gather more information.     Patient states she has been having dull intermittent pain on her lower L. Side of abdomen. Patient is diagnosed with Diverticulitis per history.  Patient states the last two weeks she has been having mild-moderate intermittent dull pain in the left lower side of abdomen.     Patient has been icing the area, which helps along with OTC pain medication she uses as needed. Patient states that she was intermittently nauseous last week, but currently is not experiecing those symptoms. Patients has been passing flatus and having normal bowel movements daily. Patient states she will use Miralax as needed to help with her bowel movements as patient at times can develop constipation. Patient states she also has a decreased appetite as well.    Patient denies any fevers currently, nausea, vomiting, blood in stool, urinary problems.     Patient stated that she does not want to have a visit with Dr. Mccall if all she is going to do is refer her to a specialist. If Dr. Mccall wants patient to be seen by a specialist (possibly GI), then she would like the referral placed. Patient states that she does nto have enough money to be having multiple visits.       Okay to leave a detailed message? YES    Tequila Ryan RN, BSN  [facility] [place]

## 2020-07-15 NOTE — PROGRESS NOTES
SUBJECTIVE:                                                   Grecia Kilgore is a 65 year old female who presents to clinic today for the following health issue(s):  Patient presents with:  Vaginal Problem: recurrent vaginal infection      HPI: The patient is seen at this time with 2 distinct complaints.  She has a long history of recurrent vaginal infections and feels that she has discharge and fluid flowing out all the time.  She is meticulous with her hygiene.  She has a sexual partner who is uncircumcised and is concerned about recurring infections due to this.  She also has some left lateral abdominal pain that comes in waves and goes towards the rectum.  She has a history of diverticulosis by colonoscopy last year and had hemorrhoids banded at that time.  This is a very intermittent issue.  She admits to being dehydrated and off her diet at this time.      Patient's last menstrual period was 1985..     Patient is sexually active, .  Using hysterectomy for contraception.    reports that she has never smoked. She has never used smokeless tobacco.    STD testing offered?  Declined    Health maintenance updated:  yes    Today's PHQ-2 Score:   PHQ-2 (  [employer]) 2018   Q1: Little interest or pleasure in doing things 1   Q2: Feeling down, depressed or hopeless 1   PHQ-2 Score 2     Today's PHQ-9 Score:   PHQ-9 SCORE 2019   PHQ-9 Total Score -   PHQ-9 Total Score 14     Today's WILL-7 Score:   WILL-7 SCORE 2019   Total Score -   Total Score 12       Problem list and histories reviewed & adjusted, as indicated.  Additional history: as documented.    Patient Active Problem List   Diagnosis     Allergic rhinitis     External hemorrhoids     Vitamin D deficiency     Fibromyalgia     Osteoarthritis, knee     Positive PPD     Panic disorder     Genital herpes     Advanced directives, counseling/discussion     DCIS (ductal carcinoma in situ) of breast     Heart palpitations     S/P  hysterectomy - fibroids     Anxiety     Major depressive disorder, single episode, moderate (H)     Hyperlipidemia LDL goal <100     Type 2 diabetes mellitus without complication, without long-term current use of insulin (H)     Mild intermittent asthma without complication     BOO (obstructive sleep apnea)     CKD (chronic kidney disease) stage 3, GFR 30-59 ml/min (H)     Past Surgical History:   Procedure Laterality Date     ARTHRODESIS TOE(S)  9/16/2013    Procedure: ARTHRODESIS TOE(S);  BILATERAL GREAT TOE FUSION (C-ARM);  Surgeon: Mary Guan MD;  Location: [facility]     ARTHRODESIS TOE(S) Left 12/19/2016    Procedure: ARTHRODESIS TOE(S);  Surgeon: Mary Guan MD;  Location: [facility]     ARTHROSCOPY KNEE Left 2/2/11     BIOPSY BREAST  2/7/2014    Procedure: BIOPSY BREAST;  Re-excision Left Breast Cavity for Margins ;  Surgeon: Lisset Amador DO;  Location: [facility]     BIOPSY BREAST SEED LOCALIZATION  1/22/2014    Procedure: BIOPSY BREAST SEED LOCALIZATION;  Left Breast Seed Localized Excisional Biopsy ;  Surgeon: Lisset Amador DO;  Location: [facility]     COLONOSCOPY  2005    nl - repeat 2015     FOOT SURGERY Bilateral 2013     HEMORRHOIDECTOMY BANDING      multiple times     HEMORRHOIDECTOMY INTERNAL N/A 7/24/2018    Procedure: HEMORRHOIDECTOMY INTERNAL;  three quadrant hemorrhoidectomy;  Surgeon: Lisa Patrick MD;  Location: [facility]     HYSTERECTOMY  7/1996    fibroids     REMOVE HARDWARE FOOT Left 2015     REPAIR TENDON FOOT Left 12/19/2016    Procedure: REPAIR TENDON FOOT;  Surgeon: Mary Guan MD;  Location: [facility]      Social History     Tobacco Use     Smoking status: Never Smoker     Smokeless tobacco: Never Used   Substance Use Topics     Alcohol use: No     Alcohol/week: 0.0 standard drinks      Problem (# of Occurrences) Relation (Name,Age of Onset)    Alcohol/Drug (1) Father    Breast Cancer (1) Mother    Cancer (1) Father    Cancer - colorectal (1) Maternal Grandfather     "Cerebrovascular Disease (1) Maternal Grandmother    Diabetes (2) Mother, Maternal Grandmother            Current Outpatient Medications   Medication Sig     albuterol (PROAIR HFA/PROVENTIL HFA/VENTOLIN HFA) 108 (90 Base) MCG/ACT inhaler Inhale 2 puffs into the lungs every 6 hours as needed for shortness of breath / dyspnea     cetirizine (ZYRTEC) 10 MG tablet TAKE 1 TABLET(10 MG) BY MOUTH EVERY EVENING     fluticasone (FLONASE) 50 MCG/ACT nasal spray USE 1 SPRAY IN EACH NOSTRIL EVERY DAY     hydrOXYzine (ATARAX) 25 MG tablet Take 1 tablet (25 mg) by mouth 3 times daily as needed for anxiety     lisinopril (PRINIVIL/ZESTRIL) 5 MG tablet Take 1 tablet (5 mg) by mouth daily     metFORMIN (GLUCOPHAGE) 500 MG tablet TAKE 1 TABLET EVERY DAY WITH BREAKFAST     mirtazapine (REMERON) 15 MG tablet TAKE 1 TABLET EVERY NIGHT AS NEEDED FOR SLEEP     montelukast (SINGULAIR) 10 MG tablet TAKE 1 TABLET EVERY DAY AT BEDTIME     ranitidine (ZANTAC) 150 MG tablet TAKE 1 TABLET TWICE DAILY      simvastatin (ZOCOR) 40 MG tablet Take 1 tablet (40 mg) by mouth At Bedtime     meloxicam (MOBIC) 15 MG tablet TAKE 1 TABLET EVERY DAY AS NEEDED FOR PAIN (Patient not taking: Reported on 12/23/2019)     No current facility-administered medications for this visit.      Allergies   Allergen Reactions     Codeine Nausea     Morphine Itching     Nitrofuran Derivatives Hives     Phenothiazines      sedation     Primatene Mist [Epinephrine Bitartrate] Itching     neck itch with primatene mist     Vicodin [Hydrocodone-Acetaminophen] Nausea     Latex Itching and Rash       ROS:  12 point review of systems negative other than symptoms noted below or in the HPI.  Genitourinary: Pelvic Pain and Vaginal Discharge  No urinary frequency or dysuria, bladder or kidney problems      OBJECTIVE:     BP (!) 142/76   Pulse 72   Ht 1.702 m (5' 7\")   Wt 76.7 kg (169 lb)   LMP 01/01/1985   BMI 26.47 kg/m    Body mass index is 26.47 kg/m .    Exam:  Constitutional:  " Appearance: Well nourished, well developed alert, in no acute distress  Gastrointestinal:  Abdominal Examination:  Abdomen nontender to palpation, tone normal without rigidity or guarding, no masses present, umbilicus without lesions; Liver/Spleen:  No hepatomegaly present, liver nontender to palpation; Hernias:  No hernias present  Lymphatic: Lymph Nodes:  No other lymphadenopathy present  Skin: General Inspection:  No rashes present, no lesions present, no areas of discoloration.  Neurologic:  Mental Status:  Oriented X3.  Normal strength and tone, sensory exam grossly normal, mentation intact and speech normal.    Psychiatric:  Mentation appears normal and affect normal/bright.  Pelvic Exam:  External Genitalia:     Normal appearance for age, no discharge present, no tenderness present, no inflammatory lesions present, color normal  Vagina:     Normal vaginal vault without central or paravaginal defects, no discharge present, no inflammatory lesions present, no masses present  Bladder:     Nontender to palpation  Urethra:   Urethral Body:  Urethra palpation normal, urethra structural support normal   Urethral Meatus:  No erythema or lesions present  Cervix:     Appearance healthy, no lesions present, nontender to palpation, no bleeding present  Uterus:     Uterus: firm, normal sized and nontender, midplane in position.   Adnexa:     No adnexal tenderness present, no adnexal masses present  Perineum:     Perineum within normal limits, no evidence of trauma, no rashes or skin lesions present  Anus:     Anus within normal limits, no hemorrhoids present  Inguinal Lymph Nodes:     No lymphadenopathy present  Pubic Hair:     Normal pubic hair distribution for age  Genitalia and Groin:     No rashes present, no lesions present, no areas of discoloration, no masses present       In-Clinic Test Results: Vaginal culture pending      ASSESSMENT/PLAN:                                                        ICD-10-CM    1.  Vaginal discharge  N89.8 Group B strep PCR     Gram stain     Yeast culture       Patient with history of diverticulosis who is now dehydrated and needs to increase fluids and fiber.  Vaginal culture of normal-appearing vagina with very little discharge is pending.  This may be all due to sexual exposure.  We will contact her on Monday.        Tristan Bryant MD  [facility] [place]

## 2020-07-16 ENCOUNTER — OFFICE VISIT (OUTPATIENT)
Dept: OBGYN | Facility: CLINIC | Age: 65
End: 2020-07-16
Payer: COMMERCIAL

## 2020-07-16 VITALS
HEART RATE: 72 BPM | BODY MASS INDEX: 26.53 KG/M2 | HEIGHT: 67 IN | SYSTOLIC BLOOD PRESSURE: 142 MMHG | DIASTOLIC BLOOD PRESSURE: 76 MMHG | WEIGHT: 169 LBS

## 2020-07-16 DIAGNOSIS — N89.8 VAGINAL DISCHARGE: Primary | ICD-10-CM

## 2020-07-16 LAB
GRAM STN SPEC: ABNORMAL
Lab: ABNORMAL
SPECIMEN SOURCE: ABNORMAL

## 2020-07-16 PROCEDURE — 87653 STREP B DNA AMP PROBE: CPT | Performed by: OBSTETRICS & GYNECOLOGY

## 2020-07-16 PROCEDURE — 87205 SMEAR GRAM STAIN: CPT | Performed by: OBSTETRICS & GYNECOLOGY

## 2020-07-16 PROCEDURE — 87102 FUNGUS ISOLATION CULTURE: CPT | Performed by: OBSTETRICS & GYNECOLOGY

## 2020-07-16 PROCEDURE — 99213 OFFICE O/P EST LOW 20 MIN: CPT | Performed by: OBSTETRICS & GYNECOLOGY

## 2020-07-16 ASSESSMENT — MIFFLIN-ST. JEOR: SCORE: 1354.21

## 2020-07-17 LAB
GP B STREP DNA SPEC QL NAA+PROBE: POSITIVE
SPECIMEN SOURCE: ABNORMAL

## 2020-07-17 PROCEDURE — 87186 SC STD MICRODIL/AGAR DIL: CPT | Performed by: OBSTETRICS & GYNECOLOGY

## 2020-07-20 LAB
Lab: ABNORMAL
SPECIMEN SOURCE: ABNORMAL
YEAST SPEC QL CULT: ABNORMAL

## 2020-07-21 LAB
BACTERIA SPEC CULT: ABNORMAL
SPECIMEN SOURCE: ABNORMAL

## 2020-07-22 ENCOUNTER — NURSE TRIAGE (OUTPATIENT)
Dept: NURSING | Facility: CLINIC | Age: 65
End: 2020-07-22

## 2020-07-22 DIAGNOSIS — A49.1 GBS (GROUP B STREPTOCOCCUS) INFECTION: Primary | ICD-10-CM

## 2020-07-22 DIAGNOSIS — B37.31 YEAST VAGINITIS: ICD-10-CM

## 2020-07-22 RX ORDER — FLUCONAZOLE 150 MG/1
150 TABLET ORAL ONCE
Qty: 1 TABLET | Refills: 0 | Status: SHIPPED | OUTPATIENT
Start: 2020-07-22 | End: 2020-07-28

## 2020-07-22 RX ORDER — CLINDAMYCIN PHOSPHATE 20 MG/G
1 CREAM VAGINAL AT BEDTIME
Qty: 40 G | Refills: 0 | Status: SHIPPED | OUTPATIENT
Start: 2020-07-22 | End: 2020-08-07

## 2020-07-22 NOTE — TELEPHONE ENCOUNTER
Patient calling to follow-up on labs done last week and inquiring if any treatment needed.  Patient stated she called the clinic this morning and never received a call back.  Reviewed chart and informed patient of notes from provider in labs and medication orders.  Also provided information about what GBS is and also labs of last time she was in for similar symptoms, as patient verbalized she was never called about those labs.    Noted in Orders encounter that information sent to [facility], but patient states she does not use that.      Will route to clinic as FYI.    Mabel Lopez RN on 7/22/2020 at 5:58 PM      Additional Information    [1] Follow-up call to recent contact AND [2] information only call, no triage required    Protocols used: INFORMATION ONLY CALL-A-

## 2020-07-22 NOTE — TELEPHONE ENCOUNTER
Patient says she spoke to another nurse but had additional questions.  Patient assked if her partner needed treatment too.  [facility] advised no treatment is needed for her partner.  Patient verbalized understanding.'    Additional Information    Negative: Caller is angry or rude (e.g., hangs up, verbally abusive, yelling)    Negative: Caller hangs up    Caller has already spoken with another triager or PCP AND has further questions AND triager able to answer questions.    Negative: Caller has already spoken with another triager and has no further questions.    Negative: Caller has already spoken with the PCP and has no further questions.    Protocols used: NO CONTACT OR DUPLICATE CONTACT CALL-A-

## 2020-07-23 NOTE — TELEPHONE ENCOUNTER
Deactivated pt mychart account due to this information and will receive calls in future about labs.    Noted information given to pt through [facility]    Gavi Carbajal RN on 7/23/2020 at 9:09 AM

## 2020-07-28 ENCOUNTER — OFFICE VISIT (OUTPATIENT)
Dept: OBGYN | Facility: CLINIC | Age: 65
End: 2020-07-28
Payer: COMMERCIAL

## 2020-07-28 VITALS
SYSTOLIC BLOOD PRESSURE: 128 MMHG | WEIGHT: 168 LBS | TEMPERATURE: 97.2 F | HEIGHT: 67 IN | BODY MASS INDEX: 26.37 KG/M2 | DIASTOLIC BLOOD PRESSURE: 72 MMHG

## 2020-07-28 DIAGNOSIS — R30.0 DYSURIA: Primary | ICD-10-CM

## 2020-07-28 LAB
ALBUMIN UR-MCNC: ABNORMAL MG/DL
APPEARANCE UR: ABNORMAL
BILIRUB UR QL STRIP: ABNORMAL
COLOR UR AUTO: ABNORMAL
GLUCOSE UR STRIP-MCNC: NEGATIVE MG/DL
HGB UR QL STRIP: ABNORMAL
KETONES UR STRIP-MCNC: NEGATIVE MG/DL
LEUKOCYTE ESTERASE UR QL STRIP: ABNORMAL
NITRATE UR QL: NEGATIVE
PH UR STRIP: 6.5 PH (ref 5–7)
SOURCE: ABNORMAL
SP GR UR STRIP: 1.02 (ref 1–1.03)
UROBILINOGEN UR STRIP-ACNC: 1 EU/DL (ref 0.2–1)

## 2020-07-28 PROCEDURE — 81003 URINALYSIS AUTO W/O SCOPE: CPT | Performed by: NURSE PRACTITIONER

## 2020-07-28 PROCEDURE — 87086 URINE CULTURE/COLONY COUNT: CPT | Performed by: NURSE PRACTITIONER

## 2020-07-28 PROCEDURE — 87088 URINE BACTERIA CULTURE: CPT | Performed by: NURSE PRACTITIONER

## 2020-07-28 PROCEDURE — 99213 OFFICE O/P EST LOW 20 MIN: CPT | Performed by: NURSE PRACTITIONER

## 2020-07-28 RX ORDER — PHENAZOPYRIDINE HYDROCHLORIDE 100 MG/1
200 TABLET, FILM COATED ORAL 3 TIMES DAILY PRN
Qty: 12 TABLET | Refills: 1 | Status: SHIPPED | OUTPATIENT
Start: 2020-07-28 | End: 2020-07-30

## 2020-07-28 RX ORDER — CIPROFLOXACIN 500 MG/1
500 TABLET, FILM COATED ORAL 2 TIMES DAILY
Qty: 14 TABLET | Refills: 0 | Status: SHIPPED | OUTPATIENT
Start: 2020-07-28 | End: 2020-08-07

## 2020-07-28 ASSESSMENT — MIFFLIN-ST. JEOR: SCORE: 1349.67

## 2020-07-28 NOTE — PROGRESS NOTES
SUBJECTIVE:                                                   Grecia Kilgore is a 65 year old female who presents to clinic today for the following health issue(s):  Patient presents with:  UTI: painful urination        HPI:  Pt here today with c/o painful urination for a few days. She was recently treated for BV, yeast and GBS. She has her last day of vaginal gel to do.    She noted blood in her urine this morning. She feels rundown and pelvic pressure. Hx of chronic vaginitis and UTI's.     By the recommendation of her GYN, her BF has an appointment for consultation of circumcision. Due to the patients chronic vaginitis we support this decision.     Patient's last menstrual period was 1985..     Patient is sexually active, .  Using hysterectomy for contraception.    reports that she has never smoked. She has never used smokeless tobacco.    STD testing offered?  Declined    Health maintenance updated:  yes    Today's PHQ-2 Score:   PHQ-2 (  [employer]) 2018   Q1: Little interest or pleasure in doing things 1   Q2: Feeling down, depressed or hopeless 1   PHQ-2 Score 2     Today's PHQ-9 Score:   PHQ-9 SCORE 2019   PHQ-9 Total Score -   PHQ-9 Total Score 14     Today's WILL-7 Score:   WILL-7 SCORE 2019   Total Score -   Total Score 12       Problem list and histories reviewed & adjusted, as indicated.  Additional history: as documented.    Patient Active Problem List   Diagnosis     Allergic rhinitis     External hemorrhoids     Vitamin D deficiency     Fibromyalgia     Osteoarthritis, knee     Positive PPD     Panic disorder     Genital herpes     Advanced directives, counseling/discussion     DCIS (ductal carcinoma in situ) of breast     Heart palpitations     S/P hysterectomy - fibroids     Anxiety     Major depressive disorder, single episode, moderate (H)     Hyperlipidemia LDL goal <100     Type 2 diabetes mellitus without complication, without long-term current use of insulin  (H)     Mild intermittent asthma without complication     BOO (obstructive sleep apnea)     CKD (chronic kidney disease) stage 3, GFR 30-59 ml/min (H)     Past Surgical History:   Procedure Laterality Date     ARTHRODESIS TOE(S)  9/16/2013    Procedure: ARTHRODESIS TOE(S);  BILATERAL GREAT TOE FUSION (C-ARM);  Surgeon: Mary Guan MD;  Location: [facility]     ARTHRODESIS TOE(S) Left 12/19/2016    Procedure: ARTHRODESIS TOE(S);  Surgeon: Mary Guan MD;  Location: [facility]     ARTHROSCOPY KNEE Left 2/2/11     BIOPSY BREAST  2/7/2014    Procedure: BIOPSY BREAST;  Re-excision Left Breast Cavity for Margins ;  Surgeon: Lisset Amador DO;  Location: [facility]     BIOPSY BREAST SEED LOCALIZATION  1/22/2014    Procedure: BIOPSY BREAST SEED LOCALIZATION;  Left Breast Seed Localized Excisional Biopsy ;  Surgeon: Lisset Amador DO;  Location: [facility]     COLONOSCOPY  2005    nl - repeat 2015     FOOT SURGERY Bilateral 2013     HEMORRHOIDECTOMY BANDING      multiple times     HEMORRHOIDECTOMY INTERNAL N/A 7/24/2018    Procedure: HEMORRHOIDECTOMY INTERNAL;  three quadrant hemorrhoidectomy;  Surgeon: Lisa Patrick MD;  Location: [facility]     HYSTERECTOMY  7/1996    fibroids     REMOVE HARDWARE FOOT Left 2015     REPAIR TENDON FOOT Left 12/19/2016    Procedure: REPAIR TENDON FOOT;  Surgeon: Mary Guan MD;  Location: [facility]      Social History     Tobacco Use     Smoking status: Never Smoker     Smokeless tobacco: Never Used   Substance Use Topics     Alcohol use: No     Alcohol/week: 0.0 standard drinks      Problem (# of Occurrences) Relation (Name,Age of Onset)    Alcohol/Drug (1) Father    Breast Cancer (1) Mother    Cancer (1) Father    Cancer - colorectal (1) Maternal Grandfather    Cerebrovascular Disease (1) Maternal Grandmother    Diabetes (2) Mother, Maternal Grandmother            Current Outpatient Medications   Medication Sig     albuterol (PROAIR HFA/PROVENTIL HFA/VENTOLIN HFA) 108 (90 Base)  "MCG/ACT inhaler Inhale 2 puffs into the lungs every 6 hours as needed for shortness of breath / dyspnea     cetirizine (ZYRTEC) 10 MG tablet TAKE 1 TABLET(10 MG) BY MOUTH EVERY EVENING     ciprofloxacin (CIPRO) 500 MG tablet Take 1 tablet (500 mg) by mouth 2 times daily     clindamycin (CLEOCIN) 2 % vaginal cream Place 1 applicator vaginally At Bedtime for 7 days     fluticasone (FLONASE) 50 MCG/ACT nasal spray USE 1 SPRAY IN EACH NOSTRIL EVERY DAY     hydrOXYzine (ATARAX) 25 MG tablet Take 1 tablet (25 mg) by mouth 3 times daily as needed for anxiety     lisinopril (PRINIVIL/ZESTRIL) 5 MG tablet Take 1 tablet (5 mg) by mouth daily     meloxicam (MOBIC) 15 MG tablet TAKE 1 TABLET EVERY DAY AS NEEDED FOR PAIN     metFORMIN (GLUCOPHAGE) 500 MG tablet TAKE 1 TABLET EVERY DAY WITH BREAKFAST     mirtazapine (REMERON) 15 MG tablet TAKE 1 TABLET EVERY NIGHT AS NEEDED FOR SLEEP     montelukast (SINGULAIR) 10 MG tablet TAKE 1 TABLET EVERY DAY AT BEDTIME     phenazopyridine (PYRIDIUM) 100 MG tablet Take 2 tablets (200 mg) by mouth 3 times daily as needed     ranitidine (ZANTAC) 150 MG tablet TAKE 1 TABLET TWICE DAILY      simvastatin (ZOCOR) 40 MG tablet Take 1 tablet (40 mg) by mouth At Bedtime     No current facility-administered medications for this visit.      Allergies   Allergen Reactions     Codeine Nausea     Morphine Itching     Nitrofuran Derivatives Hives     Phenothiazines      sedation     Primatene Mist [Epinephrine Bitartrate] Itching     neck itch with primatene mist     Vicodin [Hydrocodone-Acetaminophen] Nausea     Latex Itching and Rash       ROS:  12 point review of systems negative other than symptoms noted below or in the HPI.  Genitourinary: Painful Urination  POSITIVE for:, urinary frequency, dysuria, difficulty initiating urination, hesitancy, hematuria      OBJECTIVE:     /72   Temp 97.2  F (36.2  C)   Ht 1.702 m (5' 7\")   Wt 76.2 kg (168 lb)   LMP 01/01/1985   BMI 26.31 kg/m    Body mass " index is 26.31 kg/m .    Exam:  Constitutional:  Appearance: Well nourished, well developed alert, in no acute distress  Neurologic:  Mental Status:  Oriented X3.  Normal strength and tone, sensory exam grossly normal, mentation intact and speech normal.    Psychiatric:  Mentation appears normal and affect normal/bright.     In-Clinic Test Results:  Results for orders placed or performed in visit on 07/28/20 (from the past 24 hour(s))   UA without Microscopic   Result Value Ref Range    Color Urine Brown     Appearance Urine Slightly Cloudy     Glucose Urine Negative NEG^Negative mg/dL    Bilirubin Urine 1+ (A) NEG^Negative    Ketones Urine Negative NEG^Negative mg/dL    Specific Gravity Urine 1.025 1.003 - 1.035    Blood Urine 3+ (A) NEG^Negative    pH Urine 6.5 5.0 - 7.0 pH    Protein Albumin Urine 2+ (A) NEG^Negative mg/dL    Urobilinogen Urine 1.0 0.2 - 1.0 EU/dL    Nitrite Urine Negative NEG^Negative    Leukocyte Esterase Urine 2+ (A) NEG^Negative    Source Midstream Urine        ASSESSMENT/PLAN:                                                        ICD-10-CM    1. Dysuria  R30.0 UA without Microscopic     Urine Culture Aerobic Bacterial     phenazopyridine (PYRIDIUM) 100 MG tablet     ciprofloxacin (CIPRO) 500 MG tablet       There are no Patient Instructions on file for this visit.    UA +, will treat and send UC. Recommended SANTIAGO after abx are finished for both urine and vaginitis. Push fluids.     RALPH Cooper [facility]

## 2020-07-30 LAB
BACTERIA SPEC CULT: ABNORMAL
Lab: ABNORMAL
SPECIMEN SOURCE: ABNORMAL

## 2020-07-30 RX ORDER — METHENAMINE HIPPURATE 1000 MG/1
1 TABLET ORAL 2 TIMES DAILY PRN
Qty: 12 TABLET | Refills: 1 | Status: SHIPPED | OUTPATIENT
Start: 2020-07-30 | End: 2020-09-18

## 2020-08-07 ENCOUNTER — OFFICE VISIT (OUTPATIENT)
Dept: OBGYN | Facility: CLINIC | Age: 65
End: 2020-08-07
Payer: COMMERCIAL

## 2020-08-07 VITALS — HEIGHT: 67 IN | DIASTOLIC BLOOD PRESSURE: 72 MMHG | BODY MASS INDEX: 26.31 KG/M2 | SYSTOLIC BLOOD PRESSURE: 132 MMHG

## 2020-08-07 DIAGNOSIS — Z87.440 RECENT URINARY TRACT INFECTION: Primary | ICD-10-CM

## 2020-08-07 DIAGNOSIS — A49.1 GBS (GROUP B STREPTOCOCCUS) INFECTION: ICD-10-CM

## 2020-08-07 DIAGNOSIS — N76.1 SUBACUTE VAGINITIS: ICD-10-CM

## 2020-08-07 DIAGNOSIS — N30.90 CYSTITIS, UNSPECIFIED WITHOUT HEMATURIA: ICD-10-CM

## 2020-08-07 LAB
ALBUMIN UR-MCNC: NEGATIVE MG/DL
APPEARANCE UR: CLEAR
BILIRUB UR QL STRIP: ABNORMAL
COLOR UR AUTO: YELLOW
GLUCOSE UR STRIP-MCNC: NEGATIVE MG/DL
GRAM STN SPEC: NORMAL
HGB UR QL STRIP: ABNORMAL
KETONES UR STRIP-MCNC: NEGATIVE MG/DL
LEUKOCYTE ESTERASE UR QL STRIP: NEGATIVE
Lab: NORMAL
NITRATE UR QL: NEGATIVE
PH UR STRIP: 5.5 PH (ref 5–7)
SOURCE: ABNORMAL
SP GR UR STRIP: >1.03 (ref 1–1.03)
SPECIMEN SOURCE: NORMAL
UROBILINOGEN UR STRIP-ACNC: 1 EU/DL (ref 0.2–1)

## 2020-08-07 PROCEDURE — 87205 SMEAR GRAM STAIN: CPT | Performed by: NURSE PRACTITIONER

## 2020-08-07 PROCEDURE — 99212 OFFICE O/P EST SF 10 MIN: CPT | Performed by: NURSE PRACTITIONER

## 2020-08-07 PROCEDURE — 87653 STREP B DNA AMP PROBE: CPT | Performed by: NURSE PRACTITIONER

## 2020-08-07 PROCEDURE — 87086 URINE CULTURE/COLONY COUNT: CPT | Performed by: NURSE PRACTITIONER

## 2020-08-07 PROCEDURE — 81003 URINALYSIS AUTO W/O SCOPE: CPT | Performed by: NURSE PRACTITIONER

## 2020-08-07 PROCEDURE — 87106 FUNGI IDENTIFICATION YEAST: CPT | Performed by: NURSE PRACTITIONER

## 2020-08-07 PROCEDURE — 87186 SC STD MICRODIL/AGAR DIL: CPT | Performed by: NURSE PRACTITIONER

## 2020-08-07 PROCEDURE — 87102 FUNGUS ISOLATION CULTURE: CPT | Performed by: NURSE PRACTITIONER

## 2020-08-08 LAB
BACTERIA SPEC CULT: NO GROWTH
GP B STREP DNA SPEC QL NAA+PROBE: POSITIVE
Lab: NORMAL
SPECIMEN SOURCE: ABNORMAL
SPECIMEN SOURCE: NORMAL

## 2020-08-10 LAB
Lab: ABNORMAL
SPECIMEN SOURCE: ABNORMAL
YEAST SPEC QL CULT: ABNORMAL

## 2020-08-11 LAB
BACTERIA SPEC CULT: ABNORMAL
SPECIMEN SOURCE: ABNORMAL

## 2020-08-12 RX ORDER — FLUCONAZOLE 150 MG/1
150 TABLET ORAL
Qty: 3 TABLET | Refills: 0 | Status: SHIPPED | OUTPATIENT
Start: 2020-08-12 | End: 2020-08-19

## 2020-08-12 RX ORDER — AMOXICILLIN 500 MG/1
500 CAPSULE ORAL 2 TIMES DAILY
Qty: 14 CAPSULE | Refills: 0 | Status: SHIPPED | OUTPATIENT
Start: 2020-08-12 | End: 2020-09-17

## 2020-09-16 NOTE — PROGRESS NOTES
"  SUBJECTIVE:                                                   Grecia Kilgore is a 65 year old female who presents to clinic today for the following health issue(s):  Patient presents with:  RECHECK: here to follow up on vaginal infection-has again pressure with urination, vaginal discharge.      HPI:  Pt here today with 2-3 days worth of pelvic pressure with urination. She also notes discharge and a \"prickly\" feeling.     Hx of chronic vaginitis and UTI. She is not on any vaginal E2 cream. BF is having circumcision tomorrow.     Patient's last menstrual period was 1985..     Patient is sexually active, .  Using hysterectomy for contraception.    reports that she has never smoked. She has never used smokeless tobacco.    STD testing offered?  Declined    Health maintenance updated:  yes    Today's PHQ-2 Score:   PHQ-2 (  [employer]) 2020   Q1: Little interest or pleasure in doing things 3   Q2: Feeling down, depressed or hopeless 3   PHQ-2 Score 6     Today's PHQ-9 Score:   PHQ-9 SCORE 2020   PHQ-9 Total Score -   PHQ-9 Total Score 20     Today's WILL-7 Score:   WILL-7 SCORE 2019   Total Score -   Total Score 12       Problem list and histories reviewed & adjusted, as indicated.  Additional history: as documented.    Patient Active Problem List   Diagnosis     Allergic rhinitis     External hemorrhoids     Vitamin D deficiency     Fibromyalgia     Osteoarthritis, knee     Positive PPD     Panic disorder     Genital herpes     Advanced directives, counseling/discussion     DCIS (ductal carcinoma in situ) of breast     Heart palpitations     S/P hysterectomy - fibroids     Anxiety     Major depressive disorder, single episode, moderate (H)     Hyperlipidemia LDL goal <100     Type 2 diabetes mellitus without complication, without long-term current use of insulin (H)     Mild intermittent asthma without complication     BOO (obstructive sleep apnea)     CKD (chronic kidney disease) stage " 3, GFR 30-59 ml/min (H)     Past Surgical History:   Procedure Laterality Date     ARTHRODESIS TOE(S)  9/16/2013    Procedure: ARTHRODESIS TOE(S);  BILATERAL GREAT TOE FUSION (C-ARM);  Surgeon: Mary Guan MD;  Location: [facility]     ARTHRODESIS TOE(S) Left 12/19/2016    Procedure: ARTHRODESIS TOE(S);  Surgeon: Mary Guan MD;  Location: [facility]     ARTHROSCOPY KNEE Left 2/2/11     BIOPSY BREAST  2/7/2014    Procedure: BIOPSY BREAST;  Re-excision Left Breast Cavity for Margins ;  Surgeon: Lisset Amador DO;  Location: [facility]     BIOPSY BREAST SEED LOCALIZATION  1/22/2014    Procedure: BIOPSY BREAST SEED LOCALIZATION;  Left Breast Seed Localized Excisional Biopsy ;  Surgeon: Lisset Amador DO;  Location: [facility] OR     COLONOSCOPY  2005    nl - repeat 2015     FOOT SURGERY Bilateral 2013     HEMORRHOIDECTOMY BANDING      multiple times     HEMORRHOIDECTOMY INTERNAL N/A 7/24/2018    Procedure: HEMORRHOIDECTOMY INTERNAL;  three quadrant hemorrhoidectomy;  Surgeon: Lisa Patrick MD;  Location: [facility]     HYSTERECTOMY  7/1996    fibroids     REMOVE HARDWARE FOOT Left 2015     REPAIR TENDON FOOT Left 12/19/2016    Procedure: REPAIR TENDON FOOT;  Surgeon: Mary Guan MD;  Location: [facility]      Social History     Tobacco Use     Smoking status: Never Smoker     Smokeless tobacco: Never Used   Substance Use Topics     Alcohol use: No     Alcohol/week: 0.0 standard drinks      Problem (# of Occurrences) Relation (Name,Age of Onset)    Alcohol/Drug (1) Father    Breast Cancer (1) Mother    Cancer (1) Father    Cancer - colorectal (1) Maternal Grandfather    Cerebrovascular Disease (1) Maternal Grandmother    Diabetes (2) Mother, Maternal Grandmother            Current Outpatient Medications   Medication Sig     albuterol (PROAIR HFA/PROVENTIL HFA/VENTOLIN HFA) 108 (90 Base) MCG/ACT inhaler Inhale 2 puffs into the lungs every 6 hours as needed for shortness of breath / dyspnea     cetirizine  "(ZYRTEC) 10 MG tablet TAKE 1 TABLET(10 MG) BY MOUTH EVERY EVENING     estradiol (ESTRACE) 0.1 MG/GM vaginal cream Place 1 g vaginally At Bedtime For 30 nights, then three times per week thereafter. Use finger for application.     fluticasone (FLONASE) 50 MCG/ACT nasal spray USE 1 SPRAY IN EACH NOSTRIL EVERY DAY     hydrOXYzine (ATARAX) 25 MG tablet Take 1 tablet (25 mg) by mouth 3 times daily as needed for anxiety     lisinopril (PRINIVIL/ZESTRIL) 5 MG tablet Take 1 tablet (5 mg) by mouth daily     meloxicam (MOBIC) 15 MG tablet TAKE 1 TABLET EVERY DAY AS NEEDED FOR PAIN     metFORMIN (GLUCOPHAGE) 500 MG tablet TAKE 1 TABLET EVERY DAY WITH BREAKFAST     methenamine (HIPREX) 1 g tablet Take 1 tablet (1 g) by mouth 2 times daily as needed     montelukast (SINGULAIR) 10 MG tablet TAKE 1 TABLET EVERY DAY AT BEDTIME     ranitidine (ZANTAC) 150 MG tablet TAKE 1 TABLET TWICE DAILY      simvastatin (ZOCOR) 40 MG tablet Take 1 tablet (40 mg) by mouth At Bedtime     No current facility-administered medications for this visit.      Allergies   Allergen Reactions     Codeine Nausea     Morphine Itching     Nitrofuran Derivatives Hives     Phenothiazines      sedation     Primatene Mist [Epinephrine Bitartrate] Itching     neck itch with primatene mist     Vicodin [Hydrocodone-Acetaminophen] Nausea     Latex Itching and Rash       ROS:  12 point review of systems negative other than symptoms noted below or in the HPI.  Genitourinary: Painful Urination and Vaginal Discharge  POSITIVE for:, decreased urinary stream, vaginal discharge, vaginal itching or burning      OBJECTIVE:     Ht 1.702 m (5' 7\")   LMP 01/01/1985   BMI 26.31 kg/m    Body mass index is 26.31 kg/m .    Exam:  Constitutional:  Appearance: Well nourished, well developed alert, in no acute distress  Psychiatric:  Mentation appears normal and affect normal/bright.  Pelvic Exam:  External Genitalia:     Normal appearance for age, no discharge present, no tenderness " present, no inflammatory lesions present, color normal  Vagina:     Normal vaginal vault without central or paravaginal defects, no discharge present, no inflammatory lesions present, no masses present  Bladder:     Nontender to palpation  Urethra:   Urethral Body:  Urethra palpation normal, urethra structural support normal   Urethral Meatus:  No erythema or lesions present  Cervix:     Surgically absent  Uterus:     Surgically absent  Adnexa:     No adnexal tenderness present, no adnexal masses present  Perineum:     Perineum within normal limits, no evidence of trauma, no rashes or skin lesions present  Anus:     Anus within normal limits, no hemorrhoids present  Inguinal Lymph Nodes:     No lymphadenopathy present  Pubic Hair:     Normal pubic hair distribution for age  Genitalia and Groin:     No rashes present, no lesions present, no areas of discoloration, no masses present       In-Clinic Test Results:  Results for orders placed or performed in visit on 09/17/20 (from the past 24 hour(s))   UA without Microscopic   Result Value Ref Range    Color Urine Yellow     Appearance Urine Clear     Glucose Urine Negative NEG^Negative mg/dL    Bilirubin Urine Negative NEG^Negative    Ketones Urine Negative NEG^Negative mg/dL    Specific Gravity Urine 1.020 1.003 - 1.035    Blood Urine Trace (A) NEG^Negative    pH Urine 5.5 5.0 - 7.0 pH    Protein Albumin Urine Negative NEG^Negative mg/dL    Urobilinogen Urine 0.2 0.2 - 1.0 EU/dL    Nitrite Urine Negative NEG^Negative    Leukocyte Esterase Urine Negative NEG^Negative    Source Midstream Urine    Wet prep    Specimen: Vagina   Result Value Ref Range    Specimen Description Vagina     Wet Prep No Trichomonas seen     Wet Prep No yeast seen     Wet Prep No clue cells seen     Wet Prep No WBC's seen        ASSESSMENT/PLAN:                                                        ICD-10-CM    1. Dysuria  R30.0 UA without Microscopic     Urine Culture Aerobic Bacterial   2.  Vaginal discharge  N89.8 Group B strep PCR     Gram stain     Yeast culture     Wet prep   3. Screening for cervical cancer  Z12.4 HPV High Risk Types DNA Cervical     Pap imaged thin layer screen with HPV - recommended age 30 - 65 years (select HPV order below)   4. Frequent UTI  N39.0 estradiol (ESTRACE) 0.1 MG/GM vaginal cream     UA has some blood, UC sent. Will send vag cultures.   Wet prep: negative  Pap updated. Needs to start Vag E2 cream. RTC in 3 months.    RALPH Cooper CNP  [facility]

## 2020-09-17 ENCOUNTER — OFFICE VISIT (OUTPATIENT)
Dept: OBGYN | Facility: CLINIC | Age: 65
End: 2020-09-17
Payer: COMMERCIAL

## 2020-09-17 VITALS — HEIGHT: 67 IN | BODY MASS INDEX: 26.31 KG/M2

## 2020-09-17 DIAGNOSIS — E11.9 TYPE 2 DIABETES MELLITUS WITHOUT COMPLICATION, WITHOUT LONG-TERM CURRENT USE OF INSULIN (H): ICD-10-CM

## 2020-09-17 DIAGNOSIS — N39.0 FREQUENT UTI: ICD-10-CM

## 2020-09-17 DIAGNOSIS — B37.31 YEAST VAGINITIS: ICD-10-CM

## 2020-09-17 DIAGNOSIS — N89.8 VAGINAL DISCHARGE: ICD-10-CM

## 2020-09-17 DIAGNOSIS — E78.5 HYPERLIPIDEMIA LDL GOAL <100: ICD-10-CM

## 2020-09-17 DIAGNOSIS — F51.01 PRIMARY INSOMNIA: Primary | ICD-10-CM

## 2020-09-17 DIAGNOSIS — Z12.4 SCREENING FOR CERVICAL CANCER: ICD-10-CM

## 2020-09-17 DIAGNOSIS — R30.0 DYSURIA: Primary | ICD-10-CM

## 2020-09-17 LAB
ALBUMIN UR-MCNC: NEGATIVE MG/DL
APPEARANCE UR: CLEAR
BILIRUB UR QL STRIP: NEGATIVE
COLOR UR AUTO: YELLOW
GLUCOSE UR STRIP-MCNC: NEGATIVE MG/DL
HGB UR QL STRIP: ABNORMAL
KETONES UR STRIP-MCNC: NEGATIVE MG/DL
LEUKOCYTE ESTERASE UR QL STRIP: NEGATIVE
NITRATE UR QL: NEGATIVE
PH UR STRIP: 5.5 PH (ref 5–7)
SOURCE: ABNORMAL
SP GR UR STRIP: 1.02 (ref 1–1.03)
SPECIMEN SOURCE: NORMAL
UROBILINOGEN UR STRIP-ACNC: 0.2 EU/DL (ref 0.2–1)
WET PREP SPEC: NORMAL

## 2020-09-17 PROCEDURE — 87205 SMEAR GRAM STAIN: CPT | Performed by: NURSE PRACTITIONER

## 2020-09-17 PROCEDURE — 81003 URINALYSIS AUTO W/O SCOPE: CPT | Performed by: NURSE PRACTITIONER

## 2020-09-17 PROCEDURE — 87106 FUNGI IDENTIFICATION YEAST: CPT | Performed by: NURSE PRACTITIONER

## 2020-09-17 PROCEDURE — G0145 SCR C/V CYTO,THINLAYER,RESCR: HCPCS | Performed by: NURSE PRACTITIONER

## 2020-09-17 PROCEDURE — 87624 HPV HI-RISK TYP POOLED RSLT: CPT | Performed by: NURSE PRACTITIONER

## 2020-09-17 PROCEDURE — 87653 STREP B DNA AMP PROBE: CPT | Performed by: NURSE PRACTITIONER

## 2020-09-17 PROCEDURE — 87186 SC STD MICRODIL/AGAR DIL: CPT | Performed by: NURSE PRACTITIONER

## 2020-09-17 PROCEDURE — 87102 FUNGUS ISOLATION CULTURE: CPT | Performed by: NURSE PRACTITIONER

## 2020-09-17 PROCEDURE — G0476 HPV COMBO ASSAY CA SCREEN: HCPCS | Performed by: NURSE PRACTITIONER

## 2020-09-17 PROCEDURE — 99213 OFFICE O/P EST LOW 20 MIN: CPT | Performed by: NURSE PRACTITIONER

## 2020-09-17 PROCEDURE — 87210 SMEAR WET MOUNT SALINE/INK: CPT | Performed by: NURSE PRACTITIONER

## 2020-09-17 PROCEDURE — 87086 URINE CULTURE/COLONY COUNT: CPT | Performed by: NURSE PRACTITIONER

## 2020-09-17 RX ORDER — ESTRADIOL 0.1 MG/G
1 CREAM VAGINAL AT BEDTIME
Qty: 42.5 G | Refills: 3 | Status: SHIPPED | OUTPATIENT
Start: 2020-09-17 | End: 2020-09-18

## 2020-09-17 ASSESSMENT — PATIENT HEALTH QUESTIONNAIRE - PHQ9: SUM OF ALL RESPONSES TO PHQ QUESTIONS 1-9: 20

## 2020-09-17 NOTE — LETTER
September 30, 2020      Grecia Kilgore  [address]   [place] [address]        Dear ton,    We are happy to inform you that your recent Pap smear and Human Papillomavirus (HPV) test results are normal and negative.    It is recommended that you have your next Pap smear in 2 years. You will also need to return to the clinic every year for an annual wellness visit.    If you have additional questions regarding this result, please contact our office and we will be happy to assist you.      Sincerely,    Your [facility] Care Team

## 2020-09-18 ENCOUNTER — OFFICE VISIT (OUTPATIENT)
Dept: FAMILY MEDICINE | Facility: CLINIC | Age: 65
End: 2020-09-18
Payer: COMMERCIAL

## 2020-09-18 VITALS
BODY MASS INDEX: 26.16 KG/M2 | TEMPERATURE: 97.2 F | WEIGHT: 167 LBS | HEART RATE: 55 BPM | SYSTOLIC BLOOD PRESSURE: 120 MMHG | DIASTOLIC BLOOD PRESSURE: 80 MMHG

## 2020-09-18 DIAGNOSIS — F41.9 ANXIETY: ICD-10-CM

## 2020-09-18 DIAGNOSIS — Z00.00 ANNUAL PHYSICAL EXAM: Primary | ICD-10-CM

## 2020-09-18 DIAGNOSIS — E78.5 HYPERLIPIDEMIA LDL GOAL <100: ICD-10-CM

## 2020-09-18 DIAGNOSIS — E11.9 TYPE 2 DIABETES MELLITUS WITHOUT COMPLICATION, WITHOUT LONG-TERM CURRENT USE OF INSULIN (H): ICD-10-CM

## 2020-09-18 DIAGNOSIS — L50.9 HIVES: ICD-10-CM

## 2020-09-18 DIAGNOSIS — J30.9 CHRONIC ALLERGIC RHINITIS: ICD-10-CM

## 2020-09-18 DIAGNOSIS — J45.20 MILD INTERMITTENT ASTHMA WITHOUT COMPLICATION: ICD-10-CM

## 2020-09-18 LAB
BACTERIA SPEC CULT: NORMAL
GRAM STN SPEC: ABNORMAL
HBA1C MFR BLD: 6.1 % (ref 0–5.6)
Lab: ABNORMAL
Lab: NORMAL
SPECIMEN SOURCE: ABNORMAL
SPECIMEN SOURCE: NORMAL

## 2020-09-18 PROCEDURE — 99396 PREV VISIT EST AGE 40-64: CPT | Performed by: FAMILY MEDICINE

## 2020-09-18 PROCEDURE — 80053 COMPREHEN METABOLIC PANEL: CPT | Performed by: FAMILY MEDICINE

## 2020-09-18 PROCEDURE — 82043 UR ALBUMIN QUANTITATIVE: CPT | Performed by: FAMILY MEDICINE

## 2020-09-18 PROCEDURE — 99214 OFFICE O/P EST MOD 30 MIN: CPT | Mod: 25 | Performed by: FAMILY MEDICINE

## 2020-09-18 PROCEDURE — 80061 LIPID PANEL: CPT | Performed by: FAMILY MEDICINE

## 2020-09-18 PROCEDURE — 84443 ASSAY THYROID STIM HORMONE: CPT | Performed by: FAMILY MEDICINE

## 2020-09-18 PROCEDURE — 36415 COLL VENOUS BLD VENIPUNCTURE: CPT | Performed by: FAMILY MEDICINE

## 2020-09-18 PROCEDURE — 83036 HEMOGLOBIN GLYCOSYLATED A1C: CPT | Performed by: FAMILY MEDICINE

## 2020-09-18 RX ORDER — HYDROXYZINE HYDROCHLORIDE 25 MG/1
25 TABLET, FILM COATED ORAL
Qty: 90 TABLET | Refills: 1 | Status: ON HOLD | OUTPATIENT
Start: 2020-09-18 | End: 2021-05-02

## 2020-09-18 RX ORDER — FLUTICASONE PROPIONATE 50 MCG
SPRAY, SUSPENSION (ML) NASAL
Qty: 32 G | Refills: 5 | Status: ON HOLD | OUTPATIENT
Start: 2020-09-18 | End: 2021-05-02

## 2020-09-18 RX ORDER — MONTELUKAST SODIUM 10 MG/1
TABLET ORAL
Qty: 90 TABLET | Refills: 3 | Status: ON HOLD | OUTPATIENT
Start: 2020-09-18 | End: 2021-05-02

## 2020-09-18 RX ORDER — CETIRIZINE HYDROCHLORIDE 10 MG/1
TABLET ORAL
Qty: 90 TABLET | Refills: 3 | Status: SHIPPED | OUTPATIENT
Start: 2020-09-18 | End: 2022-09-21

## 2020-09-18 RX ORDER — SIMVASTATIN 40 MG
40 TABLET ORAL AT BEDTIME
Qty: 90 TABLET | Refills: 3 | Status: SHIPPED | OUTPATIENT
Start: 2020-09-18 | End: 2021-08-03

## 2020-09-18 RX ORDER — LISINOPRIL 5 MG/1
5 TABLET ORAL DAILY
Qty: 90 TABLET | Refills: 3 | Status: SHIPPED | OUTPATIENT
Start: 2020-09-18 | End: 2021-08-04

## 2020-09-18 RX ORDER — ALBUTEROL SULFATE 90 UG/1
2 AEROSOL, METERED RESPIRATORY (INHALATION) EVERY 6 HOURS PRN
Qty: 1 INHALER | Refills: 6 | Status: SHIPPED | OUTPATIENT
Start: 2020-09-18 | End: 2022-09-21

## 2020-09-18 ASSESSMENT — ANXIETY QUESTIONNAIRES
3. WORRYING TOO MUCH ABOUT DIFFERENT THINGS: NEARLY EVERY DAY
GAD7 TOTAL SCORE: 18
7. FEELING AFRAID AS IF SOMETHING AWFUL MIGHT HAPPEN: SEVERAL DAYS
6. BECOMING EASILY ANNOYED OR IRRITABLE: NEARLY EVERY DAY
1. FEELING NERVOUS, ANXIOUS, OR ON EDGE: MORE THAN HALF THE DAYS
IF YOU CHECKED OFF ANY PROBLEMS ON THIS QUESTIONNAIRE, HOW DIFFICULT HAVE THESE PROBLEMS MADE IT FOR YOU TO DO YOUR WORK, TAKE CARE OF THINGS AT HOME, OR GET ALONG WITH OTHER PEOPLE: VERY DIFFICULT
2. NOT BEING ABLE TO STOP OR CONTROL WORRYING: NEARLY EVERY DAY
5. BEING SO RESTLESS THAT IT IS HARD TO SIT STILL: NEARLY EVERY DAY

## 2020-09-18 ASSESSMENT — PATIENT HEALTH QUESTIONNAIRE - PHQ9
5. POOR APPETITE OR OVEREATING: NEARLY EVERY DAY
SUM OF ALL RESPONSES TO PHQ QUESTIONS 1-9: 17

## 2020-09-18 ASSESSMENT — ACTIVITIES OF DAILY LIVING (ADL): CURRENT_FUNCTION: NO ASSISTANCE NEEDED

## 2020-09-18 NOTE — LETTER
September 22, 2020      Grecia Kligore  [address]   [place] [address]        Dear ,    I have reviewed your recent labs. Here are the results:    -HDL(good) cholesterol level is low and your triglycerides are elevated which can increase your heart disease risk.  A diet high in fat and simple carbohydrates, genetics and being overweight can contribute to this. LDL(bad) cholesterol level is normal.      ADVISE: Continue current dose of cholesterol-lowering medication that you are taking.  Exercising 150 minutes of aerobic exercise per week (30 minutes 5 days per week or 50 minutes 3 days per week are options), and omega-3 fatty acids (fish oil) 7125-0825 mg daily are helpful to improve this.  In 6 months, you should recheck your fasting cholesterol panel by scheduling an appointment.    -Liver and gallbladder tests are normal (ALT,AST, Alk phos, bilirubin), kidney function is normal (Cr, GFR), sodium is normal, potassium is normal, calcium is normal    -A1C (test of diabetes control the last 2-3 months) is at your goal. Please continue with your current plan. Also, you should make an appointment to see me and recheck your A1C test in 6 months.       -TSH (thyroid stimulating hormone) level is normal which indicates normal thyroid function.  -Microalbumin (urine protein) test is normal.  ADVISE: rechecking this annually.          If you have any questions or concerns, please call the clinic at the number listed above.       Sincerely,      Romel Mccall MD

## 2020-09-18 NOTE — PROGRESS NOTES
"SUBJECTIVE:   Grecia Kilgore is a 65 year old female who presents for Preventive Visit.      Patient has been advised of split billing requirements and indicates understanding: Yes   Are you in the first 12 months of your Medicare coverage?  No    Healthy Habits:    In general, how would you rate your overall health?  Good    Frequency of exercise:  2-3 days/week    Duration of exercise:  Less than 15 minutes    Do you usually eat at least 4 servings of fruit and vegetables a day, include whole grains    & fiber and avoid regularly eating high fat or \"junk\" foods?  Yes    Taking medications regularly:  Yes    Barriers to taking medications:  Other    Medication side effects:  Other    Ability to successfully perform activities of daily living:  No assistance needed    Home Safety:  No safety concerns identified    Hearing Impairment:  No hearing concerns    In the past 6 months, have you been bothered by leaking of urine? Yes    In general, how would you rate your overall mental or emotional health?  Fair      PHQ-2 Total Score:    Additional concerns today:  Yes       Do you feel safe in your environment? YES    Have you ever done Advance Care Planning? (For example, a Health Directive, POLST, or a discussion with a medical provider or your loved ones about your wishes): No, advance care planning information given to patient to review.  Patient plans to discuss their wishes with loved ones or provider.        Fall risk       Cognitive Screening   1) Repeat 3 items (Leader, Season, Table)    2) Clock draw: NORMAL  3) 3 item recall: Recalls 3 objects  Results: 3 items recalled: COGNITIVE IMPAIRMENT LESS LIKELY    Mini-CogTM Copyright PATTIE Godinez. Licensed by the author for use in [facility]; reprinted with permission (yazan@.[email or web address]). All rights reserved.      Do you have sleep apnea, excessive snoring or daytime drowsiness?: no    Reviewed and updated as needed this visit by clinical staff  Tobacco  " Allergies  Meds         Reviewed and updated as needed this visit by Provider  Allergies        Social History     Tobacco Use     Smoking status: Never Smoker     Smokeless tobacco: Never Used   Substance Use Topics     Alcohol use: No     Alcohol/week: 0.0 standard drinks     If you drink alcohol do you typically have >3 drinks per day or >7 drinks per week? No    Alcohol Use 9/18/2020   Prescreen: >3 drinks/day or >7 drinks/week? No           Diabetes Follow-up      How often are you checking your blood sugar? Not at all    What concerns do you have today about your diabetes? None     Do you have any of these symptoms? (Select all that apply)  No numbness or tingling in feet.  No redness, sores or blisters on feet.  No complaints of excessive thirst.  No reports of blurry vision.  No significant changes to weight.      BP Readings from Last 2 Encounters:   09/18/20 120/80   08/07/20 132/72     Hemoglobin A1C (%)   Date Value   09/18/2020 6.1 (H)   09/11/2019 5.7 (H)     LDL Cholesterol Calculated (mg/dL)   Date Value   09/18/2020 71   09/11/2019 93         Hyperlipidemia Follow-Up      Are you regularly taking any medication or supplement to lower your cholesterol?   Yes- statin    Are you having muscle aches or other side effects that you think could be caused by your cholesterol lowering medication?  No    Anxiety Follow-Up    How are you doing with your anxiety since your last visit? Worsened     Are you having other symptoms that might be associated with anxiety? No    Have you had a significant life event? No     Are you feeling depressed? Yes:  .    Do you have any concerns with your use of alcohol or other drugs? No    Social History     Tobacco Use     Smoking status: Never Smoker     Smokeless tobacco: Never Used   Substance Use Topics     Alcohol use: No     Alcohol/week: 0.0 standard drinks     Drug use: No     WILL-7 SCORE 2/19/2019 9/11/2019 9/18/2020   Total Score - - -   Total Score 19 12 18      PHQ 9/11/2019 9/17/2020 9/18/2020   PHQ-9 Total Score 14 20 17   Q9: Thoughts of better off dead/self-harm past 2 weeks Not at all Not at all Not at all         Asthma and environmental allergies Follow-Up    Was ACT completed today?    Yes    ACT Total Scores 9/18/2020   ACT TOTAL SCORE -   ASTHMA ER VISITS -   ASTHMA HOSPITALIZATIONS -   ACT TOTAL SCORE (Goal Greater than or Equal to 20) 25   In the past 12 months, how many times did you visit the emergency room for your asthma without being admitted to the hospital? 0   In the past 12 months, how many times were you hospitalized overnight because of your asthma? 0         How many days per week do you miss taking your asthma controller medication?  I do not have an asthma controller medication    Please describe any recent triggers for your asthma: Environmental allergies    Have you had any Emergency Room Visits, Urgent Care Visits, or Hospital Admissions since your last office visit?  No      Current providers sharing in care for this patient include:   Patient Care Team:  Romel Mccall MD as PCP - General (Family Practice)  Romel Mccall MD as Assigned PCP    The following health maintenance items are reviewed in Epic and correct as of today:  Health Maintenance   Topic Date Due     MIGRAINE ACTION PLAN  1955     ZOSTER IMMUNIZATION (1 of 2) 01/09/2007     DIABETIC FOOT EXAM  11/15/2018     ASTHMA ACTION PLAN  04/26/2020     DTAP/TDAP/TD IMMUNIZATION (3 - Td) 08/27/2020     INFLUENZA VACCINE (1) 09/01/2020     EYE EXAM  12/30/2020     A1C  03/18/2021     WILL ASSESSMENT  03/18/2021     ASTHMA CONTROL TEST  03/18/2021     PHQ-9  03/18/2021     MEDICARE ANNUAL WELLNESS VISIT  09/18/2021     BMP  09/18/2021     LIPID  09/18/2021     MICROALBUMIN  09/18/2021     MAMMO SCREENING  12/24/2021     HPV TEST  09/17/2025     PAP  09/17/2025     ADVANCE CARE PLANNING  09/18/2025     COLORECTAL CANCER SCREENING  07/01/2026     HEPATITIS C SCREENING  Completed      HIV SCREENING  Completed     DEPRESSION ACTION PLAN  Completed     PNEUMOCOCCAL IMMUNIZATION 19-64 MEDIUM RISK  Completed     HEPATITIS B IMMUNIZATION  Completed     IPV IMMUNIZATION  Aged Out     MENINGITIS IMMUNIZATION  Aged Out     BP Readings from Last 3 Encounters:   09/18/20 120/80   08/07/20 132/72   07/28/20 128/72    Wt Readings from Last 3 Encounters:   09/18/20 75.8 kg (167 lb)   07/28/20 76.2 kg (168 lb)   07/16/20 76.7 kg (169 lb)                  Patient Active Problem List   Diagnosis     Allergic rhinitis     External hemorrhoids     Vitamin D deficiency     Fibromyalgia     Osteoarthritis, knee     Positive PPD     Panic disorder     Genital herpes     Advanced directives, counseling/discussion     DCIS (ductal carcinoma in situ) of breast     Heart palpitations     S/P hysterectomy - fibroids     Anxiety     Major depressive disorder, single episode, moderate (H)     Hyperlipidemia LDL goal <100     Type 2 diabetes mellitus without complication, without long-term current use of insulin (H)     Mild intermittent asthma without complication     BOO (obstructive sleep apnea)     CKD (chronic kidney disease) stage 3, GFR 30-59 ml/min (H)     Past Surgical History:   Procedure Laterality Date     ARTHRODESIS TOE(S)  9/16/2013    Procedure: ARTHRODESIS TOE(S);  BILATERAL GREAT TOE FUSION (C-ARM);  Surgeon: Mary Guan MD;  Location: [facility]     ARTHRODESIS TOE(S) Left 12/19/2016    Procedure: ARTHRODESIS TOE(S);  Surgeon: Mary Guan MD;  Location: [facility]     ARTHROSCOPY KNEE Left 2/2/11     BIOPSY BREAST  2/7/2014    Procedure: BIOPSY BREAST;  Re-excision Left Breast Cavity for Margins ;  Surgeon: Lisset Amador DO;  Location: [facility] OR     BIOPSY BREAST SEED LOCALIZATION  1/22/2014    Procedure: BIOPSY BREAST SEED LOCALIZATION;  Left Breast Seed Localized Excisional Biopsy ;  Surgeon: Lisset Amador DO;  Location:  OR     COLONOSCOPY  2005    nl - repeat 2015     FOOT SURGERY  Bilateral 2013     HEMORRHOIDECTOMY BANDING      multiple times     HEMORRHOIDECTOMY INTERNAL N/A 7/24/2018    Procedure: HEMORRHOIDECTOMY INTERNAL;  three quadrant hemorrhoidectomy;  Surgeon: Lisa Patrick MD;  Location: [facility] OR     HYSTERECTOMY  7/1996    fibroids     REMOVE HARDWARE FOOT Left 2015     REPAIR TENDON FOOT Left 12/19/2016    Procedure: REPAIR TENDON FOOT;  Surgeon: Mary Guan MD;  Location: [facility]       Social History     Tobacco Use     Smoking status: Never Smoker     Smokeless tobacco: Never Used   Substance Use Topics     Alcohol use: No     Alcohol/week: 0.0 standard drinks     Family History   Problem Relation Age of Onset     Diabetes Mother      Breast Cancer Mother      Alcohol/Drug Father      Cancer Father      Diabetes Maternal Grandmother      Cerebrovascular Disease Maternal Grandmother      Cancer - colorectal Maternal Grandfather          Current Outpatient Medications   Medication Sig Dispense Refill     albuterol (PROAIR HFA/PROVENTIL HFA/VENTOLIN HFA) 108 (90 Base) MCG/ACT inhaler Inhale 2 puffs into the lungs every 6 hours as needed for shortness of breath / dyspnea 1 Inhaler 6     cetirizine (ZYRTEC) 10 MG tablet TAKE 1 TABLET(10 MG) BY MOUTH EVERY EVENING 90 tablet 3     fluticasone (FLONASE) 50 MCG/ACT nasal spray USE 1 SPRAY IN EACH NOSTRIL EVERY DAY 32 g 5     hydrOXYzine (ATARAX) 25 MG tablet Take 1 tablet (25 mg) by mouth nightly as needed for anxiety 90 tablet 1     lisinopril (ZESTRIL) 5 MG tablet Take 1 tablet (5 mg) by mouth daily 90 tablet 3     metFORMIN (GLUCOPHAGE) 500 MG tablet TAKE 1 TABLET EVERY DAY WITH BREAKFAST 90 tablet 3     montelukast (SINGULAIR) 10 MG tablet TAKE 1 TABLET EVERY DAY AT BEDTIME 90 tablet 3     simvastatin (ZOCOR) 40 MG tablet Take 1 tablet (40 mg) by mouth At Bedtime 90 tablet 3     ciprofloxacin (CIPRO) 500 MG tablet Take 1 tablet (500 mg) by mouth 2 times daily 10 tablet 0     estradiol (ESTRACE) 0.1 MG/GM vaginal cream  "Place 1 g vaginally At Bedtime For 30 nights, then three times per week thereafter. Use finger for application. 42.5 g 3     mirtazapine (REMERON) 15 MG tablet TAKE 1 TABLET EVERY NIGHT AS NEEDED FOR SLEEP 90 tablet 0     Allergies   Allergen Reactions     Codeine Nausea     Morphine Itching     Nitrofuran Derivatives Hives     Phenothiazines      sedation     Primatene Mist [Epinephrine Bitartrate] Itching     neck itch with primatene mist     Vicodin [Hydrocodone-Acetaminophen] Nausea     Latex Itching and Rash         Review of Systems  CONSTITUTIONAL: NEGATIVE for fever, chills, change in weight  INTEGUMENTARY/SKIN: NEGATIVE for worrisome rashes, moles or lesions  EYES: NEGATIVE for vision changes or irritation  ENT/MOUTH: NEGATIVE for ear, mouth and throat problems  RESP: NEGATIVE for significant cough or SOB  BREAST: NEGATIVE for masses, tenderness or discharge  CV: NEGATIVE for chest pain, palpitations or peripheral edema  GI: NEGATIVE for nausea, abdominal pain, heartburn, or change in bowel habits  : NEGATIVE for frequency, dysuria, or hematuria  MUSCULOSKELETAL: NEGATIVE for significant arthralgias or myalgia  NEURO: NEGATIVE for weakness, dizziness or paresthesias  ENDOCRINE: NEGATIVE for temperature intolerance, skin/hair changes  HEME: NEGATIVE for bleeding problems  PSYCHIATRIC: NEGATIVE for changes in mood or affect    OBJECTIVE:   /80 (BP Location: Right arm, Patient Position: Chair, Cuff Size: Adult Regular)   Pulse 55   Temp 97.2  F (36.2  C) (Tympanic)   Wt 75.8 kg (167 lb)   LMP 01/01/1985   BMI 26.16 kg/m   Estimated body mass index is 26.16 kg/m  as calculated from the following:    Height as of 9/17/20: 1.702 m (5' 7\").    Weight as of this encounter: 75.8 kg (167 lb).  Physical Exam  GENERAL APPEARANCE: healthy, alert and no distress  EYES: Eyes grossly normal to inspection, PERRL and conjunctivae and sclerae normal  HENT: ear canals and TM's normal, nose and mouth without ulcers " or lesions, oropharynx clear and oral mucous membranes moist  NECK: no adenopathy, no asymmetry, masses, or scars and thyroid normal to palpation  RESP: lungs clear to auscultation - no rales, rhonchi or wheezes  BREAST: normal without masses, tenderness or nipple discharge and no palpable axillary masses or adenopathy  CV: regular rate and rhythm, normal S1 S2, no S3 or S4, no murmur, click or rub, no peripheral edema and peripheral pulses strong  ABDOMEN: soft, nontender, no hepatosplenomegaly, no masses and bowel sounds normal  MS: no musculoskeletal defects are noted and gait is age appropriate without ataxia  SKIN: no suspicious lesions or rashes  NEURO: Normal strength and tone, sensory exam grossly normal, mentation intact and speech normal  PSYCH: mentation appears normal and affect normal/bright      Results for orders placed or performed in visit on 09/18/20   HEMOGLOBIN A1C     Status: Abnormal   Result Value Ref Range    Hemoglobin A1C 6.1 (H) 0 - 5.6 %   Lipid panel reflex to direct LDL Fasting     Status: Abnormal   Result Value Ref Range    Cholesterol 163 <200 mg/dL    Triglycerides 227 (H) <150 mg/dL    HDL Cholesterol 47 (L) >49 mg/dL    LDL Cholesterol Calculated 71 <100 mg/dL    Non HDL Cholesterol 116 <130 mg/dL   Albumin Random Urine Quantitative with Creat Ratio     Status: None   Result Value Ref Range    Creatinine Urine 312 mg/dL    Albumin Urine mg/L 11 mg/L    Albumin Urine mg/g Cr 3.62 0 - 25 mg/g Cr   TSH with free T4 reflex     Status: None   Result Value Ref Range    TSH 0.48 0.40 - 4.00 mU/L   Comprehensive metabolic panel     Status: Abnormal   Result Value Ref Range    Sodium 141 133 - 144 mmol/L    Potassium 3.9 3.4 - 5.3 mmol/L    Chloride 111 (H) 94 - 109 mmol/L    Carbon Dioxide 24 20 - 32 mmol/L    Anion Gap 6 3 - 14 mmol/L    Glucose 121 (H) 70 - 99 mg/dL    Urea Nitrogen 10 7 - 30 mg/dL    Creatinine 0.95 0.52 - 1.04 mg/dL    GFR Estimate 63 >60 mL/min/[1.73_m2]    GFR  Estimate If Black 73 >60 mL/min/[1.73_m2]    Calcium 9.1 8.5 - 10.1 mg/dL    Bilirubin Total 0.6 0.2 - 1.3 mg/dL    Albumin 3.7 3.4 - 5.0 g/dL    Protein Total 7.4 6.8 - 8.8 g/dL    Alkaline Phosphatase 89 40 - 150 U/L    ALT 39 0 - 50 U/L    AST 22 0 - 45 U/L       ASSESSMENT / PLAN:   1. Annual physical exam      2. Mild intermittent asthma without complication  Well controlled.  - albuterol (PROAIR HFA/PROVENTIL HFA/VENTOLIN HFA) 108 (90 Base) MCG/ACT inhaler; Inhale 2 puffs into the lungs every 6 hours as needed for shortness of breath / dyspnea  Dispense: 1 Inhaler; Refill: 6  - montelukast (SINGULAIR) 10 MG tablet; TAKE 1 TABLET EVERY DAY AT BEDTIME  Dispense: 90 tablet; Refill: 3    3. Hives  Patient continues to take Singulair and Zyrtec.  Symptoms well managed.  - cetirizine (ZYRTEC) 10 MG tablet; TAKE 1 TABLET(10 MG) BY MOUTH EVERY EVENING  Dispense: 90 tablet; Refill: 3    4. Chronic allergic rhinitis  Resume Flonase, Zyrtec and Singulair.  Symptoms well managed.  - fluticasone (FLONASE) 50 MCG/ACT nasal spray; USE 1 SPRAY IN EACH NOSTRIL EVERY DAY  Dispense: 32 g; Refill: 5    5. Type 2 diabetes mellitus without complication, without long-term current use of insulin (H)  Well-controlled.  Resume current medications.  - HEMOGLOBIN A1C  - Albumin Random Urine Quantitative with Creat Ratio  - lisinopril (ZESTRIL) 5 MG tablet; Take 1 tablet (5 mg) by mouth daily  Dispense: 90 tablet; Refill: 3  - metFORMIN (GLUCOPHAGE) 500 MG tablet; TAKE 1 TABLET EVERY DAY WITH BREAKFAST  Dispense: 90 tablet; Refill: 3  - TSH with free T4 reflex  - Comprehensive metabolic panel    6. Hyperlipidemia LDL goal <100  LDL is within goal.  Patient has hyper triglyceridemia.  Instructed to modify diet, exercise regularly to lose weight.  Recheck in 6 months  - Lipid panel reflex to direct LDL Fasting  - simvastatin (ZOCOR) 40 MG tablet; Take 1 tablet (40 mg) by mouth At Bedtime  Dispense: 90 tablet; Refill: 3    7.  "Anxiety  Symptoms are not well controlled.  Recommending to start using hydroxyzine for anxiety as needed.  Patient was previously given the medication but she never picked it up.  - hydrOXYzine (ATARAX) 25 MG tablet; Take 1 tablet (25 mg) by mouth nightly as needed for anxiety  Dispense: 90 tablet; Refill: 1    Patient has been advised of split billing requirements and indicates understanding:   COUNSELING:  Reviewed preventive health counseling, as reflected in patient instructions       Regular exercise       Healthy diet/nutrition    Estimated body mass index is 26.16 kg/m  as calculated from the following:    Height as of 9/17/20: 1.702 m (5' 7\").    Weight as of this encounter: 75.8 kg (167 lb).        She reports that she has never smoked. She has never used smokeless tobacco.      Appropriate preventive services were discussed with this patient, including applicable screening as appropriate for cardiovascular disease, diabetes, osteopenia/osteoporosis, and glaucoma.  As appropriate for age/gender, discussed screening for colorectal cancer, prostate cancer, breast cancer, and cervical cancer. Checklist reviewing preventive services available has been given to the patient.    Reviewed patients plan of care and provided an AVS. The Intermediate Care Plan ( asthma action plan, low back pain action plan, and migraine action plan) for Grecia meets the Care Plan requirement. This Care Plan has been established and reviewed with the Patient.    Counseling Resources:  ATP IV Guidelines  Pooled Cohorts Equation Calculator  Breast Cancer Risk Calculator  Breast Cancer: Medication to Reduce Risk  FRAX Risk Assessment  ICSI Preventive Guidelines  Dietary Guidelines for Americans, 2010  [employer]'s MyPlate  ASA Prophylaxis  Lung CA Screening    Romel Mccall MD  [facility]    Identified Health Risks:  "

## 2020-09-19 LAB
ALBUMIN SERPL-MCNC: 3.7 G/DL (ref 3.4–5)
ALP SERPL-CCNC: 89 U/L (ref 40–150)
ALT SERPL W P-5'-P-CCNC: 39 U/L (ref 0–50)
ANION GAP SERPL CALCULATED.3IONS-SCNC: 6 MMOL/L (ref 3–14)
AST SERPL W P-5'-P-CCNC: 22 U/L (ref 0–45)
BILIRUB SERPL-MCNC: 0.6 MG/DL (ref 0.2–1.3)
BUN SERPL-MCNC: 10 MG/DL (ref 7–30)
CALCIUM SERPL-MCNC: 9.1 MG/DL (ref 8.5–10.1)
CHLORIDE SERPL-SCNC: 111 MMOL/L (ref 94–109)
CHOLEST SERPL-MCNC: 163 MG/DL
CO2 SERPL-SCNC: 24 MMOL/L (ref 20–32)
CREAT SERPL-MCNC: 0.95 MG/DL (ref 0.52–1.04)
CREAT UR-MCNC: 312 MG/DL
GFR SERPL CREATININE-BSD FRML MDRD: 63 ML/MIN/{1.73_M2}
GLUCOSE SERPL-MCNC: 121 MG/DL (ref 70–99)
GP B STREP DNA SPEC QL NAA+PROBE: POSITIVE
HDLC SERPL-MCNC: 47 MG/DL
LDLC SERPL CALC-MCNC: 71 MG/DL
MICROALBUMIN UR-MCNC: 11 MG/L
MICROALBUMIN/CREAT UR: 3.62 MG/G CR (ref 0–25)
NONHDLC SERPL-MCNC: 116 MG/DL
POTASSIUM SERPL-SCNC: 3.9 MMOL/L (ref 3.4–5.3)
PROT SERPL-MCNC: 7.4 G/DL (ref 6.8–8.8)
SODIUM SERPL-SCNC: 141 MMOL/L (ref 133–144)
SPECIMEN SOURCE: ABNORMAL
TRIGL SERPL-MCNC: 227 MG/DL
TSH SERPL DL<=0.005 MIU/L-ACNC: 0.48 MU/L (ref 0.4–4)

## 2020-09-19 ASSESSMENT — ANXIETY QUESTIONNAIRES: GAD7 TOTAL SCORE: 18

## 2020-09-19 ASSESSMENT — ASTHMA QUESTIONNAIRES: ACT_TOTALSCORE: 25

## 2020-09-22 LAB
BACTERIA SPEC CULT: ABNORMAL
SPECIMEN SOURCE: ABNORMAL

## 2020-09-22 RX ORDER — MIRTAZAPINE 15 MG/1
TABLET, FILM COATED ORAL
Qty: 90 TABLET | Refills: 0 | Status: SHIPPED | OUTPATIENT
Start: 2020-09-22 | End: 2021-08-04

## 2020-09-22 RX ORDER — CIPROFLOXACIN 500 MG/1
500 TABLET, FILM COATED ORAL 2 TIMES DAILY
Qty: 10 TABLET | Refills: 0 | Status: SHIPPED | OUTPATIENT
Start: 2020-09-22 | End: 2021-01-12

## 2020-09-22 RX ORDER — METRONIDAZOLE 7.5 MG/G
1 GEL VAGINAL AT BEDTIME
Qty: 70 G | Refills: 0 | Status: SHIPPED | OUTPATIENT
Start: 2020-09-22 | End: 2020-09-27

## 2020-09-22 RX ORDER — FLUCONAZOLE 150 MG/1
150 TABLET ORAL ONCE
Qty: 1 TABLET | Refills: 0 | Status: SHIPPED | OUTPATIENT
Start: 2020-09-22 | End: 2020-09-22

## 2020-09-22 RX ORDER — SIMVASTATIN 40 MG
TABLET ORAL
Qty: 90 TABLET | Refills: 3 | OUTPATIENT
Start: 2020-09-22

## 2020-09-22 RX ORDER — LISINOPRIL 5 MG/1
TABLET ORAL
Qty: 90 TABLET | Refills: 3 | OUTPATIENT
Start: 2020-09-22

## 2020-09-22 RX ORDER — ESTRADIOL 0.1 MG/G
1 CREAM VAGINAL AT BEDTIME
Qty: 42.5 G | Refills: 3 | Status: ON HOLD | OUTPATIENT
Start: 2020-09-22 | End: 2021-05-02

## 2020-09-22 NOTE — TELEPHONE ENCOUNTER
Pt states she does take the mirtazapine to help with sleep when she is in a manic state.  Does not take it every night.       Maria T BATISTA RN  EP Triage

## 2020-09-23 LAB
Lab: ABNORMAL
SPECIMEN SOURCE: ABNORMAL
YEAST SPEC QL CULT: ABNORMAL

## 2020-09-24 LAB
COPATH REPORT: NORMAL
PAP: NORMAL

## 2020-09-29 ENCOUNTER — TELEPHONE (OUTPATIENT)
Dept: OBGYN | Facility: CLINIC | Age: 65
End: 2020-09-29

## 2020-09-30 NOTE — TELEPHONE ENCOUNTER
Pt called with cottage cheese white vaginal discharge. She started vag E2 cream 2 weeks ago.     Likely discharging vaginal cream. Decrease amount she is using daily, then in 2 weeks go to three times per week use.

## 2020-10-09 ENCOUNTER — TELEPHONE (OUTPATIENT)
Dept: OBGYN | Facility: CLINIC | Age: 65
End: 2020-10-09

## 2020-10-09 DIAGNOSIS — R39.198 DECREASED URINE STREAM: Primary | ICD-10-CM

## 2020-10-09 DIAGNOSIS — T36.95XA ANTIBIOTIC-INDUCED YEAST INFECTION: ICD-10-CM

## 2020-10-09 DIAGNOSIS — B37.9 ANTIBIOTIC-INDUCED YEAST INFECTION: ICD-10-CM

## 2020-10-09 RX ORDER — FLUCONAZOLE 150 MG/1
150 TABLET ORAL
Qty: 3 TABLET | Refills: 0 | Status: SHIPPED | OUTPATIENT
Start: 2020-10-09 | End: 2020-10-16

## 2020-10-09 RX ORDER — SULFAMETHOXAZOLE/TRIMETHOPRIM 800-160 MG
1 TABLET ORAL 2 TIMES DAILY
Qty: 20 TABLET | Refills: 0 | Status: SHIPPED | OUTPATIENT
Start: 2020-10-09 | End: 2021-01-12

## 2020-10-09 NOTE — TELEPHONE ENCOUNTER
Phone call from patient. She is upset as she finished a course of oral cipro for 5 days following a +UC on 9/17.2020.     She has not been s.a. using her vag E2 cream nightly as prescribed.     3 days ago she started to have a decreased urine stream, pelvic pressure again. She is requesting treatment due to the weekend.     rx for 10 day bactrim and diflucan to prevent abx yeast. Bath soaks recommended. Push fluids, continue vag E2.     RTC after abx if symptoms return or worsen.

## 2020-10-13 ENCOUNTER — TELEPHONE (OUTPATIENT)
Dept: FAMILY MEDICINE | Facility: CLINIC | Age: 65
End: 2020-10-13

## 2020-10-13 NOTE — TELEPHONE ENCOUNTER
General Call:     Who is calling:  Grecia    Reason for Call:  Metformin recall.  Albuteral doesn't need to be refilled.     What are your questions or concerns:  Does patient need to change her medication?        Okay to leave a detailed message:Yes at Cell number on file:    Telephone Information:   Mobile [phone]

## 2020-10-14 NOTE — TELEPHONE ENCOUNTER
Please call the patient's pharmacy to verify if there is recall on her metformin.    I thought metformin extended release is only thing recalled.    She is not on the extended release version.    Romel Mccall MD  [facility], [place]

## 2020-10-14 NOTE — TELEPHONE ENCOUNTER
S/w pt and gave Dr. Mccall's message below.  S/w pharmacist at [facility] who states it is only the metformin extended releases that have been recalled.    Pt will restart the metformin.  Pt also states the hydroxyzine 25 mg is not helping with her itchiness or sleep.  Wondering if the dosing can be increased.    Also states Dr. Mccall does not need to prescribe the albuterol inhaler because pt's allergist prescribes this.    Pt can be reached at [phone].    Maria T BATISTA RN  EP Triage

## 2020-10-15 NOTE — TELEPHONE ENCOUNTER
S/w pt and gave Tank's reply below.  Pt states understanding.      Maria T BATISTA RN  EP Triage

## 2020-10-15 NOTE — TELEPHONE ENCOUNTER
Thank you.     Please call her back and let her know that Dr. Mccall is out for the remainder of the week, so I will try to help her out.     I am glad to hear that she will resume her metformin.     Regarding the hydroxyzine, she is free to self-titrate this up to a maximum 100 mg. I would recommend trying 50 mg (2 tablets/capsules) for a few days. If that doesn't work, she may try three, etc.     Thank you,    RALPH Arias, CNP

## 2020-11-10 NOTE — PROGRESS NOTES
SUBJECTIVE:                                                   Grecia Kilgore is a 62 year old female who presents to clinic today for the following health issue(s):  Patient presents with:  Vaginal Problem: vaginal discharge    HPI: The patient is a 62-year-old who is seen at this time for persistent vaginal discharge. She had a hysterectomy over 20 years ago. She has had breast cancer that was surgically treated. She is menopausal but denies hot flashes. She is not taking any vaginal estrogen. This discharge can flareup every 2-3 months. It can be related to sexual activity, antibiotic use, orspecific relation.      Patient's last menstrual period was 1985..   Patient is sexually active, .  Using hysterectomy for contraception.    reports that she has never smoked. She has never used smokeless tobacco.    STD testing offered?  Accepted    Health maintenance updated:  yes    Today's PHQ-2 Score:   PHQ-2 (  [employer]) 2016   Q1: Little interest or pleasure in doing things 0   Q2: Feeling down, depressed or hopeless 0   PHQ-2 Score 0     Today's PHQ-9 Score:   PHQ-9 SCORE 2016   Total Score 25     Today's WILL-7 Score:   WILL-7 SCORE 2016   Total Score 21       Problem list and histories reviewed & adjusted, as indicated.  Additional history: as documented.    Patient Active Problem List   Diagnosis     Allergic rhinitis     External hemorrhoids     Vitamin D deficiency     Fibromyalgia     Osteoarthritis, knee     Positive PPD     Panic disorder     Genital herpes     Advanced directives, counseling/discussion     DCIS (ductal carcinoma in situ) of breast     Heart palpitations     Anxiety     Major depressive disorder, single episode, moderate (H)     Mild persistent asthma without complication     Hyperlipidemia LDL goal <100     Type 2 diabetes mellitus without complication, without long-term current use of insulin (H)     Past Surgical History   Procedure Laterality Date      Hysterectomy  7/1996     fibroids     Foot surgery Bilateral 2013     Hemorrhoidectomy banding       multiple times     Colonoscopy  2005     nl - repeat 2015     Arthroscopy knee Left 2/2/11     Arthrodesis toe(s)  9/16/2013     Procedure: ARTHRODESIS TOE(S);  BILATERAL GREAT TOE FUSION (C-ARM);  Surgeon: Mary Guan MD;  Location: [facility]     Biopsy breast seed localization  1/22/2014     Procedure: BIOPSY BREAST SEED LOCALIZATION;  Left Breast Seed Localized Excisional Biopsy ;  Surgeon: Lisset Amador DO;  Location: [facility] OR     Biopsy breast  2/7/2014     Procedure: BIOPSY BREAST;  Re-excision Left Breast Cavity for Margins ;  Surgeon: Lisset Amador DO;  Location: [facility] OR     Remove hardware foot Left 2015     Arthrodesis toe(s) Left 12/19/2016     Procedure: ARTHRODESIS TOE(S);  Surgeon: Mary Guan MD;  Location: [facility]     Repair tendon foot Left 12/19/2016     Procedure: REPAIR TENDON FOOT;  Surgeon: Mary Guan MD;  Location: [facility]      Social History   Substance Use Topics     Smoking status: Never Smoker     Smokeless tobacco: Never Used     Alcohol use No      Problem (# of Occurrences) Relation (Name,Age of Onset)    Alcohol/Drug (1) Father    Breast Cancer (1) Mother    CANCER (1) Father    CEREBROVASCULAR DISEASE (1) Maternal Grandmother    Cancer - colorectal (1) Maternal Grandfather    DIABETES (2) Mother, Maternal Grandmother            Current Outpatient Prescriptions   Medication Sig     oxyCODONE-acetaminophen (PERCOCET) 5-325 MG per tablet Take 1-2 tablets by mouth every 4 hours as needed for pain (moderate to severe)     hydrOXYzine (ATARAX) 25 MG tablet Take 1 tablet (25 mg) by mouth every 6 hours as needed for itching (and nausea)     dextromethorphan (DELSYM) 30 MG/5ML liquid Take 5 mLs (30 mg) by mouth 2 times daily     omega 3 1000 MG CAPS Take 1 g by mouth daily     metFORMIN (GLUCOPHAGE) 500 MG tablet Take 1 tablet (500 mg) by mouth 2 times daily (with  "meals)     albuterol (PROAIR HFA, PROVENTIL HFA, VENTOLIN HFA) 108 (90 BASE) MCG/ACT inhaler Inhale 2 puffs into the lungs every 6 hours as needed for shortness of breath / dyspnea     simvastatin (ZOCOR) 20 MG tablet Take 1 tablet (20 mg) by mouth At Bedtime     lisinopril (PRINIVIL,ZESTRIL) 5 MG tablet Take 1 tablet (5 mg) by mouth daily     Azelastine HCl 0.15 % SOLN Spray 2 sprays into both nostrils 2 times daily     Divalproex Sodium (DEPAKOTE PO) Take 500 mg by mouth 2 times daily     No current facility-administered medications for this visit.      Allergies   Allergen Reactions     Codeine Nausea     Morphine Itching     Nitrofuran Derivatives Hives     Phenothiazines      sedation     Primatene Mist [Epinephrine Bitartrate] Itching     neck itch with primatene mist     Vicodin [Hydrocodone-Acetaminophen] Nausea     Latex Itching and Rash       ROS:  Gastrointestinal: Diarrhea  Genitourinary: Hot Flashes, No Periods and Vaginal Discharge  Skin: Acne and Rash  Neurologic: Dizziness  Psychiatric: Anxiety, Depression, Difficulty Sleeping, Negron and Significant Memory Loss    OBJECTIVE:     /80  Ht 5' 7\" (1.702 m)  Wt 171 lb (77.6 kg)  LMP 01/01/1985  BMI 26.78 kg/m2  Body mass index is 26.78 kg/(m^2).    Exam:  Constitutional:  Appearance: Well nourished, well developed alert, in no acute distress  Gastrointestinal:  Abdominal Examination:  Abdomen nontender to palpation, tone normal without rigidity or guarding, no masses present, umbilicus without lesions; Liver/Spleen:  No hepatomegaly present, liver nontender to palpation; Hernias:  No hernias present  Lymphatic: Lymph Nodes:  No other lymphadenopathy present  Skin:General Inspection:  No rashes present, no lesions present, no areas of discoloration; Genitalia and Groin:  No rashes present, no lesions present, no areas of discoloration, no masses present.  Neurologic/Psychiatric:  Mental Status:  Oriented X3   Pelvic Exam:  External Genitalia:  "    Normal appearance for age, no discharge present, no tenderness present, no inflammatory lesions present, color normal  Vagina:     Normal vaginal vault without central or paravaginal defects, no discharge present, no inflammatory lesions present, no masses present  Bladder:     Nontender to palpation  Urethra:   Urethral Body:  Urethra palpation normal, urethra structural support normal   Urethral Meatus:  No erythema or lesions present  Cervix:     Surgically absent  Uterus:     Surgically absent  Adnexa:     Surgically absent  Perineum:     Perineum within normal limits, no evidence of trauma, no rashes or skin lesions present  Anus:     Anus within normal limits, no hemorrhoids present  Inguinal Lymph Nodes:     No lymphadenopathy present     In-Clinic Test Results:      ASSESSMENT/PLAN:                                                      The patient has a very atypical vaginal discharge and a fairly normal exam. A culture is taken to rule out group B strep. After the examination and she related that every once in a while she thinks that she actually is passing some flatus in the vagina. Close inspection of the time of her pelvic examination showed no sign of a fistula but they are not often easy to find. She has no history of diverticular disease that should've initiated a fistula. We will follow up her culture and see her back in 3 weeks and taking another inspection to see if there is possibly a fistula. This could account for bacterial seeding to the vagina. She does deny ever seeing any fecal material in the vagina.        Tristan Bryant MD  [facility]   corrective regimen sliding scale

## 2020-11-15 ENCOUNTER — NURSE TRIAGE (OUTPATIENT)
Dept: NURSING | Facility: CLINIC | Age: 65
End: 2020-11-15

## 2020-11-15 ENCOUNTER — HOSPITAL ENCOUNTER (EMERGENCY)
Facility: CLINIC | Age: 65
Discharge: HOME | End: 2020-11-15
Attending: EMERGENCY MEDICINE | Admitting: EMERGENCY MEDICINE
Payer: COMMERCIAL

## 2020-11-15 ENCOUNTER — APPOINTMENT (OUTPATIENT)
Dept: CT IMAGING | Facility: CLINIC | Age: 65
End: 2020-11-15
Attending: EMERGENCY MEDICINE
Payer: COMMERCIAL

## 2020-11-15 VITALS
BODY MASS INDEX: 26.06 KG/M2 | TEMPERATURE: 98.4 F | WEIGHT: 166 LBS | DIASTOLIC BLOOD PRESSURE: 90 MMHG | HEART RATE: 58 BPM | SYSTOLIC BLOOD PRESSURE: 134 MMHG | HEIGHT: 67 IN | RESPIRATION RATE: 16 BRPM | OXYGEN SATURATION: 97 %

## 2020-11-15 DIAGNOSIS — N20.0 KIDNEY STONE ON LEFT SIDE: ICD-10-CM

## 2020-11-15 LAB
ALBUMIN SERPL-MCNC: 3.8 G/DL (ref 3.4–5)
ALBUMIN UR-MCNC: NEGATIVE MG/DL
ALP SERPL-CCNC: 101 U/L (ref 40–150)
ALT SERPL W P-5'-P-CCNC: 24 U/L (ref 0–50)
ANION GAP SERPL CALCULATED.3IONS-SCNC: 5 MMOL/L (ref 3–14)
APPEARANCE UR: CLEAR
AST SERPL W P-5'-P-CCNC: 20 U/L (ref 0–45)
BASOPHILS # BLD AUTO: 0 10E9/L (ref 0–0.2)
BASOPHILS NFR BLD AUTO: 0.4 %
BILIRUB DIRECT SERPL-MCNC: 0.1 MG/DL (ref 0–0.2)
BILIRUB SERPL-MCNC: 0.6 MG/DL (ref 0.2–1.3)
BILIRUB UR QL STRIP: NEGATIVE
BUN SERPL-MCNC: 13 MG/DL (ref 7–30)
CALCIUM SERPL-MCNC: 9.2 MG/DL (ref 8.5–10.1)
CHLORIDE SERPL-SCNC: 111 MMOL/L (ref 94–109)
CO2 SERPL-SCNC: 25 MMOL/L (ref 20–32)
COLOR UR AUTO: ABNORMAL
CREAT SERPL-MCNC: 0.99 MG/DL (ref 0.52–1.04)
DIFFERENTIAL METHOD BLD: ABNORMAL
EOSINOPHIL # BLD AUTO: 0.1 10E9/L (ref 0–0.7)
EOSINOPHIL NFR BLD AUTO: 1.4 %
ERYTHROCYTE [DISTWIDTH] IN BLOOD BY AUTOMATED COUNT: 13 % (ref 10–15)
GFR SERPL CREATININE-BSD FRML MDRD: 61 ML/MIN/{1.73_M2}
GLUCOSE SERPL-MCNC: 98 MG/DL (ref 70–99)
GLUCOSE UR STRIP-MCNC: NEGATIVE MG/DL
HCT VFR BLD AUTO: 40.3 % (ref 35–47)
HGB BLD-MCNC: 12.6 G/DL (ref 11.7–15.7)
HGB UR QL STRIP: ABNORMAL
IMM GRANULOCYTES # BLD: 0 10E9/L (ref 0–0.4)
IMM GRANULOCYTES NFR BLD: 0.2 %
KETONES UR STRIP-MCNC: NEGATIVE MG/DL
LEUKOCYTE ESTERASE UR QL STRIP: NEGATIVE
LIPASE SERPL-CCNC: 116 U/L (ref 73–393)
LYMPHOCYTES # BLD AUTO: 2.2 10E9/L (ref 0.8–5.3)
LYMPHOCYTES NFR BLD AUTO: 22.6 %
MCH RBC QN AUTO: 28.4 PG (ref 26.5–33)
MCHC RBC AUTO-ENTMCNC: 31.3 G/DL (ref 31.5–36.5)
MCV RBC AUTO: 91 FL (ref 78–100)
MONOCYTES # BLD AUTO: 0.6 10E9/L (ref 0–1.3)
MONOCYTES NFR BLD AUTO: 6 %
MUCOUS THREADS #/AREA URNS LPF: PRESENT /LPF
NEUTROPHILS # BLD AUTO: 6.7 10E9/L (ref 1.6–8.3)
NEUTROPHILS NFR BLD AUTO: 69.4 %
NITRATE UR QL: NEGATIVE
NRBC # BLD AUTO: 0 10*3/UL
NRBC BLD AUTO-RTO: 0 /100
PH UR STRIP: 5.5 PH (ref 5–7)
PLATELET # BLD AUTO: 250 10E9/L (ref 150–450)
POTASSIUM SERPL-SCNC: 3.7 MMOL/L (ref 3.4–5.3)
PROT SERPL-MCNC: 7.9 G/DL (ref 6.8–8.8)
RBC # BLD AUTO: 4.43 10E12/L (ref 3.8–5.2)
RBC #/AREA URNS AUTO: 6 /HPF (ref 0–2)
SODIUM SERPL-SCNC: 141 MMOL/L (ref 133–144)
SOURCE: ABNORMAL
SP GR UR STRIP: 1.02 (ref 1–1.03)
SQUAMOUS #/AREA URNS AUTO: 2 /HPF (ref 0–1)
UROBILINOGEN UR STRIP-MCNC: NORMAL MG/DL (ref 0–2)
WBC # BLD AUTO: 9.6 10E9/L (ref 4–11)
WBC #/AREA URNS AUTO: 2 /HPF (ref 0–5)

## 2020-11-15 PROCEDURE — 96374 THER/PROPH/DIAG INJ IV PUSH: CPT | Mod: 59

## 2020-11-15 PROCEDURE — 80076 HEPATIC FUNCTION PANEL: CPT | Performed by: EMERGENCY MEDICINE

## 2020-11-15 PROCEDURE — 250N000011 HC RX IP 250 OP 636: Performed by: EMERGENCY MEDICINE

## 2020-11-15 PROCEDURE — 250N000009 HC RX 250: Performed by: EMERGENCY MEDICINE

## 2020-11-15 PROCEDURE — 74177 CT ABD & PELVIS W/CONTRAST: CPT

## 2020-11-15 PROCEDURE — 81001 URINALYSIS AUTO W/SCOPE: CPT | Performed by: EMERGENCY MEDICINE

## 2020-11-15 PROCEDURE — 96375 TX/PRO/DX INJ NEW DRUG ADDON: CPT

## 2020-11-15 PROCEDURE — 85025 COMPLETE CBC W/AUTO DIFF WBC: CPT | Performed by: EMERGENCY MEDICINE

## 2020-11-15 PROCEDURE — 99285 EMERGENCY DEPT VISIT HI MDM: CPT | Mod: 25

## 2020-11-15 PROCEDURE — 80048 BASIC METABOLIC PNL TOTAL CA: CPT | Performed by: EMERGENCY MEDICINE

## 2020-11-15 PROCEDURE — 83690 ASSAY OF LIPASE: CPT | Performed by: EMERGENCY MEDICINE

## 2020-11-15 PROCEDURE — 250N000013 HC RX MED GY IP 250 OP 250 PS 637: Performed by: EMERGENCY MEDICINE

## 2020-11-15 RX ORDER — ONDANSETRON 2 MG/ML
4 INJECTION INTRAMUSCULAR; INTRAVENOUS ONCE
Status: COMPLETED | OUTPATIENT
Start: 2020-11-15 | End: 2020-11-15

## 2020-11-15 RX ORDER — IOPAMIDOL 755 MG/ML
500 INJECTION, SOLUTION INTRAVASCULAR ONCE
Status: COMPLETED | OUTPATIENT
Start: 2020-11-15 | End: 2020-11-15

## 2020-11-15 RX ORDER — OXYCODONE HYDROCHLORIDE 5 MG/1
5 TABLET ORAL ONCE
Status: COMPLETED | OUTPATIENT
Start: 2020-11-15 | End: 2020-11-15

## 2020-11-15 RX ORDER — NAPROXEN 500 MG/1
500 TABLET ORAL 2 TIMES DAILY WITH MEALS
Qty: 14 TABLET | Refills: 0 | Status: SHIPPED | OUTPATIENT
Start: 2020-11-15 | End: 2020-11-20

## 2020-11-15 RX ORDER — ONDANSETRON 4 MG/1
4 TABLET, ORALLY DISINTEGRATING ORAL EVERY 6 HOURS PRN
Qty: 10 TABLET | Refills: 0 | Status: SHIPPED | OUTPATIENT
Start: 2020-11-15 | End: 2020-11-18

## 2020-11-15 RX ORDER — TAMSULOSIN HYDROCHLORIDE 0.4 MG/1
0.4 CAPSULE ORAL DAILY
Qty: 7 CAPSULE | Refills: 0 | Status: SHIPPED | OUTPATIENT
Start: 2020-11-15 | End: 2020-11-19

## 2020-11-15 RX ORDER — OXYCODONE HYDROCHLORIDE 5 MG/1
5 TABLET ORAL EVERY 6 HOURS PRN
Qty: 12 TABLET | Refills: 0 | Status: SHIPPED | OUTPATIENT
Start: 2020-11-15 | End: 2020-11-19

## 2020-11-15 RX ORDER — HYDROMORPHONE HYDROCHLORIDE 1 MG/ML
0.5 INJECTION, SOLUTION INTRAMUSCULAR; INTRAVENOUS; SUBCUTANEOUS
Status: DISCONTINUED | OUTPATIENT
Start: 2020-11-15 | End: 2020-11-16 | Stop reason: HOSPADM

## 2020-11-15 RX ADMIN — IOPAMIDOL 83 ML: 755 INJECTION, SOLUTION INTRAVENOUS at 20:44

## 2020-11-15 RX ADMIN — OXYCODONE HYDROCHLORIDE 5 MG: 5 TABLET ORAL at 21:48

## 2020-11-15 RX ADMIN — SODIUM CHLORIDE 61 ML: 9 INJECTION, SOLUTION INTRAVENOUS at 20:44

## 2020-11-15 RX ADMIN — HYDROMORPHONE HYDROCHLORIDE 0.5 MG: 1 INJECTION, SOLUTION INTRAMUSCULAR; INTRAVENOUS; SUBCUTANEOUS at 20:01

## 2020-11-15 RX ADMIN — ONDANSETRON 4 MG: 2 INJECTION INTRAMUSCULAR; INTRAVENOUS at 20:01

## 2020-11-15 ASSESSMENT — MIFFLIN-ST. JEOR: SCORE: 1340.6

## 2020-11-15 ASSESSMENT — ENCOUNTER SYMPTOMS
ABDOMINAL PAIN: 1
NAUSEA: 0
FEVER: 0
VOMITING: 0
DIARRHEA: 0
CHILLS: 0

## 2020-11-15 NOTE — ED AVS SNAPSHOT
[facility] Emergency Dept  [address]  [facility] [address]  Phone: [phone]  Fax: [phone]                                    Grecia Kilgore   MRN: [id number]    Department: [facility] Emergency Dept   Date of Visit: 11/15/2020           After Visit Summary Signature Page    I have received my discharge instructions, and my questions have been answered. I have discussed any challenges I see with this plan with the nurse or doctor.    ..........................................................................................................................................  Patient/Patient Representative Signature      ..........................................................................................................................................  Patient Representative Print Name and Relationship to Patient    ..................................................               ................................................  Date                                   Time    ..........................................................................................................................................  Reviewed by Signature/Title    ...................................................              ..............................................  Date                                               Time          22EPIC Rev 08/18

## 2020-11-15 NOTE — TELEPHONE ENCOUNTER
Pt calls in with concern of abdominal pain   Started yesterday > under left rib cage     Did have large BM >No blood   Has not vomited     Has tried a number of different remedies >  Hot tub   Hot packs - heating pad- pain med's     All with minimal effect    Presently pain all over abdomen below naval     Pain gets up to a 10 / 10     Pt says she does have a HX of diverticulosis    Per protocol pt advised to go to ED now for evaluation     Pt agrees will go to [facility] ED now    Protocol and care advice reviewed  Caller states understanding of the recommended disposition  Advised to call back if further questions or concerns    Lambert Fernandes , RN / [facility]                  Reason for Disposition    [1] SEVERE pain (e.g., excruciating) AND [2] present > 1 hour    Additional Information    Negative: Shock suspected (e.g., cold/pale/clammy skin, too weak to stand, low BP, rapid pulse)    Negative: Difficult to awaken or acting confused (e.g., disoriented, slurred speech)    Negative: Passed out (i.e., lost consciousness, collapsed and was not responding)    Negative: Sounds like a life-threatening emergency to the triager    Protocols used: ABDOMINAL PAIN - FEMALE-A-AH

## 2020-11-16 NOTE — ED TRIAGE NOTES
Pt presents with three days left sided abdominal pain. Says shes been told she has diverticulosis but hasn't had diverticulitis. Denies diarrhea or vomiting or fevers.

## 2020-11-16 NOTE — ED PROVIDER NOTES
History     Chief Complaint:  Abdominal Pain    HPI   Grecia Kilgore is a 65 year old female with a history of type 2 diabetes, diverticulosis, GERD, and pancreatitis who presents with abdominal pain. The patient complains of three days of left upper quadrant abdominal pain that radiated to her left lower quadrant. She reports that beginning today both pains are constant and worsen with weight bearing. The patient called a nurse hotline with concern for her history of diverticulosis, who advised she come to the emergency department for imaging. She has treated her symptoms with Percocet and Oxycodone with some relief. Her last Percocet with 3-4 hours prior to arrival to the emergency department (1500). She denies any nausea, vomiting, diarrhea, fever, or chills.     Allergies:  Codeine   Morphine   Nitrofuran   Phenothiazines  Primatene Mist  Vicodin   Latex     Medications:    Albuterol   Atarax   Remeron   Lisinopril   Metformin   Simvastatin     Past Medical History:    Anemia   WILL  Chronic back pain   Type 2 diabetes   Diverticulosis   Eating disorder   Hemorrhoids   G6PD  GERD  Hepatitis A  Laxative abuse   Migraines  Asthma   Ovarian cyst   Pancreatitis   Nephrolithiasis   PTSD  STD   Liver nodule   Depression   BOO   CKD stage III    Past Surgical History:    Arthrodesis toes x2  Arthroscopy knee  Biopsy breast   Colonoscopy   Hemorrhoidectomy banding   Hemorrhoidectomy internal   Hysterectomy   Foot surgery   Remove hardware foot   Repair tendon foot     Family History:    Mother: diabetes, breast cancer   Father: alcohol/drug, cancer     Social History:  Smoking status: no  Alcohol use: no  Drug use: no  The patient presents to the emergency department with family member  CalAmadousendymoira   Marital Status:  Single [1]    Review of Systems   Constitutional: Negative for chills and fever.   Gastrointestinal: Positive for abdominal pain. Negative for diarrhea, nausea and vomiting.   All other systems  "reviewed and are negative.      Physical Exam     Patient Vitals for the past 24 hrs:   BP Temp Temp src Pulse Resp SpO2 Height Weight   11/15/20 2150 (!) 134/90 -- -- 58 16 97 % -- --   11/15/20 2130 (!) 134/93 -- -- 59 -- 98 % -- --   11/15/20 2030 (!) 143/91 -- -- 62 -- 96 % -- --   11/15/20 1844 (!) 165/105 98.4  F (36.9  C) Oral 74 20 99 % 1.702 m (5' 7\") 75.3 kg (166 lb)       Physical Exam  Nursing note and vitals reviewed.  Constitutional: Cooperative.   HENT:   Mouth/Throat: Moist mucous membranes.   Eyes: EOMI, nonicteric sclera  Cardiovascular: Normal rate, regular rhythm, no murmurs, rubs, or gallops  Pulmonary/Chest: Effort normal and breath sounds normal. No respiratory distress. No wheezes. No rales.   Abdominal: Soft. Mild TTP LLQ, left CVA tenderness noted, nondistended, no guarding or rigidity.   Musculoskeletal: Normal range of motion.   Neurological: Alert. Moves all extremities spontaneously.   Skin: Skin is warm and dry. No rash noted.   Psychiatric: Normal mood and affect.       Emergency Department Course   Imaging:  Radiology findings were communicated with the patient who voiced understanding of the findings.    Abd/Pelvic CT, IV contrast only TRAUMA/AAA  IMPRESSION:   1.  Moderate left-sided hydronephrosis with an obstructing proximal left ureteral calculus measuring 3 x 3 mm.     2.  Nonobstructing 3 mm calculus left mid kidney. No other intrarenal or ureteral calculi.     3.  Colonic diverticulosis without diverticulitis.     4.  Diffuse fatty infiltration of the liver.  Imaging independently reviewed and agree with radiologist interpretation.     Laboratory:  Laboratory findings were communicated with the patient who voiced understanding of the findings.    CBC: WBC: 9.6, HGB: 12.6, PLT: 250  BMP: Chloride: 111 (H), o/w WNL (Creatinine: 0.99)  Hepatic Panel: All Negative  UA: Blood: Trace, RBC: 6 (H), Squamous Epithelial: 2 (H), Mucous: Present, o/w Negative  Lipase: 116  "     Interventions:  2001 Dilaudid 0.5 mg IV   2001 Zofran 4mg IV   2148 Oxycodone 5 mg PO     Emergency Department Course:  Past medical records, nursing notes, and vitals reviewed.  1951: I performed an exam of the patient and obtained history, as documented above.     IV inserted and blood drawn.    Urinalysis and culture obtained and sent for evaluation.    The patient was sent for a abdominal/pelvic CT while in the emergency department, results above.     2129: I rechecked the patient. I reviewed the results with the Patient and answered all related questions prior to discharge.     Findings and plan explained to the Patient. Patient discharged home with instructions regarding supportive care, medications, and reasons to return. The importance of close follow-up was reviewed. The patient was prescribed Flomax, Naproxen,   Impression & Plan   Medical Decision Making:    Grecia Kilgore is a 65 year old female who presents with abdominal and flank pain.  A broad differential diagnosis was considered including diverticulitis, ureterolithiasis, tumor, colitis, cholecystitis, aneurysm, dissection, volvulus, appendicitis, splenic issue, stomach pathology, ulcer, hydronephrosis, pneumonia, rib fracture, UTI, pyelonephritis, ectopic, ovarian cyst or torsion and pregnancy amongst many other etiologies.  Signs and symptoms consistent with ureterolithiasis.  Patients pain is controlled in ED and given flomax.  No signs of infected stone.  Patient is hemodynamically stable in ED. Plan is home with expectant therapy. May follow-up with urology or pcp.  Return to ED if symptoms worsen in addition to fevers greater than 102, increasing pain, other new symptoms develop.  Ureterolithiasis precautions for home.  Questions were answered. She is in stable condition at the time of discharge, indications for return to the ED were discussed as well as follow up. All questions were answered and she  is in agreement with the  plan.    Diagnosis:    ICD-10-CM    1. Kidney stone on left side  N20.0        Disposition:  Discharged to home.    Discharge Medications:  Discharge Medication List as of 11/15/2020  9:58 PM      START taking these medications    Details   naproxen (NAPROSYN) 500 MG tablet Take 1 tablet (500 mg) by mouth 2 times daily (with meals) for 7 days, Disp-14 tablet, R-0, Local Print      ondansetron (ZOFRAN ODT) 4 MG ODT tab Take 1 tablet (4 mg) by mouth every 6 hours as needed for nausea or vomiting, Disp-10 tablet, R-0, Local Print      oxyCODONE (ROXICODONE) 5 MG tablet Take 1 tablet (5 mg) by mouth every 6 hours as needed for pain, Disp-12 tablet, R-0, Local Print      tamsulosin (FLOMAX) 0.4 MG capsule Take 1 capsule (0.4 mg) by mouth daily for 7 days, Disp-7 capsule, R-0, Local Print           Nay Hahn  11/15/2020   [facility] EMERGENCY DEPT  Scribe Disclosure:  I, Nay Hahn, am serving as a scribe at 7:51 PM on 11/15/2020 to document services personally performed by Chris Smith MD based on my observations and the provider's statements to me.       Chris Smith MD  11/16/20 0752

## 2020-11-18 ENCOUNTER — TELEPHONE (OUTPATIENT)
Dept: FAMILY MEDICINE | Facility: CLINIC | Age: 65
End: 2020-11-18

## 2020-11-18 ENCOUNTER — NURSE TRIAGE (OUTPATIENT)
Dept: NURSING | Facility: CLINIC | Age: 65
End: 2020-11-18

## 2020-11-18 DIAGNOSIS — N20.0 KIDNEY STONE: Primary | ICD-10-CM

## 2020-11-18 NOTE — TELEPHONE ENCOUNTER
Patient says she was seen in the Ed for a 3 mm Kidney stone and was told if it did not pass by today then she should be evaluated by a urologist.  Phone disconnected.     Additional Information    Negative: Caller has already spoken with the PCP (or office), and has no further questions    Negative: Caller has already spoken with another triager and has no further questions    Negative: Caller has already spoken with another triager or PCP (or office), and has further questions and triager able to answer questions.    Unable to complete triage due to phone connection issues    Negative: Busy signal.  First attempt to contact caller.  Follow-up call scheduled within 15 minutes.    Negative: No answer.  First attempt to contact caller.  Follow-up call scheduled within 15 minutes.    Negative: Message left on identified voicemail    Negative: Message left on unidentified voice mail. Phone number verified.    Negative: Message left with person in household    Negative: Wrong number reached. Phone number verified.    Negative: Second attempt to contact family and no contact made. Phone number verified.    Negative: Cell phone out of range. Phone number verified.    Negative: Patient already left for the hospital/clinic    Negative: Caller has cancelled the call before the first contact    Protocols used: NO CONTACT OR DUPLICATE CONTACT CALL-A-OH

## 2020-11-18 NOTE — TELEPHONE ENCOUNTER
General Call:     Who is calling:  Patient    Reason for Call:  Grecia was in the ER 11/15, has a kidney stone, is hoping Dr. Mccall can help do a direct admittance to ER (?) per what the ER doctor originally said if the kidney stone hadn't fallen out by 11/18.    She doesn't want to keep going around from provider to provider, from prescription to prescription, etc. She is in pain and wants this dealt with as swiftly as possible.    Date of last appointment with provider: 9/18/2020    Okay to leave a detailed message:Yes at Cell number on file:    Telephone Information:   Mobile [phone]

## 2020-11-18 NOTE — TELEPHONE ENCOUNTER
Reason for call:  Other   Patient called regarding (reason for call): call back    Additional comments: Patient will be out of pain medication in the morning. Requesting refills, has verified not passing stone as she is still in pain. Does not want to wait until next Tuesday for Virtual Urology appt. Was in [facility] ED records in chart.     Phone number to reach patient:  Cell number on file:    Telephone Information:   Mobile [phone]       Best Time:  any    Can we leave a detailed message on this number?  YES    Travel screening: Not Applicable

## 2020-11-18 NOTE — TELEPHONE ENCOUNTER
Pt calls back. She states she is not waiting for 2 more days for a virtual appt with a Urologist. She states the pain medication or the nausea medication is making her itch.   Advised to go back to the ED for removal of kidney stone. Or she can call her Primary Clinic to see if they would be willing to direct her for admit for lithotripsy. She agrees and will decide. '    'IMPRESSION:   1.  Moderate left-sided hydronephrosis with an obstructing proximal left ureteral calculus measuring 3 x 3 mm.

## 2020-11-19 ENCOUNTER — HOSPITAL ENCOUNTER (EMERGENCY)
Facility: CLINIC | Age: 65
Discharge: HOME | End: 2020-11-19
Attending: EMERGENCY MEDICINE | Admitting: EMERGENCY MEDICINE
Payer: COMMERCIAL

## 2020-11-19 VITALS
HEART RATE: 57 BPM | SYSTOLIC BLOOD PRESSURE: 147 MMHG | DIASTOLIC BLOOD PRESSURE: 93 MMHG | RESPIRATION RATE: 16 BRPM | TEMPERATURE: 96.3 F | OXYGEN SATURATION: 93 %

## 2020-11-19 DIAGNOSIS — N20.1 URETERAL STONE: ICD-10-CM

## 2020-11-19 LAB
ALBUMIN UR-MCNC: NEGATIVE MG/DL
ANION GAP SERPL CALCULATED.3IONS-SCNC: 4 MMOL/L (ref 3–14)
APPEARANCE UR: CLEAR
BASOPHILS # BLD AUTO: 0 10E9/L (ref 0–0.2)
BASOPHILS NFR BLD AUTO: 0.3 %
BILIRUB UR QL STRIP: NEGATIVE
BUN SERPL-MCNC: 10 MG/DL (ref 7–30)
CALCIUM SERPL-MCNC: 8.5 MG/DL (ref 8.5–10.1)
CHLORIDE SERPL-SCNC: 112 MMOL/L (ref 94–109)
CO2 SERPL-SCNC: 26 MMOL/L (ref 20–32)
COLOR UR AUTO: ABNORMAL
CREAT SERPL-MCNC: 0.85 MG/DL (ref 0.52–1.04)
DIFFERENTIAL METHOD BLD: ABNORMAL
EOSINOPHIL # BLD AUTO: 0.2 10E9/L (ref 0–0.7)
EOSINOPHIL NFR BLD AUTO: 2.4 %
ERYTHROCYTE [DISTWIDTH] IN BLOOD BY AUTOMATED COUNT: 12.7 % (ref 10–15)
GFR SERPL CREATININE-BSD FRML MDRD: 73 ML/MIN/{1.73_M2}
GLUCOSE SERPL-MCNC: 114 MG/DL (ref 70–99)
GLUCOSE UR STRIP-MCNC: NEGATIVE MG/DL
HCT VFR BLD AUTO: 39.4 % (ref 35–47)
HGB BLD-MCNC: 12.2 G/DL (ref 11.7–15.7)
HGB UR QL STRIP: NEGATIVE
IMM GRANULOCYTES # BLD: 0 10E9/L (ref 0–0.4)
IMM GRANULOCYTES NFR BLD: 0.3 %
KETONES UR STRIP-MCNC: NEGATIVE MG/DL
LEUKOCYTE ESTERASE UR QL STRIP: NEGATIVE
LYMPHOCYTES # BLD AUTO: 1.6 10E9/L (ref 0.8–5.3)
LYMPHOCYTES NFR BLD AUTO: 23.4 %
MCH RBC QN AUTO: 28.2 PG (ref 26.5–33)
MCHC RBC AUTO-ENTMCNC: 31 G/DL (ref 31.5–36.5)
MCV RBC AUTO: 91 FL (ref 78–100)
MONOCYTES # BLD AUTO: 0.4 10E9/L (ref 0–1.3)
MONOCYTES NFR BLD AUTO: 5.6 %
MUCOUS THREADS #/AREA URNS LPF: PRESENT /LPF
NEUTROPHILS # BLD AUTO: 4.8 10E9/L (ref 1.6–8.3)
NEUTROPHILS NFR BLD AUTO: 68 %
NITRATE UR QL: NEGATIVE
NRBC # BLD AUTO: 0 10*3/UL
NRBC BLD AUTO-RTO: 0 /100
PH UR STRIP: 7 PH (ref 5–7)
PLATELET # BLD AUTO: 228 10E9/L (ref 150–450)
POTASSIUM SERPL-SCNC: 3.9 MMOL/L (ref 3.4–5.3)
RBC # BLD AUTO: 4.32 10E12/L (ref 3.8–5.2)
RBC #/AREA URNS AUTO: 10 /HPF (ref 0–2)
SODIUM SERPL-SCNC: 142 MMOL/L (ref 133–144)
SOURCE: ABNORMAL
SP GR UR STRIP: 1.02 (ref 1–1.03)
SQUAMOUS #/AREA URNS AUTO: 4 /HPF (ref 0–1)
UROBILINOGEN UR STRIP-MCNC: NORMAL MG/DL (ref 0–2)
WBC # BLD AUTO: 7 10E9/L (ref 4–11)
WBC #/AREA URNS AUTO: 2 /HPF (ref 0–5)

## 2020-11-19 PROCEDURE — 80048 BASIC METABOLIC PNL TOTAL CA: CPT | Performed by: EMERGENCY MEDICINE

## 2020-11-19 PROCEDURE — 81001 URINALYSIS AUTO W/SCOPE: CPT | Performed by: EMERGENCY MEDICINE

## 2020-11-19 PROCEDURE — 250N000011 HC RX IP 250 OP 636: Performed by: EMERGENCY MEDICINE

## 2020-11-19 PROCEDURE — 250N000013 HC RX MED GY IP 250 OP 250 PS 637: Performed by: EMERGENCY MEDICINE

## 2020-11-19 PROCEDURE — 85025 COMPLETE CBC W/AUTO DIFF WBC: CPT | Performed by: EMERGENCY MEDICINE

## 2020-11-19 PROCEDURE — 96374 THER/PROPH/DIAG INJ IV PUSH: CPT

## 2020-11-19 PROCEDURE — 99284 EMERGENCY DEPT VISIT MOD MDM: CPT | Mod: 25

## 2020-11-19 PROCEDURE — 96375 TX/PRO/DX INJ NEW DRUG ADDON: CPT

## 2020-11-19 RX ORDER — IBUPROFEN 600 MG/1
600 TABLET, FILM COATED ORAL ONCE
Status: COMPLETED | OUTPATIENT
Start: 2020-11-19 | End: 2020-11-19

## 2020-11-19 RX ORDER — ONDANSETRON 2 MG/ML
4 INJECTION INTRAMUSCULAR; INTRAVENOUS ONCE
Status: COMPLETED | OUTPATIENT
Start: 2020-11-19 | End: 2020-11-19

## 2020-11-19 RX ORDER — TAMSULOSIN HYDROCHLORIDE 0.4 MG/1
0.4 CAPSULE ORAL DAILY
Qty: 14 CAPSULE | Refills: 0 | Status: SHIPPED | OUTPATIENT
Start: 2020-11-19 | End: 2020-12-03

## 2020-11-19 RX ORDER — OXYCODONE HYDROCHLORIDE 5 MG/1
5 TABLET ORAL ONCE
Status: COMPLETED | OUTPATIENT
Start: 2020-11-19 | End: 2020-11-19

## 2020-11-19 RX ORDER — ONDANSETRON 4 MG/1
4 TABLET, ORALLY DISINTEGRATING ORAL ONCE
Status: DISCONTINUED | OUTPATIENT
Start: 2020-11-19 | End: 2020-11-19

## 2020-11-19 RX ORDER — METOCLOPRAMIDE HYDROCHLORIDE 5 MG/ML
5 INJECTION INTRAMUSCULAR; INTRAVENOUS ONCE
Status: COMPLETED | OUTPATIENT
Start: 2020-11-19 | End: 2020-11-19

## 2020-11-19 RX ORDER — OXYCODONE HYDROCHLORIDE 5 MG/1
5 TABLET ORAL EVERY 6 HOURS PRN
Qty: 12 TABLET | Refills: 0 | Status: SHIPPED | OUTPATIENT
Start: 2020-11-19 | End: 2021-01-12

## 2020-11-19 RX ORDER — ACETAMINOPHEN 325 MG/1
650 TABLET ORAL ONCE
Status: COMPLETED | OUTPATIENT
Start: 2020-11-19 | End: 2020-11-19

## 2020-11-19 RX ORDER — METOCLOPRAMIDE 10 MG/1
10 TABLET ORAL
Qty: 12 TABLET | Refills: 0 | Status: SHIPPED | OUTPATIENT
Start: 2020-11-19 | End: 2020-11-22

## 2020-11-19 RX ORDER — ONDANSETRON 2 MG/ML
4 INJECTION INTRAMUSCULAR; INTRAVENOUS ONCE
Status: DISCONTINUED | OUTPATIENT
Start: 2020-11-19 | End: 2020-11-19

## 2020-11-19 RX ADMIN — OXYCODONE HYDROCHLORIDE 5 MG: 5 TABLET ORAL at 20:53

## 2020-11-19 RX ADMIN — METOCLOPRAMIDE HYDROCHLORIDE 5 MG: 5 INJECTION INTRAMUSCULAR; INTRAVENOUS at 22:23

## 2020-11-19 RX ADMIN — ONDANSETRON 4 MG: 2 INJECTION INTRAMUSCULAR; INTRAVENOUS at 20:52

## 2020-11-19 RX ADMIN — IBUPROFEN 600 MG: 600 TABLET, FILM COATED ORAL at 20:53

## 2020-11-19 RX ADMIN — OXYCODONE HYDROCHLORIDE 5 MG: 5 TABLET ORAL at 22:51

## 2020-11-19 RX ADMIN — ACETAMINOPHEN 650 MG: 325 TABLET, FILM COATED ORAL at 20:53

## 2020-11-19 ASSESSMENT — ENCOUNTER SYMPTOMS
CHILLS: 0
FLANK PAIN: 1
VOMITING: 0
NAUSEA: 1
FEVER: 0

## 2020-11-19 NOTE — ED AVS SNAPSHOT
[facility] Emergency Dept  [address]  [facility] [address]  Phone: [phone]  Fax: [phone]                                    Grecia Kilgore   MRN: [id number]    Department: [facility] Emergency Dept   Date of Visit: 11/19/2020           After Visit Summary Signature Page    I have received my discharge instructions, and my questions have been answered. I have discussed any challenges I see with this plan with the nurse or doctor.    ..........................................................................................................................................  Patient/Patient Representative Signature      ..........................................................................................................................................  Patient Representative Print Name and Relationship to Patient    ..................................................               ................................................  Date                                   Time    ..........................................................................................................................................  Reviewed by Signature/Title    ...................................................              ..............................................  Date                                               Time          22EPIC Rev 08/18

## 2020-11-20 RX ORDER — ACETAMINOPHEN 500 MG
1000 TABLET ORAL EVERY 8 HOURS PRN
Qty: 60 TABLET | Refills: 0 | Status: SHIPPED | OUTPATIENT
Start: 2020-11-20 | End: 2021-02-27

## 2020-11-20 RX ORDER — NAPROXEN 500 MG/1
500 TABLET ORAL 2 TIMES DAILY WITH MEALS
Qty: 20 TABLET | Refills: 0 | Status: SHIPPED | OUTPATIENT
Start: 2020-11-20 | End: 2021-02-01

## 2020-11-20 NOTE — TELEPHONE ENCOUNTER
She got oxycodone prescription yesterday.  It was given as a print out.  Where is that ?? Check  please  If she has filled it, I will not be prescribing Percocet.     Tylenol and naproxen ordered.    Romel Mccall MD  [facility], [facility] [place]

## 2020-11-20 NOTE — TELEPHONE ENCOUNTER
Please let her know it is the same thing if she uses oxycodone and Tylenol separate or in a combination form.  Just take the medication on a full stomach.    I am not ordering Percocet.

## 2020-11-20 NOTE — TELEPHONE ENCOUNTER
S/w pt and gave Dr. Mccall's messages below.      Pt states understanding.    Maria T BATISTA RN  EP Triage

## 2020-11-20 NOTE — TELEPHONE ENCOUNTER
Pt called back and states she was only half awake when spoke with nurse earlier.  Pt states the emergency room mixed up her medication.  Pt states she was given oxycodone without tylenol and has been violently ill after taking it.  Was told by ed md that he wanted her to take acetaminophen 1000 mg and naprosyn but no rxs were sent to pharmacy.    Pt is requesting rx for percocet, acetaminophen, and refill on naprosyn.  With the naprosyn pt was taking 4 pills total for the day instead of 2.    Pharmacy pended.    Pt can be reached at [phone].    Maria T BATISTA RN  EP Triage

## 2020-11-20 NOTE — TELEPHONE ENCOUNTER
states rx for oxycodone filled on 11/19.  Pt states this is making her sick to her stomach.  She asked the md to send in the oxycodone with tylenol (percocet) and he just advised her to take tylenol with the oxycodone.    Maria T BATISTA RN  EP Triage

## 2020-11-20 NOTE — ED TRIAGE NOTES
Pt in with C/O L sided flank pain. Pt reports seen here earlier this wee and dx with kidney sone. Pt reports pain continues and she has not passed the stone. Pt A&Ox4 ABCD's intact

## 2020-11-20 NOTE — TELEPHONE ENCOUNTER
I was not in the clinic yesterday, therefore I could not address this.  I see that the patient received 12 tablets of oxycodone yesterday by another provider.    I hope her pain gets better managed with that    Romel Mccall MD  [facility], [place], [place]

## 2020-11-20 NOTE — TELEPHONE ENCOUNTER
S/w pt and gave Dr. Mccall's message below.  Pt states the pain medication is helping.    Pt states understanding of message.    Maria T BATISTA RN  EP Triage

## 2020-11-20 NOTE — ED PROVIDER NOTES
History     Chief Complaint:  Flank Pain    HPI   Grecia Kilgore is a 65 year old female with a history of type 2 diabetes, diverticulitis, GERD, and pancreatitis who presents with flank pain. Patient was diagnosed with a 3 mm left kidney stone on 11/15/2020 for which she was discharged with prescriptions for naproxen, Zofran, oxycodone and Flomax. Today, the patient reports that the pain in her left flank is getting worse now that she has run out of her pain medications that were previously controlling her pain well. Last oxycodone was approximately 4 hours ago, and she states that she was taking her medications as directed. She also has associated nausea. No fevers, chills, or vomiting. She has an appointment with urology scheduled for Tuesday.     Allergies:  Codeine   Prochlorperazine  Morphine   Epinephrine   Nitrofuran   Chlorpromazine  Phenothiazines  Primatene mist  Vicodin   Latex      Medications:    Estradiol  Atarax   Remeron   Lisinopril   Metformin   Simvastatin      Past Medical History:    Anemia   Anxiety  DCIS  WILL  Chronic back pain   Type 2 diabetes   Diverticulosis   Eating disorder   Hemorrhoids   G6PD  GERD  Hepatitis A   Migraines  Asthma   Ovarian cyst   Pancreatitis   Nephrolithiasis  Hyperlipidemia  Fibromyalgia  PTSD  STD  Depression   BOO   CKD stage III    Past Surgical History:    Arthrodesis toes x2  Biopsy breast   Hemorrhoidectomy  Hysterectomy   Foot surgery   Remove hardware foot   Repair tendon foot     Family History:    Mother: Diabetes, breast cancer  Father: Alcohol/drugs, cancer    Social History:  Smoking Status: Never Smoker  Smokeless Tobacco: Never Used  Alcohol Use: Negative  Drug Use: Negative      PCP: Romel Mccall  Marital Status: Single     Review of Systems   Constitutional: Negative for chills and fever.   Gastrointestinal: Positive for nausea. Negative for vomiting.   Genitourinary: Positive for flank pain.   All other systems reviewed and are  negative.      Physical Exam     Patient Vitals for the past 24 hrs:   BP Temp Pulse Resp SpO2   11/19/20 2200 (!) 147/93 -- 57 -- 93 %   11/19/20 2115 -- -- -- -- 99 %   11/19/20 2107 -- -- -- -- 98 %   11/19/20 2106 -- -- -- -- 98 %   11/19/20 2105 -- -- 61 -- 97 %   11/19/20 2104 -- -- -- -- 97 %   11/19/20 2100 (!) 169/108 -- -- -- --   11/19/20 1959 (!) 182/110 96.3  F (35.7  C) 73 16 100 %     Physical Exam  Constitutional: Alert, attentive, GCS 15  HENT: Nose: Nose normal.   Mouth/Throat: Oropharynx is clear, mucous membranes are moist.  Eyes: EOM are normal, anicteric, conjugate gaze  CV: regular rate and rhythm; no murmurs  Chest: Effort normal and breath sounds clear without wheezing or rales, symmetric bilaterally   GI:  non tender. No distension. No guarding or rebound.    MSK: No LE edema, no tenderness to palpation of BLE.  Neurological: Alert, attentive, moving all extremities equally.   Skin: Skin is warm and dry.    Emergency Department Course     Laboratory:  Laboratory findings were communicated with the patient who voiced understanding of the findings.    CBC: WBC 7.0, HGB 12.2,   BMP: Chloride 112(H), Glucose 114(H) o/w WNL (Creatinine 0.85)    UA with microscopic: RBC 10(H), Squamous epithelial 4(H), Mucous Present(A) o/w WNL     Interventions:  2052 Zofran 4 mg IV   2053 Tylenol 650 mg Oral   2053 Oxycodone 5 mg Oral   2053 Advil 600 mg Oral   2223 Reglan 5 mg IV     Emergency Department Course:    2008 Nursing notes and vitals reviewed.    2022 I performed an exam of the patient as documented above.     IV was inserted and blood was drawn for laboratory testing, results above.    The patient provided a urine sample here in the emergency department. This was sent for laboratory testing, findings above.    Findings and plan explained to the patient. Patient discharged home with instructions regarding supportive care, medications, and reasons to return. The importance of close follow-up  was reviewed. The patient was prescribed Reglan, oxycodone and Flomax.     Impression & Plan      Medical Decision Making:  Grecia Kilgore is a 65 year old female past medical history significant for diabetes, G6PD, CKD, fibromyalgia and kidney stones presenting for evaluation of persistent left-sided flank pain this is in the setting of diagnosis of 3 mm obstructing left ureteral stent 4 days prior.  She denies any fevers or chills she has been taking her pain medications as prescribed but ran out today and has had significant nausea with her pain.  Kidney function here and urine without evidence of obstruction SANKET or infection.  In discussion with her given her size I do feel she warrants continued outpatient management given no red flag symptoms and her pain was readily controlled here with oral pain medications.  She has outpatient follow-up with urology for next week and I stressed the importance of scheduled pain medicine and her oxycodone as needed for severe pain.  Return precautions were reviewed and she was discharged home.    Diagnosis:    ICD-10-CM    1. Ureteral stone  N20.1      Disposition:   Discharged to home.      Discharge Medications:  New Prescriptions    METOCLOPRAMIDE (REGLAN) 10 MG TABLET    Take 1 tablet (10 mg) by mouth 4 times daily (before meals and nightly) for 3 days    OXYCODONE (ROXICODONE) 5 MG TABLET    Take 1 tablet (5 mg) by mouth every 6 hours as needed for pain    TAMSULOSIN (FLOMAX) 0.4 MG CAPSULE    Take 1 capsule (0.4 mg) by mouth daily for 14 days     Prabhu Arizmendi MD  [employer]  12:29 AM 11/20/20     Scribe Disclosure:  IDylan, am serving as a scribe at 8:32 PM on 11/19/2020 to document services personally performed by Prabhu Arizmendi MD based on my observations and the provider's statements to me.    IShira, am serving as a scribe at 9:28 PM on 11/19/2020 to document services personally performed by  Prabhu Arizmendi MD based on my observations and the provider's statements to me.      [facility] EMERGENCY DEPT       Prabhu Arizmendi MD  11/20/20 0029

## 2020-11-20 NOTE — TELEPHONE ENCOUNTER
Pt calling back. States she did not receive a call from her provider (Dr Mccall) today as expected. Seen at ED 11/15 for kidney stone measuring 3 mm. Discharged home to pass stone, straining urine. No stone yet. C/o severe pain and nausea. Cannot get appt w/ Urology until 11/24. Advised return to ED now. Pt voiced understanding and agreement.

## 2020-11-20 NOTE — DISCHARGE INSTRUCTIONS
You should continue to have your follow-up appointment with urology on Tuesday as planned.  I recommend you take his house milligrams Tylenol for 6 hours as well as naproxen in the morning and evening.  You should take the Flomax as prescribed and you can use the oxycodone as needed for more severe pain.  You should return the emergency room should you have unrelenting pain despite taking these medications, nausea to the point you are unable to take your pain meds or you develop fever or unable to urinate.

## 2020-11-24 ENCOUNTER — VIRTUAL VISIT (OUTPATIENT)
Dept: UROLOGY | Facility: CLINIC | Age: 65
End: 2020-11-24
Payer: COMMERCIAL

## 2020-11-24 VITALS — HEIGHT: 67 IN | WEIGHT: 165 LBS | BODY MASS INDEX: 25.9 KG/M2

## 2020-11-24 DIAGNOSIS — N20.1 LEFT URETERAL CALCULUS: Primary | ICD-10-CM

## 2020-11-24 PROCEDURE — 99202 OFFICE O/P NEW SF 15 MIN: CPT | Mod: 95 | Performed by: PHYSICIAN ASSISTANT

## 2020-11-24 RX ORDER — METOCLOPRAMIDE 10 MG/1
10 TABLET ORAL 4 TIMES DAILY PRN
Qty: 20 TABLET | Refills: 0 | Status: SHIPPED | OUTPATIENT
Start: 2020-11-24 | End: 2021-01-12

## 2020-11-24 RX ORDER — NAPROXEN 500 MG/1
500 TABLET ORAL 2 TIMES DAILY WITH MEALS
Qty: 30 TABLET | Refills: 0 | Status: SHIPPED | OUTPATIENT
Start: 2020-11-24 | End: 2021-02-01

## 2020-11-24 RX ORDER — OXYCODONE HYDROCHLORIDE 5 MG/1
5 TABLET ORAL EVERY 6 HOURS PRN
Qty: 12 TABLET | Refills: 0 | Status: SHIPPED | OUTPATIENT
Start: 2020-11-24 | End: 2020-11-27

## 2020-11-24 RX ORDER — TAMSULOSIN HYDROCHLORIDE 0.4 MG/1
0.4 CAPSULE ORAL DAILY
Qty: 14 CAPSULE | Refills: 0 | Status: SHIPPED | OUTPATIENT
Start: 2020-11-24 | End: 2021-01-12

## 2020-11-24 ASSESSMENT — MIFFLIN-ST. JEOR: SCORE: 1336.07

## 2020-11-24 ASSESSMENT — PAIN SCALES - GENERAL: PAINLEVEL: MILD PAIN (2)

## 2020-11-24 NOTE — NURSING NOTE
"Chief Complaint   Patient presents with     left kidney stone     Patient will have video call about her ED visit         Height 1.702 m (5' 7\"), weight 74.8 kg (165 lb), last menstrual period 01/01/1985, not currently breastfeeding. Body mass index is 25.84 kg/m .    Patient Active Problem List   Diagnosis     Allergic rhinitis     External hemorrhoids     Vitamin D deficiency     Fibromyalgia     Osteoarthritis, knee     Positive PPD     Panic disorder     Genital herpes     Advanced directives, counseling/discussion     DCIS (ductal carcinoma in situ) of breast     Heart palpitations     S/P hysterectomy - fibroids     Anxiety     Major depressive disorder, single episode, moderate (H)     Hyperlipidemia LDL goal <100     Type 2 diabetes mellitus without complication, without long-term current use of insulin (H)     Mild intermittent asthma without complication     BOO (obstructive sleep apnea)     CKD (chronic kidney disease) stage 3, GFR 30-59 ml/min       Allergies   Allergen Reactions     Codeine Nausea     Morphine Itching     Nitrofuran Derivatives Hives     Phenothiazines      sedation     Primatene Mist [Epinephrine Bitartrate] Itching     neck itch with primatene mist     Vicodin [Hydrocodone-Acetaminophen] Nausea     Latex Itching and Rash       Current Outpatient Medications   Medication Sig Dispense Refill     acetaminophen (TYLENOL) 500 MG tablet Take 2 tablets (1,000 mg) by mouth every 8 hours as needed for pain 60 tablet 0     albuterol (PROAIR HFA/PROVENTIL HFA/VENTOLIN HFA) 108 (90 Base) MCG/ACT inhaler Inhale 2 puffs into the lungs every 6 hours as needed for shortness of breath / dyspnea 1 Inhaler 6     cetirizine (ZYRTEC) 10 MG tablet TAKE 1 TABLET(10 MG) BY MOUTH EVERY EVENING 90 tablet 3     fluticasone (FLONASE) 50 MCG/ACT nasal spray USE 1 SPRAY IN EACH NOSTRIL EVERY DAY 32 g 5     lisinopril (ZESTRIL) 5 MG tablet Take 1 tablet (5 mg) by mouth daily 90 tablet 3     metFORMIN (GLUCOPHAGE) " 500 MG tablet TAKE 1 TABLET EVERY DAY WITH BREAKFAST 90 tablet 3     mirtazapine (REMERON) 15 MG tablet TAKE 1 TABLET EVERY NIGHT AS NEEDED FOR SLEEP 90 tablet 0     montelukast (SINGULAIR) 10 MG tablet TAKE 1 TABLET EVERY DAY AT BEDTIME 90 tablet 3     naproxen (NAPROSYN) 500 MG tablet Take 1 tablet (500 mg) by mouth 2 times daily (with meals) 20 tablet 0     oxyCODONE (ROXICODONE) 5 MG tablet Take 1 tablet (5 mg) by mouth every 6 hours as needed for pain 12 tablet 0     simvastatin (ZOCOR) 40 MG tablet Take 1 tablet (40 mg) by mouth At Bedtime 90 tablet 3     tamsulosin (FLOMAX) 0.4 MG capsule Take 1 capsule (0.4 mg) by mouth daily for 14 days 14 capsule 0     ciprofloxacin (CIPRO) 500 MG tablet Take 1 tablet (500 mg) by mouth 2 times daily (Patient not taking: Reported on 11/24/2020) 10 tablet 0     estradiol (ESTRACE) 0.1 MG/GM vaginal cream Place 1 g vaginally At Bedtime For 30 nights, then three times per week thereafter. Use finger for application. 42.5 g 3     hydrOXYzine (ATARAX) 25 MG tablet Take 1 tablet (25 mg) by mouth nightly as needed for anxiety 90 tablet 1     sulfamethoxazole-trimethoprim (BACTRIM DS) 800-160 MG tablet Take 1 tablet by mouth 2 times daily (Patient not taking: Reported on 11/24/2020) 20 tablet 0       Social History     Tobacco Use     Smoking status: Never Smoker     Smokeless tobacco: Never Used   Substance Use Topics     Alcohol use: No     Alcohol/week: 0.0 standard drinks     Drug use: No         Per patient had lot's of pain last night.      LICHA Nation  11/24/2020  9:28 AM
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
## 2020-11-24 NOTE — PATIENT INSTRUCTIONS
Please make an appt with Nephrology for recurrent kidney stones.   [facility]  [address]  [address]  T:  [phone]       a refill of flomax. Strain urine.     Standard recommendations on kidney stone prevention:  -These include maintaining fluid intake of 3 liters per day or more with a goal of making 2 or more liters of urine per day.  If alcoholic or caffeinated beverages are consumed, you need to drink water along with these beverages to maintain hydration.    -A few ounces of lemon juice concentrate a day diluted in water can help prevent stones (citrate is a stone inhibitor).    -Sodium influences calcium excretion in the urine. Limit intake of red meat, salt, and salty processed foods.    -Uric acid-high levels in the blood can lead to kidney stones and gout, especially if the urine pH is low.  Reduce her protein intake, especially red meats.  -Weight loss, if overweight, can reduce the recurrence of kidney stones.  -Diabetes-if diabetic, you are at a greater risk of having kidney stones.  -Maintain calcium intake in diet through continued consumption of dairy products etc.    -Limit foods that are high in oxalate such as spinach, sweet potatoes, dark chocolate, soy products, and some nuts such as peanuts.   -Medications that can increase risk of kidney stones: Ephedrine, Guaifenesin, Triamterene, Lasix, Diamox, Topamax, Zonegran, laxatives.     -Additional/different recommendations pending any stone analysis.      *Repeat CT 1 year to monitor 3mm stone in the left kidney.

## 2020-12-30 ENCOUNTER — HOSPITAL ENCOUNTER (OUTPATIENT)
Dept: MAMMOGRAPHY | Facility: CLINIC | Age: 65
Discharge: HOME | End: 2020-12-30
Attending: FAMILY MEDICINE | Admitting: FAMILY MEDICINE
Payer: COMMERCIAL

## 2020-12-30 DIAGNOSIS — Z12.31 VISIT FOR SCREENING MAMMOGRAM: ICD-10-CM

## 2020-12-30 PROCEDURE — 77063 BREAST TOMOSYNTHESIS BI: CPT

## 2021-01-12 ENCOUNTER — TELEPHONE (OUTPATIENT)
Dept: UROLOGY | Facility: CLINIC | Age: 66
End: 2021-01-12

## 2021-01-12 ENCOUNTER — OFFICE VISIT (OUTPATIENT)
Dept: OBGYN | Facility: CLINIC | Age: 66
End: 2021-01-12
Payer: COMMERCIAL

## 2021-01-12 VITALS — BODY MASS INDEX: 25.84 KG/M2 | DIASTOLIC BLOOD PRESSURE: 70 MMHG | SYSTOLIC BLOOD PRESSURE: 138 MMHG | HEIGHT: 67 IN

## 2021-01-12 DIAGNOSIS — R10.2 PELVIC PRESSURE IN FEMALE: ICD-10-CM

## 2021-01-12 DIAGNOSIS — N89.8 VAGINAL DISCHARGE: Primary | ICD-10-CM

## 2021-01-12 LAB
ALBUMIN UR-MCNC: ABNORMAL MG/DL
APPEARANCE UR: CLEAR
BILIRUB UR QL STRIP: NEGATIVE
COLOR UR AUTO: YELLOW
GLUCOSE UR STRIP-MCNC: NEGATIVE MG/DL
HGB UR QL STRIP: ABNORMAL
KETONES UR STRIP-MCNC: NEGATIVE MG/DL
LEUKOCYTE ESTERASE UR QL STRIP: NEGATIVE
NITRATE UR QL: NEGATIVE
PH UR STRIP: 5.5 PH (ref 5–7)
SOURCE: ABNORMAL
SP GR UR STRIP: >1.03 (ref 1–1.03)
SPECIMEN SOURCE: NORMAL
UROBILINOGEN UR STRIP-ACNC: 0.2 EU/DL (ref 0.2–1)
WET PREP SPEC: NORMAL

## 2021-01-12 PROCEDURE — 87086 URINE CULTURE/COLONY COUNT: CPT | Performed by: NURSE PRACTITIONER

## 2021-01-12 PROCEDURE — 87210 SMEAR WET MOUNT SALINE/INK: CPT | Performed by: NURSE PRACTITIONER

## 2021-01-12 PROCEDURE — 81003 URINALYSIS AUTO W/O SCOPE: CPT | Performed by: NURSE PRACTITIONER

## 2021-01-12 PROCEDURE — 99213 OFFICE O/P EST LOW 20 MIN: CPT | Performed by: NURSE PRACTITIONER

## 2021-01-12 NOTE — PROGRESS NOTES
SUBJECTIVE:                                                   Grecia Kilgore is a 66 year old female who presents to clinic today for the following health issue(s):  Patient presents with:  Vaginal Problem: Had heavier discharge than normal the other day. White discharge with no odor.      HPI:  Pt here today for recheck of frequent vaginal infections.   She had one day of vaginal discharge, no odor or itching.   Recent kidney stone on the right that passed, but she still has one on the left.   Has some pelvic pressure that is not constant.     Patient's last menstrual period was 1985..     Patient is sexually active, .  Using menopause for contraception.    reports that she has never smoked. She has never used smokeless tobacco.    STD testing offered?  Declined    Health maintenance updated:  yes    Today's PHQ-2 Score:   PHQ-2 (  [employer]) 2020   Q1: Little interest or pleasure in doing things 0   Q2: Feeling down, depressed or hopeless 0   PHQ-2 Score 0     Today's PHQ-9 Score:   PHQ-9 SCORE 2020   PHQ-9 Total Score -   PHQ-9 Total Score 17     Today's WILL-7 Score:   WILL-7 SCORE 2020   Total Score -   Total Score 18       Problem list and histories reviewed & adjusted, as indicated.  Additional history: as documented.    Patient Active Problem List   Diagnosis     Allergic rhinitis     External hemorrhoids     Vitamin D deficiency     Fibromyalgia     Osteoarthritis, knee     Positive PPD     Panic disorder     Genital herpes     Advanced directives, counseling/discussion     DCIS (ductal carcinoma in situ) of breast     Heart palpitations     S/P hysterectomy - fibroids     Anxiety     Major depressive disorder, single episode, moderate (H)     Hyperlipidemia LDL goal <100     Type 2 diabetes mellitus without complication, without long-term current use of insulin (H)     Mild intermittent asthma without complication     BOO (obstructive sleep apnea)     CKD (chronic kidney  disease) stage 3, GFR 30-59 ml/min     Past Surgical History:   Procedure Laterality Date     ARTHRODESIS TOE(S)  9/16/2013    Procedure: ARTHRODESIS TOE(S);  BILATERAL GREAT TOE FUSION (C-ARM);  Surgeon: Mary Guan MD;  Location: [facility]     ARTHRODESIS TOE(S) Left 12/19/2016    Procedure: ARTHRODESIS TOE(S);  Surgeon: Mary Guan MD;  Location: [facility]     ARTHROSCOPY KNEE Left 2/2/11     BIOPSY BREAST  2/7/2014    Procedure: BIOPSY BREAST;  Re-excision Left Breast Cavity for Margins ;  Surgeon: Lisset Amador DO;  Location: [facility]     BIOPSY BREAST SEED LOCALIZATION  1/22/2014    Procedure: BIOPSY BREAST SEED LOCALIZATION;  Left Breast Seed Localized Excisional Biopsy ;  Surgeon: Lisset Amador DO;  Location: [facility] OR     COLONOSCOPY  2005    nl - repeat 2015     FOOT SURGERY Bilateral 2013     HEMORRHOIDECTOMY BANDING      multiple times     HEMORRHOIDECTOMY INTERNAL N/A 7/24/2018    Procedure: HEMORRHOIDECTOMY INTERNAL;  three quadrant hemorrhoidectomy;  Surgeon: Lisa Patrick MD;  Location: [facility] OR     HYSTERECTOMY  7/1996    fibroids     REMOVE HARDWARE FOOT Left 2015     REPAIR TENDON FOOT Left 12/19/2016    Procedure: REPAIR TENDON FOOT;  Surgeon: Mary Guan MD;  Location: [facility]      Social History     Tobacco Use     Smoking status: Never Smoker     Smokeless tobacco: Never Used   Substance Use Topics     Alcohol use: No     Alcohol/week: 0.0 standard drinks      Problem (# of Occurrences) Relation (Name,Age of Onset)    Alcohol/Drug (1) Father    Breast Cancer (1) Mother    Cancer (1) Father    Cancer - colorectal (1) Maternal Grandfather    Cerebrovascular Disease (1) Maternal Grandmother    Diabetes (2) Mother, Maternal Grandmother            Current Outpatient Medications   Medication Sig     cetirizine (ZYRTEC) 10 MG tablet TAKE 1 TABLET(10 MG) BY MOUTH EVERY EVENING     estradiol (ESTRACE) 0.1 MG/GM vaginal cream Place 1 g vaginally At Bedtime For 30 nights, then  three times per week thereafter. Use finger for application.     fluticasone (FLONASE) 50 MCG/ACT nasal spray USE 1 SPRAY IN EACH NOSTRIL EVERY DAY     lisinopril (ZESTRIL) 5 MG tablet Take 1 tablet (5 mg) by mouth daily     metFORMIN (GLUCOPHAGE) 500 MG tablet TAKE 1 TABLET EVERY DAY WITH BREAKFAST     montelukast (SINGULAIR) 10 MG tablet TAKE 1 TABLET EVERY DAY AT BEDTIME     simvastatin (ZOCOR) 40 MG tablet Take 1 tablet (40 mg) by mouth At Bedtime     acetaminophen (TYLENOL) 500 MG tablet Take 2 tablets (1,000 mg) by mouth every 8 hours as needed for pain (Patient not taking: Reported on 1/12/2021)     albuterol (PROAIR HFA/PROVENTIL HFA/VENTOLIN HFA) 108 (90 Base) MCG/ACT inhaler Inhale 2 puffs into the lungs every 6 hours as needed for shortness of breath / dyspnea (Patient not taking: Reported on 1/12/2021)     hydrOXYzine (ATARAX) 25 MG tablet Take 1 tablet (25 mg) by mouth nightly as needed for anxiety (Patient not taking: Reported on 1/12/2021)     mirtazapine (REMERON) 15 MG tablet TAKE 1 TABLET EVERY NIGHT AS NEEDED FOR SLEEP (Patient not taking: Reported on 1/12/2021)     naproxen (NAPROSYN) 500 MG tablet Take 1 tablet (500 mg) by mouth 2 times daily (with meals) (Patient not taking: Reported on 1/12/2021)     naproxen (NAPROSYN) 500 MG tablet Take 1 tablet (500 mg) by mouth 2 times daily (with meals) (Patient not taking: Reported on 1/12/2021)     No current facility-administered medications for this visit.      Allergies   Allergen Reactions     Codeine Nausea     Morphine Itching     Nitrofuran Derivatives Hives     Phenothiazines      sedation     Primatene Mist [Epinephrine Bitartrate] Itching     neck itch with primatene mist     Vicodin [Hydrocodone-Acetaminophen] Nausea     Latex Itching and Rash       ROS:  12 point review of systems negative other than symptoms noted below or in the HPI.  Genitourinary: Vaginal Discharge and pelvic pressure  No urinary frequency or dysuria, bladder or kidney  "problems, POSITIVE for:, vaginal discharge      OBJECTIVE:     /70   Ht 1.702 m (5' 7\")   LMP 01/01/1985   Breastfeeding No   BMI 25.84 kg/m    Body mass index is 25.84 kg/m .    Exam:  Constitutional:  Appearance: Well nourished, well developed alert, in no acute distress  Psychiatric:  Mentation appears normal and affect normal/bright.  No CVAT  Pelvic Exam:  External Genitalia:     Normal appearance for age, no discharge present, no tenderness present, no inflammatory lesions present, color normal  Vagina:     Normal vaginal vault without central or paravaginal defects, no discharge present, no inflammatory lesions present, no masses present  Bladder:     Nontender to palpation  Urethra:   Urethral Body:  Urethra palpation normal, urethra structural support normal   Urethral Meatus:  No erythema or lesions present  Cervix:     Surgically absent  Uterus:     Surgically absent  Adnexa:     No adnexal tenderness present, no adnexal masses present  Perineum:     Perineum within normal limits, no evidence of trauma, no rashes or skin lesions present  Anus:     Anus within normal limits, no hemorrhoids present  Inguinal Lymph Nodes:     No lymphadenopathy present  Pubic Hair:     Normal pubic hair distribution for age  Genitalia and Groin:     No rashes present, no lesions present, no areas of discoloration, no masses present       In-Clinic Test Results:  Results for orders placed or performed in visit on 01/12/21 (from the past 24 hour(s))   UA without Microscopic   Result Value Ref Range    Color Urine Yellow     Appearance Urine Clear     Glucose Urine Negative NEG^Negative mg/dL    Bilirubin Urine Negative NEG^Negative    Ketones Urine Negative NEG^Negative mg/dL    Specific Gravity Urine >1.030 1.003 - 1.035    Blood Urine Large (A) NEG^Negative    pH Urine 5.5 5.0 - 7.0 pH    Protein Albumin Urine Trace (A) NEG^Negative mg/dL    Urobilinogen Urine 0.2 0.2 - 1.0 EU/dL    Nitrite Urine Negative " NEG^Negative    Leukocyte Esterase Urine Negative NEG^Negative    Source Midstream Urine    Wet prep    Specimen: Vagina   Result Value Ref Range    Specimen Description Vagina     Wet Prep No Trichomonas seen     Wet Prep No clue cells seen     Wet Prep No yeast seen     Wet Prep No WBC's seen        ASSESSMENT/PLAN:                                                        ICD-10-CM    1. Vaginal discharge  N89.8 Wet prep   2. Pelvic pressure in female  R10.2 UA without Microscopic     Urine Culture Aerobic Bacterial       There are no Patient Instructions on file for this visit.    Wet prep negative. Will send urine culture. Push fluids. May need to call urology for flomax for remaining renal stone.     RALPH Cooper CNP  [facility]

## 2021-01-12 NOTE — TELEPHONE ENCOUNTER
She is unsure  If she ever passed the stone. Has been pain free but did have slight discomfort the other day but resolved quickly . Saw gyne today  And had Large blood on test. Gyne told her that this maybe related to the stone. Can set up a telephone visit follow up with Sabra ut will ask Sabra if she wants a KUB or CT to check on stone status ?

## 2021-01-12 NOTE — LETTER
January 15, 2021      Grecia Kilgore  [address]   [place] [address]        Dear ,    We are writing to inform you of your test results.    Your urine culture was negative. I tried to call you but your voicemail box was not set up yet. Please be sure to do this for quicker communication.     Resulted Orders   Wet prep   Result Value Ref Range    Specimen Description Vagina     Wet Prep No Trichomonas seen     Wet Prep No clue cells seen     Wet Prep No yeast seen     Wet Prep No WBC's seen    UA without Microscopic   Result Value Ref Range    Color Urine Yellow     Appearance Urine Clear     Glucose Urine Negative NEG^Negative mg/dL    Bilirubin Urine Negative NEG^Negative    Ketones Urine Negative NEG^Negative mg/dL    Specific Gravity Urine >1.030 1.003 - 1.035    Blood Urine Large (A) NEG^Negative    pH Urine 5.5 5.0 - 7.0 pH    Protein Albumin Urine Trace (A) NEG^Negative mg/dL    Urobilinogen Urine 0.2 0.2 - 1.0 EU/dL    Nitrite Urine Negative NEG^Negative    Leukocyte Esterase Urine Negative NEG^Negative    Source Midstream Urine    Urine Culture Aerobic Bacterial   Result Value Ref Range    Specimen Description Midstream Urine     Special Requests Specimen received in preservative     Culture Micro No growth        If you have any questions or concerns, please call the clinic at the number listed above.       Sincerely,      RALPH Cooper CNP

## 2021-01-12 NOTE — TELEPHONE ENCOUNTER
[facility] Call Center    Phone Message    May a detailed message be left on voicemail: yes     Reason for Call: Other: Pt would like a call back to determine if she should be seen but would like to discuss     Action Taken: Message routed to:  [facility] Urology    Travel Screening: Not Applicable

## 2021-01-13 ENCOUNTER — TELEPHONE (OUTPATIENT)
Dept: UROLOGY | Facility: CLINIC | Age: 66
End: 2021-01-13

## 2021-01-13 LAB
BACTERIA SPEC CULT: NO GROWTH
Lab: NORMAL
SPECIMEN SOURCE: NORMAL

## 2021-01-13 NOTE — TELEPHONE ENCOUNTER
Per Sabra please call and arrange  A CT scan for stones  And then a video follow up .Mabel Howard LPN

## 2021-01-13 NOTE — TELEPHONE ENCOUNTER
[facility] Call Center    Phone Message    May a detailed message be left on voicemail: yes     Reason for Call: Other: Pt called returning Mabel's call. Please call her back.     Action Taken: Other: [facility]    Travel Screening: Not Applicable

## 2021-01-13 NOTE — TELEPHONE ENCOUNTER
[facility] Call [facility]    Phone Message    May a detailed message be left on voicemail: yes     Reason for Call: Order(s): Other:   Reason for requested: CT Scan Orders Per Pt called Imaging, said they did not have orders yet to schedule her CT. Please issue and call Pt to confirm.  Date needed: 01/13/2021    Provider name: Sabra Nunez PA-C      Action Taken: Other: [facility]    Travel Screening: Not Applicable

## 2021-01-18 ENCOUNTER — TELEPHONE (OUTPATIENT)
Dept: OBGYN | Facility: CLINIC | Age: 66
End: 2021-01-18

## 2021-01-18 NOTE — TELEPHONE ENCOUNTER
Patient informed of results. Discussed can take Ibuprofen 600 mg every 6 hours. Can continue with pyridium if helping. Should follow up with Urology after CT scan and for further recommendations.     Paris Dorsey RN

## 2021-01-18 NOTE — TELEPHONE ENCOUNTER
1/12/21 OV with S Mestad NP  UC no growth  Wet prep negative    Attempted to call pt but no answer and no voicemail set up.  aGvi Carbajal RN on 1/18/2021 at 1:00 PM

## 2021-01-18 NOTE — TELEPHONE ENCOUNTER
Attempted to call pt again, no answer and no voicemail set up. Pt does NOT have mychart  Gavi Carbajal RN on 1/18/2021 at 3:18 PM

## 2021-01-19 ENCOUNTER — HOSPITAL ENCOUNTER (OUTPATIENT)
Dept: CT IMAGING | Facility: CLINIC | Age: 66
Discharge: HOME | End: 2021-01-19
Attending: PHYSICIAN ASSISTANT | Admitting: PHYSICIAN ASSISTANT
Payer: COMMERCIAL

## 2021-01-19 DIAGNOSIS — N20.1 LEFT URETERAL CALCULUS: ICD-10-CM

## 2021-01-19 PROCEDURE — 74176 CT ABD & PELVIS W/O CONTRAST: CPT

## 2021-01-20 ENCOUNTER — TELEPHONE (OUTPATIENT)
Dept: UROLOGY | Facility: CLINIC | Age: 66
End: 2021-01-20

## 2021-01-20 NOTE — TELEPHONE ENCOUNTER
UROLOGY    3rd attempt today to reach the pt via phone to review CT and to notify her the ureteral stone has not made much progress. She should proceed with cysto, URS, laser litho and stent.     Sabra Nunez PA-C  [facility]rology  [phone]

## 2021-01-21 ENCOUNTER — TELEPHONE (OUTPATIENT)
Dept: UROLOGY | Facility: CLINIC | Age: 66
End: 2021-01-21

## 2021-01-21 NOTE — TELEPHONE ENCOUNTER
I spoke with pt today and she would like to talk with rickey tomorrow.  I will send rickey a message.  Pt can be reached tomorrow after noon.  OMAR V[facility]quez [facility]          ----- Message from Rickey Nunez PA-C sent at 1/20/2021  5:49 PM CST -----  Regarding: FW:  Nurses, pls attempt to get a hold of her Thurs! I tried three times today. I think she needs stone removed at this point. If there s a number to teach her, let me know and I can talk to her Fri if she wants.     ----- Message -----  From: Ade Villarreal  Sent: 1/20/2021  12:59 PM CST  To: Rickey Nunez PA-C    Have you spoken to any  docs about this yet??  ----- Message -----  From: Samantha Peng LPN  Sent: 1/20/2021  12:37 PM CST  To: [facility]      ----- Message -----  From: Rickey Nunez PA-C  Sent: 1/20/2021  12:10 PM CST  To: [facility]    I can't get a hold of her---she should have cysto, URS and stenting. Stone has not made much progress.

## 2021-01-22 ENCOUNTER — PREP FOR PROCEDURE (OUTPATIENT)
Dept: SURGERY | Facility: CLINIC | Age: 66
End: 2021-01-22

## 2021-01-22 ENCOUNTER — TELEPHONE (OUTPATIENT)
Dept: UROLOGY | Facility: CLINIC | Age: 66
End: 2021-01-22

## 2021-01-22 DIAGNOSIS — N20.1 LEFT URETERAL CALCULUS: Primary | ICD-10-CM

## 2021-01-22 DIAGNOSIS — N20.0 KIDNEY STONE: Primary | ICD-10-CM

## 2021-01-22 RX ORDER — OXYCODONE AND ACETAMINOPHEN 5; 325 MG/1; MG/1
1 TABLET ORAL EVERY 6 HOURS PRN
Qty: 15 TABLET | Refills: 0 | Status: SHIPPED | OUTPATIENT
Start: 2021-01-22 | End: 2021-01-25

## 2021-01-22 NOTE — TELEPHONE ENCOUNTER
Reviewed CT and persistent stone. Risks and benefits of cysto, left URS, laser litho and stent reviewed. She wishes to proceed. Will get her scheduled next avail at [facility].      Sabra Nunez PA-C  [facility]rology  [phone]

## 2021-01-25 DIAGNOSIS — Z11.59 ENCOUNTER FOR SCREENING FOR OTHER VIRAL DISEASES: Primary | ICD-10-CM

## 2021-01-25 PROBLEM — N20.1 LEFT URETERAL CALCULUS: Status: ACTIVE | Noted: 2021-01-25

## 2021-01-26 ENCOUNTER — TELEPHONE (OUTPATIENT)
Dept: UROLOGY | Facility: CLINIC | Age: 66
End: 2021-01-26

## 2021-01-26 NOTE — TELEPHONE ENCOUNTER
I recently saw the patient for an annual examination.  She did not request mirtazapine then.  Now I see the request for mirtazapine.  Please call to inquire if she is currently taking the medication or not how frequent.  Rest of all her meds were ordered on the annual exam visit.      Romel Mccall MD  [facility], [facility]            ETOH

## 2021-01-26 NOTE — TELEPHONE ENCOUNTER
Urology  Additional questions reviewed. Needs Covid-19 screen within 4 days of procedure with Dr. Ferreira. Again discussed Neph referral.     FELICITAS HAZEL

## 2021-01-27 ENCOUNTER — OFFICE VISIT (OUTPATIENT)
Dept: FAMILY MEDICINE | Facility: CLINIC | Age: 66
End: 2021-01-27
Payer: COMMERCIAL

## 2021-01-27 VITALS
SYSTOLIC BLOOD PRESSURE: 122 MMHG | HEART RATE: 68 BPM | BODY MASS INDEX: 26.21 KG/M2 | DIASTOLIC BLOOD PRESSURE: 78 MMHG | TEMPERATURE: 97.4 F | OXYGEN SATURATION: 100 % | WEIGHT: 167 LBS | HEIGHT: 67 IN

## 2021-01-27 DIAGNOSIS — N20.0 KIDNEY STONE: ICD-10-CM

## 2021-01-27 DIAGNOSIS — Z01.818 PREOPERATIVE EXAMINATION: Primary | ICD-10-CM

## 2021-01-27 DIAGNOSIS — E11.9 TYPE 2 DIABETES MELLITUS WITHOUT COMPLICATION, WITHOUT LONG-TERM CURRENT USE OF INSULIN (H): ICD-10-CM

## 2021-01-27 DIAGNOSIS — Z23 NEED FOR PROPHYLACTIC VACCINATION AND INOCULATION AGAINST INFLUENZA: ICD-10-CM

## 2021-01-27 DIAGNOSIS — F32.1 MAJOR DEPRESSIVE DISORDER, SINGLE EPISODE, MODERATE (H): ICD-10-CM

## 2021-01-27 LAB
ERYTHROCYTE [DISTWIDTH] IN BLOOD BY AUTOMATED COUNT: 13.1 % (ref 10–15)
HBA1C MFR BLD: 6.2 % (ref 0–5.6)
HCT VFR BLD AUTO: 37.4 % (ref 35–47)
HGB BLD-MCNC: 12.1 G/DL (ref 11.7–15.7)
MCH RBC QN AUTO: 28.9 PG (ref 26.5–33)
MCHC RBC AUTO-ENTMCNC: 32.4 G/DL (ref 31.5–36.5)
MCV RBC AUTO: 90 FL (ref 78–100)
PLATELET # BLD AUTO: 121 10E9/L (ref 150–450)
RBC # BLD AUTO: 4.18 10E12/L (ref 3.8–5.2)
WBC # BLD AUTO: 6.7 10E9/L (ref 4–11)

## 2021-01-27 PROCEDURE — 90471 IMMUNIZATION ADMIN: CPT | Performed by: FAMILY MEDICINE

## 2021-01-27 PROCEDURE — 90682 RIV4 VACC RECOMBINANT DNA IM: CPT | Performed by: FAMILY MEDICINE

## 2021-01-27 PROCEDURE — 83036 HEMOGLOBIN GLYCOSYLATED A1C: CPT | Performed by: FAMILY MEDICINE

## 2021-01-27 PROCEDURE — 90714 TD VACC NO PRESV 7 YRS+ IM: CPT | Performed by: FAMILY MEDICINE

## 2021-01-27 PROCEDURE — 85027 COMPLETE CBC AUTOMATED: CPT | Performed by: FAMILY MEDICINE

## 2021-01-27 PROCEDURE — 99214 OFFICE O/P EST MOD 30 MIN: CPT | Mod: 25 | Performed by: FAMILY MEDICINE

## 2021-01-27 PROCEDURE — 36415 COLL VENOUS BLD VENIPUNCTURE: CPT | Performed by: FAMILY MEDICINE

## 2021-01-27 PROCEDURE — 80048 BASIC METABOLIC PNL TOTAL CA: CPT | Performed by: FAMILY MEDICINE

## 2021-01-27 PROCEDURE — G0008 ADMIN INFLUENZA VIRUS VAC: HCPCS | Mod: 59 | Performed by: FAMILY MEDICINE

## 2021-01-27 ASSESSMENT — ANXIETY QUESTIONNAIRES
7. FEELING AFRAID AS IF SOMETHING AWFUL MIGHT HAPPEN: MORE THAN HALF THE DAYS
GAD7 TOTAL SCORE: 17
3. WORRYING TOO MUCH ABOUT DIFFERENT THINGS: NEARLY EVERY DAY
5. BEING SO RESTLESS THAT IT IS HARD TO SIT STILL: SEVERAL DAYS
IF YOU CHECKED OFF ANY PROBLEMS ON THIS QUESTIONNAIRE, HOW DIFFICULT HAVE THESE PROBLEMS MADE IT FOR YOU TO DO YOUR WORK, TAKE CARE OF THINGS AT HOME, OR GET ALONG WITH OTHER PEOPLE: SOMEWHAT DIFFICULT
1. FEELING NERVOUS, ANXIOUS, OR ON EDGE: NEARLY EVERY DAY
2. NOT BEING ABLE TO STOP OR CONTROL WORRYING: NEARLY EVERY DAY
6. BECOMING EASILY ANNOYED OR IRRITABLE: MORE THAN HALF THE DAYS

## 2021-01-27 ASSESSMENT — PATIENT HEALTH QUESTIONNAIRE - PHQ9
5. POOR APPETITE OR OVEREATING: NEARLY EVERY DAY
SUM OF ALL RESPONSES TO PHQ QUESTIONS 1-9: 17

## 2021-01-27 ASSESSMENT — MIFFLIN-ST. JEOR: SCORE: 1340.14
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
## 2021-01-28 LAB
ANION GAP SERPL CALCULATED.3IONS-SCNC: 6 MMOL/L (ref 3–14)
BUN SERPL-MCNC: 10 MG/DL (ref 7–30)
CALCIUM SERPL-MCNC: 9.7 MG/DL (ref 8.5–10.1)
CHLORIDE SERPL-SCNC: 113 MMOL/L (ref 94–109)
CO2 SERPL-SCNC: 24 MMOL/L (ref 20–32)
CREAT SERPL-MCNC: 0.96 MG/DL (ref 0.52–1.04)
GFR SERPL CREATININE-BSD FRML MDRD: 63 ML/MIN/{1.73_M2}
GLUCOSE SERPL-MCNC: 98 MG/DL (ref 70–99)
POTASSIUM SERPL-SCNC: 4.4 MMOL/L (ref 3.4–5.3)
SODIUM SERPL-SCNC: 143 MMOL/L (ref 133–144)

## 2021-01-28 ASSESSMENT — ANXIETY QUESTIONNAIRES: GAD7 TOTAL SCORE: 17

## 2021-01-28 ASSESSMENT — ASTHMA QUESTIONNAIRES: ACT_TOTALSCORE: 25

## 2021-01-29 NOTE — RESULT ENCOUNTER NOTE
Please call the patient to notify patient that the blood work shows normal kidney and liver functions.  Diabetes is well managed as well.  Blood counts show low platelets.  I am not sure why... It has not happened before.  I would like to recheck platelet counts on Monday.  Labs ordered.  Have her make a lab only appointment on Monday.    Her surgery is on February 4th.      Romel Mccall MD

## 2021-02-01 ENCOUNTER — OFFICE VISIT (OUTPATIENT)
Dept: FAMILY MEDICINE | Facility: CLINIC | Age: 66
End: 2021-02-01
Payer: COMMERCIAL

## 2021-02-01 ENCOUNTER — TELEPHONE (OUTPATIENT)
Dept: FAMILY MEDICINE | Facility: CLINIC | Age: 66
End: 2021-02-01

## 2021-02-01 ENCOUNTER — ANCILLARY PROCEDURE (OUTPATIENT)
Dept: GENERAL RADIOLOGY | Facility: CLINIC | Age: 66
End: 2021-02-01
Attending: FAMILY MEDICINE
Payer: COMMERCIAL

## 2021-02-01 VITALS
SYSTOLIC BLOOD PRESSURE: 110 MMHG | HEART RATE: 60 BPM | DIASTOLIC BLOOD PRESSURE: 78 MMHG | OXYGEN SATURATION: 99 % | TEMPERATURE: 98.3 F

## 2021-02-01 DIAGNOSIS — N20.0 KIDNEY STONE: ICD-10-CM

## 2021-02-01 DIAGNOSIS — N20.0 KIDNEY STONE: Primary | ICD-10-CM

## 2021-02-01 LAB
ERYTHROCYTE [DISTWIDTH] IN BLOOD BY AUTOMATED COUNT: 13.1 % (ref 10–15)
HCT VFR BLD AUTO: 40.7 % (ref 35–47)
HGB BLD-MCNC: 13.1 G/DL (ref 11.7–15.7)
MCH RBC QN AUTO: 29 PG (ref 26.5–33)
MCHC RBC AUTO-ENTMCNC: 32.2 G/DL (ref 31.5–36.5)
MCV RBC AUTO: 90 FL (ref 78–100)
PLATELET # BLD AUTO: 214 10E9/L (ref 150–450)
RBC # BLD AUTO: 4.52 10E12/L (ref 3.8–5.2)
WBC # BLD AUTO: 7.8 10E9/L (ref 4–11)

## 2021-02-01 PROCEDURE — 99214 OFFICE O/P EST MOD 30 MIN: CPT | Performed by: FAMILY MEDICINE

## 2021-02-01 PROCEDURE — 85027 COMPLETE CBC AUTOMATED: CPT | Performed by: FAMILY MEDICINE

## 2021-02-01 PROCEDURE — 74019 RADEX ABDOMEN 2 VIEWS: CPT | Performed by: RADIOLOGY

## 2021-02-01 PROCEDURE — 36415 COLL VENOUS BLD VENIPUNCTURE: CPT | Performed by: FAMILY MEDICINE

## 2021-02-01 RX ORDER — OXYCODONE AND ACETAMINOPHEN 5; 325 MG/1; MG/1
1 TABLET ORAL 2 TIMES DAILY PRN
Qty: 12 TABLET | Refills: 0 | Status: SHIPPED | OUTPATIENT
Start: 2021-02-01 | End: 2021-02-04

## 2021-02-01 NOTE — TELEPHONE ENCOUNTER
Pt calling and has urinating frequency, pressure and pain and would like to know what to do. Also calling to let you know the reason why she thinks her platelets are low. She states that she has been heavily taking naproxen and tylenol to relieve the pain of the kidney stone since November since she was not able to take pain pills. Any questions, pt can be reached at [phone]  María Elena Vazquez,

## 2021-02-01 NOTE — PROGRESS NOTES
Assessment & Plan     Kidney stone  Patient has history of left sided ureteral stone.  With her pain shifting towards the pelvic area gradually, I believe that the stone has been shifting towards the bladder.  Repeat x-ray obtained which shows the kidney stone but unable to determine the presence of the second stone and the positioning.    CBC with platelets came back normal.  Recommending to avoid using NSAIDs as her platelets were previously noted to be very low.  She has a an upcoming lithotripsy procedure scheduled for 2/4/2021.  I will try to contact the nephrologist about the current situation to see if she may need to get a CT scan to confirm the position of the stone or not.  For now Percocet ordered.  Recommending to use it cautiously.  Use Tylenol in between if needed.  Stay well-hydrated.  Patient verbalized understanding and agreement.  - CBC with platelets  - XR Abdomen 2 Views; Future  - oxyCODONE-acetaminophen (PERCOCET) 5-325 MG tablet; Take 1 tablet by mouth 2 times daily as needed for pain        Romel Mccall MD  [facility]     Grecia is a 66 year old who presents to clinic today for the following health issues     HPI       Abdominal/Flank Pain  Onset/Duration: 3-4 days   Description:   Character: Sharp  Location: lower abdominal going down pelvic region.  Now feels a lot of pressure in the pelvic area.  Not sure if the stone is moving downwards or not.  She has been using a lot of Pyridium as she thought it may benefit her but it has not made a difference.  Radiation: None  Intensity: severe  Progression of Symptoms:  worsening  Accompanying Signs & Symptoms:  Fever/Chills: no  Gas/Bloating: no  Nausea: no  Vomitting: no  Diarrhea: no  Constipation: no  Dysuria or Hematuria: no  History:   Trauma: no  Previous similar pain: no  Previous tests done: none  Precipitating factors:   Does the pain change with:     Food: no    Bowel Movement: no     Urination: YES   Other factors:  no  Therapies tried and outcome: None  Patient's last menstrual period was 01/01/1985.    She was noted to have low platelets previously.  She tells that she was using a lot of ibuprofen and Aleve.  She stopped using that.      Review of Systems   CONSTITUTIONAL: NEGATIVE for fever, chills, change in weight  ENT/MOUTH: NEGATIVE for ear, mouth and throat problems  RESP: NEGATIVE for significant cough or SOB  CV: NEGATIVE for chest pain, palpitations or peripheral edema      Objective    /78   Pulse 60   Temp 98.3  F (36.8  C) (Tympanic)   LMP 01/01/1985   SpO2 99%   There is no height or weight on file to calculate BMI.  Physical Exam   GENERAL: healthy, alert and no distress  NECK: no adenopathy, no asymmetry, masses, or scars and thyroid normal to palpation  RESP: lungs clear to auscultation - no rales, rhonchi or wheezes  CV: regular rate and rhythm, normal S1 S2, no S3 or S4, no murmur, click or rub, no peripheral edema and peripheral pulses strong  ABDOMEN: soft, nontender, no hepatosplenomegaly, no masses and bowel sounds normal  MS: no gross musculoskeletal defects noted, no edema

## 2021-02-02 ENCOUNTER — DOCUMENTATION ONLY (OUTPATIENT)
Dept: UROLOGY | Facility: CLINIC | Age: 66
End: 2021-02-02

## 2021-02-02 ENCOUNTER — TELEPHONE (OUTPATIENT)
Dept: UROLOGY | Facility: CLINIC | Age: 66
End: 2021-02-02

## 2021-02-02 NOTE — NURSING NOTE
Patient had COVID test preop.  Confirmed that it was a PCR test but was a spit specimen.  Patient only has result on phone so will bring with her for documentation when she checks in for surgery.  Test is negative.

## 2021-02-02 NOTE — TELEPHONE ENCOUNTER
Is scheduled for surgery on Thursday . Had constant urinary frequency /pressure  And pain in the vaginal  area yesterday  . Now her symptoms have resolved . She has not been straining urine but  Is questioning if she passed the stone. He primary MD did a KUB  yesterday  Please  review  and let patient know if surgery is still needed .

## 2021-02-02 NOTE — TELEPHONE ENCOUNTER
[facility] Call Center    Phone Message    May a detailed message be left on voicemail: yes     Reason for Call: Symptoms or Concerns     If patient has red-flag symptoms, warm transfer to triage line    Current symptom or concern: Pain, frequency, possible kidney stone movement    Symptoms have been present for:  5 day(s)    Has patient previously been seen for this? No    By :     Date:     Are there any new or worsening symptoms? No      Action Taken: Other: [facility]    Travel Screening: Not Applicable

## 2021-02-02 NOTE — TELEPHONE ENCOUNTER
Dr. Ferreira looked at [facility] and he can see the stone. Keep scheduled OR for Thurs.    . Patient informed. Instructed to strain urine if she passes it call office.Mabel Howard LPN

## 2021-02-03 PROBLEM — I73.9 PAD (PERIPHERAL ARTERY DISEASE) (H): Status: RESOLVED | Noted: 2021-02-03 | Resolved: 2021-02-03

## 2021-02-03 PROBLEM — I73.9 PAD (PERIPHERAL ARTERY DISEASE) (H): Status: ACTIVE | Noted: 2021-02-03

## 2021-02-03 PROBLEM — N18.30 CKD (CHRONIC KIDNEY DISEASE) STAGE 3, GFR 30-59 ML/MIN (H): Status: RESOLVED | Noted: 2019-09-11 | Resolved: 2021-02-03

## 2021-02-04 ENCOUNTER — ANESTHESIA EVENT (OUTPATIENT)
Dept: SURGERY | Facility: CLINIC | Age: 66
End: 2021-02-04
Payer: COMMERCIAL

## 2021-02-04 ENCOUNTER — NURSE TRIAGE (OUTPATIENT)
Dept: NURSING | Facility: CLINIC | Age: 66
End: 2021-02-04

## 2021-02-04 ENCOUNTER — APPOINTMENT (OUTPATIENT)
Dept: GENERAL RADIOLOGY | Facility: CLINIC | Age: 66
End: 2021-02-04
Attending: UROLOGY
Payer: COMMERCIAL

## 2021-02-04 ENCOUNTER — ANESTHESIA (OUTPATIENT)
Dept: SURGERY | Facility: CLINIC | Age: 66
End: 2021-02-04
Payer: COMMERCIAL

## 2021-02-04 ENCOUNTER — HOSPITAL ENCOUNTER (OUTPATIENT)
Facility: CLINIC | Age: 66
Discharge: HOME | End: 2021-02-04
Attending: UROLOGY | Admitting: UROLOGY
Payer: COMMERCIAL

## 2021-02-04 VITALS
SYSTOLIC BLOOD PRESSURE: 145 MMHG | TEMPERATURE: 96 F | BODY MASS INDEX: 25.9 KG/M2 | DIASTOLIC BLOOD PRESSURE: 98 MMHG | HEART RATE: 60 BPM | WEIGHT: 165 LBS | HEIGHT: 67 IN | RESPIRATION RATE: 16 BRPM | OXYGEN SATURATION: 97 %

## 2021-02-04 DIAGNOSIS — N20.1 LEFT URETERAL CALCULUS: ICD-10-CM

## 2021-02-04 LAB
COPATH REPORT: NORMAL
GLUCOSE BLDC GLUCOMTR-MCNC: 112 MG/DL (ref 70–99)

## 2021-02-04 PROCEDURE — 250N000011 HC RX IP 250 OP 636: Performed by: NURSE ANESTHETIST, CERTIFIED REGISTERED

## 2021-02-04 PROCEDURE — 88300 SURGICAL PATH GROSS: CPT | Mod: TC | Performed by: UROLOGY

## 2021-02-04 PROCEDURE — 360N000082 HC SURGERY LEVEL 2 W/ FLUORO, PER MIN: Performed by: UROLOGY

## 2021-02-04 PROCEDURE — 52332 CYSTOSCOPY AND TREATMENT: CPT | Mod: LT | Performed by: UROLOGY

## 2021-02-04 PROCEDURE — 999N000179 XR SURGERY CARM FLUORO LESS THAN 5 MIN W STILLS: Mod: TC

## 2021-02-04 PROCEDURE — 258N000001 HC RX 258: Performed by: UROLOGY

## 2021-02-04 PROCEDURE — 82365 CALCULUS SPECTROSCOPY: CPT | Performed by: UROLOGY

## 2021-02-04 PROCEDURE — C1769 GUIDE WIRE: HCPCS | Performed by: UROLOGY

## 2021-02-04 PROCEDURE — 250N000009 HC RX 250: Performed by: NURSE ANESTHETIST, CERTIFIED REGISTERED

## 2021-02-04 PROCEDURE — 710N000009 HC RECOVERY PHASE 1, LEVEL 1, PER MIN: Performed by: UROLOGY

## 2021-02-04 PROCEDURE — 999N000141 HC STATISTIC PRE-PROCEDURE NURSING ASSESSMENT: Performed by: UROLOGY

## 2021-02-04 PROCEDURE — 52352 CYSTOURETERO W/STONE REMOVE: CPT | Mod: LT | Performed by: UROLOGY

## 2021-02-04 PROCEDURE — 999N001017 HC STATISTIC GLUCOSE BY METER IP

## 2021-02-04 PROCEDURE — 710N000012 HC RECOVERY PHASE 2, PER MINUTE: Performed by: UROLOGY

## 2021-02-04 PROCEDURE — C1726 CATH, BAL DIL, NON-VASCULAR: HCPCS | Performed by: UROLOGY

## 2021-02-04 PROCEDURE — 88300 SURGICAL PATH GROSS: CPT | Mod: 26 | Performed by: PATHOLOGY

## 2021-02-04 PROCEDURE — 93010 ELECTROCARDIOGRAM REPORT: CPT | Performed by: INTERNAL MEDICINE

## 2021-02-04 PROCEDURE — 370N000017 HC ANESTHESIA TECHNICAL FEE, PER MIN: Performed by: UROLOGY

## 2021-02-04 PROCEDURE — 258N000003 HC RX IP 258 OP 636: Performed by: ANESTHESIOLOGY

## 2021-02-04 PROCEDURE — 250N000011 HC RX IP 250 OP 636: Performed by: UROLOGY

## 2021-02-04 PROCEDURE — C2617 STENT, NON-COR, TEM W/O DEL: HCPCS | Performed by: UROLOGY

## 2021-02-04 PROCEDURE — 272N000001 HC OR GENERAL SUPPLY STERILE: Performed by: UROLOGY

## 2021-02-04 DEVICE — STENT URETERAL POLARIS ULTRA 5FRX24CM M0061921220: Type: IMPLANTABLE DEVICE | Site: URETER | Status: FUNCTIONAL

## 2021-02-04 RX ORDER — SODIUM CHLORIDE, SODIUM LACTATE, POTASSIUM CHLORIDE, CALCIUM CHLORIDE 600; 310; 30; 20 MG/100ML; MG/100ML; MG/100ML; MG/100ML
INJECTION, SOLUTION INTRAVENOUS CONTINUOUS
Status: DISCONTINUED | OUTPATIENT
Start: 2021-02-04 | End: 2021-02-04 | Stop reason: HOSPADM

## 2021-02-04 RX ORDER — FENTANYL CITRATE 50 UG/ML
INJECTION, SOLUTION INTRAMUSCULAR; INTRAVENOUS PRN
Status: DISCONTINUED | OUTPATIENT
Start: 2021-02-04 | End: 2021-02-04

## 2021-02-04 RX ORDER — NALOXONE HYDROCHLORIDE 0.4 MG/ML
0.2 INJECTION, SOLUTION INTRAMUSCULAR; INTRAVENOUS; SUBCUTANEOUS
Status: DISCONTINUED | OUTPATIENT
Start: 2021-02-04 | End: 2021-02-04 | Stop reason: HOSPADM

## 2021-02-04 RX ORDER — FENTANYL CITRATE 50 UG/ML
50 INJECTION, SOLUTION INTRAMUSCULAR; INTRAVENOUS ONCE
Status: COMPLETED | OUTPATIENT
Start: 2021-02-04 | End: 2021-02-04

## 2021-02-04 RX ORDER — LABETALOL 20 MG/4 ML (5 MG/ML) INTRAVENOUS SYRINGE
10
Status: DISCONTINUED | OUTPATIENT
Start: 2021-02-04 | End: 2021-02-04 | Stop reason: HOSPADM

## 2021-02-04 RX ORDER — CIPROFLOXACIN 500 MG/1
500 TABLET, FILM COATED ORAL ONCE
Qty: 1 TABLET | Refills: 0 | Status: SHIPPED | OUTPATIENT
Start: 2021-02-04 | End: 2021-02-04

## 2021-02-04 RX ORDER — FENTANYL CITRATE 50 UG/ML
25-50 INJECTION, SOLUTION INTRAMUSCULAR; INTRAVENOUS
Status: DISCONTINUED | OUTPATIENT
Start: 2021-02-04 | End: 2021-02-04 | Stop reason: HOSPADM

## 2021-02-04 RX ORDER — NALOXONE HYDROCHLORIDE 0.4 MG/ML
0.4 INJECTION, SOLUTION INTRAMUSCULAR; INTRAVENOUS; SUBCUTANEOUS
Status: DISCONTINUED | OUTPATIENT
Start: 2021-02-04 | End: 2021-02-04 | Stop reason: HOSPADM

## 2021-02-04 RX ORDER — ONDANSETRON 4 MG/1
4 TABLET, ORALLY DISINTEGRATING ORAL EVERY 30 MIN PRN
Status: DISCONTINUED | OUTPATIENT
Start: 2021-02-04 | End: 2021-02-04 | Stop reason: HOSPADM

## 2021-02-04 RX ORDER — DEXAMETHASONE SODIUM PHOSPHATE 4 MG/ML
INJECTION, SOLUTION INTRA-ARTICULAR; INTRALESIONAL; INTRAMUSCULAR; INTRAVENOUS; SOFT TISSUE PRN
Status: DISCONTINUED | OUTPATIENT
Start: 2021-02-04 | End: 2021-02-04

## 2021-02-04 RX ORDER — MEPERIDINE HYDROCHLORIDE 25 MG/ML
12.5 INJECTION INTRAMUSCULAR; INTRAVENOUS; SUBCUTANEOUS
Status: DISCONTINUED | OUTPATIENT
Start: 2021-02-04 | End: 2021-02-04 | Stop reason: HOSPADM

## 2021-02-04 RX ORDER — ONDANSETRON 2 MG/ML
INJECTION INTRAMUSCULAR; INTRAVENOUS PRN
Status: DISCONTINUED | OUTPATIENT
Start: 2021-02-04 | End: 2021-02-04

## 2021-02-04 RX ORDER — CEFAZOLIN SODIUM 1 G/3ML
1 INJECTION, POWDER, FOR SOLUTION INTRAMUSCULAR; INTRAVENOUS SEE ADMIN INSTRUCTIONS
Status: DISCONTINUED | OUTPATIENT
Start: 2021-02-04 | End: 2021-02-04 | Stop reason: HOSPADM

## 2021-02-04 RX ORDER — GLYCOPYRROLATE 0.2 MG/ML
INJECTION, SOLUTION INTRAMUSCULAR; INTRAVENOUS PRN
Status: DISCONTINUED | OUTPATIENT
Start: 2021-02-04 | End: 2021-02-04

## 2021-02-04 RX ORDER — PROPOFOL 10 MG/ML
INJECTION, EMULSION INTRAVENOUS PRN
Status: DISCONTINUED | OUTPATIENT
Start: 2021-02-04 | End: 2021-02-04

## 2021-02-04 RX ORDER — CEFAZOLIN SODIUM 2 G/100ML
2 INJECTION, SOLUTION INTRAVENOUS
Status: COMPLETED | OUTPATIENT
Start: 2021-02-04 | End: 2021-02-04

## 2021-02-04 RX ORDER — ONDANSETRON 2 MG/ML
4 INJECTION INTRAMUSCULAR; INTRAVENOUS EVERY 30 MIN PRN
Status: DISCONTINUED | OUTPATIENT
Start: 2021-02-04 | End: 2021-02-04 | Stop reason: HOSPADM

## 2021-02-04 RX ORDER — LIDOCAINE HYDROCHLORIDE 10 MG/ML
INJECTION, SOLUTION INFILTRATION; PERINEURAL PRN
Status: DISCONTINUED | OUTPATIENT
Start: 2021-02-04 | End: 2021-02-04

## 2021-02-04 RX ADMIN — MIDAZOLAM 2 MG: 1 INJECTION INTRAMUSCULAR; INTRAVENOUS at 13:58

## 2021-02-04 RX ADMIN — GLYCOPYRROLATE 0.2 MG: 0.2 INJECTION, SOLUTION INTRAMUSCULAR; INTRAVENOUS at 14:03

## 2021-02-04 RX ADMIN — FENTANYL CITRATE 100 MCG: 50 INJECTION, SOLUTION INTRAMUSCULAR; INTRAVENOUS at 14:03

## 2021-02-04 RX ADMIN — DEXAMETHASONE SODIUM PHOSPHATE 4 MG: 4 INJECTION, SOLUTION INTRA-ARTICULAR; INTRALESIONAL; INTRAMUSCULAR; INTRAVENOUS; SOFT TISSUE at 14:03

## 2021-02-04 RX ADMIN — CEFAZOLIN SODIUM 2 G: 2 INJECTION, SOLUTION INTRAVENOUS at 14:00

## 2021-02-04 RX ADMIN — LIDOCAINE HYDROCHLORIDE 50 MG: 10 INJECTION, SOLUTION INFILTRATION; PERINEURAL at 14:03

## 2021-02-04 RX ADMIN — FENTANYL CITRATE 50 MCG: 50 INJECTION, SOLUTION INTRAMUSCULAR; INTRAVENOUS at 12:35

## 2021-02-04 RX ADMIN — PROPOFOL 200 MG: 10 INJECTION, EMULSION INTRAVENOUS at 14:03

## 2021-02-04 RX ADMIN — ONDANSETRON HYDROCHLORIDE 4 MG: 2 INJECTION, SOLUTION INTRAVENOUS at 14:03

## 2021-02-04 RX ADMIN — SODIUM CHLORIDE, POTASSIUM CHLORIDE, SODIUM LACTATE AND CALCIUM CHLORIDE: 600; 310; 30; 20 INJECTION, SOLUTION INTRAVENOUS at 13:20

## 2021-02-04 ASSESSMENT — ENCOUNTER SYMPTOMS
DYSRHYTHMIAS: 0
SEIZURES: 0

## 2021-02-04 ASSESSMENT — COPD QUESTIONNAIRES: COPD: 0

## 2021-02-04 ASSESSMENT — MIFFLIN-ST. JEOR: SCORE: 1331.07

## 2021-02-04 NOTE — ANESTHESIA PROCEDURE NOTES
Airway   Date/Time: 2/4/2021 2:05 PM   Patient location during procedure: OR  Staff -   Anesthesiologist:  Lonny Noble MD  CRNA: Carolynn Sharp APRN CRNA  Performed By: CRNA    Indications and Patient Condition  Indications for airway management: reginaldo-procedural  Induction type:intravenousMask difficulty assessment: 1 - vent by mask    Final Airway Details  Final airway type: supraglottic airway    Endotracheal Airway Details   Secured with: plastic tape    Post intubation assessment   Placement verified by: capnometry, equal breath sounds and chest rise   Number of attempts at approach: 1  Number of other approaches attempted: 0  Secured with:plastic tape  Ease of procedure: easy  Dentition: Intact and Unchanged

## 2021-02-04 NOTE — TELEPHONE ENCOUNTER
Caller is complaining of severe pain due to kidney stones and is scheduled to have the procedure done for removal at 2pm on 02/02/21. Caller wants to know if she can take tylenol with sips of water for the severe pain. Caller is requesting to move procedure time up. Triager advised caller to call back at 0730 to speak to the imagining department. Caller verbalized and understands directives.  COVID 19 Nurse Triage Plan/Patient Instructions    Please be aware that novel coronavirus (COVID-19) may be circulating in the community. If you develop symptoms such as fever, cough, or SOB or if you have concerns about the presence of another infection including coronavirus (COVID-19), please contact your health care provider or visit [email or web address].     Disposition/Instructions    Home care recommended. Follow home care protocol based instructions.    Thank you for taking steps to prevent the spread of this virus.  o Limit your contact with others.  o Wear a simple mask to cover your cough.  o Wash your hands well and often.    Resources    [facility]: About COVID-19: [email or web address]    CDC: What to Do If You're Sick: www.cdc.gov/coronavirus/2019-ncov/about/steps-when-sick.html    CDC: Ending Home Isolation: www.cdc.gov/coronavirus/2019-ncov/hcp/disposition-in-home-patients.html     CDC: Caring for Someone: www.cdc.gov/coronavirus/2019-ncov/if-you-are-sick/care-for-someone.html     [facility]: Interim Guidance for Hospital Discharge to Home: www.health.[facility].mn.us/diseases/coronavirus/hcp/hospdischarge.pdf    [facility] clinical trials (COVID-19 research studies): [email or web address].[facility]/um-clinical-trials     Below are the COVID-19 hotlines at the [place] [facility] ([facility]). Interpreters are available.   o For health questions: Call 872-914-9346 or 1-932.228.9686 (7 a.m. to 7 p.m.)  o For questions about schools and childcare: Call 030-078-7962 or 1-408.873.4719 (7 a.m. to 7 p.m.)  "                    Reason for Disposition    [1] SEVERE pain (e.g., excruciating) AND [2] present > 1 hour    Additional Information    Negative: Shock suspected (e.g., cold/pale/clammy skin, too weak to stand, low BP, rapid pulse)    Negative: Difficult to awaken or acting confused (e.g., disoriented, slurred speech)    Negative: Passed out (i.e., lost consciousness, collapsed and was not responding)    Negative: Sounds like a life-threatening emergency to the triager    Negative: Chest pain    Negative: Pain is mainly in upper abdomen  (if needed ask: \"is it mainly above the belly button?\")    Negative: Followed an abdomen (stomach) injury    Negative: [1] Abdominal pain AND [2] pregnant < 20 weeks    Negative: [1] Abdominal pain AND [2] pregnant > 20 weeks    Negative: [1] Abdominal pain AND [2] postpartum (from 0 to 6 weeks after delivery)    Protocols used: ABDOMINAL PAIN - FEMALE-A-      "

## 2021-02-04 NOTE — DISCHARGE INSTRUCTIONS
GENERAL ANESTHESIA OR SEDATION ADULT DISCHARGE INSTRUCTIONS   SPECIAL PRECAUTIONS FOR 24 HOURS AFTER SURGERY    IT IS NOT UNUSUAL TO FEEL LIGHT-HEADED OR FAINT, UP TO 24 HOURS AFTER SURGERY OR WHILE TAKING PAIN MEDICATION.  IF YOU HAVE THESE SYMPTOMS; SIT FOR A FEW MINUTES BEFORE STANDING AND HAVE SOMEONE ASSIST YOU WHEN YOU GET UP TO WALK OR USE THE BATHROOM.    YOU SHOULD REST AND RELAX FOR THE NEXT 24 HOURS AND YOU MUST MAKE ARRANGEMENTS TO HAVE SOMEONE STAY WITH YOU FOR AT LEAST 24 HOURS AFTER YOUR DISCHARGE.  AVOID HAZARDOUS AND STRENUOUS ACTIVITIES.  DO NOT MAKE IMPORTANT DECISIONS FOR 24 HOURS.    DO NOT DRIVE ANY VEHICLE OR OPERATE MECHANICAL EQUIPMENT FOR 24 HOURS FOLLOWING THE END OF YOUR SURGERY.  EVEN THOUGH YOU MAY FEEL NORMAL, YOUR REACTIONS MAY BE AFFECTED BY THE MEDICATION YOU HAVE RECEIVED.    DO NOT DRINK ALCOHOLIC BEVERAGES FOR 24 HOURS FOLLOWING YOUR SURGERY.    DRINK CLEAR LIQUIDS (APPLE JUICE, GINGER ALE, 7-UP, BROTH, ETC.).  PROGRESS TO YOUR REGULAR DIET AS YOU FEEL ABLE.    YOU MAY HAVE A DRY MOUTH, A SORE THROAT, MUSCLES ACHES OR TROUBLE SLEEPING.  THESE SHOULD GO AWAY AFTER 24 HOURS.    CALL YOUR DOCTOR FOR ANY OF THE FOLLOWING:  SIGNS OF INFECTION (FEVER, GROWING TENDERNESS AT THE SURGERY SITE, A LARGE AMOUNT OF DRAINAGE OR BLEEDING, SEVERE PAIN, FOUL-SMELLING DRAINAGE, REDNESS OR SWELLING.    IT HAS BEEN OVER 8 TO 10 HOURS SINCE SURGERY AND YOU ARE STILL NOT ABLE TO URINATE (PASS WATER).     CYSTOSCOPY DISCHARGE INSTRUCTIONS  [facility]  [address]  [phone]    YOU MAY GO BACK TO YOUR NORMAL DIET AND ACTIVITY, UNLESS YOUR DOCTOR TELLS YOU NOT TO.    FOR THE NEXT TWO DAYS, YOU MAY NOTICE:    SOME BLOOD IN YOUR URINE.  SOME BURNING WHEN YOU URINATE.  AN URGE TO URINATE MORE OFTEN.  BLADDER SPASMS.    THESE ARE NORMAL AFTER THE PROCEDURE.  THEY SHOULD GO AWAY AFTER A DAY OR TWO.  TO RELIEVE THESE PROBLEMS:     DRINK 6 TO 8 LARGE GLASSES OF WATER EACH DAY  (INCLUDES DRINKS AT MEALS).  THIS WILL HELP CLEAR THE URINE.    TAKE WARM BATHS TO RELIEVE PAIN AND BLADDER SPASMS.  DO NOT ADD ANYTHING TO THE BATH WATER.    YOUR DOCTOR MAY PRESCRIBE PAIN MEDICINE.  YOU MAY ALSO TAKE TYLENOL (ACETAMINOPHEN) FOR PAIN.    CALL YOUR SURGEON IF YOU HAVE:    A FEVER OVER 101 DEGREES.  CHECK YOUR TEMPERATURE UNDER YOUR TONGUE.    CHILLS.    FAILURE TO URINATE (NO URINE COMES OUT WHEN YOU TRY TO USE THE TOILET).  TRY SOAKING IN A BATHTUB FULL OF WARM WATER.  IF STILL NO URINE, CALL YOUR DOCTOR.    A LOT OF BLOOD IN THE URINE, OR BLOOD CLOTS LARGER THAN A NICKEL.      PAIN IN THE BACK OR BELLY AREA (ABDOMEN).    PAIN OR SPASMS THAT ARE NOT RELIEVED BY WARM TUB BATHS AND PAIN MEDICINE.      SEVERE PAIN, BURNING OR OTHER PROBLEMS WHILE PASSING URINE.    PAIN THAT GETS WORSE AFTER TWO DAYS.

## 2021-02-04 NOTE — ANESTHESIA POSTPROCEDURE EVALUATION
Patient: Grecia Kilgore    Procedure(s):  Cystoscopy, left ureteroscopy, balloon dilation and ureteral stent insertion, laser standby    Diagnosis:Left ureteral calculus [N20.1]  Diagnosis Additional Information: No value filed.    Anesthesia Type:  General    Note:     Postop Pain Control: Uneventful            Sign Out: Well controlled pain   PONV: No   Neuro/Psych: Uneventful            Sign Out: Acceptable/Baseline neuro status   Airway/Respiratory: Uneventful            Sign Out: Acceptable/Baseline resp. status   CV/Hemodynamics: Uneventful            Sign Out: Acceptable CV status   Other NRE: NONE   DID A NON-ROUTINE EVENT OCCUR?          Last vitals:  Vitals:    02/04/21 1546 02/04/21 1556 02/04/21 1612   BP: (!) 169/97 (!) 157/95 (!) 145/98   Pulse:      Resp: 16 16    Temp: 96  F (35.6  C)  96  F (35.6  C)   SpO2: 98%  97%       Last vitals prior to Anesthesia Care Transfer:  CRNA VITALS  2/4/2021 1411 - 2/4/2021 1511      2/4/2021             SpO2:  100 %    Resp Rate (observed):  (!) 5          Electronically Signed By: Roman Tsang MD  February 4, 2021  4:20 PM

## 2021-02-04 NOTE — ANESTHESIA CARE TRANSFER NOTE
Patient: Grecia P Kilgore    Procedure(s):  Cystoscopy, left ureteroscopy, balloon dilation and ureteral stent insertion, laser standby    Diagnosis: Left ureteral calculus [N20.1]  Diagnosis Additional Information: No value filed.    Anesthesia Type:   General     Note:    Oropharynx: oropharynx clear of all foreign objects and spontaneously breathing  Level of Consciousness: awake  Oxygen Supplementation: face mask  Level of Supplemental Oxygen (L/min / FiO2): 6 lpm  Independent Airway: airway patency satisfactory and stable  Dentition: dentition unchanged  Vital Signs Stable: post-procedure vital signs reviewed and stable  Report to RN Given: handoff report given  Patient transferred to: PACU  Comments: Patient with spontaneous respirations and adequate tidal volumes. Patient awake and responsive. LMA removed in OR to 6L facemask. To PACU ventilating well. VSS. Report given.    Handoff Report: Identifed the Patient, Identified the Reponsible Provider, Reviewed the pertinent medical history, Discussed the surgical course, Reviewed Intra-OP anesthesia mangement and issues during anesthesia, Set expectations for post-procedure period and Allowed opportunity for questions and acknowledgement of understanding      Vitals: (Last set prior to Anesthesia Care Transfer)  CRNA VITALS  2/4/2021 1411 - 2/4/2021 1447      2/4/2021             SpO2:  100 %    Resp Rate (observed):  (!) 5        Electronically Signed By: RALPH Sheehan CRNA  February 4, 2021  2:47 PM
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
## 2021-02-04 NOTE — OP NOTE
Procedure Date: 02/04/2021      PREOPERATIVE DIAGNOSIS:  Distal left ureteral calculus.      POSTOPERATIVE DIAGNOSIS:  Distal left ureteral calculus.      PROCEDURE PERFORMED:  Video cystoscopy, balloon dilation of left ureter, left ureteroscopy with stone extraction, standby laser, left double-J stent placement (5-Chadian x 24 cm).      SURGEON:  Craig Ferreira MD      ANESTHESIA:  General laryngeal mask.      ESTIMATED BLOOD LOSS:  Less than 5 mL      INDICATIONS FOR PROCEDURE:  The patient is a 66-year-old female with no previous stones.  She has left flank pain and has a 5 mm stone in the distal left ureter on followup KUB.  She has normal renal function and normal serum calcium level.  Urine pH has been 5.5.  She also has a left renal stone in the mid portion of the kidney.  The procedure, the alternatives, risks and follow-up were carefully discussed.      DESCRIPTION OF THE PROCEDURE:  The patient was given 2 grams of IV Ancef and taken to cystoscopy.  She was placed supine on the operating table and administered a general laryngeal mask anesthetic.  She was then placed in lithotomy and her genitalia were prepped and draped in a sterile fashion.  A #22 Chadian Storz cystoscope with obturator was gently introduced into the bladder and residual urine was clear.  The bladder was examined with a 30-degree lens and video using water as an irrigant and revealed a normal urethra and bladder mucosa, no stones.  The left ureteral orifice was identified, and I passed a Glidewire easily up the left ureter under fluoroscopy until it curled in the left renal pelvis.  I then passed a 6 mm balloon catheter over the Glidewire across the ureteral orifice and intramural ureter and dilated the area for 20 seconds using gentle thumb pressure.  I deflated the balloon and removed the balloon catheter, leaving the Glidewire as a safety wire.  I removed the cystoscope and passed the 6.9 Chadian ureteroscope into the bladder and up the  ureter.  I had to use thumb pressure to get by the ureterovesical junction and just above that was her stone.  I intended to use the 365 micron holmium laser fiber at 4 holloway, but when I turned on the irrigation, the stone moved cephalad and I abandoned the laser and basketed the stone mid ureter and removed this and sent it for stone analysis.  I repassed the ureteroscope up the ureter and saw no other stones, fragments, perforations or bleeding.  I passed the ureteroscope up into the left renal pelvis, where I saw no stones.  I was able to see one of the midpole calices with no stones.  Under fluoroscopy, I could not see her renal stone plainly.  I removed the ureteroscope under direct vision.  I backloaded the Glidewire onto the cystoscope and passed the cystoscope into the bladder and placed a 5-Belarusian x 24 cm Polaris stent over the Glidewire into good position.  The Glidewire was removed and the stent curled nicely in the left kidney and in the bladder with efflux of clear urine.  Estimated blood loss was less than 5 mL.  The bladder was emptied and the cystoscope removed.  The patient went to recovery in stable condition and will receive some IV Toradol there and be discharged on Cipro 500 mg x1 dose in the morning.  She will follow-up with me in 12-14 days for stent removal and to review her stone analysis.         JARROD BRYAN JR, MD             D: 2021   T: 2021   MT: [id number]      Name:     EDIL STEVENSON   MRN:      [id number]        Account:        [id number]   :      1955           Procedure Date: 2021      Document: [id number]       cc: Romel Bryan Jr, MD

## 2021-02-05 LAB — INTERPRETATION ECG - MUSE: NORMAL

## 2021-02-06 LAB
APPEARANCE STONE: NORMAL
COMPN STONE: NORMAL
NUMBER STONE: 1
SIZE STONE: NORMAL MM
WT STONE: 22 MG

## 2021-02-10 ENCOUNTER — TELEPHONE (OUTPATIENT)
Dept: FAMILY MEDICINE | Facility: CLINIC | Age: 66
End: 2021-02-10

## 2021-02-10 ENCOUNTER — TELEPHONE (OUTPATIENT)
Dept: UROLOGY | Facility: CLINIC | Age: 66
End: 2021-02-10

## 2021-02-10 DIAGNOSIS — N20.0 CALCULUS OF KIDNEY: Primary | ICD-10-CM

## 2021-02-10 DIAGNOSIS — N32.89 BLADDER SPASM: ICD-10-CM

## 2021-02-10 RX ORDER — OXYBUTYNIN CHLORIDE 5 MG/1
5 TABLET, EXTENDED RELEASE ORAL DAILY
Qty: 2 TABLET | Refills: 0 | Status: SHIPPED | OUTPATIENT
Start: 2021-02-10 | End: 2021-03-03

## 2021-02-10 NOTE — TELEPHONE ENCOUNTER
"Spoke with the patient. She states that the \"lips down there\" (labia) are burning because she is peeing so often.     Patient called urology earlier today and was prescribed Ditropan. Patient states that she has been unable to get to the pharmacy to  the prescription because she has to pee every 3 seconds. She states that she does not have any pad that she can wear to the store and has no one she can call. The pharmacy does not deliver.     Patient agrees to call the urologist office back for advisement. She agrees to try barrier cream on labia to protect her skin from her frequent urination.  Clarissa Roman RN    "

## 2021-02-10 NOTE — TELEPHONE ENCOUNTER
Routing to triage to follow up.    .Josette CONROY    [facility] [place]

## 2021-02-10 NOTE — TELEPHONE ENCOUNTER
Reason for call:  Patient reporting a symptom    Symptom or request: Symptoms     Duration (how long have symptoms been present): on going    Have you been treated for this before? Yes    Additional comments: Pt had a kidney stone removed and she is starting to feel a burning sensation down  and would liek to see if her PCP can send in a Rx for a possible UTI    Phone Number patient can be reached at:  Home number on file [phone] (home)    Best Time:  anytime    Can we leave a detailed message on this number:  YES    Call taken on 2/10/2021 at 12:08 PM by Stuart Cutler

## 2021-02-10 NOTE — TELEPHONE ENCOUNTER
[facility] Call [facility]    Phone Message    May a detailed message be left on voicemail: yes     Reason for Call: Symptoms or Concerns     If patient has red-flag symptoms, warm transfer to triage line    Current symptom or concern: Spasms and pain running from left kidney to lower stomach - Surgery date 2/4/21    Symptoms have been present for:  4 day(s)    Has patient previously been seen for this? No    By :     Date:     Are there any new or worsening symptoms? Yes: Worsening since Saturday      Action Taken: Message routed to:  Other: [facility] uro    Travel Screening: Not Applicable                                                                      
Returned call to [facility]. She has not yet had a chance to start the Ditropan she was prescribed. Advice is to push water intake; sitz baths in clear water, avoid bladder irritants. Stent will be removed tomorrow morning.  MAME Mathur RN       [facility] Call Center    Phone Message    May a detailed message be left on voicemail: yes     Reason for Call: Other: PT called again reporting that she is also experiencing burning and pain due to constant urination. PT is requesting a call back ASAP to discuss symptoms.      Action Taken: Message routed to:  Other: Urology    Travel Screening: Not Applicable                                                                      
She is requesting  Medication for the symptoms  Ditropan 5 mg   2 pills  To take one today and one tomorrow. Stent is set to come out tomorrow .Mabel Howard LPN   
significant other

## 2021-02-11 ENCOUNTER — TELEPHONE (OUTPATIENT)
Dept: UROLOGY | Facility: CLINIC | Age: 66
End: 2021-02-11

## 2021-02-11 ENCOUNTER — OFFICE VISIT (OUTPATIENT)
Dept: UROLOGY | Facility: CLINIC | Age: 66
End: 2021-02-11
Payer: COMMERCIAL

## 2021-02-11 VITALS
HEIGHT: 66 IN | BODY MASS INDEX: 26.84 KG/M2 | SYSTOLIC BLOOD PRESSURE: 124 MMHG | WEIGHT: 167 LBS | DIASTOLIC BLOOD PRESSURE: 80 MMHG

## 2021-02-11 DIAGNOSIS — N20.1 LEFT URETERAL CALCULUS: Primary | ICD-10-CM

## 2021-02-11 DIAGNOSIS — Z79.2 PROPHYLACTIC ANTIBIOTIC: ICD-10-CM

## 2021-02-11 LAB
ALBUMIN UR-MCNC: >=300 MG/DL
APPEARANCE UR: ABNORMAL
BILIRUB UR QL STRIP: ABNORMAL
COLOR UR AUTO: ABNORMAL
GLUCOSE UR STRIP-MCNC: 100 MG/DL
HGB UR QL STRIP: ABNORMAL
KETONES UR STRIP-MCNC: NEGATIVE MG/DL
LEUKOCYTE ESTERASE UR QL STRIP: NEGATIVE
NITRATE UR QL: POSITIVE
PH UR STRIP: 6 PH (ref 5–7)
SOURCE: ABNORMAL
SP GR UR STRIP: >1.03 (ref 1–1.03)
UROBILINOGEN UR STRIP-ACNC: 1 EU/DL (ref 0.2–1)

## 2021-02-11 PROCEDURE — 81003 URINALYSIS AUTO W/O SCOPE: CPT | Mod: QW | Performed by: UROLOGY

## 2021-02-11 PROCEDURE — 52310 CYSTOSCOPY AND TREATMENT: CPT | Performed by: UROLOGY

## 2021-02-11 RX ORDER — CIPROFLOXACIN 500 MG/1
500 TABLET, FILM COATED ORAL ONCE
Qty: 1 TABLET | Refills: 0 | Status: SHIPPED | OUTPATIENT
Start: 2021-02-11 | End: 2021-02-11

## 2021-02-11 RX ORDER — LIDOCAINE HYDROCHLORIDE 20 MG/ML
JELLY TOPICAL ONCE
Status: COMPLETED | OUTPATIENT
Start: 2021-02-11 | End: 2021-02-11

## 2021-02-11 RX ADMIN — LIDOCAINE HYDROCHLORIDE: 20 JELLY TOPICAL at 09:21

## 2021-02-11 ASSESSMENT — PAIN SCALES - GENERAL: PAINLEVEL: NO PAIN (0)

## 2021-02-11 ASSESSMENT — MIFFLIN-ST. JEOR: SCORE: 1324.26
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
## 2021-02-11 NOTE — TELEPHONE ENCOUNTER
[facility] Call Center    Phone Message    May a detailed message be left on voicemail: yes     Reason for Call: Other: Pt was wondering what kind of nuts she can and cannot eat, she said she forgot. Please call back at .     Action Taken: Message routed to:  [facility] Urology    Travel Screening: Not Applicable

## 2021-02-11 NOTE — NURSING NOTE
Chief Complaint   Patient presents with     Ureteral Stone     Stent removal     Prior to the start of the procedure and with procedural staff participation, I verbally confirmed the patient s identity using two indicators, relevant allergies, that the procedure was appropriate and matched the consent or emergent situation, and that the correct equipment/implants were available. Immediately prior to starting the procedure I conducted the Time Out with the procedural staff and re-confirmed the patient s name, procedure, and site/side. I have wiped the patient off with the povidone-Iodine solution, draped them, and used Lidocaine hydrochloride jelly. (The Joint Commission universal protocol was followed.)  Yes    Sedation (Moderate or Deep): None    5mL 2% lidocaine hydrochloride Urojet instilled into urethra.    NDC# [address]-7  Lot #: [id number]  Expiration Date:  08/22    Aimee Flores, EMT

## 2021-02-11 NOTE — PATIENT INSTRUCTIONS
"AFTER YOUR CYSTOSCOPY AND STENT REMOVAL  ?  ?  You have just completed a cystoscopy, or \"cysto\", which allowed your physician to learn more about your bladder (or to remove a stent placed after surgery). We suggest that you continue to avoid caffeine, fruit juice, and alcohol for the next 24 hours, however, you are encouraged to return to your normal activities.  ?  ?  A few things that are considered normal after your cystoscopy:  ?  * small amount of bleeding (or spotting) that clears within the next 24 hours  ?  * slight burning sensation with urination  ?  * sensation of needing to void (urinate) more frequently  ?  * the feeling of \"air\" in your urine  ?  * mild discomfort that is relieved with Tylenol    * bladder spasms  ?  ?  ?  Please contact our office promptly if you:  ?  * develop a fever above 101 degrees  ?  * are unable to urinate  ?  * develop bright red blood that does not stop  ?  * experience severe pain or swelling  ?  ?  ?  And of course, please contact our office with any concerns or questions [phone]  ?      "

## 2021-02-17 NOTE — TELEPHONE ENCOUNTER
Returned phone call and informed her per MD-Lin Lincoln.   Patient also reports today that she is having urinary frequency and pressure after stent removal. Will send message to MD to Advise.    Samantha Peng LPN

## 2021-02-18 NOTE — TELEPHONE ENCOUNTER
Per MD-Try pryridium 200mg every12 hours. Willturn urine orange.   Stop caffeine, chocolate and any alcohol until better.    Called patient and informed her of this advice. Patient expressed understanding. She is aware this script can be found over the counter.     Samantha Peng LPN

## 2021-02-22 ENCOUNTER — TELEPHONE (OUTPATIENT)
Dept: UROLOGY | Facility: CLINIC | Age: 66
End: 2021-02-22

## 2021-02-22 DIAGNOSIS — R35.0 URINARY FREQUENCY: Primary | ICD-10-CM

## 2021-02-22 NOTE — TELEPHONE ENCOUNTER
PT STATES SHE IS HAVING PRESSURE AND FREQ.  I PUT [facility] ORDERS IN AND GAVE HER THE NUMBER FOR [facility] [place].  OMAR Vázquez CMA

## 2021-02-22 NOTE — TELEPHONE ENCOUNTER
[facility] Call Center    Phone Message    May a detailed message be left on voicemail: yes     Reason for Call: Symptoms or Concerns     If patient has red-flag symptoms, warm transfer to triage line    Current symptom or concern: pt stated she has a UTI, she feels a lot of pressure, please call pt thank you    Symptoms have been present for: a few days    Has patient previously been seen for this? No    By : n/a    Date: n/a    Are there any new or worsening symptoms? Yes: new      Action Taken: Message routed to:  [facility] uro    Travel Screening: Not Applicable

## 2021-02-24 DIAGNOSIS — R35.0 URINARY FREQUENCY: ICD-10-CM

## 2021-02-24 LAB
ALBUMIN UR-MCNC: NEGATIVE MG/DL
APPEARANCE UR: CLEAR
BILIRUB UR QL STRIP: NEGATIVE
COLOR UR AUTO: YELLOW
GLUCOSE UR STRIP-MCNC: NEGATIVE MG/DL
HGB UR QL STRIP: ABNORMAL
KETONES UR STRIP-MCNC: NEGATIVE MG/DL
LEUKOCYTE ESTERASE UR QL STRIP: NEGATIVE
NITRATE UR QL: NEGATIVE
PH UR STRIP: 6 PH (ref 5–7)
SOURCE: ABNORMAL
SP GR UR STRIP: >1.03 (ref 1–1.03)
UROBILINOGEN UR STRIP-ACNC: 0.2 EU/DL (ref 0.2–1)

## 2021-02-24 PROCEDURE — 87086 URINE CULTURE/COLONY COUNT: CPT | Performed by: STUDENT IN AN ORGANIZED HEALTH CARE EDUCATION/TRAINING PROGRAM

## 2021-02-24 PROCEDURE — 81003 URINALYSIS AUTO W/O SCOPE: CPT | Performed by: STUDENT IN AN ORGANIZED HEALTH CARE EDUCATION/TRAINING PROGRAM

## 2021-02-26 LAB
BACTERIA SPEC CULT: NORMAL
Lab: NORMAL
SPECIMEN SOURCE: NORMAL

## 2021-02-27 ENCOUNTER — HOSPITAL ENCOUNTER (EMERGENCY)
Facility: CLINIC | Age: 66
Discharge: HOME | End: 2021-02-27
Attending: EMERGENCY MEDICINE | Admitting: EMERGENCY MEDICINE
Payer: COMMERCIAL

## 2021-02-27 ENCOUNTER — APPOINTMENT (OUTPATIENT)
Dept: ULTRASOUND IMAGING | Facility: CLINIC | Age: 66
End: 2021-02-27
Attending: EMERGENCY MEDICINE
Payer: COMMERCIAL

## 2021-02-27 VITALS
HEART RATE: 75 BPM | OXYGEN SATURATION: 98 % | SYSTOLIC BLOOD PRESSURE: 176 MMHG | TEMPERATURE: 98.1 F | RESPIRATION RATE: 20 BRPM | DIASTOLIC BLOOD PRESSURE: 102 MMHG

## 2021-02-27 DIAGNOSIS — M54.16 RIGHT LUMBAR RADICULOPATHY: ICD-10-CM

## 2021-02-27 PROCEDURE — 99284 EMERGENCY DEPT VISIT MOD MDM: CPT | Mod: 25

## 2021-02-27 PROCEDURE — 93971 EXTREMITY STUDY: CPT | Mod: RT

## 2021-02-27 ASSESSMENT — ENCOUNTER SYMPTOMS
ARTHRALGIAS: 1
BACK PAIN: 1

## 2021-02-27 NOTE — ED PROVIDER NOTES
History     Chief Complaint:  Leg Pain    HPI  Grecia Kilgore is a 66 year old female who presents to the emergency department for evaluation of leg pain. Patient states 2 weeks ago she was cleaning her house when she bent over and suddenly began having lower back pain that radiates down her right leg. Bending her knee exacerbates her pain. Today her pain worsened, prompting her presentation. She denies recent fall or trauma.    Review of Systems   Musculoskeletal: Positive for arthralgias (Right leg and knee pain) and back pain.   All other systems reviewed and are negative.    Allergies:  Codeine  Morphine  Nitrofuran Derivatives  Phenothiazines  Primatene Mist [Epinephrine Bitartrate]  Vicodin [Hydrocodone-Acetaminophen]  Dilaudid [Hydromorphone]  Latex    Medications:    Albuterol  Cetirizine  Hydroxyzine  Lisinopril  Fluticasone  Metformin  Remeron  Singulair  Oxybutynin  Simvastatin    Past Medical History:    Allergic rhinitis  Anemia  Anxiety  Chronic back pain  DCIS  Depression  DM type 2  Diverticulosis  Exploding head syndrome  External hemorrhoids  G6PD deficiency  GERD  Hepatitis A  HLD  Laxative abuse  Migraine  Asthma  Mumps  Ovarian cyst  Pancreatitis  PTSD  STD  Tuberculosis  BOO  Panic disorder  Osteoarthritis  Vitamin D deficiency    Past Surgical History:    Arthrodesis toes, bilateral  Breast biopsy x2  Left ureteroscopy with stone extraction and stent placement  Hemorrhoidectomy  Hysterectomy  Repair tendon left food  Remove hardware left foot    Family History:    Diabetes  Breast cancer  Alcohol/drug    Social History:  The patient presents to the emergency department alone.    Physical Exam     Patient Vitals for the past 24 hrs:   BP Temp Temp src Pulse Resp SpO2   02/27/21 0510 -- -- -- -- -- 98 %   02/27/21 0505 176/102 -- -- 75 -- --   02/27/21 0457 165/105 98.1  F (36.7  C) Oral 62 20 98 %         Physical Exam  Nursing note and vitals reviewed.  Constitutional: Cooperative.    Cardiovascular: Normal rate, regular rhythm. 2+ right DP pulses  Pulmonary/Chest: Effort normal.   Musculoskeletal: Normal range of motion of RLE. No lower extremity edema.  Neurological: Alert.  Normal strength and sensation in RLE.  Skin: Skin is warm and dry. No rash noted.   Psychiatric: Normal mood and affect.        Emergency Department Course   Imaging:  US Lower Extremity Venous Duplex, right:   1.  No deep venous thrombosis in the right lower extremity. as per radiology.     Reviewed:  I reviewed the patient's nursing notes, vitals, past medical records, Care Everywhere.     Assessments:  503 I assessed the patient. Exam findings described above.    545 I reassessed and updated the patient.     Disposition:  Discharged to home.    Impression & Plan    Medical Decision Making:  Grecia Kilgore is a 66 year old female who presents for evaluation of back pain and radicular symptoms. They have no history of back pain in the past. She did not sustain any trauma, therefore x-rays are not necessary due to the low likelihood of fracture or subluxation. Advanced imaging with CT/MRI is not indicated at this time, but may be indicated in the future if symptoms fail to resolve.  Nor is there any indication for consultation with neurosurgery or orthopedic spinal surgeon.  There is no clinical evidence of cauda equina syndrome, discitis, spinal/epidural space hematoma or epidural abscess or other emergently worrisome etiology.     The neurological exam is normal.  The patient was advised that radiculopathy often takes significant time to resolve, and that follow up with primary care, neurology and/or neurosurgery will be indicated if symptoms do not improve. No heavy lifting, bending or twisting. Return if increasing pain, muscular weakness, or bowel or bladder dysfunction. Patient was discharged in stable condition.     Diagnosis:    ICD-10-CM    1. Right lumbar radiculopathy  M54.16        Abdi Ten  2/27/2021    EMERGENCY DEPARTMENT  Scribe Disclosure:  I, Abdi Ten, am serving as a scribe at 5:03 AM on 2/27/2021 to document services personally performed by Prabhu Kahn MD based on my observations and the provider's statements to me.          Prabhu Kahn MD  02/27/21 0608

## 2021-02-27 NOTE — ED TRIAGE NOTES
Pt states RLE pain for over one week that is worse tonight. Pt states pain radiates from hip to foot but is worse behind knee. ABCs intact GCS 15

## 2021-03-01 ENCOUNTER — TELEPHONE (OUTPATIENT)
Dept: FAMILY MEDICINE | Facility: CLINIC | Age: 66
End: 2021-03-01

## 2021-03-01 NOTE — TELEPHONE ENCOUNTER
Reason for Call: Request for an order or referral:    Order or referral being requested: neurology and rehab for follow-up ER visit in [place]    Date needed: as soon as possible    Has the patient been seen by the PCP for this problem? YES    Additional comments: patient would like call to discuss referral    Phone number Patient can be reached at:  Home number on file [phone] (home)    Best Time:  any    Can we leave a detailed message on this number?  YES    Call taken on 3/1/2021 at 8:20 AM by Paris Olivo

## 2021-03-01 NOTE — TELEPHONE ENCOUNTER
Reason for Call:  Other call back    Detailed comments Would like a call back when possible    Phone Number Patient can be reached at: Home number on file [phone] (home)    Best Time: As soon as possible      Call taken on 3/1/2021 at 9:38 AM by Evelin Young

## 2021-03-01 NOTE — TELEPHONE ENCOUNTER
Pt calling back.  She states her insurance does not need a referral, so she just needs a recommendation for Neurology/Neurosurgery.  She cannot sit or bend her leg, so wants to get an appt ASAP if possible.  She was told she can go to [facility] in [place] or [facility] in [place].  She prefers [facility].  She was also told she can try [facility] in [place] if that is an option as well for pool therapy, so asking if we can do that referral as well.  Colleen Sandoval

## 2021-03-02 NOTE — TELEPHONE ENCOUNTER
Patient given message from Dr. Mccall. Patient scheduled for 7:20 tomorrow morning. Clarissa Roman RN

## 2021-03-02 NOTE — TELEPHONE ENCOUNTER
Patient will need an evaluation to figure out why she is having the symptoms.  She will need to be examined and get some baseline imaging done in order to determine the cause and then I would be able to recommend which specialty she will need to see.  Please recommend an in clinic visit to see me for evaluation.    Romel Mccall MD  [facility], [place]

## 2021-03-03 ENCOUNTER — NURSE TRIAGE (OUTPATIENT)
Dept: NURSING | Facility: CLINIC | Age: 66
End: 2021-03-03

## 2021-03-03 ENCOUNTER — OFFICE VISIT (OUTPATIENT)
Dept: FAMILY MEDICINE | Facility: CLINIC | Age: 66
End: 2021-03-03
Payer: COMMERCIAL

## 2021-03-03 ENCOUNTER — ANCILLARY PROCEDURE (OUTPATIENT)
Dept: GENERAL RADIOLOGY | Facility: CLINIC | Age: 66
End: 2021-03-03
Attending: FAMILY MEDICINE
Payer: COMMERCIAL

## 2021-03-03 ENCOUNTER — HOSPITAL ENCOUNTER (OUTPATIENT)
Dept: MRI IMAGING | Facility: CLINIC | Age: 66
Discharge: HOME | End: 2021-03-03
Attending: FAMILY MEDICINE | Admitting: FAMILY MEDICINE
Payer: COMMERCIAL

## 2021-03-03 VITALS
BODY MASS INDEX: 26.95 KG/M2 | OXYGEN SATURATION: 99 % | DIASTOLIC BLOOD PRESSURE: 72 MMHG | TEMPERATURE: 97.6 F | SYSTOLIC BLOOD PRESSURE: 128 MMHG | HEIGHT: 66 IN | HEART RATE: 60 BPM

## 2021-03-03 DIAGNOSIS — M25.561 RIGHT KNEE PAIN, UNSPECIFIED CHRONICITY: ICD-10-CM

## 2021-03-03 DIAGNOSIS — M54.16 LUMBAR RADICULOPATHY: ICD-10-CM

## 2021-03-03 DIAGNOSIS — M79.661 PAIN OF RIGHT LOWER LEG: Primary | ICD-10-CM

## 2021-03-03 DIAGNOSIS — M54.16 LUMBAR RADICULOPATHY: Primary | ICD-10-CM

## 2021-03-03 DIAGNOSIS — M25.561 ACUTE PAIN OF RIGHT KNEE: ICD-10-CM

## 2021-03-03 PROCEDURE — 99214 OFFICE O/P EST MOD 30 MIN: CPT | Performed by: FAMILY MEDICINE

## 2021-03-03 PROCEDURE — 73562 X-RAY EXAM OF KNEE 3: CPT | Mod: RT | Performed by: RADIOLOGY

## 2021-03-03 PROCEDURE — 72148 MRI LUMBAR SPINE W/O DYE: CPT

## 2021-03-03 NOTE — PROGRESS NOTES
"    Assessment & Plan     Lumbar radiculopathy  Patient recently had ultrasound of the leg to rule out DVT which was negative.  Her symptoms are concerning for nerve involvement.  - MR Lumbar Spine w/o Contrast; Future  - PHYSICAL THERAPY REFERRAL; Future  MRI preliminary report reviewed.  No obvious nerve compression noted on the report.  Await official report.  Recommending to start physical therapy/pool therapy at [facility] in [place].  Referral given to the patient as requested by her.  Acute pain of right knee    - XR Knee Right 3 Views; ordered and reviewed.  No fractures noted.  Mild narrowing of the joint space.          Romel Mccall MD  [facility]    Subjective   Grecia is a 66 year old who presents for the following health issues     HPI       Musculoskeletal problem/pain  Onset/Duration: x 2-3 weeks after pulling her knee (hearing clicking) while bending forward to reach something. Seen at ER 2/27/21 was told at the er that it is not muscular but nerve pain    Description  Location: behind knee and leg and buttocks  - right  Joint Swelling: no  Redness: no  Pain: YES  Intensity:  severe  Progression of Symptoms:  worsening  Accompanying signs and symptoms:  Unable to bear weight on knee   Fevers: no  Numbness/tingling/weakness: no  History  Trauma to the area: no  Previous evaluation:  YES-ED 2/27   Precipitating or alleviating factors:  Aggravating factors include: sitting, standing and walking  Therapies tried and outcome: stretching, support wrap and acetaminophen        Review of Systems   CONSTITUTIONAL: NEGATIVE for fever, chills, change in weight  ENT/MOUTH: NEGATIVE for ear, mouth and throat problems  RESP: NEGATIVE for significant cough or SOB  CV: NEGATIVE for chest pain, palpitations or peripheral edema      Objective    /72   Pulse 60   Temp 97.6  F (36.4  C) (Tympanic)   Ht 1.676 m (5' 6\")   LMP 01/01/1985   SpO2 99%   BMI 26.95 kg/m  "   Body mass index is 26.95 kg/m .  Physical Exam   GENERAL: healthy, alert and no distress  RESP: lungs clear to auscultation - no rales, rhonchi or wheezes  CV: regular rate and rhythm, normal S1 S2, no S3 or S4, no murmur, click or rub, no peripheral edema and peripheral pulses strong  MS: Limitation of range of motion noted on the right knee.  Tender to palpate over the anterior and posterior surface of the knee and the lateral side of the thigh as well as the calf.  No swelling or ecchymosis or erythema noted.  No warmth.

## 2021-03-04 NOTE — TELEPHONE ENCOUNTER
Pt called stating she was seen today in clinic, and reported tonight she wanted to go to the kitchen to eat something to eat and grabe a towel, and she was walking with her toe's with right knee, and turned and noticed  her vains protruding out, and heard her knee cup popped and then the knee turned red and it was sore around the knee cup, and her muscles were pulling thight.       Pt reported she has vericose veins that provider didn't saw today visit. Pt reported the knee cup wet back in to th right place, and proturdind veins disappeared. Pt reported 15 minutes ago when the incident happen her pain level was 9/10, and she took tylenol 1000 mg. Pt also reported last night her knee also popped out of place.    Pt reported the x ray imaging that was taken today was not done on the right spots for her pain. Pt wants referrrel for neurologist. Pt also reported she is allergic to naproxen and ibuprofen. Pt stated with ibuprofen she was once told by a proivider this medication caused lower extrtmity swelling. Pt also reported with naproxen her platelets was dropping.      Per protocol pt was advised to be seen with in three days, and pt scheduled virtual appointment with pcp on monday. Protocol care advice provided, and pt was informed to call back with fever, severe knee pain, redness or  swelling or  worsening symptoms. Pt stated okay, and verbalized understanding.          Farshad Hawley RN  [facility]     COVID 19 Nurse Triage Plan/Patient Instructions    Please be aware that novel coronavirus (COVID-19) may be circulating in the community. If you develop symptoms such as fever, cough, or SOB or if you have concerns about the presence of another infection including coronavirus (COVID-19), please contact your health care provider or visit [email or web address].     Disposition/Instructions    Virtual Visit with provider recommended. Reference Visit Selection Guide.    Thank you for  taking steps to prevent the spread of this virus.  o Limit your contact with others.  o Wear a simple mask to cover your cough.  o Wash your hands well and often.    Resources    [facility]: About COVID-19: [email or web address]    CDC: What to Do If You're Sick: www.cdc.gov/coronavirus/2019-ncov/about/steps-when-sick.html    CDC: Ending Home Isolation: www.cdc.gov/coronavirus/2019-ncov/hcp/disposition-in-home-patients.html     CDC: Caring for Someone: www.cdc.gov/coronavirus/2019-ncov/if-you-are-sick/care-for-someone.html     [facility]: Interim Guidance for Hospital Discharge to Home: www.[email or web address]./diseases/coronavirus/hcp/hospdischarge.pdf    [facility] clinical trials (COVID-19 research studies): [email or web address]-[email or web address]-trials     Below are the COVID-19 hotlines at the [place] [facility] ([facility]). Interpreters are available.   o For health questions: Call 621-275-9640 or 1-637.140.9882 (7 a.m. to 7 p.m.)  o For questions about schools and childcare: Call 233-801-0639 or 1-495.888.3277 (7 a.m. to 7 p.m.)         Additional Information    Negative: Sounds like a life-threatening emergency to the triager    Negative: Followed a knee injury    Negative: Leg pain is main symptom    Negative: Knee swelling is main symptom    Negative: [1] Swollen joint AND [2] fever    Negative: [1] Red area or streak AND [2] fever    Negative: Patient sounds very sick or weak to the triager    Negative: [1] SEVERE pain (e.g., excruciating, unable to walk) AND [2] not improved after 2 hours of pain medicine    Negative: [1] Can't move swollen joint at all AND [2] no fever    Negative: [1] Thigh or calf pain AND [2] only 1 side AND [3] present > 1 hour    Negative: [1] Thigh, calf, or ankle swelling AND [2] only 1 side    Negative: [1] Looks infected (spreading redness, pus) AND [2] large red area (> 2 in. or 5 cm)    Negative: [1] Very swollen joint AND [2] no fever    Negative:  "Blistering rash in area of pain  (i.e., dermatomal distribution or \"band\" or \"stripe\")    Negative: Looks like a boil, infected sore, or deep ulcer    Negative: [1] Redness of the skin AND [2] no fever    [1] MODERATE pain (e.g., interferes with normal activities, limping) AND [2] present > 3 days    Protocols used: KNEE PAIN-A-AH      "

## 2021-03-05 RX ORDER — TIZANIDINE 2 MG/1
2 TABLET ORAL 3 TIMES DAILY PRN
Qty: 30 TABLET | Refills: 1 | Status: SHIPPED | OUTPATIENT
Start: 2021-03-05 | End: 2021-03-22

## 2021-03-05 NOTE — TELEPHONE ENCOUNTER
Called patient and gave providers message.  She will get the mri scheduled. Number given.    Tessie CHASERN BSN  [facility]  [phone]

## 2021-03-05 NOTE — TELEPHONE ENCOUNTER
Patient Contact    Attempt # 1    Was call answered?  No. No voicemail. Try again on 3/5.    On call back:     - Relay provider message below

## 2021-03-05 NOTE — TELEPHONE ENCOUNTER
Tizanidine ordered to the pharmacy.  MRI of the right knee has been placed.  I will for the patient to get it the MRI scheduled.  After the MRI results, we can decide if she needs to see orthopedics versus neurology versus pain management.    Romel Mccall MD  [facility], [place]

## 2021-03-07 ENCOUNTER — HOSPITAL ENCOUNTER (OUTPATIENT)
Dept: MRI IMAGING | Facility: CLINIC | Age: 66
Discharge: HOME | End: 2021-03-07
Attending: FAMILY MEDICINE | Admitting: FAMILY MEDICINE
Payer: COMMERCIAL

## 2021-03-07 DIAGNOSIS — M25.561 RIGHT KNEE PAIN, UNSPECIFIED CHRONICITY: ICD-10-CM

## 2021-03-07 PROCEDURE — 73721 MRI JNT OF LWR EXTRE W/O DYE: CPT | Mod: RT

## 2021-03-08 NOTE — RESULT ENCOUNTER NOTE
Please call the patient to notify patient that MRI of the knee shows normal tear in the cartilage or ligaments.  Mild degenerative changes noted in the joint.  Small amount of fluid and a small Baker's cyst noted in the back of the knee.  I would like to inquire how is her leg pain doing today.  I would recommend seeing orthopedics for management of her leg/knee pain.  If she is okay with that, please let me know so I can place an order.    Romel Mccall MD

## 2021-03-09 ENCOUNTER — TELEPHONE (OUTPATIENT)
Dept: FAMILY MEDICINE | Facility: CLINIC | Age: 66
End: 2021-03-09

## 2021-03-09 DIAGNOSIS — M25.561 ACUTE PAIN OF RIGHT KNEE: Primary | ICD-10-CM

## 2021-03-09 NOTE — TELEPHONE ENCOUNTER
----- Message from Romel Mccall MD sent at 3/8/2021  1:54 PM CST -----  Please call the patient to notify patient that MRI of the knee shows normal tear in the cartilage or ligaments.  Mild degenerative changes noted in the joint.  Small amount of fluid and a small Baker's cyst noted in the back of the knee.  I would like to inquire how is her leg pain doing today.  I would recommend seeing orthopedics for management of her leg/knee pain.  If she is okay with that, please let me know so I can place an order.    Romel Mccall MD

## 2021-03-09 NOTE — TELEPHONE ENCOUNTER
Patient states the pain is the same- the medication helps- dosnt feel stable when she walks- when she sits too long- she has a hard time getting the knee to work after sitting too long.    Patient wants  Are ferral for a provider in [facility]- (khoa

## 2021-03-10 NOTE — TELEPHONE ENCOUNTER
Dr. Mccall- Patient can either go to [facility] or [facility]. Both have locations close to where she live. Pending referral.  Thanks  Silvana Sauceda  Referral Coordinator

## 2021-03-10 NOTE — TELEPHONE ENCOUNTER
Referral ordered. Notify patient that she can go to either [facility] or [facility] for orthopeds care    Romel Mccall MD  [facility], [place]

## 2021-03-10 NOTE — TELEPHONE ENCOUNTER
Please assist with finding orthopedic referral based on her preference.    Romel Mccall MD  [facility], [place]

## 2021-03-10 NOTE — TELEPHONE ENCOUNTER
Called patient and notified that referral was placed. Routing to [facility] - can you please fax referral to both [facility] and [facility]? She plans to check with insurance to verify her coverage.    She plans to call for appointment yet today if possible

## 2021-03-15 ENCOUNTER — TRANSFERRED RECORDS (OUTPATIENT)
Dept: HEALTH INFORMATION MANAGEMENT | Facility: CLINIC | Age: 66
End: 2021-03-15

## 2021-03-22 ENCOUNTER — OFFICE VISIT (OUTPATIENT)
Dept: FAMILY MEDICINE | Facility: CLINIC | Age: 66
End: 2021-03-22
Payer: COMMERCIAL

## 2021-03-22 VITALS
SYSTOLIC BLOOD PRESSURE: 126 MMHG | HEIGHT: 66 IN | DIASTOLIC BLOOD PRESSURE: 80 MMHG | BODY MASS INDEX: 26.95 KG/M2 | OXYGEN SATURATION: 97 % | HEART RATE: 81 BPM | TEMPERATURE: 97.6 F

## 2021-03-22 DIAGNOSIS — J45.20 MILD INTERMITTENT ASTHMA WITHOUT COMPLICATION: ICD-10-CM

## 2021-03-22 DIAGNOSIS — E78.5 HYPERLIPIDEMIA LDL GOAL <100: Primary | ICD-10-CM

## 2021-03-22 DIAGNOSIS — M79.661 PAIN OF RIGHT LOWER LEG: ICD-10-CM

## 2021-03-22 LAB
CHOLEST SERPL-MCNC: 180 MG/DL
HDLC SERPL-MCNC: 68 MG/DL
LDLC SERPL CALC-MCNC: 92 MG/DL
NONHDLC SERPL-MCNC: 112 MG/DL
TRIGL SERPL-MCNC: 98 MG/DL

## 2021-03-22 PROCEDURE — 99213 OFFICE O/P EST LOW 20 MIN: CPT | Performed by: FAMILY MEDICINE

## 2021-03-22 PROCEDURE — 36415 COLL VENOUS BLD VENIPUNCTURE: CPT | Performed by: FAMILY MEDICINE

## 2021-03-22 PROCEDURE — 80061 LIPID PANEL: CPT | Performed by: FAMILY MEDICINE

## 2021-03-22 RX ORDER — TIZANIDINE 2 MG/1
2 TABLET ORAL 2 TIMES DAILY PRN
Qty: 60 TABLET | Refills: 2 | Status: ON HOLD | OUTPATIENT
Start: 2021-03-22 | End: 2021-05-02

## 2021-03-22 NOTE — PROGRESS NOTES
"    Assessment & Plan     1. Hyperlipidemia LDL goal <100  Patient continues to be on statin.  Recommending to use the medication regularly.  Cholesterol is well managed with medications.  - Lipid panel reflex to direct LDL Fasting    2. Pain of right lower leg  Patient is starting physical therapy next week for her pain in the leg.  Needs a refill on Zanaflex which is ordered.  She is seen orthopedic and has had a knee injection which did not help much.  I recommended her to follow-up with them as needed.  - tiZANidine (ZANAFLEX) 2 MG tablet; Take 1 tablet (2 mg) by mouth 2 times daily as needed for muscle spasms  Dispense: 60 tablet; Refill: 2           BMI:   Estimated body mass index is 26.95 kg/m  as calculated from the following:    Height as of this encounter: 1.676 m (5' 6\").    Weight as of 2/11/21: 75.8 kg (167 lb).     Return in about 6 months (around 9/21/2021) for Annual Physical Exam.    Romel Mccall MD  [facility] [place] [place]    Subjective   Grecia is a 66 year old who presents for the following health issues     HPI     Diabetes Follow-up      How often are you checking your blood sugar? Not at all    What concerns do you have today about your diabetes? None     Do you have any of these symptoms? (Select all that apply)  No numbness or tingling in feet.  No redness, sores or blisters on feet.  No complaints of excessive thirst.  No reports of blurry vision.  No significant changes to weight.    Have you had a diabetic eye exam in the last 12 months? No        BP Readings from Last 2 Encounters:   03/22/21 126/80   03/03/21 128/72     Hemoglobin A1C (%)   Date Value   01/27/2021 6.2 (H)   09/18/2020 6.1 (H)     LDL Cholesterol Calculated (mg/dL)   Date Value   03/22/2021 92   09/18/2020 71           How many servings of fruits and vegetables do you eat daily?  4 or more    On average, how many sweetened beverages do you drink each day (Examples: soda, juice, sweet tea, etc.  Do NOT " "count diet or artificially sweetened beverages)?   0    How many days per week do you exercise enough to make your heart beat faster? 3 or less    How many minutes a day do you exercise enough to make your heart beat faster? 9 or less    How many days per week do you miss taking your medication? 0    Concern - recheck on her knees   Onset: Patient recently saw orthopedics who gave her a knee injection after reviewing her images.  No improvement noted with the pain.  She has a physical therapy appointment scheduled soon.  Needs a refill on her muscle relaxer which helps her tremendously          Review of Systems   CONSTITUTIONAL: NEGATIVE for fever, chills, change in weight  ENT/MOUTH: NEGATIVE for ear, mouth and throat problems  RESP: NEGATIVE for significant cough or SOB  CV: NEGATIVE for chest pain, palpitations or peripheral edema      Objective    /80   Pulse 81   Temp 97.6  F (36.4  C) (Tympanic)   Ht 1.676 m (5' 6\")   LMP 01/01/1985   SpO2 97%   BMI 26.95 kg/m    Body mass index is 26.95 kg/m .  Physical Exam   GENERAL: healthy, alert and no distress  NECK: no adenopathy, no asymmetry, masses, or scars and thyroid normal to palpation  RESP: lungs clear to auscultation - no rales, rhonchi or wheezes  CV: regular rate and rhythm, normal S1 S2, no S3 or S4, no murmur, click or rub, no peripheral edema and peripheral pulses strong  ABDOMEN: soft, nontender, no hepatosplenomegaly, no masses and bowel sounds normal  MS: no gross musculoskeletal defects noted, no edema                "

## 2021-03-22 NOTE — LETTER
March 23, 2021      Grecia Kilgore  [address]   [place] [address]        Dear MsHilary,    I have reviewed your recent labs. Here are the results:    -Cholesterol levels (LDL,HDL, Triglycerides) are normal.  ADVISE: rechecking in 1 year.       If you have any questions or concerns, please call the clinic at the number listed above.       Sincerely,      Romel Mccall MD

## 2021-04-01 RX ORDER — ALBUTEROL SULFATE 90 UG/1
AEROSOL, METERED RESPIRATORY (INHALATION)
Qty: 18 G | OUTPATIENT
Start: 2021-04-01

## 2021-04-12 ENCOUNTER — TELEPHONE (OUTPATIENT)
Dept: FAMILY MEDICINE | Facility: CLINIC | Age: 66
End: 2021-04-12

## 2021-04-12 DIAGNOSIS — R05.8 SPUTUM PRODUCTION: Primary | ICD-10-CM

## 2021-04-12 RX ORDER — GUAIFENESIN 600 MG/1
1200 TABLET, EXTENDED RELEASE ORAL 2 TIMES DAILY
Qty: 40 TABLET | Refills: 0 | Status: SHIPPED | OUTPATIENT
Start: 2021-04-12 | End: 2021-04-22

## 2021-04-12 NOTE — TELEPHONE ENCOUNTER
Mucinex ordered to the pharmacy    Romel Mccall MD  [facility], [place]

## 2021-04-12 NOTE — TELEPHONE ENCOUNTER
Pt requesting a medication that will break up her phlegm. States this has been an ongoing issue for years but recently getting worsePlease send an rx to [facility]. Any questions pt can be reached at [phone]. Thank you.  María Elena Vazquez,

## 2021-04-12 NOTE — TELEPHONE ENCOUNTER
Result message given from Dr. Mccall. Pt verbalized understanding, all questions answered.     Scheduled pt for covid vaccine.     Yuli Apple RN

## 2021-04-15 ENCOUNTER — IMMUNIZATION (OUTPATIENT)
Dept: NURSING | Facility: CLINIC | Age: 66
End: 2021-04-15
Payer: COMMERCIAL

## 2021-04-15 PROCEDURE — 91300 PR COVID VAC PFIZER DIL RECON 30 MCG/0.3 ML IM: CPT

## 2021-04-15 PROCEDURE — 0001A PR COVID VAC PFIZER DIL RECON 30 MCG/0.3 ML IM: CPT

## 2021-04-23 ENCOUNTER — TRANSFERRED RECORDS (OUTPATIENT)
Dept: HEALTH INFORMATION MANAGEMENT | Facility: CLINIC | Age: 66
End: 2021-04-23

## 2021-04-28 ENCOUNTER — OFFICE VISIT (OUTPATIENT)
Dept: OBGYN | Facility: CLINIC | Age: 66
End: 2021-04-28
Payer: COMMERCIAL

## 2021-04-28 VITALS
HEART RATE: 72 BPM | DIASTOLIC BLOOD PRESSURE: 64 MMHG | SYSTOLIC BLOOD PRESSURE: 118 MMHG | WEIGHT: 166 LBS | HEIGHT: 66 IN | BODY MASS INDEX: 26.68 KG/M2

## 2021-04-28 DIAGNOSIS — N89.8 VAGINAL DISCHARGE: Primary | ICD-10-CM

## 2021-04-28 LAB
SPECIMEN SOURCE: ABNORMAL
WET PREP SPEC: ABNORMAL

## 2021-04-28 PROCEDURE — 87106 FUNGI IDENTIFICATION YEAST: CPT | Performed by: NURSE PRACTITIONER

## 2021-04-28 PROCEDURE — 87102 FUNGUS ISOLATION CULTURE: CPT | Performed by: NURSE PRACTITIONER

## 2021-04-28 PROCEDURE — 87653 STREP B DNA AMP PROBE: CPT | Performed by: NURSE PRACTITIONER

## 2021-04-28 PROCEDURE — 87210 SMEAR WET MOUNT SALINE/INK: CPT | Performed by: NURSE PRACTITIONER

## 2021-04-28 PROCEDURE — 99213 OFFICE O/P EST LOW 20 MIN: CPT | Performed by: NURSE PRACTITIONER

## 2021-04-28 PROCEDURE — 87205 SMEAR GRAM STAIN: CPT | Performed by: NURSE PRACTITIONER

## 2021-04-28 RX ORDER — METRONIDAZOLE 500 MG/1
500 TABLET ORAL 2 TIMES DAILY
Qty: 14 TABLET | Refills: 0 | Status: SHIPPED | OUTPATIENT
Start: 2021-04-28 | End: 2021-04-28

## 2021-04-28 RX ORDER — METRONIDAZOLE 250 MG/1
500 TABLET ORAL 2 TIMES DAILY
Qty: 28 TABLET | Refills: 0 | Status: SHIPPED | OUTPATIENT
Start: 2021-04-28 | End: 2021-06-02

## 2021-04-28 ASSESSMENT — MIFFLIN-ST. JEOR: SCORE: 1319.72

## 2021-04-28 ASSESSMENT — PATIENT HEALTH QUESTIONNAIRE - PHQ9: SUM OF ALL RESPONSES TO PHQ QUESTIONS 1-9: 15

## 2021-04-28 NOTE — PROGRESS NOTES
SUBJECTIVE:                                                   Grecia Kilgore is a 66 year old female who presents to clinic today for the following health issue(s):  Patient presents with:  Vaginal Problem: has been having a heavy vaginal discharge for the last 2 months.   Back Pain: has some back pain that just started today, seems to be moving to the front now. Happended after bending over.      HPI:  Pt here today with c/o vaginal discharge for a few weeks.   Hx of chronic vaginitis. Hasn't been s.a. in weeks.   Having some low pelvic pressure. No true dysuria.   Had a left kidney stone with stent in 2021.    New upper right back pain that is wrapping to the front.     Patient's last menstrual period was 1985..     Patient is sexually active, .  Using hysterectomy for contraception.    reports that she has never smoked. She has never used smokeless tobacco.    STD testing offered?  Declined    Health maintenance updated:  yes    Today's PHQ-2 Score:   PHQ-2 (  [employer]) 2021   Q1: Little interest or pleasure in doing things 3   Q2: Feeling down, depressed or hopeless 3   PHQ-2 Score 6     Today's PHQ-9 Score:   PHQ-9 SCORE 2021   PHQ-9 Total Score -   PHQ-9 Total Score 15     Today's WILL-7 Score:   WILL-7 SCORE 2021   Total Score -   Total Score 17       Problem list and histories reviewed & adjusted, as indicated.  Additional history: as documented.    Patient Active Problem List   Diagnosis     Allergic rhinitis     External hemorrhoids     Vitamin D deficiency     Osteoarthritis, knee     Positive PPD     Panic disorder     Genital herpes     Advanced directives, counseling/discussion     DCIS (ductal carcinoma in situ) of breast     Anxiety     Major depressive disorder, single episode, moderate (H)     Hyperlipidemia LDL goal <100     Type 2 diabetes mellitus without complication, without long-term current use of insulin (H)     Mild intermittent asthma without  complication     BOO (obstructive sleep apnea)     Left ureteral calculus     Past Surgical History:   Procedure Laterality Date     ARTHRODESIS TOE(S)  9/16/2013    Procedure: ARTHRODESIS TOE(S);  BILATERAL GREAT TOE FUSION (C-ARM);  Surgeon: Mary Guan MD;  Location: [facility]     ARTHRODESIS TOE(S) Left 12/19/2016    Procedure: ARTHRODESIS TOE(S);  Surgeon: Mary Guan MD;  Location: [facility]     ARTHROSCOPY KNEE Left 2/2/11     BIOPSY BREAST  2/7/2014    Procedure: BIOPSY BREAST;  Re-excision Left Breast Cavity for Margins ;  Surgeon: Lisset Amador DO;  Location: [facility]     BIOPSY BREAST SEED LOCALIZATION  1/22/2014    Procedure: BIOPSY BREAST SEED LOCALIZATION;  Left Breast Seed Localized Excisional Biopsy ;  Surgeon: Lisset Amador DO;  Location: [facility]     COLONOSCOPY  2005    nl - repeat 2015     CYSTOSCOPY       CYSTOSCOPY, RETROGRADES, EXTRACT STONE, INSERT STENT, COMBINED Left 2/4/2021    Procedure: Video cystoscopy, balloon dilation of left ureter, left ureteroscopy with stone extraction, standby laser, left double-J stent placement (5-Polish x 24 cm).;  Surgeon: Craig Ferreira MD;  Location: [facility]     FOOT SURGERY Bilateral 2013     HEMORRHOIDECTOMY BANDING      multiple times     HEMORRHOIDECTOMY INTERNAL N/A 7/24/2018    Procedure: HEMORRHOIDECTOMY INTERNAL;  three quadrant hemorrhoidectomy;  Surgeon: Lisa Patrick MD;  Location: [facility]     HYSTERECTOMY  7/1996    fibroids     REMOVE HARDWARE FOOT Left 2015     REPAIR TENDON FOOT Left 12/19/2016    Procedure: REPAIR TENDON FOOT;  Surgeon: Mary Guan MD;  Location: [facility]      Social History     Tobacco Use     Smoking status: Never Smoker     Smokeless tobacco: Never Used   Substance Use Topics     Alcohol use: No     Alcohol/week: 0.0 standard drinks      Problem (# of Occurrences) Relation (Name,Age of Onset)    Alcohol/Drug (1) Father    Breast Cancer (1) Mother    Cancer (1) Father    Cancer - colorectal (1)  Maternal Grandfather    Cerebrovascular Disease (1) Maternal Grandmother    Diabetes (2) Mother, Maternal Grandmother            Current Outpatient Medications   Medication Sig     albuterol (PROAIR HFA/PROVENTIL HFA/VENTOLIN HFA) 108 (90 Base) MCG/ACT inhaler Inhale 2 puffs into the lungs every 6 hours as needed for shortness of breath / dyspnea     cetirizine (ZYRTEC) 10 MG tablet TAKE 1 TABLET(10 MG) BY MOUTH EVERY EVENING     estradiol (ESTRACE) 0.1 MG/GM vaginal cream Place 1 g vaginally At Bedtime For 30 nights, then three times per week thereafter. Use finger for application.     fluticasone (FLONASE) 50 MCG/ACT nasal spray USE 1 SPRAY IN EACH NOSTRIL EVERY DAY     hydrOXYzine (ATARAX) 25 MG tablet Take 1 tablet (25 mg) by mouth nightly as needed for anxiety     lisinopril (ZESTRIL) 5 MG tablet Take 1 tablet (5 mg) by mouth daily     metFORMIN (GLUCOPHAGE) 500 MG tablet TAKE 1 TABLET EVERY DAY WITH BREAKFAST     metroNIDAZOLE (FLAGYL) 500 MG tablet Take 1 tablet (500 mg) by mouth 2 times daily for 7 days     mirtazapine (REMERON) 15 MG tablet TAKE 1 TABLET EVERY NIGHT AS NEEDED FOR SLEEP     montelukast (SINGULAIR) 10 MG tablet TAKE 1 TABLET EVERY DAY AT BEDTIME     simvastatin (ZOCOR) 40 MG tablet Take 1 tablet (40 mg) by mouth At Bedtime     tiZANidine (ZANAFLEX) 2 MG tablet Take 1 tablet (2 mg) by mouth 2 times daily as needed for muscle spasms     No current facility-administered medications for this visit.      Allergies   Allergen Reactions     Codeine Nausea     Morphine Itching     Nitrofuran Derivatives Hives     Phenothiazines      sedation     Primatene Mist [Epinephrine Bitartrate] Itching     neck itch with primatene mist     Vicodin [Hydrocodone-Acetaminophen] Nausea     Dilaudid [Hydromorphone] Rash     Latex Itching and Rash       ROS:  12 point review of systems negative other than symptoms noted below or in the HPI.  Genitourinary: Vaginal Discharge  No urinary frequency or dysuria, bladder  "or kidney problems      OBJECTIVE:     /64   Pulse 72   Ht 1.676 m (5' 6\")   Wt 75.3 kg (166 lb)   LMP 01/01/1985   BMI 26.79 kg/m    Body mass index is 26.79 kg/m .    Exam:  Constitutional:  Appearance: Well nourished, well developed alert, in no acute distress  Psychiatric:  Mentation appears normal and affect normal/bright.  No CVAT  Pelvic Exam:  External Genitalia:     Normal appearance for age, no tenderness present, no inflammatory lesions present, color normal-white discharge present  Vagina:     Normal vaginal vault without central or paravaginal defects, mild amount of white discharge present, no inflammatory lesions present, no masses present  Bladder:     Nontender to palpation  Urethra:   Urethral Body:  Urethra palpation normal, urethra structural support normal   Urethral Meatus:  No erythema or lesions present  Cervix:     Surgically absent  Uterus:     Surgically absent  Adnexa:     No adnexal tenderness present, no adnexal masses present  Perineum:     Perineum within normal limits, no evidence of trauma, no rashes or skin lesions present  Anus:     Anus within normal limits, no hemorrhoids present  Inguinal Lymph Nodes:     No lymphadenopathy present  Pubic Hair:     Normal pubic hair distribution for age  Genitalia and Groin:     No rashes present, no lesions present, no areas of discoloration, no masses present       In-Clinic Test Results:  Results for orders placed or performed in visit on 04/28/21 (from the past 24 hour(s))   Wet prep    Specimen: Vagina   Result Value Ref Range    Specimen Description Vagina     Wet Prep No Trichomonas seen     Wet Prep Clue cells seen  Few   (A)     Wet Prep No yeast seen     Wet Prep No WBC's seen        ASSESSMENT/PLAN:                                                        ICD-10-CM    1. Vaginal discharge  N89.8 Group B strep PCR     Gram stain     Wet prep     Yeast culture     metroNIDAZOLE (FLAGYL) 500 MG tablet       There are no Patient " Instructions on file for this visit.    Wet prep + clue cells. Will treat with oral tabs. Vag cultures sent as well due to her hx.     Gavi Katz, APRN CNP  [facility] [place]

## 2021-04-29 ENCOUNTER — NURSE TRIAGE (OUTPATIENT)
Dept: FAMILY MEDICINE | Facility: CLINIC | Age: 66
End: 2021-04-29

## 2021-04-29 ENCOUNTER — OFFICE VISIT (OUTPATIENT)
Dept: PEDIATRICS | Facility: CLINIC | Age: 66
End: 2021-04-29
Payer: COMMERCIAL

## 2021-04-29 VITALS
WEIGHT: 166 LBS | OXYGEN SATURATION: 98 % | HEIGHT: 66 IN | DIASTOLIC BLOOD PRESSURE: 74 MMHG | SYSTOLIC BLOOD PRESSURE: 116 MMHG | TEMPERATURE: 97.8 F | BODY MASS INDEX: 26.68 KG/M2 | RESPIRATION RATE: 14 BRPM | HEART RATE: 57 BPM

## 2021-04-29 DIAGNOSIS — S29.012A UPPER BACK STRAIN, INITIAL ENCOUNTER: ICD-10-CM

## 2021-04-29 DIAGNOSIS — R00.2 PALPITATIONS: Primary | ICD-10-CM

## 2021-04-29 LAB
GRAM STN SPEC: ABNORMAL
Lab: ABNORMAL
SPECIMEN SOURCE: ABNORMAL

## 2021-04-29 PROCEDURE — 99213 OFFICE O/P EST LOW 20 MIN: CPT | Performed by: NURSE PRACTITIONER

## 2021-04-29 PROCEDURE — 93000 ELECTROCARDIOGRAM COMPLETE: CPT | Performed by: NURSE PRACTITIONER

## 2021-04-29 ASSESSMENT — MIFFLIN-ST. JEOR: SCORE: 1319.72

## 2021-04-29 NOTE — PROGRESS NOTES
"  Assessment & Plan     Palpitations  Pt describes as a fluttering sensation, intermittent, worst with lying on her right side the past day. Denies chest pain/dyspnea. EKG done today without any signs of ischemia or arrhythmia.   Proceed with zio patch to rule out any underlying arrhythmia. Other differentials are muscle spasm vs gas as she describes it as a \"vibration\"  - EKG 12-lead complete w/read - Clinics  - Leadless EKG Monitor 3 to 7 Days; Future    Upper back strain, initial encounter  Improved today.               BMI:   Estimated body mass index is 26.79 kg/m  as calculated from the following:    Height as of this encounter: 1.676 m (5' 6\").    Weight as of this encounter: 75.3 kg (166 lb).           Return in about 1 week (around 5/6/2021), or if symptoms worsen or fail to improve.    Skye Contreras NP  [facility] BRENNA Paige is a 66 year old who presents for the following health issues     HPI   Right chest fluttering under breast, back and chest straining  Fluttering started today  Back Pain  Onset/Duration: yesterday  Description:   Location of pain: middle of back right  Character of pain: \"tightness\" and intermittent  Pain radiation: none and radiating around into abdomen  New numbness or weakness in legs, not attributed to pain: no   Intensity: moderate to severe at times  Progression of Symptoms: improving from yesterday  History:   Specific cause: lifting  Pain interferes with job: not applicable  History of back problems: no prior back problems  Any previous MRI or X-rays: None  Sees a specialist for back pain: No  Alleviating factors:   Improved by: none    Precipitating factors:  Worsened by: Nothing  Therapies tried and outcome: cold and heat    Pain much improved today, not concerned about this.    Here to discuss palpitations:  -fluttering/\"vibrations\" to right lower chest, has noted the past day intermittent  -notes worst when she is lying down on her " "right side  -denies chest pain, dyspnea, headaches, syncope, lightheadedness, or dizziness  -wonders if could be gas? Denies any constipation, no gas pains/bloating or abd pain  -had similar sensation 1 mos ago and it resolved  -Hx HLD, DM T2    Review of Systems   Constitutional, HEENT, cardiovascular, pulmonary, GI, , musculoskeletal, neuro, skin, endocrine and psych systems are negative, except as otherwise noted.      Objective    /74 (BP Location: Right arm, Cuff Size: Adult Regular)   Pulse 57   Temp 97.8  F (36.6  C) (Tympanic)   Resp 14   Ht 1.676 m (5' 6\")   Wt 75.3 kg (166 lb)   LMP 01/01/1985   SpO2 98%   BMI 26.79 kg/m    Body mass index is 26.79 kg/m .  Physical Exam   GENERAL: healthy, alert and no distress  RESP: lungs clear to auscultation - no rales, rhonchi or wheezes  CV: regular rate and rhythm, normal S1 S2, no S3 or S4, no murmur, click or rub, no peripheral edema and peripheral pulses strong, chest wall non-tender to palpation but locates area of palpitations right lower chest under breast  ABDOMEN: soft, nontender, no hepatosplenomegaly, no masses and bowel sounds normal      EKG - Reviewed and interpreted by me appears normal, NSR, sinus bradycardia, unchanged from previous tracings            "

## 2021-04-29 NOTE — PATIENT INSTRUCTIONS
Schedule the heart monitor    If you have chest pain or difficulties breathing, then you should be seen in the hospital    Follow up with Dr. Mccall

## 2021-04-29 NOTE — TELEPHONE ENCOUNTER
"Pt wants to be seen today  Warm transferred to central scheduling     Thank you  Tom Wilder RN on 4/29/2021 at 11:37 AM    Additional Information    Patient wants to be seen    Negative: Palpitations    Answer Assessment - Initial Assessment Questions  1. DESCRIPTION: \"Please describe your heart rate or heart beat that you are having\" (e.g., fast/slow, regular/irregular, skipped or extra beats, \"palpitations\")      Thumping right chest an side  2. ONS: \"When did it start?\" (Minutes, hours or days)       About 2 months ago  3. DURATION: \"How long does it last\" (e.g., seconds, minutes, hours)      Sometimes a few minutes  4. PATTERN \"Does it come and go, or has it been constant since it started?\"  \"Does it get worse with exertion?\"   \"Are you feeling it now?\"      intermittent  5. TAP: \"Using your hand, can you tap out what you are feeling on a chair or table in front of you, so that I can hear?\" (Note: not all patients can do this)        unable  6. HEART RATE: \"Can you tell me your heart rate?\" \"How many beats in 15 seconds?\"  (Note: not all patients can do this)        Pt unable to do this   7. RECURRENT SYMPTOM: \"Have you ever had this before?\" If so, ask: \"When was the last time?\" and \"What happened that time?\"       A couple of months ago  8. CAUSE: \"What do you think is causing the palpitations?\"      unknown  9. CARDIAC HISTORY: \"Do you have any history of heart disease?\" (e.g., heart attack, angina, bypass surgery, angioplasty, arrhythmia)       High cholsterol  10. OTHER SYMPTOMS: \"Do you have any other symptoms?\" (e.g., dizziness, chest pain, sweating, difficulty breathing)        Gassy   11. PREGNANCY: \"Is there any chance you are pregnant?\" \"When was your last menstrual period?\"        NA    Protocols used: HEART RATE AND HEARTBEAT [id number]      "

## 2021-04-30 ENCOUNTER — TRANSFERRED RECORDS (OUTPATIENT)
Dept: HEALTH INFORMATION MANAGEMENT | Facility: CLINIC | Age: 66
End: 2021-04-30

## 2021-04-30 LAB
GP B STREP DNA SPEC QL NAA+PROBE: NEGATIVE
SPECIMEN SOURCE: NORMAL

## 2021-05-02 ENCOUNTER — HOSPITAL ENCOUNTER (OUTPATIENT)
Facility: CLINIC | Age: 66
Setting detail: OBSERVATION
Discharge: HOME | End: 2021-05-03
Attending: EMERGENCY MEDICINE | Admitting: INTERNAL MEDICINE
Payer: COMMERCIAL

## 2021-05-02 ENCOUNTER — APPOINTMENT (OUTPATIENT)
Dept: GENERAL RADIOLOGY | Facility: CLINIC | Age: 66
End: 2021-05-02
Attending: EMERGENCY MEDICINE
Payer: COMMERCIAL

## 2021-05-02 DIAGNOSIS — R07.9 ACUTE CHEST PAIN: ICD-10-CM

## 2021-05-02 DIAGNOSIS — R00.2 PALPITATIONS: ICD-10-CM

## 2021-05-02 LAB
ALBUMIN SERPL-MCNC: 3.9 G/DL (ref 3.4–5)
ALBUMIN UR-MCNC: NEGATIVE MG/DL
ALP SERPL-CCNC: 92 U/L (ref 40–150)
ALT SERPL W P-5'-P-CCNC: 26 U/L (ref 0–50)
ANION GAP SERPL CALCULATED.3IONS-SCNC: 5 MMOL/L (ref 3–14)
APPEARANCE UR: CLEAR
AST SERPL W P-5'-P-CCNC: 24 U/L (ref 0–45)
BASOPHILS # BLD AUTO: 0 10E9/L (ref 0–0.2)
BASOPHILS NFR BLD AUTO: 0.4 %
BILIRUB SERPL-MCNC: 0.7 MG/DL (ref 0.2–1.3)
BILIRUB UR QL STRIP: NEGATIVE
BUN SERPL-MCNC: 12 MG/DL (ref 7–30)
CALCIUM SERPL-MCNC: 9.3 MG/DL (ref 8.5–10.1)
CHLORIDE SERPL-SCNC: 111 MMOL/L (ref 94–109)
CO2 SERPL-SCNC: 25 MMOL/L (ref 20–32)
COLOR UR AUTO: NORMAL
CREAT SERPL-MCNC: 1.06 MG/DL (ref 0.52–1.04)
D DIMER PPP FEU-MCNC: 0.4 UG/ML FEU (ref 0–0.5)
DIFFERENTIAL METHOD BLD: ABNORMAL
EOSINOPHIL # BLD AUTO: 0.2 10E9/L (ref 0–0.7)
EOSINOPHIL NFR BLD AUTO: 3 %
ERYTHROCYTE [DISTWIDTH] IN BLOOD BY AUTOMATED COUNT: 13.1 % (ref 10–15)
GFR SERPL CREATININE-BSD FRML MDRD: 55 ML/MIN/{1.73_M2}
GLUCOSE BLDC GLUCOMTR-MCNC: 132 MG/DL (ref 70–99)
GLUCOSE BLDC GLUCOMTR-MCNC: 148 MG/DL (ref 70–99)
GLUCOSE BLDC GLUCOMTR-MCNC: 95 MG/DL (ref 70–99)
GLUCOSE SERPL-MCNC: 111 MG/DL (ref 70–99)
GLUCOSE UR STRIP-MCNC: NEGATIVE MG/DL
HCT VFR BLD AUTO: 44.5 % (ref 35–47)
HGB BLD-MCNC: 13.6 G/DL (ref 11.7–15.7)
HGB UR QL STRIP: NEGATIVE
IMM GRANULOCYTES # BLD: 0 10E9/L (ref 0–0.4)
IMM GRANULOCYTES NFR BLD: 0.3 %
KETONES UR STRIP-MCNC: NEGATIVE MG/DL
LABORATORY COMMENT REPORT: NORMAL
LEUKOCYTE ESTERASE UR QL STRIP: NEGATIVE
LIPASE SERPL-CCNC: 102 U/L (ref 73–393)
LYMPHOCYTES # BLD AUTO: 2.6 10E9/L (ref 0.8–5.3)
LYMPHOCYTES NFR BLD AUTO: 35.1 %
MCH RBC QN AUTO: 28.8 PG (ref 26.5–33)
MCHC RBC AUTO-ENTMCNC: 30.6 G/DL (ref 31.5–36.5)
MCV RBC AUTO: 94 FL (ref 78–100)
MONOCYTES # BLD AUTO: 0.5 10E9/L (ref 0–1.3)
MONOCYTES NFR BLD AUTO: 7.1 %
NEUTROPHILS # BLD AUTO: 4 10E9/L (ref 1.6–8.3)
NEUTROPHILS NFR BLD AUTO: 54.1 %
NITRATE UR QL: NEGATIVE
NRBC # BLD AUTO: 0 10*3/UL
NRBC BLD AUTO-RTO: 0 /100
PH UR STRIP: 6 PH (ref 5–7)
PLATELET # BLD AUTO: 237 10E9/L (ref 150–450)
POTASSIUM SERPL-SCNC: 3.6 MMOL/L (ref 3.4–5.3)
PROT SERPL-MCNC: 7.6 G/DL (ref 6.8–8.8)
RBC # BLD AUTO: 4.73 10E12/L (ref 3.8–5.2)
RBC #/AREA URNS AUTO: 0 /HPF (ref 0–2)
SARS-COV-2 RNA RESP QL NAA+PROBE: NEGATIVE
SODIUM SERPL-SCNC: 141 MMOL/L (ref 133–144)
SOURCE: NORMAL
SP GR UR STRIP: 1 (ref 1–1.03)
SPECIMEN SOURCE: NORMAL
TROPONIN I SERPL-MCNC: <0.015 UG/L (ref 0–0.04)
UROBILINOGEN UR STRIP-MCNC: NORMAL MG/DL (ref 0–2)
WBC # BLD AUTO: 7.4 10E9/L (ref 4–11)
WBC #/AREA URNS AUTO: <1 /HPF (ref 0–5)

## 2021-05-02 PROCEDURE — 85025 COMPLETE CBC W/AUTO DIFF WBC: CPT | Performed by: EMERGENCY MEDICINE

## 2021-05-02 PROCEDURE — G0378 HOSPITAL OBSERVATION PER HR: HCPCS

## 2021-05-02 PROCEDURE — 36415 COLL VENOUS BLD VENIPUNCTURE: CPT | Performed by: INTERNAL MEDICINE

## 2021-05-02 PROCEDURE — 84484 ASSAY OF TROPONIN QUANT: CPT | Mod: 91 | Performed by: INTERNAL MEDICINE

## 2021-05-02 PROCEDURE — 93005 ELECTROCARDIOGRAM TRACING: CPT | Mod: 76

## 2021-05-02 PROCEDURE — 99219 PR INITIAL OBSERVATION CARE,LEVEL II: CPT | Performed by: INTERNAL MEDICINE

## 2021-05-02 PROCEDURE — 250N000013 HC RX MED GY IP 250 OP 250 PS 637: Performed by: EMERGENCY MEDICINE

## 2021-05-02 PROCEDURE — 80053 COMPREHEN METABOLIC PANEL: CPT | Performed by: EMERGENCY MEDICINE

## 2021-05-02 PROCEDURE — C9803 HOPD COVID-19 SPEC COLLECT: HCPCS

## 2021-05-02 PROCEDURE — 71046 X-RAY EXAM CHEST 2 VIEWS: CPT

## 2021-05-02 PROCEDURE — 81001 URINALYSIS AUTO W/SCOPE: CPT | Performed by: EMERGENCY MEDICINE

## 2021-05-02 PROCEDURE — 87635 SARS-COV-2 COVID-19 AMP PRB: CPT | Performed by: EMERGENCY MEDICINE

## 2021-05-02 PROCEDURE — 99285 EMERGENCY DEPT VISIT HI MDM: CPT | Mod: 25

## 2021-05-02 PROCEDURE — 84484 ASSAY OF TROPONIN QUANT: CPT | Performed by: EMERGENCY MEDICINE

## 2021-05-02 PROCEDURE — 83690 ASSAY OF LIPASE: CPT | Performed by: EMERGENCY MEDICINE

## 2021-05-02 PROCEDURE — 93005 ELECTROCARDIOGRAM TRACING: CPT

## 2021-05-02 PROCEDURE — 85379 FIBRIN DEGRADATION QUANT: CPT | Performed by: EMERGENCY MEDICINE

## 2021-05-02 PROCEDURE — 250N000013 HC RX MED GY IP 250 OP 250 PS 637: Performed by: INTERNAL MEDICINE

## 2021-05-02 PROCEDURE — 250N000013 HC RX MED GY IP 250 OP 250 PS 637: Performed by: PHYSICIAN ASSISTANT

## 2021-05-02 PROCEDURE — 999N001017 HC STATISTIC GLUCOSE BY METER IP

## 2021-05-02 RX ORDER — ONDANSETRON 2 MG/ML
4 INJECTION INTRAMUSCULAR; INTRAVENOUS EVERY 6 HOURS PRN
Status: DISCONTINUED | OUTPATIENT
Start: 2021-05-02 | End: 2021-05-03 | Stop reason: HOSPADM

## 2021-05-02 RX ORDER — TIZANIDINE HYDROCHLORIDE 2 MG/1
2 CAPSULE, GELATIN COATED ORAL DAILY PRN
COMMUNITY
End: 2021-08-11

## 2021-05-02 RX ORDER — DEXTROSE MONOHYDRATE 25 G/50ML
25-50 INJECTION, SOLUTION INTRAVENOUS
Status: DISCONTINUED | OUTPATIENT
Start: 2021-05-02 | End: 2021-05-03 | Stop reason: HOSPADM

## 2021-05-02 RX ORDER — ASPIRIN 81 MG/1
324 TABLET, CHEWABLE ORAL ONCE
Status: COMPLETED | OUTPATIENT
Start: 2021-05-02 | End: 2021-05-02

## 2021-05-02 RX ORDER — ALBUTEROL SULFATE 90 UG/1
2 AEROSOL, METERED RESPIRATORY (INHALATION) EVERY 6 HOURS PRN
Status: DISCONTINUED | OUTPATIENT
Start: 2021-05-02 | End: 2021-05-03

## 2021-05-02 RX ORDER — ONDANSETRON 4 MG/1
4 TABLET, ORALLY DISINTEGRATING ORAL EVERY 6 HOURS PRN
Status: DISCONTINUED | OUTPATIENT
Start: 2021-05-02 | End: 2021-05-03 | Stop reason: HOSPADM

## 2021-05-02 RX ORDER — LORAZEPAM 0.5 MG/1
0.5 TABLET ORAL ONCE
Status: COMPLETED | OUTPATIENT
Start: 2021-05-02 | End: 2021-05-02

## 2021-05-02 RX ORDER — SIMVASTATIN 40 MG
40 TABLET ORAL AT BEDTIME
Status: DISCONTINUED | OUTPATIENT
Start: 2021-05-02 | End: 2021-05-03 | Stop reason: HOSPADM

## 2021-05-02 RX ORDER — FLUTICASONE PROPIONATE 50 MCG
1 SPRAY, SUSPENSION (ML) NASAL DAILY PRN
Status: DISCONTINUED | OUTPATIENT
Start: 2021-05-02 | End: 2021-05-03 | Stop reason: HOSPADM

## 2021-05-02 RX ORDER — NICOTINE POLACRILEX 4 MG
15-30 LOZENGE BUCCAL
Status: DISCONTINUED | OUTPATIENT
Start: 2021-05-02 | End: 2021-05-03 | Stop reason: HOSPADM

## 2021-05-02 RX ORDER — FLUTICASONE PROPIONATE 50 MCG
1 SPRAY, SUSPENSION (ML) NASAL DAILY PRN
COMMUNITY
End: 2021-06-16

## 2021-05-02 RX ORDER — ACETAMINOPHEN 650 MG/1
650 SUPPOSITORY RECTAL EVERY 4 HOURS PRN
Status: DISCONTINUED | OUTPATIENT
Start: 2021-05-02 | End: 2021-05-03 | Stop reason: HOSPADM

## 2021-05-02 RX ORDER — TIZANIDINE 2 MG/1
2 TABLET ORAL DAILY PRN
Status: DISCONTINUED | OUTPATIENT
Start: 2021-05-02 | End: 2021-05-03 | Stop reason: HOSPADM

## 2021-05-02 RX ORDER — LISINOPRIL 5 MG/1
5 TABLET ORAL DAILY
Status: DISCONTINUED | OUTPATIENT
Start: 2021-05-02 | End: 2021-05-03 | Stop reason: HOSPADM

## 2021-05-02 RX ORDER — MONTELUKAST SODIUM 10 MG/1
10 TABLET ORAL EVERY MORNING
COMMUNITY
End: 2021-08-04

## 2021-05-02 RX ORDER — ACETAMINOPHEN 325 MG/1
650 TABLET ORAL EVERY 4 HOURS PRN
Status: DISCONTINUED | OUTPATIENT
Start: 2021-05-02 | End: 2021-05-03 | Stop reason: HOSPADM

## 2021-05-02 RX ORDER — CETIRIZINE HYDROCHLORIDE 10 MG/1
10 TABLET ORAL AT BEDTIME
Status: DISCONTINUED | OUTPATIENT
Start: 2021-05-02 | End: 2021-05-03 | Stop reason: HOSPADM

## 2021-05-02 RX ADMIN — SIMVASTATIN 40 MG: 40 TABLET, FILM COATED ORAL at 21:28

## 2021-05-02 RX ADMIN — ALBUTEROL SULFATE 2 PUFF: 90 AEROSOL, METERED RESPIRATORY (INHALATION) at 21:28

## 2021-05-02 RX ADMIN — CETIRIZINE HYDROCHLORIDE 10 MG: 10 TABLET, FILM COATED ORAL at 21:28

## 2021-05-02 RX ADMIN — LORAZEPAM 0.5 MG: 0.5 TABLET ORAL at 06:37

## 2021-05-02 RX ADMIN — ASPIRIN 324 MG: 81 TABLET, CHEWABLE ORAL at 06:36

## 2021-05-02 RX ADMIN — TIZANIDINE 2 MG: 2 TABLET ORAL at 21:28

## 2021-05-02 RX ADMIN — LISINOPRIL 5 MG: 5 TABLET ORAL at 13:15

## 2021-05-02 ASSESSMENT — ENCOUNTER SYMPTOMS
DIAPHORESIS: 0
CHEST TIGHTNESS: 0
PALPITATIONS: 1
VOMITING: 0
NAUSEA: 1
MYALGIAS: 0

## 2021-05-02 ASSESSMENT — MIFFLIN-ST. JEOR: SCORE: 1321.99

## 2021-05-02 NOTE — PHARMACY-ADMISSION MEDICATION HISTORY
Admission medication history interview status for this patient is complete. See [facility] admission navigator for allergy information, prior to admission medications and immunization status.     Medication history interview done, indicate source(s): Patient  Medication history resources (including written lists, pill bottles, clinic record): care everywhere  [facility]    Changes made to PTA medication list:  Added: nothing  Deleted: estradiol, hydroxyzine  Changed: montelukast from at bedtime --> q am, flonase from once daily --> once daily prn, tizanidine from BID prn --> once daily prn    Actions taken by pharmacist (provider contacted, etc):None     Additional medication history information: Patient recently received a prescription for cyclobenzaprine 5 mg TID prn, but took 1 tablet 2 days ago, and found no relief from the medication; she is not interested in receiving this medication again. The patient took tizanidine this morning to help her sleep and help with the pain, but said that it didn't really work, potentially due to her panic attack.     The patient's prescription for metronidazole is as prescribed in her chart, however, the patient thought she was supposed to be taking metronidazole 2 tablets once daily (not BID like the prescription says). She said that she started taking the medication on Wednesday, and has been taking 2 tablets once daily because she cannot swallow the 500 mg tablet (too big). The patient said that she has a decent amount of medications left because she was only taking it once daily and not BID. The patient was surprised when I alerted her to the directions.      Medication reconciliation/reorder completed by provider prior to medication history?  N   (Y/N)     Prior to Admission medications    Medication Sig Last Dose Taking? Auth Provider   albuterol (PROAIR HFA/PROVENTIL HFA/VENTOLIN HFA) 108 (90 Base) MCG/ACT inhaler Inhale 2 puffs into the lungs  every 6 hours as needed for shortness of breath / dyspnea 5/2/2021 at 0100 Yes Romel Mccall MD   cetirizine (ZYRTEC) 10 MG tablet TAKE 1 TABLET(10 MG) BY MOUTH EVERY EVENING 5/1/2021 at pm Yes Romel Mccall MD   fluticasone (FLONASE) 50 MCG/ACT nasal spray Spray 1 spray into both nostrils daily as needed for rhinitis or allergies 4/30/2021 Yes Unknown, Entered By History   lisinopril (ZESTRIL) 5 MG tablet Take 1 tablet (5 mg) by mouth daily 5/1/2021 at am Yes Romel Mccall MD   metFORMIN (GLUCOPHAGE) 500 MG tablet TAKE 1 TABLET EVERY DAY WITH BREAKFAST 5/1/2021 at am Yes Romel Mccall MD   metroNIDAZOLE (FLAGYL) 250 MG tablet Take 2 tablets (500 mg) by mouth 2 times daily - patient taking 2 tablets (500 mg) by mouth once daily 5/1/2021 at 1x Yes Gavi Katz APRN CNP   montelukast (SINGULAIR) 10 MG tablet Take 10 mg by mouth every morning 5/1/2021 at am Yes Unknown, Entered By History   simvastatin (ZOCOR) 40 MG tablet Take 1 tablet (40 mg) by mouth At Bedtime 5/1/2021 at pm Yes Romel Mccall MD   tiZANidine (ZANAFLEX) 2 MG tablet Take 1 tablet (2 mg) by once daily as needed for muscle spasms 5/2/2021 at 0100 Yes Romel Mccall MD   mirtazapine (REMERON) 15 MG tablet TAKE 1 TABLET EVERY NIGHT AS NEEDED FOR SLEEP More than a month at Unknown time  Romel Mccall MD

## 2021-05-02 NOTE — ED NOTES
.  [facility]  ED Nurse Handoff Report    Grecia Kilgore is a 66 year old female   ED Chief complaint: Anxiety  . ED Diagnosis:   Final diagnoses:   Acute chest pain   Palpitations     Allergies:   Allergies   Allergen Reactions     Codeine Nausea     Morphine Itching     Nitrofuran Derivatives Hives     Phenothiazines      sedation     Primatene Mist [Epinephrine Bitartrate] Itching     neck itch with primatene mist     Vicodin [Hydrocodone-Acetaminophen] Nausea     Dilaudid [Hydromorphone] Rash     Latex Itching and Rash       Code Status: Full Code  Activity level - Baseline/Home:  Independent. Activity Level - Current:   Stand by Assist. Lift room needed: No. Bariatric: No   Needed: No   Isolation: No. Infection: Not Applicable.     Vital Signs:   Vitals:    05/02/21 0730 05/02/21 0745 05/02/21 0800 05/02/21 0815   BP: (!) 158/96   (!) 145/111   Pulse: 61 64 54 61   Resp: 14  10    Temp:       SpO2: 98% 96% 99% 98%       Cardiac Rhythm:  ,      Pain level:    Patient confused: No. Patient Falls Risk: Yes.   Elimination Status: Has voided x 2, steady gait with ambulating/no difficulty  Patient Report - Initial Complaint:  66-year-old female with a PMH significant for DM type II, hyperlipidemia, presenting to the ED for evaluation of anxiety, chest pain, and palpitations.  VS on presentation reveal elevated BP, which improved during patient's ED course.  DDx includes ACS, PE, pneumothorax, pneumonia, atypical reflux, cardiac arrhythmia, musculoskeletal pain, referred intra-abdominal pathology, among others.  Precise cause for pain at this time is somewhat unclear.  Work-up included EKG, laboratory studies, and imaging.  Patient does describe waxing and waning right-sided chest discomfort with associated shortness of breath and nausea.  EKG demonstrates sinus rhythm with new T wave inversion across the anterolateral precordial leads.  Following aspirin and 1 dose of oral Ativan, the  patient symptoms had resolved.  Her initial troponin did return negative.  HEART score places patient in the moderate risk group for adverse outcome for which I feel observation with provocative study is indicated.  Other sources for pain were strongly considered.  She does have some discomfort to palpation across her chest, which certainly implicates a possible musculoskeletal component.  Pulmonary embolism unlikely as patient is low risk by Wells criteria and D-dimer returned within normal limits.  Chest x-ray negative for pneumothorax or pneumonia.  She has no tenderness to palpation to her abdomen, and labs reveal normal lipase, and normal LFTs.  Results and clinical impression were reviewed with the patient.  We will plan for hospital observation for further supportive treatment and care.  Questions answered prior to admission.   .   Focused Assessment: v   Tests Performed: EKG, labs, CXR.   Abnormal Results: .  Labs Ordered and Resulted from Time of ED Arrival Up to the Time of Departure from the ED   CBC WITH PLATELETS DIFFERENTIAL - Abnormal; Notable for the following components:       Result Value    MCHC 30.6 (*)     All other components within normal limits   COMPREHENSIVE METABOLIC PANEL - Abnormal; Notable for the following components:    Chloride 111 (*)     Glucose 111 (*)     Creatinine 1.06 (*)     GFR Estimate 55 (*)     All other components within normal limits   LIPASE   TROPONIN I   D DIMER QUANTITATIVE   ROUTINE UA WITH MICROSCOPIC REFLEX TO CULTURE   SARS-COV-2 (COVID-19) VIRUS RT-PCR     .  XR Chest 2 Views   Final Result   IMPRESSION:  There are no acute infiltrates. The cardiac silhouette is   not enlarged. Pulmonary vasculature is unremarkable.       MARY LOU ESTRADA MD        .   Treatments provided: see ED meds below  Family Comments: NA  OBS brochure/video discussed/provided to patient:  Yes  ED Medications:   Medications   aspirin (ASA) chewable tablet 324 mg (324 mg Oral Given 5/2/21  0636)   LORazepam (ATIVAN) tablet 0.5 mg (0.5 mg Oral Given 5/2/21 0637)     Drips infusing:  No  For the majority of the shift, the patient's behavior Green. Interventions performed were NA.    Sepsis treatment initiated: No     Patient tested for COVID 19 prior to admission: YES - not resulted as of 0832    ED Nurse Name/Phone Number: Laurence Nevarez RN,   8:30 AM    RECEIVING UNIT ED HANDOFF REVIEW    Above ED Nurse Handoff Report was reviewed: Yes  Reviewed by: Airam Hutchinson RN on May 2, 2021 at 8:48 AM

## 2021-05-02 NOTE — PLAN OF CARE
ROOM # 228    Living Situation (if not independent, order SW consult):  Facility name:  : Giovanni Chatterjee (daughter) [phone]    Activity level at baseline: Independent   Activity level on admit: SBA       Patient registered to observation; given Patient Bill of Rights; given the opportunity to ask questions about observation status and their plan of care.  Patient has been oriented to the observation room, bathroom and call light is in place.    Discussed discharge goals and expectations with patient/family.

## 2021-05-02 NOTE — PROGRESS NOTES
PRIMARY DIAGNOSIS: CHEST PAIN  OUTPATIENT/OBSERVATION GOALS TO BE MET BEFORE DISCHARGE:  1. Ruled out acute coronary syndrome (negative or stable Troponin):  Yes  2. Pain Status: Pain free.  3. Appropriate provocative testing performed: Yes  - Stress Test Procedure: [place]  - Interpretation of cardiac rhythm per telemetry tech: SR/SB HR 60    4. Cleared by Consultants (if applicable):No  5. Return to near baseline physical activity: Yes  Discharge Planner Nurse   Safe discharge environment identified: Yes  Barriers to discharge: Yes       Entered by: Airam Hutchinson 05/02/2021 2:17 PM     Please review provider order for any additional goals.   Nurse to notify provider when observation goals have been met and patient is ready for discharge.

## 2021-05-02 NOTE — ED PROVIDER NOTES
History     Chief Complaint:  Chest pain    HPI  Grecia Kilgore is a 66 year old female with a history of diabetes, GERD, anxiety, and hypercholesterolemia who presents for evaluation of right lower chest pain and palpitation. She woke up at 2am with this right chest pain which she has been seen by her PCP for on 4/29. An EKG done on that day was without ischemia or arrhythmia. She was prescribed a Holter monitor but this has not yet been placed. The patient took her tizanidine when she woke up this morning but still reports diffuse right sided chest pain which is tender to palpation. Earlier today when she first woke up she felt nauseous but has not vomited. She denies any crushing chest pain, arm or neck pain, shortness of breath, or diaphoresis. The patient also denies any family history of MI or blood clots.  She does acknowledge having done some lifting with her right arm a few days ago, and was wondering if this was the cause of her pain.      Review of Systems   Constitutional: Negative for diaphoresis.   Respiratory: Negative for chest tightness.    Cardiovascular: Positive for chest pain and palpitations.   Gastrointestinal: Positive for nausea. Negative for vomiting.   Musculoskeletal: Negative for myalgias.   All other systems reviewed and are negative.      Allergies:  Codeine  Morphine  Nitrofuran Derivatives  Phenothiazines  Primatene Mist [Epinephrine Bitartrate]  Vicodin [Hydrocodone-Acetaminophen]  Dilaudid [Hydromorphone]  Latex    Medications:    Albuterol   Zyrtec  Flonase  Atarax  Lisinopril   Metformin   Mirtazapine   Montelukast   Simvastatin   Tizanidine     Past Medical History:    Allergic Rhinitis   Anemia  Anxiety  Chronic Back Pain  DCIS  Depression  Diabetes Mellitus Type 2   Diverticulosis  Eating Disorder   Exploding Head Syndrome  External Hemorrhoids  Gastroesophageal Reflux Disease  Hepatitis A  Hypercholesterolemia  Migraine  Mild Persistent  Asthma   Mumps  Nephrolithiasis  Ovarian Cyst  Pancreatitis  PTSD  STD  Tuberculosis    Past Surgical History:    Hysterectomy  Foot surgery left  Hemorrhoidectomy banding  Remove hardware foot left  Hemorrhoidectomy internal  Arthrodesis toe(s) left  Repair tendon foot  Left ureter stent placement and stone removal    Family History:    Alcohol/Drug in her father  Breast Cancer in her mother  Cancer in her father  Diabetes in her mother    Social History:  The patient arrives alone  Arrived by private car    Physical Exam     Patient Vitals for the past 24 hrs:   BP Temp Pulse Resp SpO2   05/02/21 0830 (!) 125/99 -- 66 -- 98 %   05/02/21 0815 (!) 145/111 -- 61 -- 98 %   05/02/21 0800 -- -- 54 10 99 %   05/02/21 0745 -- -- 64 -- 96 %   05/02/21 0730 (!) 158/96 -- 61 14 98 %   05/02/21 0715 (!) 167/92 -- 64 17 98 %   05/02/21 0645 (!) 148/113 -- 60 19 97 %   05/02/21 0532 (!) 161/85 98.5  F (36.9  C) 69 18 99 %     Physical Exam  General:              Well-nourished              Speaking in full sentences  Eyes:              Conjunctiva without injection or scleral icterus  ENT:              Moist mucous membranes              Nares patent              Pinnae normal  Neck:              Full ROM              No stiffness appreciated  Resp:              Lungs CTAB              No crackles, wheezing or audible rubs              Good air movement  CV:                    Normal rate, regular rhythm              S1 and S2 present              No murmur, gallop or rub  GI:              BS present              Abdomen soft without distention              Non-tender to light and deep palpation              No guarding or rebound tenderness  Skin:              Warm, dry, well perfused              No rashes or open wounds on exposed skin  MSK:              Moves all extremities              No focal deformities or swelling              Mild tenderness to palpation to right lower costal margin  Neuro:              Alert               Answers questions appropriately              Moves all extremities equally              Gait stable  Psych:              Normal affect, normal mood    Emergency Department Course     ECG #1  ECG taken at 0539, ECG read at 0542  Sinus bradycardia  T wave abnormality, consider anterior ischemia  Abnormal EKG   Rate 56 bpm. MI interval 164 ms. QRS duration 80 ms. QT/QTc 438/422 ms. P-R-T axes 59 0 37.     ECG #2  ECG taken at 0759, ECG read at 0802  Sinus bradycardia with sinus arrhythmia   T wave abnormality, consider anterior ischemia  Abnormal EKG   Rate 58 bpm. MI interval 162 ms. QRS duration 74 ms. QT/QTc 442/433 ms. P-R-T axes 23 -18 24.    Imaging:  XR Chest 2 views   IMPRESSION:  There are no acute infiltrates. The cardiac silhouette is   not enlarged. Pulmonary vasculature is unremarkable  Reading per radiology    Laboratory:  CBC: WBC 7.4, HGB 13.6,   CMP: Glucose 111 (H), Chloride: 111 (H), GFR: 55 (L), o/w WNL (Creatinine: 1.06 (H))    Troponin (Collected 0632): <0.015  D-dimer: 0.4  Lipase: 102   Asymptomatic COVID test negative    UA: Negative      Emergency Department Course:    Reviewed:  I reviewed nursing notes, vitals, past medical history and care everywhere    Assessments:  0626 I obtained history and examined the patient as noted above.   0758 I rechecked the patient and explained findings.   0804 I recheck the patient and explained plan for admission, she agrees with this and will admit to OBS.    Consults:   0802: I spoke with Dr. Ramirez of the hospitalist service regarding this patient.    Interventions:  0636 Aspirin 324 mg PO  0637 Lorazepam, 0.5 mg, IV injection    Disposition:  The patient was admitted to the hospital under the care of Dr. Ramirez.     Impression & Plan      Medical Decision Making:  Grecia Kilgore is a very pleasant 66-year-old female with a PMH significant for DM type II, hyperlipidemia, presenting to the ED for evaluation of anxiety, chest pain, and  palpitations.  VS on presentation reveal elevated BP, which improved during patient's ED course.  DDx includes ACS, PE, pneumothorax, pneumonia, atypical reflux, cardiac arrhythmia, musculoskeletal pain, referred intra-abdominal pathology, among others.  Precise cause for pain at this time is somewhat unclear.  Work-up included EKG, laboratory studies, and imaging.  Patient does describe waxing and waning right-sided chest discomfort with associated shortness of breath and nausea.  EKG demonstrates sinus rhythm with new T wave inversion across the anterolateral precordial leads.  Following aspirin and 1 dose of oral Ativan, the patient symptoms had resolved.  Her initial troponin did return negative.  HEART score places patient in the moderate risk group for adverse outcome for which I feel observation with provocative study is indicated.  Other sources for pain were strongly considered.  She does have some discomfort to palpation across her chest, which certainly implicates a possible musculoskeletal component.  Pulmonary embolism unlikely as patient is low risk by Wells criteria and D-dimer returned within normal limits.  Chest x-ray negative for pneumothorax or pneumonia.  She has no tenderness to palpation to her abdomen, and labs reveal normal lipase, and normal LFTs.  Results and clinical impression were reviewed with the patient.  We will plan for hospital observation for further supportive treatment and care.  Questions answered prior to admission.    Covid-19  Grecia Kilgore was evaluated during a global COVID-19 pandemic, which necessitated consideration that the patient might be at risk for infection with the SARS-CoV-2 virus that causes COVID-19.   Applicable protocols for evaluation were followed during the patient's care.   COVID-19 was considered as part of the patient's evaluation. The plan for testing is:  a test was obtained during this visit.      Diagnosis:    ICD-10-CM    1. Acute chest pain   R07.9 Comprehensive metabolic panel     Lipase     Troponin I     D dimer quantitative     UA with Microscopic reflex to Culture     Asymptomatic SARS-CoV-2 COVID-19 Virus (Coronavirus) by PCR   2. Palpitations  R00.2        Scribe Disclosure:  I, Michelle Capone, am serving as a scribe at 6:26 AM on 5/2/2021 to document services personally performed by Daryn Whitehead MD based on my observations and the provider's statements to me.        Daryn Whitehead MD  05/02/21 0918
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
## 2021-05-03 ENCOUNTER — APPOINTMENT (OUTPATIENT)
Dept: NUCLEAR MEDICINE | Facility: CLINIC | Age: 66
End: 2021-05-03
Attending: INTERNAL MEDICINE
Payer: COMMERCIAL

## 2021-05-03 ENCOUNTER — APPOINTMENT (OUTPATIENT)
Dept: CARDIOLOGY | Facility: CLINIC | Age: 66
End: 2021-05-03
Attending: INTERNAL MEDICINE
Payer: COMMERCIAL

## 2021-05-03 VITALS
DIASTOLIC BLOOD PRESSURE: 82 MMHG | RESPIRATION RATE: 16 BRPM | HEIGHT: 67 IN | WEIGHT: 163 LBS | BODY MASS INDEX: 25.58 KG/M2 | OXYGEN SATURATION: 95 % | HEART RATE: 75 BPM | SYSTOLIC BLOOD PRESSURE: 132 MMHG | TEMPERATURE: 98.8 F

## 2021-05-03 DIAGNOSIS — B37.9 YEAST INFECTION: Primary | ICD-10-CM

## 2021-05-03 LAB
ANION GAP SERPL CALCULATED.3IONS-SCNC: 4 MMOL/L (ref 3–14)
BUN SERPL-MCNC: 12 MG/DL (ref 7–30)
CALCIUM SERPL-MCNC: 8.8 MG/DL (ref 8.5–10.1)
CHLORIDE SERPL-SCNC: 111 MMOL/L (ref 94–109)
CO2 SERPL-SCNC: 25 MMOL/L (ref 20–32)
CREAT SERPL-MCNC: 1.01 MG/DL (ref 0.52–1.04)
CV BLOOD PRESSURE: 80 %
CV STRESS MAX HR HE: 109
ERYTHROCYTE [DISTWIDTH] IN BLOOD BY AUTOMATED COUNT: 13 % (ref 10–15)
GFR SERPL CREATININE-BSD FRML MDRD: 59 ML/MIN/{1.73_M2}
GLUCOSE BLDC GLUCOMTR-MCNC: 115 MG/DL (ref 70–99)
GLUCOSE BLDC GLUCOMTR-MCNC: 147 MG/DL (ref 70–99)
GLUCOSE SERPL-MCNC: 131 MG/DL (ref 70–99)
HCT VFR BLD AUTO: 39.7 % (ref 35–47)
HGB BLD-MCNC: 12.7 G/DL (ref 11.7–15.7)
INTERPRETATION ECG - MUSE: NORMAL
INTERPRETATION ECG - MUSE: NORMAL
MCH RBC QN AUTO: 28.8 PG (ref 26.5–33)
MCHC RBC AUTO-ENTMCNC: 32 G/DL (ref 31.5–36.5)
MCV RBC AUTO: 90 FL (ref 78–100)
NUC STRESS EJECTION FRACTION: 81 %
PLATELET # BLD AUTO: 227 10E9/L (ref 150–450)
POTASSIUM SERPL-SCNC: 3.8 MMOL/L (ref 3.4–5.3)
RATE PRESSURE PRODUCT: NORMAL
RBC # BLD AUTO: 4.41 10E12/L (ref 3.8–5.2)
SODIUM SERPL-SCNC: 140 MMOL/L (ref 133–144)
SPECIMEN SOURCE: ABNORMAL
STRESS ECHO BASELINE DIASTOLIC HE: 90
STRESS ECHO BASELINE HR: 69
STRESS ECHO BASELINE SYSTOLIC BP: 157
STRESS ECHO CALCULATED PERCENT HR: 70 %
STRESS ECHO LAST STRESS DIASTOLIC BP: 100
STRESS ECHO LAST STRESS SYSTOLIC BP: 179
STRESS ECHO TARGET HR: 156
STRESS/REST PERFUSION RATIO: 0.92
WBC # BLD AUTO: 7.4 10E9/L (ref 4–11)
YEAST SPEC QL CULT: ABNORMAL

## 2021-05-03 PROCEDURE — 78452 HT MUSCLE IMAGE SPECT MULT: CPT | Mod: 26 | Performed by: INTERNAL MEDICINE

## 2021-05-03 PROCEDURE — 99207 PR CDG-CODE CATEGORY CHANGED: CPT | Performed by: INTERNAL MEDICINE

## 2021-05-03 PROCEDURE — 85027 COMPLETE CBC AUTOMATED: CPT | Performed by: INTERNAL MEDICINE

## 2021-05-03 PROCEDURE — 78452 HT MUSCLE IMAGE SPECT MULT: CPT

## 2021-05-03 PROCEDURE — 93018 CV STRESS TEST I&R ONLY: CPT | Performed by: INTERNAL MEDICINE

## 2021-05-03 PROCEDURE — G0378 HOSPITAL OBSERVATION PER HR: HCPCS

## 2021-05-03 PROCEDURE — A9502 TC99M TETROFOSMIN: HCPCS | Performed by: INTERNAL MEDICINE

## 2021-05-03 PROCEDURE — 250N000011 HC RX IP 250 OP 636

## 2021-05-03 PROCEDURE — 343N000001 HC RX 343: Performed by: INTERNAL MEDICINE

## 2021-05-03 PROCEDURE — 93306 TTE W/DOPPLER COMPLETE: CPT

## 2021-05-03 PROCEDURE — 80048 BASIC METABOLIC PNL TOTAL CA: CPT | Performed by: INTERNAL MEDICINE

## 2021-05-03 PROCEDURE — 999N001017 HC STATISTIC GLUCOSE BY METER IP

## 2021-05-03 PROCEDURE — 250N000013 HC RX MED GY IP 250 OP 250 PS 637: Performed by: INTERNAL MEDICINE

## 2021-05-03 PROCEDURE — 93244 EXT ECG>48HR<7D REV&INTERPJ: CPT | Performed by: INTERNAL MEDICINE

## 2021-05-03 PROCEDURE — 93242 EXT ECG>48HR<7D RECORDING: CPT

## 2021-05-03 PROCEDURE — 99217 PR OBSERVATION CARE DISCHARGE: CPT | Performed by: INTERNAL MEDICINE

## 2021-05-03 PROCEDURE — 36415 COLL VENOUS BLD VENIPUNCTURE: CPT | Performed by: INTERNAL MEDICINE

## 2021-05-03 PROCEDURE — 93017 CV STRESS TEST TRACING ONLY: CPT

## 2021-05-03 PROCEDURE — 93306 TTE W/DOPPLER COMPLETE: CPT | Mod: 26 | Performed by: INTERNAL MEDICINE

## 2021-05-03 PROCEDURE — 93016 CV STRESS TEST SUPVJ ONLY: CPT | Performed by: INTERNAL MEDICINE

## 2021-05-03 RX ORDER — LEVALBUTEROL TARTRATE 45 UG/1
2 AEROSOL, METERED ORAL EVERY 6 HOURS PRN
Status: DISCONTINUED | OUTPATIENT
Start: 2021-05-03 | End: 2021-05-03 | Stop reason: HOSPADM

## 2021-05-03 RX ORDER — FLUCONAZOLE 150 MG/1
150 TABLET ORAL
Qty: 2 TABLET | Refills: 0 | Status: SHIPPED | OUTPATIENT
Start: 2021-05-03 | End: 2021-06-02

## 2021-05-03 RX ORDER — REGADENOSON 0.08 MG/ML
INJECTION, SOLUTION INTRAVENOUS
Status: COMPLETED
Start: 2021-05-03 | End: 2021-05-03

## 2021-05-03 RX ADMIN — LISINOPRIL 5 MG: 5 TABLET ORAL at 08:36

## 2021-05-03 RX ADMIN — REGADENOSON 0.4 MG: 0.08 INJECTION, SOLUTION INTRAVENOUS at 11:41

## 2021-05-03 RX ADMIN — TETROFOSMIN 10.8 MCI.: 1.38 INJECTION, POWDER, LYOPHILIZED, FOR SOLUTION INTRAVENOUS at 10:03

## 2021-05-03 RX ADMIN — TETROFOSMIN 32.7 MCI.: 1.38 INJECTION, POWDER, LYOPHILIZED, FOR SOLUTION INTRAVENOUS at 11:31

## 2021-05-03 RX ADMIN — ACETAMINOPHEN 650 MG: 325 TABLET, FILM COATED ORAL at 08:36

## 2021-05-03 RX ADMIN — METFORMIN HYDROCHLORIDE 500 MG: 500 TABLET, FILM COATED ORAL at 08:36

## 2021-05-03 NOTE — PROGRESS NOTES
"PRIMARY DIAGNOSIS: CHEST PAIN  OUTPATIENT/OBSERVATION GOALS TO BE MET BEFORE DISCHARGE:  1. Ruled out acute coronary syndrome (negative or stable Troponin):  Yes  2. Pain Status: Pain free.  3. Appropriate provocative testing performed: Yes  - Stress Test Procedure: Lesiscan  - Interpretation of cardiac rhythm per telemetry tech: SR/SB HR 63     4. Cleared by Consultants (if applicable):No  5. Return to near baseline physical activity: Yes     Discharge Planner Nurse      Safe discharge environment identified: Yes  Barriers to discharge: Yes       Entered by: Airam Hutchinson 05/03/2021 [id number]        Please review provider order for any additional goals.   Nurse to notify provider when observation goals have been met and patient is ready for discharge.    Patient is alert and oriented x4.VS WNL and documented on the FS. Lung sounds clear in all lobes and patient is on RA. Denies SOB. Active bowel sounds. Patient voiding spontaneously. SL. Regular diet.  and patient refused correction. Patient is a independent in room. Denies (R) chest pain at this time.  Echocardiogram and NM MPI w Lexiscan done (negative). Patient needs a Leadless EKG 3-7 days placed and patient can discharge home.     /82 (BP Location: Right arm)   Pulse 69   Temp 98.2  F (36.8  C) (Oral)   Resp 16   Ht 1.702 m (5' 7\")   Wt 73.9 kg (163 lb)   LMP 01/01/1985   SpO2 99%   BMI 25.53 kg/m       "

## 2021-05-03 NOTE — PLAN OF CARE
PRIMARY DIAGNOSIS: CHEST PAIN  OUTPATIENT/OBSERVATION GOALS TO BE MET BEFORE DISCHARGE:  1. Ruled out acute coronary syndrome (negative or stable Troponin):  Yes  2. Pain Status: Pain free.  3. Appropriate provocative testing performed: Yes  - Stress Test Procedure: Lesiscan  - Interpretation of cardiac rhythm per telemetry tech: SB with hr 50's    4. Cleared by Consultants (if applicable):No  5. Return to near baseline physical activity: Yes  Discharge Planner Nurse   Safe discharge environment identified: Yes  Barriers to discharge: Yes       Entered by: Krystle Mesa 05/03/2021 1:08 AM     Vitals are Temp: 98.1  F (36.7  C) Temp src: Oral BP: 138/68 Pulse: 64   Resp: 16 SpO2: 96 %.  Patient is Alert and Oriented x4. They are SBA with no assistive devices .  Pt is a Diabetic diet.  They are denying chest pain and nausea.  Right leg elevated on pillow and T pump in use for a pulled muscle in right knee since February per pt. Patient is Saline locked. Will continue to monitor and provide cares.     Please review provider order for any additional goals.   Nurse to notify provider when observation goals have been met and patient is ready for discharge.

## 2021-05-03 NOTE — PLAN OF CARE
PRIMARY DIAGNOSIS: CHEST PAIN  OUTPATIENT/OBSERVATION GOALS TO BE MET BEFORE DISCHARGE:  1. Ruled out acute coronary syndrome (negative or stable Troponin):  Yes  2. Pain Status: Pain free.  3. Appropriate provocative testing performed: Yes  - Stress Test Procedure: Lesiscan  - Interpretation of cardiac rhythm per telemetry tech: SR with hr 60's    4. Cleared by Consultants (if applicable):No  5. Return to near baseline physical activity: Yes  Discharge Planner Nurse   Safe discharge environment identified: Yes  Barriers to discharge: Yes       Entered by: Krystle Mesa 05/02/2021 8:58 PM     Vitals are Temp: 98  F (36.7  C) Temp src: Oral BP: 130/70(manual bp per pt request) Pulse: 68   Resp: 18 SpO2: 98 %.  Patient is Alert and Oriented x4. They are SBA with no assistive devices .  Pt is a Diabetic diet.  They are denying chest pain and nausea.  Right leg elevated on pillow and T pump in use for a pulled muscle in right knee since February per pt. Albuterol inhaler given for mild sob, effective. Patient is Saline locked. Will continue to monitor and provide cares.     Please review provider order for any additional goals.   Nurse to notify provider when observation goals have been met and patient is ready for discharge.

## 2021-05-03 NOTE — PLAN OF CARE
Patient's After Visit Summary was reviewed with patient.   Patient verbalized understanding of After Visit Summary, recommended follow up and was given an opportunity to ask questions.   Discharge medications sent home with patient/family: No   Discharged with other:alone. Self drive  VSS. Wheelchair ride provided.

## 2021-05-03 NOTE — PLAN OF CARE
PRIMARY DIAGNOSIS: CHEST PAIN  OUTPATIENT/OBSERVATION GOALS TO BE MET BEFORE DISCHARGE:  1. Ruled out acute coronary syndrome (negative or stable Troponin):  Yes  2. Pain Status: Pain free.  3. Appropriate provocative testing performed: Yes  - Stress Test Procedure: Lesiscan  - Interpretation of cardiac rhythm per telemetry tech: SB with hr 40-50's    4. Cleared by Consultants (if applicable):No  5. Return to near baseline physical activity: Yes  Discharge Planner Nurse   Safe discharge environment identified: Yes  Barriers to discharge: Yes       Entered by: Krystle Mesa 05/03/2021 5:44 AM     Vitals are Temp: 97.9  F (36.6  C) Temp src: Oral BP: 132/78 Pulse: 59   Resp: 16 SpO2: 98 %.  Patient is Alert and Oriented x4. They are SBA with no assistive devices .  Pt is a Diabetic diet.  They are denying chest pain and nausea.  Right leg elevated on pillow and T pump in use for a pulled muscle in right knee. Patient is Saline locked. Plan: monitor for chest pain, tele, lexiscan today.    Please review provider order for any additional goals.   Nurse to notify provider when observation goals have been met and patient is ready for discharge.

## 2021-05-03 NOTE — PROGRESS NOTES
8:16 PM  Provider notified: Obs pagerKathryn  Reason:Pt is requesting order for PTA Zyrtec and Zanaflex.  Order: Kathryn ordered zyrtec and zanaflex.

## 2021-05-03 NOTE — DISCHARGE SUMMARY
[facility]    Discharge Summary  Hospitalist    Date of Admission:  5/2/2021  Date of Discharge:  5/3/2021  Discharging Provider: Gilmer Rock MD  Date of Service (when I saw the patient): 05/03/21    Discharge Diagnoses      1. Chest pain, suspect noncardiac. Negative cardiac workup     2. Diabetes mellitus type 2, not on insulin     3. Hyperlipidemia     4. Hypertension      History of Present Illness   Grecia Kilgore is an 66 year old female who presented with chest pain    Hospital Course   Grecia Kilgore is a 66 year old female patient with past medical history of diabetes mellitus type 2, hypertension, hyperlipidemia, anxiety,depression, anxiety, PTSD, who came to emergency room for evaluation for chest pain. She stated that she had right side chest pain with fluttering symptoms about two months ago and her symptoms resolved. She stated that she lifted heavy object with her right hand four days ago and developed right side chest pain with fluttering symptoms. She denies cough, fever, shortness of breath.   Initial vital signs in the ER showed temperature 98.5, pulse 60, blood pressure 148/113, oxygen saturation 97% on room air.   Lab workup showed unremarkable CBC and BMP. Troponin is negative. CXR showed no evidence of acute process. EKG revealed sinus bradycardia with nonspecific ST-T changes. Patient was admitted to the hospital for further evaluation.      1. Chest pain, suspect noncardiac.  Given her cardiac risk factors, will rule out acute coronary syndrome. Cardiac enzymes undetectable.  Lexiscan stress test negative  Ordered leadless EKG for 7 days to rule out arrhythmias. She was advised to follow up with PCP in 1 week.      2. Diabetes mellitus type 2, not on insulin  She was put on insulin sliding scale. Resumed metformin.      3. Hyperlipidemia: Resumed simvastatin     4. Hypertension: Resumed lisinopril     Patient remained stable. She was discharged home  in a stable condition.     Gilmer Carranza MD    Significant Results and Procedures   Results for orders placed or performed during the hospital encounter of 21   XR Chest 2 Views    Narrative    CHEST TWO VIEWS 2021 7:03 AM     HISTORY: Chest pain, palpitations.    COMPARISON: May 28, 2016       Impression    IMPRESSION:  There are no acute infiltrates. The cardiac silhouette is  not enlarged. Pulmonary vasculature is unremarkable.     MARY LOU ESTRADA MD   Echocardiogram Complete    Narrative    [id number]  [id number]  [id number]  [id number]^TERE^GILMER^FREDERICK     [facility]  Echocardiography Laboratory  [address] [place] [address]     Name: EDIL STEVENSON  MRN: [id number]  : 1955  Study Date: 2021 09:10 AM  Age: 66 yrs  Gender: Female  Patient Location: [facility]  Reason For Study: Chest Pain  Ordering Physician: GILMER CARRANZA  Performed By: Mary Eng     BSA: 1.9 m2  Height: 67 in  Weight: 163 lb  HR: 47  BP: 132/78 mmHg  ______________________________________________________________________________  Procedure  Complete Portable Echo Adult.  ______________________________________________________________________________  Interpretation Summary     Left ventricular systolic function is normal.  The visual ejection fraction is estimated at 65-70%.  No regional wall motion abnormalities noted.  The study was technically adequate. Compared to the prior study dated ,  there have been no changes.  ______________________________________________________________________________  Left Ventricle  The left ventricle is normal in size. There is normal left ventricular wall  thickness. Left ventricular systolic function is normal. The visual ejection  fraction is estimated at 65-70%. Left ventricular diastolic function is  indeterminate. No regional wall motion abnormalities noted.     Right Ventricle  The right ventricle is normal size. The right ventricular  systolic function is  normal.     Atria  Normal left atrial size. Right atrial size is normal.     Mitral Valve  There is trace mitral regurgitation.     Tricuspid Valve  There is trace tricuspid regurgitation. Right ventricular systolic pressure  could not be approximated due to inadequate tricuspid regurgitation.     Aortic Valve  The aortic valve is trileaflet. No aortic regurgitation is present. No aortic  stenosis is present.     Pulmonic Valve  The pulmonic valve is not well visualized.     Vessels  The aortic root is normal size.     Pericardium  There is no pericardial effusion.     Rhythm  Sinus rhythm was noted.  ______________________________________________________________________________  MMode/2D Measurements & Calculations     IVSd: 1.0 cm  LVIDd: 3.3 cm  LVIDs: 2.1 cm  LVPWd: 0.98 cm  FS: 36.8 %  LV mass(C)d: 95.1 grams  LV mass(C)dI: 51.3 grams/m2  Ao root diam: 2.9 cm  LA dimension: 2.8 cm  asc Aorta Diam: 3.5 cm  LA/Ao: 0.97  LVOT diam: 2.1 cm  LVOT area: 3.5 cm2  LA Volume (BP): 51.9 ml  LA Volume Index (BP): 28.1 ml/m2  RWT: 0.60     Doppler Measurements & Calculations  MV E max keyanna: 71.8 cm/sec  MV A max keyanna: 84.4 cm/sec  MV E/A: 0.85  MV max P.3 mmHg  MV mean P.0 mmHg  MV V2 VTI: 36.4 cm  MV dec time: 0.31 sec  PA acc time: 0.13 sec  E/E' avg: 10.9  Lateral E/e': 10.0  Medial E/e': 11.8     ______________________________________________________________________________  Report approved by: Annabelle Siegel 2021 09:51 AM         NM MPI w Lexiscan     Value    Target     Baseline Systolic     Baseline Diastolic BP 90    Last Stress Systolic     Last Stress Diastolic     Baseline HR 69    Max     Calculated Percent HR 70    Rate Pressure Product 19,511.0    Nuc Rest EF 80    Left Ventricular EF 81    Stress/rest perfusion ratio 0.92    Narrative       The nuclear stress test is negative for inducible myocardial ischemia   or infarction.     Left  ventricular function is hyperdynamic.     The left ventricular ejection fraction at rest is 80%.  The left   ventricular ejection fraction at stress is 81%.     LV cavity size small.     Stress to rest cavity ratio is 0.92.  TID is absent.     A prior study was conducted on 12/28/2015.  This study has no change   when compared with the prior study.             Pending Results   None    Code Status   Full Code       Primary Care Physician   Skye Contreras        Discharge Disposition   Discharged to home  Condition at discharge: Stable    Consultations This Hospital Stay   None    Time Spent on this Encounter   Gilmer RASHEED MD, MD, personally saw the patient today and spent greater than 30 minutes discharging this patient.    Discharge Orders      Reason for your hospital stay    Chest pain     Activity    Your activity upon discharge: activity as tolerated     Follow-up and recommended labs and tests     Follow up with primary care provider, Skye Contreras, within 7 days  Home with leadless EKG for 7 days     Diet    Follow this diet upon discharge: Orders Placed This Encounter      Moderate Consistent CHO Diet     Discharge Medications   Current Discharge Medication List      CONTINUE these medications which have NOT CHANGED    Details   albuterol (PROAIR HFA/PROVENTIL HFA/VENTOLIN HFA) 108 (90 Base) MCG/ACT inhaler Inhale 2 puffs into the lungs every 6 hours as needed for shortness of breath / dyspnea  Qty: 1 Inhaler, Refills: 6    Comments: Pharmacy may dispense brand covered by insurance (Proair, or proventil or ventolin or generic albuterol inhaler)  Associated Diagnoses: Mild intermittent asthma without complication      cetirizine (ZYRTEC) 10 MG tablet TAKE 1 TABLET(10 MG) BY MOUTH EVERY EVENING  Qty: 90 tablet, Refills: 3    Associated Diagnoses: Hives      fluticasone (FLONASE) 50 MCG/ACT nasal spray Spray 1 spray into both nostrils daily as needed for rhinitis or allergies      lisinopril  (ZESTRIL) 5 MG tablet Take 1 tablet (5 mg) by mouth daily  Qty: 90 tablet, Refills: 3    Associated Diagnoses: Type 2 diabetes mellitus without complication, without long-term current use of insulin (H)      metFORMIN (GLUCOPHAGE) 500 MG tablet TAKE 1 TABLET EVERY DAY WITH BREAKFAST  Qty: 90 tablet, Refills: 3    Associated Diagnoses: Type 2 diabetes mellitus without complication, without long-term current use of insulin (H)      metroNIDAZOLE (FLAGYL) 250 MG tablet Take 2 tablets (500 mg) by mouth 2 times daily  Qty: 28 tablet, Refills: 0    Associated Diagnoses: Vaginal discharge      montelukast (SINGULAIR) 10 MG tablet Take 10 mg by mouth every morning      simvastatin (ZOCOR) 40 MG tablet Take 1 tablet (40 mg) by mouth At Bedtime  Qty: 90 tablet, Refills: 3    Associated Diagnoses: Hyperlipidemia LDL goal <100      tiZANidine (ZANAFLEX) 2 MG capsule Take 2 mg by mouth daily as needed      fluconazole (DIFLUCAN) 150 MG tablet Take 1 tablet (150 mg) by mouth every 3 days  Qty: 2 tablet, Refills: 0    Associated Diagnoses: Yeast infection      mirtazapine (REMERON) 15 MG tablet TAKE 1 TABLET EVERY NIGHT AS NEEDED FOR SLEEP  Qty: 90 tablet, Refills: 0    Associated Diagnoses: Primary insomnia             Allergies   Allergies   Allergen Reactions     Codeine Nausea     Morphine Itching     Nitrofuran Derivatives Hives     Phenothiazines      sedation     Primatene Mist [Epinephrine Bitartrate] Itching     neck itch with primatene mist     Vicodin [Hydrocodone-Acetaminophen] Nausea     Dilaudid [Hydromorphone] Rash     Latex Itching and Rash     Data   Most Recent 3 CBC's:  Recent Labs   Lab Test 05/03/21  0536 05/02/21  0632 02/01/21  1355   WBC 7.4 7.4 7.8   HGB 12.7 13.6 13.1   MCV 90 94 90    237 214      Most Recent 3 BMP's:  Recent Labs   Lab Test 05/03/21  0536 05/02/21  0632 01/27/21  1516    141 143   POTASSIUM 3.8 3.6 4.4   CHLORIDE 111* 111* 113*   CO2 25 25 24   BUN 12 12 10   CR 1.01 1.06*  0.96   ANIONGAP 4 5 6   LOGAN 8.8 9.3 9.7   * 111* 98     Most Recent 2 LFT's:  Recent Labs   Lab Test 05/02/21  0632 11/15/20  1853   AST 24 20   ALT 26 24   ALKPHOS 92 101   BILITOTAL 0.7 0.6     Most Recent INR's and Anticoagulation Dosing History:  Anticoagulation Dose History     There is no flowsheet data to display.        Most Recent 3 Troponin's:  Recent Labs   Lab Test 05/02/21  1723 05/02/21  1412 05/02/21  0632 05/28/16  0719 04/09/13  2335 04/09/13  2335   TROPI <0.015 <0.015 <0.015  --    < >  --    TROPONIN  --   --   --  0.00  --  0.00    < > = values in this interval not displayed.     Most Recent Cholesterol Panel:  Recent Labs   Lab Test 03/22/21  1425   CHOL 180   LDL 92   HDL 68   TRIG 98     Most Recent 6 Bacteria Isolates From Any Culture (See EPIC Reports for Culture Details):  Recent Labs   Lab Test 04/28/21  1458 02/24/21  1608 01/12/21  1431 09/17/20  1435 09/17/20  1414 08/07/20  1125   CULT Light growth  Candida albicans  * <10,000 colonies/mL  urogenital sixto  Susceptibility testing not routinely done   No growth Light growth  Candida albicans / dubliniensis  Candida albicans and Candida dubliniensis are not routinely speciated  Susceptibility testing not routinely done  *  Streptococcus agalactiae sero group B  isolated  * 50,000 to 100,000 colonies/mL  mixed urogenital sixto  Susceptibility testing not routinely done   Streptococcus agalactiae sero group B  isolated  *  Light growth  Candida albicans / dubliniensis  isolated  Candida albicans and Candida dubliniensis are not routinely speciated  *     Most Recent TSH, T4 and A1c Labs:  Recent Labs   Lab Test 01/27/21  1516 09/18/20  1546 09/11/19  1235   TSH  --  0.48 0.33*   T4  --   --  0.95   A1C 6.2* 6.1* 5.7*

## 2021-05-03 NOTE — PROGRESS NOTES
"PRIMARY DIAGNOSIS: CHEST PAIN  OUTPATIENT/OBSERVATION GOALS TO BE MET BEFORE DISCHARGE:  1. Ruled out acute coronary syndrome (negative or stable Troponin):  Yes  2. Pain Status: Pain free.  3. Appropriate provocative testing performed: Yes  - Stress Test Procedure: Lesiscan  - Interpretation of cardiac rhythm per telemetry tech: SR/SB HR 63    4. Cleared by Consultants (if applicable):No  5. Return to near baseline physical activity: Yes  Discharge Planner Nurse   Safe discharge environment identified: Yes  Barriers to discharge: Yes       Entered by: Airam Hutchinson 05/03/2021 9:53 AM     Please review provider order for any additional goals.   Nurse to notify provider when observation goals have been met and patient is ready for discharge.  Patient is alert and oriented x4.VS WNL and documented on the FS. Lung sounds clear in all lobes and patient is on RA. Denies SOB. Active bowel sounds. Patient voiding spontaneously. SL. NPO this morning.  and no correction given. Patient is a independent in room. Denies (R) chest pain at this time.  Tylenol given for a headache. Plan:  Echocardiogram completed and waiting on NM MPI w Lexiscan.   /78 (BP Location: Right arm)   Pulse 60   Temp 97.8  F (36.6  C) (Oral)   Resp 16   Ht 1.702 m (5' 7\")   Wt 73.9 kg (163 lb)   LMP 01/01/1985   SpO2 98%   BMI 25.53 kg/m        "

## 2021-05-04 ENCOUNTER — TELEPHONE (OUTPATIENT)
Dept: FAMILY MEDICINE | Facility: CLINIC | Age: 66
End: 2021-05-04

## 2021-05-04 NOTE — TELEPHONE ENCOUNTER
Patient asking for  to review her notes from recent ED visit and determine if it's okay to receive 2nd Covid shot on 5/6/21.    Currently wearing Ziopatch. Cardiology asked that PCP advise if okay to receive shot OR postpone for a bit.    Please contact patient with recommendation on 5/5/21.  Thank you,  Vika Urias RN on 5/4/2021 at 5:55 PM

## 2021-05-05 NOTE — TELEPHONE ENCOUNTER
Pt calling back and has the zio patch on and it is very irritating and itchy. She would like to know if she can take it off. Also, she would like to know if she can get her second COVID vaccine tomorrow since she was recently in the hospital. Please advise. Pt can be reached at [phone]. Thank you.  María Elena Vazquez,

## 2021-05-05 NOTE — TELEPHONE ENCOUNTER
I would recommend to proceed with a Covid second dose vaccine tomorrow.  For the ZIO patch, have her contact cardiology department to inquire how to manage the symptoms   And if it has been enough days of monitoring heart rhythm or not.  They would be able to answer that.    Romel Mccall MD  [facility], [facility]

## 2021-05-05 NOTE — TELEPHONE ENCOUNTER
S/w pt and gave Dr. Mccall's message below.  Pt states she has not felt the fluttering in 3 days and passes a lot of gas.  Gave number to cardiology [phone] to call.    Pt states understanding.    Maria T BATISTA RN  EP Triage

## 2021-05-06 ENCOUNTER — IMMUNIZATION (OUTPATIENT)
Dept: NURSING | Facility: CLINIC | Age: 66
End: 2021-05-06
Attending: INTERNAL MEDICINE
Payer: COMMERCIAL

## 2021-05-06 ENCOUNTER — NURSE TRIAGE (OUTPATIENT)
Dept: NURSING | Facility: CLINIC | Age: 66
End: 2021-05-06

## 2021-05-06 PROCEDURE — 91300 PR COVID VAC PFIZER DIL RECON 30 MCG/0.3 ML IM: CPT

## 2021-05-06 PROCEDURE — 0002A PR COVID VAC PFIZER DIL RECON 30 MCG/0.3 ML IM: CPT

## 2021-05-06 NOTE — TELEPHONE ENCOUNTER
"Pt states \"I just rec'd my covid vaccine.\"  2nd Pfizer vaccine.    \"Now feel like I\"m having a panic attack.\"  \"Feeling hyper-jittery.\"  \"Breathing fast.\"    History of panic attack.  Denies breathing difficulty.  Denies any swallowing difficulty.  States \"I'mjust breathing fast.\"  \"I have my inhaler in my purse, but I don't need it.\"    Pt waited 20 mins at vaccine site following injection.  Son is currently driving her back to vaccine site for re-assessment.    Asked pt about her meds for panic attacks.  As pt talks, she states \"I'm already feeling much better from talking with you.\"  \"My breathing is not as fast.\"    Pt is able to speak full sentences.  Exhibits clarity of speech and thought.    Waited for pt and son to reach vaccination site.  Now there.  Pt currently stable.  Will proceed into building for re-assessment by nursing personnel to determine whether Benadryl dosing (and/or other intervention) is appropriate.    No further action required for this encounter.    [facility] Nurse Advisor     Reason for Disposition    COVID-19 vaccine, systemic reactions (e.g., fatigue, fever, muscle aches), questions about    COVID-19 vaccine, Frequently Asked Questions (FAQs)    Additional Information    Negative: [1] Difficulty breathing or swallowing AND [2] starts within 2 hours after injection    Negative: Sounds like a life-threatening emergency to the triager    Negative: [1] Has NOT completed COVID-19 vaccine series AND [2] COVID-19 exposure AND [2] AND [3] typical COVID-19 symptoms    Negative: [1] Has NOT completed COVID-19 vaccine series AND [2]  COVID-19 exposure AND [3] no symptoms    Negative: [1] COVID-19 vaccine series completed (fully vaccinated) in past 3 months AND [2] new-onset of COVID-19 symptoms BUT [3] no known exposure    Negative: [1] Typical COVID-19 symptoms AND [2] symptoms that are NOT expected from vaccine (e.g., cough, difficulty breathing, loss of taste or smell, runny nose, " sore throat)    Negative: [1] Typical COVID-19 symptoms AND [2] started > 3 days after getting vaccine    Negative: Fever > 104 F (40 C)    Negative: Sounds like a severe, unusual reaction to the triager    Negative: [1] Redness or red streak around the injection site AND [2] started > 48 hours after getting vaccine AND [3] fever    Negative: [1] Fever > 101 F (38.3 C) AND [2] age > 60 years AND [3] started > 48 hours after getting vaccine    Negative: [1] Fever > 100.0 F (37.8 C) AND [2] bedridden (e.g., nursing home patient, CVA, chronic illness, recovering from surgery) AND [3] started > 48 hours after getting vaccine    Negative: [1] Fever > 100.0 F (37.8 C) AND [2] diabetes mellitus or weak immune system (e.g., HIV positive, cancer chemo, splenectomy, organ transplant, chronic steroids) AND [3] started > 48 hours after getting vaccine    Negative: [1] Redness or red streak around the injection site AND [2] started > 48 hours after getting vaccine AND [3] no fever  (Exception: red area < 1 inch or 2.5 cm wide)    Negative: [1] Pain, tenderness, or swelling at the injection site AND [2] over 3 days (72 hours) since vaccine AND [3] getting worse    Negative: Fever > 100.0 F (37.8 C) present > 3 days (72 hours)    Negative: [1] Fever > 100.0 F (37.8 C) AND [2] healthcare worker    Negative: [1] Pain, tenderness, or swelling at the injection site AND [2] lasts > 7 days    Negative: [1] Lymph node swelling (i.e., armpit or neck on side of vaccine) AND [2] lasts > 3 weeks    Negative: [1] Requesting COVID-19 vaccine AND [2] healthcare worker (e.g., EMS first responders, doctors, nurses)    Negative: [1] Requesting COVID-19 vaccine AND [2] resident of a long-term care facility (e.g., nursing home)    Negative: [1] Requesting COVID-19 vaccine AND [2] vaccine available in the community for this patient group    Negative: COVID-19 vaccine, injection site reaction (e.g., pain, redness, swelling), question  "about    Protocols used: CORONAVIRUS (COVID-19) VACCINE QUESTIONS AND [id number]-S-GV 3.25    _________________________    COVID 19 Nurse Triage Plan/Patient Instructions    Please be aware that novel coronavirus (COVID-19) may be circulating in the community. If you develop symptoms such as fever, cough, or SOB or if you have concerns about the presence of another infection including coronavirus (COVID-19), please contact your health care provider or visit [email or web address].     Disposition/Instructions    Additional COVID19 information to add for patients.   How can I protect others?  If you have symptoms (fever, cough, body aches or trouble breathing): Stay home and away from others (self-isolate) until:    At least 10 days have passed since your symptoms started, And     You ve had no fever--and no medicine that reduces fever--for 1 full day (24 hours), And      Your other symptoms have resolved (gotten better).     If you don t have symptoms, but a test showed that you have COVID-19 (you tested positive):    Stay home and away from others (self-isolate). Follow the tips under \"How do I self-isolate?\" below for 10 days (20 days if you have a weak immune system).    You don't need to be retested for COVID-19 before going back to school or work. As long as you're fever-free and feeling better, you can go back to school, work and other activities after waiting the 10 or 20 days.     How do I self-isolate?    Stay in your own room, even for meals. Use your own bathroom if you can.     Stay away from others in your home. No hugging, kissing or shaking hands. No visitors.    Don t go to work, school or anywhere else.     Clean  high touch  surfaces often (doorknobs, counters, handles, etc.). Use a household cleaning spray or wipes. You ll find a full list on the EPA website:  www.epa.gov/pesticide-registration/list-n-disinfectants-use-against-sars-cov-2.    Cover your mouth and nose with a mask, tissue or " washcloth to avoid spreading germs.    Wash your hands and face often. Use soap and water.    Caregivers in these groups are at risk for severe illness due to COVID-19:  o People 65 years and older  o People who live in a nursing home or long-term care facility  o People with chronic disease (lung, heart, cancer, diabetes, kidney, liver, immunologic)  o People who have a weakened immune system, including those who:  - Are in cancer treatment  - Take medicine that weakens the immune system, such as corticosteroids  - Had a bone marrow or organ transplant  - Have an immune deficiency  - Have poorly controlled HIV or AIDS  - Are obese (body mass index of 40 or higher)  - Smoke regularly    Caregivers should wear gloves while washing dishes, handling laundry and cleaning bedrooms and bathrooms.    Use caution when washing and drying laundry: Don t shake dirty laundry, and use the warmest water setting that you can.    For more tips, go to www.cdc.gov/coronavirus/2019-ncov/downloads/10Things.pdf.    How can I take care of myself?  1. Get lots of rest. Drink extra fluids (unless a doctor has told you not to).     2. Take Tylenol (acetaminophen) for fever or pain. If you have liver or kidney problems, ask your family doctor if it s okay to take Tylenol.     Adults can take either:     650 mg (two 325 mg pills) every 4 to 6 hours, or     1,000 mg (two 500 mg pills) every 8 hours as needed.     Note: Don t take more than 3,000 mg in one day.   Acetaminophen is found in many medicines (both prescribed and over-the-counter medicines). Read all labels to be sure you don t take too much.     For children, check the Tylenol bottle for the right dose. The dose is based on the child s age or weight.    3. If you have other health problems (like cancer, heart failure, an organ transplant or severe kidney disease): Call your specialty clinic if you don t feel better in the next 2 days.    4. Know when to call 911: Emergency warning  signs include:    Trouble breathing or shortness of breath    Pain or pressure in the chest that doesn t go away    Feeling confused like you haven t felt before, or not being able to wake up    Bluish-colored lips or face    What are the symptoms of COVID-19?     The most common symptoms are cough, fever and trouble breathing.     Less common symptoms include body aches, chills, diarrhea (loose, watery poops), fatigue (feeling very tired), headache, runny nose, sore throat and loss of smell.    COVID-19 can cause severe coughing (bronchitis) and lung infection (pneumonia).    How does it spread?     The virus may spread when a person coughs or sneezes into the air. The virus can travel about 6 feet this way, and it can live on surfaces.      Common  (household disinfectants) will kill the virus.    Who is at risk?  Anyone can catch COVID-19 if they re around someone who has the virus.    How can others protect themselves?     Stay away from people who have COVID-19 (or symptoms of COVID-19).    Wash hands often with soap and water. Or, use hand  with at least 60% alcohol.    Avoid touching the eyes, nose or mouth.     Wear a face mask when you go out in public, when sick or when caring for a sick person.    Where can I get more information?    [facility]: About COVID-19: [email or web address]    CDC: What to Do If You re Sick: www.cdc.gov/coronavirus/2019-ncov/about/steps-when-sick.html    CDC: Ending Home Isolation: www.cdc.gov/coronavirus/2019-ncov/hcp/disposition-in-home-patients.html     CDC: Caring for Someone: www.cdc.gov/coronavirus/2019-ncov/if-you-are-sick/care-for-someone.html     [facility]: Interim Guidance for Hospital Discharge to Home: www.health.[facility].[place].us/diseases/coronavirus/hcp/hospdischarge.pdf    [facility] clinical trials (COVID-19 research studies): [email or web address].[email or web address]/[email or web address]-trials     Below are the COVID-19 hotlines at [facility]  [facility] ([facility]). Interpreters are available.   o For health questions: Call 244-199-2999 or 1-881.454.9331 (7 a.m. to 7 p.m.)  o For questions about schools and childcare: Call 043-850-6713 or 1-322.570.3062 (7 a.m. to 7 p.m.)          Thank you for taking steps to prevent the spread of this virus.  o Limit your contact with others.  o Wear a simple mask to cover your cough.  o Wash your hands well and often.    Resources    [facility]: About COVID-19: [email or web address]    CDC: What to Do If You're Sick: www.cdc.gov/coronavirus/2019-ncov/about/steps-when-sick.html    CDC: Ending Home Isolation: www.cdc.gov/coronavirus/2019-ncov/hcp/disposition-in-home-patients.html     CDC: Caring for Someone: www.cdc.gov/coronavirus/2019-ncov/if-you-are-sick/care-for-someone.html     [facility]: Interim Guidance for Hospital Discharge to Home: www.health.[facility].mn./diseases/coronavirus/hcp/hospdischarge.pdf    [facility] clinical trials (COVID-19 research studies): clinicalaffairs.[facility]/n-clinical-trials     Below are the COVID-19 hotlines at the [place] [facility] ([facility]). Interpreters are available.   o For health questions: Call 616-751-6715 or 1-538.587.8070 (7 a.m. to 7 p.m.)  o For questions about schools and childcare: Call 001-530-6023 or 1-252.595.9964 (7 a.m. to 7 p.m.)

## 2021-05-25 ENCOUNTER — TELEPHONE (OUTPATIENT)
Dept: FAMILY MEDICINE | Facility: CLINIC | Age: 66
End: 2021-05-25

## 2021-05-25 DIAGNOSIS — M25.561 ACUTE PAIN OF RIGHT KNEE: Primary | ICD-10-CM

## 2021-05-25 NOTE — TELEPHONE ENCOUNTER
Physical therapy ordered. Please print and fax the orders. Thanks    Romel Mccall MD  [facility], [place]

## 2021-05-25 NOTE — TELEPHONE ENCOUNTER
[facility] calling and they would like a referral to see the pt. Pt is having R knee pain. Please fax referral to [phone]. Any questions please ask for Colleen at [phone]. Thank you.  María Elena Vazquez,

## 2021-06-01 ENCOUNTER — TRANSFERRED RECORDS (OUTPATIENT)
Dept: HEALTH INFORMATION MANAGEMENT | Facility: CLINIC | Age: 66
End: 2021-06-01

## 2021-06-02 ENCOUNTER — VIRTUAL VISIT (OUTPATIENT)
Dept: FAMILY MEDICINE | Facility: CLINIC | Age: 66
End: 2021-06-02
Payer: COMMERCIAL

## 2021-06-02 DIAGNOSIS — J20.8 ACUTE BRONCHITIS DUE TO OTHER SPECIFIED ORGANISMS: Primary | ICD-10-CM

## 2021-06-02 PROCEDURE — 99213 OFFICE O/P EST LOW 20 MIN: CPT | Mod: 95 | Performed by: FAMILY MEDICINE

## 2021-06-02 RX ORDER — AZITHROMYCIN 250 MG/1
TABLET, FILM COATED ORAL
Qty: 6 TABLET | Refills: 0 | Status: SHIPPED | OUTPATIENT
Start: 2021-06-02 | End: 2021-06-16

## 2021-06-02 NOTE — PROGRESS NOTES
"Grecia is a 66 year old who is being evaluated via a billable video visit.      How would you like to obtain your AVS? MyChart  If the video visit is dropped, the invitation should be resent by: Text to cell phone: [phone]  Will anyone else be joining your video visit? No    Video Start Time: 9:17 AM    Assessment & Plan     Acute bronchitis due to other specified organisms  Starting patient on azithromycin empirically for acute bronchitis.  Instructed to stay hydrated.  Resume all allergy and asthma medications at this time.  If symptoms are not improving in the next 3 to 5 days, instructed to notify us back.  Patient verbalized understanding and agreement  - azithromycin (ZITHROMAX) 250 MG tablet; Two tablets first day, then one tablet daily for four days.      BMI:   Estimated body mass index is 25.53 kg/m  as calculated from the following:    Height as of 5/2/21: 1.702 m (5' 7\").    Weight as of 5/2/21: 73.9 kg (163 lb).         Romel Mccall MD  [facility] [place]    Lashay Paige is a 66 year old who presents for the following health issues     HPI     Acute Illness  Acute illness concerns: productive cough  Onset/Duration: yesterday  Symptoms:  Fever: no  Chills/Sweats: no  Headache (location?): no  Sinus Pressure: no  Conjunctivitis:  no  Ear Pain: YES: bilateral started two days ago  Rhinorrhea: YES  Congestion: YES  Sore Throat: YES  Cough: YES-productive  Wheeze: YES  Decreased Appetite: YES  Nausea: no  Vomiting: no  Diarrhea: no  Dysuria/Freq.: no  Dysuria or Hematuria: no  Fatigue/Achiness: no  Sick/Strep Exposure: no  Therapies tried and outcome: Cough drops, tea with lemon, mucus reducer      Review of Systems   CONSTITUTIONAL: NEGATIVE for fever, chills, change in weight  INTEGUMENTARY/SKIN: NEGATIVE for worrisome rashes, moles or lesions      Objective           Vitals:  No vitals were obtained today due to virtual visit.    Physical Exam   GENERAL: Healthy, alert " and no distress  EYES: Eyes grossly normal to inspection.  No discharge or erythema, or obvious scleral/conjunctival abnormalities.  RESP: No audible wheeze, cough, or visible cyanosis.  No visible retractions or increased work of breathing.    SKIN: Visible skin clear. No significant rash, abnormal pigmentation or lesions.  NEURO: Cranial nerves grossly intact.  Mentation and speech appropriate for age.  PSYCH: Mentation appears normal, affect normal/bright, judgement and insight intact, normal speech and appearance well-groomed.                Video-Visit Details    Type of service:  Video Visit    Video End Time:9:25 AM    Originating Location (pt. Location): Home    Distant Location (provider location):  [facility]     Platform used for Video Visit: [employer]

## 2021-06-03 ENCOUNTER — OFFICE VISIT (OUTPATIENT)
Dept: OBGYN | Facility: CLINIC | Age: 66
End: 2021-06-03
Payer: COMMERCIAL

## 2021-06-03 VITALS
HEIGHT: 67 IN | BODY MASS INDEX: 25.43 KG/M2 | DIASTOLIC BLOOD PRESSURE: 64 MMHG | WEIGHT: 162 LBS | SYSTOLIC BLOOD PRESSURE: 126 MMHG

## 2021-06-03 DIAGNOSIS — N89.8 VAGINAL DISCHARGE: Primary | ICD-10-CM

## 2021-06-03 LAB
GRAM STN SPEC: ABNORMAL
Lab: ABNORMAL
SPECIMEN SOURCE: ABNORMAL
SPECIMEN SOURCE: NORMAL
WET PREP SPEC: NORMAL

## 2021-06-03 PROCEDURE — 87653 STREP B DNA AMP PROBE: CPT | Performed by: NURSE PRACTITIONER

## 2021-06-03 PROCEDURE — 87102 FUNGUS ISOLATION CULTURE: CPT | Performed by: NURSE PRACTITIONER

## 2021-06-03 PROCEDURE — 87210 SMEAR WET MOUNT SALINE/INK: CPT | Performed by: NURSE PRACTITIONER

## 2021-06-03 PROCEDURE — 99213 OFFICE O/P EST LOW 20 MIN: CPT | Performed by: NURSE PRACTITIONER

## 2021-06-03 PROCEDURE — 87205 SMEAR GRAM STAIN: CPT | Performed by: NURSE PRACTITIONER

## 2021-06-03 RX ORDER — TIZANIDINE 2 MG/1
TABLET ORAL
COMMUNITY
Start: 2021-05-11 | End: 2021-06-21

## 2021-06-03 ASSESSMENT — MIFFLIN-ST. JEOR: SCORE: 1317.46

## 2021-06-03 NOTE — PROGRESS NOTES
SUBJECTIVE:                                                   Grecia Kilgore is a 66 year old female who presents to clinic today for the following health issue(s):  Patient presents with:  Follow Up: Pt was seen  with heavy vaginal discharge. Finished diflucan, still having increased discharge.     HPI:  Patient here today with complaints of vaginal discharge.  No itching or odor.  She denies any pelvic pain at this time.  She is currently being treated for bronchitis with a Z-Jesus that was started yesterday.    Patient's last menstrual period was 1985.    Patient is sexually active, .  Using menopause for contraception.    reports that she has never smoked. She has never used smokeless tobacco.    STD testing offered?  Declined    Health maintenance updated:  yes    Today's PHQ-2 Score:   PHQ-2 (  [employer]) 2021   Q1: Little interest or pleasure in doing things 3   Q2: Feeling down, depressed or hopeless 3   PHQ-2 Score 6     Today's PHQ-9 Score:   PHQ-9 SCORE 2021   PHQ-9 Total Score -   PHQ-9 Total Score 15     Today's WILL-7 Score:   WILL-7 SCORE 2021   Total Score -   Total Score 17       Problem list and histories reviewed & adjusted, as indicated.  Additional history: as documented.    Patient Active Problem List   Diagnosis     Allergic rhinitis     External hemorrhoids     Vitamin D deficiency     Osteoarthritis, knee     Positive PPD     Panic disorder     Genital herpes     Advanced directives, counseling/discussion     DCIS (ductal carcinoma in situ) of breast     Anxiety     Major depressive disorder, single episode, moderate (H)     Hyperlipidemia LDL goal <100     Type 2 diabetes mellitus without complication, without long-term current use of insulin (H)     Mild intermittent asthma without complication     BOO (obstructive sleep apnea)     Left ureteral calculus     Palpitations     Acute chest pain     Past Surgical History:   Procedure Laterality Date      ARTHRODESIS TOE(S)  9/16/2013    Procedure: ARTHRODESIS TOE(S);  BILATERAL GREAT TOE FUSION (C-ARM);  Surgeon: Mary Guan MD;  Location: [facility]     ARTHRODESIS TOE(S) Left 12/19/2016    Procedure: ARTHRODESIS TOE(S);  Surgeon: Mary Guan MD;  Location: [facility]     ARTHROSCOPY KNEE Left 2/2/11     BIOPSY BREAST  2/7/2014    Procedure: BIOPSY BREAST;  Re-excision Left Breast Cavity for Margins ;  Surgeon: Lisset Amador DO;  Location: [facility]     BIOPSY BREAST SEED LOCALIZATION  1/22/2014    Procedure: BIOPSY BREAST SEED LOCALIZATION;  Left Breast Seed Localized Excisional Biopsy ;  Surgeon: Lisset Amador DO;  Location: [facility] OR     COLONOSCOPY  2005    nl - repeat 2015     CYSTOSCOPY       CYSTOSCOPY, RETROGRADES, EXTRACT STONE, INSERT STENT, COMBINED Left 2/4/2021    Procedure: Video cystoscopy, balloon dilation of left ureter, left ureteroscopy with stone extraction, standby laser, left double-J stent placement (5-Greek x 24 cm).;  Surgeon: Craig Ferreira MD;  Location: [facility] OR     FOOT SURGERY Bilateral 2013     HEMORRHOIDECTOMY BANDING      multiple times     HEMORRHOIDECTOMY INTERNAL N/A 7/24/2018    Procedure: HEMORRHOIDECTOMY INTERNAL;  three quadrant hemorrhoidectomy;  Surgeon: Lisa Patrick MD;  Location: [facility]     HYSTERECTOMY  7/1996    fibroids     REMOVE HARDWARE FOOT Left 2015     REPAIR TENDON FOOT Left 12/19/2016    Procedure: REPAIR TENDON FOOT;  Surgeon: Mary Guan MD;  Location: [facility]      Social History     Tobacco Use     Smoking status: Never Smoker     Smokeless tobacco: Never Used   Substance Use Topics     Alcohol use: No     Alcohol/week: 0.0 standard drinks      Problem (# of Occurrences) Relation (Name,Age of Onset)    Alcohol/Drug (1) Father    Breast Cancer (1) Mother    Cancer (1) Father    Cancer - colorectal (1) Maternal Grandfather    Cerebrovascular Disease (1) Maternal Grandmother    Diabetes (2) Mother, Maternal Grandmother         "    Current Outpatient Medications   Medication Sig     albuterol (PROAIR HFA/PROVENTIL HFA/VENTOLIN HFA) 108 (90 Base) MCG/ACT inhaler Inhale 2 puffs into the lungs every 6 hours as needed for shortness of breath / dyspnea     azithromycin (ZITHROMAX) 250 MG tablet Two tablets first day, then one tablet daily for four days.     cetirizine (ZYRTEC) 10 MG tablet TAKE 1 TABLET(10 MG) BY MOUTH EVERY EVENING     fluticasone (FLONASE) 50 MCG/ACT nasal spray Spray 1 spray into both nostrils daily as needed for rhinitis or allergies     lisinopril (ZESTRIL) 5 MG tablet Take 1 tablet (5 mg) by mouth daily     metFORMIN (GLUCOPHAGE) 500 MG tablet TAKE 1 TABLET EVERY DAY WITH BREAKFAST     mirtazapine (REMERON) 15 MG tablet TAKE 1 TABLET EVERY NIGHT AS NEEDED FOR SLEEP     montelukast (SINGULAIR) 10 MG tablet Take 10 mg by mouth every morning     simvastatin (ZOCOR) 40 MG tablet Take 1 tablet (40 mg) by mouth At Bedtime     tiZANidine (ZANAFLEX) 2 MG tablet      tiZANidine (ZANAFLEX) 2 MG capsule Take 2 mg by mouth daily as needed     No current facility-administered medications for this visit.      Allergies   Allergen Reactions     Codeine Nausea     Morphine Itching     Nitrofuran Derivatives Hives     Phenothiazines      sedation     Primatene Mist [Epinephrine Bitartrate] Itching     neck itch with primatene mist     Vicodin [Hydrocodone-Acetaminophen] Nausea     Dilaudid [Hydromorphone] Rash     Latex Itching and Rash       ROS:  12 point review of systems negative other than symptoms noted below or in the HPI.  Genitourinary: Vaginal Discharge  No urinary frequency or dysuria, bladder or kidney problems, POSITIVE for:, vaginal discharge      OBJECTIVE:     /66   Ht 1.702 m (5' 7\")   Wt 73.5 kg (162 lb)   LMP 01/01/1985   BMI 25.37 kg/m    Body mass index is 25.37 kg/m .    Exam:  Constitutional:  Appearance: Well nourished, well developed alert, in no acute distress  Psychiatric:  Mentation appears normal and " affect normal/bright.  Pelvic Exam:  External Genitalia:     Normal appearance for age, minimal thin white discharge present, no tenderness present, no inflammatory lesions present, color normal  Vagina:     Normal vaginal vault without central or paravaginal defects, no discharge present, no inflammatory lesions present, no masses present  Urethra:   Urethral Meatus:  No erythema or lesions present  Cervix:     Appearance healthy, no lesions present, nontender to palpation, no bleeding present  Perineum:     Perineum within normal limits, no evidence of trauma, no rashes or skin lesions present  Inguinal Lymph Nodes:     No lymphadenopathy present  Pubic Hair:     Normal pubic hair distribution for age  Genitalia and Groin:     No rashes present, no lesions present, no areas of discoloration, no masses present       In-Clinic Test Results:  Results for orders placed or performed in visit on 06/03/21 (from the past 24 hour(s))   Wet prep    Specimen: Vagina   Result Value Ref Range    Specimen Description Vagina     Wet Prep No Trichomonas seen     Wet Prep No yeast seen     Wet Prep No clue cells seen     Wet Prep No WBC's seen        ASSESSMENT/PLAN:                                                        ICD-10-CM    1. Vaginal discharge  N89.8 Wet prep     Strep, Group B by PCR     Yeast culture     Gram stain       There are no Patient Instructions on file for this visit.    Wet prep negative. Will send vaginal cultures. We will not treat unless positive.     RALPH Cooper [facility] [place]

## 2021-06-04 LAB
GP B STREP DNA SPEC QL NAA+PROBE: POSITIVE
SPECIMEN SOURCE: ABNORMAL

## 2021-06-07 LAB
Lab: NORMAL
SPECIMEN SOURCE: NORMAL
YEAST SPEC QL CULT: NORMAL

## 2021-06-08 RX ORDER — METRONIDAZOLE 7.5 MG/G
1 GEL VAGINAL AT BEDTIME
Qty: 70 G | Refills: 0 | Status: SHIPPED | OUTPATIENT
Start: 2021-06-08 | End: 2021-06-13

## 2021-06-13 ENCOUNTER — NURSE TRIAGE (OUTPATIENT)
Dept: NURSING | Facility: CLINIC | Age: 66
End: 2021-06-13

## 2021-06-13 DIAGNOSIS — B37.31 YEAST INFECTION OF THE VAGINA: Primary | ICD-10-CM

## 2021-06-13 RX ORDER — FLUCONAZOLE 150 MG/1
TABLET ORAL
Qty: 3 TABLET | Refills: 0 | Status: SHIPPED | OUTPATIENT
Start: 2021-06-13 | End: 2021-06-21

## 2021-06-13 NOTE — TELEPHONE ENCOUNTER
"Was seen a little over a week ago.  Vaginal infection. Flagyl given.    Yesterday had discharge running out of her body. Not yellow.  White thin liquid. Dried. Looked powdery when dry.  Very itchy.  With past yeast infections her discharge isn't \"cottge cheese-like\" in appearance. It's as stated above.      Has diabetes.  Recently on antibiotic.  Does not want to be seen again. Only wants Rx today.    Prescribed Flagyl for BV.  Believes now that she has a yeast infection.  Requesting Diflucan 150 mg tablet quantity of three. Take three days apart.    I spoke to Dr Tracy. She okayed this being ordered for Grecia.  Sent to [facility] on [address] in [place].  Generic message left on patients voicemail associated with phone number. No positive identifier.    COVID 19 Nurse Triage Plan/Patient Instructions    Please be aware that novel coronavirus (COVID-19) may be circulating in the community. If you develop symptoms such as fever, cough, or SOB or if you have concerns about the presence of another infection including coronavirus (COVID-19), please contact your health care provider or visit [email or web address].     Disposition/Instructions    Home care recommended. Follow home care protocol based instructions.    Thank you for taking steps to prevent the spread of this virus.  o Limit your contact with others.  o Wear a simple mask to cover your cough.  o Wash your hands well and often.    Resources    [facility]: About COVID-19: [email or web address]    CDC: What to Do If You're Sick: www.cdc.gov/coronavirus/2019-ncov/about/steps-when-sick.html    CDC: Ending Home Isolation: www.cdc.gov/coronavirus/2019-ncov/hcp/disposition-in-home-patients.html     CDC: Caring for Someone: www.cdc.gov/coronavirus/2019-ncov/if-you-are-sick/care-for-someone.html     MD: Interim Guidance for Hospital Discharge to Home: www.health.[facility].mn.us/diseases/coronavirus/hcp/hospdischarge.pdf    [facility]" "[place] clinical trials (COVID-19 research studies): clinicalaffairs.[facility]-clinical-trials     Below are the COVID-19 hotlines at the [facility] ([facility]). Interpreters are available.   o For health questions: Call 706-506-2524 or 1-835.545.4995 (7 a.m. to 7 p.m.)  o For questions about schools and childcare: Call 792-902-5732 or 1-248.290.1598 (7 a.m. to 7 p.m.)     Reason for Disposition    [1] Caller has NON-URGENT medication question about med that PCP prescribed AND [2] triager unable to answer question    Additional Information    Negative: MORE THAN A DOUBLE DOSE of a prescription or over-the-counter (OTC) drug    Negative: [1] DOUBLE DOSE (an extra dose or lesser amount) of over-the-counter (OTC) drug AND [2] any symptoms (e.g., dizziness, nausea, pain, sleepiness)    Negative: [1] DOUBLE DOSE (an extra dose or lesser amount) of prescription drug AND [2] any symptoms (e.g., dizziness, nausea, pain, sleepiness)    Negative: Took another person's prescription drug    Negative: [1] DOUBLE DOSE (an extra dose or lesser amount) of prescription drug AND [2] NO symptoms (Exception: a double dose of antibiotics)    Negative: Diabetes drug error or overdose (e.g., took wrong type of insulin or took extra dose)    Negative: [1] Request for URGENT new prescription or refill of \"essential\" medication (i.e., likelihood of harm to patient if not taken) AND [2] triager unable to fill per unit policy    Negative: [1] Prescription not at pharmacy AND [2] was prescribed by PCP recently    Negative: [1] Pharmacy calling with prescription questions AND [2] triager unable to answer question    Negative: [1] Caller has URGENT medication question about med that PCP or specialist prescribed AND [2] triager unable to answer question    Protocols used: MEDICATION QUESTION CALL-A-PEPE VARNER RN [place] [facility]     "

## 2021-06-16 ENCOUNTER — TRANSFERRED RECORDS (OUTPATIENT)
Dept: HEALTH INFORMATION MANAGEMENT | Facility: CLINIC | Age: 66
End: 2021-06-16

## 2021-06-16 ENCOUNTER — TELEPHONE (OUTPATIENT)
Dept: FAMILY MEDICINE | Facility: CLINIC | Age: 66
End: 2021-06-16

## 2021-06-16 DIAGNOSIS — J20.8 ACUTE BRONCHITIS DUE TO OTHER SPECIFIED ORGANISMS: ICD-10-CM

## 2021-06-16 DIAGNOSIS — J45.20 MILD INTERMITTENT ASTHMA WITHOUT COMPLICATION: ICD-10-CM

## 2021-06-16 DIAGNOSIS — J30.2 SEASONAL ALLERGIC RHINITIS, UNSPECIFIED TRIGGER: Primary | ICD-10-CM

## 2021-06-16 LAB — RETINOPATHY: NEGATIVE

## 2021-06-16 RX ORDER — FLUTICASONE PROPIONATE 50 MCG
1 SPRAY, SUSPENSION (ML) NASAL DAILY PRN
Qty: 16 G | Refills: 1 | Status: SHIPPED | OUTPATIENT
Start: 2021-06-16 | End: 2021-08-06

## 2021-06-16 NOTE — TELEPHONE ENCOUNTER
Asmanex ordered.  Use 1 puff once daily.  If symptoms are not improving in the next 7 to 10 days, please have her follow-up in the clinic for a visit    Romel Mccall MD  [facility], [place]

## 2021-06-16 NOTE — TELEPHONE ENCOUNTER
I would recommend repeating a different antibiotic.  Augmentin ordered for 7-day course.  Use it twice a day.  Steroid nasal spray, Flonase ordered as well  Stay hydrated.    I hope that helps.    Romel Mccall MD  [facility], [place]

## 2021-06-16 NOTE — TELEPHONE ENCOUNTER
Pt states she has flonase and has been using it.  Would like an asthma steroid inhaler.    Pharmacy pended.    Maria T BATISTA RN  EP Triage

## 2021-06-16 NOTE — TELEPHONE ENCOUNTER
"Pt calling was given zpak on 6/2 is feeling better but does not think \"it is all out of my lungs\".  Wondering if needs another round of abx?  Also thinks she needs a steroid nasal spray.  Feels like she is having an asthma attack all the time and albuterol is not helping.    Pharmacy pended.    Pt can be reached at [phone].    Maria T BATISTA RN  EP Triage    "

## 2021-06-20 ENCOUNTER — TELEPHONE (OUTPATIENT)
Dept: NURSING | Facility: CLINIC | Age: 66
End: 2021-06-20

## 2021-06-20 ENCOUNTER — VIRTUAL VISIT (OUTPATIENT)
Dept: URGENT CARE | Facility: CLINIC | Age: 66
End: 2021-06-20
Payer: COMMERCIAL

## 2021-06-20 DIAGNOSIS — Z53.9 ERRONEOUS ENCOUNTER--DISREGARD: Primary | ICD-10-CM

## 2021-06-20 NOTE — PROGRESS NOTES
Called 3 times to start visit, but was rolled over to  [facility] to be seen in urgent care since she issue was asthma.

## 2021-06-20 NOTE — TELEPHONE ENCOUNTER
Patient calling requesting a phone appointment due to sore throat and asthma. Transferred to a  for an appointment. Patient verbalized understanding and agrees with plan.     Dunia Damon RN  [facility]  2:46 PM 6/20/2021

## 2021-06-21 ENCOUNTER — VIRTUAL VISIT (OUTPATIENT)
Dept: FAMILY MEDICINE | Facility: CLINIC | Age: 66
End: 2021-06-21
Payer: COMMERCIAL

## 2021-06-21 ENCOUNTER — TRANSFERRED RECORDS (OUTPATIENT)
Dept: HEALTH INFORMATION MANAGEMENT | Facility: CLINIC | Age: 66
End: 2021-06-21

## 2021-06-21 DIAGNOSIS — J20.8 ACUTE BRONCHITIS DUE TO OTHER SPECIFIED ORGANISMS: Primary | ICD-10-CM

## 2021-06-21 DIAGNOSIS — J45.21 MILD INTERMITTENT ASTHMA WITH EXACERBATION: ICD-10-CM

## 2021-06-21 PROCEDURE — 99214 OFFICE O/P EST MOD 30 MIN: CPT | Mod: 95 | Performed by: FAMILY MEDICINE

## 2021-06-21 RX ORDER — BENZONATATE 200 MG/1
200 CAPSULE ORAL 3 TIMES DAILY PRN
Qty: 25 CAPSULE | Refills: 0 | Status: SHIPPED | OUTPATIENT
Start: 2021-06-21 | End: 2021-08-11

## 2021-06-21 RX ORDER — PREDNISONE 20 MG/1
40 TABLET ORAL EVERY MORNING
Qty: 10 TABLET | Refills: 0 | Status: SHIPPED | OUTPATIENT
Start: 2021-06-21 | End: 2021-06-26

## 2021-06-21 NOTE — PROGRESS NOTES
"Grecia is a 66 year old who is being evaluated via a billable telephone visit.      What phone number would you like to be contacted at? [phone]  How would you like to obtain your AVS? Mail a copy    Assessment & Plan     Acute bronchitis due to other specified organisms  Patient has recently finished azithromycin followed by Augmentin.  Recommending to stay hydrated.  Cough medication ordered.  - benzonatate (TESSALON) 200 MG capsule; Take 1 capsule (200 mg) by mouth 3 times daily as needed for cough      Mild intermittent asthma with exacerbation  Continue the use of Asmanex.  Patient will take her Asmanex inhaler with her to the pharmacist to make sure she is using it appropriately.  Continue the use of albuterol, Singulair, Zyrtec and Flonase to keep her allergies and check as well.  Prednisone 40 mg once daily for 5 days recommended for symptomatic improvement.  If symptoms are not improving in the next 5 to 7 days, she is instructed to follow-up in the clinic.  Patient verbalized understanding and agreement  - predniSONE (DELTASONE) 20 MG tablet; Take 2 tablets (40 mg) by mouth every morning for 5 days    BMI:   Estimated body mass index is 25.37 kg/m  as calculated from the following:    Height as of 6/3/21: 1.702 m (5' 7\").    Weight as of 6/3/21: 73.5 kg (162 lb).     Romel Mccall MD  [facility]    Lashay Paige is a 66 year old who presents for the following health issues     HPI     Acute Illness  Acute illness concerns: Sore throat, cough, horse voice  Onset/Duration: 3 weeks  Symptoms:  Fever: no  Chills/Sweats: no  Headache (location?): YES- neck  Sinus Pressure: YES  Conjunctivitis:  no  Ear Pain: YES: bilateral  Rhinorrhea: YES  Congestion: YES  Sore Throat: YES  Cough: YES-non-productive  Wheeze: YES  Decreased Appetite: no  Nausea: no  Vomiting: no  Diarrhea: YES  Dysuria/Freq.: no  Dysuria or Hematuria: no  Fatigue/Achiness: YES  Sick/Strep Exposure: " no  Therapies tried and outcome: amoxicillin, flonase, zyrtec, musinex ER, mometasone.      Review of Systems   CONSTITUTIONAL: NEGATIVE for fever, chills, change in weight  GI: NEGATIVE for nausea, abdominal pain, heartburn, or change in bowel habits      Objective           Vitals:  No vitals were obtained today due to virtual visit.    Physical Exam   healthy, alert and no distress  PSYCH: Alert and oriented times 3; coherent speech, normal   rate and volume, able to articulate logical thoughts, able   to abstract reason, no tangential thoughts, no hallucinations   or delusions  Her affect is normal  RESP: No cough, no audible wheezing, able to talk in full sentences  Remainder of exam unable to be completed due to telephone visits                Phone call duration: 13 minutes

## 2021-06-22 ENCOUNTER — NURSE TRIAGE (OUTPATIENT)
Dept: NURSING | Facility: CLINIC | Age: 66
End: 2021-06-22

## 2021-06-22 NOTE — TELEPHONE ENCOUNTER
"Started as URI 5/2/2021  -started on z-pack  -a week later was still having issues   -has been seen twice.     Still having a hard cough that gags her    Seen today and was prescribed Mucus DM and Prednisone.     Just woke up from a \"dead sleep\", coughing and gagging    Took Mucus DM before bed    Pharmacist did patient education with her today on how to properly use the Asmanex inhaler.     She will start the prednisone in the morning.     Home Care advice given. Patient is agreeable, will continue to follow treatment recommendations given by PCP today at .     COVID 19 Nurse Triage Plan/Patient Instructions    Please be aware that novel coronavirus (COVID-19) may be circulating in the community. If you develop symptoms such as fever, cough, or SOB or if you have concerns about the presence of another infection including coronavirus (COVID-19), please contact your health care provider or visit [email or web address].     Disposition/Instructions    Home care recommended. Follow home care protocol based instructions.    Thank you for taking steps to prevent the spread of this virus.  o Limit your contact with others.  o Wear a simple mask to cover your cough.  o Wash your hands well and often.    Resources    [facility]: About COVID-19: [email or web address]    CDC: What to Do If You're Sick: www.cdc.gov/coronavirus/2019-ncov/about/steps-when-sick.html    CDC: Ending Home Isolation: www.cdc.gov/coronavirus/2019-ncov/hcp/disposition-in-home-patients.html     CDC: Caring for Someone: www.cdc.gov/coronavirus/2019-ncov/if-you-are-sick/care-for-someone.html     [facility]: Interim Guidance for Hospital Discharge to Home: www.health.[facility].mn.us/diseases/coronavirus/hcp/hospdischarge.pdf    [facility] clinical trials (COVID-19 research studies): clinicalaffairs.[facility]/umn-clinical-trials     Below are the COVID-19 hotlines at the [place] Department of Health ([facility]). Interpreters are " available.   o For health questions: Call 801-799-3361 or 1-887.608.1229 (7 a.m. to 7 p.m.)  o For questions about schools and childcare: Call 710-869-4175 or 1-297.251.7059 (7 a.m. to 7 p.m.)     Reason for Disposition    SEVERE coughing spells (e.g., whooping sound after coughing, vomiting after coughing)    Additional Information    Negative: Severe difficulty breathing (e.g., struggling for each breath, speaks in single words)    Negative: Bluish (or gray) lips or face now    Negative: [1] Rapid onset of cough AND [2] has hives    Negative: Coughing started suddenly after medicine, an allergic food or bee sting    Negative: [1] Difficulty breathing AND [2] exposure to flames, smoke, or fumes    Negative: [1] Stridor AND [2] difficulty breathing    Negative: Sounds like a life-threatening emergency to the triager    Negative: Choked on object of food that could be caught in the throat    Negative: Chest pain is main symptom    Negative: [1] Previous asthma attacks AND [2] this feels like asthma attack    Negative: Cough lasts > 3 weeks    Negative: Wet (productive) cough (i.e., white-yellow, yellow, green, or sydney colored sputum)    Negative: [1] Dry (non-productive) cough AND [2] within 14 days of COVID-19 Exposure    Negative: Chest pain  (Exception: MILD central chest pain, present only when coughing)    Negative: Difficulty breathing    Negative: Patient sounds very sick or weak to the triager    Negative: [1] Coughed up blood AND [2] > 1 tablespoon (15 ml) (Exception: blood-tinged sputum)    Negative: Fever > 103 F (39.4 C)    Negative: [1] Fever > 101 F (38.3 C) AND [2] age > 60    Negative: [1] Fever > 100.0 F (37.8 C) AND [2] bedridden (e.g., nursing home patient, CVA, chronic illness, recovering from surgery)    Negative: [1] Fever > 100.0 F (37.8 C) AND [2] diabetes mellitus or weak immune system (e.g., HIV positive, cancer chemo, splenectomy, organ transplant, chronic steroids)    Negative: Wheezing  is present    Negative: [1] Ankle swelling AND [2] swelling is increasing    Protocols used: COUGH - ACUTE NON-PRODUCTIVE-A-AH    Krystal Méndez RN on 6/22/2021 at 1:06 AM

## 2021-06-25 ENCOUNTER — TELEPHONE (OUTPATIENT)
Dept: FAMILY MEDICINE | Facility: CLINIC | Age: 66
End: 2021-06-25

## 2021-06-25 ENCOUNTER — TRANSFERRED RECORDS (OUTPATIENT)
Dept: HEALTH INFORMATION MANAGEMENT | Facility: CLINIC | Age: 66
End: 2021-06-25

## 2021-06-25 NOTE — TELEPHONE ENCOUNTER
Prednisone is only used for 5 days at this dose.  It should help decrease inflammation in the airways and improve symptoms.  We do not want to prolong the use of prednisone as it can cause adrenal suppression.    Romel Mccall MD  [facility], [place]

## 2021-06-25 NOTE — TELEPHONE ENCOUNTER
Patient calling to inform that the prednisone is really helping her. States that her cough is not bad and she is now able to cough up thick mucous. States that she stops wheezing after coughing stuff up.     Patient states that her chest pressure is gone. States that she is eating before she takes the prednisone and drinking lots of water as advise.    Patient is requesting a few more doses of prednisone for over the weekend because it is helping.    Please advise. Triage to call the patient back.  Clarissa Roman RN

## 2021-06-25 NOTE — TELEPHONE ENCOUNTER
Message given from Dr. Mccall. Pt states that if her symptoms do not get better over the weekend she will call and schedule a follow up appt.  Pt verbalized understanding, all questions answered.     Yuli Apple RN

## 2021-08-03 ENCOUNTER — NURSE TRIAGE (OUTPATIENT)
Dept: NURSING | Facility: CLINIC | Age: 66
End: 2021-08-03

## 2021-08-03 DIAGNOSIS — E78.5 HYPERLIPIDEMIA LDL GOAL <100: ICD-10-CM

## 2021-08-03 RX ORDER — SIMVASTATIN 40 MG
40 TABLET ORAL AT BEDTIME
Qty: 10 TABLET | Refills: 0 | Status: SHIPPED | OUTPATIENT
Start: 2021-08-03 | End: 2021-08-11

## 2021-08-03 NOTE — TELEPHONE ENCOUNTER
Routing to PCP; please review note below. Pt switched pharmacies and needs small amount of medication to bridge.      Yuli Apple RN

## 2021-08-03 NOTE — TELEPHONE ENCOUNTER
----------------------------------------------

Discharge Information

----------------------------------------------

Plan Status:Home with No Needs                       Medically Cleared to Leave:

Discharge Date:02/06/2018 02:04 PM                   CM D/C Disposition:Home, Routine, Self-Care

ADT D/C Disposition:Home, Routine, Self-Care         Projected Discharge Date:02/06/2018 02:04 PM

Transportation at D/C:                               Discharge Delay Reason:

Follow-Up Date:02/06/2018 02:04 PM                   Discharge Slot:

Final Diagnosis:

----------------------------------------------

Placement Information

----------------------------------------------

----------------------------------------------

Patient Contact Information

----------------------------------------------

Contact Name:TRICIA                          Relationship:Mother

Address:[address]                             Home [phone]

                                                     Work Phone:

City:[place]                                         Alternate Phone:

[facility]/Zip Code:[place] [address]                              Email:

----------------------------------------------

Financial Information

----------------------------------------------

Financial Class:Medicare Advantage Plans

Primary Plan Desc:[employer] ADVANTAGE OUTPAT      Primary Plan Number:[id number]

Secondary Plan Desc:                                 Secondary Plan Number:

 

 

----------------------------------------------

Assessment Information

----------------------------------------------

----------------------------------------------

[facility] CM Progress Note

----------------------------------------------

CM Note

 

CM Note                       

Notes:

Spoke with hospitalist, patient here for a COPD exacerbation and will likely discharge tomorrow. No 


therapies have been ordered, so I anticipate an independent discharge. CM available if needs 

arise. 

 

Date Signed:  02/05/2018 04:55 PM

Electronically Signed By:Eri Singh RN

 

 

----------------------------------------------

Intervention Information

----------------------------------------------

Intervention Type:*STEPHANIE-Signed                       Date of Service:02/05/2018 11:03 AM

Patient Type:Observation                             Staff Member:Tracey Castellon

Hours:                                               Discipline:

Severity:                                            Comment: TC could not pend/bridge as requested.   Vi Cabrera

## 2021-08-03 NOTE — TELEPHONE ENCOUNTER
Ran out of simvastatin     Switched insurance  Now has mail order medicactions  Will not arrive for one week     Would like one week sent to [facility] in [place]  Needs at least one weeks worth      Message sent to provider with order attached for a 10 day supply, adjust as needed.     Reason for Disposition    [1] Caller requesting a NON-URGENT new prescription or refill AND [2] triager unable to refill per unit policy    Protocols used: MEDICATION QUESTION CALL-A-AH    Krystal Méndez RN on 8/3/2021 at 2:59 AM

## 2021-08-03 NOTE — TELEPHONE ENCOUNTER
Called patient and left detailed message that small amount of requested medication has been called into [facility]'s pharmacy in [place].    Call clinic for any questions [phone].     Yuli Apple RN

## 2021-08-03 NOTE — TELEPHONE ENCOUNTER
10 tablets ordered.    Romel Mccall MD  [facility], [place]

## 2021-08-04 DIAGNOSIS — J30.9 ALLERGIC RHINITIS, UNSPECIFIED SEASONALITY, UNSPECIFIED TRIGGER: Primary | ICD-10-CM

## 2021-08-04 DIAGNOSIS — F51.01 PRIMARY INSOMNIA: ICD-10-CM

## 2021-08-04 DIAGNOSIS — E11.9 TYPE 2 DIABETES MELLITUS WITHOUT COMPLICATION, WITHOUT LONG-TERM CURRENT USE OF INSULIN (H): ICD-10-CM

## 2021-08-04 RX ORDER — LISINOPRIL 5 MG/1
5 TABLET ORAL DAILY
Qty: 90 TABLET | Refills: 1 | Status: SHIPPED | OUTPATIENT
Start: 2021-08-04 | End: 2021-12-24

## 2021-08-04 RX ORDER — MONTELUKAST SODIUM 10 MG/1
10 TABLET ORAL EVERY MORNING
Qty: 90 TABLET | Refills: 0 | Status: SHIPPED | OUTPATIENT
Start: 2021-08-04 | End: 2021-10-06

## 2021-08-04 RX ORDER — MIRTAZAPINE 15 MG/1
15 TABLET, FILM COATED ORAL
Qty: 90 TABLET | Refills: 1 | Status: SHIPPED | OUTPATIENT
Start: 2021-08-04 | End: 2021-11-01

## 2021-08-04 NOTE — TELEPHONE ENCOUNTER
Medication is being filled for 1 time refill only due to:  Patient needs to be seen because for diabetes med check and labs.   Needs appt for asthma check also    Maria T BATISTA RN  EP Triage

## 2021-08-05 ENCOUNTER — HOSPITAL ENCOUNTER (OUTPATIENT)
Dept: GENERAL RADIOLOGY | Facility: CLINIC | Age: 66
Discharge: HOME | End: 2021-08-05
Attending: UROLOGY | Admitting: UROLOGY
Payer: COMMERCIAL

## 2021-08-05 DIAGNOSIS — N20.1 LEFT URETERAL CALCULUS: ICD-10-CM

## 2021-08-05 DIAGNOSIS — J30.2 SEASONAL ALLERGIC RHINITIS, UNSPECIFIED TRIGGER: ICD-10-CM

## 2021-08-05 PROCEDURE — 74019 RADEX ABDOMEN 2 VIEWS: CPT

## 2021-08-06 ENCOUNTER — OFFICE VISIT (OUTPATIENT)
Dept: UROLOGY | Facility: CLINIC | Age: 66
End: 2021-08-06
Payer: COMMERCIAL

## 2021-08-06 VITALS — SYSTOLIC BLOOD PRESSURE: 138 MMHG | WEIGHT: 162 LBS | BODY MASS INDEX: 25.37 KG/M2 | DIASTOLIC BLOOD PRESSURE: 76 MMHG

## 2021-08-06 DIAGNOSIS — Z87.442 PERSONAL HISTORY OF KIDNEY STONES: Primary | ICD-10-CM

## 2021-08-06 DIAGNOSIS — N20.0 KIDNEY STONE ON LEFT SIDE: ICD-10-CM

## 2021-08-06 DIAGNOSIS — Z11.59 ENCOUNTER FOR SCREENING FOR OTHER VIRAL DISEASES: ICD-10-CM

## 2021-08-06 LAB
ALBUMIN UR-MCNC: ABNORMAL MG/DL
APPEARANCE UR: CLEAR
BILIRUB UR QL STRIP: ABNORMAL
COLOR UR AUTO: YELLOW
GLUCOSE UR STRIP-MCNC: NEGATIVE MG/DL
HGB UR QL STRIP: NEGATIVE
KETONES UR STRIP-MCNC: NEGATIVE MG/DL
LEUKOCYTE ESTERASE UR QL STRIP: NEGATIVE
NITRATE UR QL: NEGATIVE
PH UR STRIP: 5 [PH] (ref 5–7)
SP GR UR STRIP: >=1.03 (ref 1–1.03)
UROBILINOGEN UR STRIP-ACNC: 0.2 E.U./DL

## 2021-08-06 PROCEDURE — 99213 OFFICE O/P EST LOW 20 MIN: CPT | Performed by: STUDENT IN AN ORGANIZED HEALTH CARE EDUCATION/TRAINING PROGRAM

## 2021-08-06 PROCEDURE — 81003 URINALYSIS AUTO W/O SCOPE: CPT | Mod: QW | Performed by: STUDENT IN AN ORGANIZED HEALTH CARE EDUCATION/TRAINING PROGRAM

## 2021-08-06 RX ORDER — FLUTICASONE PROPIONATE 50 MCG
1 SPRAY, SUSPENSION (ML) NASAL DAILY PRN
Qty: 16 G | Refills: 3 | Status: SHIPPED | OUTPATIENT
Start: 2021-08-06 | End: 2021-12-24

## 2021-08-06 RX ORDER — CEFAZOLIN SODIUM 2 G/50ML
2 SOLUTION INTRAVENOUS
Status: CANCELLED | OUTPATIENT
Start: 2021-08-06

## 2021-08-06 RX ORDER — CEFAZOLIN SODIUM 2 G/50ML
2 SOLUTION INTRAVENOUS SEE ADMIN INSTRUCTIONS
Status: CANCELLED | OUTPATIENT
Start: 2021-08-06

## 2021-08-06 ASSESSMENT — PAIN SCALES - GENERAL: PAINLEVEL: NO PAIN (0)

## 2021-08-06 NOTE — NURSING NOTE
Chief Complaint   Patient presents with     Kidney Stone Related     Review TIARAB     Aimee Flores, EMT

## 2021-08-06 NOTE — PATIENT INSTRUCTIONS
Patient Education     Preventing Kidney Stones  If you ve had a kidney stone, you may worry that you ll have another. Removing or passing your stone doesn t prevent future stones. But with your healthcare provider s help, you can reduce your risk of forming new stones. Follow up with your healthcare provider to help find new stones. Depending on your medical condition, you may need follow-up every 3 months to a year for the rest of your life.     Drink lots of water  Staying well-hydrated is the best way to reduce your risk of future stones. Drink 8 12-ounce glasses of water daily. Have 2 with each meal and 2 between meals. Keep track of your intake. Try keeping a pitcher of water nearby during the day and at night.   Take medicines if needed  Medicines, including vitamins and minerals, may be prescribed for certain types of stones. You may want to write your doses and medicine times on a calendar. Some medicines decrease stone-forming chemicals in your blood. Others help prevent those chemicals from crystallizing in urine. Still others help keep a normal acid balance in your urine.   Follow your prescribed diet  Your healthcare provider will tell you which foods contain the chemicals you should avoid. Your healthcare provider may also suggest talking to a dietitian. He or she can help you plan meals you ll enjoy. These meals won t put you at risk for future stones. Bring your spouse, partner, or close friend with you when you meet with the dietitian so you can have support for your necessary diet changes.   You may be told to limit certain foods, depending on which type of stones you ve had. You should limit the amount of salt in your food to about 2 grams a day. This will help prevent most types of kidney stones. Make sure you get an adequate amount of calcium in your diet.   For calcium oxalate stones: Limit animal protein, such as meat, eggs, and fish. Limit grapefruit juice and alcohol. Limit high-oxalate foods  (such as cola, tea, chocolate, spinach, rhubarb, wheat bran, and peanuts).   For uric acid stones: Limit high-purine foods, such as mushrooms, peas, beans, anchovies, meat, poultry, shellfish, and organ meats. These foods increase uric acid production.   For cystine stones: Limit high-methionine foods (fish is the most common, but eggs and meats, also). These foods increase production of cystine.   [employer] last reviewed this educational content on 4/1/2020 2000-2021 [employer]. All rights reserved. This information is not intended as a substitute for professional medical care. Always follow your healthcare professional's instructions.             You had calcium oxalate stones    General recommendations for stone prevention are listed below    - Drink more water, to maintain urine output >2 liters a day  - Start or maintain a low sodium diet  - Reduce the amount of oxalate in your diet  - Do not take calcium supplements but continue to have a moderate amount of calcium in your diet.    You may read more about stone prevention at https://[email or web address].org/urology-a-z/k/kidney-stones        Patient Education     Shock Wave Lithotripsy  Passing a kidney stone can be very painful. Shock wave lithotripsy is a treatment that helps by breaking the kidney stone into smaller pieces that are easier to pass. This treatment is also called extracorporeal shock wave lithotripsy (ESWL). Lithotripsy takes about an hour. It s done in a hospital, lithotripsy center, or mobile lithotripsy van. You will likely go home the same day. This treatment is not used for all types of kidney stones. Your healthcare provider will discuss whether this is the right treatment for the type of stone you have.       Energy waves strike the stone, which begins to crack. The stone crumbles into tiny pieces.   During the procedure    You get medicine to prevent pain and help you relax or sleep during lithotripsy. Once this takes  effect, the procedure will start.    A flexible tube (stent) with holes in it may be placed into your ureter, the tube that connects the kidney and the bladder. This helps keep urine flowing from the kidney.    Your healthcare provider then uses X-ray or ultrasound to find the exact location of the kidney stone.    Sound waves are aimed at the stone and sent at high speed. If you re awake, you may feel a tapping as they pass through your body.    After the procedure    You ll be closely watched in a recovery room for about 1 to 3 hours. Antibiotics and pain medicine may be prescribed before you leave.    You ll have a follow-up visit in a few weeks. If you received a stent, it will be removed. Your healthcare provider will also check for pieces of stone. If large pieces remain, you may need a second lithotripsy or another procedure.    Possible risks and complications    Infection    Bleeding in the kidney    Bruising of the kidney or skin    Blockage (obstruction) of the ureter    Failure to break up the stone (other procedures may be needed)    Passing the stone  It can take a day to several weeks for the pieces of stone to leave your body. Drink plenty of liquids --at least 8 to12-ounce glasses of water a day-- to help flush your system. During this time:     Your urine may be cloudy or slightly bloody. You may even see small pieces of stone.    You may have a slight fever and some pain. Take prescribed or over-the-counter pain medicine as instructed by your healthcare provider.    You may be asked to strain your urine to collect some stone particles. These will be studied in the lab.  When to call your healthcare provider  Call your healthcare provider right away if you have any of the following:    Fever of 100.4 F (38 C) or higher, or as directed by your healthcare provider    Heavy bleeding    Pain that doesn t go away with medicine    Upset stomach and vomiting    Problems urinating    New symptoms or  symptoms that get worse  [employer] last reviewed this educational content on 4/1/2020 2000-2021 [employer]. All rights reserved. This information is not intended as a substitute for professional medical care. Always follow your healthcare professional's instructions.

## 2021-08-06 NOTE — LETTER
8/6/2021       RE: Grecia Kilgore  [id number]  [facility] [facility] [id number]     Dear Colleague,    Thank you for referring your patient, Grecia Kilgore, to the [facility] [place] [facility]. Please see a copy of my visit note below.    CHIEF COMPLAINT   Grecia Kilgore who is a 66 year old female returns today for follow-up of nephrolithiasis.      HPI   Grecia Kilgore is a 66 year old female who presents with a history of calcium oxalate nephrolithiasis s/p left URS 2/4/2021 ([facility])    Trying to drink more water but not always successful.     Not symptomatic currently    PHYSICAL EXAM  Patient is a 66 year old  female   Vitals: Blood pressure 138/76, weight 73.5 kg (162 lb), last menstrual period 01/01/1985, not currently breastfeeding.  Body mass index is 25.37 kg/m .  General Appearance Adult:   Alert, no acute distress, oriented  HENT: throat/mouth:normal, good dentition  Lungs: no respiratory distress, or pursed lip breathing  Heart: No obvious jugular venous distension present  Abdomen: nondistended  Musculoskeltal: extremities normal, no peripheral edema  Skin: no suspicious lesions or rashes  Neuro: Alert, oriented, speech and mentation normal  Psych: affect and mood normal  Gait: Normal    All pertinent imaging reviewed:    [facility] 8/5/2021    IMPRESSION: There is a 2 mm calculus projected over the left renal  collecting system, unchanged from prior. Rounded calcifications in the  pelvis appear to be phleboliths. Nonobstructive gas pattern.        90 mm STSD. 4 mm upper/interpolar calyx stone on the left, about 500 HU units    ASSESSMENT and PLAN  66 year old female who presents with a history of calcium oxalate nephrolithiasis s/p left URS 2/4/2021 ([facility]), with a small left sided nonobstructive stone, asymptomatic. Counseled on continued observation vs. Treatment with ESWL. Risks of ESWL including pain, bleeding,  need for secondary procedure, about 80-85% stone free rate discussed.    Counseled on calcium oxalate stone prevention diet, handout given.    - Plan for left extracorporal shockwave lithotripsy (no stent)      Keegan Cannon MD   [facility]  [facility] Phone: [phone]

## 2021-08-06 NOTE — PROGRESS NOTES
CHIEF COMPLAINT   Grecia Kilgore who is a 66 year old female returns today for follow-up of nephrolithiasis.      HPI   Grecia Kilgore is a 66 year old female who presents with a history of calcium oxalate nephrolithiasis s/p left URS 2/4/2021 ([facility])    Trying to drink more water but not always successful.     Not symptomatic currently    PHYSICAL EXAM  Patient is a 66 year old  female   Vitals: Blood pressure 138/76, weight 73.5 kg (162 lb), last menstrual period 01/01/1985, not currently breastfeeding.  Body mass index is 25.37 kg/m .  General Appearance Adult:   Alert, no acute distress, oriented  HENT: throat/mouth:normal, good dentition  Lungs: no respiratory distress, or pursed lip breathing  Heart: No obvious jugular venous distension present  Abdomen: nondistended  Musculoskeltal: extremities normal, no peripheral edema  Skin: no suspicious lesions or rashes  Neuro: Alert, oriented, speech and mentation normal  Psych: affect and mood normal  Gait: Normal    All pertinent imaging reviewed:    [facility] 8/5/2021    IMPRESSION: There is a 2 mm calculus projected over the left renal  collecting system, unchanged from prior. Rounded calcifications in the  pelvis appear to be phleboliths. Nonobstructive gas pattern.        90 mm STSD. 4 mm upper/interpolar calyx stone on the left, about 500 HU units    ASSESSMENT and PLAN  66 year old female who presents with a history of calcium oxalate nephrolithiasis s/p left URS 2/4/2021 ([facility]), with a small left sided nonobstructive stone, asymptomatic. Counseled on continued observation vs. Treatment with ESWL. Risks of ESWL including pain, bleeding, need for secondary procedure, about 80-85% stone free rate discussed.    Counseled on calcium oxalate stone prevention diet, handout given.    - Plan for left extracorporal shockwave lithotripsy (no stent)      Keegan Cannon MD   [facility]  [facility] Phone: [phone]

## 2021-08-11 ENCOUNTER — OFFICE VISIT (OUTPATIENT)
Dept: FAMILY MEDICINE | Facility: CLINIC | Age: 66
End: 2021-08-11
Payer: COMMERCIAL

## 2021-08-11 ENCOUNTER — TELEPHONE (OUTPATIENT)
Dept: FAMILY MEDICINE | Facility: CLINIC | Age: 66
End: 2021-08-11

## 2021-08-11 VITALS
HEART RATE: 60 BPM | BODY MASS INDEX: 25.74 KG/M2 | RESPIRATION RATE: 16 BRPM | OXYGEN SATURATION: 100 % | WEIGHT: 164 LBS | TEMPERATURE: 98.5 F | HEIGHT: 67 IN | SYSTOLIC BLOOD PRESSURE: 130 MMHG | DIASTOLIC BLOOD PRESSURE: 68 MMHG

## 2021-08-11 DIAGNOSIS — E78.5 HYPERLIPIDEMIA LDL GOAL <100: ICD-10-CM

## 2021-08-11 DIAGNOSIS — J45.20 MILD INTERMITTENT ASTHMA WITHOUT COMPLICATION: ICD-10-CM

## 2021-08-11 DIAGNOSIS — E11.9 TYPE 2 DIABETES MELLITUS WITHOUT COMPLICATION, WITHOUT LONG-TERM CURRENT USE OF INSULIN (H): ICD-10-CM

## 2021-08-11 DIAGNOSIS — Z01.818 PREOP GENERAL PHYSICAL EXAM: Primary | ICD-10-CM

## 2021-08-11 DIAGNOSIS — M54.16 LUMBAR RADICULOPATHY: ICD-10-CM

## 2021-08-11 DIAGNOSIS — N20.0 NEPHROLITHIASIS: ICD-10-CM

## 2021-08-11 LAB
HBA1C MFR BLD: 6.5 % (ref 0–5.6)
HGB BLD-MCNC: 12.9 G/DL (ref 11.7–15.7)

## 2021-08-11 PROCEDURE — 99214 OFFICE O/P EST MOD 30 MIN: CPT | Performed by: FAMILY MEDICINE

## 2021-08-11 PROCEDURE — 85018 HEMOGLOBIN: CPT | Performed by: FAMILY MEDICINE

## 2021-08-11 PROCEDURE — 36415 COLL VENOUS BLD VENIPUNCTURE: CPT | Performed by: FAMILY MEDICINE

## 2021-08-11 PROCEDURE — 83036 HEMOGLOBIN GLYCOSYLATED A1C: CPT | Performed by: FAMILY MEDICINE

## 2021-08-11 PROCEDURE — 80048 BASIC METABOLIC PNL TOTAL CA: CPT | Performed by: FAMILY MEDICINE

## 2021-08-11 PROCEDURE — 82043 UR ALBUMIN QUANTITATIVE: CPT | Performed by: FAMILY MEDICINE

## 2021-08-11 RX ORDER — SIMVASTATIN 40 MG
40 TABLET ORAL AT BEDTIME
Qty: 10 TABLET | Refills: 0 | Status: SHIPPED | OUTPATIENT
Start: 2021-08-11 | End: 2021-08-11

## 2021-08-11 RX ORDER — SIMVASTATIN 40 MG
40 TABLET ORAL AT BEDTIME
Qty: 30 TABLET | Refills: 0 | Status: SHIPPED | OUTPATIENT
Start: 2021-08-11 | End: 2021-08-11

## 2021-08-11 RX ORDER — SIMVASTATIN 40 MG
40 TABLET ORAL AT BEDTIME
Qty: 90 TABLET | Refills: 1 | Status: SHIPPED | OUTPATIENT
Start: 2021-08-11 | End: 2021-08-23

## 2021-08-11 RX ORDER — CYCLOBENZAPRINE HCL 5 MG
5 TABLET ORAL
Qty: 30 TABLET | Refills: 1 | Status: SHIPPED | OUTPATIENT
Start: 2021-08-11 | End: 2022-09-21

## 2021-08-11 ASSESSMENT — ANXIETY QUESTIONNAIRES
GAD7 TOTAL SCORE: 14
GAD7 TOTAL SCORE: 14
5. BEING SO RESTLESS THAT IT IS HARD TO SIT STILL: MORE THAN HALF THE DAYS
7. FEELING AFRAID AS IF SOMETHING AWFUL MIGHT HAPPEN: NEARLY EVERY DAY
6. BECOMING EASILY ANNOYED OR IRRITABLE: MORE THAN HALF THE DAYS
2. NOT BEING ABLE TO STOP OR CONTROL WORRYING: MORE THAN HALF THE DAYS
7. FEELING AFRAID AS IF SOMETHING AWFUL MIGHT HAPPEN: NEARLY EVERY DAY
4. TROUBLE RELAXING: MORE THAN HALF THE DAYS
GAD7 TOTAL SCORE: 14
3. WORRYING TOO MUCH ABOUT DIFFERENT THINGS: MORE THAN HALF THE DAYS
8. IF YOU CHECKED OFF ANY PROBLEMS, HOW DIFFICULT HAVE THESE MADE IT FOR YOU TO DO YOUR WORK, TAKE CARE OF THINGS AT HOME, OR GET ALONG WITH OTHER PEOPLE?: VERY DIFFICULT
1. FEELING NERVOUS, ANXIOUS, OR ON EDGE: SEVERAL DAYS

## 2021-08-11 ASSESSMENT — ASTHMA QUESTIONNAIRES
ACT_TOTALSCORE: 22
QUESTION_5 LAST FOUR WEEKS HOW WOULD YOU RATE YOUR ASTHMA CONTROL: WELL CONTROLLED
QUESTION_3 LAST FOUR WEEKS HOW OFTEN DID YOUR ASTHMA SYMPTOMS (WHEEZING, COUGHING, SHORTNESS OF BREATH, CHEST TIGHTNESS OR PAIN) WAKE YOU UP AT NIGHT OR EARLIER THAN USUAL IN THE MORNING: NOT AT ALL
QUESTION_4 LAST FOUR WEEKS HOW OFTEN HAVE YOU USED YOUR RESCUE INHALER OR NEBULIZER MEDICATION (SUCH AS ALBUTEROL): ONCE A WEEK OR LESS
QUESTION_1 LAST FOUR WEEKS HOW MUCH OF THE TIME DID YOUR ASTHMA KEEP YOU FROM GETTING AS MUCH DONE AT WORK, SCHOOL OR AT HOME: NONE OF THE TIME
QUESTION_2 LAST FOUR WEEKS HOW OFTEN HAVE YOU HAD SHORTNESS OF BREATH: ONCE OR TWICE A WEEK

## 2021-08-11 ASSESSMENT — PAIN SCALES - GENERAL: PAINLEVEL: NO PAIN (0)

## 2021-08-11 ASSESSMENT — PATIENT HEALTH QUESTIONNAIRE - PHQ9
10. IF YOU CHECKED OFF ANY PROBLEMS, HOW DIFFICULT HAVE THESE PROBLEMS MADE IT FOR YOU TO DO YOUR WORK, TAKE CARE OF THINGS AT HOME, OR GET ALONG WITH OTHER PEOPLE: VERY DIFFICULT
SUM OF ALL RESPONSES TO PHQ QUESTIONS 1-9: 18
SUM OF ALL RESPONSES TO PHQ QUESTIONS 1-9: 18

## 2021-08-11 ASSESSMENT — MIFFLIN-ST. JEOR: SCORE: 1326.53

## 2021-08-11 NOTE — TELEPHONE ENCOUNTER
Called patient. She states she needed a bridge of medication until she could get mail order supply. However, no documentation of fax received from [facility] for medication request. Patient is upset that she got a bridge but her mail order supply was 'cancelled'.     Patient is now needing a short supply of simvastatin 40 mg tablets sent to [facility], order pended. She will also need longer term supply sent to [facility], order pended.     Patient has appointment today 8/11 at 1:40 PM with Dr. Mccall.

## 2021-08-11 NOTE — TELEPHONE ENCOUNTER
Per chart review, rx for simvastatin 40 mg (10 tablets) was cancelled. This was intended to be short supply to local pharmacy while patient waits for 90 day supply from [facility].     Called patient. She states that Dr. Mccall was going to only prescribe 30 day supply to [facility]. Routing to provider to clarify prescriptions.     Patient will need short term supply to local pharmacy while waiting for mail order supply. Thanks.

## 2021-08-11 NOTE — TELEPHONE ENCOUNTER
General Call:     Who is calling:  PT     Reason for Call:  PT had   Request an emergency refill  for her cholesterol medication due  to the mail out not able to get the refill to her fast enough. And she just got a call saying that ins cancelled it. PT is upset, please call PT back to talk about - thanks     What are your questions or concerns:  Getting her refill    Date of last appointment with provider: 6-21-21    Okay to leave a detailed message:Yes at Cell number on file:    Telephone Information:   Mobile [phone]
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
## 2021-08-11 NOTE — TELEPHONE ENCOUNTER
Simvastatin (long term supply): Prescription approved per [facility] Refill Protocol.

## 2021-08-11 NOTE — LETTER
August 13, 2021      Grecia Kilgore  [address]   [place] [address]        Dear ,    We are writing to inform you of your test results.    Your test results fall within the expected range(s) or remain unchanged from previous results.  Please continue with current treatment plan.    Diabetes is well controlled. Follow up in 6 months for diabetes recheck.  Best wishes for your up coming surgery.        If you have any questions or concerns, please call the clinic at the number listed above.       Sincerely,      Romel Mccall MD

## 2021-08-12 LAB
ANION GAP SERPL CALCULATED.3IONS-SCNC: 5 MMOL/L (ref 3–14)
BUN SERPL-MCNC: 9 MG/DL (ref 7–30)
CALCIUM SERPL-MCNC: 9.4 MG/DL (ref 8.5–10.1)
CHLORIDE BLD-SCNC: 111 MMOL/L (ref 94–109)
CO2 SERPL-SCNC: 24 MMOL/L (ref 20–32)
CREAT SERPL-MCNC: 0.95 MG/DL (ref 0.52–1.04)
CREAT UR-MCNC: 304 MG/DL
GFR SERPL CREATININE-BSD FRML MDRD: 63 ML/MIN/1.73M2
GLUCOSE BLD-MCNC: 114 MG/DL (ref 70–99)
MICROALBUMIN UR-MCNC: 23 MG/L
MICROALBUMIN/CREAT UR: 7.57 MG/G CR (ref 0–25)
POTASSIUM BLD-SCNC: 4 MMOL/L (ref 3.4–5.3)
SODIUM SERPL-SCNC: 140 MMOL/L (ref 133–144)

## 2021-08-12 ASSESSMENT — ANXIETY QUESTIONNAIRES: GAD7 TOTAL SCORE: 14

## 2021-08-12 ASSESSMENT — ASTHMA QUESTIONNAIRES: ACT_TOTALSCORE: 22

## 2021-08-13 NOTE — TELEPHONE ENCOUNTER
Short supply was ordered to [facility].   Bigger supply was ordered to mail order pharmacy.    Canceling in Epic doesn't cancel the order to the local pharmacy.   Please have the patient contact [employer] to get a short supply.   If any problems , please authroize 10 to 30 day supply for her.    Romel Mccall MD  [facility], [facility] [place]

## 2021-08-13 NOTE — TELEPHONE ENCOUNTER
S/w pt and advised short supply of simvastatin was sent to local [facility] and the larger supply was sent to [facility] mail order pharmacy.    Pt states understanding.    Maria T BATISTA RN  EP Triage

## 2021-08-18 ENCOUNTER — ANESTHESIA EVENT (OUTPATIENT)
Dept: SURGERY | Facility: CLINIC | Age: 66
End: 2021-08-18
Payer: COMMERCIAL

## 2021-08-20 ENCOUNTER — HOSPITAL ENCOUNTER (OUTPATIENT)
Facility: CLINIC | Age: 66
Discharge: HOME | End: 2021-08-20
Attending: STUDENT IN AN ORGANIZED HEALTH CARE EDUCATION/TRAINING PROGRAM | Admitting: STUDENT IN AN ORGANIZED HEALTH CARE EDUCATION/TRAINING PROGRAM
Payer: COMMERCIAL

## 2021-08-20 ENCOUNTER — ANESTHESIA (OUTPATIENT)
Dept: SURGERY | Facility: CLINIC | Age: 66
End: 2021-08-20
Payer: COMMERCIAL

## 2021-08-20 VITALS
RESPIRATION RATE: 12 BRPM | SYSTOLIC BLOOD PRESSURE: 144 MMHG | OXYGEN SATURATION: 97 % | BODY MASS INDEX: 25.58 KG/M2 | WEIGHT: 163 LBS | HEART RATE: 61 BPM | HEIGHT: 67 IN | DIASTOLIC BLOOD PRESSURE: 96 MMHG | TEMPERATURE: 95.8 F

## 2021-08-20 DIAGNOSIS — N20.0 KIDNEY STONE ON LEFT SIDE: ICD-10-CM

## 2021-08-20 LAB
GLUCOSE BLDC GLUCOMTR-MCNC: 109 MG/DL (ref 70–99)
GLUCOSE BLDC GLUCOMTR-MCNC: 133 MG/DL (ref 70–99)

## 2021-08-20 PROCEDURE — 250N000011 HC RX IP 250 OP 636: Performed by: NURSE ANESTHETIST, CERTIFIED REGISTERED

## 2021-08-20 PROCEDURE — 50590 FRAGMENTING OF KIDNEY STONE: CPT | Mod: LT | Performed by: STUDENT IN AN ORGANIZED HEALTH CARE EDUCATION/TRAINING PROGRAM

## 2021-08-20 PROCEDURE — 710N000012 HC RECOVERY PHASE 2, PER MINUTE: Performed by: STUDENT IN AN ORGANIZED HEALTH CARE EDUCATION/TRAINING PROGRAM

## 2021-08-20 PROCEDURE — 999N000141 HC STATISTIC PRE-PROCEDURE NURSING ASSESSMENT: Performed by: STUDENT IN AN ORGANIZED HEALTH CARE EDUCATION/TRAINING PROGRAM

## 2021-08-20 PROCEDURE — 370N000017 HC ANESTHESIA TECHNICAL FEE, PER MIN: Performed by: STUDENT IN AN ORGANIZED HEALTH CARE EDUCATION/TRAINING PROGRAM

## 2021-08-20 PROCEDURE — 258N000003 HC RX IP 258 OP 636: Performed by: ANESTHESIOLOGY

## 2021-08-20 PROCEDURE — 710N000009 HC RECOVERY PHASE 1, LEVEL 1, PER MIN: Performed by: STUDENT IN AN ORGANIZED HEALTH CARE EDUCATION/TRAINING PROGRAM

## 2021-08-20 PROCEDURE — 360N000076 HC SURGERY LEVEL 3, PER MIN: Performed by: STUDENT IN AN ORGANIZED HEALTH CARE EDUCATION/TRAINING PROGRAM

## 2021-08-20 PROCEDURE — 250N000009 HC RX 250: Performed by: NURSE ANESTHETIST, CERTIFIED REGISTERED

## 2021-08-20 PROCEDURE — 250N000011 HC RX IP 250 OP 636: Performed by: STUDENT IN AN ORGANIZED HEALTH CARE EDUCATION/TRAINING PROGRAM

## 2021-08-20 RX ORDER — ACETAMINOPHEN 500 MG
1000 TABLET ORAL EVERY 6 HOURS PRN
Qty: 100 TABLET | Refills: 0 | Status: SHIPPED | OUTPATIENT
Start: 2021-08-20 | End: 2022-09-21

## 2021-08-20 RX ORDER — LIDOCAINE 40 MG/G
CREAM TOPICAL
Status: DISCONTINUED | OUTPATIENT
Start: 2021-08-20 | End: 2021-08-20 | Stop reason: HOSPADM

## 2021-08-20 RX ORDER — OXYCODONE HYDROCHLORIDE 5 MG/1
5 TABLET ORAL EVERY 6 HOURS PRN
Qty: 8 TABLET | Refills: 0 | Status: SHIPPED | OUTPATIENT
Start: 2021-08-20 | End: 2021-08-23

## 2021-08-20 RX ORDER — HYDROMORPHONE HCL IN WATER/PF 6 MG/30 ML
.2-.4 PATIENT CONTROLLED ANALGESIA SYRINGE INTRAVENOUS EVERY 5 MIN PRN
Status: DISCONTINUED | OUTPATIENT
Start: 2021-08-20 | End: 2021-08-20 | Stop reason: HOSPADM

## 2021-08-20 RX ORDER — DOCUSATE SODIUM 100 MG/1
100 CAPSULE, LIQUID FILLED ORAL 2 TIMES DAILY
Qty: 30 CAPSULE | Refills: 0 | Status: SHIPPED | OUTPATIENT
Start: 2021-08-20 | End: 2022-09-21

## 2021-08-20 RX ORDER — ONDANSETRON 2 MG/ML
4 INJECTION INTRAMUSCULAR; INTRAVENOUS EVERY 30 MIN PRN
Status: DISCONTINUED | OUTPATIENT
Start: 2021-08-20 | End: 2021-08-20 | Stop reason: HOSPADM

## 2021-08-20 RX ORDER — ACETAMINOPHEN 325 MG/1
975 TABLET ORAL ONCE
Status: DISCONTINUED | OUTPATIENT
Start: 2021-08-20 | End: 2021-08-20 | Stop reason: HOSPADM

## 2021-08-20 RX ORDER — PROPOFOL 10 MG/ML
INJECTION, EMULSION INTRAVENOUS PRN
Status: DISCONTINUED | OUTPATIENT
Start: 2021-08-20 | End: 2021-08-20

## 2021-08-20 RX ORDER — TAMSULOSIN HYDROCHLORIDE 0.4 MG/1
0.4 CAPSULE ORAL DAILY
Qty: 14 CAPSULE | Refills: 0 | Status: SHIPPED | OUTPATIENT
Start: 2021-08-20 | End: 2021-11-01

## 2021-08-20 RX ORDER — OXYCODONE HYDROCHLORIDE 5 MG/1
5 TABLET ORAL EVERY 4 HOURS PRN
Status: DISCONTINUED | OUTPATIENT
Start: 2021-08-20 | End: 2021-08-20 | Stop reason: HOSPADM

## 2021-08-20 RX ORDER — LABETALOL HYDROCHLORIDE 5 MG/ML
10 INJECTION, SOLUTION INTRAVENOUS
Status: DISCONTINUED | OUTPATIENT
Start: 2021-08-20 | End: 2021-08-20 | Stop reason: HOSPADM

## 2021-08-20 RX ORDER — CEFAZOLIN SODIUM/WATER 2 G/20 ML
2 SYRINGE (ML) INTRAVENOUS SEE ADMIN INSTRUCTIONS
Status: DISCONTINUED | OUTPATIENT
Start: 2021-08-20 | End: 2021-08-20 | Stop reason: HOSPADM

## 2021-08-20 RX ORDER — FENTANYL CITRATE 50 UG/ML
25-50 INJECTION, SOLUTION INTRAMUSCULAR; INTRAVENOUS EVERY 5 MIN PRN
Status: DISCONTINUED | OUTPATIENT
Start: 2021-08-20 | End: 2021-08-20 | Stop reason: HOSPADM

## 2021-08-20 RX ORDER — SODIUM CHLORIDE, SODIUM LACTATE, POTASSIUM CHLORIDE, CALCIUM CHLORIDE 600; 310; 30; 20 MG/100ML; MG/100ML; MG/100ML; MG/100ML
INJECTION, SOLUTION INTRAVENOUS CONTINUOUS
Status: DISCONTINUED | OUTPATIENT
Start: 2021-08-20 | End: 2021-08-20 | Stop reason: HOSPADM

## 2021-08-20 RX ORDER — CEFAZOLIN SODIUM/WATER 2 G/20 ML
2 SYRINGE (ML) INTRAVENOUS
Status: COMPLETED | OUTPATIENT
Start: 2021-08-20 | End: 2021-08-20

## 2021-08-20 RX ORDER — DEXAMETHASONE SODIUM PHOSPHATE 4 MG/ML
INJECTION, SOLUTION INTRA-ARTICULAR; INTRALESIONAL; INTRAMUSCULAR; INTRAVENOUS; SOFT TISSUE PRN
Status: DISCONTINUED | OUTPATIENT
Start: 2021-08-20 | End: 2021-08-20

## 2021-08-20 RX ORDER — ONDANSETRON 2 MG/ML
INJECTION INTRAMUSCULAR; INTRAVENOUS PRN
Status: DISCONTINUED | OUTPATIENT
Start: 2021-08-20 | End: 2021-08-20

## 2021-08-20 RX ORDER — FENTANYL CITRATE 50 UG/ML
INJECTION, SOLUTION INTRAMUSCULAR; INTRAVENOUS PRN
Status: DISCONTINUED | OUTPATIENT
Start: 2021-08-20 | End: 2021-08-20

## 2021-08-20 RX ORDER — GLYCOPYRROLATE 0.2 MG/ML
INJECTION, SOLUTION INTRAMUSCULAR; INTRAVENOUS PRN
Status: DISCONTINUED | OUTPATIENT
Start: 2021-08-20 | End: 2021-08-20

## 2021-08-20 RX ORDER — DIPHENHYDRAMINE HYDROCHLORIDE 50 MG/ML
INJECTION INTRAMUSCULAR; INTRAVENOUS PRN
Status: DISCONTINUED | OUTPATIENT
Start: 2021-08-20 | End: 2021-08-20

## 2021-08-20 RX ORDER — ONDANSETRON 4 MG/1
4 TABLET, ORALLY DISINTEGRATING ORAL EVERY 30 MIN PRN
Status: DISCONTINUED | OUTPATIENT
Start: 2021-08-20 | End: 2021-08-20 | Stop reason: HOSPADM

## 2021-08-20 RX ORDER — NALBUPHINE HYDROCHLORIDE 10 MG/ML
5 INJECTION, SOLUTION INTRAMUSCULAR; INTRAVENOUS; SUBCUTANEOUS
Status: DISCONTINUED | OUTPATIENT
Start: 2021-08-20 | End: 2021-08-20 | Stop reason: HOSPADM

## 2021-08-20 RX ADMIN — FENTANYL CITRATE 100 MCG: 50 INJECTION, SOLUTION INTRAMUSCULAR; INTRAVENOUS at 07:39

## 2021-08-20 RX ADMIN — MIDAZOLAM 2 MG: 1 INJECTION INTRAMUSCULAR; INTRAVENOUS at 07:30

## 2021-08-20 RX ADMIN — PROPOFOL 200 MG: 10 INJECTION, EMULSION INTRAVENOUS at 07:39

## 2021-08-20 RX ADMIN — DIPHENHYDRAMINE HYDROCHLORIDE 25 MG: 50 INJECTION, SOLUTION INTRAMUSCULAR; INTRAVENOUS at 07:39

## 2021-08-20 RX ADMIN — SODIUM CHLORIDE, POTASSIUM CHLORIDE, SODIUM LACTATE AND CALCIUM CHLORIDE: 600; 310; 30; 20 INJECTION, SOLUTION INTRAVENOUS at 06:41

## 2021-08-20 RX ADMIN — GLYCOPYRROLATE 0.2 MG: 0.2 INJECTION, SOLUTION INTRAMUSCULAR; INTRAVENOUS at 07:58

## 2021-08-20 RX ADMIN — GLYCOPYRROLATE 0.2 MG: 0.2 INJECTION, SOLUTION INTRAMUSCULAR; INTRAVENOUS at 07:56

## 2021-08-20 RX ADMIN — DEXAMETHASONE SODIUM PHOSPHATE 4 MG: 4 INJECTION, SOLUTION INTRA-ARTICULAR; INTRALESIONAL; INTRAMUSCULAR; INTRAVENOUS; SOFT TISSUE at 07:39

## 2021-08-20 RX ADMIN — Medication 2 G: at 07:30

## 2021-08-20 RX ADMIN — ONDANSETRON HYDROCHLORIDE 4 MG: 2 INJECTION, SOLUTION INTRAVENOUS at 07:39

## 2021-08-20 ASSESSMENT — ENCOUNTER SYMPTOMS: SEIZURES: 0

## 2021-08-20 ASSESSMENT — MIFFLIN-ST. JEOR: SCORE: 1321.99
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
## 2021-08-20 NOTE — ANESTHESIA POSTPROCEDURE EVALUATION
Patient: Grecia RANGEL Kilgore    Procedure(s):  Left extracorporal shockwave lithotripsy    Diagnosis:Kidney stone on left side [N20.0]  Diagnosis Additional Information: No value filed.    Anesthesia Type:  General    Note:  Disposition: Outpatient   Postop Pain Control: Uneventful            Sign Out: Well controlled pain   PONV: No   Neuro/Psych: Uneventful            Sign Out: Acceptable/Baseline neuro status   Airway/Respiratory: Uneventful            Sign Out: Acceptable/Baseline resp. status   CV/Hemodynamics: Uneventful            Sign Out: Acceptable CV status; No obvious hypovolemia; No obvious fluid overload   Other NRE: NONE   DID A NON-ROUTINE EVENT OCCUR? No           Last vitals:  Vitals Value Taken Time   /94 08/20/21 0915   Temp 96.8  F (36  C) 08/20/21 0913   Pulse 62 08/20/21 0916   Resp 13 08/20/21 0916   SpO2 93 % 08/20/21 0916   Vitals shown include unvalidated device data.    Electronically Signed By: Iza Dent MD  August 20, 2021  9:17 AM

## 2021-08-20 NOTE — DISCHARGE INSTRUCTIONS
POSTOPERATIVE INSTRUCTIONS    Diagnosis-------------------------------   left kidney stone    Procedure-------------------------------  Procedure(s) (LRB):  Left extracorporal shockwave lithotripsy (Left)    Findings--------------------------------  4 mm left kidney stone, lithotripsy with 2000 shocks total, good fragmentation seen    Home-going instructions-----------------         Activity Limitation:     - No driving or operating heavy machinery until 24 hours after anesthesia  - no heavy lifting over 10 pounds or strenuous activity for 48 hours  - for the first two weeks after surgery, strenuous activity may cause increased blood in your urine    FOLLOW THESE INSTRUCTIONS AS INDICATED BELOW:  - Observe operative area for signs of excessive bleeding.  - You may shower.  - Increase fluid intake to promote clear urine.  - Resume usual diet as tolerated  - strain your urine for the first week after surgery, collect any fragments, dry them out and submit them to a [place] laboratory for stone analysis    What to expect while recovering-----------  - You may experience some intermittent bleeding that makes your urine pink or cherry colored. This is normal.  - However, if you are unable to urinate, passing large amount of clots, have antoine blood in your urine, or have a temperature >101 degrees, call the urology nurse on call, or present to your nearest emergency department.  - You are encouraged to walk daily      Discharge Medications/instructions:     - Flomax (tamsulosin) to be taken daily to help with passage of stone fragments    - Take Tylenol 1000mg every 6 hours for pain    - Take oxycodone as needed for pain not relieved by Tylenol    - Take colace (a stool softener) if you are taking oxycodone, to prevent constipation    Questions/concerns------------------------  [facility]: [phone]  Future appointments  We will schedule you for a follow up visit in 8 weeks with a KUB and  Mckenzie (24 hour urine collection) prior  Keegan Cannon MD    GENERAL ANESTHESIA OR SEDATION ADULT DISCHARGE INSTRUCTIONS   SPECIAL PRECAUTIONS FOR 24 HOURS AFTER SURGERY    IT IS NOT UNUSUAL TO FEEL LIGHT-HEADED OR FAINT, UP TO 24 HOURS AFTER SURGERY OR WHILE TAKING PAIN MEDICATION.  IF YOU HAVE THESE SYMPTOMS; SIT FOR A FEW MINUTES BEFORE STANDING AND HAVE SOMEONE ASSIST YOU WHEN YOU GET UP TO WALK OR USE THE BATHROOM.    YOU SHOULD REST AND RELAX FOR THE NEXT 24 HOURS AND YOU MUST MAKE ARRANGEMENTS TO HAVE SOMEONE STAY WITH YOU FOR AT LEAST 24 HOURS AFTER YOUR DISCHARGE.  AVOID HAZARDOUS AND STRENUOUS ACTIVITIES.  DO NOT MAKE IMPORTANT DECISIONS FOR 24 HOURS.    DO NOT DRIVE ANY VEHICLE OR OPERATE MECHANICAL EQUIPMENT FOR 24 HOURS FOLLOWING THE END OF YOUR SURGERY.  EVEN THOUGH YOU MAY FEEL NORMAL, YOUR REACTIONS MAY BE AFFECTED BY THE MEDICATION YOU HAVE RECEIVED.    DO NOT DRINK ALCOHOLIC BEVERAGES FOR 24 HOURS FOLLOWING YOUR SURGERY.    DRINK CLEAR LIQUIDS (APPLE JUICE, GINGER ALE, 7-UP, BROTH, ETC.).  PROGRESS TO YOUR REGULAR DIET AS YOU FEEL ABLE.    YOU MAY HAVE A DRY MOUTH, A SORE THROAT, MUSCLES ACHES OR TROUBLE SLEEPING.  THESE SHOULD GO AWAY AFTER 24 HOURS.    CALL YOUR DOCTOR FOR ANY OF THE FOLLOWING:  SIGNS OF INFECTION (FEVER, GROWING TENDERNESS AT THE SURGERY SITE, A LARGE AMOUNT OF DRAINAGE OR BLEEDING, SEVERE PAIN, FOUL-SMELLING DRAINAGE, REDNESS OR SWELLING.    IT HAS BEEN OVER 8 TO 10 HOURS SINCE SURGERY AND YOU ARE STILL NOT ABLE TO URINATE (PASS WATER).

## 2021-08-20 NOTE — ANESTHESIA CARE TRANSFER NOTE
Patient: Grecia P Kilgore    Procedure(s):  Left extracorporal shockwave lithotripsy    Diagnosis: Kidney stone on left side [N20.0]  Diagnosis Additional Information: No value filed.    Anesthesia Type:   General     Note:    Oropharynx: oropharynx clear of all foreign objects  Level of Consciousness: awake  Oxygen Supplementation: face mask  Level of Supplemental Oxygen (L/min / FiO2): 6 lpm  Independent Airway: airway patency satisfactory and stable  Dentition: dentition unchanged  Vital Signs Stable: post-procedure vital signs reviewed and stable  Report to RN Given: handoff report given  Patient transferred to: PACU  Comments: Patient with spontaneous respirations and adequate tidal volumes. Patient awake and responsive. LMA removed in OR to 6L facemask. To PACU ventilating well. VSS. Report given.    Handoff Report: Identifed the Patient, Identified the Reponsible Provider, Reviewed the pertinent medical history, Discussed the surgical course, Reviewed Intra-OP anesthesia mangement and issues during anesthesia, Set expectations for post-procedure period and Allowed opportunity for questions and acknowledgement of understanding      Vitals:  Vitals Value Taken Time   /114 08/20/21 0828   Temp     Pulse 75 08/20/21 0831   Resp 14 08/20/21 0831   SpO2 100 % 08/20/21 0831   Vitals shown include unvalidated device data.    Electronically Signed By: RALPH Sheehan CRNA  August 20, 2021  8:33 AM

## 2021-08-20 NOTE — OP NOTE
[facility]  Operative Note    Pre-operative diagnosis: Kidney stone on left side [N20.0]   Post-operative diagnosis Same as preop   Procedure: Procedure(s):  Left extracorporal shockwave lithotripsy   Fluoroscopic interpretation <1 hour physician time   Surgeon: Keegan Cannon MD   Assistants(s): none   Anesthesia: General    Estimated blood loss: None    Total IV fluids: (See anesthesia record)   Blood transfusion: No transfusion was given during surgery   Total urine output: Not measured   Drains: None   Specimens: None   Implants: * No implants in log *     Findings: 4 mm left renal stone, fragmented with 2000 shocks total.     Complications: None.   Condition: Stable       Description of procedure:  66 year old female who presents with a history of calcium oxalate nephrolithiasis s/p left URS 2/4/2021 ([facility]), with a small left sided nonobstructive stone, asymptomatic. Counseled on continued observation vs. Treatment with ESWL. Risks of ESWL including pain, bleeding, need for secondary procedure, about 80-85% stone free rate discussed. Informed consent obtained    Patient was brought to OR#1 and placed on the [employer] SLX lithotripter table. Prior to undergoing anesthesia, biplanar fluoroscopy was used to ensure that the stone was visible. General anesthesia was induced, she was kept in supine position, and the therapy head was couple to her left flank using fluid. A timeout was performed. Extracorporeal shockwave lithotripsy commenced at 60 Hz, power setting of 3. A 2 minute renal protective pause was performed after 200 shocks. At around 400 shocks it was noted that the patient was heaving excessive ectopy so EKG gating was then performed for the remainder of the procedure. The stone was periodically monitored and retargeted using biplanar fluoroscopy. Power slowly ramped up to 7 at maximum. The stone was seen to break up well. A total of 2000 shocks were delivered. Patient was  then cleaned up, awoken from anesthesia and brought to PACU in stable condition    She will return in 8 weeks with a KUB x-ray and a litholink prior    Keegan Cannon MD   [facility]  [phone] clinic phone

## 2021-08-20 NOTE — OR NURSING
Discharge instructions were reviewed with patient and with significant other, Prabhu. All questions were answered at this time. Discharge medications delivered to phase 2 and given to patient and significant other.

## 2021-08-21 DIAGNOSIS — E78.5 HYPERLIPIDEMIA LDL GOAL <100: ICD-10-CM

## 2021-08-23 RX ORDER — SIMVASTATIN 40 MG
TABLET ORAL
Qty: 90 TABLET | Refills: 1 | Status: SHIPPED | OUTPATIENT
Start: 2021-08-23 | End: 2021-12-24

## 2021-08-25 ENCOUNTER — TELEPHONE (OUTPATIENT)
Dept: UROLOGY | Facility: CLINIC | Age: 66
End: 2021-08-25

## 2021-08-25 NOTE — TELEPHONE ENCOUNTER
[facility] Call Center    Phone Message    May a detailed message be left on voicemail: yes     Reason for Call: Other: Maxime calling to discuss why she needs to do the 24 hour urine test as she did it back in Feb.  She states she feels this would be redundant and not necessary.  Please call her back to discuss     Action Taken: Message routed to:  [facility] ([facility]  Urology    Travel Screening: Not Applicable

## 2021-08-25 NOTE — TELEPHONE ENCOUNTER
Returned phone call to patient and she informed me that she is referring to a stone analysis not a 24-hour urine test. She is wondering why this needs to be sent again. Will send message to MD. Samantha Peng LPN

## 2021-08-27 DIAGNOSIS — M79.661 PAIN OF RIGHT LOWER LEG: ICD-10-CM

## 2021-08-30 RX ORDER — TIZANIDINE 2 MG/1
TABLET ORAL
Qty: 60 TABLET | Refills: 2 | OUTPATIENT
Start: 2021-08-30

## 2021-08-30 NOTE — TELEPHONE ENCOUNTER
Patient is on Flexeril instead.  Refill declined    Romel Mccall MD  [facility], Michelle Corozal

## 2021-08-30 NOTE — TELEPHONE ENCOUNTER
Routing refill request to provider for review/approval because:  Drug not on the [facility] refill protocol     Maria T BATISTA RN  EP Triage

## 2021-08-31 NOTE — TELEPHONE ENCOUNTER
"Per MD- \"Ok, if she doesn't want to submit more specimen that is fine.\"    Called patient and CHANEL.     Samantha Peng LPN    "

## 2021-09-10 ENCOUNTER — TRANSFERRED RECORDS (OUTPATIENT)
Dept: HEALTH INFORMATION MANAGEMENT | Facility: CLINIC | Age: 66
End: 2021-09-10

## 2021-09-24 ENCOUNTER — TELEPHONE (OUTPATIENT)
Dept: FAMILY MEDICINE | Facility: CLINIC | Age: 66
End: 2021-09-24

## 2021-09-24 ENCOUNTER — OFFICE VISIT (OUTPATIENT)
Dept: OBGYN | Facility: CLINIC | Age: 66
End: 2021-09-24
Payer: COMMERCIAL

## 2021-09-24 VITALS — WEIGHT: 163 LBS | HEIGHT: 67 IN | BODY MASS INDEX: 25.58 KG/M2

## 2021-09-24 DIAGNOSIS — N76.0 RECURRENT VAGINITIS: ICD-10-CM

## 2021-09-24 DIAGNOSIS — F51.04 PSYCHOPHYSIOLOGICAL INSOMNIA: ICD-10-CM

## 2021-09-24 DIAGNOSIS — N95.1 VASOMOTOR SYMPTOMS DUE TO MENOPAUSE: ICD-10-CM

## 2021-09-24 DIAGNOSIS — F41.9 ANXIETY: ICD-10-CM

## 2021-09-24 DIAGNOSIS — N89.8 VAGINAL DISCHARGE: Primary | ICD-10-CM

## 2021-09-24 DIAGNOSIS — F33.1 DEPRESSION, MAJOR, RECURRENT, MODERATE (H): ICD-10-CM

## 2021-09-24 DIAGNOSIS — F43.10 PTSD (POST-TRAUMATIC STRESS DISORDER): ICD-10-CM

## 2021-09-24 PROBLEM — R07.9 ACUTE CHEST PAIN: Status: RESOLVED | Noted: 2021-05-02 | Resolved: 2021-09-24

## 2021-09-24 LAB
CLUE CELLS: ABNORMAL
NUGENT SCORE: 8
WHITE BLOOD CELLS: ABNORMAL

## 2021-09-24 PROCEDURE — 87205 SMEAR GRAM STAIN: CPT | Performed by: OBSTETRICS & GYNECOLOGY

## 2021-09-24 PROCEDURE — 99215 OFFICE O/P EST HI 40 MIN: CPT | Performed by: OBSTETRICS & GYNECOLOGY

## 2021-09-24 RX ORDER — TRAZODONE HYDROCHLORIDE 50 MG/1
50 TABLET, FILM COATED ORAL AT BEDTIME
Qty: 90 TABLET | Refills: 1 | Status: SHIPPED | OUTPATIENT
Start: 2021-09-24 | End: 2021-11-01

## 2021-09-24 RX ORDER — METRONIDAZOLE 7.5 MG/G
1 GEL VAGINAL AT BEDTIME
Qty: 70 G | Refills: 0 | Status: SHIPPED | OUTPATIENT
Start: 2021-09-24 | End: 2021-09-29

## 2021-09-24 ASSESSMENT — MIFFLIN-ST. JEOR: SCORE: 1321.99

## 2021-09-24 NOTE — TELEPHONE ENCOUNTER
Pt has bacterial or yeast infection, want to be worked in to be seen on Friday afternoon. Please call her back.
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
## 2021-09-24 NOTE — TELEPHONE ENCOUNTER
Spoke with Grecia, no available appointments at this time.   She states she gets these reoccurring infections and wondering if Dr. Bryant or Gavi were on call and would prescribe her something.    Please call her back

## 2021-10-05 DIAGNOSIS — E11.9 TYPE 2 DIABETES MELLITUS WITHOUT COMPLICATION, WITHOUT LONG-TERM CURRENT USE OF INSULIN (H): ICD-10-CM

## 2021-10-05 DIAGNOSIS — J30.9 ALLERGIC RHINITIS, UNSPECIFIED SEASONALITY, UNSPECIFIED TRIGGER: ICD-10-CM

## 2021-10-06 RX ORDER — MONTELUKAST SODIUM 10 MG/1
TABLET ORAL
Qty: 90 TABLET | Refills: 2 | Status: SHIPPED | OUTPATIENT
Start: 2021-10-06 | End: 2022-05-22

## 2021-10-14 ENCOUNTER — TELEPHONE (OUTPATIENT)
Dept: UROLOGY | Facility: CLINIC | Age: 66
End: 2021-10-14

## 2021-10-14 NOTE — TELEPHONE ENCOUNTER
----- Message from Aimee Flores EMT sent at 10/13/2021  3:50 PM CDT -----  Regarding: Litholink needed  Appt. note indicates this patient should have Litholink results prior to Monday's appt with Dr. Cannon (10/18) but the results arent in Epic yet.  Please arrange.    Thanks,    Aimee

## 2021-10-15 ENCOUNTER — HOSPITAL ENCOUNTER (OUTPATIENT)
Dept: GENERAL RADIOLOGY | Facility: CLINIC | Age: 66
Discharge: HOME | End: 2021-10-15
Attending: STUDENT IN AN ORGANIZED HEALTH CARE EDUCATION/TRAINING PROGRAM | Admitting: STUDENT IN AN ORGANIZED HEALTH CARE EDUCATION/TRAINING PROGRAM
Payer: COMMERCIAL

## 2021-10-15 DIAGNOSIS — N20.0 KIDNEY STONE ON LEFT SIDE: ICD-10-CM

## 2021-10-15 PROCEDURE — 74019 RADEX ABDOMEN 2 VIEWS: CPT

## 2021-10-18 ENCOUNTER — VIRTUAL VISIT (OUTPATIENT)
Dept: UROLOGY | Facility: CLINIC | Age: 66
End: 2021-10-18
Payer: COMMERCIAL

## 2021-10-18 VITALS — WEIGHT: 160 LBS | BODY MASS INDEX: 25.11 KG/M2 | HEIGHT: 67 IN

## 2021-10-18 DIAGNOSIS — N20.0 CALCIUM OXALATE KIDNEY STONES: ICD-10-CM

## 2021-10-18 DIAGNOSIS — N20.0 KIDNEY STONE ON LEFT SIDE: Primary | ICD-10-CM

## 2021-10-18 PROCEDURE — 99213 OFFICE O/P EST LOW 20 MIN: CPT | Mod: TEL | Performed by: STUDENT IN AN ORGANIZED HEALTH CARE EDUCATION/TRAINING PROGRAM

## 2021-10-18 ASSESSMENT — MIFFLIN-ST. JEOR: SCORE: 1308.39

## 2021-10-18 ASSESSMENT — PAIN SCALES - GENERAL: PAINLEVEL: NO PAIN (0)

## 2021-10-18 NOTE — PATIENT INSTRUCTIONS
Follow up in 1 year with a KUB prior    Patient Education     Preventing Kidney Stones  If you ve had a kidney stone, you may worry that you ll have another. Removing or passing your stone doesn t prevent future stones. But with your healthcare provider s help, you can reduce your risk of forming new stones. Follow up with your healthcare provider to help find new stones. Depending on your medical condition, you may need follow-up every 3 months to a year for the rest of your life.     Drink lots of water  Staying well-hydrated is the best way to reduce your risk of future stones. Drink 8 12-ounce glasses of water daily. Have 2 with each meal and 2 between meals. Keep track of your intake. Try keeping a pitcher of water nearby during the day and at night.   Take medicines if needed  Medicines, including vitamins and minerals, may be prescribed for certain types of stones. You may want to write your doses and medicine times on a calendar. Some medicines decrease stone-forming chemicals in your blood. Others help prevent those chemicals from crystallizing in urine. Still others help keep a normal acid balance in your urine.   Follow your prescribed diet  Your healthcare provider will tell you which foods contain the chemicals you should avoid. Your healthcare provider may also suggest talking to a dietitian. He or she can help you plan meals you ll enjoy. These meals won t put you at risk for future stones. Bring your spouse, partner, or close friend with you when you meet with the dietitian so you can have support for your necessary diet changes.   You may be told to limit certain foods, depending on which type of stones you ve had. You should limit the amount of salt in your food to about 2 grams a day. This will help prevent most types of kidney stones. Make sure you get an adequate amount of calcium in your diet.   For calcium oxalate stones: Limit animal protein, such as meat, eggs, and fish. Limit grapefruit  juice and alcohol. Limit high-oxalate foods (such as cola, tea, chocolate, spinach, rhubarb, wheat bran, and peanuts).   For uric acid stones: Limit high-purine foods, such as mushrooms, peas, beans, anchovies, meat, poultry, shellfish, and organ meats. These foods increase uric acid production.   For cystine stones: Limit high-methionine foods (fish is the most common, but eggs and meats, also). These foods increase production of cystine.   [employer] last reviewed this educational content on 4/1/2020 2000-2021 [employer]. All rights reserved. This information is not intended as a substitute for professional medical care. Always follow your healthcare professional's instructions.

## 2021-10-18 NOTE — PROGRESS NOTES
Grecia is a 66 year old who is being evaluated via a billable telephone visit.      What phone number would you like to be contacted at? [phone]  How would you like to obtain your AVS? MyChart      A/P  66 year old female who presents with a history of calcium oxalate nephrolithiasis s/p left URS 2/4/2021 ([facility]), with a small left sided nonobstructive stone s/p left ESWL 8/20/2021. Small residual left sided kidney stone seen on followup KUB. She remains asymptomatic. Counseled patient on continued observation of the stone. She is aware of calcium oxalate stone prevention diet strategies and declined litholink    - follow up 1 year with KUB prior    Keegan Cannon MD   [facility]  [phone] clinic phone        Subjective   Grecia is a 66 year old who presents for the following health issues: urolithiasis    HPI     66 year old female who presents with a history of calcium oxalate nephrolithiasis s/p left URS 2/4/2021 ([facility]), with a small left sided nonobstructive stone s/p left ESWL 8/20/2021    No specific issues after ESWL. Did not see any stone fragments. Did have some pain and passed some blood after the operation.    Review of Systems   Constitutional, HEENT, cardiovascular, pulmonary, gi and gu systems are negative, except as otherwise noted.      Objective           Vitals:  No vitals were obtained today due to virtual visit.    Physical Exam   healthy, alert and no distress  PSYCH: Alert and oriented times 3; coherent speech, normal   rate and volume, able to articulate logical thoughts, able   to abstract reason, no tangential thoughts, no hallucinations   or delusions  Her affect is normal  RESP: No cough, no audible wheezing, able to talk in full sentences  Remainder of exam unable to be completed due to telephone visits    KUB x-ray 10/15/2021 I personally reviewed. Small residual left kidney stone, essentially unchanged from prior to ESWL              Phone call duration: 5 minutes

## 2021-10-18 NOTE — LETTER
10/18/2021       RE: Grecia Kilgore  [address]  [place] [address]     Dear Colleague,    Thank you for referring your patient, Grecia Kilgore, to the [facility] [place] [facility]. Please see a copy of my visit note below.    Grecia is a 66 year old who is being evaluated via a billable telephone visit.      What phone number would you like to be contacted at? [phone]  How would you like to obtain your AVS? MyChart      A/P  66 year old female who presents with a history of calcium oxalate nephrolithiasis s/p left URS 2/4/2021 ([facility]), with a small left sided nonobstructive stone s/p left ESWL 8/20/2021. Small residual left sided kidney stone seen on followup KUB. She remains asymptomatic. Counseled patient on continued observation of the stone. She is aware of calcium oxalate stone prevention diet strategies and declined litholink    - follow up 1 year with KUB prior    Keegan Cannon MD   [facility]  [phone] clinic phone        Subjective   Grecia is a 66 year old who presents for the following health issues: urolithiasis    HPI     66 year old female who presents with a history of calcium oxalate nephrolithiasis s/p left URS 2/4/2021 ([facility]), with a small left sided nonobstructive stone s/p left ESWL 8/20/2021    No specific issues after ESWL. Did not see any stone fragments. Did have some pain and passed some blood after the operation.    Review of Systems   Constitutional, HEENT, cardiovascular, pulmonary, gi and gu systems are negative, except as otherwise noted.      Objective           Vitals:  No vitals were obtained today due to virtual visit.    Physical Exam   healthy, alert and no distress  PSYCH: Alert and oriented times 3; coherent speech, normal   rate and volume, able to articulate logical thoughts, able   to abstract reason, no tangential thoughts, no hallucinations   or  delusions  Her affect is normal  RESP: No cough, no audible wheezing, able to talk in full sentences  Remainder of exam unable to be completed due to telephone visits    KUB x-ray 10/15/2021 I personally reviewed. Small residual left kidney stone, essentially unchanged from prior to ESWL              Phone call duration: 5 minutes

## 2021-10-26 DIAGNOSIS — N20.0 KIDNEY STONE: ICD-10-CM

## 2021-10-26 DIAGNOSIS — R10.9 FLANK PAIN: Primary | ICD-10-CM

## 2021-10-27 DIAGNOSIS — E78.5 HYPERLIPIDEMIA LDL GOAL <100: ICD-10-CM

## 2021-10-27 DIAGNOSIS — F51.01 PRIMARY INSOMNIA: ICD-10-CM

## 2021-10-27 DIAGNOSIS — E11.9 TYPE 2 DIABETES MELLITUS WITHOUT COMPLICATION, WITHOUT LONG-TERM CURRENT USE OF INSULIN (H): ICD-10-CM

## 2021-10-27 DIAGNOSIS — J30.2 SEASONAL ALLERGIC RHINITIS, UNSPECIFIED TRIGGER: ICD-10-CM

## 2021-10-28 ENCOUNTER — HOSPITAL ENCOUNTER (OUTPATIENT)
Dept: CT IMAGING | Facility: CLINIC | Age: 66
Discharge: HOME | End: 2021-10-28
Attending: STUDENT IN AN ORGANIZED HEALTH CARE EDUCATION/TRAINING PROGRAM | Admitting: STUDENT IN AN ORGANIZED HEALTH CARE EDUCATION/TRAINING PROGRAM
Payer: COMMERCIAL

## 2021-10-28 DIAGNOSIS — N20.0 KIDNEY STONE: ICD-10-CM

## 2021-10-28 DIAGNOSIS — R10.9 FLANK PAIN: ICD-10-CM

## 2021-10-28 PROCEDURE — 74176 CT ABD & PELVIS W/O CONTRAST: CPT

## 2021-10-28 RX ORDER — SIMVASTATIN 40 MG
TABLET ORAL
Qty: 90 TABLET | Refills: 1 | OUTPATIENT
Start: 2021-10-28

## 2021-10-28 RX ORDER — MIRTAZAPINE 15 MG/1
TABLET, FILM COATED ORAL
Qty: 90 TABLET | Refills: 1 | OUTPATIENT
Start: 2021-10-28

## 2021-10-28 RX ORDER — FLUTICASONE PROPIONATE 50 MCG
SPRAY, SUSPENSION (ML) NASAL
Qty: 32 G | Refills: 1 | OUTPATIENT
Start: 2021-10-28

## 2021-10-28 RX ORDER — LISINOPRIL 5 MG/1
TABLET ORAL
Qty: 90 TABLET | Refills: 3 | OUTPATIENT
Start: 2021-10-28

## 2021-10-28 NOTE — TELEPHONE ENCOUNTER
Duplicate- sent info sent to pharmacy.  Tessie CHASE RN  [facility]  [phone]

## 2021-10-28 NOTE — RESULT ENCOUNTER NOTE
CT abd/pelvis shows no ureteral stones, and an unchanged nonobstructive left renal stone    Apparently per telephone encounter note today the patient no longer wishes to see me    Will need to transfer the patient's care to another provider    Keegan Cannon MD   [facility]  [phone] clinic phone

## 2021-11-01 ENCOUNTER — OFFICE VISIT (OUTPATIENT)
Dept: FAMILY MEDICINE | Facility: CLINIC | Age: 66
End: 2021-11-01
Payer: COMMERCIAL

## 2021-11-01 VITALS
SYSTOLIC BLOOD PRESSURE: 138 MMHG | BODY MASS INDEX: 25.11 KG/M2 | OXYGEN SATURATION: 100 % | DIASTOLIC BLOOD PRESSURE: 82 MMHG | WEIGHT: 160 LBS | HEIGHT: 67 IN | TEMPERATURE: 97.5 F | HEART RATE: 54 BPM

## 2021-11-01 DIAGNOSIS — D17.30 LIPOMA OF SKIN AND SUBCUTANEOUS TISSUE: ICD-10-CM

## 2021-11-01 DIAGNOSIS — F51.04 PSYCHOPHYSIOLOGICAL INSOMNIA: Primary | ICD-10-CM

## 2021-11-01 PROCEDURE — 99214 OFFICE O/P EST MOD 30 MIN: CPT | Performed by: FAMILY MEDICINE

## 2021-11-01 RX ORDER — TRAZODONE HYDROCHLORIDE 50 MG/1
50-100 TABLET, FILM COATED ORAL AT BEDTIME
Qty: 90 TABLET | Refills: 1 | Status: SHIPPED | OUTPATIENT
Start: 2021-11-01 | End: 2022-09-21

## 2021-11-01 ASSESSMENT — MIFFLIN-ST. JEOR: SCORE: 1308.39

## 2021-11-01 NOTE — PROGRESS NOTES
"  Assessment & Plan     Psychophysiological insomnia  Patient has been on mirtazapine as needed which has not helped her with insomnia management.  Recommending to stop the medication and start using trazodone.  If trazodone does not work, she is instructed to see sleep management.  - traZODone (DESYREL) 50 MG tablet; Take 1-2 tablets ( mg) by mouth At Bedtime  - SLEEP EVALUATION & MANAGEMENT REFERRAL - ADULT -; Future    Lipoma of skin and subcutaneous tissue  Patient has 3 lumps in subcutaneous region in the left flank area which are most likely lipomas in nature.  She is reassured that it is not her kidneys.  She does have a 4 mm kidney stone but that is not obstructing and causing any symptoms.  Stay hydrated but get an ultrasound to look at these possible lipomas.  Patient agrees to the  - POC US SOFT TISSUE; Future    BMI:   Estimated body mass index is 25.06 kg/m  as calculated from the following:    Height as of this encounter: 1.702 m (5' 7\").    Weight as of this encounter: 72.6 kg (160 lb).         Romel Mccall MD  [facility] [place]    Subjective   Grecia is a 66 year old who presents for the following health issues     HPI     Patient noticed a lump in her left flank.  She went to see the urologist.  They had told her that it is not her kidneys therefore seeing the PCP.  She comes in today concerned about the lumps.  She has noticed 2 lumps in the left flank and one in the left upper quadrant.  Nontender.    Insomnia follow up.  Mirtazapine is being used as needed.  It helps her stay asleep but it is hard to fall asleep with that.  She would like to try something else.  Previously her OB doctor had ordered trazodone which the patient was not aware of.  Recommending to try trazodone.  Patient agrees to that    Review of Systems   CONSTITUTIONAL: NEGATIVE for fever, chills, change in weight  ENT/MOUTH: NEGATIVE for ear, mouth and throat problems  RESP: NEGATIVE for significant " "cough or SOB  CV: NEGATIVE for chest pain, palpitations or peripheral edema      Objective    /82   Pulse 54   Temp 97.5  F (36.4  C) (Tympanic)   Ht 1.702 m (5' 7\")   Wt 72.6 kg (160 lb)   LMP 01/01/1985   SpO2 100%   BMI 25.06 kg/m    Body mass index is 25.06 kg/m .  Physical Exam   GENERAL: healthy, alert and no distress  NECK: no adenopathy, no asymmetry, masses, or scars and thyroid normal to palpation  RESP: lungs clear to auscultation - no rales, rhonchi or wheezes  CV: regular rate and rhythm, normal S1 S2, no S3 or S4, no murmur, click or rub, no peripheral edema and peripheral pulses strong  ABDOMEN: soft, nontender, no hepatosplenomegaly, no masses and bowel sounds normal  MS: no gross musculoskeletal defects noted, no edema                "

## 2021-11-02 ENCOUNTER — HOSPITAL ENCOUNTER (OUTPATIENT)
Dept: ULTRASOUND IMAGING | Facility: CLINIC | Age: 66
Discharge: HOME | End: 2021-11-02
Attending: FAMILY MEDICINE | Admitting: FAMILY MEDICINE
Payer: COMMERCIAL

## 2021-11-02 ENCOUNTER — TELEPHONE (OUTPATIENT)
Dept: OBGYN | Facility: CLINIC | Age: 66
End: 2021-11-02

## 2021-11-02 DIAGNOSIS — N95.2 VAGINAL ATROPHY: ICD-10-CM

## 2021-11-02 DIAGNOSIS — N89.8 VAGINAL DISCHARGE: Primary | ICD-10-CM

## 2021-11-02 DIAGNOSIS — D17.30 LIPOMA OF SKIN AND SUBCUTANEOUS TISSUE: ICD-10-CM

## 2021-11-02 PROCEDURE — 76705 ECHO EXAM OF ABDOMEN: CPT

## 2021-11-02 NOTE — TELEPHONE ENCOUNTER
Patient is calling and is very frustrated. She has always seen Dr.Beadle Sabrina Gatica and been happy. She saw Dr Rodriguez's in September. She has been using a cream and it is not working. She would really appreciate a call back from Gavi-please  today if possible

## 2021-11-03 ENCOUNTER — TELEPHONE (OUTPATIENT)
Dept: FAMILY MEDICINE | Facility: CLINIC | Age: 66
End: 2021-11-03
Payer: COMMERCIAL

## 2021-11-03 NOTE — TELEPHONE ENCOUNTER
Call back from patient. Relayed results.     She states that she thought one of them had a fever and was irritating. She states she continues to have some irritation from the test and touch of them. She questions if there are options for managing this? She questions if there are dietary modifications that would help? Would surgical management be needed in the future if they continue to be bothersome?

## 2021-11-03 NOTE — RESULT ENCOUNTER NOTE
Please call the patient to notify patient the ultrasound confirmed that the lumps she is noticing on the left side in the front and the back are lipomas which is fat collection.  They are benign.  No concerns or follow-up required    Romel Mccall MD

## 2021-11-05 RX ORDER — METRONIDAZOLE 7.5 MG/G
1 GEL VAGINAL AT BEDTIME
Qty: 70 G | Refills: 0 | Status: SHIPPED | OUTPATIENT
Start: 2021-11-05 | End: 2021-11-10

## 2021-11-05 NOTE — TELEPHONE ENCOUNTER
Call patient back.  She is still struggling with vaginal symptoms with a positive Gram stain.  We will cover her with MetroGel x7 days and will initiate vaginal estrogen moving forward.

## 2021-11-08 NOTE — TELEPHONE ENCOUNTER
S/w pt and gave Dr. Mccall's message below.  Pt wondering if there is something she can do dietary wise or this heredity.    Maria T BATISTA RN  EP Triage

## 2021-11-08 NOTE — TELEPHONE ENCOUNTER
S/w pt and gave Dr. Mccall's message below.    Pt states understanding.    Maria T BATISTA RN  EP Triage

## 2021-11-08 NOTE — TELEPHONE ENCOUNTER
No changes in diet can make a difference.  This is more genetically acquired.    Romel Mccall MD  [facility], [place]

## 2021-11-08 NOTE — TELEPHONE ENCOUNTER
She can get a surgical consult for that but it is usually not recommended for such benign condition.  They are probably a bit irritated due to the ultrasound that was done recently.  They should get better.  Otherwise she can always request a surgical referral    Romel Mccall MD  [facility] [place]

## 2021-11-29 ENCOUNTER — TELEPHONE (OUTPATIENT)
Dept: OBGYN | Facility: CLINIC | Age: 66
End: 2021-11-29
Payer: COMMERCIAL

## 2021-11-29 NOTE — TELEPHONE ENCOUNTER
11/2/21 spoke w Gavi Katz NP and was given Metrogel x7 days     She is continuing to have issues. Has not been seen in office since Sept/2021. Recommended being seen in office but no apts w Manny or Gavi Katz NP avail this week.    Pt is asking to send message to Gavi Katz NP to see if she would fit her in tomorrow or soon - possible cancellations as well.    She is sure Gavi will fit her in as she knows her issues and how miserable she can get.    Routing to PATTIE Katz NP - willing to fit her in somewhere Tuesday 11/30?    Gavi Carbajal RN on 11/29/2021 at 9:10 AM

## 2021-11-30 NOTE — TELEPHONE ENCOUNTER
Pt informed. Will take the 0730 spot on Thursday  Silva Strickland RN on 11/30/2021 at 10:27 AM

## 2021-11-30 NOTE — TELEPHONE ENCOUNTER
I am so sorry, but I can't fit her in today, I am out of the office tomorrow (Wednesday), but I could get her in at 7:30 on Thursday if that works for her. I'm full but will double book at 7:30

## 2021-12-02 ENCOUNTER — OFFICE VISIT (OUTPATIENT)
Dept: OBGYN | Facility: CLINIC | Age: 66
End: 2021-12-02
Payer: COMMERCIAL

## 2021-12-02 VITALS
BODY MASS INDEX: 25.11 KG/M2 | DIASTOLIC BLOOD PRESSURE: 62 MMHG | HEART RATE: 68 BPM | HEIGHT: 67 IN | SYSTOLIC BLOOD PRESSURE: 116 MMHG | WEIGHT: 160 LBS

## 2021-12-02 DIAGNOSIS — F51.04 CHRONIC INSOMNIA: ICD-10-CM

## 2021-12-02 DIAGNOSIS — N89.8 VAGINAL DISCHARGE: Primary | ICD-10-CM

## 2021-12-02 LAB
CLUE CELLS: ABNORMAL
CLUE CELLS: ABNORMAL
NUGENT SCORE: 8
TRICHOMONAS, WET PREP: ABNORMAL
WBC'S/HIGH POWER FIELD, WET PREP: ABNORMAL
WHITE BLOOD CELLS: ABNORMAL
YEAST, WET PREP: ABNORMAL

## 2021-12-02 PROCEDURE — 87102 FUNGUS ISOLATION CULTURE: CPT | Performed by: NURSE PRACTITIONER

## 2021-12-02 PROCEDURE — 87205 SMEAR GRAM STAIN: CPT | Performed by: NURSE PRACTITIONER

## 2021-12-02 PROCEDURE — 87210 SMEAR WET MOUNT SALINE/INK: CPT | Performed by: NURSE PRACTITIONER

## 2021-12-02 PROCEDURE — 99213 OFFICE O/P EST LOW 20 MIN: CPT | Performed by: NURSE PRACTITIONER

## 2021-12-02 RX ORDER — QUETIAPINE FUMARATE 25 MG/1
TABLET, FILM COATED ORAL
COMMUNITY
Start: 2021-11-29 | End: 2022-09-21

## 2021-12-02 RX ORDER — LORAZEPAM 0.5 MG/1
TABLET ORAL
COMMUNITY
Start: 2021-11-29 | End: 2023-10-11

## 2021-12-02 RX ORDER — ARIPIPRAZOLE 5 MG/1
TABLET ORAL
COMMUNITY
Start: 2021-12-01 | End: 2022-09-21

## 2021-12-02 ASSESSMENT — MIFFLIN-ST. JEOR: SCORE: 1308.39

## 2021-12-02 NOTE — PROGRESS NOTES
SUBJECTIVE:                                                   Grecia Kilgore is a 66 year old female who presents to clinic today for the following health issue(s):  Patient presents with:  Vaginal Problem: c/o heavy discharge and some itching, does not want to use metrogel      HPI:  Patient here today with complaints of profuse vaginal discharge.  She has had a hysterectomy.  She is sexually active with no pain.    She has not slept in what she feels is days.  She has never been evaluated by a sleep center.    Patient's last menstrual period was 1985..     Patient is sexually active, .  Using hysterectomy for contraception.    reports that she has never smoked. She has never used smokeless tobacco.    STD testing offered?  Declined    Health maintenance updated:  yes    Today's PHQ-2 Score:   PHQ-2 (  [employer]) 10/18/2021   Q1: Little interest or pleasure in doing things 1   Q2: Feeling down, depressed or hopeless 1   PHQ-2 Score 2   PHQ-2 Total Score (12-17 Years)- Positive if 3 or more points; Administer PHQ-A if positive 2     Today's PHQ-9 Score:   PHQ-9 SCORE 2021   PHQ-9 Total Score -   PHQ-9 Total Score MyChart 18 (Moderately severe depression)   PHQ-9 Total Score 18     Today's WILL-7 Score:   WILL-7 SCORE 2021   Total Score -   Total Score 14 (moderate anxiety)   Total Score 14       Problem list and histories reviewed & adjusted, as indicated.  Additional history: as documented.    Patient Active Problem List   Diagnosis     Allergic rhinitis     External hemorrhoids     Vitamin D deficiency     Osteoarthritis, knee     Positive PPD     Panic disorder     Genital herpes     Advanced directives, counseling/discussion     DCIS (ductal carcinoma in situ) of breast     Anxiety     Hyperlipidemia LDL goal <100     Type 2 diabetes mellitus without complication, without long-term current use of insulin (H)     Mild intermittent asthma without complication     BOO (obstructive sleep  apnea)     Left ureteral calculus     Palpitations     Kidney stone on left side     Recurrent vaginitis     Psychophysiological insomnia     Depression, major, recurrent, moderate (H)     PTSD (post-traumatic stress disorder)     Vasomotor symptoms due to menopause     Past Surgical History:   Procedure Laterality Date     ARTHRODESIS TOE(S)  9/16/2013    Procedure: ARTHRODESIS TOE(S);  BILATERAL GREAT TOE FUSION (C-ARM);  Surgeon: Mary Guan MD;  Location:  [facility]     ARTHRODESIS TOE(S) Left 12/19/2016    Procedure: ARTHRODESIS TOE(S);  Surgeon: Mary Guan MD;  Location: [facility]     ARTHROSCOPY KNEE Left 2/2/11     BIOPSY BREAST  2/7/2014    Procedure: BIOPSY BREAST;  Re-excision Left Breast Cavity for Margins ;  Surgeon: Lisset Amador DO;  Location:  OR     BIOPSY BREAST SEED LOCALIZATION  1/22/2014    Procedure: BIOPSY BREAST SEED LOCALIZATION;  Left Breast Seed Localized Excisional Biopsy ;  Surgeon: Lisset Amador DO;  Location:  OR     COLONOSCOPY  2005    nl - repeat 2015     CYSTOSCOPY       CYSTOSCOPY, RETROGRADES, EXTRACT STONE, INSERT STENT, COMBINED Left 2/4/2021    Procedure: Video cystoscopy, balloon dilation of left ureter, left ureteroscopy with stone extraction, standby laser, left double-J stent placement (5-Maori x 24 cm).;  Surgeon: Craig Ferreira MD;  Location:  OR     EXTRACORPOREAL SHOCK WAVE LITHOTRIPSY (ESWL) Left 8/20/2021    Procedure: Left extracorporal shockwave lithotripsy, fluoroscopic interpretation <1 hour physician time;  Surgeon: Keegan Cannon MD;  Location: [facility]     FOOT SURGERY Bilateral 2013     HEMORRHOIDECTOMY BANDING      multiple times     HEMORRHOIDECTOMY INTERNAL N/A 7/24/2018    Procedure: HEMORRHOIDECTOMY INTERNAL;  three quadrant hemorrhoidectomy;  Surgeon: Lisa Patrick MD;  Location: [facility] OR     HYSTERECTOMY  7/1996    fibroids     REMOVE HARDWARE FOOT Left 2015     REPAIR TENDON FOOT Left 12/19/2016    Procedure: REPAIR  TENDON FOOT;  Surgeon: Mary Guan MD;  Location: [facility]      Social History     Tobacco Use     Smoking status: Never Smoker     Smokeless tobacco: Never Used   Substance Use Topics     Alcohol use: No     Alcohol/week: 0.0 standard drinks      Problem (# of Occurrences) Relation (Name,Age of Onset)    Alcohol/Drug (1) Father    Breast Cancer (1) Mother    Cancer (1) Father    Cancer - colorectal (1) Maternal Grandfather    Cerebrovascular Disease (1) Maternal Grandmother    Diabetes (2) Mother, Maternal Grandmother    No Known Problems (8) Sister, Brother, Paternal Grandmother, Paternal Grandfather, Daughter, Son, Son, Son            Current Outpatient Medications   Medication Sig     acetaminophen (TYLENOL) 500 MG tablet Take 2 tablets (1,000 mg) by mouth every 6 hours as needed for mild pain     albuterol (PROAIR HFA/PROVENTIL HFA/VENTOLIN HFA) 108 (90 Base) MCG/ACT inhaler Inhale 2 puffs into the lungs every 6 hours as needed for shortness of breath / dyspnea     boric acid 600 mg vaginal suppository - PHARMACY TO MIX COMPOUND Place 1 suppository (600 mg) vaginally twice a week     cetirizine (ZYRTEC) 10 MG tablet TAKE 1 TABLET(10 MG) BY MOUTH EVERY EVENING     COMPOUNDED NON-CONTROLLED SUBSTANCE (CMPD RX) - PHARMACY TO MIX COMPOUNDED MEDICATION Boric acid 600mg vaginal suppository. Place 1 suppository vaginally after intercourse and once per week.     COMPOUNDED NON-CONTROLLED SUBSTANCE (CMPD RX) - PHARMACY TO MIX COMPOUNDED MEDICATION Bi-Est Estriol 0.4mg, Estradiol 0.1mg. Place 1 gram vaginally for 30 nights, then three times per week thereafter. Use finger for application.     cyclobenzaprine (FLEXERIL) 5 MG tablet Take 1 tablet (5 mg) by mouth nightly as needed for muscle spasms     docusate sodium (COLACE) 100 MG capsule Take 1 capsule (100 mg) by mouth 2 times daily     fluticasone (FLONASE) 50 MCG/ACT nasal spray Spray 1 spray into both nostrils daily as needed for rhinitis or allergies      "lisinopril (ZESTRIL) 5 MG tablet Take 1 tablet (5 mg) by mouth daily     metFORMIN (GLUCOPHAGE) 500 MG tablet TAKE 1 TABLET BY MOUTH  DAILY WITH BREAKFAST     montelukast (SINGULAIR) 10 MG tablet TAKE 1 TABLET BY MOUTH IN  THE MORNING     simvastatin (ZOCOR) 40 MG tablet TAKE 1 TABLET(40 MG) BY MOUTH AT BEDTIME     traZODone (DESYREL) 50 MG tablet Take 1-2 tablets ( mg) by mouth At Bedtime     ARIPiprazole (ABILIFY) 5 MG tablet  (Patient not taking: Reported on 12/2/2021)     LORazepam (ATIVAN) 0.5 MG tablet  (Patient not taking: Reported on 12/2/2021)     QUEtiapine (SEROQUEL) 25 MG tablet  (Patient not taking: Reported on 12/2/2021)     No current facility-administered medications for this visit.     Allergies   Allergen Reactions     Codeine Nausea     Morphine Itching     Nitrofuran Derivatives Hives     Phenothiazines      sedation     Primatene Mist [Epinephrine Bitartrate] Itching     neck itch with primatene mist     Vicodin [Hydrocodone-Acetaminophen] Nausea     Dilaudid [Hydromorphone] Rash     Latex Itching and Rash       ROS:  12 point review of systems negative other than symptoms noted below or in the HPI.  Genitourinary: Vaginal Discharge  No urinary frequency or dysuria, bladder or kidney problems      OBJECTIVE:     /62   Pulse 68   Ht 1.702 m (5' 7\")   Wt 72.6 kg (160 lb)   LMP 01/01/1985   BMI 25.06 kg/m    Body mass index is 25.06 kg/m .    Exam:  Constitutional:  Appearance: Well nourished, well developed alert, in no acute distress  Psychiatric:  Mentation appears normal and affect normal/bright.  Pelvic Exam:  External Genitalia:     Normal appearance for age, white discharge present, no tenderness present, no inflammatory lesions present, color normal  Vagina:     Normal vaginal vault without central or paravaginal defects, thick white discharge present, no inflammatory lesions present, no masses present  Bladder:     Nontender to palpation  Urethra:   Urethral Body:  Urethra " palpation normal, urethra structural support normal   Urethral Meatus:  No erythema or lesions present  Cervix:     Surgically absent  Uterus:     Surgically absent  Adnexa:     No adnexal tenderness present, no adnexal masses present  Perineum:     Perineum within normal limits, no evidence of trauma, no rashes or skin lesions present  Anus:     Anus within normal limits, no hemorrhoids present  Inguinal Lymph Nodes:     No lymphadenopathy present  Pubic Hair:     Normal pubic hair distribution for age  Genitalia and Groin:     No rashes present, no lesions present, no areas of discoloration, no masses present       In-Clinic Test Results:  Results for orders placed or performed in visit on 12/02/21 (from the past 24 hour(s))   Wet prep - Clinic Collect    Specimen: Vagina; Swab   Result Value Ref Range    Trichomonas Absent Absent    Yeast Absent Absent    Clue Cells Absent Absent    WBCs/high power field 1+ (A) None   Bacterial Vaginosis Smear    Specimen: Vagina; Swab    Narrative    The following orders were created for panel order Bacterial Vaginosis Smear.  Procedure                               Abnormality         Status                     ---------                               -----------         ------                     Bacterial Vaginosis Stain[[id number]]                        In process                   Please view results for these tests on the individual orders.       ASSESSMENT/PLAN:                                                        ICD-10-CM    1. Vaginal discharge  N89.8 Wet prep - Clinic Collect     Bacterial Vaginosis Smear     Yeast culture     COMPOUNDED NON-CONTROLLED SUBSTANCE (CMPD RX) - PHARMACY TO MIX COMPOUNDED MEDICATION   2. Chronic insomnia  F51.04 SLEEP EVALUATION & MANAGEMENT REFERRAL - ADULT -       There are no Patient Instructions on file for this visit.    66-year-old female with moderate amount of thick white discharge on exam.  Her wet prep is negative for any yeast  or BV but does have 1+ WBCs.  We will send for vaginal culture and update her as necessary.  We will also send a sleep study.  We encouraged her to use bathtub for soak limit chemicals and start vaginal boric acid suppositories.    RALPH Cooper CNP  [facility]

## 2021-12-03 RX ORDER — METRONIDAZOLE 500 MG/1
500 TABLET ORAL 2 TIMES DAILY
Qty: 14 TABLET | Refills: 0 | Status: SHIPPED | OUTPATIENT
Start: 2021-12-03 | End: 2021-12-10

## 2021-12-03 NOTE — RESULT ENCOUNTER NOTE
called patient with results.  We will start oral Flagyl and send boric acid to [facility].

## 2021-12-06 LAB — BACTERIA SPEC CULT: NO GROWTH

## 2021-12-13 ENCOUNTER — TELEPHONE (OUTPATIENT)
Dept: FAMILY MEDICINE | Facility: CLINIC | Age: 66
End: 2021-12-13
Payer: COMMERCIAL

## 2021-12-13 DIAGNOSIS — M79.661 PAIN OF RIGHT LOWER LEG: Primary | ICD-10-CM

## 2021-12-13 NOTE — TELEPHONE ENCOUNTER
Reason for Call: Request for an order or referral: Referral    Order or referral being requested: Need same referral for her right knee as last time    Date needed: as soon as possible    Has the patient been seen by the PCP for this problem? YES    Additional comments:NON    Phone number Patient can be reached at:  Cell number on file:    Telephone Information:   Mobile [phone] or        Best Time:  Anytime    Can we leave a detailed message on this number?  YES    Call taken on 12/13/2021 at 11:19 AM by Ho Smith

## 2021-12-13 NOTE — TELEPHONE ENCOUNTER
Can you please clarify since Dr Mccall is out, I'm not exactly sure what she is requesting.     Are we evaluating possible lipomas with a soft tissue ultrasound?  That looks like what was done in November.  If so, where on her leg are the lumps she is concerned about?    Angela Montaño MD

## 2021-12-13 NOTE — TELEPHONE ENCOUNTER
Please see request for referral and place order if appropriate. Thank you.  María Eelna Vazquez,

## 2021-12-20 NOTE — TELEPHONE ENCOUNTER
Patient Contact    Called the patient to clarify what referral she is requesting. She states she needs a PT/chiropractor referral for her right leg pain in which she had a previous tear in knee/leg this summer. She had gone to 7 physicians who could not help her and the only thing that helped her was the PT and chiropractor.    This pain is the same that she had in the summer. With the therapy she was getting better so she did exercises at home now she thinks this is coming back. [facility] did not know if she needed a new referral but [facility] told her she needs a new referral. She does not want to be seen again regarding this because she states she was seen by 7 doctors previously that could not help her the only thing that helped was the PT and chiropractor.     Rebecca STEELE RN  [facility]

## 2021-12-21 NOTE — TELEPHONE ENCOUNTER
PT referral faxed to [facility]. Fax number is [phone].  María Elena Vazquez,

## 2021-12-23 DIAGNOSIS — J30.2 SEASONAL ALLERGIC RHINITIS, UNSPECIFIED TRIGGER: ICD-10-CM

## 2021-12-23 DIAGNOSIS — E78.5 HYPERLIPIDEMIA LDL GOAL <100: ICD-10-CM

## 2021-12-23 DIAGNOSIS — E11.9 TYPE 2 DIABETES MELLITUS WITHOUT COMPLICATION, WITHOUT LONG-TERM CURRENT USE OF INSULIN (H): ICD-10-CM

## 2021-12-24 RX ORDER — FLUTICASONE PROPIONATE 50 MCG
SPRAY, SUSPENSION (ML) NASAL
Qty: 32 G | Refills: 2 | Status: SHIPPED | OUTPATIENT
Start: 2021-12-24 | End: 2022-09-21

## 2021-12-24 RX ORDER — LISINOPRIL 5 MG/1
TABLET ORAL
Qty: 90 TABLET | Refills: 2 | Status: SHIPPED | OUTPATIENT
Start: 2021-12-24 | End: 2022-05-09

## 2021-12-24 RX ORDER — SIMVASTATIN 40 MG
TABLET ORAL
Qty: 90 TABLET | Refills: 2 | Status: SHIPPED | OUTPATIENT
Start: 2021-12-24 | End: 2022-05-09

## 2021-12-24 NOTE — TELEPHONE ENCOUNTER
Prescription approved per [facility] Refill Protocol.  Rebecca STEELE RN  [facility]

## 2021-12-30 ENCOUNTER — TELEPHONE (OUTPATIENT)
Dept: OBGYN | Facility: CLINIC | Age: 66
End: 2021-12-30
Payer: COMMERCIAL

## 2021-12-30 NOTE — TELEPHONE ENCOUNTER
Contacted [facility]-they are unsure of insurance coverage. Out of pocket for estradiol cream around 172.00    Contacted Grecia, she will call [facility] and give them her insurance information to check for price. Often times not covered.   Silva Strickland RN on 12/30/2021 at 12:52 PM

## 2021-12-30 NOTE — TELEPHONE ENCOUNTER
Patient asked to speak with a nurse regarding her refill for COMPOUNDED NON-CONTROLLED SUBSTANCE (CMPD RX) - PHARMACY TO MIX COMPOUNDED MEDICATION. Has some questions about insurance and whether it will cover this medication for her? Please return call.

## 2022-01-20 ENCOUNTER — OFFICE VISIT (OUTPATIENT)
Dept: FAMILY MEDICINE | Facility: CLINIC | Age: 67
End: 2022-01-20
Payer: COMMERCIAL

## 2022-01-20 VITALS
WEIGHT: 160 LBS | HEIGHT: 67 IN | RESPIRATION RATE: 15 BRPM | TEMPERATURE: 97.9 F | BODY MASS INDEX: 25.11 KG/M2 | HEART RATE: 79 BPM | SYSTOLIC BLOOD PRESSURE: 130 MMHG | OXYGEN SATURATION: 98 % | DIASTOLIC BLOOD PRESSURE: 88 MMHG

## 2022-01-20 DIAGNOSIS — K21.9 GASTROESOPHAGEAL REFLUX DISEASE, UNSPECIFIED WHETHER ESOPHAGITIS PRESENT: Primary | ICD-10-CM

## 2022-01-20 DIAGNOSIS — F41.9 ANXIETY: ICD-10-CM

## 2022-01-20 DIAGNOSIS — F33.1 DEPRESSION, MAJOR, RECURRENT, MODERATE (H): ICD-10-CM

## 2022-01-20 DIAGNOSIS — E11.9 TYPE 2 DIABETES MELLITUS WITHOUT COMPLICATION, WITHOUT LONG-TERM CURRENT USE OF INSULIN (H): ICD-10-CM

## 2022-01-20 PROCEDURE — 99214 OFFICE O/P EST MOD 30 MIN: CPT | Performed by: PHYSICIAN ASSISTANT

## 2022-01-20 RX ORDER — OMEPRAZOLE 20 MG/1
20 TABLET, DELAYED RELEASE ORAL DAILY
Qty: 90 TABLET | Refills: 0 | Status: SHIPPED | OUTPATIENT
Start: 2022-01-20 | End: 2022-09-21

## 2022-01-20 RX ORDER — SUCRALFATE ORAL 1 G/10ML
1 SUSPENSION ORAL 4 TIMES DAILY
Qty: 420 ML | Refills: 0 | Status: SHIPPED | OUTPATIENT
Start: 2022-01-20 | End: 2022-09-21

## 2022-01-20 ASSESSMENT — ANXIETY QUESTIONNAIRES
1. FEELING NERVOUS, ANXIOUS, OR ON EDGE: NEARLY EVERY DAY
6. BECOMING EASILY ANNOYED OR IRRITABLE: NEARLY EVERY DAY
5. BEING SO RESTLESS THAT IT IS HARD TO SIT STILL: NEARLY EVERY DAY
7. FEELING AFRAID AS IF SOMETHING AWFUL MIGHT HAPPEN: NEARLY EVERY DAY
2. NOT BEING ABLE TO STOP OR CONTROL WORRYING: NEARLY EVERY DAY
GAD7 TOTAL SCORE: 21
IF YOU CHECKED OFF ANY PROBLEMS ON THIS QUESTIONNAIRE, HOW DIFFICULT HAVE THESE PROBLEMS MADE IT FOR YOU TO DO YOUR WORK, TAKE CARE OF THINGS AT HOME, OR GET ALONG WITH OTHER PEOPLE: EXTREMELY DIFFICULT
3. WORRYING TOO MUCH ABOUT DIFFERENT THINGS: NEARLY EVERY DAY

## 2022-01-20 ASSESSMENT — MIFFLIN-ST. JEOR: SCORE: 1303.39

## 2022-01-20 ASSESSMENT — PATIENT HEALTH QUESTIONNAIRE - PHQ9
SUM OF ALL RESPONSES TO PHQ QUESTIONS 1-9: 20
5. POOR APPETITE OR OVEREATING: NEARLY EVERY DAY

## 2022-01-20 ASSESSMENT — PAIN SCALES - GENERAL: PAINLEVEL: NO PAIN (0)

## 2022-01-20 ASSESSMENT — ASTHMA QUESTIONNAIRES: ACT_TOTALSCORE: 24

## 2022-01-20 NOTE — PROGRESS NOTES
"  Assessment & Plan     Gastroesophageal reflux disease, unspecified whether esophagitis present  Start Prilosec- add Carafate for relief.  Follow up in 2-4 weeks if not helping.  Lifestyle modifications discussed.  - omeprazole (PRILOSEC OTC) 20 MG EC tablet; Take 1 tablet (20 mg) by mouth daily  - sucralfate (CARAFATE) 1 GM/10ML suspension; Take 10 mLs (1 g) by mouth 4 times daily    Anxiety  Patient appeared quite anxious.  Would recommend follow up with PCP.    Depression, major, recurrent, moderate (H)  See above.    Type 2 diabetes mellitus without complication, without long-term current use of insulin (H)  Will be due for lab follow up and appointment soon.         BMI:   Estimated body mass index is 25.06 kg/m  as calculated from the following:    Height as of this encounter: 1.702 m (5' 7\").    Weight as of this encounter: 72.6 kg (160 lb).   Weight management plan: Discussed healthy diet and exercise guidelines      Return in about 1 month (around 2/20/2022) for if symptoms worsen or fail to improve.    FELICITAS Venegas [facility] AIDAN Paige is a 67 year old who presents for the following health issues     HPI     Concern - Indigestion  Onset: 2 weeks  Description: Indigestion. When she eats something it feels tight like it takes forever to digest something. Afraid to eat.  Intensity: moderate  Progression of Symptoms:  worsening  Accompanying Signs & Symptoms: None  Previous history of similar problem: No  Precipitating factors:        Worsened by: Eating something   Alleviating factors:        Improved by: Burping or passing gas  Therapies tried and outcome: tried mag citrate      At first she thought symptoms were due to diet- was eating more red meat.  Thought she was having indigestion.  Pain in chest and then epigastic pain.  Felt then for a while when swallowing like she could feel food going down her esophagus.  Has been trying to drink more " "water.  Afraid to eat now due to symptoms that may happen.  Was on zantac prn in the past.    Does eat spicy  Rare caffeine  Not taking any NASAIDs      Hx of eating disorder- seen at [facility].  Problems with laxatives  Hx of anxiety as well      Review of Systems   Constitutional, HEENT, cardiovascular, pulmonary, gi and gu systems are negative, except as otherwise noted.      Objective    /88 (BP Location: Right arm, Patient Position: Sitting, Cuff Size: Adult Regular)   Pulse 79   Temp 97.9  F (36.6  C) (Oral)   Resp 15   Ht 1.702 m (5' 7\")   Wt 72.6 kg (160 lb)   LMP 01/01/1985   SpO2 98%   BMI 25.06 kg/m    Body mass index is 25.06 kg/m .  Physical Exam   GENERAL: healthy, alert and no distress  ABDOMEN: soft, nontender, no hepatosplenomegaly, no masses and bowel sounds normal  MS: no gross musculoskeletal defects noted, no edema  SKIN: no suspicious lesions or rashes  PSYCH: mentation appears normal, affect normal/bright and anxious            "

## 2022-01-21 ASSESSMENT — ANXIETY QUESTIONNAIRES: GAD7 TOTAL SCORE: 21

## 2022-01-31 DIAGNOSIS — E11.9 TYPE 2 DIABETES MELLITUS WITHOUT COMPLICATION, WITHOUT LONG-TERM CURRENT USE OF INSULIN (H): ICD-10-CM

## 2022-02-15 ENCOUNTER — ANCILLARY PROCEDURE (OUTPATIENT)
Dept: MAMMOGRAPHY | Facility: CLINIC | Age: 67
End: 2022-02-15
Attending: FAMILY MEDICINE
Payer: COMMERCIAL

## 2022-02-15 DIAGNOSIS — Z12.31 VISIT FOR SCREENING MAMMOGRAM: ICD-10-CM

## 2022-02-15 PROCEDURE — 77067 SCR MAMMO BI INCL CAD: CPT | Mod: TC | Performed by: RADIOLOGY

## 2022-02-15 PROCEDURE — 77063 BREAST TOMOSYNTHESIS BI: CPT | Mod: TC | Performed by: RADIOLOGY

## 2022-02-17 ENCOUNTER — HOSPITAL ENCOUNTER (OUTPATIENT)
Dept: ULTRASOUND IMAGING | Facility: CLINIC | Age: 67
Discharge: HOME | End: 2022-02-17
Attending: FAMILY MEDICINE | Admitting: FAMILY MEDICINE
Payer: COMMERCIAL

## 2022-02-17 DIAGNOSIS — R92.8 ABNORMAL MAMMOGRAM: ICD-10-CM

## 2022-02-17 PROCEDURE — 76642 ULTRASOUND BREAST LIMITED: CPT | Mod: LT

## 2022-03-18 DIAGNOSIS — L50.9 HIVES: ICD-10-CM

## 2022-03-21 RX ORDER — CETIRIZINE HYDROCHLORIDE 10 MG/1
TABLET ORAL
Qty: 90 TABLET | Refills: 3 | OUTPATIENT
Start: 2022-03-21

## 2022-03-26 DIAGNOSIS — J30.9 ALLERGIC RHINITIS, UNSPECIFIED SEASONALITY, UNSPECIFIED TRIGGER: ICD-10-CM

## 2022-03-28 RX ORDER — MONTELUKAST SODIUM 10 MG/1
TABLET ORAL
Qty: 90 TABLET | Refills: 2 | OUTPATIENT
Start: 2022-03-28

## 2022-05-06 DIAGNOSIS — E11.9 TYPE 2 DIABETES MELLITUS WITHOUT COMPLICATION, WITHOUT LONG-TERM CURRENT USE OF INSULIN (H): ICD-10-CM

## 2022-05-06 DIAGNOSIS — E78.5 HYPERLIPIDEMIA LDL GOAL <100: ICD-10-CM

## 2022-05-09 RX ORDER — LISINOPRIL 5 MG/1
TABLET ORAL
Qty: 100 TABLET | Refills: 0 | Status: SHIPPED | OUTPATIENT
Start: 2022-05-09 | End: 2022-08-16

## 2022-05-09 RX ORDER — SIMVASTATIN 40 MG
TABLET ORAL
Qty: 100 TABLET | Refills: 0 | Status: SHIPPED | OUTPATIENT
Start: 2022-05-09 | End: 2022-08-16

## 2022-05-09 NOTE — TELEPHONE ENCOUNTER
Med refills ordered. Patient needs to follow up for annual exam and labs.     Romel Mccall MD  [facility] [place]

## 2022-05-09 NOTE — TELEPHONE ENCOUNTER
Routing refill request to provider for review/approval because:  Pharmacy requesting 1 year supply    Josiah Crouch RN  [facility] Triage Nurse

## 2022-05-19 DIAGNOSIS — J30.9 ALLERGIC RHINITIS, UNSPECIFIED SEASONALITY, UNSPECIFIED TRIGGER: ICD-10-CM

## 2022-05-19 NOTE — TELEPHONE ENCOUNTER
Patient is unable to schedule visit anytime before 5:00 PM, due to work schedule, so she will look for appointment at another clinic for physical. Josie Petty MA on 5/19/2022 at 10:55 AM

## 2022-05-20 NOTE — TELEPHONE ENCOUNTER
New Rx sent 1/7/19 by Dr Bryant  Closing encounter  
Pt calling after saw [place] last week    Sx's: itching in front on the outside, worsening a little bit each day with vaginal discharge continues    Pt states she has not heard from Dr Bryant on the yeast culture results. Wondering what results are and what Dr Bryant's recommendations are.    Preferred pharmacy selected    1/2/19:yeast culture results    1/2/19  3:45 PM [id number]    Specimen Information: Vagina        Component Collected Lab   Specimen Description 01/02/2019  3:45    Vagina    Special Requests 01/02/2019  3:45 PM 75   Specimen collected in eSwab transport (white cap)    Culture Micro (Abnormal) 01/02/2019  3:45    Abnormal   Moderate growth   Candida albicans / dubliniensis   Candida albicans and Candida dubliniensis are not routinely speciated      Will consult with Dr Bryant on results and recommendations and call pt with his response.  Pt verbalized understanding and no further questions.      
: Yes

## 2022-05-22 RX ORDER — MONTELUKAST SODIUM 10 MG/1
TABLET ORAL
Qty: 90 TABLET | Refills: 0 | Status: SHIPPED | OUTPATIENT
Start: 2022-05-22 | End: 2022-08-16

## 2022-05-22 NOTE — TELEPHONE ENCOUNTER
Medication is being filled for 1 time refill only due to:  Patient needs to be seen because it has been more than one year since last visit.  Next 5 appointments (look out 90 days)    Jun 13, 2022  2:00 PM  (Arrive by 1:40 PM)  Welcome to Medicare Visit with Salvador Diop PA-C  [facility] [address] [place] [id number] [phone]        Patient has future appointment scheduled.  Montse LOZADA - Registered Nurse  [facility]  Acute and Diagnostic Services

## 2022-05-24 ENCOUNTER — OFFICE VISIT (OUTPATIENT)
Dept: UROLOGY | Facility: CLINIC | Age: 67
End: 2022-05-24
Payer: COMMERCIAL

## 2022-05-24 VITALS
HEIGHT: 67 IN | WEIGHT: 160 LBS | BODY MASS INDEX: 25.11 KG/M2 | DIASTOLIC BLOOD PRESSURE: 66 MMHG | SYSTOLIC BLOOD PRESSURE: 118 MMHG

## 2022-05-24 DIAGNOSIS — N39.3 STRESS INCONTINENCE OF URINE: Primary | ICD-10-CM

## 2022-05-24 DIAGNOSIS — N20.0 NEPHROLITHIASIS: ICD-10-CM

## 2022-05-24 DIAGNOSIS — R10.9 FLANK PAIN: ICD-10-CM

## 2022-05-24 DIAGNOSIS — N39.43 POST-VOID DRIBBLING: ICD-10-CM

## 2022-05-24 LAB
ALBUMIN UR-MCNC: NEGATIVE MG/DL
APPEARANCE UR: CLEAR
BILIRUB UR QL STRIP: NEGATIVE
COLOR UR AUTO: YELLOW
GLUCOSE UR STRIP-MCNC: NEGATIVE MG/DL
HGB UR QL STRIP: ABNORMAL
KETONES UR STRIP-MCNC: NEGATIVE MG/DL
LEUKOCYTE ESTERASE UR QL STRIP: NEGATIVE
NITRATE UR QL: NEGATIVE
PH UR STRIP: 5.5 [PH] (ref 5–7)
RESIDUAL VOLUME (RV) (EXTERNAL): 131
SP GR UR STRIP: 1.02 (ref 1–1.03)
UROBILINOGEN UR STRIP-ACNC: 2 E.U./DL

## 2022-05-24 PROCEDURE — 51798 US URINE CAPACITY MEASURE: CPT | Performed by: PHYSICIAN ASSISTANT

## 2022-05-24 PROCEDURE — 99214 OFFICE O/P EST MOD 30 MIN: CPT | Mod: 25 | Performed by: PHYSICIAN ASSISTANT

## 2022-05-24 PROCEDURE — 81003 URINALYSIS AUTO W/O SCOPE: CPT | Mod: QW | Performed by: PHYSICIAN ASSISTANT

## 2022-05-24 RX ORDER — AMOXICILLIN 500 MG/1
500 TABLET, FILM COATED ORAL 3 TIMES DAILY
COMMUNITY
Start: 2022-05-23 | End: 2022-09-21

## 2022-05-24 ASSESSMENT — ENCOUNTER SYMPTOMS
HEMATURIA: 0
FLANK PAIN: 1
FREQUENCY: 1

## 2022-05-24 ASSESSMENT — PAIN SCALES - GENERAL: PAINLEVEL: NO PAIN (0)

## 2022-05-24 NOTE — PATIENT INSTRUCTIONS
For the vaginal voiding and post void dribbling, shift positions, intention double void, and do wide leg voiding.     For the stress incontinence, would consider pelvic floor physical therapy.    **This order will print in the [facility] Scheduling Office**    Physical Therapy available through:    *[facility]    Call one number to schedule at any of the above locations: [phone].    Your provider has referred you to: Physical Therapy at [facility] or [facility]    Additional Comments for the Therapist : Core strengthening    Please be aware that coverage of these services is subject to the terms and limitations of your health insurance plan.  Call member services at your health plan with any benefit or coverage questions.      Please bring the following to your appointment:    *Your personal calendar for scheduling future appointments  *Comfortable clothing     CT of the ab/pelvis to look for passing stone or hydronephrosis.
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
## 2022-05-24 NOTE — NURSING NOTE
Chief Complaint   Patient presents with     Incontinence     Kidney pain       Pt states she doesn't feel urge, and urine will leak out. Will occasionally happen with laughing, sneezing. Pt is on antibiotics for tooth. Pt states she is having kidney pain on both sides.    PVR: 131 mL    Floresita Herron, CMA
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
## 2022-05-25 ENCOUNTER — OFFICE VISIT (OUTPATIENT)
Dept: OBGYN | Facility: CLINIC | Age: 67
End: 2022-05-25
Payer: COMMERCIAL

## 2022-05-25 VITALS — BODY MASS INDEX: 25.06 KG/M2 | HEIGHT: 67 IN

## 2022-05-25 DIAGNOSIS — N76.0 BACTERIAL VAGINOSIS: Primary | ICD-10-CM

## 2022-05-25 DIAGNOSIS — B96.89 BACTERIAL VAGINOSIS: Primary | ICD-10-CM

## 2022-05-25 LAB
CLUE CELLS: ABNORMAL
TRICHOMONAS, WET PREP: ABNORMAL
WBC'S/HIGH POWER FIELD, WET PREP: ABNORMAL
YEAST, WET PREP: ABNORMAL

## 2022-05-25 PROCEDURE — 99215 OFFICE O/P EST HI 40 MIN: CPT | Performed by: FAMILY MEDICINE

## 2022-05-25 PROCEDURE — 87210 SMEAR WET MOUNT SALINE/INK: CPT | Performed by: FAMILY MEDICINE

## 2022-05-25 RX ORDER — METRONIDAZOLE 250 MG/1
500 TABLET ORAL 2 TIMES DAILY
Qty: 28 TABLET | Refills: 11 | Status: SHIPPED | OUTPATIENT
Start: 2022-05-25 | End: 2023-04-17

## 2022-05-25 RX ORDER — METRONIDAZOLE 250 MG/1
250 TABLET ORAL SEE ADMIN INSTRUCTIONS
Qty: 32 TABLET | Refills: 0 | Status: SHIPPED | OUTPATIENT
Start: 2022-05-25 | End: 2022-09-21

## 2022-05-25 RX ORDER — SACCHAROMYCES BOULARDII 250 MG
250 CAPSULE ORAL 2 TIMES DAILY
Qty: 60 CAPSULE | Refills: 11 | Status: SHIPPED | OUTPATIENT
Start: 2022-05-25 | End: 2022-09-21

## 2022-05-25 NOTE — PATIENT INSTRUCTIONS
BV:  Take full course metronidazole.  Also do 21 days vaginal boric acid.   After 1 weeks of metronidazole, then do one day per week of metronidazole daily for 8 weeks     Return in 6 weeks for recheck.       Dr. Tammi Long, DO    Obstetrics and Gynecology  [facility] [place] [facility] [place]
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
## 2022-05-25 NOTE — NURSING NOTE
"Chief Complaint   Patient presents with     Vaginal Problem     History of vaginal infections--using Boric acid vaginally--doesn't feel like it's helping--very heavy discharge--pt states \"it is pouring out of her\"       Initial Ht 1.702 m (5' 7\")   LMP 1985   BMI 25.06 kg/m   Estimated body mass index is 25.06 kg/m  as calculated from the following:    Height as of this encounter: 1.702 m (5' 7\").    Weight as of 22: 72.6 kg (160 lb).  BP completed using cuff size: regular long    Questioned patient about current smoking habits.  Pt. has never smoked.          The following HM Due: NONE           "

## 2022-05-31 ENCOUNTER — OFFICE VISIT (OUTPATIENT)
Dept: URGENT CARE | Facility: URGENT CARE | Age: 67
End: 2022-05-31
Payer: COMMERCIAL

## 2022-05-31 ENCOUNTER — TELEPHONE (OUTPATIENT)
Dept: OBGYN | Facility: CLINIC | Age: 67
End: 2022-05-31
Payer: COMMERCIAL

## 2022-05-31 ENCOUNTER — TELEPHONE (OUTPATIENT)
Dept: UROLOGY | Facility: CLINIC | Age: 67
End: 2022-05-31
Payer: COMMERCIAL

## 2022-05-31 VITALS
RESPIRATION RATE: 16 BRPM | TEMPERATURE: 98.1 F | BODY MASS INDEX: 25.06 KG/M2 | WEIGHT: 160 LBS | HEART RATE: 60 BPM | OXYGEN SATURATION: 99 % | DIASTOLIC BLOOD PRESSURE: 86 MMHG | SYSTOLIC BLOOD PRESSURE: 160 MMHG

## 2022-05-31 DIAGNOSIS — R10.11 RIGHT UPPER QUADRANT PAIN: Primary | ICD-10-CM

## 2022-05-31 DIAGNOSIS — B37.31 YEAST VAGINITIS: Primary | ICD-10-CM

## 2022-05-31 LAB
ALBUMIN SERPL-MCNC: 4.1 G/DL (ref 3.4–5)
ALP SERPL-CCNC: 80 U/L (ref 40–150)
ALT SERPL W P-5'-P-CCNC: 27 U/L (ref 0–50)
ANION GAP SERPL CALCULATED.3IONS-SCNC: 9 MMOL/L (ref 3–14)
AST SERPL W P-5'-P-CCNC: 27 U/L (ref 0–45)
BILIRUB SERPL-MCNC: 0.8 MG/DL (ref 0.2–1.3)
BUN SERPL-MCNC: 10 MG/DL (ref 7–30)
CALCIUM SERPL-MCNC: 9.8 MG/DL (ref 8.5–10.1)
CHLORIDE BLD-SCNC: 108 MMOL/L (ref 94–109)
CO2 SERPL-SCNC: 27 MMOL/L (ref 20–32)
CREAT SERPL-MCNC: 0.8 MG/DL (ref 0.52–1.04)
ERYTHROCYTE [DISTWIDTH] IN BLOOD BY AUTOMATED COUNT: 12.7 % (ref 10–15)
GFR SERPL CREATININE-BSD FRML MDRD: 81 ML/MIN/1.73M2
GLUCOSE BLD-MCNC: 84 MG/DL (ref 70–99)
HCT VFR BLD AUTO: 41 % (ref 35–47)
HGB BLD-MCNC: 13.5 G/DL (ref 11.7–15.7)
MCH RBC QN AUTO: 29 PG (ref 26.5–33)
MCHC RBC AUTO-ENTMCNC: 32.9 G/DL (ref 31.5–36.5)
MCV RBC AUTO: 88 FL (ref 78–100)
PLATELET # BLD AUTO: 204 10E3/UL (ref 150–450)
POTASSIUM BLD-SCNC: 4.3 MMOL/L (ref 3.4–5.3)
PROT SERPL-MCNC: 7.4 G/DL (ref 6.8–8.8)
RBC # BLD AUTO: 4.66 10E6/UL (ref 3.8–5.2)
SODIUM SERPL-SCNC: 144 MMOL/L (ref 133–144)
WBC # BLD AUTO: 7.4 10E3/UL (ref 4–11)

## 2022-05-31 PROCEDURE — 80053 COMPREHEN METABOLIC PANEL: CPT | Performed by: PHYSICIAN ASSISTANT

## 2022-05-31 PROCEDURE — 36415 COLL VENOUS BLD VENIPUNCTURE: CPT | Performed by: PHYSICIAN ASSISTANT

## 2022-05-31 PROCEDURE — 85027 COMPLETE CBC AUTOMATED: CPT | Performed by: PHYSICIAN ASSISTANT

## 2022-05-31 PROCEDURE — 83690 ASSAY OF LIPASE: CPT | Performed by: PHYSICIAN ASSISTANT

## 2022-05-31 PROCEDURE — 99214 OFFICE O/P EST MOD 30 MIN: CPT | Performed by: PHYSICIAN ASSISTANT

## 2022-05-31 RX ORDER — TERCONAZOLE 0.4 %
1 CREAM WITH APPLICATOR VAGINAL AT BEDTIME
Qty: 45 G | Refills: 0 | Status: SHIPPED | OUTPATIENT
Start: 2022-05-31 | End: 2022-06-07

## 2022-05-31 RX ORDER — FLUCONAZOLE 150 MG/1
TABLET ORAL
Qty: 2 TABLET | Refills: 0 | Status: SHIPPED | OUTPATIENT
Start: 2022-05-31 | End: 2022-09-21

## 2022-05-31 NOTE — TELEPHONE ENCOUNTER
Pt is wondering if there is also a cream that she can use on the vagina to help with the symptoms.   She is no longer taking the amox, and was started on zithromax today. She did not complete the course of flagyl.    Meera VILCHIS RN\

## 2022-05-31 NOTE — TELEPHONE ENCOUNTER
Advise Pt that there isn't a specific cream to treat the itching and irritation but just to treat yeast, as that is the likely underlying cause.  The Zithromax is also going to predispose her to yeast persistence, so this may be difficult to resolve until her Abx are all completed.  I sent Rx Terazol-7 which is an anti-yeast cream to be used at HS x 7 days vaginally.  She may have burning when this is inserted initially for the first couple days until the yeast improves.  She can use this along with the Diflucan.

## 2022-05-31 NOTE — PROGRESS NOTES
Assessment & Plan     Right upper quadrant pain  Location of pain possible for cholecystitis, although less likely given lack of postprandial symptoms, easily resolved with positional changes.  Evaluation of CBC without evidence of leukocytosis.  CMP also reassuring without any abnormal findings.  Lipase is pending, although low suspicion for pancreatitis.  Patient is asymptomatic at time of urgent care visit.  Advised conservative approach, watch and wait, and if persistent symptoms over the course of the next week to have repeat evaluation with primary care provider.  Patient does have CT imaging in 2 days for kidneys, this would not be the ideal test for evaluation of gallbladder, but would help to evaluate for other intra-abdominal pathology.  No indication for urgent referral to emergency department at this time.  Did discuss reasons for her to return to evaluation more urgently, she is reliable for close follow-up.  - CBC with platelets  - Comprehensive metabolic panel (BMP + Alb, Alk Phos, ALT, AST, Total. Bili, TP)  - Lipase     I spent a total of 30 minutes on the day of the visit.   Time spent doing chart review, history and exam, documentation and further activities per the note    Return in about 1 week (around 6/7/2022) for reevaluation with PCP if symptoms not improving, return earlier if symptoms are worsening.    [facility] Paige is a 67 year old female who presents to clinic today for the following health issues:  Chief Complaint   Patient presents with     Abdominal Pain     Right side under ribcage, for about 2 weeks now, mostly the last 4 days painful. Feels tight, like a muscle tightness.      HPI  Patient is a 67-year-old female with a history notable for type 2 diabetes, hyperlipidemia, and renal lithiasis presenting to urgent care for evaluation of right upper quadrant abdominal discomfort.  Patient notes approximately 2  "weeks ago developed pain that she initially described as intense, took Tylenol which seemed to resolve the discomfort and completely went away.  Noted some mild associated nausea without episode.  Approximately 4 days ago while shopping noted a return of the discomfort.  No identifiable trigger.  States that the pain is \"tight\" and waxes and wanes, mostly with positional changes.  Using Tylenol at home with improvement of symptoms, does completely resolve but then comes back.  Notes that if she sits in a certain position leaning to her left feels that it relieves the symptoms.  Wonders if \"sucking in my stomach\" might be causing the symptoms.  Denies any changes in symptoms with eating.  Notes approximately 1 month ago had some pain in her stomach followed by 24 hours of diarrhea which completely resolved.  A couple episodes since that time of soft stools interspersed by regular stools, mild nausea but this has resolved.  Denies fever/chills, vomiting, chest pain, dyspnea, lightheadedness, urinary symptoms, bright red blood per rectum, melena, or other complaints.  Has a CT scan scheduled in 2 days by her urologist for evaluation of her kidneys.    Review of Systems  Focused ROS obtained, pertinent positives/negatives reviewed in the HPI.       Objective    BP (!) 160/86   Pulse 60   Temp 98.1  F (36.7  C) (Tympanic)   Resp 16   Wt 72.6 kg (160 lb)   LMP 01/01/1985   SpO2 99%   BMI 25.06 kg/m    Physical Exam   GENERAL: healthy, alert and no distress  NECK: no adenopathy  RESP: lungs clear to auscultation - no rales, rhonchi or wheezes  CV: regular rates and rhythm, peripheral pulses strong and no peripheral edema  ABDOMEN: soft, nontender, no organomegaly or masses and bowel sounds normal  SKIN: no suspicious lesions or rashes    Results for orders placed or performed in visit on 05/31/22 (from the past 24 hour(s))   CBC with platelets   Result Value Ref Range    WBC Count 7.4 4.0 - 11.0 10e3/uL    RBC Count " 4.66 3.80 - 5.20 10e6/uL    Hemoglobin 13.5 11.7 - 15.7 g/dL    Hematocrit 41.0 35.0 - 47.0 %    MCV 88 78 - 100 fL    MCH 29.0 26.5 - 33.0 pg    MCHC 32.9 31.5 - 36.5 g/dL    RDW 12.7 10.0 - 15.0 %    Platelet Count 204 150 - 450 10e3/uL   Comprehensive metabolic panel (BMP + Alb, Alk Phos, ALT, AST, Total. Bili, TP)   Result Value Ref Range    Sodium 144 133 - 144 mmol/L    Potassium 4.3 3.4 - 5.3 mmol/L    Chloride 108 94 - 109 mmol/L    Carbon Dioxide (CO2) 27 20 - 32 mmol/L    Anion Gap 9 3 - 14 mmol/L    Urea Nitrogen 10 7 - 30 mg/dL    Creatinine 0.80 0.52 - 1.04 mg/dL    Calcium 9.8 8.5 - 10.1 mg/dL    Glucose 84 70 - 99 mg/dL    Alkaline Phosphatase 80 40 - 150 U/L    AST 27 0 - 45 U/L    ALT 27 0 - 50 U/L    Protein Total 7.4 6.8 - 8.8 g/dL    Albumin 4.1 3.4 - 5.0 g/dL    Bilirubin Total 0.8 0.2 - 1.3 mg/dL    GFR Estimate 81 >60 mL/min/1.73m2

## 2022-05-31 NOTE — PATIENT INSTRUCTIONS
Follow-up with Primary Care for reevaluation if no further improvement or continual recurrence of symptoms.

## 2022-05-31 NOTE — TELEPHONE ENCOUNTER
Advise Pt that since she has been on 2 Abx (and still on the Amox), the itching is most likely yeast which can occur after courses of Abx.  I sent Rx Diflucan 150 mg to be taken today and repeated in 3 days.  However, yeast may recur as long as she is on the Amox.  So she may need repeat course Diflucan after that has been completed.  If no improvement she should f/u w/ Dr. Long again to reevaluate.

## 2022-05-31 NOTE — TELEPHONE ENCOUNTER
Pt calling.   Last OV 5/25/22. She was given flagyl for her symptoms even though her wet prep was neg. She did not finish the flagyl.    Pt is currently taking having vag irritation and itching. States it is severe.     Pt was also taking Amoxicillin, which she believes that she is reacting to it. She is taking the antibiotic for an tooth abscess. She ha requested a different antibiotic. She believes that she was reacting to it because of the vag irritation.     I explained that sometimes antibiotics can cause a yeast infection.       Please advise. Is there a cream that she can use in her vaginal area? Also, diflucan?    Meera VILCHIS, RN

## 2022-06-01 LAB — LIPASE SERPL-CCNC: 131 U/L (ref 73–393)

## 2022-06-02 ENCOUNTER — HOSPITAL ENCOUNTER (OUTPATIENT)
Dept: CT IMAGING | Facility: CLINIC | Age: 67
Discharge: HOME | End: 2022-06-02
Attending: PHYSICIAN ASSISTANT | Admitting: PHYSICIAN ASSISTANT
Payer: COMMERCIAL

## 2022-06-02 DIAGNOSIS — R10.9 FLANK PAIN: ICD-10-CM

## 2022-06-02 DIAGNOSIS — N20.0 NEPHROLITHIASIS: ICD-10-CM

## 2022-06-02 PROCEDURE — 74176 CT ABD & PELVIS W/O CONTRAST: CPT

## 2022-06-15 ENCOUNTER — TRANSFERRED RECORDS (OUTPATIENT)
Dept: HEALTH INFORMATION MANAGEMENT | Facility: CLINIC | Age: 67
End: 2022-06-15

## 2022-07-06 ENCOUNTER — TRANSFERRED RECORDS (OUTPATIENT)
Dept: HEALTH INFORMATION MANAGEMENT | Facility: CLINIC | Age: 67
End: 2022-07-06

## 2022-07-28 DIAGNOSIS — N20.0 KIDNEY STONE ON LEFT SIDE: Primary | ICD-10-CM

## 2022-08-03 ENCOUNTER — TRANSFERRED RECORDS (OUTPATIENT)
Dept: HEALTH INFORMATION MANAGEMENT | Facility: CLINIC | Age: 67
End: 2022-08-03

## 2022-08-09 DIAGNOSIS — E11.9 TYPE 2 DIABETES MELLITUS WITHOUT COMPLICATION, WITHOUT LONG-TERM CURRENT USE OF INSULIN (H): ICD-10-CM

## 2022-08-09 DIAGNOSIS — E78.5 HYPERLIPIDEMIA LDL GOAL <100: ICD-10-CM

## 2022-08-11 NOTE — TELEPHONE ENCOUNTER
Routing refill request to provider for review/approval because:  Labs not current:  A1C and LDL  BP Readings from Last 3 Encounters:   05/31/22 (!) 160/86   05/24/22 118/66   01/20/22 130/88     Lab Results   Component Value Date    A1C 6.5 08/11/2021    A1C 6.2 01/27/2021    A1C 6.1 09/18/2020    A1C 5.7 09/11/2019    A1C 6.0 11/23/2018    A1C 6.1 05/07/2018     LDL Cholesterol Calculated   Date Value Ref Range Status   03/22/2021 92 <100 mg/dL Final     Comment:     Desirable:       <100 mg/dl     Cait Sánchez RN

## 2022-08-16 RX ORDER — LISINOPRIL 5 MG/1
TABLET ORAL
Qty: 90 TABLET | Refills: 0 | Status: SHIPPED | OUTPATIENT
Start: 2022-08-16 | End: 2022-09-21

## 2022-08-16 RX ORDER — SIMVASTATIN 40 MG
TABLET ORAL
Qty: 90 TABLET | Refills: 0 | Status: SHIPPED | OUTPATIENT
Start: 2022-08-16 | End: 2022-09-21

## 2022-08-16 NOTE — TELEPHONE ENCOUNTER
Patient is overdue for recheck on cholesterol, diabetes and blood pressure.  Please have her schedule an appointment to see me in person and come in fasting    Romel Mccall MD  [facility], [place]

## 2022-09-13 ENCOUNTER — TELEPHONE (OUTPATIENT)
Dept: OBGYN | Facility: CLINIC | Age: 67
End: 2022-09-13

## 2022-09-13 DIAGNOSIS — N89.8 VAGINAL IRRITATION: Primary | ICD-10-CM

## 2022-09-13 RX ORDER — FLUCONAZOLE 150 MG/1
150 TABLET ORAL
Qty: 3 TABLET | Refills: 0 | Status: SHIPPED | OUTPATIENT
Start: 2022-09-13 | End: 2022-09-21

## 2022-09-13 NOTE — TELEPHONE ENCOUNTER
Pt calling.   Woke up this am with vag irritation and white thick discharge.     Believes that she has a yeast infection.     Pt is requesting diflucan. She used it in the past and it worked. She did the 1 tab every 3 days x 3 doses.    Please advise.    Last OV 5/25/22.    Meera VILCHIS RN

## 2022-09-13 NOTE — TELEPHONE ENCOUNTER
rx called in please advise   Dr. Tammi Long, DO    Obstetrics and Gynecology  [facility] [place]

## 2022-09-21 ENCOUNTER — OFFICE VISIT (OUTPATIENT)
Dept: FAMILY MEDICINE | Facility: CLINIC | Age: 67
End: 2022-09-21
Payer: COMMERCIAL

## 2022-09-21 VITALS
WEIGHT: 159 LBS | OXYGEN SATURATION: 99 % | HEART RATE: 53 BPM | TEMPERATURE: 97.3 F | SYSTOLIC BLOOD PRESSURE: 140 MMHG | DIASTOLIC BLOOD PRESSURE: 80 MMHG | BODY MASS INDEX: 24.9 KG/M2

## 2022-09-21 DIAGNOSIS — E78.5 HYPERLIPIDEMIA LDL GOAL <100: ICD-10-CM

## 2022-09-21 DIAGNOSIS — Z00.00 ENCOUNTER FOR ANNUAL WELLNESS EXAM IN MEDICARE PATIENT: Primary | ICD-10-CM

## 2022-09-21 DIAGNOSIS — K21.9 GASTROESOPHAGEAL REFLUX DISEASE, UNSPECIFIED WHETHER ESOPHAGITIS PRESENT: ICD-10-CM

## 2022-09-21 DIAGNOSIS — N89.8 VAGINAL DISCHARGE: ICD-10-CM

## 2022-09-21 DIAGNOSIS — E11.9 TYPE 2 DIABETES MELLITUS WITHOUT COMPLICATION, WITHOUT LONG-TERM CURRENT USE OF INSULIN (H): ICD-10-CM

## 2022-09-21 DIAGNOSIS — Z23 NEED FOR PROPHYLACTIC VACCINATION AND INOCULATION AGAINST INFLUENZA: ICD-10-CM

## 2022-09-21 DIAGNOSIS — J30.9 ALLERGIC RHINITIS, UNSPECIFIED SEASONALITY, UNSPECIFIED TRIGGER: ICD-10-CM

## 2022-09-21 DIAGNOSIS — Z23 NEED FOR PNEUMOCOCCAL VACCINE: ICD-10-CM

## 2022-09-21 DIAGNOSIS — J30.2 SEASONAL ALLERGIC RHINITIS, UNSPECIFIED TRIGGER: ICD-10-CM

## 2022-09-21 DIAGNOSIS — N76.0 BACTERIAL VAGINOSIS: ICD-10-CM

## 2022-09-21 DIAGNOSIS — B96.89 BACTERIAL VAGINOSIS: ICD-10-CM

## 2022-09-21 LAB
CLUE CELLS: PRESENT
HBA1C MFR BLD: 6.2 % (ref 0–5.6)
TRICHOMONAS, WET PREP: ABNORMAL
WBC'S/HIGH POWER FIELD, WET PREP: ABNORMAL
YEAST, WET PREP: ABNORMAL

## 2022-09-21 PROCEDURE — 82043 UR ALBUMIN QUANTITATIVE: CPT | Performed by: FAMILY MEDICINE

## 2022-09-21 PROCEDURE — 99397 PER PM REEVAL EST PAT 65+ YR: CPT | Performed by: FAMILY MEDICINE

## 2022-09-21 PROCEDURE — 90677 PCV20 VACCINE IM: CPT | Performed by: FAMILY MEDICINE

## 2022-09-21 PROCEDURE — 87210 SMEAR WET MOUNT SALINE/INK: CPT | Performed by: FAMILY MEDICINE

## 2022-09-21 PROCEDURE — G0009 ADMIN PNEUMOCOCCAL VACCINE: HCPCS | Performed by: FAMILY MEDICINE

## 2022-09-21 PROCEDURE — G0008 ADMIN INFLUENZA VIRUS VAC: HCPCS | Performed by: FAMILY MEDICINE

## 2022-09-21 PROCEDURE — 36415 COLL VENOUS BLD VENIPUNCTURE: CPT | Performed by: FAMILY MEDICINE

## 2022-09-21 PROCEDURE — 80061 LIPID PANEL: CPT | Performed by: FAMILY MEDICINE

## 2022-09-21 PROCEDURE — 90662 IIV NO PRSV INCREASED AG IM: CPT | Performed by: FAMILY MEDICINE

## 2022-09-21 PROCEDURE — 99214 OFFICE O/P EST MOD 30 MIN: CPT | Mod: 25 | Performed by: FAMILY MEDICINE

## 2022-09-21 PROCEDURE — 83036 HEMOGLOBIN GLYCOSYLATED A1C: CPT | Performed by: FAMILY MEDICINE

## 2022-09-21 RX ORDER — FLUTICASONE PROPIONATE 50 MCG
SPRAY, SUSPENSION (ML) NASAL
Qty: 32 G | Refills: 4 | Status: SHIPPED | OUTPATIENT
Start: 2022-09-21 | End: 2022-09-22

## 2022-09-21 RX ORDER — MONTELUKAST SODIUM 10 MG/1
1 TABLET ORAL EVERY MORNING
Qty: 90 TABLET | Refills: 3 | Status: SHIPPED | OUTPATIENT
Start: 2022-09-21 | End: 2022-09-22

## 2022-09-21 RX ORDER — CETIRIZINE HYDROCHLORIDE 10 MG/1
TABLET ORAL
Qty: 180 TABLET | Refills: 3 | Status: SHIPPED | OUTPATIENT
Start: 2022-09-21 | End: 2023-12-11

## 2022-09-21 RX ORDER — OMEPRAZOLE 20 MG/1
20 TABLET, DELAYED RELEASE ORAL DAILY
Qty: 90 TABLET | Refills: 3 | Status: SHIPPED | OUTPATIENT
Start: 2022-09-21 | End: 2022-09-22

## 2022-09-21 RX ORDER — SIMVASTATIN 40 MG
40 TABLET ORAL AT BEDTIME
Qty: 90 TABLET | Refills: 3 | Status: SHIPPED | OUTPATIENT
Start: 2022-09-21 | End: 2022-09-22

## 2022-09-21 RX ORDER — LISINOPRIL 5 MG/1
5 TABLET ORAL DAILY
Qty: 90 TABLET | Refills: 3 | Status: SHIPPED | OUTPATIENT
Start: 2022-09-21 | End: 2022-09-21

## 2022-09-21 RX ORDER — SACCHAROMYCES BOULARDII 250 MG
250 CAPSULE ORAL 2 TIMES DAILY
Qty: 60 CAPSULE | Refills: 11 | Status: SHIPPED | OUTPATIENT
Start: 2022-09-21 | End: 2022-09-22

## 2022-09-21 RX ORDER — LISINOPRIL 10 MG/1
10 TABLET ORAL DAILY
Qty: 90 TABLET | Refills: 1 | Status: SHIPPED | OUTPATIENT
Start: 2022-09-21 | End: 2022-09-22

## 2022-09-21 ASSESSMENT — ENCOUNTER SYMPTOMS
FREQUENCY: 0
NERVOUS/ANXIOUS: 1
DIARRHEA: 0
HEARTBURN: 0
HEMATOCHEZIA: 0
COUGH: 0
DIZZINESS: 0
NAUSEA: 0
SHORTNESS OF BREATH: 0
MYALGIAS: 0
CHILLS: 0
WEAKNESS: 0
DYSURIA: 0
HEMATURIA: 0
BREAST MASS: 0
ABDOMINAL PAIN: 0
PARESTHESIAS: 0
FEVER: 0
CONSTIPATION: 0
HEADACHES: 0
ARTHRALGIAS: 0
SORE THROAT: 0
JOINT SWELLING: 0
EYE PAIN: 0

## 2022-09-21 ASSESSMENT — ANXIETY QUESTIONNAIRES
GAD7 TOTAL SCORE: 14
6. BECOMING EASILY ANNOYED OR IRRITABLE: NEARLY EVERY DAY
5. BEING SO RESTLESS THAT IT IS HARD TO SIT STILL: SEVERAL DAYS
1. FEELING NERVOUS, ANXIOUS, OR ON EDGE: MORE THAN HALF THE DAYS
4. TROUBLE RELAXING: MORE THAN HALF THE DAYS
7. FEELING AFRAID AS IF SOMETHING AWFUL MIGHT HAPPEN: MORE THAN HALF THE DAYS
8. IF YOU CHECKED OFF ANY PROBLEMS, HOW DIFFICULT HAVE THESE MADE IT FOR YOU TO DO YOUR WORK, TAKE CARE OF THINGS AT HOME, OR GET ALONG WITH OTHER PEOPLE?: VERY DIFFICULT
GAD7 TOTAL SCORE: 14
GAD7 TOTAL SCORE: 14
3. WORRYING TOO MUCH ABOUT DIFFERENT THINGS: MORE THAN HALF THE DAYS
7. FEELING AFRAID AS IF SOMETHING AWFUL MIGHT HAPPEN: MORE THAN HALF THE DAYS
2. NOT BEING ABLE TO STOP OR CONTROL WORRYING: MORE THAN HALF THE DAYS
IF YOU CHECKED OFF ANY PROBLEMS ON THIS QUESTIONNAIRE, HOW DIFFICULT HAVE THESE PROBLEMS MADE IT FOR YOU TO DO YOUR WORK, TAKE CARE OF THINGS AT HOME, OR GET ALONG WITH OTHER PEOPLE: VERY DIFFICULT

## 2022-09-21 ASSESSMENT — PAIN SCALES - GENERAL: PAINLEVEL: NO PAIN (0)

## 2022-09-21 ASSESSMENT — PATIENT HEALTH QUESTIONNAIRE - PHQ9
SUM OF ALL RESPONSES TO PHQ QUESTIONS 1-9: 12
SUM OF ALL RESPONSES TO PHQ QUESTIONS 1-9: 12
10. IF YOU CHECKED OFF ANY PROBLEMS, HOW DIFFICULT HAVE THESE PROBLEMS MADE IT FOR YOU TO DO YOUR WORK, TAKE CARE OF THINGS AT HOME, OR GET ALONG WITH OTHER PEOPLE: VERY DIFFICULT

## 2022-09-21 ASSESSMENT — ASTHMA QUESTIONNAIRES
ACT_TOTALSCORE: 25
ACT_TOTALSCORE: 25
QUESTION_1 LAST FOUR WEEKS HOW MUCH OF THE TIME DID YOUR ASTHMA KEEP YOU FROM GETTING AS MUCH DONE AT WORK, SCHOOL OR AT HOME: NONE OF THE TIME
QUESTION_5 LAST FOUR WEEKS HOW WOULD YOU RATE YOUR ASTHMA CONTROL: COMPLETELY CONTROLLED
QUESTION_2 LAST FOUR WEEKS HOW OFTEN HAVE YOU HAD SHORTNESS OF BREATH: NOT AT ALL
QUESTION_4 LAST FOUR WEEKS HOW OFTEN HAVE YOU USED YOUR RESCUE INHALER OR NEBULIZER MEDICATION (SUCH AS ALBUTEROL): NOT AT ALL
QUESTION_3 LAST FOUR WEEKS HOW OFTEN DID YOUR ASTHMA SYMPTOMS (WHEEZING, COUGHING, SHORTNESS OF BREATH, CHEST TIGHTNESS OR PAIN) WAKE YOU UP AT NIGHT OR EARLIER THAN USUAL IN THE MORNING: NOT AT ALL

## 2022-09-21 ASSESSMENT — ACTIVITIES OF DAILY LIVING (ADL): CURRENT_FUNCTION: NO ASSISTANCE NEEDED

## 2022-09-21 NOTE — LETTER
September 27, 2022      Grecia CLAIRE [place]  [address]   [place] [address]        Dear ,     Diabetes is well controlled.  Cholesterol is well managed as well.  Urine test is normal.  Resume current medications without any changes.      If you have any questions or concerns, please call the clinic at the number listed above.       Sincerely,      Romel Mccall MD

## 2022-09-21 NOTE — TELEPHONE ENCOUNTER
Patient calling to state she saw the provider today and needs her medication refills sent to a different pharmacy.     Patient reports only Cetirizine can go to [facility].   All the other medications need to be sent to [facility].     Yuli Churchill RN BSN MSN  [facility]

## 2022-09-21 NOTE — RESULT ENCOUNTER NOTE
Please call the patient to notify patient that the wet prep shows bacterial vaginosis.  Patient already has a prescription of metronidazole called in by her OB doctor which she can get filled and start using that.  No signs of yeast infection.    Romel Mccall MD

## 2022-09-21 NOTE — PROGRESS NOTES
"SUBJECTIVE:   Grecia is a 67 year old who presents for Preventive Visit.    {Patient has been advised of split billing requirements and indicates understanding: Yes  Are you in the first 12 months of your Medicare coverage?  NO  Healthy Habits:     In general, how would you rate your overall health?  Fair    Frequency of exercise:  2-3 days/week    Duration of exercise:  Less than 15 minutes    Do you usually eat at least 4 servings of fruit and vegetables a day, include whole grains    & fiber and avoid regularly eating high fat or \"junk\" foods?  No    Taking medications regularly:  No    Barriers to taking medications:  Problems remembering to take them    Medication side effects:  Not applicable    Ability to successfully perform activities of daily living:  No assistance needed    Home Safety:  No safety concerns identified    Hearing Impairment:  No hearing concerns    In the past 6 months, have you been bothered by leaking of urine? Yes    In general, how would you rate your overall mental or emotional health?  Good      PHQ-2 Total Score: 4    Additional concerns today:  No    Do you feel safe in your environment? Yes    Have you ever done Advance Care Planning? (For example, a Health Directive, POLST, or a discussion with a medical provider or your loved ones about your wishes): NO       Fall risk  Fallen 2 or more times in the past year?: No  Any fall with injury in the past year?: No    Cognitive Screening   1) Repeat 3 items (Leader, Season, Table)    2) Clock draw: NORMAL  3) 3 item recall: Recalls 1 object   Results: NORMAL clock, 1-2 items recalled: COGNITIVE IMPAIRMENT LESS LIKELY    Mini-CogTM Copyright PATTIE Godinez. Licensed by the author for use in [facility]; reprinted with permission (yazan@.[email or web address]). All rights reserved.          Reviewed and updated as needed this visit by clinical staff   Tobacco  Allergies  Meds              Reviewed and updated as needed this visit by Provider    " Allergies              Social History     Tobacco Use     Smoking status: Never     Smokeless tobacco: Never   Substance Use Topics     Alcohol use: No     Alcohol/week: 0.0 standard drinks         Alcohol Use 9/21/2022   Prescreen: >3 drinks/day or >7 drinks/week? Not Applicable   Prescreen: >3 drinks/day or >7 drinks/week? -     Patient history of type 2 diabetes and dyslipidemia.  Reports of compliance to medication use.    Reports of vaginal discharge.  Her OB/GYN called in her metronidazole prescription which she would like her wet prep checked today to confirm that.    Current providers sharing in care for this patient include:   Patient Care Team:  Romel Mccall MD as PCP - General (Family Medicine)  Romel Mccall MD as Assigned PCP  Gavi Katz APRN CNP as Assigned OBGYN Provider  Keegan Cannon MD as Assigned Surgical Provider  Betty Frederick PA-C as Physician Assistant (Urology)    The following health maintenance items are reviewed in Epic and correct as of today:  Health Maintenance   Topic Date Due     MIGRAINE ACTION PLAN  Never done     ZOSTER IMMUNIZATION (1 of 2) Never done     DIABETIC FOOT EXAM  11/15/2018     ASTHMA ACTION PLAN  04/26/2020     COVID-19 Vaccine (3 - Booster for Pfizer series) 07/01/2021     MEDICARE ANNUAL WELLNESS VISIT  01/09/2022     ANNUAL REVIEW OF HM ORDERS  01/20/2023     A1C  03/21/2023     WILL ASSESSMENT  03/21/2023     ASTHMA CONTROL TEST  03/21/2023     PHQ-9  03/21/2023     BMP  05/31/2023     EYE EXAM  06/16/2023     LIPID  09/21/2023     MICROALBUMIN  09/21/2023     FALL RISK ASSESSMENT  09/21/2023     MAMMO SCREENING  02/15/2024     DEXA  09/28/2025     ADVANCE CARE PLANNING  09/29/2025     COLORECTAL CANCER SCREENING  07/01/2026     DTAP/TDAP/TD IMMUNIZATION (4 - Td or Tdap) 01/27/2031     HEPATITIS C SCREENING  Completed     HIV SCREENING  Completed     DEPRESSION ACTION PLAN  Completed     INFLUENZA VACCINE  Completed     Pneumococcal  Vaccine: 65+ Years  Completed     IPV IMMUNIZATION  Aged Out     MENINGITIS IMMUNIZATION  Aged Out     PAP  Discontinued     Patient Active Problem List   Diagnosis     Allergic rhinitis     External hemorrhoids     Vitamin D deficiency     Osteoarthritis, knee     Positive PPD     Panic disorder     Genital herpes     Advanced directives, counseling/discussion     DCIS (ductal carcinoma in situ) of breast     Anxiety     Hyperlipidemia LDL goal <100     Type 2 diabetes mellitus without complication, without long-term current use of insulin (H)     Mild intermittent asthma without complication     BOO (obstructive sleep apnea)     Left ureteral calculus     Palpitations     Kidney stone on left side     Recurrent vaginitis     Psychophysiological insomnia     Depression, major, recurrent, moderate (H)     PTSD (post-traumatic stress disorder)     Vasomotor symptoms due to menopause     Past Surgical History:   Procedure Laterality Date     ARTHRODESIS TOE(S)  9/16/2013    Procedure: ARTHRODESIS TOE(S);  BILATERAL GREAT TOE FUSION (C-ARM);  Surgeon: Mary Guan MD;  Location: [facility]     ARTHRODESIS TOE(S) Left 12/19/2016    Procedure: ARTHRODESIS TOE(S);  Surgeon: Mary Guan MD;  Location: [facility]     ARTHROSCOPY KNEE Left 2/2/11     BIOPSY BREAST  2/7/2014    Procedure: BIOPSY BREAST;  Re-excision Left Breast Cavity for Margins ;  Surgeon: Lisset Amador DO;  Location: [facility]     BIOPSY BREAST SEED LOCALIZATION  1/22/2014    Procedure: BIOPSY BREAST SEED LOCALIZATION;  Left Breast Seed Localized Excisional Biopsy ;  Surgeon: Lisset Amador DO;  Location: [facility] OR     COLONOSCOPY  2005    nl - repeat 2015     CYSTOSCOPY       CYSTOSCOPY, RETROGRADES, EXTRACT STONE, INSERT STENT, COMBINED Left 2/4/2021    Procedure: Video cystoscopy, balloon dilation of left ureter, left ureteroscopy with stone extraction, standby laser, left double-J stent placement (5-H[place]er x 24 cm).;  Surgeon: Craig Ferreira  MD DESHAWN;  Location: [facility]     EXTRACORPOREAL SHOCK WAVE LITHOTRIPSY (ESWL) Left 8/20/2021    Procedure: Left extracorporal shockwave lithotripsy, fluoroscopic interpretation <1 hour physician time;  Surgeon: Keegan Cannon MD;  Location: [facility]     FOOT SURGERY Bilateral 2013     HEMORRHOIDECTOMY BANDING      multiple times     HEMORRHOIDECTOMY INTERNAL N/A 7/24/2018    Procedure: HEMORRHOIDECTOMY INTERNAL;  three quadrant hemorrhoidectomy;  Surgeon: Lisa Patrick MD;  Location: [facility]     HYSTERECTOMY  7/1996    fibroids     REMOVE HARDWARE FOOT Left 2015     REPAIR TENDON FOOT Left 12/19/2016    Procedure: REPAIR TENDON FOOT;  Surgeon: Mary Guan MD;  Location: [facility]       Social History     Tobacco Use     Smoking status: Never     Smokeless tobacco: Never   Substance Use Topics     Alcohol use: No     Alcohol/week: 0.0 standard drinks     Family History   Problem Relation Age of Onset     Diabetes Mother      Breast Cancer Mother      Alcohol/Drug Father      Cancer Father      No Known Problems Sister      No Known Problems Brother      Diabetes Maternal Grandmother      Cerebrovascular Disease Maternal Grandmother      Cancer - colorectal Maternal Grandfather      No Known Problems Paternal Grandmother      No Known Problems Paternal Grandfather      No Known Problems Daughter      No Known Problems Son      No Known Problems Son      No Known Problems Son          Current Outpatient Medications   Medication Sig Dispense Refill     cetirizine (ZYRTEC) 10 MG tablet TAKE 1 TABLET(10 MG) BY MOUTH BID prn allergy 180 tablet 3     LORazepam (ATIVAN) 0.5 MG tablet        metroNIDAZOLE (FLAGYL) 250 MG tablet Take 2 tablets (500 mg) by mouth 2 times daily 28 tablet 11     fluticasone (FLONASE) 50 MCG/ACT nasal spray USE 1 SPRAY IN BOTH  NOSTRILS DAILY AS NEEDED  FOR RHINITIS OR ALLERGIES 32 g 4     lisinopril (ZESTRIL) 10 MG tablet Take 1 tablet (10 mg) by mouth daily 90 tablet 1     metFORMIN  (GLUCOPHAGE) 500 MG tablet Take 1 tablet (500 mg) by mouth daily (with breakfast) 90 tablet 3     montelukast (SINGULAIR) 10 MG tablet Take 1 tablet (10 mg) by mouth every morning 90 tablet 3     omeprazole (PRILOSEC OTC) 20 MG EC tablet Take 1 tablet (20 mg) by mouth daily 90 tablet 3     saccharomyces boulardii (FLORASTOR) 250 MG capsule Take 1 capsule (250 mg) by mouth 2 times daily 60 capsule 11     simvastatin (ZOCOR) 40 MG tablet Take 1 tablet (40 mg) by mouth At Bedtime 90 tablet 3     Allergies   Allergen Reactions     Amoxicillin Other (See Comments)     Yeast infection, severe itch within 24hr.     Codeine Nausea     Morphine Itching     Nitrofuran Derivatives Hives     Phenothiazines      sedation     Primatene Mist [Epinephrine Bitartrate] Itching     neck itch with primatene mist     Vicodin [Hydrocodone-Acetaminophen] Nausea     Dilaudid [Hydromorphone] Rash     Latex Itching and Rash         Breast CA Risk Assessment (FHS-7) 9/21/2022   Do you have a family history of breast, colon, or ovarian cancer? No / Unknown         Mammogram Screening: Recommended mammography every 1-2 years with patient discussion and risk factor consideration  Pertinent mammograms are reviewed under the imaging tab.    Review of Systems   Constitutional: Negative for chills and fever.   HENT: Negative for congestion, ear pain, hearing loss and sore throat.    Eyes: Negative for pain and visual disturbance.   Respiratory: Negative for cough and shortness of breath.    Cardiovascular: Negative for chest pain and peripheral edema.   Gastrointestinal: Negative for abdominal pain, constipation, diarrhea, heartburn, hematochezia and nausea.   Breasts:  Negative for tenderness, breast mass and discharge.   Genitourinary: Positive for vaginal discharge. Negative for dysuria, frequency, genital sores, hematuria, pelvic pain, urgency and vaginal bleeding.   Musculoskeletal: Negative for arthralgias, joint swelling and myalgias.   Skin:  "Negative for rash.   Neurological: Negative for dizziness, weakness, headaches and paresthesias.   Psychiatric/Behavioral: Negative for mood changes. The patient is nervous/anxious.          OBJECTIVE:   BP (!) 140/80   Pulse 53   Temp 97.3  F (36.3  C) (Tympanic)   Wt 72.1 kg (159 lb)   LMP 01/01/1985   SpO2 99%   BMI 24.90 kg/m   Estimated body mass index is 24.9 kg/m  as calculated from the following:    Height as of 5/25/22: 1.702 m (5' 7\").    Weight as of this encounter: 72.1 kg (159 lb).  Physical Exam  GENERAL: healthy, alert and no distress  EYES: Eyes grossly normal to inspection, PERRL and conjunctivae and sclerae normal  HENT: ear canals and TM's normal, nose and mouth without ulcers or lesions  NECK: no adenopathy, no asymmetry, masses, or scars and thyroid normal to palpation  RESP: lungs clear to auscultation - no rales, rhonchi or wheezes  BREAST: normal without masses, tenderness or nipple discharge and no palpable axillary masses or adenopathy  CV: regular rate and rhythm, normal S1 S2, no S3 or S4, no murmur, click or rub, no peripheral edema and peripheral pulses strong  ABDOMEN: soft, nontender, no hepatosplenomegaly, no masses and bowel sounds normal  MS: no gross musculoskeletal defects noted, no edema  SKIN: no suspicious lesions or rashes  NEURO: Normal strength and tone, mentation intact and speech normal  PSYCH: mentation appears normal, affect normal/bright    Results for orders placed or performed in visit on 09/21/22   HEMOGLOBIN A1C     Status: Abnormal   Result Value Ref Range    Hemoglobin A1C 6.2 (H) 0.0 - 5.6 %   Lipid panel reflex to direct LDL Non-fasting     Status: Abnormal   Result Value Ref Range    Cholesterol 178 <200 mg/dL    Triglycerides 145 <150 mg/dL    Direct Measure HDL 43 (L) >=50 mg/dL    LDL Cholesterol Calculated 106 (H) <=100 mg/dL    Non HDL Cholesterol 135 (H) <130 mg/dL    Patient Fasting > 8hrs? Yes     Narrative    Cholesterol  Desirable:  <200 " mg/dL    Triglycerides  Normal:  Less than 150 mg/dL  Borderline High:  150-199 mg/dL  High:  200-499 mg/dL  Very High:  Greater than or equal to 500 mg/dL    Direct Measure HDL  Female:  Greater than or equal to 50 mg/dL   Male:  Greater than or equal to 40 mg/dL    LDL Cholesterol  Desirable:  <100mg/dL  Above Desirable:  100-129 mg/dL   Borderline High:  130-159 mg/dL   High:  160-189 mg/dL   Very High:  >= 190 mg/dL    Non HDL Cholesterol  Desirable:  130 mg/dL  Above Desirable:  130-159 mg/dL  Borderline High:  160-189 mg/dL  High:  190-219 mg/dL  Very High:  Greater than or equal to 220 mg/dL   Albumin Random Urine Quantitative with Creat Ratio     Status: None   Result Value Ref Range    Creatinine Urine mg/dL 250 mg/dL    Albumin Urine mg/L 17 mg/L    Albumin Urine mg/g Cr 6.80 0.00 - 25.00 mg/g Cr   Wet prep - Clinic Collect     Status: Abnormal    Specimen: Vagina; Swab   Result Value Ref Range    Trichomonas Absent Absent    Yeast Absent Absent    Clue Cells Present (A) Absent    WBCs/high power field 3+ (A) None         ASSESSMENT / PLAN:     1. Encounter for annual wellness exam in Medicare patient      2. Seasonal allergic rhinitis, unspecified trigger  Resume the use of Zyrtec as needed for allergies  - cetirizine (ZYRTEC) 10 MG tablet; TAKE 1 TABLET(10 MG) BY MOUTH BID prn allergy  Dispense: 180 tablet; Refill: 3    3. Hyperlipidemia LDL goal <100  Cholesterol is well managed as well  - Lipid panel reflex to direct LDL Non-fasting; Future  - Lipid panel reflex to direct LDL Non-fasting    4. Type 2 diabetes mellitus without complication, without long-term current use of insulin (H)  Diabetes is well controlled.  Resume current regimen.  No changes.  - HEMOGLOBIN A1C; Future  - Albumin Random Urine Quantitative with Creat Ratio; Future  - HEMOGLOBIN A1C  - Albumin Random Urine Quantitative with Creat Ratio      8. Need for prophylactic vaccination and inoculation against influenza  *- INFLUENZA, QUAD,  "HD, PF, 65+ (FLUZONE HD)    9. Need for pneumococcal vaccine    - Pneumococcal 20 Valent Conjugate (PCV20)    10. Vaginal discharge    - Wet prep - Clinic Collect  Noted to have bacterial vaginosis.  Patient already has a prescription of metronidazole ordered by her OB doctor.  She can get it filled and get it started.  No signs of yeast infection.    COUNSELING:  Reviewed preventive health counseling, as reflected in patient instructions       Regular exercise       Healthy diet/nutrition    Estimated body mass index is 24.9 kg/m  as calculated from the following:    Height as of 5/25/22: 1.702 m (5' 7\").    Weight as of this encounter: 72.1 kg (159 lb).        She reports that she has never smoked. She has never used smokeless tobacco.      Appropriate preventive services were discussed with this patient, including applicable screening as appropriate for cardiovascular disease, diabetes, osteopenia/osteoporosis, and glaucoma.  As appropriate for age/gender, discussed screening for colorectal cancer, prostate cancer, breast cancer, and cervical cancer. Checklist reviewing preventive services available has been given to the patient.    Reviewed patients plan of care and provided an AVS. The Intermediate Care Plan ( asthma action plan, low back pain action plan, and migraine action plan) for Grecia meets the Care Plan requirement. This Care Plan has been established and reviewed with the Patient.    Counseling Resources:  ATP IV Guidelines  Pooled Cohorts Equation Calculator  Breast Cancer Risk Calculator  Breast Cancer: Medication to Reduce Risk  FRAX Risk Assessment  ICSI Preventive Guidelines  Dietary Guidelines for Americans, 2010  [employer]'s MyPlate  ASA Prophylaxis  Lung CA Screening    Romel Mccall MD  [facility]    Identified Health Risks:  "

## 2022-09-22 ENCOUNTER — TELEPHONE (OUTPATIENT)
Dept: FAMILY MEDICINE | Facility: CLINIC | Age: 67
End: 2022-09-22

## 2022-09-22 LAB
CHOLEST SERPL-MCNC: 178 MG/DL
CREAT UR-MCNC: 250 MG/DL
FASTING STATUS PATIENT QL REPORTED: YES
HDLC SERPL-MCNC: 43 MG/DL
LDLC SERPL CALC-MCNC: 106 MG/DL
MICROALBUMIN UR-MCNC: 17 MG/L
MICROALBUMIN/CREAT UR: 6.8 MG/G CR (ref 0–25)
NONHDLC SERPL-MCNC: 135 MG/DL
TRIGL SERPL-MCNC: 145 MG/DL

## 2022-09-22 RX ORDER — LISINOPRIL 10 MG/1
10 TABLET ORAL DAILY
Qty: 90 TABLET | Refills: 1 | Status: SHIPPED | OUTPATIENT
Start: 2022-09-22 | End: 2022-12-02

## 2022-09-22 RX ORDER — OMEPRAZOLE 20 MG/1
20 TABLET, DELAYED RELEASE ORAL DAILY
Qty: 90 TABLET | Refills: 3 | Status: SHIPPED | OUTPATIENT
Start: 2022-09-22 | End: 2023-10-11

## 2022-09-22 RX ORDER — MONTELUKAST SODIUM 10 MG/1
1 TABLET ORAL EVERY MORNING
Qty: 90 TABLET | Refills: 3 | Status: SHIPPED | OUTPATIENT
Start: 2022-09-22 | End: 2023-07-20

## 2022-09-22 RX ORDER — SIMVASTATIN 40 MG
40 TABLET ORAL AT BEDTIME
Qty: 90 TABLET | Refills: 3 | Status: SHIPPED | OUTPATIENT
Start: 2022-09-22 | End: 2023-04-17 | Stop reason: ALTCHOICE

## 2022-09-22 RX ORDER — FLUTICASONE PROPIONATE 50 MCG
SPRAY, SUSPENSION (ML) NASAL
Qty: 32 G | Refills: 4 | Status: SHIPPED | OUTPATIENT
Start: 2022-09-22 | End: 2024-09-17

## 2022-09-22 RX ORDER — SACCHAROMYCES BOULARDII 250 MG
250 CAPSULE ORAL 2 TIMES DAILY
Qty: 60 CAPSULE | Refills: 11 | Status: SHIPPED | OUTPATIENT
Start: 2022-09-22

## 2022-09-22 NOTE — TELEPHONE ENCOUNTER
S/w pt and updated her on result note below. Pt verbalized understanding. No further questions at this time.    Shelby EVNAS RN  EP Triage

## 2022-09-22 NOTE — TELEPHONE ENCOUNTER
----- Message from Romel Mccall MD sent at 9/21/2022  4:08 PM CDT -----  Please call the patient to notify patient that the wet prep shows bacterial vaginosis.  Patient already has a prescription of metronidazole called in by her OB doctor which she can get filled and start using that.  No signs of yeast infection.    Romel Mccall MD

## 2022-09-23 DIAGNOSIS — J30.2 SEASONAL ALLERGIC RHINITIS, UNSPECIFIED TRIGGER: ICD-10-CM

## 2022-09-23 RX ORDER — CETIRIZINE HYDROCHLORIDE 10 MG/1
TABLET ORAL
Qty: 30 TABLET | OUTPATIENT
Start: 2022-09-23

## 2022-09-24 ENCOUNTER — HOSPITAL ENCOUNTER (EMERGENCY)
Facility: CLINIC | Age: 67
Discharge: HOME | End: 2022-09-24
Attending: EMERGENCY MEDICINE | Admitting: EMERGENCY MEDICINE
Payer: COMMERCIAL

## 2022-09-24 ENCOUNTER — APPOINTMENT (OUTPATIENT)
Dept: GENERAL RADIOLOGY | Facility: CLINIC | Age: 67
End: 2022-09-24
Attending: PHYSICIAN ASSISTANT
Payer: COMMERCIAL

## 2022-09-24 ENCOUNTER — NURSE TRIAGE (OUTPATIENT)
Dept: NURSING | Facility: CLINIC | Age: 67
End: 2022-09-24

## 2022-09-24 VITALS
HEART RATE: 79 BPM | DIASTOLIC BLOOD PRESSURE: 107 MMHG | TEMPERATURE: 97.7 F | OXYGEN SATURATION: 98 % | RESPIRATION RATE: 18 BRPM | SYSTOLIC BLOOD PRESSURE: 161 MMHG

## 2022-09-24 DIAGNOSIS — S82.831A CLOSED AVULSION FRACTURE OF DISTAL END OF RIGHT FIBULA, INITIAL ENCOUNTER: ICD-10-CM

## 2022-09-24 DIAGNOSIS — S93.401A SPRAIN OF RIGHT ANKLE, UNSPECIFIED LIGAMENT, INITIAL ENCOUNTER: ICD-10-CM

## 2022-09-24 PROCEDURE — 73610 X-RAY EXAM OF ANKLE: CPT | Mod: RT

## 2022-09-24 PROCEDURE — 99284 EMERGENCY DEPT VISIT MOD MDM: CPT | Mod: 25

## 2022-09-24 PROCEDURE — 27786 TREATMENT OF ANKLE FRACTURE: CPT | Mod: RT

## 2022-09-24 ASSESSMENT — ENCOUNTER SYMPTOMS
ARTHRALGIAS: 1
JOINT SWELLING: 1

## 2022-09-24 ASSESSMENT — ACTIVITIES OF DAILY LIVING (ADL): ADLS_ACUITY_SCORE: 33

## 2022-09-25 NOTE — ED TRIAGE NOTES
Presents to triage with c/o R ankle pain. Patient was walking down stairs and missed the last stair causing her ankle to roll. Now having ankle pain and swelling. Denies hitting head or LOC. CMS intact     Triage Assessment     Row Name 09/24/22 0589       Triage Assessment (Adult)    Airway WDL WDL       Respiratory WDL    Respiratory WDL WDL       Cardiac WDL    Cardiac WDL WDL

## 2022-09-25 NOTE — DISCHARGE INSTRUCTIONS
Ice/elevate your ankle as much as possible for the next 4 days.    Avoid weight bearing until you see the orthopedic surgeon in clinic.

## 2022-09-25 NOTE — ED PROVIDER NOTES
History   Chief Complaint:  Fall and Ankle Pain       HPI   Grecia Kilgore is a 67 year old female who presents with sprained right ankle after rolling it around 2200 today when she rolled it as she was walking down stairs. She iced and elevated her ankle but her foot began to feel weak prompting her to present. She denies pain in calf. She has taken tylenol and feels that her pain is controled. She has been seen by [facility] before and will schedule follow up with them.  No head injury.  No other injuries.    Review of Systems   Musculoskeletal: Positive for arthralgias, gait problem and joint swelling.   All other systems reviewed and are negative.      Allergies:  Codeine  Morphine  Nitrofuran Derivatives  Phenothiazines  Primatene Mist [Epinephrine Bitartrate]  Vicodin [Hydrocodone-Acetaminophen]  Dilaudid [Hydromorphone]  Latex     Medications:    Albuterol   Zyrtec  Flonase  Atarax  Lisinopril   Metformin   Mirtazapine   Montelukast   Simvastatin   Tizanidine      Past Medical History:    Allergic Rhinitis             Anemia  Anxiety  Chronic Back Pain  DCIS  Depression  Diabetes Mellitus Type 2          Diverticulosis  Eating Disorder             Exploding Head Syndrome  External Hemorrhoids  Gastroesophageal Reflux Disease  Hepatitis A  Hypercholesterolemia  Migraine  Mild Persistent Asthma             Mumps  Nephrolithiasis  Ovarian Cyst  Pancreatitis  PTSD  STD  Tuberculosis     Past Surgical History:    Hysterectomy  Foot surgery left  Hemorrhoidectomy banding  Remove hardware foot left  Hemorrhoidectomy internal  Arthrodesis toe(s) left  Repair tendon foot  Left ureter stent placement and stone removal     Family History:    Alcohol/Drug in her father  Breast Cancer in her mother  Cancer in her father  Diabetes in her mother    Social History:  The patient presents to the ED with brother  PCP: Romel Mccall    Physical Exam     Patient Vitals for the past 24 hrs:   BP Temp Temp src Pulse Resp SpO2    09/24/22 2219 (!) 161/107 97.7  F (36.5  C) Temporal 79 18 98 %       Physical Exam  Nursing note and vitals reviewed.  Constitutional:  Appears well-developed and well-nourished.   HENT:   Head:    Atraumatic.   Eyes:    Pupils are equal, round, and reactive to light.   Neck:    Normal range of motion. Neck supple.      No tracheal deviation present. No thyromegaly present.   Cardiovascular:  Normal rate, regular rhythm, no murmur   Pulmonary/Chest: Breath sounds are clear and equal without wheezes or crackles.  Musculoskeletal:  Right leg exam: tenderness and swelling over lateral malleolus. Foot and proximal 5th metatarsal non tender. Proximal tibia and fibula non tender. Good dorsalis pedis pulse. Sensation intact distally.   Neurological:   Alert and oriented to person, place, and time.   Skin:    Skin is warm and dry. No rash noted. No pallor.      Emergency Department Course   Imaging:  Ankle XR, G/E 3 views, right   Final Result   IMPRESSION: Soft tissue swelling adjacent to the lateral malleolus. Tiny calcification along the tip of the lateral malleolus, equivocal for fracture. Bones otherwise unremarkable. Ankle mortise preserved.        Report per radiology    Emergency Department Course:  Reviewed:  I reviewed nursing notes, vitals, past medical history and Care Everywhere    Assessments:  2245 I obtained history and examined the patient as noted above.   2256 I rechecked the patient and explained findings.     Disposition:  The patient was discharged to home.     Impression & Plan   Medical Decision Making:  Grecia Kilgore is a 67 year old female who presents for evaluation of ankle pain.  Signs and symptoms are consistent with an ankle sprain with likely a small avulsion fracture of the distal fibula. No signs of dislocation, septic arthritis, gout, pseudogout, fracture, cellulitis, etc. The patients neurovascular status is normal. A head to toe trauma exam is otherwise negative; the likelihood of  other serious sequelae of trauma (spine, head, chest, abdomen, other extremities, pelvis) is low.  Plan is for avoidance of weightbearing for the next 4 days, RICE treatment with ice 15 minutes on, 1 hour off, ace wrap.  Patient will advance weightbearing as tolerated and follow-up with orthopedic surgery in 1 week.  She was given a fracture boot and crutches.    Diagnosis:    ICD-10-CM    1. Closed avulsion fracture of distal end of right fibula, initial encounter  S82.831A    2. Sprain of right ankle, unspecified ligament, initial encounter  S93.401A        Scribe Disclosure:  I, Michael Hawley, am serving as a scribe at 10:49 PM on 9/24/2022 to document services personally performed by Svetlana House MD based on my observations and the provider's statements to me.         Svetlana House MD  09/25/22 0035

## 2022-09-25 NOTE — TELEPHONE ENCOUNTER
Pt called stating she just fell and she is concern if she should come in, or do what she was doing icing and elevating? Pt stated he foot are going to sleep(numb).       Pt stated she was coming down from her brothers stairs and though she hit the last stair and when she step down she was falling, and her foot folded. Pt denied broken bones, bleeding or pain.      Per protocal pt was advised to go tot he emergency department and she stated okay.      Farshad Hawley RN  [facility]       COVID 19 Nurse Triage Plan/Patient Instructions    Please be aware that novel coronavirus (COVID-19) may be circulating in the community. If you develop symptoms such as fever, cough, or SOB or if you have concerns about the presence of another infection including coronavirus (COVID-19), please contact your health care provider or visit [email or web address].     Disposition/Instructions    ED Visit recommended. Follow protocol based instructions.     Bring Your Own Device:  Please also bring your smart device(s) (smart phones, tablets, laptops) and their charging cables for your personal use and to communicate with your care team during your visit.    Thank you for taking steps to prevent the spread of this virus.  o Limit your contact with others.  o Wear a simple mask to cover your cough.  o Wash your hands well and often.    Resources    [facility]: About COVID-19: [email or web address]    CDC: What to Do If You're Sick: www.cdc.gov/coronavirus/2019-ncov/about/steps-when-sick.html    CDC: Ending Home Isolation: www.cdc.gov/coronavirus/2019-ncov/hcp/disposition-in-home-patients.html     CDC: Caring for Someone: www.cdc.gov/coronavirus/2019-ncov/if-you-are-sick/care-for-someone.html     [facility]: Interim Guidance for Hospital Discharge to Home: www.health.[facility].[place]/diseases/coronavirus/hcp/hospdischarge.pdf    [facility] clinical trials (COVID-19 research studies):  [email or web address].[email or web address]-clinical-trials     Below are the COVID-19 hotlines at the [place] Department of Health ([facility]). Interpreters are available.   o For health questions: Call 654-856-2047 or 1-385.666.3268 (7 a.m. to 7 p.m.)  o For questions about schools and childcare: Call 997-462-2123 or 1-203.529.7881 (7 a.m. to 7 p.m.)     Reason for Disposition    [1] Numbness (new loss of sensation) of toe(s) AND [2] present now    Additional Information    Negative: Serious injury with multiple fractures (broken bones)    Negative: [1] Major bleeding (e.g., actively dripping or spurting) AND [2] can't be stopped    Negative: Amputation    Negative: Looks like a dislocated joint (very crooked or deformed)    Negative: Sounds like a life-threatening emergency to the triager    Negative: Wound looks infected    Negative: Caused by an animal bite    Negative: Caused by a human bite    Negative: Puncture wound of foot    Negative: Toe injury is main concern    Negative: Cast problems or questions    Negative: Bullet wound, stabbed by knife, or other serious penetrating wound    Negative: Skin is split open or gaping (or length > 1/2 inch or 12 mm)    Negative: [1] Bleeding AND [2] won't stop after 10 minutes of direct pressure (using correct technique)    Negative: [1] Dirt in the wound AND [2] not removed with 15 minutes of scrubbing    Negative: Can't stand (bear weight) or walk    Protocols used: ANKLE AND FOOT INJURY-A-AH

## 2022-09-26 ENCOUNTER — PATIENT OUTREACH (OUTPATIENT)
Dept: FAMILY MEDICINE | Facility: CLINIC | Age: 67
End: 2022-09-26

## 2022-09-26 DIAGNOSIS — S82.831A CLOSED AVULSION FRACTURE OF DISTAL END OF RIGHT FIBULA, INITIAL ENCOUNTER: Primary | ICD-10-CM

## 2022-09-26 NOTE — TELEPHONE ENCOUNTER
What type of discharge? Emergency Department  Risk of Hospital admission or ED visit: 62%  Is a TCM episode required? No  When should the patient follow up with PCP? 7 days of discharge.    Clarissa Roman RN  [facility]

## 2022-09-27 ENCOUNTER — TELEPHONE (OUTPATIENT)
Dept: FAMILY MEDICINE | Facility: CLINIC | Age: 67
End: 2022-09-27

## 2022-09-27 NOTE — TELEPHONE ENCOUNTER
Patient states she would like the referral for her ankle faxed to [facility] at [phone].    Thank you.

## 2022-09-27 NOTE — TELEPHONE ENCOUNTER
Patient calling back to inform that she does not want to go back [facility].    Patient needs referral to [facility] in order to covered by her insurance.    Clarissa Roman RN

## 2022-09-27 NOTE — TELEPHONE ENCOUNTER
No obvious need for the patient to follow up with PCP. States that she injured her ankle. She is scheduling with orthopedics.     Please advise if you need to see patient for a hospital follow up.  Clarissa Roman RN

## 2022-10-05 ENCOUNTER — TRANSFERRED RECORDS (OUTPATIENT)
Dept: HEALTH INFORMATION MANAGEMENT | Facility: CLINIC | Age: 67
End: 2022-10-05

## 2022-10-10 ENCOUNTER — TRANSFERRED RECORDS (OUTPATIENT)
Dept: HEALTH INFORMATION MANAGEMENT | Facility: CLINIC | Age: 67
End: 2022-10-10

## 2022-10-17 ENCOUNTER — TRANSCRIBE ORDERS (OUTPATIENT)
Dept: OTHER | Age: 67
End: 2022-10-17

## 2022-10-17 DIAGNOSIS — M79.673 FOOT PAIN: ICD-10-CM

## 2022-10-17 DIAGNOSIS — M25.579 ANKLE PAIN: Primary | ICD-10-CM

## 2022-10-24 ENCOUNTER — TRANSFERRED RECORDS (OUTPATIENT)
Dept: HEALTH INFORMATION MANAGEMENT | Facility: CLINIC | Age: 67
End: 2022-10-24

## 2022-10-24 ENCOUNTER — TRANSCRIBE ORDERS (OUTPATIENT)
Dept: OTHER | Age: 67
End: 2022-10-24

## 2022-11-01 ENCOUNTER — THERAPY VISIT (OUTPATIENT)
Dept: PHYSICAL THERAPY | Facility: CLINIC | Age: 67
End: 2022-11-01
Payer: COMMERCIAL

## 2022-11-01 DIAGNOSIS — M25.579 ANKLE PAIN: ICD-10-CM

## 2022-11-01 DIAGNOSIS — M79.673 FOOT PAIN: ICD-10-CM

## 2022-11-01 PROCEDURE — 97161 PT EVAL LOW COMPLEX 20 MIN: CPT | Mod: GP | Performed by: PHYSICAL THERAPIST

## 2022-11-01 PROCEDURE — 97110 THERAPEUTIC EXERCISES: CPT | Mod: GP | Performed by: PHYSICAL THERAPIST

## 2022-11-01 NOTE — PROGRESS NOTES
[facility]    OUTPATIENT Physical Therapy ORTHOPEDIC EVALUATION  PLAN OF TREATMENT FOR OUTPATIENT REHABILITATION  (COMPLETE FOR INITIAL CLAIMS ONLY)  Patient's Last Name, First Name, M.I.  YOB: 1955  Grecia Kilgore    Provider s Name:  [facility]   Medical Record No.  [id number]   Start of Care Date:  11/01/22   Onset Date:   09/24/22   Treatment Diagnosis:  R ankle pain Medical Diagnosis:     Ankle pain  Foot pain       Goals:     11/01/22 0500   Body Part   Goals listed below are for R ankle fracture   Goal #1   Goal #1 ambulation   Previous Functional Level No restrictions   Current Functional Level Minutes patient can walk;with brace   Performance Level 20   STG Target Performance Minutes patient will be able to walk   Performance Level 30   Rationale for safe household ambulation;for safe outdoor household ambulation;for safe community ambulation   Due Date 11/22/22    LTG Target Performance Minutes patient will be able to  walk   Performance Level 45   Rationale for safe household ambulation;for safe outdoor household ambulation;for safe community ambulation   Due Date 01/10/23       Therapy Frequency:  1 x week  Predicted Duration of Therapy Intervention:  12 weeks    Maxwell Savage, PT                 I CERTIFY THE NEED FOR THESE SERVICES FURNISHED UNDER        THIS PLAN OF TREATMENT AND WHILE UNDER MY CARE .             Physician Signature               Date    X_____________________________________________________                         Certification Date From:  11/01/22   Certification Date To:  01/24/23    Referring Provider:  Dillon Fontenot    Initial Assessment        See Epic Evaluation SOC Date: 11/01/22

## 2022-11-01 NOTE — PROGRESS NOTES
Physical Therapy Initial Evaluation  Subjective:  The history is provided by the patient. No  was used.   Patient Health History  Grecia Kilgore being seen for R ankle pain.     Problem began: 9/24/2022.   Problem occurred: Fell at bottom of steps   Pain is reported as 6/10 on pain scale.  General health as reported by patient is good.  Pertinent medical history includes: anemia, asthma, cancer, depression, diabetes, fibromyalgia, menopausal and sleep disorder/apnea.   Red flags:  None as reported by patient.  Medical allergies: See EPIC.   Surgeries include:  Orthopedic surgery and cancer surgery. Other surgery history details: Breast cancer, B 1st toe fusions.    Current medications:  None.    Current occupation is retired.                     Therapist Generated HPI Evaluation  Problem details: Pt. complains of right ankle pain  that has been present since falling at bottom of steps and suffering and inversion sprain/distal fibular fracture on 9/24/22.  .         Type of problem:  Right ankle.    This is a new condition.  Condition occurred with:  A fall/slip.  Where condition occurred: in the community.  Patient reports pain:  Lateral and anterior.  Pain is described as aching and is intermittent.  Pain radiates to:  No radiation and ankle. Pain is worse during the day and worse in the P.M..  Since onset symptoms are gradually improving.  Associated symptoms:  Loss of motion/stiffness and loss of strength. Symptoms are exacerbated by standing, weight bearing, walking and descending stairs  and relieved by rest and ice.  Special tests included:  X-ray.    Barriers include:  None as reported by patient.                        Objective:    Gait:    Gait Type:  Antalgic   Weight Bearing Status:  WBAT   Assistive Devices:  Brace            Ankle/Foot Evaluation  ROM:      PROM:    Dorsiflexion:  Left:    22     Right:   16   Plantarflexion:  Left:    72     Right:   53                    PALPATION:     Left ankle tenderness not present at:   medial malleolus or lateral malleolus  Right ankle tenderness present at:   medial malleolus and lateral malleolus                                                        General     ROS    Assessment/Plan:    Patient is a 67 year old female with right side knee complaints.    Patient has the following significant findings with corresponding treatment plan.                Diagnosis 1:  R ankle pain  Pain -  hot/cold therapy, manual therapy, self management, education, directional preference exercise and home program  Decreased ROM/flexibility - manual therapy and therapeutic exercise  Decreased joint mobility - manual therapy and therapeutic exercise  Decreased strength - therapeutic exercise and therapeutic activities  Impaired balance - neuro re-education and therapeutic activities  Decreased proprioception - neuro re-education and therapeutic activities  Inflammation - cold therapy  Impaired gait - gait training  Impaired muscle performance - neuro re-education  Decreased function - therapeutic activities    Therapy Evaluation Codes:      1) Clinical presentation characteristics are:   Stable/Uncomplicated.  2) Decision-Making    Low complexity using standardized patient assessment instrument and/or measureable assessment of functional outcome.  Cumulative Therapy Evaluation is: Low complexity.    Previous and current functional limitations:  (See Goal Flow Sheet for this information)    Short term and Long term goals: (See Goal Flow Sheet for this information)     Communication ability:  Patient appears to be able to clearly communicate and understand verbal and written communication and follow directions correctly.  Treatment Explanation - The following has been discussed with the patient:   RX ordered/plan of care  Anticipated outcomes  Possible risks and side effects  This patient would benefit from PT intervention to resume normal  activities.   Rehab potential is good.    Frequency:  1 X week, once daily  Duration:  for 12 weeks  Discharge Plan:  Achieve all LTG.  Independent in home treatment program.  Reach maximal therapeutic benefit.    Please refer to the daily flowsheet for treatment today, total treatment time and time spent performing 1:1 timed codes.

## 2022-11-04 ENCOUNTER — THERAPY VISIT (OUTPATIENT)
Dept: PHYSICAL THERAPY | Facility: CLINIC | Age: 67
End: 2022-11-04
Payer: COMMERCIAL

## 2022-11-04 DIAGNOSIS — M25.579 ANKLE PAIN: Primary | ICD-10-CM

## 2022-11-04 PROCEDURE — 97110 THERAPEUTIC EXERCISES: CPT | Mod: GP | Performed by: PHYSICAL THERAPIST

## 2022-11-08 ENCOUNTER — IMMUNIZATION (OUTPATIENT)
Dept: FAMILY MEDICINE | Facility: CLINIC | Age: 67
End: 2022-11-08
Payer: COMMERCIAL

## 2022-11-08 ENCOUNTER — TRANSCRIBE ORDERS (OUTPATIENT)
Dept: OTHER | Age: 67
End: 2022-11-08

## 2022-11-08 ENCOUNTER — THERAPY VISIT (OUTPATIENT)
Dept: PHYSICAL THERAPY | Facility: CLINIC | Age: 67
End: 2022-11-08
Payer: COMMERCIAL

## 2022-11-08 DIAGNOSIS — M25.562 BILATERAL KNEE PAIN: ICD-10-CM

## 2022-11-08 DIAGNOSIS — M25.561 BILATERAL KNEE PAIN: ICD-10-CM

## 2022-11-08 DIAGNOSIS — M79.671 BILATERAL FOOT PAIN: ICD-10-CM

## 2022-11-08 DIAGNOSIS — M79.673 FOOT PAIN: ICD-10-CM

## 2022-11-08 DIAGNOSIS — M54.9 BACK PAIN: Primary | ICD-10-CM

## 2022-11-08 DIAGNOSIS — M79.672 BILATERAL FOOT PAIN: ICD-10-CM

## 2022-11-08 DIAGNOSIS — Z23 NEED FOR VACCINATION: Primary | ICD-10-CM

## 2022-11-08 DIAGNOSIS — M25.579 ANKLE PAIN: Primary | ICD-10-CM

## 2022-11-08 DIAGNOSIS — M62.50 MUSCLE ATROPHY: ICD-10-CM

## 2022-11-08 PROCEDURE — 97112 NEUROMUSCULAR REEDUCATION: CPT | Mod: GP

## 2022-11-08 PROCEDURE — 97110 THERAPEUTIC EXERCISES: CPT | Mod: GP

## 2022-11-08 PROCEDURE — 99207 PR NO CHARGE NURSE ONLY: CPT

## 2022-11-10 ENCOUNTER — THERAPY VISIT (OUTPATIENT)
Dept: PHYSICAL THERAPY | Facility: CLINIC | Age: 67
End: 2022-11-10
Payer: COMMERCIAL

## 2022-11-10 DIAGNOSIS — M25.552 HIP PAIN, LEFT: ICD-10-CM

## 2022-11-10 DIAGNOSIS — M54.50 BILATERAL LOW BACK PAIN WITHOUT SCIATICA: Primary | ICD-10-CM

## 2022-11-10 PROCEDURE — 97161 PT EVAL LOW COMPLEX 20 MIN: CPT | Mod: GP

## 2022-11-10 PROCEDURE — 97110 THERAPEUTIC EXERCISES: CPT | Mod: GP

## 2022-11-10 ASSESSMENT — ACTIVITIES OF DAILY LIVING (ADL)
HOW_WOULD_YOU_RATE_THE_OVERALL_FUNCTION_OF_YOUR_KNEE_DURING_YOUR_USUAL_DAILY_ACTIVITIES?: SEVERELY ABNORMAL
STAND: ACTIVITY IS VERY DIFFICULT
AS_A_RESULT_OF_YOUR_KNEE_INJURY,_HOW_WOULD_YOU_RATE_YOUR_CURRENT_LEVEL_OF_DAILY_ACTIVITY?: ABNORMAL
KNEE_ACTIVITY_OF_DAILY_LIVING_SCORE: 14.29
SIT WITH YOUR KNEE BENT: I AM UNABLE TO DO THE ACTIVITY
WEAKNESS: THE SYMPTOM PREVENTS ME FROM ALL DAILY ACTIVITIES
GO UP STAIRS: ACTIVITY IS VERY DIFFICULT
SWELLING: THE SYMPTOM PREVENTS ME FROM ALL DAILY ACTIVITIES
STIFFNESS: THE SYMPTOM AFFECTS MY ACTIVITY SEVERELY
KNEEL ON THE FRONT OF YOUR KNEE: I AM UNABLE TO DO THE ACTIVITY
WALK: ACTIVITY IS VERY DIFFICULT
PAIN: THE SYMPTOM PREVENTS ME FROM ALL DAILY ACTIVITIES
RAW_SCORE: 10
GIVING WAY, BUCKLING OR SHIFTING OF KNEE: THE SYMPTOM AFFECTS MY ACTIVITY SLIGHTLY
GO DOWN STAIRS: I AM UNABLE TO DO THE ACTIVITY
LIMPING: THE SYMPTOM AFFECTS MY ACTIVITY SEVERELY
KNEE_ACTIVITY_OF_DAILY_LIVING_SUM: 10
RISE FROM A CHAIR: ACTIVITY IS FAIRLY DIFFICULT
SQUAT: I AM UNABLE TO DO THE ACTIVITY

## 2022-11-10 NOTE — PROGRESS NOTES
[facility]    OUTPATIENT Physical Therapy ORTHOPEDIC EVALUATION  PLAN OF TREATMENT FOR OUTPATIENT REHABILITATION  (COMPLETE FOR INITIAL CLAIMS ONLY)  Patient's Last Name, First Name, M.I.  YOB: 1955  Grecia Kilgore    Provider s Name:  [facility]   Medical Record No.  [id number]   Start of Care Date:  11/10/22   Onset Date:   11/08/22 (MD order)   Treatment Diagnosis:  LBP, hip and knee pain Medical Diagnosis:     Bilateral low back pain without sciatica  Hip pain, left       Goals:     11/10/22 0500   Body Part   Goals listed below are for Low back, hip, knee pain   Goal #2   Goal #2 standing   Previous Functional Level No restrictions   Current Functional Level Minutes patient can stand   Performance level 15 min w/ increased LBP/hip pain   STG Target Performance Minutes patient will be able to stand   Performance level 20 min pain no greater than 4/10   Rationale for housekeeping tasks such as vacuuming, bed making, mowing, gardening;for personal hygiene;for meal preparation;for safe household ambulation;for safe community ambulation   Due date 12/15/22   LTG Target Performance Minutes patient will be able to stand   Performance Level 30 min pain no greater than 2/10   Rationale for housekeeping tasks such as vacuuming, bed making, mowing;for personal hygiene;for meal preparation;for safe household ambulation;for safe community ambulation   Due date 02/02/23       Therapy Frequency:  1x/week  Predicted Duration of Therapy Intervention:  4 weeks tapering to 2x/mo for 2 months    Zahida Kline, PT                 I CERTIFY THE NEED FOR THESE SERVICES FURNISHED UNDER        THIS PLAN OF TREATMENT AND WHILE UNDER MY CARE .             Physician Signature               Date    X_____________________________________________________                          Certification Date From:  11/10/22   Certification Date To:  02/02/23    Referring Provider:  Dillon Fontenot    Initial Assessment        See Epic Evaluation SOC Date: 11/10/22

## 2022-11-10 NOTE — PROGRESS NOTES
Physical Therapy Initial Evaluation  Therapist Impression: Grecia is a 67 year old year old female referred to physical therapy by Dillon Fontenot DO at [facility] for treatment of back pain, knee pain, foot pain and muscle atrophy. Subjective history and objective findings are consistent with chronic LBP and compensation post ankle fracture. Due to these impairments, patient has difficulty with sitting, standing, walking. Patient will benefit from skilled PT to address impairments/limitations in order to reach patient's goals, facilitate return to prior level of function, and maximize participation.    KEY FINDINGS:  1. No directional preference noted today  2. B hip flexion, extension and ABD weakness  3. Negative neural tension  4. Somewhat limited evaluation d/t multiple body parts    Subjective:  The history is provided by the patient. No  was used.   Therapist Generated HPI Evaluation  Problem details: Pt presents with low back pain, B hip pain and B knee pain. This all started after her R ankle fracture, using boot and crutches. Has history of back and knee pain. Has been working with chiropractor, hasn't been able to make progress. .         Type of problem:  Lumbar.    Chronicity: acute on chronic.  Condition occurred with:  Repetition/overuse.  Where condition occurred: other.  Patient reports pain:  Lumbar spine left and lumbar spine right.  Pain is described as aching (throbbing) and is constant.  Pain radiates to:  Gluteals left, gluteals right, thigh left and thigh right (B hips, B calves). Pain timing: not time dependent.  Since onset symptoms are unchanged.  Associated symptoms:  Loss of strength and loss of motion/stiffness (N/T B calves). Symptoms are exacerbated by sitting and standing (walking more than 15 min)  and relieved by ice (changing positions).  Special tests included:  MRI (most recent 2021).  Previous treatment includes chiropractic.   Barriers include:  None as  reported by patient.    Patient Health History         Pain is reported as 7/10 on pain scale.  General health as reported by patient is good.  Pertinent medical history includes: asthma, cancer, depression, diabetes, fibromyalgia, incontinence and menopausal.   Red flags:  None as reported by patient.  Medical allergies: latex.           Current occupation is retired.   Primary job tasks include:  Prolonged sitting and prolonged standing.                                    Objective:  System         Lumbar/SI Evaluation  ROM:  Arom wnl lumbar: * indicates pain.  AROM Lumbar:   Flexion:            WNL  Ext:                    Min loss * L low back   Side Bend:        Left:  Mod loss * L low back    Right:  Min loss * L low back  Rotation:           Left:  Min loss * L low back    Right:  Min loss * L low back  Side Glide:        Left:     Right:           Lumbar Myotomes:    T12-L3 (Hip Flex):  Left: 5-    Right: 5-  L2-4 (Quads):  Left:  5    Right:  5  L4 (Ankle DF):  Left:  5    Right:  5  L5 (Great Toe Ext): Left: 5    Right: 5   S1 (Toe Raise):  Left: 5    Right: 4  Lumbar DTR's:  not assessed        Lumbar Dermtomes:  not assessed                Neural Tension/Mobility:      Left side:SLR; SLR w/DF or Femoral Nerve  negative.     Right side:   SLR w/DF; Femoral Nerve or SLR  negative.   Lumbar Palpation:  Palpation (lumbar): L>R.  Tenderness present at Left:    Quadratus Lumborum; Erector Spinae; Piriformis; PSIS; Gluteus Medius and Greater Trochanter  Tenderness present at Right: Quadratus Lumborum; Erector Spinae; Piriformis; PSIS; Gluteus Medius and Greater Trochanter    Lumbar Provocation:      Left negative with:  PROM hip    Right negative with:  PROM hip                                      Hip Evaluation  Hip PROM:  Hip PROM:  Left Hip:    Normal (min hamstring and quad tightness)  Right Hip:  Normal                          Hip Strength:      Extension:  Left: 4+/5  Pain:Right: 5-/5    Pain:     Abduction:  Left: 4/5     Pain:Right: 4/5    Pain:                  Hip Special Testing:    Left hip positive for the following special tests:  Martin (lateral) and Fadir/Labrum (anterior)   Right hip positive for the following special tests:  Martin and Fadir/Labrum           Knee Evaluation:  ROM:  Prom wnl knee: WNL.            Strength:         Quad Set Left: Good    Pain:   Quad Set Right: Good    Pain:            Functional Testing:  : squat limited depth d/t pain, excessive anterior knee excursion.                  General     ROS    Assessment/Plan:    Patient is a 67 year old female with lumbar, both sides hip and both sides knee complaints.    Patient has the following significant findings with corresponding treatment plan.                Diagnosis 1:  LBP, hip and knee pain  Pain -  hot/cold therapy, US, electric stimulation, mechanical traction, manual therapy, splint/taping/bracing/orthotics, self management, education, directional preference exercise and home program  Decreased ROM/flexibility - manual therapy, therapeutic exercise, therapeutic activity and home program  Decreased joint mobility - manual therapy, therapeutic exercise, therapeutic activity and home program  Decreased strength - therapeutic exercise, therapeutic activities and home program  Impaired balance - neuro re-education, therapeutic activities and home program  Impaired gait - gait training, assistive devices and home program  Decreased function - therapeutic activities and home program  Impaired posture - neuro re-education, therapeutic activities and home program    Therapy Evaluation Codes:   Cumulative Therapy Evaluation is: Low complexity.    Previous and current functional limitations:  (See Goal Flow Sheet for this information)    Short term and Long term goals: (See Goal Flow Sheet for this information)     Communication ability:  Patient appears to be able to clearly communicate and understand verbal and written  communication and follow directions correctly.  Treatment Explanation - The following has been discussed with the patient:   RX ordered/plan of care  Anticipated outcomes  Possible risks and side effects  This patient would benefit from PT intervention to resume normal activities.   Rehab potential is excellent.    Frequency:  1 X week, once daily  Duration:  for 4 weeks tapering to 2 X a month over 8 weeks  Discharge Plan:  Achieve all LTG.  Independent in home treatment program.  Reach maximal therapeutic benefit.    Please refer to the daily flowsheet for treatment today, total treatment time and time spent performing 1:1 timed codes.

## 2022-11-10 NOTE — PROGRESS NOTES
[facility]    OUTPATIENT Physical Therapy ORTHOPEDIC EVALUATION  PLAN OF TREATMENT FOR OUTPATIENT REHABILITATION  (COMPLETE FOR INITIAL CLAIMS ONLY)  Patient's Last Name, First Name, M.I.  YOB: 1955  Grecia Kilgore    Provider s Name:  [facility]   Medical Record No.  [id number]   Start of Care Date:  11/10/22   Onset Date:   11/08/22 (MD order)   Treatment Diagnosis:  LBP, hip and knee pain Medical Diagnosis:  Data Unavailable       Goals:     11/10/22 0500   Body Part   Goals listed below are for Low back, hip, knee pain   Goal #2   Goal #2 standing   Previous Functional Level No restrictions   Current Functional Level Minutes patient can stand   Performance level 15 min w/ increased LBP/hip pain   STG Target Performance Minutes patient will be able to stand   Performance level 20 min pain no greater than 4/10   Rationale for housekeeping tasks such as vacuuming, bed making, mowing, gardening;for personal hygiene;for meal preparation;for safe household ambulation;for safe community ambulation   Due date 12/15/22   LTG Target Performance Minutes patient will be able to stand   Performance Level 30 min pain no greater than 2/10   Rationale for housekeeping tasks such as vacuuming, bed making, mowing;for personal hygiene;for meal preparation;for safe household ambulation;for safe community ambulation   Due date 02/02/23       Therapy Frequency:  1x/week  Predicted Duration of Therapy Intervention:  4 weeks tapering to 2x/mo for 2 months    Zahida Kline PT                 I CERTIFY THE NEED FOR THESE SERVICES FURNISHED UNDER        THIS PLAN OF TREATMENT AND WHILE UNDER MY CARE .             Physician Signature               Date    X_____________________________________________________                         Certification Date From:  11/10/22   Certification  Date To:  02/02/23    Referring Provider:  Dillon Fontenot    Initial Assessment        See Epic Evaluation SOC Date: 11/10/22

## 2022-11-14 ENCOUNTER — THERAPY VISIT (OUTPATIENT)
Dept: PHYSICAL THERAPY | Facility: CLINIC | Age: 67
End: 2022-11-14
Payer: COMMERCIAL

## 2022-11-14 DIAGNOSIS — M25.571 CHRONIC PAIN OF RIGHT ANKLE: Primary | ICD-10-CM

## 2022-11-14 DIAGNOSIS — G89.29 CHRONIC PAIN OF RIGHT ANKLE: Primary | ICD-10-CM

## 2022-11-14 DIAGNOSIS — M79.671 RIGHT FOOT PAIN: ICD-10-CM

## 2022-11-14 PROCEDURE — 97112 NEUROMUSCULAR REEDUCATION: CPT | Mod: GP | Performed by: PHYSICAL THERAPIST

## 2022-11-14 PROCEDURE — 97110 THERAPEUTIC EXERCISES: CPT | Mod: GP | Performed by: PHYSICAL THERAPIST

## 2022-11-18 ENCOUNTER — THERAPY VISIT (OUTPATIENT)
Dept: PHYSICAL THERAPY | Facility: CLINIC | Age: 67
End: 2022-11-18
Payer: COMMERCIAL

## 2022-11-18 DIAGNOSIS — M25.571 ACUTE RIGHT ANKLE PAIN: ICD-10-CM

## 2022-11-18 DIAGNOSIS — M25.571 CHRONIC PAIN OF RIGHT ANKLE: Primary | ICD-10-CM

## 2022-11-18 DIAGNOSIS — M25.579 ANKLE PAIN: ICD-10-CM

## 2022-11-18 DIAGNOSIS — M79.671 RIGHT FOOT PAIN: ICD-10-CM

## 2022-11-18 DIAGNOSIS — G89.29 CHRONIC PAIN OF RIGHT ANKLE: Primary | ICD-10-CM

## 2022-11-18 PROCEDURE — 97110 THERAPEUTIC EXERCISES: CPT | Mod: GP | Performed by: PHYSICAL THERAPIST

## 2022-11-18 PROCEDURE — 97112 NEUROMUSCULAR REEDUCATION: CPT | Mod: GP | Performed by: PHYSICAL THERAPIST

## 2022-11-26 DIAGNOSIS — E11.9 TYPE 2 DIABETES MELLITUS WITHOUT COMPLICATION, WITHOUT LONG-TERM CURRENT USE OF INSULIN (H): ICD-10-CM

## 2022-11-30 NOTE — TELEPHONE ENCOUNTER
BP Readings from Last 3 Encounters:   09/24/22 (!) 161/107   09/21/22 (!) 140/80   05/31/22 (!) 160/86     Creatinine   Date Value Ref Range Status   05/31/2022 0.80 0.52 - 1.04 mg/dL Final   05/03/2021 1.01 0.52 - 1.04 mg/dL Final     NOT RN protocol.  BP out of range.    Please refill as appropriate.  Thank you,    Shahnaz Desir RN  [facility]

## 2022-12-02 RX ORDER — LISINOPRIL 10 MG/1
TABLET ORAL
Qty: 100 TABLET | Refills: 2 | Status: SHIPPED | OUTPATIENT
Start: 2022-12-02 | End: 2023-08-08

## 2022-12-09 ENCOUNTER — THERAPY VISIT (OUTPATIENT)
Dept: PHYSICAL THERAPY | Facility: CLINIC | Age: 67
End: 2022-12-09
Payer: COMMERCIAL

## 2022-12-09 DIAGNOSIS — M54.50 BILATERAL LOW BACK PAIN WITHOUT SCIATICA: Primary | ICD-10-CM

## 2022-12-09 DIAGNOSIS — M17.9 OSTEOARTHRITIS, KNEE: ICD-10-CM

## 2022-12-09 DIAGNOSIS — M25.552 HIP PAIN, LEFT: ICD-10-CM

## 2022-12-09 PROCEDURE — 97110 THERAPEUTIC EXERCISES: CPT | Mod: GP | Performed by: PHYSICAL THERAPIST

## 2022-12-09 PROCEDURE — 97112 NEUROMUSCULAR REEDUCATION: CPT | Mod: GP | Performed by: PHYSICAL THERAPIST

## 2023-01-23 ENCOUNTER — TRANSFERRED RECORDS (OUTPATIENT)
Dept: HEALTH INFORMATION MANAGEMENT | Facility: CLINIC | Age: 68
End: 2023-01-23

## 2023-02-03 ENCOUNTER — TRANSFERRED RECORDS (OUTPATIENT)
Dept: HEALTH INFORMATION MANAGEMENT | Facility: CLINIC | Age: 68
End: 2023-02-03
Payer: COMMERCIAL

## 2023-02-06 NOTE — TELEPHONE ENCOUNTER
----- Message from oRmel Mccall MD sent at 11/3/2021 10:43 AM CDT -----  Please call the patient to notify patient the ultrasound confirmed that the lumps she is noticing on the left side in the front and the back are lipomas which is fat collection.  They are benign.  No concerns or follow-up required    Romel Mccall MD     06-Feb-2023 18:07

## 2023-02-14 ENCOUNTER — NURSE TRIAGE (OUTPATIENT)
Dept: NURSING | Facility: CLINIC | Age: 68
End: 2023-02-14

## 2023-02-14 ENCOUNTER — LAB (OUTPATIENT)
Dept: LAB | Facility: CLINIC | Age: 68
End: 2023-02-14
Payer: COMMERCIAL

## 2023-02-14 ENCOUNTER — VIRTUAL VISIT (OUTPATIENT)
Dept: FAMILY MEDICINE | Facility: CLINIC | Age: 68
End: 2023-02-14
Payer: COMMERCIAL

## 2023-02-14 DIAGNOSIS — N30.01 ACUTE CYSTITIS WITH HEMATURIA: Primary | ICD-10-CM

## 2023-02-14 DIAGNOSIS — R39.9 URINARY TRACT INFECTION SYMPTOMS: Primary | ICD-10-CM

## 2023-02-14 DIAGNOSIS — R39.9 URINARY TRACT INFECTION SYMPTOMS: ICD-10-CM

## 2023-02-14 DIAGNOSIS — R30.0 DYSURIA: ICD-10-CM

## 2023-02-14 LAB
ALBUMIN UR-MCNC: ABNORMAL G/DL
APPEARANCE UR: ABNORMAL
BILIRUB UR QL STRIP: ABNORMAL
COLOR UR AUTO: ABNORMAL
GLUCOSE UR STRIP-MCNC: NEGATIVE MG/DL
HGB UR QL STRIP: ABNORMAL
KETONES UR STRIP-MCNC: NEGATIVE MG/DL
LEUKOCYTE ESTERASE UR QL STRIP: ABNORMAL
MUCOUS THREADS #/AREA URNS LPF: PRESENT /LPF
NITRATE UR QL: ABNORMAL
PH UR STRIP: 5.5 [PH] (ref 5–7)
RBC URINE: >182 /HPF
SP GR UR STRIP: 1.02 (ref 1–1.03)
SQUAMOUS EPITHELIAL: 1 /HPF
UROBILINOGEN UR STRIP-MCNC: 4 MG/DL
WBC CLUMPS #/AREA URNS HPF: PRESENT /HPF
WBC URINE: >182 /HPF

## 2023-02-14 PROCEDURE — 81001 URINALYSIS AUTO W/SCOPE: CPT

## 2023-02-14 PROCEDURE — 99213 OFFICE O/P EST LOW 20 MIN: CPT | Mod: 95 | Performed by: FAMILY MEDICINE

## 2023-02-14 PROCEDURE — 87086 URINE CULTURE/COLONY COUNT: CPT

## 2023-02-14 RX ORDER — SULFAMETHOXAZOLE/TRIMETHOPRIM 800-160 MG
1 TABLET ORAL 2 TIMES DAILY
Qty: 10 TABLET | Refills: 0 | Status: SHIPPED | OUTPATIENT
Start: 2023-02-14 | End: 2023-02-19

## 2023-02-14 NOTE — PROGRESS NOTES
Grecia is a 68 year old who is being evaluated via a billable telephone visit.      What phone number would you like to be contacted at? [phone]  How would you like to obtain your AVS? Doroteo  Distant Location (provider location):  On-site    Assessment & Plan     Urinary tract infection symptoms  - UA Macro with Reflex to Micro and Culture - lab collect- patient to schedule a lab only appointment.     Await results.     This was a late Virtual add on. Urinalysis results may not be available until after hours. Patient is aware. She sounds frustrated stating that she's been trying to reach her PCP's clinic in [place] all day.     Return in about 1 week (around 2023), or if symptoms worsen or fail to improve.    Theodora Ramirez MD  [facility] GALILEA Paige is a 68 year old, presenting for the following health issues:  UTI    New patient to me       HPI     Genitourinary - Female  Onset/Duration: x1 week   Description:   Painful urination (Dysuria): YES           Frequency: YES  Blood in urine (Hematuria): Unsure- hx of microscopic hematuria   Delay in urine (Hesitency): No  Intensity: 9-10/10  Progression of Symptoms:  worsening  Accompanying Signs & Symptoms:  Fever/chills: YES- chills  Flank pain: No  Nausea and vomiting: YES- nausea a few days ago   Vaginal symptoms: none  Abdominal/Pelvic Pain: YES- stomach pains- bottom of stomach   History:   History of frequent UTI s: YES  History of kidney stones: YES  Sexually Active: YES  Possibility of pregnancy: No  Precipitating or alleviating factors: None  Therapies tried and outcome: AZO- no relief -- she has been soaking in bath because of constant sensation of having to urinate. ALSO  WOULD LIKE TO DISCUSS PRESCRIPTION FOR AZO SINCE OTC TREATMENT SHE HAS IS        Review of Systems   Constitutional, HEENT, cardiovascular, pulmonary, gi and gu systems are negative, except as otherwise noted.      Objective            Vitals:  No vitals were obtained today due to virtual visit.    Physical Exam   alert and no distress  PSYCH: Alert and oriented times 3; coherent speech, normal   rate and volume, able to articulate logical thoughts, able. Sounds frustrated.   to abstract reason, no tangential thoughts, no hallucinations   or delusions  Her affect is anxious  RESP: No cough, no audible wheezing, able to talk in full sentences  Remainder of exam unable to be completed due to telephone visits    DATA  Urinalysis ordered.   Patient to call and schedule a lab only appointment to the nearest [facility] lab.           Phone call duration: 15 minutes

## 2023-02-15 RX ORDER — PHENAZOPYRIDINE HYDROCHLORIDE 100 MG/1
200 TABLET, FILM COATED ORAL 3 TIMES DAILY PRN
Qty: 20 TABLET | Refills: 0 | Status: SHIPPED | OUTPATIENT
Start: 2023-02-15 | End: 2023-04-17

## 2023-02-15 NOTE — TELEPHONE ENCOUNTER
Pyridium ordered.  Please notify the patient    Romel Mccall MD  [facility], [place]

## 2023-02-15 NOTE — TELEPHONE ENCOUNTER
Patient wanting results for urine as soon as possible, her pharmacy closes at 8 and she has a ride there right now.  Please contact her and send if prescription if needed.   Clarissa Bain RN on 2/14/2023 at 7:19 PM    Reason for Disposition    Lab result questions    Caller requesting lab results  (Exception: Routine or non-urgent lab result.)    Additional Information    Negative: [1] Caller is not with the adult (patient) AND [2] reporting urgent symptoms    Negative: Lab calling with strep throat test results and triager can call in prescription    Negative: Lab calling with urinalysis test results and triager can call in prescription    Negative: Medication questions    Negative: Medication renewal and refill questions    Negative: Pre-operative or pre-procedural questions    Negative: ED call to PCP (i.e., primary care provider; doctor, NP, or PA)    Negative: Doctor (or NP/PA) call to PCP    Negative: Call about patient who is currently hospitalized    Negative: Lab or radiology calling with CRITICAL test results    Negative: [1] Follow-up call from patient regarding patient's clinical status AND [2] information urgent    Negative: [1] Caller requests to speak ONLY to PCP AND [2] URGENT question    Negative: [1] Caller requests to speak to PCP now AND [2] won't tell us reason for call  (Exception: If 10 pm to 6 am, caller must first discuss reason for the call.)    Negative: Notification of hospital admission    Negative: Notification of death    Protocols used: INFORMATION ONLY CALL - NO TRIAGE-A-AH, PCP CALL - NO TRIAGE-A-AH

## 2023-02-15 NOTE — TELEPHONE ENCOUNTER
Triage Call:    Updated patient that provider prescribed Bactrim.  Prescription was sent to requested pharmacy.    Michaelle Marshall RN  02/14/23 11:05 PM  [facility] Nurse Advisor

## 2023-02-15 NOTE — TELEPHONE ENCOUNTER
Patient called the clinic stating the [facility] does not have Rx for Bactrim DS. It appears that the Rx was ordered yesterday and local printed at [facility] and no record that it was faxed anywhere.  Patient has a virtual OV so she would not have this Rx. [facility] was called with a verbal for this order.     Patient states that her symptoms are the same at yesterday and she has 10/10 back and abdominal pain.Tylenol is not helping. She would like something for pain and Rx that turns urine orange which will relax her abdomen (she has this before). Patient does not know the name, but I think she is referring to Pyridium.

## 2023-02-15 NOTE — TELEPHONE ENCOUNTER
It seems like the RN called it in verbally. See documentation    Romel Mccall MD  [facility], [place]

## 2023-02-15 NOTE — TELEPHONE ENCOUNTER
Spoke to patient and she understands the pyridium RX is ready.     Looking at the generic Bactrim RX, virtual care provider   did not send the RX to the pharmacy.   Can this be approved by you and sent to her pharmacy?     Lili Bagley RN  [facility]

## 2023-02-16 LAB — BACTERIA UR CULT: NORMAL

## 2023-02-17 ENCOUNTER — NURSE TRIAGE (OUTPATIENT)
Dept: NURSING | Facility: CLINIC | Age: 68
End: 2023-02-17
Payer: COMMERCIAL

## 2023-02-17 ENCOUNTER — OFFICE VISIT (OUTPATIENT)
Dept: URGENT CARE | Facility: URGENT CARE | Age: 68
End: 2023-02-17
Payer: COMMERCIAL

## 2023-02-17 DIAGNOSIS — L28.0 LICHEN SIMPLEX: Primary | ICD-10-CM

## 2023-02-17 PROCEDURE — 99213 OFFICE O/P EST LOW 20 MIN: CPT | Performed by: FAMILY MEDICINE

## 2023-02-17 RX ORDER — TRIAMCINOLONE ACETONIDE 1 MG/G
CREAM TOPICAL 2 TIMES DAILY
Qty: 80 G | Refills: 1 | Status: SHIPPED | OUTPATIENT
Start: 2023-02-17 | End: 2023-03-03

## 2023-02-17 NOTE — PATIENT INSTRUCTIONS
Sit on cushions or pillows if possible when sitting on the floor.      Follow up with a dermatologist if not better in 6-8 weeks.      Apply moisturizing cream onto the rough patch of skin.  You can also apply olive oil.

## 2023-02-17 NOTE — TELEPHONE ENCOUNTER
Nurse Triage SBAR    Is this a 2nd Level Triage? No    Situation/Background: Patient is calling with concern of new skin roughness on the upper left buttock, near intergluteal cleft. Patient states that she put olive oil on it, site is not tender to touch. Site is larger than a quarter, smaller than an egg.     Assessment:   Patient denies fever, is currently taking medication for UTI.  Patient denies lump, redness, drainage, tenderness    Recommendation: Per disposition, See in Office within 2 weeks. Home care advice provided. Advised patient to call back with any new or worsening symptoms. Patient verbalized understanding and agrees with plan. Transferred to scheduling.     Protocol Recommended Disposition: Routine office follow-up appointment     Lilliana Cary RN on 2/17/2023 at 12:38 PM  [facility] Nurse Advisors    Reason for Disposition    Sticks up out of the skin (elevated), and feels rough to the touch    Additional Information    Negative: Patient sounds very sick or weak to the triager    Negative: Fever and bump is tender to touch    Negative: Looks infected (e.g., spreading redness, red streak, pus)    Negative: Looks like a boil, infected sore, or deep ulcer    Negative: Caller can't describe it clearly    Negative: Patient wants to be seen    Negative: Skin growth or mole and two sides do not look the same (it is asymmetric)    Negative: Skin growth or mole and border is irregular or blurry    Negative: Skin growth or mole and changes color or has more than one color    Negative: Skin growth or mole and it is larger than a pencil eraser or increasing in size    Negative: Skin growth or mole and bleeds    Protocols used: SKIN LESION - MOLES OR GROWTHS-A-OH
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
## 2023-02-28 NOTE — PROGRESS NOTES
Discharge Note    Progress reporting period is from initial eval to Dec 9, 2022.     Grecia failed to return for next follow up visit and current status is unknown.  Please see information below for last relevant information on current status.  Patient seen for Rxs Used: 7 visits.  SUBJECTIVE  Subjective changes noted by patient:  Subjective: Pt reports right ankle has been swollen since doing eliptical for 60 minutes.  .  Current pain level is  .     Previous pain level was  Initial Pain level: 6/10.   Changes in function:  Yes (See Goal flowsheet attached for changes in current functional level)  Adverse reaction to treatment or activity: None    OBJECTIVE  Changes noted in objective findings: Objective: R ankle AROM WNL.  Strength 4/5.     ASSESSMENT/PLAN  Diagnosis: R ankle pain   DIAGP:  The primary encounter diagnosis was Bilateral low back pain without sciatica. Diagnoses of Osteoarthritis, knee and Hip pain, left were also pertinent to this visit.  Updated problem list and treatment plan:   Decreased strength - HEP  STG/LTGs have been met or progress has been made towards goals:  Yes, please see goal flowsheet for most current information  Assessment of Progress: current status is unknown.  Last current status: Assessment of progress: Pt is progressing well, Pt is progressing as expected   Self Management Plans:  HEP  I have re-evaluated this patient and find that the nature, scope, duration and intensity of the therapy is appropriate for the medical condition of the patient.  Grecia continues to require the following intervention to meet STG and LTG's:  HEP.    Recommendations:  Discharge with current home program.  Patient to follow up with MD as needed.    Please refer to the daily flowsheet for treatment today, total treatment time and time spent performing 1:1 timed codes.

## 2023-03-08 ENCOUNTER — ANCILLARY PROCEDURE (OUTPATIENT)
Dept: MAMMOGRAPHY | Facility: CLINIC | Age: 68
End: 2023-03-08
Payer: COMMERCIAL

## 2023-03-08 DIAGNOSIS — Z12.31 VISIT FOR SCREENING MAMMOGRAM: ICD-10-CM

## 2023-03-08 PROCEDURE — 77067 SCR MAMMO BI INCL CAD: CPT | Mod: TC | Performed by: RADIOLOGY

## 2023-03-08 PROCEDURE — 77063 BREAST TOMOSYNTHESIS BI: CPT | Mod: TC | Performed by: RADIOLOGY

## 2023-03-14 ENCOUNTER — TRANSCRIBE ORDERS (OUTPATIENT)
Dept: OTHER | Age: 68
End: 2023-03-14

## 2023-03-14 DIAGNOSIS — M25.562 BILATERAL KNEE PAIN: ICD-10-CM

## 2023-03-14 DIAGNOSIS — M25.561 BILATERAL KNEE PAIN: ICD-10-CM

## 2023-03-14 DIAGNOSIS — M25.571 RIGHT ANKLE PAIN: ICD-10-CM

## 2023-03-14 DIAGNOSIS — M54.9 BACK PAIN: Primary | ICD-10-CM

## 2023-03-14 DIAGNOSIS — M79.672 BILATERAL FOOT PAIN: ICD-10-CM

## 2023-03-14 DIAGNOSIS — M62.50 MUSCLE ATROPHY: ICD-10-CM

## 2023-03-14 DIAGNOSIS — M79.673 FOOT PAIN: ICD-10-CM

## 2023-03-14 DIAGNOSIS — M79.671 BILATERAL FOOT PAIN: ICD-10-CM

## 2023-03-24 ENCOUNTER — THERAPY VISIT (OUTPATIENT)
Dept: PHYSICAL THERAPY | Facility: CLINIC | Age: 68
End: 2023-03-24
Payer: COMMERCIAL

## 2023-03-24 DIAGNOSIS — M25.562 CHRONIC PAIN OF LEFT KNEE: ICD-10-CM

## 2023-03-24 DIAGNOSIS — G89.29 CHRONIC BILATERAL LOW BACK PAIN WITHOUT SCIATICA: Primary | ICD-10-CM

## 2023-03-24 DIAGNOSIS — M54.2 NECK PAIN: ICD-10-CM

## 2023-03-24 DIAGNOSIS — G89.29 CHRONIC PAIN OF LEFT KNEE: ICD-10-CM

## 2023-03-24 DIAGNOSIS — M54.50 CHRONIC BILATERAL LOW BACK PAIN WITHOUT SCIATICA: Primary | ICD-10-CM

## 2023-03-24 PROCEDURE — 97162 PT EVAL MOD COMPLEX 30 MIN: CPT | Mod: GP | Performed by: PHYSICAL THERAPIST

## 2023-03-24 PROCEDURE — 97110 THERAPEUTIC EXERCISES: CPT | Mod: GP | Performed by: PHYSICAL THERAPIST

## 2023-03-24 ASSESSMENT — ACTIVITIES OF DAILY LIVING (ADL)
HOW_WOULD_YOU_RATE_THE_CURRENT_FUNCTION_OF_YOUR_KNEE_DURING_YOUR_USUAL_DAILY_ACTIVITIES_ON_A_SCALE_FROM_0_TO_100_WITH_100_BEING_YOUR_LEVEL_OF_KNEE_FUNCTION_PRIOR_TO_YOUR_INJURY_AND_0_BEING_THE_INABILITY_TO_PERFORM_ANY_OF_YOUR_USUAL_DAILY_ACTIVITIES?: 0
STAND: ACTIVITY IS VERY DIFFICULT
GO UP STAIRS: ACTIVITY IS VERY DIFFICULT
WEAKNESS: THE SYMPTOM AFFECTS MY ACTIVITY SEVERELY
GO DOWN STAIRS: ACTIVITY IS VERY DIFFICULT
WALK: ACTIVITY IS VERY DIFFICULT
AS_A_RESULT_OF_YOUR_KNEE_INJURY,_HOW_WOULD_YOU_RATE_YOUR_CURRENT_LEVEL_OF_DAILY_ACTIVITY?: SEVERELY ABNORMAL
KNEEL ON THE FRONT OF YOUR KNEE: I AM UNABLE TO DO THE ACTIVITY
PAIN: THE SYMPTOM AFFECTS MY ACTIVITY SEVERELY
KNEE_ACTIVITY_OF_DAILY_LIVING_SUM: 12
HOW_WOULD_YOU_RATE_THE_OVERALL_FUNCTION_OF_YOUR_KNEE_DURING_YOUR_USUAL_DAILY_ACTIVITIES?: SEVERELY ABNORMAL
SIT WITH YOUR KNEE BENT: ACTIVITY IS VERY DIFFICULT
KNEE_ACTIVITY_OF_DAILY_LIVING_SCORE: 17.14
SWELLING: THE SYMPTOM AFFECTS MY ACTIVITY SEVERELY
SQUAT: I AM UNABLE TO DO THE ACTIVITY
GIVING WAY, BUCKLING OR SHIFTING OF KNEE: THE SYMPTOM AFFECTS MY ACTIVITY SLIGHTLY
STIFFNESS: THE SYMPTOM PREVENTS ME FROM ALL DAILY ACTIVITIES
LIMPING: THE SYMPTOM PREVENTS ME FROM ALL DAILY ACTIVITIES
RAW_SCORE: 12
RISE FROM A CHAIR: ACTIVITY IS VERY DIFFICULT

## 2023-03-24 NOTE — PROGRESS NOTES
[facility]    OUTPATIENT Physical Therapy ORTHOPEDIC EVALUATION  PLAN OF TREATMENT FOR OUTPATIENT REHABILITATION  (COMPLETE FOR INITIAL CLAIMS ONLY)  Patient's Last Name, First Name, M.I.  YOB: 1955  Grecia Kilgore    Provider s Name:  [facility]   Medical Record No.  [id number]   Start of Care Date:  03/24/23   Onset Date:   01/23/23   Treatment Diagnosis:  LBP Medical Diagnosis:     Chronic pain of left knee  Chronic bilateral low back pain without sciatica  Neck pain       Goals:     03/24/23 0500   Body Part   Goals listed below are for Neck, LB and left knee pain   Goal #1   Goal #1 standing   Previous Functional Level No restrictions   Current Functional Level Minutes patient can stand   Performance level 10 with pain 9/10   STG Target Performance Minutes patient will be able to stand   Performance level 20 with pain 5/10   Rationale for housekeeping tasks such as vacuuming, bed making, mowing, gardening;for personal hygiene;for meal preparation   Due date 04/21/23   LTG Target Performance Minutes patient will be able to stand   Performance Level 30 with pian 3/10   Rationale for housekeeping tasks such as vacuuming, bed making, mowing;for personal hygiene;for meal preparation   Due date 06/16/23   Goal #2   Goal #2 sleeping   Previous Functional Level No restrictions   Current Functional Level 3-5 hours without sleep per night   STG Target Performance 2-3 hours without sleep per night   Rationale to establish restorative sleep pattern   Due Date 05/05/23   LTG Target Performance Less than 1 hour without sleep per night   Rationale to establish restorative sleep pattern   Due Date 06/16/23         Therapy Frequency:  1 x week  Predicted Duration of Therapy Intervention:  12 weeks    Maxwell Savage, PT                 I CERTIFY THE NEED FOR THESE SERVICES  FURNISHED UNDER        THIS PLAN OF TREATMENT AND WHILE UNDER MY CARE .             Physician Signature               Date    X_____________________________________________________                         Certification Date From:  03/24/23   Certification Date To:  06/16/23    Referring Provider:  Dillon Fontenot    Initial Assessment        See Epic Evaluation SOC Date: 03/24/23

## 2023-03-24 NOTE — PROGRESS NOTES
"Physical Therapy Initial Evaluation  Subjective:  The history is provided by the patient. No  was used.   Patient Health History  Grecia Kilgore being seen for LBP, neck and knee pain.     Date of Onset: After falling down step at home on 9/24/22.      Pain is reported as 9/10 on pain scale.  General health as reported by patient is fair.  Pertinent medical history includes: asthma, depression, diabetes, fibromyalgia, incontinence and mental illness.   Red flags:  None as reported by patient.  Medical allergies: latex.   Surgeries include:  Cancer surgery. Other surgery history details: cancer 2013.    Current medications:  Muscle relaxants and pain medication.    Current occupation is retired.                     Therapist Generated HPI Evaluation  Problem details: Pt. complains of right neck, bilateral LB and left knee pain that has been present since falling down steps on 9/24/22. She reports having chronic pain for \"many years\" that was made worse by the fall. PT order dated 1/23/23.      .         Type of problem:  Lumbar.    This is a chronic condition.  Condition occurred with:  A fall/slip.  Where condition occurred: at home.  Patient reports pain:  Central lumbar spine.  Pain is described as aching and is constant.  Pain radiates to:  Gluteals left. Pain is the same all the time.  Since onset symptoms are gradually worsening.  Associated symptoms:  Loss of motion/stiffness, loss of strength and loss of motion. Symptoms are exacerbated by standing, walking, sitting, stress, lifting, bending, carrying, lying down and twisting  and relieved by nothing.    Previous treatment includes physical therapy. There was moderate improvement following previous treatment.  Barriers include:  Lives alone.                        Objective:  Standing Alignment:    Cervical/Thoracic:  Forward head  Shoulder/UE:  Rounded shoulders  Lumbar:  Normal      Knee:  Normal          Flexibility/Screens: "   Positive screens:  Cervical; Lumbar and Knee    Lower Extremity:  Decreased left lower extremity flexibility:Hamstrings; Gastroc and Soleus    Decreased right lower extremity flexibility:  Hamstrings; Gastroc and Soleus  Spine:  Decreased left spine flexibility:  Upper Trap; Levator and Quadratus Lumborum    Decreased right spine flexibility:  Upper Trap; Levator and Quadratus Lumborum        Ankle/Foot Evaluation  ROM:  AROM is normal.PROM is normal.      Strength wnl ankle: 4/5 generally bilaterally.                 Lumbar/SI Evaluation  ROM:    AROM Lumbar:   Flexion:          100% with PDM  Ext:                    100% with PDM   Side Bend:        Left:  100% with PDM    Right:  100% with PDM  Rotation:           Left:     Right:   Side Glide:        Left:     Right:           Lumbar Myotomes:  normal              Cord Signs:  normal      Neural Tension/Mobility:  Lumbar:  Normal        Lumbar Palpation:  normal                 Cervical/Thoracic Evaluation    AROM:  AROM Cervical:    Flexion:            100%  Extension:       100% with ERP  Rotation:         Left: 100% with ERP     Right: 100% with ERP  Side Bend:      Left: 100% with ERP     Right:  100% with ERP                Cervical Palpation:    Tenderness present at Left:    Upper Trap; Levator and Erector Spinae  Tenderness present at Right:    Upper Trap; Levator and Erector Spinae                                            Knee Evaluation:  ROM:  AROM: normal  PROM: normal        Pain: ERP with full passive flexion on left    Strength:     Extension:  Left: 4/5    Pain:+/-      Right: 5/5    Pain:-  Flexion:  Left: 4/5    Pain:+/-      Right: 5/5    Pain:-    Quad Set Left: Fair    Pain:   Quad Set Right: Fair    Pain:  Ligament Testing:  Normal                  Palpation:  Normal      Edema:  Normal            General     ROS    Assessment/Plan:    Patient is a 68 year old female with cervical, lumbar and left side knee complaints.    Patient has  the following significant findings with corresponding treatment plan.                Diagnosis 1:  LBP, Neck pain and left knee pain  Pain -  self management, education, directional preference exercise and home program  Decreased ROM/flexibility - manual therapy and therapeutic exercise  Decreased strength - therapeutic exercise and therapeutic activities  Decreased proprioception - neuro re-education and therapeutic activities  Impaired muscle performance - neuro re-education  Decreased function - therapeutic activities    Therapy Evaluation Codes:   .  1) Clinical presentation characteristics are:   Stable/Uncomplicated.  2) Decision-Making    Moderate complexity using standardized patient assessment instrument and/or measureable assessment of functional outcome.  Cumulative Therapy Evaluation is: Moderate complexity.    Previous and current functional limitations:  (See Goal Flow Sheet for this information)    Short term and Long term goals: (See Goal Flow Sheet for this information)     Communication ability:  Patient appears to be able to clearly communicate and understand verbal and written communication and follow directions correctly.  Treatment Explanation - The following has been discussed with the patient:   RX ordered/plan of care  Anticipated outcomes  Possible risks and side effects  This patient would benefit from PT intervention to resume normal activities.   Rehab potential is fair.    Frequency:  1 X week, once daily  Duration:  for 12 weeks  Discharge Plan:  Achieve all LTG.  Independent in home treatment program.  Reach maximal therapeutic benefit.    Please refer to the daily flowsheet for treatment today, total treatment time and time spent performing 1:1 timed codes.

## 2023-04-07 ENCOUNTER — THERAPY VISIT (OUTPATIENT)
Dept: PHYSICAL THERAPY | Facility: CLINIC | Age: 68
End: 2023-04-07
Payer: COMMERCIAL

## 2023-04-07 DIAGNOSIS — M25.562 CHRONIC PAIN OF LEFT KNEE: Primary | ICD-10-CM

## 2023-04-07 DIAGNOSIS — G89.29 CHRONIC PAIN OF LEFT KNEE: Primary | ICD-10-CM

## 2023-04-07 DIAGNOSIS — G89.29 CHRONIC BILATERAL LOW BACK PAIN WITHOUT SCIATICA: ICD-10-CM

## 2023-04-07 DIAGNOSIS — M54.50 CHRONIC BILATERAL LOW BACK PAIN WITHOUT SCIATICA: ICD-10-CM

## 2023-04-07 DIAGNOSIS — M54.2 NECK PAIN: ICD-10-CM

## 2023-04-07 PROCEDURE — 97112 NEUROMUSCULAR REEDUCATION: CPT | Mod: GP | Performed by: PHYSICAL THERAPIST

## 2023-04-07 PROCEDURE — 97110 THERAPEUTIC EXERCISES: CPT | Mod: GP | Performed by: PHYSICAL THERAPIST

## 2023-04-14 ENCOUNTER — THERAPY VISIT (OUTPATIENT)
Dept: PHYSICAL THERAPY | Facility: CLINIC | Age: 68
End: 2023-04-14
Payer: COMMERCIAL

## 2023-04-14 DIAGNOSIS — G89.29 CHRONIC PAIN OF LEFT KNEE: Primary | ICD-10-CM

## 2023-04-14 DIAGNOSIS — M25.562 CHRONIC PAIN OF LEFT KNEE: Primary | ICD-10-CM

## 2023-04-14 DIAGNOSIS — G89.29 CHRONIC BILATERAL LOW BACK PAIN WITHOUT SCIATICA: ICD-10-CM

## 2023-04-14 DIAGNOSIS — M54.50 CHRONIC BILATERAL LOW BACK PAIN WITHOUT SCIATICA: ICD-10-CM

## 2023-04-14 DIAGNOSIS — M54.2 NECK PAIN: ICD-10-CM

## 2023-04-14 PROCEDURE — 97110 THERAPEUTIC EXERCISES: CPT | Mod: GP | Performed by: PHYSICAL THERAPIST

## 2023-04-14 ASSESSMENT — ACTIVITIES OF DAILY LIVING (ADL)
SWELLING: THE SYMPTOM AFFECTS MY ACTIVITY SLIGHTLY
SIT WITH YOUR KNEE BENT: ACTIVITY IS MINIMALLY DIFFICULT
RAW_SCORE: 60
HOW_WOULD_YOU_RATE_THE_CURRENT_FUNCTION_OF_YOUR_KNEE_DURING_YOUR_USUAL_DAILY_ACTIVITIES_ON_A_SCALE_FROM_0_TO_100_WITH_100_BEING_YOUR_LEVEL_OF_KNEE_FUNCTION_PRIOR_TO_YOUR_INJURY_AND_0_BEING_THE_INABILITY_TO_PERFORM_ANY_OF_YOUR_USUAL_DAILY_ACTIVITIES?: 80
GO DOWN STAIRS: ACTIVITY IS MINIMALLY DIFFICULT
SQUAT: ACTIVITY IS MINIMALLY DIFFICULT
GIVING WAY, BUCKLING OR SHIFTING OF KNEE: I DO NOT HAVE THE SYMPTOM
KNEEL ON THE FRONT OF YOUR KNEE: ACTIVITY IS MINIMALLY DIFFICULT
KNEE_ACTIVITY_OF_DAILY_LIVING_SCORE: 85.71
STIFFNESS: I DO NOT HAVE THE SYMPTOM
STAND: ACTIVITY IS MINIMALLY DIFFICULT
RISE FROM A CHAIR: ACTIVITY IS MINIMALLY DIFFICULT
WALK: ACTIVITY IS MINIMALLY DIFFICULT
WEAKNESS: I DO NOT HAVE THE SYMPTOM
KNEE_ACTIVITY_OF_DAILY_LIVING_SUM: 60
HOW_WOULD_YOU_RATE_THE_OVERALL_FUNCTION_OF_YOUR_KNEE_DURING_YOUR_USUAL_DAILY_ACTIVITIES?: NEARLY NORMAL
GO UP STAIRS: ACTIVITY IS MINIMALLY DIFFICULT
PAIN: I DO NOT HAVE THE SYMPTOM
LIMPING: I DO NOT HAVE THE SYMPTOM
AS_A_RESULT_OF_YOUR_KNEE_INJURY,_HOW_WOULD_YOU_RATE_YOUR_CURRENT_LEVEL_OF_DAILY_ACTIVITY?: NEARLY NORMAL

## 2023-04-14 NOTE — LETTER
[facility]  [address] [facility] [address]  [phone]    2023    Re: Grecia Kilgore   :   1955  MRN:  [id number]   REFERRING PHYSICIAN:   Dillon HESS [facility]  Date of Initial Evaluation:  3/24/2024  Visits:  Rxs Used: 3  Reason for Referral:   Chronic pain of left knee  Chronic bilateral low back pain without sciatica  Neck pain    DISCHARGE REPORT  Progress reporting period is from eval to 23.       SUBJECTIVE  Subjective changes noted by patient:  .  Subjective: Pt reports she is doing much better.  No sx's in past week.  She feel comfortable continueing independently with HEP.    Current pain level is  Current Pain level: 0/10.     Previous pain level was   Initial Pain level: 9/10.   Changes in function:  Yes (See Goal flowsheet attached for changes in current functional level)  Adverse reaction to treatment or activity: None    OBJECTIVE  Changes noted in objective findings:  Yes,   Objective: LROM WNL.  No Sx's present today.  CROM WNL.     ASSESSMENT/PLAN  Updated problem list and treatment plan: Diagnosis 1: Neck, knee and ankle pain  Decreased strength - therapeutic exercise and therapeutic activities  STG/LTGs have been met or progress has been made towards goals:  Yes (See Goal flow sheet completed today.)  Assessment of Progress: The patient's condition is improving.  The patient's condition has potential to improve.  The patient has met all of their long term goals.  Self Management Plans:  Patient has been instructed in a home treatment program.  Patient is independent in a home treatment program.  Patient  has been instructed in self management of symptoms.  Patient is independent in self management of symptoms.  I have re-evaluated this patient and find that the nature, scope, duration and intensity of the therapy is appropriate for the medical condition of the  patient.      Recommendations:  This patient is ready to be discharged from therapy and continue their home treatment program.    Re: Grecia Kilgore   :   1955      Thank you for your referral.    INQUIRIES  Therapist: Baltazar Savage [facility]  [id number] [place] [id number]  Phone: [phone]  Fax: [phone]

## 2023-04-14 NOTE — PROGRESS NOTES
DISCHARGE REPORT    Progress reporting period is from eval to 4/14/23.       SUBJECTIVE  Subjective changes noted by patient:  .  Subjective: Pt reports she is doing much better.  No sx's in past week.  She feel comfortable continueing independently with HEP.    Current pain level is  Current Pain level: 0/10.     Previous pain level was   Initial Pain level: 9/10.   Changes in function:  Yes (See Goal flowsheet attached for changes in current functional level)  Adverse reaction to treatment or activity: None    OBJECTIVE  Changes noted in objective findings:  Yes,   Objective: LROM WNL.  No Sx's present today.  CROM WNL.     ASSESSMENT/PLAN  Updated problem list and treatment plan: Diagnosis 1: Neck, knee and ankle pain  Decreased strength - therapeutic exercise and therapeutic activities  STG/LTGs have been met or progress has been made towards goals:  Yes (See Goal flow sheet completed today.)  Assessment of Progress: The patient's condition is improving.  The patient's condition has potential to improve.  The patient has met all of their long term goals.  Self Management Plans:  Patient has been instructed in a home treatment program.  Patient is independent in a home treatment program.  Patient  has been instructed in self management of symptoms.  Patient is independent in self management of symptoms.  I have re-evaluated this patient and find that the nature, scope, duration and intensity of the therapy is appropriate for the medical condition of the patient.      Recommendations:  This patient is ready to be discharged from therapy and continue their home treatment program.    Please refer to the daily flowsheet for treatment today, total treatment time and time spent performing 1:1 timed codes.

## 2023-04-17 ENCOUNTER — OFFICE VISIT (OUTPATIENT)
Dept: FAMILY MEDICINE | Facility: CLINIC | Age: 68
End: 2023-04-17
Payer: COMMERCIAL

## 2023-04-17 VITALS
OXYGEN SATURATION: 99 % | DIASTOLIC BLOOD PRESSURE: 84 MMHG | RESPIRATION RATE: 12 BRPM | HEART RATE: 62 BPM | TEMPERATURE: 98.3 F | SYSTOLIC BLOOD PRESSURE: 118 MMHG

## 2023-04-17 DIAGNOSIS — E11.9 TYPE 2 DIABETES MELLITUS WITHOUT COMPLICATION, WITHOUT LONG-TERM CURRENT USE OF INSULIN (H): Primary | ICD-10-CM

## 2023-04-17 DIAGNOSIS — J30.9 ALLERGIC RHINITIS, UNSPECIFIED SEASONALITY, UNSPECIFIED TRIGGER: ICD-10-CM

## 2023-04-17 DIAGNOSIS — E78.5 HYPERLIPIDEMIA LDL GOAL <100: ICD-10-CM

## 2023-04-17 DIAGNOSIS — M79.604 BILATERAL LEG PAIN: ICD-10-CM

## 2023-04-17 DIAGNOSIS — M79.605 BILATERAL LEG PAIN: ICD-10-CM

## 2023-04-17 PROCEDURE — 99214 OFFICE O/P EST MOD 30 MIN: CPT | Performed by: NURSE PRACTITIONER

## 2023-04-17 RX ORDER — ROSUVASTATIN CALCIUM 5 MG/1
5 TABLET, COATED ORAL DAILY
Qty: 90 TABLET | Refills: 1 | Status: SHIPPED | OUTPATIENT
Start: 2023-04-17 | End: 2023-10-11

## 2023-04-17 ASSESSMENT — ASTHMA QUESTIONNAIRES
QUESTION_4 LAST FOUR WEEKS HOW OFTEN HAVE YOU USED YOUR RESCUE INHALER OR NEBULIZER MEDICATION (SUCH AS ALBUTEROL): ONCE A WEEK OR LESS
QUESTION_5 LAST FOUR WEEKS HOW WOULD YOU RATE YOUR ASTHMA CONTROL: WELL CONTROLLED
QUESTION_1 LAST FOUR WEEKS HOW MUCH OF THE TIME DID YOUR ASTHMA KEEP YOU FROM GETTING AS MUCH DONE AT WORK, SCHOOL OR AT HOME: NONE OF THE TIME
ACT_TOTALSCORE: 23
ACT_TOTALSCORE: 23
QUESTION_3 LAST FOUR WEEKS HOW OFTEN DID YOUR ASTHMA SYMPTOMS (WHEEZING, COUGHING, SHORTNESS OF BREATH, CHEST TIGHTNESS OR PAIN) WAKE YOU UP AT NIGHT OR EARLIER THAN USUAL IN THE MORNING: NOT AT ALL
QUESTION_2 LAST FOUR WEEKS HOW OFTEN HAVE YOU HAD SHORTNESS OF BREATH: NOT AT ALL

## 2023-04-17 ASSESSMENT — ANXIETY QUESTIONNAIRES
GAD7 TOTAL SCORE: 11
7. FEELING AFRAID AS IF SOMETHING AWFUL MIGHT HAPPEN: NOT AT ALL
4. TROUBLE RELAXING: MORE THAN HALF THE DAYS
2. NOT BEING ABLE TO STOP OR CONTROL WORRYING: SEVERAL DAYS
1. FEELING NERVOUS, ANXIOUS, OR ON EDGE: MORE THAN HALF THE DAYS
5. BEING SO RESTLESS THAT IT IS HARD TO SIT STILL: MORE THAN HALF THE DAYS
GAD7 TOTAL SCORE: 11
8. IF YOU CHECKED OFF ANY PROBLEMS, HOW DIFFICULT HAVE THESE MADE IT FOR YOU TO DO YOUR WORK, TAKE CARE OF THINGS AT HOME, OR GET ALONG WITH OTHER PEOPLE?: SOMEWHAT DIFFICULT
7. FEELING AFRAID AS IF SOMETHING AWFUL MIGHT HAPPEN: NOT AT ALL
GAD7 TOTAL SCORE: 11
IF YOU CHECKED OFF ANY PROBLEMS ON THIS QUESTIONNAIRE, HOW DIFFICULT HAVE THESE PROBLEMS MADE IT FOR YOU TO DO YOUR WORK, TAKE CARE OF THINGS AT HOME, OR GET ALONG WITH OTHER PEOPLE: SOMEWHAT DIFFICULT
3. WORRYING TOO MUCH ABOUT DIFFERENT THINGS: MORE THAN HALF THE DAYS
6. BECOMING EASILY ANNOYED OR IRRITABLE: MORE THAN HALF THE DAYS

## 2023-04-17 ASSESSMENT — PATIENT HEALTH QUESTIONNAIRE - PHQ9
10. IF YOU CHECKED OFF ANY PROBLEMS, HOW DIFFICULT HAVE THESE PROBLEMS MADE IT FOR YOU TO DO YOUR WORK, TAKE CARE OF THINGS AT HOME, OR GET ALONG WITH OTHER PEOPLE: SOMEWHAT DIFFICULT
SUM OF ALL RESPONSES TO PHQ QUESTIONS 1-9: 10
SUM OF ALL RESPONSES TO PHQ QUESTIONS 1-9: 10

## 2023-04-17 ASSESSMENT — ENCOUNTER SYMPTOMS: LEG PAIN: 1

## 2023-04-17 ASSESSMENT — PAIN SCALES - GENERAL: PAINLEVEL: NO PAIN (0)

## 2023-04-17 NOTE — PROGRESS NOTES
"  Assessment & Plan     Type 2 diabetes mellitus without complication, without long-term current use of insulin (H)  Chronic, has been well controlled with metformin.  Will update labs today and have her continue on her current medications.  Due for follow-up in 6 mos.   - HEMOGLOBIN A1C; Future  - Comprehensive metabolic panel (BMP + Alb, Alk Phos, ALT, AST, Total. Bili, TP); Future    Allergic rhinitis, unspecified seasonality, unspecified trigger  In the past was doing allergy shots and then needed to change clinics.  Would like referral today.   - Adult Allergy/Asthma Referral; Future    Hyperlipidemia LDL goal <100  Chronic, statin may have been contributing to pain in bilat lower legs.  Will trial switching to crestor.   - rosuvastatin (CRESTOR) 5 MG tablet; Take 1 tablet (5 mg) by mouth daily    Bilateral leg pain  Has been improving some over time and with massage.  ?statin causing the myalgias.  Will have stop zocor and start crestor.  Follow-up if not improving or worsening.          BMI:   Estimated body mass index is 24.9 kg/m  as calculated from the following:    Height as of 5/25/22: 1.702 m (5' 7\").    Weight as of 9/21/22: 72.1 kg (159 lb).         RALPH Frazier CNP  [facility] AIDAN Paige is a 68 year old, presenting for the following health issues:  Leg Pain        4/17/2023     7:24 AM   Additional Questions   Roomed by Lisa Magill, CMA   Accompanied by self         4/17/2023     7:24 AM   Patient Reported Additional Medications   Patient reports taking the following new medications NONE     Leg Pain    History of Present Illness       Reason for visit:  Both legs had pain by leg muscles a couple weeks ago  Symptom onset:  3-4 weeks ago    She eats 2-3 servings of fruits and vegetables daily.She consumes 0 sweetened beverage(s) daily.She exercises with enough effort to increase her heart rate 20 to 29 minutes per day.  She exercises with enough effort " to increase her heart rate 3 or less days per week.   She is taking medications regularly.    Today's PHQ-9         PHQ-9 Total Score: 10    PHQ-9 Q9 Thoughts of better off dead/self-harm past 2 weeks :   Not at all    How difficult have these problems made it for you to do your work, take care of things at home, or get along with other people: Somewhat difficult  Today's WILL-7 Score: 11       Pain History:  When did you first notice your pain? 3-4 weeks ago   Have you seen anyone else for your pain? No  How has your pain affected your ability to work? Not applicable  Where in your body do you have pain? Musculoskeletal problem/pain  Onset/Duration: 3-4 weeks   Description  Location: lower legs - bilateral  Joint Swelling: No  Redness: No  Pain: YES  Warmth: No  Intensity:  mild  Progression of Symptoms:  improving and intermittent  Accompanying signs and symptoms:   Fevers: No  Numbness/tingling/weakness: No  History  Trauma to the area: No  Recent illness:  No  Previous similar problem: No  Previous evaluation:  No  Precipitating or alleviating factors:  Aggravating factors include: none  Therapies tried and outcome: physical therapy    Last fall fractured her R ankle when she fell on stairs.    Finished PT last week for chronic L knee pain.   Was advised to avoid NSAIDs.   One month ago woke up with bilat pain in her calves.   Pain has been better, but does have some mild irritation in her calves that is helped with massasge.  No swelling, redness or rashes over the lower legs.     Hx of asthma; well controlled.   Uses albuterol if she gets a URI.       1/20/2022    11:47 AM 9/21/2022    12:50 PM 4/17/2023     7:08 AM   ACT Total Scores   ACT TOTAL SCORE (Goal Greater than or Equal to 20) 24 25 23   In the past 12 months, how many times did you visit the emergency room for your asthma without being admitted to the hospital? 0 0 0   In the past 12 months, how many times were you hospitalized overnight because of  your asthma? 0 0 0     Hx of panic attacks; does not like mychart.   Hx of eating disorder; prefers not to have her weight checked in the clinic.     Review of Systems   Constitutional, HEENT, cardiovascular, pulmonary, gi and gu systems are negative, except as otherwise noted.      Objective    /84 (BP Location: Right arm, Patient Position: Sitting, Cuff Size: Adult Regular)   Pulse 62   Temp 98.3  F (36.8  C) (Oral)   Resp 12   LMP 01/01/1985   SpO2 99%   There is no height or weight on file to calculate BMI.  Physical Exam   GENERAL: healthy, alert and no distress  RESP: lungs clear to auscultation - no rales, rhonchi or wheezes  CV: regular rate and rhythm, normal S1 S2, no S3 or S4, no murmur, click or rub, no peripheral edema and peripheral pulses strong  MS: no gross musculoskeletal defects noted, no edema, no calf tenderness, no redness, swelling or warmth over the lower legs  SKIN: no suspicious lesions or rashes  Diabetic foot exam: normal DP and PT pulses, no trophic changes or ulcerative lesions, normal sensory exam and normal monofilament exam    No results found for this or any previous visit (from the past 24 hour(s)).

## 2023-04-17 NOTE — Clinical Note
Looks like pt left and labs were not done; please call and schedule a lab only appt for her.   Sarah Alvarez CNP

## 2023-04-17 NOTE — NURSING NOTE
"Chief Complaint   Patient presents with     Leg Pain     Initial /84 (BP Location: Right arm, Patient Position: Sitting, Cuff Size: Adult Regular)   Pulse 62   Temp 98.3  F (36.8  C) (Oral)   Resp 12   LMP 01/01/1985   SpO2 99%  Estimated body mass index is 24.9 kg/m  as calculated from the following:    Height as of 5/25/22: 1.702 m (5' 7\").    Weight as of 9/21/22: 72.1 kg (159 lb).  BP completed using cuff size regular long right arm    Lisa Magill, CMA    "

## 2023-04-21 ENCOUNTER — NURSE TRIAGE (OUTPATIENT)
Dept: NURSING | Facility: CLINIC | Age: 68
End: 2023-04-21

## 2023-04-21 ENCOUNTER — LAB (OUTPATIENT)
Dept: LAB | Facility: CLINIC | Age: 68
End: 2023-04-21
Payer: COMMERCIAL

## 2023-04-21 ENCOUNTER — TELEPHONE (OUTPATIENT)
Dept: FAMILY MEDICINE | Facility: CLINIC | Age: 68
End: 2023-04-21

## 2023-04-21 DIAGNOSIS — E11.9 TYPE 2 DIABETES MELLITUS WITHOUT COMPLICATION, WITHOUT LONG-TERM CURRENT USE OF INSULIN (H): ICD-10-CM

## 2023-04-21 LAB
ALBUMIN SERPL BCG-MCNC: 4.5 G/DL (ref 3.5–5.2)
ALP SERPL-CCNC: 82 U/L (ref 35–104)
ALT SERPL W P-5'-P-CCNC: 33 U/L (ref 10–35)
ANION GAP SERPL CALCULATED.3IONS-SCNC: 13 MMOL/L (ref 7–15)
AST SERPL W P-5'-P-CCNC: 29 U/L (ref 10–35)
BILIRUB SERPL-MCNC: 0.3 MG/DL
BUN SERPL-MCNC: 10 MG/DL (ref 8–23)
CALCIUM SERPL-MCNC: 9.9 MG/DL (ref 8.8–10.2)
CHLORIDE SERPL-SCNC: 109 MMOL/L (ref 98–107)
CREAT SERPL-MCNC: 1.11 MG/DL (ref 0.51–0.95)
DEPRECATED HCO3 PLAS-SCNC: 23 MMOL/L (ref 22–29)
GFR SERPL CREATININE-BSD FRML MDRD: 55 ML/MIN/1.73M2
GLUCOSE SERPL-MCNC: 136 MG/DL (ref 70–99)
HBA1C MFR BLD: 6.8 % (ref 0–5.6)
HOLD SPECIMEN: NORMAL
POTASSIUM SERPL-SCNC: 4.4 MMOL/L (ref 3.4–5.3)
PROT SERPL-MCNC: 7.3 G/DL (ref 6.4–8.3)
SODIUM SERPL-SCNC: 145 MMOL/L (ref 136–145)

## 2023-04-21 PROCEDURE — 80053 COMPREHEN METABOLIC PANEL: CPT

## 2023-04-21 PROCEDURE — 83036 HEMOGLOBIN GLYCOSYLATED A1C: CPT

## 2023-04-21 PROCEDURE — 36415 COLL VENOUS BLD VENIPUNCTURE: CPT

## 2023-04-21 NOTE — TELEPHONE ENCOUNTER
"Pt calls from clinic lobby, wanting to have blood type added on to 4/21/23 lab work.      Pt says she called insurance to inquire of cost for blood type.  Said they told her there would be no cost to pt and that it is part of her service at the clinic, and doesn't need a diagnosis code.  Wants to follow \"Eat Right for Your Type\" book      Pt asks me to hold.  LIBRA Wiley came out to talk to pt while on the phone with pt.     Per pt request, phone number to patient relations given.    Arlette Garcias RN, BSN  [facility]        "

## 2023-04-21 NOTE — TELEPHONE ENCOUNTER
"Pt came into clinic and MATEO Spann asked if nurse can go relay Sarah's message to pt who was waiting in the lobby. Sarah stated she did not see any diagnosis/pt history that would warrant blood type needing to be drawn or added to lab so she will not order.    Relayed message to pt in person and pt reports \"it is my right as a patient to request for this, I do not need a diagnosis to get this drawn when I'm requesting it.\" Pt also states she called and spoke to her insurance and they stated that this blood draw will be covered. Nurse again reiterated to pt that Sarah will not order the blood type lab and pt states she wants to see a different provider. Nurse referred pt to scheduling at the  that pt can choose a different provider. Pt requested for Sarah's supervisor, gave pt Patient Relations phone number and Clinic Manager's info. Pt accepted.    Inez Murray RN on 4/21/2023 at 3:24 PM    "

## 2023-04-21 NOTE — TELEPHONE ENCOUNTER
Pt is wondering why only a six month supply of rosuvastatin was sent in. Pt also reports [facility] sent her 100 tablets and there are no refills.    Writer advised pt to contact [facility] as our [facility] records show she should have a six month supply with them.     Pt will contact [facility] but would also like to know why PCP only sent in a six month supply as she usually has a year supply sent in.     Reason for Disposition    [1] Caller has NON-URGENT medicine question about med that PCP prescribed AND [2] triager unable to answer question    Protocols used: MEDICATION QUESTION CALL-A-

## 2023-04-24 NOTE — TELEPHONE ENCOUNTER
Called patient, left voicemail for call back to any triage nurse.     Please relay message below to pt from provider.    Inez Murray RN on 4/24/2023 at 9:03 AM

## 2023-04-24 NOTE — TELEPHONE ENCOUNTER
Because she was previously only given a 6 month supply.  The 6 mos supply I gave her will equal one year and she will be due for labs in Sept.    Sarah Alvarez CNP

## 2023-04-24 NOTE — TELEPHONE ENCOUNTER
Pt calling back adv of below.     Adv of appt     Talked a lot about blood type - got it drawn through [facility]       Deepthi STEELE RN

## 2023-05-19 ENCOUNTER — ANCILLARY PROCEDURE (OUTPATIENT)
Dept: GENERAL RADIOLOGY | Facility: CLINIC | Age: 68
End: 2023-05-19
Attending: FAMILY MEDICINE
Payer: COMMERCIAL

## 2023-05-19 ENCOUNTER — OFFICE VISIT (OUTPATIENT)
Dept: URGENT CARE | Facility: URGENT CARE | Age: 68
End: 2023-05-19
Payer: COMMERCIAL

## 2023-05-19 VITALS
DIASTOLIC BLOOD PRESSURE: 78 MMHG | TEMPERATURE: 98.1 F | HEART RATE: 59 BPM | OXYGEN SATURATION: 100 % | SYSTOLIC BLOOD PRESSURE: 132 MMHG

## 2023-05-19 DIAGNOSIS — M54.2 NECK PAIN: ICD-10-CM
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
## 2023-05-19 DIAGNOSIS — M25.562 ACUTE PAIN OF LEFT KNEE: ICD-10-CM
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
## 2023-05-19 PROCEDURE — 99214 OFFICE O/P EST MOD 30 MIN: CPT | Performed by: FAMILY MEDICINE

## 2023-05-19 PROCEDURE — 72040 X-RAY EXAM NECK SPINE 2-3 VW: CPT | Mod: TC | Performed by: RADIOLOGY

## 2023-05-19 PROCEDURE — 73562 X-RAY EXAM OF KNEE 3: CPT | Mod: TC | Performed by: RADIOLOGY

## 2023-05-19 RX ORDER — METHOCARBAMOL 500 MG/1
500 TABLET, FILM COATED ORAL 4 TIMES DAILY PRN
Qty: 30 TABLET | Refills: 0 | Status: SHIPPED | OUTPATIENT
Start: 2023-05-19 | End: 2023-10-11
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
## 2023-05-20 NOTE — PATIENT INSTRUCTIONS
Okay for tylenol for pain  Okay for muscle relaxant - robaxin - to help with neck muscle spasm    Follow up with orthopedic for knee concerns  Follow up with spine specialist for neck concerns

## 2023-06-30 ENCOUNTER — TRANSFERRED RECORDS (OUTPATIENT)
Dept: HEALTH INFORMATION MANAGEMENT | Facility: CLINIC | Age: 68
End: 2023-06-30
Payer: COMMERCIAL

## 2023-06-30 LAB — RETINOPATHY: NEGATIVE

## 2023-07-04 ENCOUNTER — APPOINTMENT (OUTPATIENT)
Dept: CT IMAGING | Facility: CLINIC | Age: 68
End: 2023-07-04
Attending: EMERGENCY MEDICINE
Payer: COMMERCIAL

## 2023-07-04 ENCOUNTER — TELEPHONE (OUTPATIENT)
Dept: FAMILY MEDICINE | Facility: CLINIC | Age: 68
End: 2023-07-04

## 2023-07-04 ENCOUNTER — HOSPITAL ENCOUNTER (EMERGENCY)
Facility: CLINIC | Age: 68
Discharge: HOME | End: 2023-07-04
Attending: EMERGENCY MEDICINE | Admitting: EMERGENCY MEDICINE
Payer: COMMERCIAL

## 2023-07-04 VITALS
TEMPERATURE: 97.1 F | DIASTOLIC BLOOD PRESSURE: 98 MMHG | SYSTOLIC BLOOD PRESSURE: 148 MMHG | HEART RATE: 53 BPM | OXYGEN SATURATION: 100 % | RESPIRATION RATE: 18 BRPM

## 2023-07-04 DIAGNOSIS — F41.1 ANXIETY REACTION: ICD-10-CM

## 2023-07-04 DIAGNOSIS — M54.2 NECK PAIN: ICD-10-CM

## 2023-07-04 DIAGNOSIS — R07.9 CHEST PAIN, UNSPECIFIED TYPE: ICD-10-CM

## 2023-07-04 LAB
ANION GAP SERPL CALCULATED.3IONS-SCNC: 12 MMOL/L (ref 7–15)
BASOPHILS # BLD AUTO: 0 10E3/UL (ref 0–0.2)
BASOPHILS NFR BLD AUTO: 0 %
BUN SERPL-MCNC: 8 MG/DL (ref 8–23)
CALCIUM SERPL-MCNC: 9.6 MG/DL (ref 8.8–10.2)
CHLORIDE SERPL-SCNC: 105 MMOL/L (ref 98–107)
CREAT SERPL-MCNC: 0.95 MG/DL (ref 0.51–0.95)
DEPRECATED HCO3 PLAS-SCNC: 24 MMOL/L (ref 22–29)
EOSINOPHIL # BLD AUTO: 0.2 10E3/UL (ref 0–0.7)
EOSINOPHIL NFR BLD AUTO: 2 %
ERYTHROCYTE [DISTWIDTH] IN BLOOD BY AUTOMATED COUNT: 12.6 % (ref 10–15)
GFR SERPL CREATININE-BSD FRML MDRD: 66 ML/MIN/1.73M2
GLUCOSE SERPL-MCNC: 125 MG/DL (ref 70–99)
HCT VFR BLD AUTO: 42.8 % (ref 35–47)
HGB BLD-MCNC: 13.9 G/DL (ref 11.7–15.7)
IMM GRANULOCYTES # BLD: 0 10E3/UL
IMM GRANULOCYTES NFR BLD: 0 %
LYMPHOCYTES # BLD AUTO: 2.7 10E3/UL (ref 0.8–5.3)
LYMPHOCYTES NFR BLD AUTO: 36 %
MCH RBC QN AUTO: 28.7 PG (ref 26.5–33)
MCHC RBC AUTO-ENTMCNC: 32.5 G/DL (ref 31.5–36.5)
MCV RBC AUTO: 88 FL (ref 78–100)
MONOCYTES # BLD AUTO: 0.4 10E3/UL (ref 0–1.3)
MONOCYTES NFR BLD AUTO: 6 %
NEUTROPHILS # BLD AUTO: 4.1 10E3/UL (ref 1.6–8.3)
NEUTROPHILS NFR BLD AUTO: 56 %
NRBC # BLD AUTO: 0 10E3/UL
NRBC BLD AUTO-RTO: 0 /100
PLATELET # BLD AUTO: 249 10E3/UL (ref 150–450)
POTASSIUM SERPL-SCNC: 3.4 MMOL/L (ref 3.4–5.3)
RBC # BLD AUTO: 4.85 10E6/UL (ref 3.8–5.2)
SODIUM SERPL-SCNC: 141 MMOL/L (ref 136–145)
TROPONIN T SERPL HS-MCNC: <6 NG/L
WBC # BLD AUTO: 7.5 10E3/UL (ref 4–11)

## 2023-07-04 PROCEDURE — 82310 ASSAY OF CALCIUM: CPT | Performed by: EMERGENCY MEDICINE

## 2023-07-04 PROCEDURE — 250N000013 HC RX MED GY IP 250 OP 250 PS 637: Performed by: EMERGENCY MEDICINE

## 2023-07-04 PROCEDURE — 70450 CT HEAD/BRAIN W/O DYE: CPT

## 2023-07-04 PROCEDURE — 70498 CT ANGIOGRAPHY NECK: CPT

## 2023-07-04 PROCEDURE — 93005 ELECTROCARDIOGRAM TRACING: CPT

## 2023-07-04 PROCEDURE — 250N000011 HC RX IP 250 OP 636: Performed by: EMERGENCY MEDICINE

## 2023-07-04 PROCEDURE — 85025 COMPLETE CBC W/AUTO DIFF WBC: CPT | Performed by: EMERGENCY MEDICINE

## 2023-07-04 PROCEDURE — 36415 COLL VENOUS BLD VENIPUNCTURE: CPT | Performed by: EMERGENCY MEDICINE

## 2023-07-04 PROCEDURE — 99285 EMERGENCY DEPT VISIT HI MDM: CPT | Mod: 25

## 2023-07-04 PROCEDURE — 70496 CT ANGIOGRAPHY HEAD: CPT

## 2023-07-04 PROCEDURE — 84484 ASSAY OF TROPONIN QUANT: CPT | Performed by: EMERGENCY MEDICINE

## 2023-07-04 RX ORDER — LIDOCAINE 4 G/G
1 PATCH TOPICAL ONCE
Status: DISCONTINUED | OUTPATIENT
Start: 2023-07-04 | End: 2023-07-04 | Stop reason: HOSPADM

## 2023-07-04 RX ORDER — IOPAMIDOL 755 MG/ML
500 INJECTION, SOLUTION INTRAVASCULAR ONCE
Status: COMPLETED | OUTPATIENT
Start: 2023-07-04 | End: 2023-07-04

## 2023-07-04 RX ORDER — LORAZEPAM 0.5 MG/1
0.5 TABLET ORAL ONCE
Status: COMPLETED | OUTPATIENT
Start: 2023-07-04 | End: 2023-07-04

## 2023-07-04 RX ORDER — IBUPROFEN 600 MG/1
600 TABLET, FILM COATED ORAL EVERY 6 HOURS PRN
Qty: 20 TABLET | Refills: 0 | Status: SHIPPED | OUTPATIENT
Start: 2023-07-04 | End: 2023-08-03

## 2023-07-04 RX ORDER — LIDOCAINE 4 G/G
1 PATCH TOPICAL EVERY 24 HOURS
Qty: 5 PATCH | Refills: 0 | Status: SHIPPED | OUTPATIENT
Start: 2023-07-04 | End: 2023-10-11

## 2023-07-04 RX ADMIN — LIDOCAINE PATCH 4% 1 PATCH: 40 PATCH TOPICAL at 04:53

## 2023-07-04 RX ADMIN — IOPAMIDOL 75 ML: 755 INJECTION, SOLUTION INTRAVENOUS at 05:44

## 2023-07-04 RX ADMIN — LORAZEPAM 0.5 MG: 0.5 TABLET ORAL at 04:53

## 2023-07-04 ASSESSMENT — ACTIVITIES OF DAILY LIVING (ADL): ADLS_ACUITY_SCORE: 35
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
## 2023-07-04 NOTE — TELEPHONE ENCOUNTER
Reason for Call:  Appointment Request    Patient requesting this type of appt:  Hospital/ED Follow-Up     Requested provider: Sarah Alvarez    Reason patient unable to be scheduled: Not within requested timeframe    When does patient want to be seen/preferred time: 1-2 days    Comments: Pt called and scheduled pcp soonest appointment for 7/17 for ER follow up. Pt stated is supposed to be seen in the next two days and would like call back from pcp care team if pcp can see pt sooner. Pt does not want to see different provider for sooner. Please call pt back to discuss.    Okay to leave a detailed message?: Yes at Cell number on file:    Telephone Information:   Mobile [phone]       Call taken on 7/4/2023 at 7:47 AM by Mary Gambino

## 2023-07-05 LAB
ATRIAL RATE - MUSE: 49 BPM
DIASTOLIC BLOOD PRESSURE - MUSE: NORMAL MMHG
INTERPRETATION ECG - MUSE: NORMAL
P AXIS - MUSE: 52 DEGREES
PR INTERVAL - MUSE: 170 MS
QRS DURATION - MUSE: 76 MS
QT - MUSE: 456 MS
QTC - MUSE: 411 MS
R AXIS - MUSE: 3 DEGREES
SYSTOLIC BLOOD PRESSURE - MUSE: NORMAL MMHG
T AXIS - MUSE: 42 DEGREES
VENTRICULAR RATE- MUSE: 49 BPM

## 2023-07-07 NOTE — TELEPHONE ENCOUNTER
Okay to see next week if there is an opening--same day or provider approval.     Sarah Alvarez CNP

## 2023-07-20 ENCOUNTER — TRANSFERRED RECORDS (OUTPATIENT)
Dept: HEALTH INFORMATION MANAGEMENT | Facility: CLINIC | Age: 68
End: 2023-07-20

## 2023-07-20 ENCOUNTER — LAB (OUTPATIENT)
Dept: LAB | Facility: CLINIC | Age: 68
End: 2023-07-20
Payer: COMMERCIAL

## 2023-07-20 ENCOUNTER — OFFICE VISIT (OUTPATIENT)
Dept: ALLERGY | Facility: CLINIC | Age: 68
End: 2023-07-20
Attending: NURSE PRACTITIONER
Payer: COMMERCIAL

## 2023-07-20 VITALS — HEART RATE: 54 BPM | OXYGEN SATURATION: 99 %

## 2023-07-20 DIAGNOSIS — J30.81 ALLERGIC RHINITIS DUE TO ANIMAL (CAT) (DOG) HAIR AND DANDER: ICD-10-CM

## 2023-07-20 DIAGNOSIS — J45.20 MILD INTERMITTENT ASTHMA WITHOUT COMPLICATION: ICD-10-CM

## 2023-07-20 DIAGNOSIS — J30.1 SEASONAL ALLERGIC RHINITIS DUE TO POLLEN: ICD-10-CM

## 2023-07-20 DIAGNOSIS — Z91.013 SHRIMP ALLERGY: ICD-10-CM

## 2023-07-20 DIAGNOSIS — H10.13 ALLERGIC CONJUNCTIVITIS, BILATERAL: ICD-10-CM

## 2023-07-20 DIAGNOSIS — J30.1 SEASONAL ALLERGIC RHINITIS DUE TO POLLEN: Primary | ICD-10-CM

## 2023-07-20 PROCEDURE — 86003 ALLG SPEC IGE CRUDE XTRC EA: CPT

## 2023-07-20 PROCEDURE — 36415 COLL VENOUS BLD VENIPUNCTURE: CPT

## 2023-07-20 PROCEDURE — 99204 OFFICE O/P NEW MOD 45 MIN: CPT | Performed by: INTERNAL MEDICINE

## 2023-07-20 RX ORDER — ALBUTEROL SULFATE 90 UG/1
2 AEROSOL, METERED RESPIRATORY (INHALATION) EVERY 4 HOURS PRN
Qty: 18 G | Refills: 1 | Status: SHIPPED | OUTPATIENT
Start: 2023-07-20 | End: 2023-10-11

## 2023-07-20 RX ORDER — OLOPATADINE HYDROCHLORIDE 2 MG/ML
1 SOLUTION/ DROPS OPHTHALMIC DAILY
Qty: 2.5 ML | Refills: 4 | Status: SHIPPED | OUTPATIENT
Start: 2023-07-20 | End: 2023-08-11

## 2023-07-20 RX ORDER — LEVOCETIRIZINE DIHYDROCHLORIDE 5 MG/1
5 TABLET, FILM COATED ORAL EVERY EVENING
Qty: 30 TABLET | Refills: 4 | Status: SHIPPED | OUTPATIENT
Start: 2023-07-20 | End: 2023-08-11

## 2023-07-20 ASSESSMENT — ENCOUNTER SYMPTOMS
COLOR CHANGE: 0
VOMITING: 0
COUGH: 0
EYE PAIN: 0
FEVER: 0
SEIZURES: 0
PALPITATIONS: 0
BACK PAIN: 0
ARTHRALGIAS: 0
ABDOMINAL PAIN: 0
CHILLS: 0
SHORTNESS OF BREATH: 0
DYSURIA: 0
SORE THROAT: 0
HEMATURIA: 0
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
## 2023-07-20 NOTE — PATIENT INSTRUCTIONS
1.   Albuterol renewed  2.   Xyzal 5 mg at night, may take twice a day at night  3.   Labs for allergies  4.  Follow up in 3 weeks  5.  Bring inhalers at next visit  6.  Pataday prescribed      Allergy Staff Appt Hours Shot Hours Location       Physician   Prabhu Little MD      Support Staff   Candice URIARTE, RN   Jd MANN, RN   Brock GUERRIER, MA   Marco Antonio BEATTY, LPN      Mondays Tuesdays Thursdays and Fridays:  [place] 7-5 Wednesdays  Close                Mondays, Tuesdays and Fridays:  7:20 - 3:40              [facility]  [address]  [place] [address]  Appt Line: [phone]    Pulmonary Function Scheduling:  [place]: [phone]

## 2023-07-21 LAB
A ALTERNATA IGE QN: <0.1 KU(A)/L
A FUMIGATUS IGE QN: <0.1 KU(A)/L
C HERBARUM IGE QN: <0.1 KU(A)/L
CALIF WALNUT POLN IGE QN: 0.19 KU(A)/L
CAT DANDER IGG QN: 18.1 KU(A)/L
CEDAR IGE QN: <0.1 KU(A)/L
COMMON RAGWEED IGE QN: 0.94 KU(A)/L
COTTONWOOD IGE QN: <0.1 KU(A)/L
D FARINAE IGE QN: 17.4 KU(A)/L
D PTERONYSS IGE QN: 13.4 KU(A)/L
DOG DANDER+EPITH IGE QN: 15.4 KU(A)/L
E PURPURASCENS IGE QN: 0.34 KU(A)/L
EAST WHITE PINE IGE QN: <0.1 KU(A)/L
ENGL PLANTAIN IGE QN: <0.1 KU(A)/L
FIREBUSH IGE QN: <0.1 KU(A)/L
GIANT RAGWEED IGE QN: 0.38 KU(A)/L
GOOSEFOOT IGE QN: 0.17 KU(A)/L
JOHNSON GRASS IGE QN: 0.14 KU(A)/L
MAPLE IGE QN: 0.18 KU(A)/L
MUGWORT IGE QN: 0.18 KU(A)/L
NETTLE IGE QN: 2.2 KU(A)/L
P NOTATUM IGE QN: <0.1 KU(A)/L
RED MULBERRY IGE QN: <0.1 KU(A)/L
SALTWORT IGE QN: 0.29 KU(A)/L
SHEEP SORREL IGE QN: <0.1 KU(A)/L
SILVER BIRCH IGE QN: 2.58 KU(A)/L
TIMOTHY IGE QN: 0.18 KU(A)/L
WHITE ASH IGE QN: 0.15 KU(A)/L
WHITE ELM IGE QN: 0.11 KU(A)/L
WHITE MULBERRY IGE QN: <0.1 KU(A)/L
WHITE OAK IGE QN: 1.43 KU(A)/L
WORMWOOD IGE QN: 0.18 KU(A)/L

## 2023-07-25 ENCOUNTER — TELEPHONE (OUTPATIENT)
Dept: ALLERGY | Facility: CLINIC | Age: 68
End: 2023-07-25
Payer: COMMERCIAL

## 2023-07-25 NOTE — TELEPHONE ENCOUNTER
Returned call to patient to discuss lab results. Informed that labs came back positive for dog, cat, grass, weeds, and tree. Informed patient that tobacco is not an item that is tested for. Patient verbalized understanding and has no further questions.    Jd Rodriguez RN, BSN  7/25/2023 2:42 PM

## 2023-07-25 NOTE — TELEPHONE ENCOUNTER
[facility] Call Center    Phone Message    May a detailed message be left on voicemail: yes     Reason for Call: Other: Pt returning call possibly about labs for upcoming appointment. Please call. Okay to leave a message. Thank you.      Action Taken: Message routed to:  Other: [facility] Allergy    Travel Screening: Not Applicable

## 2023-08-07 DIAGNOSIS — E11.9 TYPE 2 DIABETES MELLITUS WITHOUT COMPLICATION, WITHOUT LONG-TERM CURRENT USE OF INSULIN (H): ICD-10-CM

## 2023-08-08 RX ORDER — LISINOPRIL 10 MG/1
TABLET ORAL
Qty: 90 TABLET | Refills: 1 | Status: SHIPPED | OUTPATIENT
Start: 2023-08-08 | End: 2023-10-11

## 2023-08-08 NOTE — TELEPHONE ENCOUNTER
Refilling; recommend coming in for nurse BP check due to elevated BP when in the ER.     Sarah Alvarez CNP

## 2023-08-09 ENCOUNTER — ALLIED HEALTH/NURSE VISIT (OUTPATIENT)
Dept: FAMILY MEDICINE | Facility: CLINIC | Age: 68
End: 2023-08-09
Payer: COMMERCIAL

## 2023-08-09 VITALS — DIASTOLIC BLOOD PRESSURE: 74 MMHG | SYSTOLIC BLOOD PRESSURE: 160 MMHG

## 2023-08-09 DIAGNOSIS — Z01.30 BLOOD PRESSURE CHECK: Primary | ICD-10-CM

## 2023-08-09 PROCEDURE — 99207 PR NO CHARGE NURSE ONLY: CPT

## 2023-08-09 NOTE — PROGRESS NOTES
Grecia Kilgore is a 68 year old year old patient who comes in today for a Blood Pressure check because of it was high in ER.  Vital Signs as repeated by /80 manual per patient request, she does not like when automatic machines are used. Patient asked to recheck BP one more time before leaving. 160/74 manually.   Patient is taking medication as prescribed. Patient is unsure if she took her medication this morning. Patient willing to come in for another recheck if recommended.   Patient is tolerating medications well.  Patient is not monitoring Blood Pressure at home.   Current complaints: none  Disposition:  huddled with provider, Sarah Casillas. Recommends patient to follow up with PCP      Dunia Ashley RN on 8/9/2023 at 5:27 PM

## 2023-08-10 ENCOUNTER — TELEPHONE (OUTPATIENT)
Dept: FAMILY MEDICINE | Facility: CLINIC | Age: 68
End: 2023-08-10
Payer: COMMERCIAL
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
## 2023-08-11 ENCOUNTER — OFFICE VISIT (OUTPATIENT)
Dept: PULMONOLOGY | Facility: CLINIC | Age: 68
End: 2023-08-11
Payer: COMMERCIAL

## 2023-08-11 ENCOUNTER — OFFICE VISIT (OUTPATIENT)
Dept: ALLERGY | Facility: CLINIC | Age: 68
End: 2023-08-11
Payer: COMMERCIAL

## 2023-08-11 ENCOUNTER — TELEPHONE (OUTPATIENT)
Dept: ALLERGY | Facility: CLINIC | Age: 68
End: 2023-08-11

## 2023-08-11 VITALS — RESPIRATION RATE: 18 BRPM | OXYGEN SATURATION: 99 % | HEART RATE: 62 BPM | BODY MASS INDEX: 24.9 KG/M2 | WEIGHT: 159 LBS

## 2023-08-11 DIAGNOSIS — J45.20 MILD INTERMITTENT ASTHMA WITHOUT COMPLICATION: ICD-10-CM

## 2023-08-11 DIAGNOSIS — H10.13 ALLERGIC CONJUNCTIVITIS, BILATERAL: ICD-10-CM

## 2023-08-11 DIAGNOSIS — J45.20 MILD INTERMITTENT ASTHMA WITHOUT COMPLICATION: Primary | ICD-10-CM

## 2023-08-11 DIAGNOSIS — J30.1 SEASONAL ALLERGIC RHINITIS DUE TO POLLEN: ICD-10-CM

## 2023-08-11 DIAGNOSIS — J30.81 ALLERGIC RHINITIS DUE TO ANIMAL (CAT) (DOG) HAIR AND DANDER: ICD-10-CM

## 2023-08-11 PROCEDURE — 94375 RESPIRATORY FLOW VOLUME LOOP: CPT | Performed by: INTERNAL MEDICINE

## 2023-08-11 PROCEDURE — 99214 OFFICE O/P EST MOD 30 MIN: CPT | Performed by: INTERNAL MEDICINE

## 2023-08-11 RX ORDER — BENZONATATE 200 MG/1
200 CAPSULE ORAL 3 TIMES DAILY PRN
Qty: 30 CAPSULE | Refills: 4 | Status: SHIPPED | OUTPATIENT
Start: 2023-08-11 | End: 2023-10-11

## 2023-08-11 RX ORDER — ALBUTEROL SULFATE 90 UG/1
2 AEROSOL, METERED RESPIRATORY (INHALATION) EVERY 6 HOURS PRN
Qty: 18 G | Refills: 4 | Status: SHIPPED | OUTPATIENT
Start: 2023-08-11 | End: 2024-06-26

## 2023-08-11 RX ORDER — OLOPATADINE HYDROCHLORIDE 2 MG/ML
1 SOLUTION/ DROPS OPHTHALMIC DAILY
Qty: 7.5 ML | Refills: 3 | Status: SHIPPED | OUTPATIENT
Start: 2023-08-11 | End: 2023-10-11

## 2023-08-11 RX ORDER — LEVOCETIRIZINE DIHYDROCHLORIDE 5 MG/1
5 TABLET, FILM COATED ORAL EVERY EVENING
Qty: 90 TABLET | Refills: 3 | Status: SHIPPED | OUTPATIENT
Start: 2023-08-11 | End: 2023-10-11

## 2023-08-11 NOTE — TELEPHONE ENCOUNTER
Tessalon pearls prescribed for patient per Dr. Little's approval. Called patient to inform.    Jd Rodriguez RN, BSN  8/11/2023 2:15 PM
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
## 2023-08-11 NOTE — TELEPHONE ENCOUNTER
Spoke with patient and  she is watching her salt in diet. She hs an upcoming appointment with PCP.     She is missing sleep, forgot to take her medication and she feels this may be why her B/P was elevated.     She does not want to come in for B/P check and would prefer to wait until her appointment.       Lili Bagley RN  [facility]
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
## 2023-08-11 NOTE — PATIENT INSTRUCTIONS
Allergy Staff Appt Hours Shot Hours Location       Physician   Prabhu Little MD      Support Staff   LIBRA Dia, RN   Brock GUERRIER, ROQUE BEATTY LPN      Mondays Tuesdays Thursdays and Fridays:  Gretchen 7-5      Wednesdays  Close                Mondays, Tuesdays and Fridays:  7:20 - 3:40              [facility]  [address]  [place] [address]  Appt Line: [phone]    Pulmonary Function Scheduling:  [place]: [phone]           Questions about cost of your care  For questions about your cost of your visit, procedure, lab or imaging contact: [employer] Consumer Price Line [phone] or visit:  [email or web address]    Important Scheduling Information  Appointments for skin testing: Appointment will last approximately 45 minutes.  Please call the appointment line for your clinic to schedule.  Discontinue oral antihistamines 7 days prior to the appointment.  Discontinue nasal and ocular antihistamines 4 days prior to appointment.    Appointments for challenges (oral food challenge, penicillin testing, aspirin desensitization) If your provider instructs you to that this additional testing is indicated, it will need to be scheduled directly through the allergy department.  Please contact them via [employer] or by calling the clinic and asking to speak with the allergy team.  They will provide additional information and instructions for the appointment.  Discontinue oral antihistamines 7 days prior to the appointment.  Discontinue nasal and ocular antihistamines 4 days prior to the appointment.    Thank you for trusting us with your care. Please feel free to contact us with any questions or concerns you may have.

## 2023-08-11 NOTE — TELEPHONE ENCOUNTER
[facility] Call Center    Phone Message    May a detailed message be left on voicemail: yes     Reason for Call: Medication Question or concern regarding medication   Prescription Clarification  Name of Medication: LUIS MANUEL  Prescribing Provider: LUIS MANUEL   Pharmacy: [id number] [address] AT [place] [address] & [facility]   What on the order needs clarification? Pt said that she wakes up from a dry cough at least once a day for about a month now and is asking if she is able to get a Rx for the cough? Please call pt back to discuss. Thanks       Action Taken: Message routed to:  Other: CS allergy    Travel Screening: Not Applicable

## 2023-08-11 NOTE — TELEPHONE ENCOUNTER
Have her take her medications regularly.  Eat a low-salt diet.  Follow-up in 2 weeks for a blood pressure recheck with the nurse visit.    Romel Mccall MD  [facility] [place]

## 2023-08-11 NOTE — PROGRESS NOTES
Grecia Kilgore comes into clinic today at the request of Dr. Prabhu Little, Ordering Provider for spirometry     This service provided today was under the supervising provider of the day Dr. Prabhu Little, who was available if needed.    Ashok Robin, RT

## 2023-08-14 ENCOUNTER — ANCILLARY PROCEDURE (OUTPATIENT)
Dept: GENERAL RADIOLOGY | Facility: CLINIC | Age: 68
End: 2023-08-14
Attending: ORTHOPAEDIC SURGERY
Payer: COMMERCIAL

## 2023-08-14 ENCOUNTER — OFFICE VISIT (OUTPATIENT)
Dept: ORTHOPEDICS | Facility: CLINIC | Age: 68
End: 2023-08-14
Payer: COMMERCIAL

## 2023-08-14 DIAGNOSIS — M79.644 FINGER PAIN, RIGHT: Primary | ICD-10-CM

## 2023-08-14 DIAGNOSIS — M17.10 ARTHRITIS OF KNEE: ICD-10-CM

## 2023-08-14 DIAGNOSIS — M79.644 FINGER PAIN, RIGHT: ICD-10-CM

## 2023-08-14 LAB
EXPTIME-PRE: 7.54 SEC
FEF2575-%PRED-PRE: 128 %
FEF2575-PRE: 2.62 L/SEC
FEF2575-PRED: 2.05 L/SEC
FEFMAX-%PRED-PRE: 122 %
FEFMAX-PRE: 7.82 L/SEC
FEFMAX-PRED: 6.4 L/SEC
FEV1-%PRED-PRE: 117 %
FEV1-PRE: 2.8 L
FEV1FEV6-PRE: 80 %
FEV1FEV6-PRED: 80 %
FEV1FVC-PRE: 79 %
FEV1FVC-PRED: 79 %
FIFMAX-PRE: 5.44 L/SEC
FVC-%PRED-PRE: 116 %
FVC-PRE: 3.53 L
FVC-PRED: 3.04 L

## 2023-08-14 PROCEDURE — 20610 DRAIN/INJ JOINT/BURSA W/O US: CPT | Mod: LT | Performed by: ORTHOPAEDIC SURGERY

## 2023-08-14 PROCEDURE — 73140 X-RAY EXAM OF FINGER(S): CPT | Mod: TC | Performed by: RADIOLOGY

## 2023-08-14 PROCEDURE — 99203 OFFICE O/P NEW LOW 30 MIN: CPT | Mod: 25 | Performed by: ORTHOPAEDIC SURGERY

## 2023-08-14 RX ADMIN — TRIAMCINOLONE ACETONIDE 40 MG: 40 INJECTION, SUSPENSION INTRA-ARTICULAR; INTRAMUSCULAR at 11:22

## 2023-08-14 RX ADMIN — LIDOCAINE HYDROCHLORIDE 3 ML: 10 INJECTION, SOLUTION INFILTRATION; PERINEURAL at 11:22

## 2023-08-14 RX ADMIN — BUPIVACAINE HYDROCHLORIDE 3 ML: 2.5 INJECTION, SOLUTION EPIDURAL; INFILTRATION; INTRACAUDAL at 11:22

## 2023-08-14 NOTE — PATIENT INSTRUCTIONS
Thank you for choosing [facility]    Dr. Newton Locations:    [facility] - [address], [address]  [place], [place] [id number] [place] [id number]   Appointments: [phone] Appointments: [phone]   Fax: [phone] Fax: [phone]       Follow up:  Please call [phone] to schedule your follow up appointment.     Lab orders have been placed  You can go to your [facility] location that has a lab. Please call your [facility] to schedule a lab appointment. [facility] has a walk-in hospital lab for your convenience.     Steroid injection of the left knee was performed today in clinic    - Would not soak in a hot tub, bath or swimming pool for 48 hours  - Ok to shower  - Ice today and only do your normal amounts of activity  - The lidocaine (what is giving you pain relief right now) will likely stop working in 1-2 hours.  You will then have pain again, similar to before you received the injection. The corticosteroid will not start working until approximately 1-2 weeks from now.  In a small percentage of people, cortisone can cause flushing/redness in the face. This usually lasts for 1-3 days and resolves. Cool compress and Ibuprofen/Tylenol can help if this happens.      For any questions please contact my office, [phone].
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
## 2023-08-15 ENCOUNTER — HOSPITAL ENCOUNTER (EMERGENCY)
Facility: CLINIC | Age: 68
Discharge: HOME | End: 2023-08-15
Attending: STUDENT IN AN ORGANIZED HEALTH CARE EDUCATION/TRAINING PROGRAM | Admitting: STUDENT IN AN ORGANIZED HEALTH CARE EDUCATION/TRAINING PROGRAM
Payer: COMMERCIAL

## 2023-08-15 VITALS — TEMPERATURE: 97.8 F | HEART RATE: 55 BPM | RESPIRATION RATE: 18 BRPM | OXYGEN SATURATION: 100 %

## 2023-08-15 DIAGNOSIS — N30.01 ACUTE CYSTITIS WITH HEMATURIA: ICD-10-CM

## 2023-08-15 LAB
ALBUMIN UR-MCNC: ABNORMAL G/DL
ALBUMIN UR-MCNC: ABNORMAL G/DL
APPEARANCE UR: CLEAR
APPEARANCE UR: CLEAR
BACTERIA #/AREA URNS HPF: ABNORMAL /HPF
BILIRUB UR QL STRIP: ABNORMAL
BILIRUB UR QL STRIP: ABNORMAL
COLOR UR AUTO: ABNORMAL
COLOR UR AUTO: ABNORMAL
GLUCOSE UR STRIP-MCNC: ABNORMAL MG/DL
GLUCOSE UR STRIP-MCNC: ABNORMAL MG/DL
HGB UR QL STRIP: ABNORMAL
HGB UR QL STRIP: ABNORMAL
HYALINE CASTS: 2 /LPF
KETONES UR STRIP-MCNC: ABNORMAL MG/DL
KETONES UR STRIP-MCNC: ABNORMAL MG/DL
LEUKOCYTE ESTERASE UR QL STRIP: ABNORMAL
LEUKOCYTE ESTERASE UR QL STRIP: ABNORMAL
MUCOUS THREADS #/AREA URNS LPF: PRESENT /LPF
MUCOUS THREADS #/AREA URNS LPF: PRESENT /LPF
NITRATE UR QL: ABNORMAL
NITRATE UR QL: ABNORMAL
PH UR STRIP: ABNORMAL [PH]
PH UR STRIP: ABNORMAL [PH]
RBC URINE: 4 /HPF
RBC URINE: 5 /HPF
SP GR UR STRIP: 1.02 (ref 1–1.03)
SP GR UR STRIP: ABNORMAL
SQUAMOUS EPITHELIAL: 10 /HPF
SQUAMOUS EPITHELIAL: 2 /HPF
UROBILINOGEN UR STRIP-MCNC: ABNORMAL MG/DL
UROBILINOGEN UR STRIP-MCNC: ABNORMAL MG/DL
WBC URINE: 1 /HPF
WBC URINE: 3 /HPF

## 2023-08-15 PROCEDURE — 99283 EMERGENCY DEPT VISIT LOW MDM: CPT

## 2023-08-15 PROCEDURE — 87086 URINE CULTURE/COLONY COUNT: CPT | Performed by: STUDENT IN AN ORGANIZED HEALTH CARE EDUCATION/TRAINING PROGRAM

## 2023-08-15 PROCEDURE — 81001 URINALYSIS AUTO W/SCOPE: CPT | Performed by: STUDENT IN AN ORGANIZED HEALTH CARE EDUCATION/TRAINING PROGRAM

## 2023-08-15 PROCEDURE — 81001 URINALYSIS AUTO W/SCOPE: CPT | Performed by: EMERGENCY MEDICINE

## 2023-08-15 RX ORDER — CEPHALEXIN 500 MG/1
500 CAPSULE ORAL 2 TIMES DAILY
Qty: 14 CAPSULE | Refills: 0 | Status: SHIPPED | OUTPATIENT
Start: 2023-08-15 | End: 2023-08-22

## 2023-08-15 ASSESSMENT — ACTIVITIES OF DAILY LIVING (ADL): ADLS_ACUITY_SCORE: 35

## 2023-08-15 NOTE — ED PROVIDER NOTES
History     Chief Complaint:  UTI       HPI   Grecia Kilgore is a 68 year old female with past medical history including type 2 diabetes, vaginitis, asthma, and hyperlipidemia, who presents for evaluation of dysuria.  Patient reports onset of lower abdominal pressure 3 days ago following sexual intercourse.  She reports a history of frequent UTIs, and these are classic symptoms for her.  She denies any back pain, flank pain, abdominal pain, nausea, vomiting, or fevers.  She took Azo at home prior to arrival.  She does note a history of kidney stones, however she states that this does not feel similar to a kidney stone, as she is not having any pain, only pressure.      Independent Historian:   None - Patient Only    Review of External Notes:   Prior visit for UTI, noted treatment with cephalexin.      Medications:    cephALEXin (KEFLEX) 500 MG capsule  albuterol (PROAIR HFA/PROVENTIL HFA/VENTOLIN HFA) 108 (90 Base) MCG/ACT inhaler  albuterol (PROAIR HFA/PROVENTIL HFA/VENTOLIN HFA) 108 (90 Base) MCG/ACT inhaler  benzonatate (TESSALON) 200 MG capsule  cetirizine (ZYRTEC) 10 MG tablet  fluticasone (FLONASE) 50 MCG/ACT nasal spray  levocetirizine (XYZAL) 5 MG tablet  Lidocaine (LIDOCARE) 4 % Patch  lisinopril (ZESTRIL) 10 MG tablet  LORazepam (ATIVAN) 0.5 MG tablet  metFORMIN (GLUCOPHAGE) 500 MG tablet  methocarbamol (ROBAXIN) 500 MG tablet  olopatadine (PATADAY) 0.2 % ophthalmic solution  omeprazole (PRILOSEC OTC) 20 MG EC tablet  rosuvastatin (CRESTOR) 5 MG tablet  saccharomyces boulardii (FLORASTOR) 250 MG capsule        Past Medical History:    Past Medical History:   Diagnosis Date    Allergic rhinitis     Anemia     Anxiety     Chronic back pain 1/2002    DCIS (ductal carcinoma in situ) 2014    Depression 2003    Depression, major, recurrent, moderate (H) 9/24/2021    Diabetes mellitus type 2     Diverticulosis     Eating disorder     Exploding head syndrome     External hemorrhoids     G6PD deficiency 2015     Gastroesophageal reflux disease     Hepatitis A age 12    Hypercholesterolemia     Laxative abuse     Liver nodule     Migraine     Mild persistent asthma     Mumps     Nephrolithiasis     BOO (obstructive sleep apnea)     Ovarian cyst 11/06    Palpitations     Pancreatitis     Psychophysiological insomnia 9/24/2021    PTSD (post-traumatic stress disorder)     Pure hypercholesterolemia     Recurrent vaginitis 9/24/2021    Sleep apnea     Spina bifida (H)     STD (sexually transmitted disease)     Tuberculosis     Vasomotor symptoms due to menopause 9/24/2021       Past Surgical History:    Past Surgical History:   Procedure Laterality Date    ARTHRODESIS TOE(S)  9/16/2013    Procedure: ARTHRODESIS TOE(S);  BILATERAL GREAT TOE FUSION (C-ARM);  Surgeon: Mary Guan MD;  Location: [facility]    ARTHRODESIS TOE(S) Left 12/19/2016    Procedure: ARTHRODESIS TOE(S);  Surgeon: Mary Guan MD;  Location: [facility]    ARTHROSCOPY KNEE Left 2/2/11    BIOPSY BREAST  2/7/2014    Procedure: BIOPSY BREAST;  Re-excision Left Breast Cavity for Margins ;  Surgeon: Lisset Amador DO;  Location:  OR    BIOPSY BREAST SEED LOCALIZATION  1/22/2014    Procedure: BIOPSY BREAST SEED LOCALIZATION;  Left Breast Seed Localized Excisional Biopsy ;  Surgeon: Lisset Amador DO;  Location:  OR    COLONOSCOPY  2005    nl - repeat 2015    CYSTOSCOPY      CYSTOSCOPY, RETROGRADES, EXTRACT STONE, INSERT STENT, COMBINED Left 2/4/2021    Procedure: Video cystoscopy, balloon dilation of left ureter, left ureteroscopy with stone extraction, standby laser, left double-J stent placement (5-Ukrainian x 24 cm).;  Surgeon: Craig Ferreira MD;  Location:  OR    EXTRACORPOREAL SHOCK WAVE LITHOTRIPSY (ESWL) Left 8/20/2021    Procedure: Left extracorporal shockwave lithotripsy, fluoroscopic interpretation <1 hour physician time;  Surgeon: Keegan Cannon MD;  Location:  OR    FOOT SURGERY Bilateral 2013    HEMORRHOIDECTOMY BANDING       multiple times    HEMORRHOIDECTOMY INTERNAL N/A 7/24/2018    Procedure: HEMORRHOIDECTOMY INTERNAL;  three quadrant hemorrhoidectomy;  Surgeon: Lisa Patrick MD;  Location: [facility]    HYSTERECTOMY  7/1996    fibroids    REMOVE HARDWARE FOOT Left 2015    REPAIR TENDON FOOT Left 12/19/2016    Procedure: REPAIR TENDON FOOT;  Surgeon: Mary Guan MD;  Location: [facility]        Physical Exam   Patient Vitals for the past 24 hrs:   Temp Temp src Pulse Resp SpO2   08/15/23 1531 -- -- 55 18 100 %   08/15/23 1226 97.8  F (36.6  C) Temporal -- -- --        Physical Exam  Vitals: Reviewed, as above.    General: Alert and oriented. Resting on bed.  Skin: Warm and well-perfused. No rashes, lesions, or erythema.   HEENT:   Head: Normocephalic, atraumatic. Facial features symmetric.   Eyes: Conjunctiva pink, sclera white. EOMs grossly intact.   Ears: Auricles without lesion, erythema, or edema.   Nose: Symmetric with no discharge.  Mouth and throat: Lips are moist. Buccal mucosa is pink and moist without lesions.   Neck: Supple with no lymphadenopathy. Full ROM.   Pulmonary: Chest wall expansion symmetric with no increased work of breathing. Lungs clear to auscultation bilaterally.   Cardiovascular: Heart RRR with no murmurs.   Abdominal: No hernias or distension.  No CVA tenderness bilaterally.  Bowel sounds present and physiologic. Abdomen is soft and nontender to light and deep palpation in all 4 quadrants with no guarding or rebound. No masses or organomegaly.   Musculoskeletal: Moves all extremities spontaneously.  Psych: Affect appropriate.  Answers questions appropriately. Patient appears calm.      Emergency Department Course     Laboratory:  Labs Ordered and Resulted from Time of ED Arrival to Time of ED Departure   ROUTINE UA WITH MICROSCOPIC REFLEX TO CULTURE - Abnormal       Result Value    Color Urine Orange (*)     Appearance Urine Clear      Glucose Urine        Bilirubin Urine        Ketones Urine         Specific Gravity Urine 1.025      Blood Urine        pH Urine        Protein Albumin Urine        Urobilinogen Urine        Nitrite Urine        Leukocyte Esterase Urine        Bacteria Urine Few (*)     Mucus Urine Present (*)     RBC Urine 4 (*)     WBC Urine 1      Squamous Epithelials Urine 10 (*)     Hyaline Casts Urine 2     ROUTINE UA WITH MICROSCOPIC REFLEX TO CULTURE - Abnormal    Color Urine Orange (*)     Appearance Urine Clear      Glucose Urine        Bilirubin Urine        Ketones Urine        Specific Gravity Urine        Blood Urine        pH Urine        Protein Albumin Urine        Urobilinogen Urine        Nitrite Urine        Leukocyte Esterase Urine        Mucus Urine Present (*)     RBC Urine 5 (*)     WBC Urine 3      Squamous Epithelials Urine 2 (*)    URINE CULTURE          Emergency Department Course & Assessments:         Interventions:  Medications - No data to display     Assessments:  I evaluated the patient, as noted above.    Independent Interpretation (X-rays, CTs, rhythm strip):  None    Consultations/Discussion of Management or Tests:  None        Social Determinants of Health affecting care:   None    Disposition:  The patient was discharged to home.     Impression & Plan      Medical Decision Making:  Grecia Kilgore is a 68 year old female with past medical history including type 2 diabetes, vaginitis, asthma, and hyperlipidemia, who presents for evaluation of dysuria.  Please HPI and exam for details.  Differential includes UTI/pyelonephritis, ureteral stone, intra-abdominal pathology, vaginitis, among others.  Patient has a reassuring physical exam with no evidence of ascending kidney infection, sepsis, or other intra-abdominal pathology.  She has benign abdomen with no CVA tenderness and is afebrile.  Urinalysis is challenging to interpret, given interfering substance of Azo, however given symptom pattern, I recommended treatment with cephalexin, as below, which she has  tolerated in the past.  She will follow-up with her PCP for persistent symptoms.  I advised her to discontinue her use of Azo, as she has used it for 2 days already, and it can mask symptoms of a worsening infection.  Return precautions to the ED were discussed in detail, including fevers, abdominal pain, vomiting, flank pain, or other emergent concerns.  She is comfortable with this plan.     Critical Care time:  was 0 minutes for this patient excluding procedures.    Diagnosis:    ICD-10-CM    1. Acute cystitis with hematuria  N30.01            Discharge Medications:  New Prescriptions    CEPHALEXIN (KEFLEX) 500 MG CAPSULE    Take 1 capsule (500 mg) by mouth 2 times daily for 7 days            8/15/2023   Jodi Hartmann PA-C Sells, Jenna, PA-C  08/15/23 1759

## 2023-08-15 NOTE — ED TRIAGE NOTES
Pt reports pressure on lower stomach for a couple days, painful urinaiton, took AZO with relief, took some old phenazopyridine she found around the house. Thinks she has a UTI, refusing VS.

## 2023-08-16 ENCOUNTER — PATIENT OUTREACH (OUTPATIENT)
Dept: CARE COORDINATION | Facility: CLINIC | Age: 68
End: 2023-08-16
Payer: COMMERCIAL

## 2023-08-16 NOTE — PROGRESS NOTES
Clinic Care Coordination Contact  Community Health Worker Initial Outreach    CHW Initial Information Gathering:  Referral Source: ED Follow-Up  Preferred Hospital: [facility] [place]  [phone]  Preferred Urgent Care: Other ([facility])  Current living arrangement:: I live alone  Type of residence:: [facility]Senior Living)  Community Resources: Financial/Insurance  Supplies Currently Used at Home: None  Equipment Currently Used at Home: none  Informal Support system:: Children (Children)  No PCP office visit in Past Year: No  Transportation means:: Regular car       Patient accepts CC: Yes. Patient scheduled for assessment with the SW on 8/17/23 at 3:00 pm. Patient noted desire to discuss her struggle with food has SNAP assistance, but needing a specific diet due to diabetes and allergies. On disability, has outstanding bills with chiropractor. Having issues with paying for medicine. Needing a walker at this time. The patient has Mattie Care through [facility]. Patient was given the [place] Prescription Assistance Program phone number. Wanting to know about how to obtain a new cell phone, patient stated she knew about [employer] in another [facility] that would help with obtaining a new cell phone..   [place]  [phone]  [facility]  [facility]

## 2023-08-17 ENCOUNTER — TELEPHONE (OUTPATIENT)
Dept: ALLERGY | Facility: CLINIC | Age: 68
End: 2023-08-17

## 2023-08-17 ENCOUNTER — PATIENT OUTREACH (OUTPATIENT)
Dept: NURSING | Facility: CLINIC | Age: 68
End: 2023-08-17
Payer: COMMERCIAL

## 2023-08-17 DIAGNOSIS — J45.20 MILD INTERMITTENT ASTHMA WITHOUT COMPLICATION: Primary | ICD-10-CM

## 2023-08-17 DIAGNOSIS — H10.13 ALLERGIC CONJUNCTIVITIS, BILATERAL: ICD-10-CM

## 2023-08-17 LAB — BACTERIA UR CULT: NO GROWTH

## 2023-08-17 RX ORDER — LIDOCAINE HYDROCHLORIDE 10 MG/ML
3 INJECTION, SOLUTION INFILTRATION; PERINEURAL
Status: DISCONTINUED | OUTPATIENT
Start: 2023-08-14 | End: 2023-10-11

## 2023-08-17 RX ORDER — OLOPATADINE HYDROCHLORIDE 2 MG/ML
1 SOLUTION/ DROPS OPHTHALMIC DAILY
Qty: 2.5 ML | Refills: 1 | Status: SHIPPED | OUTPATIENT
Start: 2023-08-17

## 2023-08-17 RX ORDER — BUPIVACAINE HYDROCHLORIDE 2.5 MG/ML
3 INJECTION, SOLUTION EPIDURAL; INFILTRATION; INTRACAUDAL
Status: DISCONTINUED | OUTPATIENT
Start: 2023-08-14 | End: 2023-10-11

## 2023-08-17 RX ORDER — TRIAMCINOLONE ACETONIDE 40 MG/ML
40 INJECTION, SUSPENSION INTRA-ARTICULAR; INTRAMUSCULAR
Status: DISCONTINUED | OUTPATIENT
Start: 2023-08-14 | End: 2023-10-11

## 2023-08-17 RX ORDER — BENZONATATE 200 MG/1
200 CAPSULE ORAL 3 TIMES DAILY PRN
Qty: 30 CAPSULE | Refills: 2 | Status: SHIPPED | OUTPATIENT
Start: 2023-08-17 | End: 2023-10-11

## 2023-08-17 NOTE — RESULT ENCOUNTER NOTE
"Final urine culture report shows \"NO GROWTH\" and is NEGATIVE.  [facility] Emergency Dept discharge antibiotic: Cephalexin (Keflex) 500 mg capsule, 1 capsule (500 mg) by mouth 2 times daily for 7 days.    [facility] Emergency Dept discharge Rx antibiotic for UTI only (Yes/No): Yes  RN to confirm if Patient took antibiotic within 3 days prior to urine culture collection.  Recommendations in treatment per [facility] ED Lab result Urine culture protocol.  "

## 2023-08-17 NOTE — TELEPHONE ENCOUNTER
----- Message from Prabhu Littel MD sent at 8/17/2023  8:04 AM CDT -----  Regarding: Tessalon  Grecia,    Please inform Grecia the Tessalon Perles and Olopatadine were not covered by insurance.  She can discuss with her primary in regards to cough suppression medications that might be covered by insurance.    Thank you,    Prabhu Little

## 2023-08-17 NOTE — TELEPHONE ENCOUNTER
Returned call to patient to talk about prior authorization needed for medications. Patient would like to try having prescriptions sent to a [facility]. Medications sent to patient's pharmacy of choice. Grecia has no further questions at this time.    Jd Rodriguez, RN, BSN  8/17/2023 3:07 PM

## 2023-08-17 NOTE — PROGRESS NOTES
Clinic Care Coordination Contact  Clinic Care Coordination Contact  OUTREACH    Referral Information:  Referral Source: ED Follow-Up         Chief Complaint   Patient presents with    Clinic Care Coordination - Follow-up     Resources        Universal Utilization:    Utilization      Hospital Admissions  0             ED Visits  3             No Show Count (past year)  1                    Current as of: 8/17/2023 11:57 AM                Clinical Concerns:  Current Medical Concerns:    Patient Active Problem List   Diagnosis    Allergic rhinitis    External hemorrhoids    Vitamin D deficiency    Neck pain    Osteoarthritis, knee    Positive PPD    Panic disorder    Genital herpes    Advanced directives, counseling/discussion    DCIS (ductal carcinoma in situ) of breast    Anxiety    Hyperlipidemia LDL goal <100    Type 2 diabetes mellitus without complication, without long-term current use of insulin (H)    Mild intermittent asthma without complication    BOO (obstructive sleep apnea)    Left ureteral calculus    Palpitations    Kidney stone on left side    Recurrent vaginitis    Psychophysiological insomnia    Depression, major, recurrent, moderate (H)    PTSD (post-traumatic stress disorder)    Vasomotor symptoms due to menopause    Chronic bilateral low back pain without sciatica    Hip pain, left    Acute right ankle pain    Chronic pain of left knee       [facility] outreached to pt on this date for scheduled assessment. Pt shares she has already worked with her [facility] to get her Rx prior authorization as well as prescription filled at  Clinic. Pt denied any other needs in this area.     Pt has questions about getting a new cell phone. [facility] explained if pt was getting county benefits [facility], [place], [place], she may qualify for a new cell phone from the [employer]. Pt agreed to more information sent to her on this via email. Writer communicated the risks of unencrypted electronic communication and the patient and/or  patient representative has agreed to accept the risks and receive unencrypted communication related to the information or resources we have discussed. We reviewed that no PHI will be included.  Email address verified with the patient.  Will include electronic communication form for patient/caregiver to complete.      Pt had no other needs and declined ongoing outreaches.       Current Behavioral Concerns: None noted.       Education Provided to patient: Resources provided via email.       Health Maintenance Reviewed:    Health Maintenance Due   Topic Date Due    MIGRAINE ACTION PLAN  Never done    ZOSTER IMMUNIZATION (1 of 2) Never done    ASTHMA ACTION PLAN  04/26/2020    COVID-19 Vaccine (3 - Pfizer series) 07/01/2021       Clinical Pathway: None    Medication Management:  Current Outpatient Medications   Medication    albuterol (PROAIR HFA/PROVENTIL HFA/VENTOLIN HFA) 108 (90 Base) MCG/ACT inhaler    albuterol (PROAIR HFA/PROVENTIL HFA/VENTOLIN HFA) 108 (90 Base) MCG/ACT inhaler    benzonatate (TESSALON) 200 MG capsule    benzonatate (TESSALON) 200 MG capsule    cephALEXin (KEFLEX) 500 MG capsule    cetirizine (ZYRTEC) 10 MG tablet    fluticasone (FLONASE) 50 MCG/ACT nasal spray    levocetirizine (XYZAL) 5 MG tablet    Lidocaine (LIDOCARE) 4 % Patch    lisinopril (ZESTRIL) 10 MG tablet    LORazepam (ATIVAN) 0.5 MG tablet    metFORMIN (GLUCOPHAGE) 500 MG tablet    methocarbamol (ROBAXIN) 500 MG tablet    olopatadine (PATADAY) 0.2 % ophthalmic solution    olopatadine (PATADAY) 0.2 % ophthalmic solution    omeprazole (PRILOSEC OTC) 20 MG EC tablet    rosuvastatin (CRESTOR) 5 MG tablet    saccharomyces boulardii (FLORASTOR) 250 MG capsule     No current facility-administered medications for this visit.     Living Situation:  Current living arrangement:: I live alone  Type of residence:: Independent Senior Living (Senior Living)    Lifestyle & Psychosocial Needs:    Social Determinants of Health     Tobacco Use: Low  Risk  (8/14/2023)    Patient History     Smoking Tobacco Use: Never     Smokeless Tobacco Use: Never     Passive Exposure: Not on file   Alcohol Use: Not on file   Financial Resource Strain: Not on file   Food Insecurity: Not on file   Transportation Needs: Not on file   Physical Activity: Not on file   Stress: Not on file   Social Connections: Not on file   Intimate Partner Violence: Not on file   Depression: At risk (4/17/2023)    PHQ-2     PHQ-2 Score: 3   Housing Stability: Not on file         Informal Support system:: Children (Children)      Resources and Interventions:  Community Resources: Financial/Insurance  Supplies Currently Used at Home: None  Equipment Currently Used at Home: none     Patient/Caregiver understanding: Pt reports understanding and denies any additional questions or concerns at this times. ISABELLE CC engaged in AIDET communication during encounter.         Future Appointments                In 1 month Romel Mccall MD [facility], [place]            Plan: Email sent:     Salas Paige,     I'm glad we got the chance to talk today. I wanted to send you the resources we chatted about today. I hope some of this is helpful:       [place] Prescription Assistance Program is available to assist low income established [facility] patients who may be having difficulty paying for their medications. In collaboration with [facility], [place] physicians and clinics, we can explore options to reduce your overall prescription costs.Please let me know if there are any other questions or concerns.   For more information about [place]'s Prescription Assistance Program call [phone].      Food Resources  Food Resource Map (food shelves, dining sites, etc.) - Click Here  Fare for All - Buy fresh fruits, vegetables, and frozen meat in bulk to save you up to 40% off grocery store prices. They select the best food from our shipments, pre-packaging it to give excellent. Fare  For All is community supported and open to everyone. Click here for distribution dates, times and more information.   [employer]-Are You in Need of Food Support? Making the first call to a food shelf can be a humbling experience. Please know that we are here to help you and your family. The volunteers and staff of The [employer] promise you a dignified and respectful experience. No family should feel hunger. Give us a call today! [phone]      Free or reduced cost phone resources:  Lifeline Free Government Cell Phones Program in [place]  Low-income [place] either with income below or up to the particular level regarding the poverty line or those benefiting from federal and state assistance programs may apply for free [employer]ion[employer] smartphones and free government cell phones. There are several free phone-providing carriers, all giving their services under [place] SpecifiedBy terms. Pick the one which suits you most and apply for your free phone from the [place] [employer]ion[employer].    TuneIn Twitter Dashboard (Requires online application)  TuneIn Twitter Dashboard provides FREE Cell Phone Service to eligible Lifeline subscribers. You may qualify for Lifeline through [employer] if you participate in Food [place] (SNAP), Medicaid, SSI, , Section 8, or 's Pension; or qualify based on income.  Lifeline Benefits  3GB Free Monthly Data  Unlimited Monthly Minutes  Free Unlimited Texts  Free Voicemail & Caller ID  No Bills, Ever  No Fees  No Credit Check  No Contract  To sign up and enroll for [employer]' Lifeline service, just complete our fast and easy online Lifeline Application. Website to apply: [email or web address]    Assurance Wireless (Requires online application)  Federal Lifeline Assistance:  Assurance Wireless is a federal Lifeline Assistance program. Lifeline is a government assistance program. The Assurance Wireless offer provides eligible low-income free monthly data, unlimited texting, and free monthly  minutes. Plus a free phone.  Qualifications:  Enrollment is available to individuals who qualify based on federal or state-specific eligibility criteria. You may qualify if you are on certain public assistance programs, like Medicaid or Supplemental Nutrition Assistance Program (SNAP). You can also qualify based on your household income.  You can also qualify based on your household income.   Proof of Eligibility:  You must provide proof of program participation or proof of income.  One Account per Household:  The Lifeline Assistance program is available for only one wireless or wireline account per household. Separate households that live at the same address are eligible, including residents of homeless shelters and nursing homes. Residents with temporary addresses are also eligible.  Website: https://www.StartupMojo/  Apply: https://www.StartupMojo/apply-now  Questions? Call us toll free  1-449.253.5539    Safelink Wireless  Website: https://www.Photodigm/Enrollment/Safelink/en/Web/www/default/index.html#!/aboutUs    Life Wireless:  Website: https://www.Codemasters/what-is-lifeline    Others:   Access Wireless  American Assistance  EnTouch Wireless  StandUp Wireless  Tempo Communications  Terr[employer] Wireless  T-Mobile  Verizon  AT&T       SHANNA Myrick  Social Work Care Coordinator     [facility]      Pt denied any other needs. Pt encouraged to follow up with  Pharmacy if refill hasn't called in a couple days. Pe agreed. No further outreaches will be made at this time unless a new referral is made or a change in the pt's status occurs. Patient was provided with this writer's contact information and encouraged to call with any questions or concerns.     SHANNA Myrick  Clinic Care Coordinator  [facility]  [facility]  [phone]  [email or web address]

## 2023-08-24 ENCOUNTER — NURSE TRIAGE (OUTPATIENT)
Dept: FAMILY MEDICINE | Facility: CLINIC | Age: 68
End: 2023-08-24
Payer: COMMERCIAL

## 2023-08-24 DIAGNOSIS — B37.31 CANDIDIASIS OF VAGINA: Primary | ICD-10-CM

## 2023-08-24 RX ORDER — FLUCONAZOLE 150 MG/1
150 TABLET ORAL ONCE
Qty: 2 TABLET | Refills: 0 | Status: SHIPPED | OUTPATIENT
Start: 2023-08-24 | End: 2023-08-24

## 2023-08-24 NOTE — TELEPHONE ENCOUNTER
"Patient called following her recent visit to ER for UTI    Recently finished Keflex for UTI. Asked that we add this to her allergy list since it caused full body itching as a side effect. States she did complete the course and is feeling better/no longer complaining of the reaction to medication. Added to allergy list    Pt now complaining of yeast infection due to taking abx.     \"Raging\" down below and lots of discharge. Area is raw and burning and itching like crazy. Poured warm water over vaginal area and it burns     Patient does not want to do a visit or an appointment. States she wants Dr Mccall to call in both an \"internal and external\" script for yeast without doing an appointment     Did advise of  hours but pt refuses to be seen     Please advise    Kaylyn ERICKSON, Triage RN  [facility]     Reason for Disposition   Vaginal discharge is main symptom   Bad smelling vaginal discharge    Additional Information   Negative: Sounds like a life-threatening emergency to the triager   Negative: Pain or burning with passing urine (urination) is main symptom   Negative: Pregnant with vaginal discharge   Negative: SEVERE abdominal pain (e.g., excruciating)   Negative: Patient sounds very sick or weak to the triager   Negative: Rash (e.g., redness, tiny bumps, sore) of genital area present > 24 hours   Negative: Mild lower abdominal pain comes and goes (cramps) that lasts > 24 hours   Negative: Genital area looks infected (e.g., draining sore, spreading redness)   Negative: Rash is tiny water blisters (3 or more)   Negative: Patient wants to be seen   Negative: Yellow or green vaginal discharge and has a fever   Negative: Constant abdominal pain lasting > 2 hours    Protocols used: Vaginal Symptoms-A-OH, Vaginal [id number]-VB    "

## 2023-08-31 ENCOUNTER — TELEPHONE (OUTPATIENT)
Dept: UROLOGY | Facility: CLINIC | Age: 68
End: 2023-08-31
Payer: COMMERCIAL

## 2023-08-31 NOTE — TELEPHONE ENCOUNTER
[facility] Call Center    Phone Message    May a detailed message be left on voicemail: yes     Reason for Call: Symptoms or Concerns     If patient has red-flag symptoms, warm transfer to triage line    Current symptom or concern: Gross Hematuria, pelvic pressure, doesn't feel like she's completely emptying her bladder    Symptoms have been present for:  3-4 day(s)    Has patient previously been seen for this? Yes    By Kassandra/Yoly      Are there any new or worsening symptoms? Yes:     Patient called stating she is having above symptoms. States she feels like she is has a UTI. Would like for someone to contact her in regards to this message. Thank you      Action Taken: Other: Urology    Travel Screening: Not Applicable

## 2023-08-31 NOTE — TELEPHONE ENCOUNTER
She  was seen in the ED for all of the symptoms she is describing, does not have blood in the urine,was taking AZO. She is going to follow up with her GYN regarding the other symptoms.  Koki Moore LPN

## 2023-09-13 DIAGNOSIS — E11.9 TYPE 2 DIABETES MELLITUS WITHOUT COMPLICATION, WITHOUT LONG-TERM CURRENT USE OF INSULIN (H): ICD-10-CM

## 2023-09-13 NOTE — TELEPHONE ENCOUNTER
Prescription approved per [facility] Refill Protocol.  Vida Pineda, RN  [facility] Triage Nurse

## 2023-09-19 ENCOUNTER — TELEPHONE (OUTPATIENT)
Dept: UROLOGY | Facility: CLINIC | Age: 68
End: 2023-09-19
Payer: COMMERCIAL

## 2023-09-19 ENCOUNTER — TELEPHONE (OUTPATIENT)
Dept: ORTHOPEDICS | Facility: CLINIC | Age: 68
End: 2023-09-19
Payer: COMMERCIAL

## 2023-09-19 NOTE — TELEPHONE ENCOUNTER
[facility] Call Center    Phone Message    May a detailed message be left on voicemail: yes     Reason for Call: Other: Patient is requesting a call back from the staff of Dr Newton concerning a shot she recieved     Action Taken: Message routed to:  [facility] ([facility]): lincoln newton    Travel Screening: Not Applicable

## 2023-09-19 NOTE — TELEPHONE ENCOUNTER
[facility] Call Center    Phone Message    May a detailed message be left on voicemail: yes     Reason for Call: Symptoms or Concerns     If patient has red-flag symptoms, warm transfer to triage line    Current symptom or concern: Pt is calling stating she has been experiencing extreme kidney pain, stomach pain , painful intercourse, and blood in urine    Symptoms have been present for:  1 month(s)    Has patient previously been seen for this? No    Are there any new or worsening symptoms? No    Action Taken: Message routed to:  Other: Uro    Travel Screening: Not Applicable

## 2023-09-19 NOTE — TELEPHONE ENCOUNTER
Patient Returning Call    Reason for call:  patient calling wanting to talk to provider in regards to recent injection she received, she has been experiencing several problems  the last 2 weeks and is swollen knee area, requesting callback.     Information relayed to patient:  te sent to clinic     Patient has additional questions:  No      Okay to leave a detailed message?: Yes at Cell number on file:    Telephone Information:   Mobile [phone]

## 2023-09-19 NOTE — TELEPHONE ENCOUNTER
"Phoned patient back in regards to left knee swelling after a clinic injection.    Patient received left knee injection with Dr. Newton on 8/14/23    Patient reports knee to be swollen.  She has the sensation of \"pressure on the knee bone\".  When she sits down she moves her knee cap to relieve the pressure, she feels as though there could be fluid build up.  Patient denies drainage, warmth, redness, fever/chills.    Writer advised she follow back with Dr. Newton in clinic.  Patient prefers to be seen in [place].  Writer will route message to [place] team to work on scheduling.    Michaelle Palmer RN on 9/19/2023 at 3:45 PM    "

## 2023-09-21 ENCOUNTER — APPOINTMENT (OUTPATIENT)
Dept: CT IMAGING | Facility: CLINIC | Age: 68
End: 2023-09-21
Attending: EMERGENCY MEDICINE
Payer: COMMERCIAL

## 2023-09-21 ENCOUNTER — TELEPHONE (OUTPATIENT)
Dept: FAMILY MEDICINE | Facility: CLINIC | Age: 68
End: 2023-09-21

## 2023-09-21 ENCOUNTER — HOSPITAL ENCOUNTER (EMERGENCY)
Facility: CLINIC | Age: 68
Discharge: HOME | End: 2023-09-22
Attending: EMERGENCY MEDICINE | Admitting: EMERGENCY MEDICINE
Payer: COMMERCIAL

## 2023-09-21 DIAGNOSIS — R31.9 HEMATURIA: Primary | ICD-10-CM

## 2023-09-21 DIAGNOSIS — R10.9 LEFT FLANK PAIN: ICD-10-CM

## 2023-09-21 DIAGNOSIS — N20.1 URETEROLITHIASIS: ICD-10-CM

## 2023-09-21 DIAGNOSIS — N20.0 NEPHROLITHIASIS: ICD-10-CM

## 2023-09-21 DIAGNOSIS — R10.9 FLANK PAIN: Primary | ICD-10-CM

## 2023-09-21 DIAGNOSIS — R31.29 OTHER MICROSCOPIC HEMATURIA: ICD-10-CM

## 2023-09-21 DIAGNOSIS — R10.9 FLANK PAIN: ICD-10-CM

## 2023-09-21 LAB
ALBUMIN UR-MCNC: NEGATIVE MG/DL
ANION GAP SERPL CALCULATED.3IONS-SCNC: 11 MMOL/L (ref 7–15)
APPEARANCE UR: CLEAR
BASOPHILS # BLD AUTO: 0 10E3/UL (ref 0–0.2)
BASOPHILS NFR BLD AUTO: 0 %
BILIRUB UR QL STRIP: NEGATIVE
BUN SERPL-MCNC: 8.3 MG/DL (ref 8–23)
CALCIUM SERPL-MCNC: 9.4 MG/DL (ref 8.8–10.2)
CHLORIDE SERPL-SCNC: 106 MMOL/L (ref 98–107)
COLOR UR AUTO: ABNORMAL
CREAT SERPL-MCNC: 0.91 MG/DL (ref 0.51–0.95)
DEPRECATED HCO3 PLAS-SCNC: 23 MMOL/L (ref 22–29)
EGFRCR SERPLBLD CKD-EPI 2021: 69 ML/MIN/1.73M2
EOSINOPHIL # BLD AUTO: 0.1 10E3/UL (ref 0–0.7)
EOSINOPHIL NFR BLD AUTO: 1 %
ERYTHROCYTE [DISTWIDTH] IN BLOOD BY AUTOMATED COUNT: 12.9 % (ref 10–15)
GLUCOSE SERPL-MCNC: 119 MG/DL (ref 70–99)
GLUCOSE UR STRIP-MCNC: NEGATIVE MG/DL
HCT VFR BLD AUTO: 41 % (ref 35–47)
HGB BLD-MCNC: 13.4 G/DL (ref 11.7–15.7)
HGB UR QL STRIP: NEGATIVE
HOLD SPECIMEN: NORMAL
HYALINE CASTS: 1 /LPF
IMM GRANULOCYTES # BLD: 0 10E3/UL
IMM GRANULOCYTES NFR BLD: 0 %
KETONES UR STRIP-MCNC: NEGATIVE MG/DL
LEUKOCYTE ESTERASE UR QL STRIP: NEGATIVE
LYMPHOCYTES # BLD AUTO: 2 10E3/UL (ref 0.8–5.3)
LYMPHOCYTES NFR BLD AUTO: 32 %
MCH RBC QN AUTO: 28.9 PG (ref 26.5–33)
MCHC RBC AUTO-ENTMCNC: 32.7 G/DL (ref 31.5–36.5)
MCV RBC AUTO: 88 FL (ref 78–100)
MONOCYTES # BLD AUTO: 0.4 10E3/UL (ref 0–1.3)
MONOCYTES NFR BLD AUTO: 6 %
MUCOUS THREADS #/AREA URNS LPF: PRESENT /LPF
NEUTROPHILS # BLD AUTO: 3.8 10E3/UL (ref 1.6–8.3)
NEUTROPHILS NFR BLD AUTO: 61 %
NITRATE UR QL: NEGATIVE
NRBC # BLD AUTO: 0 10E3/UL
NRBC BLD AUTO-RTO: 0 /100
PH UR STRIP: 5.5 [PH] (ref 5–7)
PLATELET # BLD AUTO: 250 10E3/UL (ref 150–450)
POTASSIUM SERPL-SCNC: 4.8 MMOL/L (ref 3.4–5.3)
RBC # BLD AUTO: 4.64 10E6/UL (ref 3.8–5.2)
RBC URINE: <1 /HPF
SODIUM SERPL-SCNC: 140 MMOL/L (ref 136–145)
SP GR UR STRIP: 1.01 (ref 1–1.03)
SQUAMOUS EPITHELIAL: 1 /HPF
UROBILINOGEN UR STRIP-MCNC: NORMAL MG/DL
WBC # BLD AUTO: 6.3 10E3/UL (ref 4–11)
WBC URINE: <1 /HPF

## 2023-09-21 PROCEDURE — 82310 ASSAY OF CALCIUM: CPT | Performed by: EMERGENCY MEDICINE

## 2023-09-21 PROCEDURE — 96361 HYDRATE IV INFUSION ADD-ON: CPT

## 2023-09-21 PROCEDURE — 250N000011 HC RX IP 250 OP 636: Performed by: EMERGENCY MEDICINE

## 2023-09-21 PROCEDURE — 96374 THER/PROPH/DIAG INJ IV PUSH: CPT | Mod: 59

## 2023-09-21 PROCEDURE — 96375 TX/PRO/DX INJ NEW DRUG ADDON: CPT

## 2023-09-21 PROCEDURE — 36415 COLL VENOUS BLD VENIPUNCTURE: CPT | Performed by: EMERGENCY MEDICINE

## 2023-09-21 PROCEDURE — 81001 URINALYSIS AUTO W/SCOPE: CPT | Performed by: EMERGENCY MEDICINE

## 2023-09-21 PROCEDURE — 99285 EMERGENCY DEPT VISIT HI MDM: CPT | Mod: 25

## 2023-09-21 PROCEDURE — 250N000009 HC RX 250: Performed by: EMERGENCY MEDICINE

## 2023-09-21 PROCEDURE — 258N000003 HC RX IP 258 OP 636: Performed by: EMERGENCY MEDICINE

## 2023-09-21 PROCEDURE — 85025 COMPLETE CBC W/AUTO DIFF WBC: CPT | Performed by: EMERGENCY MEDICINE

## 2023-09-21 PROCEDURE — 74178 CT ABD&PLV WO CNTR FLWD CNTR: CPT

## 2023-09-21 RX ORDER — TAMSULOSIN HYDROCHLORIDE 0.4 MG/1
0.4 CAPSULE ORAL DAILY
Qty: 7 CAPSULE | Refills: 0 | Status: SHIPPED | OUTPATIENT
Start: 2023-09-21 | End: 2023-10-11

## 2023-09-21 RX ORDER — LORAZEPAM 2 MG/ML
1 INJECTION INTRAMUSCULAR ONCE
Status: COMPLETED | OUTPATIENT
Start: 2023-09-21 | End: 2023-09-21

## 2023-09-21 RX ORDER — KETOROLAC TROMETHAMINE 15 MG/ML
10 INJECTION, SOLUTION INTRAMUSCULAR; INTRAVENOUS ONCE
Status: COMPLETED | OUTPATIENT
Start: 2023-09-21 | End: 2023-09-21

## 2023-09-21 RX ORDER — ONDANSETRON 4 MG/1
4 TABLET, ORALLY DISINTEGRATING ORAL EVERY 8 HOURS PRN
Qty: 12 TABLET | Refills: 0 | Status: SHIPPED | OUTPATIENT
Start: 2023-09-21 | End: 2023-10-11

## 2023-09-21 RX ORDER — OXYCODONE HYDROCHLORIDE 5 MG/1
5 TABLET ORAL EVERY 6 HOURS PRN
Qty: 10 TABLET | Refills: 0 | Status: SHIPPED | OUTPATIENT
Start: 2023-09-21 | End: 2023-10-11

## 2023-09-21 RX ORDER — IOPAMIDOL 755 MG/ML
500 INJECTION, SOLUTION INTRAVASCULAR ONCE
Status: COMPLETED | OUTPATIENT
Start: 2023-09-21 | End: 2023-09-21

## 2023-09-21 RX ADMIN — IOPAMIDOL 80 ML: 755 INJECTION, SOLUTION INTRAVENOUS at 22:47

## 2023-09-21 RX ADMIN — SODIUM CHLORIDE 1000 ML: 9 INJECTION, SOLUTION INTRAVENOUS at 21:56

## 2023-09-21 RX ADMIN — KETOROLAC TROMETHAMINE 10 MG: 15 INJECTION INTRAMUSCULAR; INTRAVENOUS at 21:56

## 2023-09-21 RX ADMIN — SODIUM CHLORIDE 60 ML: 9 INJECTION, SOLUTION INTRAVENOUS at 22:54

## 2023-09-21 RX ADMIN — LORAZEPAM 1 MG: 2 INJECTION INTRAMUSCULAR; INTRAVENOUS at 23:11

## 2023-09-21 ASSESSMENT — ACTIVITIES OF DAILY LIVING (ADL)
ADLS_ACUITY_SCORE: 35
ADLS_ACUITY_SCORE: 35

## 2023-09-21 NOTE — ED TRIAGE NOTES
Pt refusing BP. Pt c/o lower abd pain/pressure radiating into left lower back.     CT scans ordered for mid October. Pain worse so pt presented here d/t PCP telling pt to have CT scan done here today. Blood in urine 2 weeks ago, resolved.

## 2023-09-21 NOTE — TELEPHONE ENCOUNTER
Please see 8/15/23 ED note     Pt contacted her obgyn regarding ongoing abdominal symptoms. Pt was instructed to contact her PCP and request referrals for GI and urology to address ongoing abdominal pain.   Pt is not having any new symptoms; painful intercourse, abdominal pain, reporting kidney discomfort last week.     Referrals pending, routing to provider. Please review/advise

## 2023-09-21 NOTE — TELEPHONE ENCOUNTER
Patient calling clinic requesting Dr. Mccall to also place orders for CT Abdomen Pelvis w/o & w Contrast.     Patient was seen at [facility] by Dr. Patrick on 9/8/23 and was informed this provider is not within [place]. Patient assumed this provider was within [place] as office is located within the [facility].   Dr. Patrick placed orders for CT Abdomen Pelvis w/o & w Contrast. Patient requesting Dr. Mccall to place this CT order instead so she is not billed out of network.     Patient also states she is having CT Urogram completed on 10/13/23 and would like to have this CT completed at that time as well.     Pt is in progress of scheduling Sabra Nunez to go over CT Urogram results.    Patient has tried scheduling with GYN for her ongoing abdominal/UTI symptoms, but was advised to see Urology and GI first to rule out other possible causes, than will be able to see GYN.

## 2023-09-21 NOTE — TELEPHONE ENCOUNTER
Called and informed of Betty's instructions   If too bad, should go back to ER.  I'd get a CT Urogram and return visit.  She hasn't been seen almost 1.5 years.  Should also have pelvic exam, UA, and PVR. (Possibly cystoscopy if all other testing is negative).   She has scheduled her CT will arrange office visit ..

## 2023-09-22 VITALS — TEMPERATURE: 96.2 F | HEART RATE: 68 BPM | OXYGEN SATURATION: 100 % | RESPIRATION RATE: 16 BRPM

## 2023-09-22 NOTE — ED PROVIDER NOTES
History     Chief Complaint:  Abdominal Pain and Flank Pain       HPI   Grecia Kilgore is a 68 year old female who presents with left flank pain.  Patient reports this pain has been intermittent for the past few weeks, and is accompanied by nausea.  She adds that she also experiences some discomfort during sexual intercourse, which causes a shooting pain up into her left flank.  She has seen a GI doctor, and is scheduled to follow-up with a urologist in the near future for a full work-up.  No fevers, vomiting.  She has had a partial hysterectomy.  Has been taking ibuprofen for pain at home.      Independent Historian:   None - Patient Only    Review of External Notes:   None    Medications:    Lisinopril   Ativan   Metformin   Robaxin   Montelukast   Omeprazole   Rosuvastatin   Florastor      Past Medical History:    Anemia   Anxiety   Chronic back pain   Ducal carcinoma in situ   Depression   Type 2 diabetes mellitus   Diverticulosis   Eating disorder  Exploding head syndrome   External hemorrhoids   G6PD deficiency   GERD  Hepatitis A  Hyperlipidemia   Laxative abuse   Liver nodule   Migraine   Mild persistent asthma   Nephrolithiasis   BOO  Ovarian cyst   Palpitations   Pancreatitis   Psychophysiological insomnia   PTSD  Spina bifida   Tuberculosis      Past Surgical History:    Bilateral great toe fusion   Left toe arthrodesis   Left knee arthroscopy   Left breast biopsy   Left breast seed localization   Colonoscopy   Cystoscopy   Cystoscopy, retrogrades, extract stone, insert stent, combined, left   Left ESWL  Banding hemorrhoidectomy x 3+  Internal hemorrhoidectomy   Hysterectomy   Left remove foot hardware   Left tendon foot repair     Physical Exam   Patient Vitals for the past 24 hrs:   Temp Temp src Pulse Resp SpO2   09/21/23 1833 (!) 96.2  F (35.7  C) Temporal 68 16 100 %        Physical Exam  Vitals and nursing note reviewed.   Constitutional:       General: She is not in acute distress.      Appearance: She is not ill-appearing.   HENT:      Head: Normocephalic and atraumatic.      Right Ear: External ear normal.      Left Ear: External ear normal.      Nose: Nose normal.   Eyes:      Extraocular Movements: Extraocular movements intact.      Conjunctiva/sclera: Conjunctivae normal.   Cardiovascular:      Rate and Rhythm: Normal rate and regular rhythm.      Heart sounds: No murmur heard.  Pulmonary:      Effort: Pulmonary effort is normal. No respiratory distress.      Breath sounds: Normal breath sounds. No wheezing, rhonchi or rales.   Abdominal:      General: Abdomen is flat. Bowel sounds are normal. There is no distension.      Palpations: Abdomen is soft.      Tenderness: There is no abdominal tenderness. There is no right CVA tenderness, left CVA tenderness, guarding or rebound.   Musculoskeletal:         General: No deformity or signs of injury.      Cervical back: Normal range of motion and neck supple.   Skin:     General: Skin is warm and dry.      Findings: No rash.   Neurological:      Mental Status: She is alert and oriented to person, place, and time.   Psychiatric:         Behavior: Behavior normal.           Emergency Department Course   ECG  ECG results from 07/04/23   EKG 12 lead     Value    Systolic Blood Pressure     Diastolic Blood Pressure     Ventricular Rate 49    Atrial Rate 49    RI Interval 170    QRS Duration 76        QTc 411    P Axis 52    R AXIS 3    T Axis 42    Interpretation ECG      Sinus bradycardia  Nonspecific T wave abnormality  Abnormal ECG  When compared with ECG of 03-MAY-2021 11:35,  No significant change was found       *Note: Due to a large number of results and/or encounters for the requested time period, some results have not been displayed. A complete set of results can be found in Results Review.       Imaging:  CT Abdomen Pelvis w/o & w Contrast   Final Result   IMPRESSION:    1.  3 mm stone in the left proximal ureter causing mild left  pelviectasis and proximal hydroureter.      2.  2 small left intrarenal calculi.            Report per radiology    Laboratory:  Labs Ordered and Resulted from Time of ED Arrival to Time of ED Departure   BASIC METABOLIC PANEL - Abnormal       Result Value    Sodium 140      Potassium 4.8      Chloride 106      Carbon Dioxide (CO2) 23      Anion Gap 11      Urea Nitrogen 8.3      Creatinine 0.91      Calcium 9.4      Glucose 119 (*)     GFR Estimate 69     ROUTINE UA WITH MICROSCOPIC REFLEX TO CULTURE - Abnormal    Color Urine Straw      Appearance Urine Clear      Glucose Urine Negative      Bilirubin Urine Negative      Ketones Urine Negative      Specific Gravity Urine 1.007      Blood Urine Negative      pH Urine 5.5      Protein Albumin Urine Negative      Urobilinogen Urine Normal      Nitrite Urine Negative      Leukocyte Esterase Urine Negative      Mucus Urine Present (*)     RBC Urine <1      WBC Urine <1      Squamous Epithelials Urine 1      Hyaline Casts Urine 1     CBC WITH PLATELETS AND DIFFERENTIAL    WBC Count 6.3      RBC Count 4.64      Hemoglobin 13.4      Hematocrit 41.0      MCV 88      MCH 28.9      MCHC 32.7      RDW 12.9      Platelet Count 250      % Neutrophils 61      % Lymphocytes 32      % Monocytes 6      % Eosinophils 1      % Basophils 0      % Immature Granulocytes 0      NRBCs per 100 WBC 0      Absolute Neutrophils 3.8      Absolute Lymphocytes 2.0      Absolute Monocytes 0.4      Absolute Eosinophils 0.1      Absolute Basophils 0.0      Absolute Immature Granulocytes 0.0      Absolute NRBCs 0.0            Emergency Department Course & Assessments:    Interventions:  Medications   sodium chloride 0.9% BOLUS 1,000 mL (1,000 mLs Intravenous $New Bag 9/21/23 2156)   ketorolac (TORADOL) injection 10 mg (10 mg Intravenous $Given 9/21/23 2156)   CT Scan Flush (60 mLs Intravenous $Given 9/21/23 2538)   iopamidol (ISOVUE-370) solution 500 mL (80 mLs Intravenous $Given 9/21/23 7957)    LORazepam (ATIVAN) injection 1 mg (1 mg Intravenous $Given 23 2311)        Independent Interpretation (X-rays, CTs, rhythm strip):  None    Assessments/Consultations/Discussion of Management or Tests:  ED Course as of 23   Thu Sep 21, 2023   1954 Dr. Estrada' evaluation   2100 Rechecked and updated       Social Determinants of Health affecting care:   None    Disposition:  Discharged to home    Impression & Plan      CMS Diagnoses: None    Medical Decision Makin-year-old female presenting with ongoing left-sided abdominal and flank pain for several weeks.  She also had intermittent hematuria.  She has been following up with primary care, gastroenterology, and has follow-up planned in the near future with urology and gynecology regarding his complaints.  She is supposed to have a CT with and without contrast on  to further evaluate her complaints.  However, she feels that her pain is worsening and she wants to have her imaging done sooner to try to figure out what is going on.  Is unclear what is causing her pain at this time.  She could have a ureteral stone, diverticulitis, colitis, etc.  Abdominal exam is benign.  Lab work is also unremarkable.  We will check a CT to rule out worrisome causes.  Otherwise she has follow-up already planned with those specialist and she can continue work-up as an outpatient.    Diagnosis:    ICD-10-CM    1. Left flank pain  R10.9       2. Ureterolithiasis  N20.1            Discharge Medications:  New Prescriptions    ONDANSETRON (ZOFRAN ODT) 4 MG ODT TAB    Take 1 tablet (4 mg) by mouth every 8 hours as needed for nausea    OXYCODONE (ROXICODONE) 5 MG TABLET    Take 1 tablet (5 mg) by mouth every 6 hours as needed for severe pain    TAMSULOSIN (FLOMAX) 0.4 MG CAPSULE    Take 1 capsule (0.4 mg) by mouth daily        Scribe Disclosure:  JOSEFINA RASHEED, am serving as a scribe at 7:44 PM on 2023 to document services personally performed by  Kee Palacio MD based on my observations and the provider's statements to me.     9/21/2023   Kee Palacio MD Goodwin, Shaun M, MD  09/21/23 9701       Kee Palacio MD  09/21/23 4003

## 2023-09-25 ENCOUNTER — DOCUMENTATION ONLY (OUTPATIENT)
Dept: FAMILY MEDICINE | Facility: CLINIC | Age: 68
End: 2023-09-25

## 2023-09-25 DIAGNOSIS — R31.9 HEMATURIA, UNSPECIFIED TYPE: Primary | ICD-10-CM

## 2023-09-25 NOTE — TELEPHONE ENCOUNTER
I need to see the patient.  This is too much information that I would need to review with her and then order appropriate referrals and testing.    Please assist her with scheduling an appointment in person with me in the next 1 to 2 weeks.  May use a same-day slot if needed    Romel Mccall MD  [facility]3], Michelle Stonewall

## 2023-10-02 ENCOUNTER — OFFICE VISIT (OUTPATIENT)
Dept: ORTHOPEDICS | Facility: CLINIC | Age: 68
End: 2023-10-02
Payer: COMMERCIAL

## 2023-10-02 ENCOUNTER — ANCILLARY PROCEDURE (OUTPATIENT)
Dept: GENERAL RADIOLOGY | Facility: CLINIC | Age: 68
End: 2023-10-02
Attending: ORTHOPAEDIC SURGERY
Payer: COMMERCIAL

## 2023-10-02 VITALS — HEIGHT: 67 IN | BODY MASS INDEX: 24.9 KG/M2

## 2023-10-02 DIAGNOSIS — M17.10 ARTHRITIS OF KNEE: Primary | ICD-10-CM

## 2023-10-02 DIAGNOSIS — M17.10 ARTHRITIS OF KNEE: ICD-10-CM

## 2023-10-02 PROCEDURE — 73562 X-RAY EXAM OF KNEE 3: CPT | Mod: TC | Performed by: RADIOLOGY

## 2023-10-02 PROCEDURE — 99213 OFFICE O/P EST LOW 20 MIN: CPT | Performed by: ORTHOPAEDIC SURGERY
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
## 2023-10-02 NOTE — PATIENT INSTRUCTIONS
Thank you for choosing [facility]    Dr. Newton Locations:    [facility] [address], [address]  [address] [address]   Appointments: [phone] Appointments: [phone]   Fax: [phone] Fax: [phone]       Follow up: With Dr. Casey for the cyst on your finger     Please call [phone] to schedule your follow up appointment.

## 2023-10-03 ENCOUNTER — TELEPHONE (OUTPATIENT)
Dept: ORTHOPEDICS | Facility: CLINIC | Age: 68
End: 2023-10-03
Payer: COMMERCIAL

## 2023-10-03 NOTE — TELEPHONE ENCOUNTER
Made second attempt to contact patient and went straight to voicemail and mailbox is still full.     Mychart status is pending so cannot send patient a [employer] message instead.    Adwoa Hanna, ATC, LAT

## 2023-10-04 ENCOUNTER — TELEPHONE (OUTPATIENT)
Dept: UROLOGY | Facility: CLINIC | Age: 68
End: 2023-10-04
Payer: COMMERCIAL

## 2023-10-04 NOTE — TELEPHONE ENCOUNTER
Health Call Center    Phone Message    May a detailed message be left on voicemail: yes     Reason for Call: Other: Patient calling requesting laser treatment for her stones if needed at her appt with Yoly on 10/13/2023. Patient stated if there is an insurance approval needed she would like to start the process now in case they can do it at her upcoming appt. Please advise and call patient back. Thank you     Action Taken: Message routed to:  Other: Uro    Travel Screening: Not Applicable

## 2023-10-04 NOTE — TELEPHONE ENCOUNTER
Called patient and informed that we will discuss need for laser or not at upcoming visit.    Samantha Peng LPN

## 2023-10-04 NOTE — TELEPHONE ENCOUNTER
Called and spoke with patient to explain that she needed an appointment scheduled before prior authorization could start. Patient was schedule with Dr. Barreto on 10/26/2023 at 4:20.    Adwoa Hanna, TIMOTHY, LAT

## 2023-10-11 ENCOUNTER — TELEPHONE (OUTPATIENT)
Dept: FAMILY MEDICINE | Facility: CLINIC | Age: 68
End: 2023-10-11

## 2023-10-11 ENCOUNTER — OFFICE VISIT (OUTPATIENT)
Dept: FAMILY MEDICINE | Facility: CLINIC | Age: 68
End: 2023-10-11
Payer: COMMERCIAL

## 2023-10-11 VITALS
HEIGHT: 67 IN | OXYGEN SATURATION: 93 % | SYSTOLIC BLOOD PRESSURE: 152 MMHG | WEIGHT: 155.2 LBS | TEMPERATURE: 97.4 F | BODY MASS INDEX: 24.36 KG/M2 | HEART RATE: 67 BPM | DIASTOLIC BLOOD PRESSURE: 80 MMHG | RESPIRATION RATE: 14 BRPM

## 2023-10-11 DIAGNOSIS — M54.2 NECK PAIN: ICD-10-CM

## 2023-10-11 DIAGNOSIS — F33.1 DEPRESSION, MAJOR, RECURRENT, MODERATE (H): ICD-10-CM

## 2023-10-11 DIAGNOSIS — E78.5 HYPERLIPIDEMIA LDL GOAL <100: ICD-10-CM

## 2023-10-11 DIAGNOSIS — R05.9 COUGH, UNSPECIFIED TYPE: ICD-10-CM

## 2023-10-11 DIAGNOSIS — F39 MOOD DISORDER (H): ICD-10-CM

## 2023-10-11 DIAGNOSIS — I10 HYPERTENSION, UNSPECIFIED TYPE: ICD-10-CM

## 2023-10-11 DIAGNOSIS — K21.9 GASTROESOPHAGEAL REFLUX DISEASE, UNSPECIFIED WHETHER ESOPHAGITIS PRESENT: ICD-10-CM

## 2023-10-11 DIAGNOSIS — J45.20 MILD INTERMITTENT ASTHMA WITHOUT COMPLICATION: ICD-10-CM

## 2023-10-11 DIAGNOSIS — R11.0 NAUSEA: ICD-10-CM

## 2023-10-11 DIAGNOSIS — R14.0 BLOATING: ICD-10-CM

## 2023-10-11 DIAGNOSIS — F41.0 PANIC ATTACK: ICD-10-CM

## 2023-10-11 DIAGNOSIS — H61.20 IMPACTED CERUMEN, UNSPECIFIED LATERALITY: ICD-10-CM

## 2023-10-11 DIAGNOSIS — E11.9 TYPE 2 DIABETES MELLITUS WITHOUT COMPLICATION, WITHOUT LONG-TERM CURRENT USE OF INSULIN (H): ICD-10-CM

## 2023-10-11 DIAGNOSIS — Z00.00 ENCOUNTER FOR MEDICARE ANNUAL WELLNESS EXAM: Primary | ICD-10-CM

## 2023-10-11 LAB
ALBUMIN SERPL BCG-MCNC: 4.5 G/DL (ref 3.5–5.2)
ALP SERPL-CCNC: 87 U/L (ref 35–104)
ALT SERPL W P-5'-P-CCNC: 19 U/L (ref 0–50)
ANION GAP SERPL CALCULATED.3IONS-SCNC: 14 MMOL/L (ref 7–15)
AST SERPL W P-5'-P-CCNC: 22 U/L (ref 0–45)
BILIRUB SERPL-MCNC: 0.7 MG/DL
BUN SERPL-MCNC: 12.3 MG/DL (ref 8–23)
CALCIUM SERPL-MCNC: 9.7 MG/DL (ref 8.8–10.2)
CHLORIDE SERPL-SCNC: 107 MMOL/L (ref 98–107)
CHOLEST SERPL-MCNC: 269 MG/DL
CREAT SERPL-MCNC: 0.91 MG/DL (ref 0.51–0.95)
CREAT UR-MCNC: 290 MG/DL
DEPRECATED HCO3 PLAS-SCNC: 21 MMOL/L (ref 22–29)
EGFRCR SERPLBLD CKD-EPI 2021: 69 ML/MIN/1.73M2
GLUCOSE SERPL-MCNC: 107 MG/DL (ref 70–99)
HBA1C MFR BLD: 6.6 % (ref 0–5.6)
HDLC SERPL-MCNC: 45 MG/DL
LDLC SERPL CALC-MCNC: 197 MG/DL
MICROALBUMIN UR-MCNC: 56.1 MG/L
MICROALBUMIN/CREAT UR: 19.34 MG/G CR (ref 0–25)
NONHDLC SERPL-MCNC: 224 MG/DL
POTASSIUM SERPL-SCNC: 4.3 MMOL/L (ref 3.4–5.3)
PROT SERPL-MCNC: 7.5 G/DL (ref 6.4–8.3)
SODIUM SERPL-SCNC: 142 MMOL/L (ref 135–145)
T4 FREE SERPL-MCNC: 1.2 NG/DL (ref 0.9–1.7)
TRIGL SERPL-MCNC: 133 MG/DL
TSH SERPL DL<=0.005 MIU/L-ACNC: 0.13 UIU/ML (ref 0.3–4.2)
VIT D+METAB SERPL-MCNC: 28 NG/ML (ref 20–50)

## 2023-10-11 PROCEDURE — 84439 ASSAY OF FREE THYROXINE: CPT | Performed by: FAMILY MEDICINE

## 2023-10-11 PROCEDURE — 84443 ASSAY THYROID STIM HORMONE: CPT | Performed by: FAMILY MEDICINE

## 2023-10-11 PROCEDURE — 82306 VITAMIN D 25 HYDROXY: CPT | Performed by: FAMILY MEDICINE

## 2023-10-11 PROCEDURE — 99214 OFFICE O/P EST MOD 30 MIN: CPT | Mod: 25 | Performed by: FAMILY MEDICINE

## 2023-10-11 PROCEDURE — 80061 LIPID PANEL: CPT | Performed by: FAMILY MEDICINE

## 2023-10-11 PROCEDURE — 80053 COMPREHEN METABOLIC PANEL: CPT | Performed by: FAMILY MEDICINE

## 2023-10-11 PROCEDURE — 69209 REMOVE IMPACTED EAR WAX UNI: CPT | Mod: 50 | Performed by: FAMILY MEDICINE

## 2023-10-11 PROCEDURE — 82570 ASSAY OF URINE CREATININE: CPT | Performed by: FAMILY MEDICINE

## 2023-10-11 PROCEDURE — G0438 PPPS, INITIAL VISIT: HCPCS | Performed by: FAMILY MEDICINE

## 2023-10-11 PROCEDURE — 83036 HEMOGLOBIN GLYCOSYLATED A1C: CPT | Performed by: FAMILY MEDICINE

## 2023-10-11 PROCEDURE — 82043 UR ALBUMIN QUANTITATIVE: CPT | Performed by: FAMILY MEDICINE

## 2023-10-11 PROCEDURE — 36415 COLL VENOUS BLD VENIPUNCTURE: CPT | Performed by: FAMILY MEDICINE

## 2023-10-11 RX ORDER — LISINOPRIL 20 MG/1
20 TABLET ORAL DAILY
Qty: 90 TABLET | Refills: 1 | Status: SHIPPED | OUTPATIENT
Start: 2023-10-11 | End: 2023-10-17

## 2023-10-11 RX ORDER — RESPIRATORY SYNCYTIAL VIRUS VACCINE 120MCG/0.5
0.5 KIT INTRAMUSCULAR ONCE
Qty: 1 EACH | Refills: 0 | Status: CANCELLED | OUTPATIENT
Start: 2023-10-11 | End: 2023-10-11

## 2023-10-11 RX ORDER — LIDOCAINE 4 G/G
1 PATCH TOPICAL EVERY 24 HOURS
Qty: 30 PATCH | Refills: 1 | Status: SHIPPED | OUTPATIENT
Start: 2023-10-11

## 2023-10-11 RX ORDER — BENZONATATE 200 MG/1
200 CAPSULE ORAL 3 TIMES DAILY PRN
Qty: 30 CAPSULE | Refills: 1 | Status: SHIPPED | OUTPATIENT
Start: 2023-10-11 | End: 2024-03-19

## 2023-10-11 RX ORDER — OMEPRAZOLE 20 MG/1
20 TABLET, DELAYED RELEASE ORAL DAILY
Qty: 90 TABLET | Refills: 3 | Status: SHIPPED | OUTPATIENT
Start: 2023-10-11

## 2023-10-11 RX ORDER — LORAZEPAM 0.5 MG/1
0.5 TABLET ORAL DAILY PRN
Qty: 20 TABLET | Refills: 0 | Status: SHIPPED | OUTPATIENT
Start: 2023-10-11 | End: 2024-09-18

## 2023-10-11 RX ORDER — SUCRALFATE ORAL 1 G/10ML
1 SUSPENSION ORAL 4 TIMES DAILY
COMMUNITY
End: 2023-10-11

## 2023-10-11 RX ORDER — ONDANSETRON 4 MG/1
4 TABLET, ORALLY DISINTEGRATING ORAL EVERY 8 HOURS PRN
Qty: 30 TABLET | Refills: 0 | Status: SHIPPED | OUTPATIENT
Start: 2023-10-11

## 2023-10-11 RX ORDER — ESTRADIOL 0.1 MG/G
CREAM VAGINAL
COMMUNITY
Start: 2023-04-05

## 2023-10-11 RX ORDER — ONDANSETRON 4 MG/1
4 TABLET, ORALLY DISINTEGRATING ORAL EVERY 8 HOURS PRN
Qty: 30 TABLET | Refills: 0 | Status: SHIPPED | OUTPATIENT
Start: 2023-10-11 | End: 2023-10-11

## 2023-10-11 RX ORDER — SUCRALFATE ORAL 1 G/10ML
1 SUSPENSION ORAL 4 TIMES DAILY PRN
Qty: 414 ML | Refills: 0 | Status: SHIPPED | OUTPATIENT
Start: 2023-10-11

## 2023-10-11 ASSESSMENT — ENCOUNTER SYMPTOMS
FREQUENCY: 1
COUGH: 1
BREAST MASS: 0
NAUSEA: 1
NERVOUS/ANXIOUS: 1
HEARTBURN: 1
HEMATURIA: 1
DYSURIA: 1
DIARRHEA: 1
ARTHRALGIAS: 1
CONSTIPATION: 1
ABDOMINAL PAIN: 1

## 2023-10-11 ASSESSMENT — PATIENT HEALTH QUESTIONNAIRE - PHQ9
SUM OF ALL RESPONSES TO PHQ QUESTIONS 1-9: 16
10. IF YOU CHECKED OFF ANY PROBLEMS, HOW DIFFICULT HAVE THESE PROBLEMS MADE IT FOR YOU TO DO YOUR WORK, TAKE CARE OF THINGS AT HOME, OR GET ALONG WITH OTHER PEOPLE: SOMEWHAT DIFFICULT
SUM OF ALL RESPONSES TO PHQ QUESTIONS 1-9: 16

## 2023-10-11 ASSESSMENT — ACTIVITIES OF DAILY LIVING (ADL)
CURRENT_FUNCTION: MEDICATION ADMINISTRATION REQUIRES ASSISTANCE
CURRENT_FUNCTION: TRANSPORTATION REQUIRES ASSISTANCE
CURRENT_FUNCTION: PREPARING MEALS REQUIRES ASSISTANCE
CURRENT_FUNCTION: HOUSEWORK REQUIRES ASSISTANCE
CURRENT_FUNCTION: LAUNDRY REQUIRES ASSISTANCE

## 2023-10-11 ASSESSMENT — ANXIETY QUESTIONNAIRES
GAD7 TOTAL SCORE: 17
IF YOU CHECKED OFF ANY PROBLEMS ON THIS QUESTIONNAIRE, HOW DIFFICULT HAVE THESE PROBLEMS MADE IT FOR YOU TO DO YOUR WORK, TAKE CARE OF THINGS AT HOME, OR GET ALONG WITH OTHER PEOPLE: SOMEWHAT DIFFICULT
4. TROUBLE RELAXING: NEARLY EVERY DAY
1. FEELING NERVOUS, ANXIOUS, OR ON EDGE: NEARLY EVERY DAY
GAD7 TOTAL SCORE: 17
5. BEING SO RESTLESS THAT IT IS HARD TO SIT STILL: SEVERAL DAYS
2. NOT BEING ABLE TO STOP OR CONTROL WORRYING: NEARLY EVERY DAY
6. BECOMING EASILY ANNOYED OR IRRITABLE: MORE THAN HALF THE DAYS
7. FEELING AFRAID AS IF SOMETHING AWFUL MIGHT HAPPEN: MORE THAN HALF THE DAYS
3. WORRYING TOO MUCH ABOUT DIFFERENT THINGS: NEARLY EVERY DAY

## 2023-10-11 ASSESSMENT — ASTHMA QUESTIONNAIRES: ACT_TOTALSCORE: 23

## 2023-10-11 ASSESSMENT — PAIN SCALES - GENERAL: PAINLEVEL: SEVERE PAIN (6)

## 2023-10-11 NOTE — COMMUNITY RESOURCES LIST (ENGLISH)
10/11/2023   [facility]  N/A  For questions about this resource list or additional care needs, please contact your primary care clinic or care manager.  Phone: [phone]   Email: N/A   Address: [address] [place] [address]   Hours: N/A        Food and Nutrition       Food pantry  1  [address] [place] [address] Distance: 0.73 miles      In-Person, Pickup   [address] [place] [address]  Language: English  Hours: Mon 8:00 AM - 4:00 PM , Tue 8:00 AM - 7:00 PM , Wed - Thu 8:00 AM - 4:00 PM  Fees: Free   Phone: (773) 125-5800 Email: info@[employer] Website: https://NGI/resources/resource-centers/     2  [facility] Distance: 2.4 miles      In-Person   [address] [place] [address]  Language: English, Croatian  Hours: Mon - Fri 5:00 AM - 10:00 PM  Fees: Free   Phone: (955) 956-9643 Email: admissions@[facility] Website: http://www.[facility]     SNAP application assistance  3  [employer] ([facility]) [facility] Distance: 0.6 miles      In-Person   [address], [place] [address]  Language: English, Croatian  Hours: Mon - Fri 8:00 AM - 8:00 PM  Fees: Free   Phone: (245) 608-4940 Email: info@[facility].org Website: http://www.capagenTedcas.org     4  [address] [place] [address] Distance: 0.73 miles      In-Person   [address] [place] [address]  Language: English  Hours: Mon 8:00 AM - 4:00 PM , Tue 8:00 AM - 7:00 PM , Wed - Thu 8:00 AM - 4:00 PM  Fees: Free   Phone: (432) 398-2266 Email: info@[employer] Website: https://NGI/resources/resource-centers/     Soup kitchen or free meals  5  [facility] [place] Distance: 4.3 miles      Pickup   [address] [place] [address]  Language: English, Croatian  Hours: Mon - Thu 5:30 PM - 6:30 PM  Fees: Free   Phone:  (629) 405-6968 Email: yanira@[employer] Website: http://[employer]/wordpress/?page_id=5168     6  [facility] [place] Distance: 9.48 miles      In-Person, [place]ster   [address] [place] [address]  Language: English  Hours: Mon - Thu 5:00 PM - 6:30 PM  Fees: Free   Phone: (666) 204-8283 Email: office@[employer] Website: http://[employer]/          Transportation       Free or low-cost transportation  7  [employer]. Distance: 4.6 miles      In-Person   [address] [place] [address] [address] [place] [address]  Language: English  Hours: Mon - Fri 7:00 AM - 5:00 PM  Fees: Free   Phone: (244) 613-4031 Email: [employer] Website: https://SocialBro/     8  [facility] Distance: 9.7 miles      [address] [place] [address]  Language: English, Citizen of the [place], Hmong, Burundian, Yoruba  Hours: Mon - Sun 5:00 AM - 10:00 PM  Fees: Self Pay   Phone: (736) 717-4053 Email: maximalcare_mobility@[employer] Website: https://www.[facility].info/Providers/Maximal_Care_Mobility_LLC/Transportation/1?pos=9     Transportation to medical appointments  9  [place] [facility] Distance: 7.13 miles      In-Person   [address] [place] [address]  Language: Khmer, English, Oromo, Burundian  Hours: Mon - Sat 5:00 AM - 9:00 PM  Fees: Insurance, Self Pay   Phone: (808) 853-3506 Email: info@[employer] Website: http://www.[employer]/     10  [facility] [employer] Distance: 9.1 miles      In-Person   [address] [facility] [place] [address]  Language: English, Yoruba  Hours: Mon - Sun Appt. Only  Fees: Self Pay   Phone: (678) 140-7274 Email: clarkup@[employer] Website: http://www.[employer]/          Important Numbers & Websites       Emergency Services   [address]  [place] [address]  Poison Control   (807) 703-2825  Suicide Prevention Lifeline   (379) 650-6071  (TALK)  Child Abuse Hotline   (231) 544-1575 (4-A-Child)  Sexual Assault Hotline   (904) 872-1838 (HOPE)  National Runaway Safeline   (945) 615-3439 (RUNAWAY)  All-Options Talkline   (450) 498-6017  Substance Abuse Referral   (659) 136-2555 (HELP)

## 2023-10-11 NOTE — COMMUNITY RESOURCES LIST (ENGLISH)
10/11/2023   [facility]  N/A  For questions about this resource list or additional care needs, please contact your primary care clinic or care manager.  Phone: [phone]   Email: N/A   Address: [address] [place] [address]   Hours: N/A        Food and Nutrition       Food pantry  1  [address] [place] [address] Distance: 0.73 miles      In-Person, Pickup   [address] [place] [address]  Language: English  Hours: Mon 8:00 AM - 4:00 PM , Tue 8:00 AM - 7:00 PM , Wed - Thu 8:00 AM - 4:00 PM  Fees: Free   Phone: (246) 443-2682 Email: info@[employer] Website: https://[facility]/resources/resource-centers/     2  [facility] Distance: 2.4 miles      In-Person   [address] [place] [address]  Language: English, Jordanian  Hours: Mon - Fri 5:00 AM - 10:00 PM  Fees: Free   Phone: (794) 744-9384 Email: admissions@[facility] Website: http://www.[facility]     SNAP application assistance  3  [facility] [employer] [facility] ([facility]) [facility] Distance: 0.6 miles      In-Person   [address], [place] [address]  Language: English, Jordanian  Hours: Mon - Fri 8:00 AM - 8:00 PM  Fees: Free   Phone: (248) 511-1556 Email: info@[facility].org Website: http://www.capagenRealOps.org     4  [address] [place] [address] Distance: 0.73 miles      In-Person   [address] [place] [address]  Language: English  Hours: Mon 8:00 AM - 4:00 PM , Tue 8:00 AM - 7:00 PM , Wed - Thu 8:00 AM - 4:00 PM  Fees: Free   Phone: (659) 625-1966 Email: info@[employer] Website: https://[facility]/resources/resource-centers/     Soup kitchen or free meals  5  [facility] [place] Distance: 4.3 miles      Pickup   [address] [place] [address]  Language: English, Jordanian  Hours: Mon - Thu 5:30 PM - 6:30 PM  Fees: Free   Phone:  (342) 841-9221 Email: yanira@[employer] Website: http://[employer]/wordpress/?page_id=5168     6  [facility] [place] Distance: 9.48 miles      In-Person, Ester   [address] [place] [address]  Language: English  Hours: Mon - Thu 5:00 PM - 6:30 PM  Fees: Free   Phone: (118) 587-1817 Email: office@[employer] Website: http://[employer]/          Transportation       Free or low-cost transportation  7  [employer] Distance: 4.6 miles      In-Person   [address] [place] [address] [address] [place] [address]  Language: English  Hours: Mon - Fri 7:00 AM - 5:00 PM  Fees: Free   Phone: (681) 317-1037 Email: [employer]mob@[employer] Website: https://[employer]/     8  [facility] Distance: 9.7 miles      [address] [place] [address]  Language: English, Jordanian, Hmong, Bolivian, English  Hours: Mon - Sun 5:00 AM - 10:00 PM  Fees: Self Pay   Phone: (951) 738-5455 Email: maximalcare_mobility@[employer] Website: https://www.[facility].info/Providers/Maximal_Care_Mobility_LLC/Transportation/1?pos=9     Transportation to medical appointments  9  [place] [facility] Distance: 7.13 miles      In-Person   [address] [place] [address]  Language: Icelandic, English, Oromo, Bolivian  Hours: Mon - Sat 5:00 AM - 9:00 PM  Fees: Insurance, Self Pay   Phone: (301) 795-8507 Email: info@[employer] Website: http://www.[facility]/     10  Assisted Transport Distance: 9.1 miles      In-Person   [address] [facility] [place] [address]  Language: English, English  Hours: Mon - Sun Appt. Only  Fees: Self Pay   Phone: (349) 412-8602 Email: clarkup@[employer] Website: http://www.[employer]/          Important Numbers & Websites       Emergency Services   [address]  [place] [address]  Poison Control   (113) 282-6447  Suicide Prevention Lifeline   (658) 105-7431  (TALK)  Child Abuse Hotline   (345) 192-5255 (4-A-Child)  Sexual Assault Hotline   (778) 454-2456 (HOPE)  National Runaway Safeline   (386) 135-2460 (RUNAWAY)  All-Options Talkline   (184) 132-9311  Substance Abuse Referral   (391) 142-4885 (HELP)
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
## 2023-10-11 NOTE — PATIENT INSTRUCTIONS
"Patient Education   Personalized Prevention Plan  You are due for the preventive services outlined below.  Your care team is available to assist you in scheduling these services.  If you have already completed any of these items, please share that information with your care team to update in your medical record.  Health Maintenance Due   Topic Date Due     Migraine Action Plan  Never done     Zoster (Shingles) Vaccine (1 of 2) Never done     RSV VACCINE 60+ (1 - 1-dose 60+ series) Never done     Asthma Action Plan - yearly  04/26/2020     COVID-19 Vaccine (3 - Pfizer series) 07/01/2021     Flu Vaccine (1) 09/01/2023     Cholesterol Lab  09/21/2023     Kidney Microalbumin Urine Test  09/21/2023     Annual Wellness Visit  09/21/2023     A1C Lab  10/21/2023     Learning About Being Physically Active  What is physical activity?     Being physically active means doing any kind of activity that gets your body moving.  The types of physical activity that can help you get fit and stay healthy include:  Aerobic or \"cardio\" activities. These make your heart beat faster and make you breathe harder, such as brisk walking, riding a bike, or running. They strengthen your heart and lungs and build up your endurance.  Strength training activities. These make your muscles work against, or \"resist,\" something. Examples include lifting weights or doing push-ups. These activities help tone and strengthen your muscles and bones.  Stretches. These let you move your joints and muscles through their full range of motion. Stretching helps you be more flexible.  Reaching a balance between these three types of physical activity is important because each one contributes to your overall fitness.  What are the benefits of being active?  Being active is one of the best things you can do for your health. It helps you to:  Feel stronger and have more energy to do all the things you like to do.  Focus better at school or work.  Feel, think, and " "sleep better.  Reach and stay at a healthy weight.  Lose fat and build lean muscle.  Lower your risk for serious health problems, including diabetes, heart attack, high blood pressure, and some cancers.  Keep your heart, lungs, bones, muscles, and joints strong and healthy.  How can you make being active part of your life?  Start slowly. Make it your long-term goal to get at least 30 minutes of exercise on most days of the week. Walking is a good choice. You also may want to do other activities, such as running, swimming, cycling, or playing tennis or team sports.  Pick activities that you like--ones that make your heart beat faster, your muscles stronger, and your muscles and joints more flexible. If you find more than one thing you like doing, do them all. You don't have to do the same thing every day.  Get your heart pumping every day. Any activity that makes your heart beat faster and keeps it at that rate for a while counts.  Here are some great ways to get your heart beating faster:  Go for a brisk walk, run, or bike ride.  Go for a hike or swim.  Go in-line skating.  Play a game of touch football, basketball, or soccer.  Ride a bike.  Play tennis or racquetball.  Climb stairs.  Even some household chores can be aerobic--just do them at a faster pace. Vacuuming, raking or mowing the lawn, sweeping the garage, and washing and waxing the car all can help get your heart rate up.  Strengthen your muscles during the week. You don't have to lift heavy weights or grow big, bulky muscles to get stronger. Doing a few simple activities that make your muscles work against, or \"resist,\" something can help you get stronger.  For example, you can:  Do push-ups or sit-ups, which use your own body weight as resistance.  Lift weights or dumbbells or use stretch bands at home or in a gym or community center.  Stretch your muscles often. Stretching will help you as you become more active. It can help you stay flexible, loosen " "tight muscles, and avoid injury. It can also help improve your balance and posture and can be a great way to relax.  Be sure to stretch the muscles you'll be using when you work out. It's best to warm your muscles slightly before you stretch them. Walk or do some other light aerobic activity for a few minutes, and then start stretching.  When you stretch your muscles:  Do it slowly. Stretching is not about going fast or making sudden movements.  Don't push or bounce during a stretch.  Hold each stretch for at least 15 to 30 seconds, if you can. You should feel a stretch in the muscle, but not pain.  Breathe out as you do the stretch. Then breathe in as you hold the stretch. Don't hold your breath.  If you're worried about how more activity might affect your health, have a checkup before you start. Follow any special advice your doctor gives you for getting a smart start.  Where can you learn more?  Go to https://www.[employer]/patiented  Enter W332 in the search box to learn more about \"Learning About Being Physically Active.\"  Current as of: October 10, 2022               Content Version: 13.7    6903-4078 [employer].   Care instructions adapted under license by your healthcare professional. If you have questions about a medical condition or this instruction, always ask your healthcare professional. [employer] disclaims any warranty or liability for your use of this information.      Activities of Daily Living    Your Health Risk Assessment indicates you have difficulties with activities of daily living such as housework, bathing, preparing meals, taking medication, etc. Please make a follow up appointment for us to address this issue in more detail.  Hearing Loss: Care Instructions  Overview     Hearing loss is a sudden or slow decrease in how well you hear. It can range from slight to profound. Permanent hearing loss can occur with aging. It also can happen when you are exposed " long-term to loud noise. Examples include listening to loud music, riding motorcycles, or being around other loud machines.  Hearing loss can affect your work and home life. It can make you feel lonely or depressed. You may feel that you have lost your independence. But hearing aids and other devices can help you hear better and feel connected to others.  Follow-up care is a key part of your treatment and safety. Be sure to make and go to all appointments, and call your doctor if you are having problems. It's also a good idea to know your test results and keep a list of the medicines you take.  How can you care for yourself at home?  Avoid loud noises whenever possible. This helps keep your hearing from getting worse.  Always wear hearing protection around loud noises.  Wear a hearing aid as directed.  A professional can help you pick a hearing aid that will work best for you.  You can also get hearing aids over the counter for mild to moderate hearing loss.  Have hearing tests as your doctor suggests. They can show whether your hearing has changed. Your hearing aid may need to be adjusted.  Use other devices as needed. These may include:  Telephone amplifiers and hearing aids that can connect to a television, stereo, radio, or microphone.  Devices that use lights or vibrations. These alert you to the doorbell, a ringing telephone, or a baby monitor.  Television closed-captioning. This shows the words at the bottom of the screen. Most new TVs can do this.  TTY (text telephone). This lets you type messages back and forth on the telephone instead of talking or listening. These devices are also called TDD. When messages are typed on the keyboard, they are sent over the phone line to a receiving TTY. The message is shown on a monitor.  Use text messaging, social media, and email if it is hard for you to communicate by telephone.  Try to learn a listening technique called speechreading. It is not lipreading. You pay  "attention to people's gestures, expressions, posture, and tone of voice. These clues can help you understand what a person is saying. Face the person you are talking to, and have them face you. Make sure the lighting is good. You need to see the other person's face clearly.  Think about counseling if you need help to adjust to your hearing loss.  When should you call for help?  Watch closely for changes in your health, and be sure to contact your doctor if:    You think your hearing is getting worse.     You have new symptoms, such as dizziness or nausea.   Where can you learn more?  Go to https://www.bodaplanes.net/patiented  Enter R798 in the search box to learn more about \"Hearing Loss: Care Instructions.\"  Current as of: March 1, 2023               Content Version: 13.7    4945-4934 [employer].   Care instructions adapted under license by your healthcare professional. If you have questions about a medical condition or this instruction, always ask your healthcare professional. [employer] disclaims any warranty or liability for your use of this information.      Bladder Training: Care Instructions  Your Care Instructions     Bladder training is used to treat urge incontinence and stress incontinence. Urge incontinence means that the need to urinate comes on so fast that you can't get to a toilet in time. Stress incontinence means that you leak urine because of pressure on your bladder. For example, it may happen when you laugh, cough, or lift something heavy.  Bladder training can increase how long you can wait before you have to urinate. It can also help your bladder hold more urine. And it can give you better control over the urge to urinate.  It is important to remember that bladder training takes a few weeks to a few months to make a difference. You may not see results right away, but don't give up.  Follow-up care is a key part of your treatment and safety. Be sure to make and go " to all appointments, and call your doctor if you are having problems. It's also a good idea to know your test results and keep a list of the medicines you take.  How can you care for yourself at home?  Work with your doctor to come up with a bladder training program that is right for you. You may use one or more of the following methods.  Delayed urination  In the beginning, try to keep from urinating for 5 minutes after you first feel the need to go.  While you wait, take deep, slow breaths to relax. Kegel exercises can also help you delay the need to go to the bathroom.  After some practice, when you can easily wait 5 minutes to urinate, try to wait 10 minutes before you urinate.  Slowly increase the waiting period until you are able to control when you have to urinate.  Scheduled urination  Empty your bladder when you first wake up in the morning.  Schedule times throughout the day when you will urinate.  Start by going to the bathroom every hour, even if you don't need to go.  Slowly increase the time between trips to the bathroom.  When you have found a schedule that works well for you, keep doing it.  If you wake up during the night and have to urinate, do it. Apply your schedule to waking hours only.  Kegel exercises  These tighten and strengthen pelvic muscles, which can help you control the flow of urine. (If doing these exercises causes pain, stop doing them and talk with your doctor.) To do Kegel exercises:  Squeeze your muscles as if you were trying not to pass gas. Or squeeze your muscles as if you were stopping the flow of urine. Your belly, legs, and buttocks shouldn't move.  Hold the squeeze for 3 seconds, then relax for 5 to 10 seconds.  Start with 3 seconds, then add 1 second each week until you are able to squeeze for 10 seconds.  Repeat the exercise 10 times a session. Do 3 to 8 sessions a day.  When should you call for help?  Watch closely for changes in your health, and be sure to contact your  "doctor if:    Your incontinence is getting worse.     You do not get better as expected.   Where can you learn more?  Go to https://www.[employer].net/patiented  Enter V684 in the search box to learn more about \"Bladder Training: Care Instructions.\"  Current as of: March 1, 2023               Content Version: 13.7    1903-5949 [employer].   Care instructions adapted under license by your healthcare professional. If you have questions about a medical condition or this instruction, always ask your healthcare professional. [employer] disclaims any warranty or liability for your use of this information.      Learning About Depression Screening  What is depression screening?  Depression screening is a way to see if you have depression symptoms. It may be done by a doctor or counselor. It's often part of a routine checkup. That's because your mental health is just as important as your physical health.  Depression is a mental health condition that affects how you feel, think, and act. You may:  Have less energy.  Lose interest in your daily activities.  Feel sad and grouchy for a long time.  Depression is very common. It affects people of all ages.  Many things can lead to depression. Some people become depressed after they have a stroke or find out they have a major illness like cancer or heart disease. The death of a loved one or a breakup may lead to depression. It can run in families. Most experts believe that a combination of inherited genes and stressful life events can cause it.  What happens during screening?  You may be asked to fill out a form about your depression symptoms. You and the doctor will discuss your answers. The doctor may ask you more questions to learn more about how you think, act, and feel.  What happens after screening?  If you have symptoms of depression, your doctor will talk to you about your options.  Doctors usually treat depression with medicines or counseling. " "Often, combining the two works best. Many people don't get help because they think that they'll get over the depression on their own. But people with depression may not get better unless they get treatment.  The cause of depression is not well understood. There may be many factors involved. But if you have depression, it's not your fault.  A serious symptom of depression is thinking about death or suicide. If you or someone you care about talks about this or about feeling hopeless, get help right away.  It's important to know that depression can be treated. Medicine, counseling, and self-care may help.  Where can you learn more?  Go to https://www.[employer]ise[employer].net/patiented  Enter T185 in the search box to learn more about \"Learning About Depression Screening.\"  Current as of: October 20, 2022               Content Version: 13.7    9632-0394 [employer].   Care instructions adapted under license by your healthcare professional. If you have questions about a medical condition or this instruction, always ask your healthcare professional. [employer] disclaims any warranty or liability for your use of this information.      Preventing Falls: Care Instructions    Talk to your doctor about the medicines you take. Ask if any of them increase the risk of falls and whether they can be changed or stopped.   Try to exercise regularly. It can help improve your strength and balance. This can help lower your risk of falling.     Practice fall safety and prevention.    Wear low-heeled shoes that fit well and give your feet good support. Talk to your doctor if you have foot problems that make this hard.  Carry a cellphone or wear a medical alert device that you can use to call for help.  Use stepladders instead of chairs to reach high objects. Don't climb if you're at risk for falls. Ask for help, if needed.  Wear the correct eyeglasses, if you need them.    Make your home safer.    Remove rugs, cords, " "clutter, and furniture from walkways.  Keep your house well lit. Use night-lights in hallways and bathrooms.  Install and use sturdy handrails on stairways.  Wear nonskid footwear, even inside. Don't walk barefoot or in socks without shoes.    Be safe outside.    Use handrails, curb cuts, and ramps whenever possible.  Keep your hands free by using a shoulder bag or backpack.  Try to walk in well-lit areas. Watch out for uneven ground, changes in pavement, and debris.  Be careful in the winter. Walk on the grass or gravel when sidewalks are slippery. Use de-icer on steps and walkways. Add non-slip devices to shoes.    Put grab bars and nonskid mats in your shower or tub and near the toilet. Try to use a shower chair or bath bench when bathing.   Get into a tub or shower by putting in your weaker leg first. Get out with your strong side first. Have a phone or medical alert device in the bathroom with you.   Where can you learn more?  Go to https://www.[employer].net/patiented  Enter G117 in the search box to learn more about \"Preventing Falls: Care Instructions.\"  Current as of: November 9, 2022               Content Version: 13.7    5117-2537 [employer].   Care instructions adapted under license by your healthcare professional. If you have questions about a medical condition or this instruction, always ask your healthcare professional. [employer] disclaims any warranty or liability for your use of this information.      How to Get Up Safely After a Fall: Care Instructions  Overview     If you have injuries, health problems, or other reasons that may make it easy for you to fall at home, it is a good idea to learn how to get up safely after a fall. Learning how to get up correctly can help you avoid making an injury worse.  Also, knowing what to do if you cannot get up can help you stay safe until help arrives.  Follow-up care is a key part of your treatment and safety. Be sure to make and " go to all appointments, and call your doctor if you are having problems. It's also a good idea to know your test results and keep a list of the medicines you take.  How can you care for yourself after a fall?  If you think you can get up  First lie still for a few minutes and think about how you feel. If your body feels okay and you think you can get up safely, follow the rest of the steps below:  Look for a chair or other piece of furniture that is close to you.  Roll onto your side and rest. Roll by turning your head in the direction you want to roll, move your shoulder and arm, then hip and leg in the same direction.  Lie still for a moment to let your blood pressure adjust.  Slowly push your upper body up, lift your head, and take a moment to rest.  Slowly get up on your hands and knees, and crawl to the chair or other stable piece of furniture.  Put your hands on the chair.  Move one foot forward, and place it flat on the floor. Your other leg should be bent with the knee on the floor.  Rise slowly, turn your body, and sit in the chair. Stay seated for a bit and think about how you feel. Call for help. Even if you feel okay, let someone know what happened to you. You might not know that you have a serious injury.  If you cannot get up  If you think you are injured after a fall or you cannot get up, try not to panic.  Call out for help.  If you have a phone within reach or you have an emergency call device, use it to call for help.  If you do not have a phone within reach, try to slide yourself toward it. If you cannot get to the phone, try to slide toward a door or window or a place where you think you can be heard.  [place] or use an object to make noise so someone might hear you.  If you can reach something that you can use for a pillow, place it under your head. Try to stay warm by covering yourself with a blanket or clothing while you wait for help.  When should you call for help?   Call 911 anytime you think  "you may need emergency care. For example, call if:    You passed out (lost consciousness).     You cannot get up after a fall.     You have severe pain.   Call your doctor now or seek immediate medical care if:    You have new or worse pain.     You are dizzy or lightheaded.     You hit your head.   Watch closely for changes in your health, and be sure to contact your doctor if:    You do not get better as expected.   Where can you learn more?  Go to https://www.[employer] Communicator.net/patiented  Enter G513 in the search box to learn more about \"How to Get Up Safely After a Fall: Care Instructions.\"  Current as of: November 14, 2022               Content Version: 13.7    4573-1138 [employer].   Care instructions adapted under license by your healthcare professional. If you have questions about a medical condition or this instruction, always ask your healthcare professional. [employer] disclaims any warranty or liability for your use of this information.         "

## 2023-10-12 NOTE — TELEPHONE ENCOUNTER
Central Prior Authorization Team   Phone: [phone]        PRIOR AUTHORIZATION DENIED    Medication: ONDANSETRON 4 MG PO TBDP  Insurance Company: alike ([facility]) - Phone [phone] Fax [phone]  Denial Date: 10/11/2023  Denial Rational:           Appeal Information:         Patient Notified: No. Please note: Providers/Clinics are to notify the patients of the denial outcomes and steps going forward.

## 2023-10-13 ENCOUNTER — OFFICE VISIT (OUTPATIENT)
Dept: UROLOGY | Facility: CLINIC | Age: 68
End: 2023-10-13
Payer: COMMERCIAL

## 2023-10-13 ENCOUNTER — HOSPITAL ENCOUNTER (OUTPATIENT)
Dept: CT IMAGING | Facility: CLINIC | Age: 68
Discharge: HOME | End: 2023-10-13
Attending: PHYSICIAN ASSISTANT | Admitting: PHYSICIAN ASSISTANT
Payer: COMMERCIAL

## 2023-10-13 DIAGNOSIS — N20.0 NEPHROLITHIASIS: Primary | ICD-10-CM

## 2023-10-13 DIAGNOSIS — N20.1 URETERAL STONE: ICD-10-CM

## 2023-10-13 DIAGNOSIS — N20.0 NEPHROLITHIASIS: ICD-10-CM

## 2023-10-13 LAB
ALBUMIN UR-MCNC: 100 MG/DL
APPEARANCE UR: CLEAR
BILIRUB UR QL STRIP: ABNORMAL
COLOR UR AUTO: ABNORMAL
GLUCOSE UR STRIP-MCNC: 100 MG/DL
HGB UR QL STRIP: ABNORMAL
KETONES UR STRIP-MCNC: 15 MG/DL
LEUKOCYTE ESTERASE UR QL STRIP: ABNORMAL
NITRATE UR QL: POSITIVE
PH UR STRIP: 5.5 [PH] (ref 5–7)
RESIDUAL VOLUME (RV) (EXTERNAL): 56
SP GR UR STRIP: 1.02 (ref 1–1.03)
UROBILINOGEN UR STRIP-ACNC: >=8 E.U./DL

## 2023-10-13 PROCEDURE — 74176 CT ABD & PELVIS W/O CONTRAST: CPT

## 2023-10-13 PROCEDURE — 99213 OFFICE O/P EST LOW 20 MIN: CPT | Mod: 25 | Performed by: PHYSICIAN ASSISTANT

## 2023-10-13 PROCEDURE — 81003 URINALYSIS AUTO W/O SCOPE: CPT | Mod: QW | Performed by: PHYSICIAN ASSISTANT

## 2023-10-13 PROCEDURE — 51798 US URINE CAPACITY MEASURE: CPT | Performed by: PHYSICIAN ASSISTANT

## 2023-10-13 RX ORDER — OXYCODONE HYDROCHLORIDE 5 MG/1
5 TABLET ORAL EVERY 6 HOURS PRN
Qty: 10 TABLET | Refills: 0 | Status: SHIPPED | OUTPATIENT
Start: 2023-10-13 | End: 2023-10-16

## 2023-10-13 ASSESSMENT — PAIN SCALES - GENERAL: PAINLEVEL: MODERATE PAIN (4)

## 2023-10-13 NOTE — PATIENT INSTRUCTIONS
-Tylenol and oxycodone 5 mg for pain. If reaction to oxycodone, stop, given your other allergies.    - Continue to push fluids. Strain all urine and submit any captured stones for analysis.    - Warning signs discussed including fevers, chills, gross hematuria, severe pain that is refractory to medications or uncontrolled nausea and vomiting, which should prompt more urgent evaluation. Patient understands to proceed to the ER should these warning signs occur outside of clinic hours.    -CT ab/pelvis without to look for stone persisting.  If still in ureter, will have you set up with Dr. Shaikh or Dr. Mo for ureteroscopy with laser lithotripsy and basketing.    -Emptying your bladder well!  UA today looks abnormal, but likely due to AZO.    Contact us in the interim with questions, concerns, or changes in symptomatology.  [phone]
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
## 2023-10-13 NOTE — NURSING NOTE
Chief Complaint   Patient presents with    Kidney Stone Related    Dysuria     PVR: 56 mL by bladder scan    Aimee Flores, EMT
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
## 2023-10-16 ASSESSMENT — ENCOUNTER SYMPTOMS
HEMATURIA: 0
SHORTNESS OF BREATH: 0
FREQUENCY: 1
CHILLS: 0
VOMITING: 0
FEVER: 0
FLANK PAIN: 0
NAUSEA: 1

## 2023-10-17 ENCOUNTER — TELEPHONE (OUTPATIENT)
Dept: ORTHOPEDICS | Facility: CLINIC | Age: 68
End: 2023-10-17
Payer: COMMERCIAL

## 2023-10-17 ENCOUNTER — HOSPITAL ENCOUNTER (OUTPATIENT)
Facility: CLINIC | Age: 68
End: 2023-10-17
Attending: UROLOGY | Admitting: UROLOGY
Payer: COMMERCIAL

## 2023-10-17 ENCOUNTER — TELEPHONE (OUTPATIENT)
Dept: FAMILY MEDICINE | Facility: CLINIC | Age: 68
End: 2023-10-17

## 2023-10-17 DIAGNOSIS — M17.10 ARTHRITIS OF KNEE: ICD-10-CM

## 2023-10-17 DIAGNOSIS — E11.9 TYPE 2 DIABETES MELLITUS WITHOUT COMPLICATION, WITHOUT LONG-TERM CURRENT USE OF INSULIN (H): ICD-10-CM

## 2023-10-17 DIAGNOSIS — M17.12 PRIMARY OSTEOARTHRITIS OF LEFT KNEE: Primary | ICD-10-CM

## 2023-10-17 DIAGNOSIS — N20.1 URETERAL STONE: Primary | ICD-10-CM

## 2023-10-17 RX ORDER — ROSUVASTATIN CALCIUM 5 MG/1
5 TABLET, COATED ORAL DAILY
Qty: 90 TABLET | Refills: 1 | Status: SHIPPED | OUTPATIENT
Start: 2023-10-17 | End: 2023-10-31

## 2023-10-17 RX ORDER — CEFAZOLIN SODIUM 2 G/50ML
2 SOLUTION INTRAVENOUS SEE ADMIN INSTRUCTIONS
Status: CANCELLED | OUTPATIENT
Start: 2023-10-17

## 2023-10-17 RX ORDER — CEFAZOLIN SODIUM 2 G/50ML
2 SOLUTION INTRAVENOUS
Status: CANCELLED | OUTPATIENT
Start: 2023-10-17

## 2023-10-17 RX ORDER — LISINOPRIL 20 MG/1
30 TABLET ORAL DAILY
Qty: 135 TABLET | Refills: 1 | Status: SHIPPED | OUTPATIENT
Start: 2023-10-17 | End: 2024-03-25

## 2023-10-17 NOTE — TELEPHONE ENCOUNTER
Patient Contact    Attempt # 1    Was call answered?  No. Mail box is full and could not leave a message.     Clarissa Roman RN

## 2023-10-17 NOTE — TELEPHONE ENCOUNTER
Patient scheduled for appointment on 10/26/2023 @ [facility] for discussion of viscosupplementation injection vs steroid injection of left knee.       Patient has failed trial of OTC NSAIDs/Pain Medication (ibuprofen, tylenol, naproxen,...):  Yes       Patient has completed trial of physical therapy: Yes     Prior authorization referral for SynviscOne injection pended.    Please advise    Adwoa Hanna, TIMOTHY, LAT

## 2023-10-17 NOTE — TELEPHONE ENCOUNTER
Thank you for clarifying.  I am okay with her taking lisinopril 30 mg.  She currently has 20 mg pills, have her take 1-1/2 pill daily.  Pharmacy has been notified as well.  Schedule a follow-up visit to see the nurse for blood pressure recheck in a month    Romel Mccall MD  [facility], [place]

## 2023-10-17 NOTE — TELEPHONE ENCOUNTER
Spoke with the patient. She states that she was supposed to call back and let Dr. Mccall know what dose of lisinopril she had been taking prior to her 10/11 appointment because she did not know the mg of each tablet at the time of her appointment.    Patient confirmed that she has been taking lisinopril 20 mg daily (2 of the 10 mg tablets).    Because her blood pressure was elevated at her 10/11 appointment the patient increased to lisinopril 30 mg daily this past Saturday (10/14). Patient states that she was planning to scheduled a blood pressure check in 30 days.     Please confirm that this plan is OK.  Clarissa Roman RN

## 2023-10-17 NOTE — TELEPHONE ENCOUNTER
"TO PCP    Patient calling stating that, \"She wanted me to start taking 20 mg lisinopril. I think 20 mg was too low so I am taking 30 mg instead because my blood pressure was too high or does she want me to take something different for kidney protection instead?\"     Please advise dosage of lisinopril and if she should be taking something for 'kidney protection as well?    Denise Gu RN on 10/17/2023 at 9:04 AM    "

## 2023-10-17 NOTE — TELEPHONE ENCOUNTER
Please have her come in for a nurse only blood pressure check in the next few days.  If she is taking lisinopril 30 mg daily at this time, have her continue that for now.    I will adjust her dose of lisinopril once she is seen by the nurse.    Romel Mccall MD  [facility], [place]

## 2023-10-18 ENCOUNTER — TELEPHONE (OUTPATIENT)
Dept: UROLOGY | Facility: CLINIC | Age: 68
End: 2023-10-18
Payer: COMMERCIAL

## 2023-10-18 RX ORDER — ACETAMINOPHEN 325 MG/1
325 TABLET ORAL EVERY 6 HOURS PRN
COMMUNITY
End: 2023-10-23 | Stop reason: HOSPADM

## 2023-10-18 NOTE — TELEPHONE ENCOUNTER
Spoke to patient yesterday regarding scheduling surgery. She said she would like to schedule soonest available as long as Dr. Mo is planning on getting ALL her kidney stones during the surgery, not just the one in the ureter. I told he I would double check and then get back to her.     Dr. Mo said he is planning on getting all the stones. I scheduled her for 10/23. I've tried to reach her three times today and her phone has been going straight to voicemail and the voicemail box is full.     Please give her my direct number if she calls back: [phone]    -Lisa

## 2023-10-19 NOTE — TELEPHONE ENCOUNTER
Spoke with pt, she said she already went over prep with pre-admit yesterday. She said pre-admit was going to reach out to [place] to make an addendum to recent visit, so I told her she does not need a pre-op.

## 2023-10-19 NOTE — TELEPHONE ENCOUNTER
Spoke with pt and relayed providers message: Please have the patient contact urology for an alternate pain med. I am out of office.   Pt stated understanding and had will reach out to urolgoy. Also added to allerigy list.     Caro Welch RN

## 2023-10-19 NOTE — TELEPHONE ENCOUNTER
Spoke with pt, she stated understanding to take 30mg of lisinopril.      Pt also wanted PCP to know that her kidney stone surgery is scheduled for Monday and does not need any further action from PCP.     Pt also states Urologist prescribed oxycodone for pain, pt then sates she broke out with hives and will not continue to take. Pt is requesting other options for pain. Pt notified provider doing surgery as well.     Caro Welch RN

## 2023-10-20 ENCOUNTER — TELEPHONE (OUTPATIENT)
Dept: FAMILY MEDICINE | Facility: CLINIC | Age: 68
End: 2023-10-20
Payer: COMMERCIAL

## 2023-10-20 ENCOUNTER — HOSPITAL ENCOUNTER (OUTPATIENT)
Dept: CT IMAGING | Facility: CLINIC | Age: 68
Discharge: HOME | End: 2023-10-20
Attending: UROLOGY | Admitting: UROLOGY
Payer: COMMERCIAL

## 2023-10-20 DIAGNOSIS — N20.1 URETERAL STONE: ICD-10-CM

## 2023-10-20 PROCEDURE — 74176 CT ABD & PELVIS W/O CONTRAST: CPT

## 2023-10-20 NOTE — TELEPHONE ENCOUNTER
Left message with pre op team that the patient's wellness visit was updated for pre op by Dr. Mccall.       Lili Bagley RN  [facility]             RE: Pre Op Clearance  Received: Today  Romel Mccall MD Golden, Leah M, RN; P Ec Triage  Addendum added to the wellness visit for the preop clearance    Romel Mccall MD  [facility] [place]          Previous Messages       ----- Message -----  From: Megha Mendoza RN  Sent: 10/18/2023   6:28 PM CDT  To: Romel Mccall MD  Subject: Pre Op Clearance                                Good evening,    I am a nurse at [facility]. I just got off the phone with a patient who is scheduled for surgery with us on Monday 10/23/2023. She is to have a preop done but I see she just recently had a wellness visit with you on 10/11/2023. Would you be willing to write an addendum to have her cleared for surgery? She is scheduled for a cystocopy with laser lithotripsy and stone basketing with Dr. Alex Mo. Thank you    Please reach out to our office with questions. We can be called at [phone]    Sincerely,  Megha Mendoza  Pre-Admitting RN

## 2023-10-31 ENCOUNTER — TELEPHONE (OUTPATIENT)
Dept: FAMILY MEDICINE | Facility: CLINIC | Age: 68
End: 2023-10-31
Payer: COMMERCIAL

## 2023-10-31 DIAGNOSIS — E78.5 HYPERLIPIDEMIA LDL GOAL <100: ICD-10-CM

## 2023-10-31 RX ORDER — ROSUVASTATIN CALCIUM 5 MG/1
5 TABLET, COATED ORAL DAILY
Qty: 90 TABLET | Refills: 1 | Status: SHIPPED | OUTPATIENT
Start: 2023-10-31 | End: 2024-02-26

## 2023-10-31 NOTE — TELEPHONE ENCOUNTER
Patient calling stating she wants rosuvastatin to be sent [facility] instead of [facility].     Writer sent to [facility]. See refill for further details.     Denise Gu RN on 10/31/2023 at 9:07 AM

## 2023-11-09 ENCOUNTER — OFFICE VISIT (OUTPATIENT)
Dept: ORTHOPEDICS | Facility: CLINIC | Age: 68
End: 2023-11-09
Payer: COMMERCIAL

## 2023-11-09 DIAGNOSIS — M17.12 PRIMARY OSTEOARTHRITIS OF LEFT KNEE: Primary | ICD-10-CM

## 2023-11-09 PROCEDURE — 20611 DRAIN/INJ JOINT/BURSA W/US: CPT | Mod: LT | Performed by: STUDENT IN AN ORGANIZED HEALTH CARE EDUCATION/TRAINING PROGRAM

## 2023-11-09 RX ORDER — LIDOCAINE HYDROCHLORIDE 10 MG/ML
3 INJECTION, SOLUTION INFILTRATION; PERINEURAL
Status: SHIPPED | OUTPATIENT
Start: 2023-11-09

## 2023-11-09 RX ADMIN — LIDOCAINE HYDROCHLORIDE 3 ML: 10 INJECTION, SOLUTION INFILTRATION; PERINEURAL at 16:12

## 2023-11-09 NOTE — PROGRESS NOTES
Sports Medicine Procedure Note    Grecia was seen today for pain.    Diagnoses and all orders for this visit:    Primary osteoarthritis of left knee  -     Large Joint Injection/Arthocentesis: L knee joint        Grecia Kilgore is a/an 68 year old female who is seen for Synvisc injection of left knee.   Last injection: 08/14/2023 steroid injection, reports she had only a few weeks of relief with this and the injection was painful    -Synvisc injection of the left knee performed under ultrasound guidance today in clinic without complications  -Post-injection instructions were provided to the patient  - Patient's former orthopedic provider retired so I will plan to have her establish care with me in approximately 2 months to discuss her bilateral knee pain    Return in about 2 months (around 1/9/2024).      Post-Injection Discharge Instructions    You may shower, however avoid swimming, tub baths or hot tubs for 24 hours following your procedure  You may have a mild to moderate increase in pain for a few days following the injection.  The lidocaine (local numbing medicine) will wear off in several hours. It usually takes 3-5 days for the steroid medication to start working although it may take up to 14 days for full effect.   You may use ice packs for 10-15 minutes, 3 to 4 times a day at the injection site for comfort if needed  You may use extra strength Tylenol for pain control if necessary   If you were fasting, you may resume your normal diet and medications after the procedure  If you have diabetes, your blood sugar may be higher than normal for 10-14 days following a steroid injection. Contact your doctor who manages your diabetes if your blood sugar is significantly higher than usual    If you experience any of the following, call Sports Medicine @ [phone] or [phone]  -Fever over 100 degrees F  -Swelling, bleeding, redness, drainage, warmth at the injection site  -New or significant worsening  pain    Large Joint Injection/Arthocentesis: L knee joint    Date/Time: 11/9/2023 4:12 PM    Performed by: Debra Barreto DO  Authorized by: Debra Barreto DO    Indications:  Pain and osteoarthritis  Needle Size:  22 G  Guidance: ultrasound    Approach:  Anterolateral  Location:  Knee      Medications:  48 mg hylan 48 MG/6ML; 3 mL lidocaine 1 %  Outcome:  Tolerated well, no immediate complications  Procedure discussed: discussed risks, benefits, and alternatives    Consent Given by:  Patient  Timeout: timeout called immediately prior to procedure    Prep: patient was prepped and draped in usual sterile fashion     Ultrasound was used to ensure safe and accurate needle placement and injection. Ultrasound images of the procedure were permanently stored.    1ml of 8.4% Sodium Bicarbonate solution was used to buffer the local numbing agent for today's injection        Dr. Debra Barreto DO  [facility]  Sports Medicine     -----

## 2023-11-09 NOTE — LETTER
11/9/2023         RE: Grecia Kilgore  [address]  [facility] [place] [address]        Dear Colleague,    Thank you for referring your patient, Grecia Kilgore, to the [facility] [place]. Please see a copy of my visit note below.    Sports Medicine Procedure Note    Grecia was seen today for pain.    Diagnoses and all orders for this visit:    Primary osteoarthritis of left knee  -     Large Joint Injection/Arthocentesis: L knee joint        Grecia Kilgore is a/an 68 year old female who is seen for Synvisc injection of left knee.   Last injection: 08/14/2023 steroid injection, reports she had only a few weeks of relief with this and the injection was painful    -Synvisc injection of the left knee performed under ultrasound guidance today in clinic without complications  -Post-injection instructions were provided to the patient  - Patient's former orthopedic provider retired so I will plan to have her establish care with me in approximately 2 months to discuss her bilateral knee pain    Return in about 2 months (around 1/9/2024).      Post-Injection Discharge Instructions    You may shower, however avoid swimming, tub baths or hot tubs for 24 hours following your procedure  You may have a mild to moderate increase in pain for a few days following the injection.  The lidocaine (local numbing medicine) will wear off in several hours. It usually takes 3-5 days for the steroid medication to start working although it may take up to 14 days for full effect.   You may use ice packs for 10-15 minutes, 3 to 4 times a day at the injection site for comfort if needed  You may use extra strength Tylenol for pain control if necessary   If you were fasting, you may resume your normal diet and medications after the procedure  If you have diabetes, your blood sugar may be higher than normal for 10-14 days following a steroid injection. Contact your doctor who manages your  diabetes if your blood sugar is significantly higher than usual    If you experience any of the following, call Sports Medicine @ [phone] or [phone]  -Fever over 100 degrees F  -Swelling, bleeding, redness, drainage, warmth at the injection site  -New or significant worsening pain    Large Joint Injection/Arthocentesis: L knee joint    Date/Time: 11/9/2023 4:12 PM    Performed by: Debra Barreto DO  Authorized by: Debra Barreto DO    Indications:  Pain and osteoarthritis  Needle Size:  22 G  Guidance: ultrasound    Approach:  Anterolateral  Location:  Knee      Medications:  48 mg hylan 48 MG/6ML; 3 mL lidocaine 1 %  Outcome:  Tolerated well, no immediate complications  Procedure discussed: discussed risks, benefits, and alternatives    Consent Given by:  Patient  Timeout: timeout called immediately prior to procedure    Prep: patient was prepped and draped in usual sterile fashion     Ultrasound was used to ensure safe and accurate needle placement and injection. Ultrasound images of the procedure were permanently stored.    1ml of 8.4% Sodium Bicarbonate solution was used to buffer the local numbing agent for today's injection        Dr. Debra Barreto DO  [facility]  Sports Medicine     -----      Again, thank you for allowing me to participate in the care of your patient.        Sincerely,        Debra Barreto DO

## 2023-11-10 ENCOUNTER — ALLIED HEALTH/NURSE VISIT (OUTPATIENT)
Dept: FAMILY MEDICINE | Facility: CLINIC | Age: 68
End: 2023-11-10
Payer: COMMERCIAL

## 2023-11-10 VITALS — SYSTOLIC BLOOD PRESSURE: 132 MMHG | DIASTOLIC BLOOD PRESSURE: 82 MMHG

## 2023-11-10 DIAGNOSIS — Z01.30 BP CHECK: Primary | ICD-10-CM

## 2023-11-10 PROCEDURE — 99207 PR NO CHARGE NURSE ONLY: CPT

## 2023-11-10 NOTE — PROGRESS NOTES
Grecia Kilogre is a 68 year old patient who comes in today for a Blood Pressure check.  Initial BP:  BP (!) 144/92   LMP 01/01/1985      Second BP: 132/82   Disposition: follow-up as previously indicated by provider and results routed to provider    Steve Smith CMA

## 2023-12-08 DIAGNOSIS — J30.2 SEASONAL ALLERGIC RHINITIS, UNSPECIFIED TRIGGER: ICD-10-CM

## 2023-12-11 RX ORDER — CETIRIZINE HYDROCHLORIDE 10 MG/1
10 TABLET ORAL DAILY
Qty: 180 TABLET | Refills: 1 | Status: SHIPPED | OUTPATIENT
Start: 2023-12-11 | End: 2024-08-21

## 2024-01-04 ENCOUNTER — OFFICE VISIT (OUTPATIENT)
Dept: ORTHOPEDICS | Facility: CLINIC | Age: 69
End: 2024-01-04
Payer: COMMERCIAL

## 2024-01-04 VITALS — HEIGHT: 67 IN | WEIGHT: 155 LBS | BODY MASS INDEX: 24.33 KG/M2

## 2024-01-04 DIAGNOSIS — M17.12 LOCALIZED OSTEOARTHRITIS OF LEFT KNEE: Primary | ICD-10-CM

## 2024-01-04 PROCEDURE — 99213 OFFICE O/P EST LOW 20 MIN: CPT | Performed by: STUDENT IN AN ORGANIZED HEALTH CARE EDUCATION/TRAINING PROGRAM

## 2024-01-04 NOTE — PROGRESS NOTES
"ASSESSMENT & PLAN    Grecia was seen today for follow up.    Diagnoses and all orders for this visit:    Localized osteoarthritis of left knee      68-year-old female presents to follow-up on left knee pain secondary to osteoarthritis.  She had her first Synvisc injection approximately 2 months ago and reports almost 100% relief of her pain since that time.  She has been able to be physically active and is very happy with her results.    Plan:  - Tylenol Extra Strength (1000mg) up to three times daily as needed for pain  - Discussed continuing low impact activity  - Given her positive results with Synvisc, we discussed that she could repeat these every 6 months as needed.  Patient will reach out to me in approximately 4 months if she feels that her injections are wearing off and we can get a prior authorization for repeat injections at that time    Return if symptoms worsen or fail to improve.      Dr. Debra Barreto, DO  [facility]  Sports Medicine     -----  Chief Complaint   Patient presents with    Left Knee - Follow Up       SUBJECTIVE  Grecia Kilgore is a/an 68 year old female who is seen to follow-up on left knee. The patient was last seen 11/9/2023. Since last visit, patient states she is doing well with no complaints. Had synvisc injection about 2 months ago and has had almost 100% relief.  Reports she is able to do stairs and has started exercising again and is very happy with her results.    The patient is seen alone      REVIEW OF SYSTEMS:  Pertinent positives/negative: As stated above in HPI    OBJECTIVE:  Ht 1.702 m (5' 7\")   Wt 70.3 kg (155 lb)   LMP 01/01/1985   BMI 24.28 kg/m     General: Alert and in no distress  Skin: no visable rashes  CV: Extremities appear well perfused   Resp: normal respiratory effort, no conversational dyspnea   Psych: normal mood, affect  MSK:  L knee exam  No significant tenderness to palpation  Good range of motion  No " effusion  Extensor mechanism intact    RADIOLOGY:  Final results and radiologist's interpretation available in the [facility] health record.  Images below were personally reviewed and discussed with the patient in the office today.  My personal interpretation of the performed imaging: X-ray of the left knee from 10/2/2023 reveals medial and patellofemoral degenerative changes with what appears to be possible intra-articular loose bodies.

## 2024-01-04 NOTE — LETTER
"    1/4/2024         RE: Grecia Kilgore  [address]  [place] [address]        Dear Colleague,    Thank you for referring your patient, Grecia Kilgore, to the [facility] [place]. Please see a copy of my visit note below.    ASSESSMENT & PLAN    Grecia was seen today for follow up.    Diagnoses and all orders for this visit:    Localized osteoarthritis of left knee      68-year-old female presents to follow-up on left knee pain secondary to osteoarthritis.  She had her first Synvisc injection approximately 2 months ago and reports almost 100% relief of her pain since that time.  She has been able to be physically active and is very happy with her results.    Plan:  - Tylenol Extra Strength (1000mg) up to three times daily as needed for pain  - Discussed continuing low impact activity  - Given her positive results with Synvisc, we discussed that she could repeat these every 6 months as needed.  Patient will reach out to me in approximately 4 months if she feels that her injections are wearing off and we can get a prior authorization for repeat injections at that time    Return if symptoms worsen or fail to improve.      Dr. Debra Barreto, DO  [facility]  Sports Medicine     -----  Chief Complaint   Patient presents with     Left Knee - Follow Up       SUBJECTIVE  Grecia Kilgore is a/an 68 year old female who is seen to follow-up on left knee. The patient was last seen 11/9/2023. Since last visit, patient states she is doing well with no complaints. Had synvisc injection about 2 months ago and has had almost 100% relief.  Reports she is able to do stairs and has started exercising again and is very happy with her results.    The patient is seen alone      REVIEW OF SYSTEMS:  Pertinent positives/negative: As stated above in HPI    OBJECTIVE:  Ht 1.702 m (5' 7\")   Wt 70.3 kg (155 lb)   LMP 01/01/1985   BMI 24.28 kg/m   "   General: Alert and in no distress  Skin: no visable rashes  CV: Extremities appear well perfused   Resp: normal respiratory effort, no conversational dyspnea   Psych: normal mood, affect  MSK:  L knee exam  No significant tenderness to palpation  Good range of motion  No effusion  Extensor mechanism intact    RADIOLOGY:  Final results and radiologist's interpretation available in the [facility] health record.  Images below were personally reviewed and discussed with the patient in the office today.  My personal interpretation of the performed imaging: X-ray of the left knee from 10/2/2023 reveals medial and patellofemoral degenerative changes with what appears to be possible intra-articular loose bodies.                    Again, thank you for allowing me to participate in the care of your patient.        Sincerely,        Debra Barreto, DO

## 2024-01-04 NOTE — PATIENT INSTRUCTIONS
Thank you for choosing [facility]!    DR. COLEMAN'S CLINIC LOCATIONS:     [place]  TRIAGE LINE: [phone] [address] [facility] [facility] APPOINTMENTS: [phone]   [place] [address] RADIOLOGY: [phone]   (Mondays & Tuesdays) HAND THERAPY: [phone]    PHYSICAL THERAPY: [phone]   [place] BILLING QUESTIONS: [phone] [address] FAX: [phone]   [place] [address]    (Thursdays & Fridays)       Plan:  -See how your knee fells around the first week of May- if your pain is coming back at all, please call the office so we can get insurance approval for a repeat Synvisc injection. If your right knee is hurting as well, please let us know and we can get approval for both knees.

## 2024-01-11 DIAGNOSIS — N20.0 NEPHROLITHIASIS: Primary | ICD-10-CM

## 2024-01-30 ENCOUNTER — OFFICE VISIT (OUTPATIENT)
Dept: URGENT CARE | Facility: URGENT CARE | Age: 69
End: 2024-01-30
Payer: COMMERCIAL

## 2024-01-30 ENCOUNTER — NURSE TRIAGE (OUTPATIENT)
Dept: FAMILY MEDICINE | Facility: CLINIC | Age: 69
End: 2024-01-30
Payer: COMMERCIAL

## 2024-01-30 VITALS — SYSTOLIC BLOOD PRESSURE: 144 MMHG | HEART RATE: 64 BPM | OXYGEN SATURATION: 95 % | DIASTOLIC BLOOD PRESSURE: 80 MMHG

## 2024-01-30 DIAGNOSIS — E11.9 TYPE 2 DIABETES MELLITUS WITHOUT COMPLICATION, WITHOUT LONG-TERM CURRENT USE OF INSULIN (H): ICD-10-CM

## 2024-01-30 DIAGNOSIS — J06.9 UPPER RESPIRATORY TRACT INFECTION, UNSPECIFIED TYPE: Primary | ICD-10-CM

## 2024-01-30 DIAGNOSIS — J45.901 MODERATE ASTHMA WITH EXACERBATION, UNSPECIFIED WHETHER PERSISTENT: ICD-10-CM

## 2024-01-30 PROCEDURE — 99214 OFFICE O/P EST MOD 30 MIN: CPT | Performed by: PHYSICIAN ASSISTANT

## 2024-01-30 RX ORDER — PREDNISONE 20 MG/1
20 TABLET ORAL 2 TIMES DAILY
Qty: 10 TABLET | Refills: 0 | Status: SHIPPED | OUTPATIENT
Start: 2024-01-30 | End: 2024-03-19

## 2024-01-30 RX ORDER — LEVALBUTEROL TARTRATE 45 UG/1
2 AEROSOL, METERED ORAL EVERY 4 HOURS PRN
Qty: 15 G | Refills: 3 | Status: SHIPPED | OUTPATIENT
Start: 2024-01-30

## 2024-01-30 RX ORDER — PREDNISONE 20 MG/1
20 TABLET ORAL 2 TIMES DAILY
Qty: 10 TABLET | Refills: 0 | Status: SHIPPED | OUTPATIENT
Start: 2024-01-30 | End: 2024-01-30

## 2024-01-30 NOTE — TELEPHONE ENCOUNTER
"CC: Patient calling requesting medication for asthma.    States she has a cold and was recently around dogs and asthma has been worse since     Breathing Difficulty:     RESPIRATORY STATUS: increased SOB   ONSET: 3-4 weeks ago \"I have had a cold and it makes my breathing worse\"  PATTERN: intermittent   SEVERITY: some SOB at rest but mostly related to activity. Takes albuterol inhaler which helps. Denies any SOB while on phone with writer, able to speak in full sentences etc   RECURRENT SYMPTOM: yes - asthma gets worse with colds   CARDIAC HISTORY: hx of palpitations   LUNG HISTORY: hx of asthma   CAUSE: \"its because of my cold\"   OTHER SYMPTOMS: denies dizziness, chest pain, fever. Does report runny nose, some cough due to the phlegm. Had chest tightness about a week ago initially after being around dogs but has not had any further chest tightness since   O2 SATURATION MONITOR: does not have   TRAVEL: has not taken covid test     Patient has albuterol inhaler at home which she has been using as needed. States she sometimes experiences palpitations after using inhaler but denies any palpitations aside from inhaler use.     Triaged per [facility] protocol, patient to go to office now. No appointments available at this time of day, writer advised that patient be seen in urgent care. Patient declines, asking if message can be sent to Dr. Mccall if she can prescribe something to \"calm down\" her asthma.     Routing to Dr. Mccall to please advise if patient should be seen in urgent care or if other recommendations? Please review and advise - thank you!     Writer advised that if patient does not hear back from clinic before 6pm with alternative recommendation from PCP - to plan to go to urgent care this evening - patient is aware of hours and locations, patient expressed verbal understanding and is agreeable.    Callback [phone] - ok to leave detailed VM     Nam Atwood RN  [facility]    Reason for " "Disposition   MILD difficulty breathing (e.g., minimal/no SOB at rest, SOB with walking, pulse < 100) of new-onset or worse than normal    Additional Information   Negative: SEVERE difficulty breathing (e.g., struggling for each breath, speaks in single words, pulse > 120)   Negative: Breathing stopped and hasn't returned   Negative: Choking on something   Negative: Bluish (or gray) lips or face   Negative: Difficult to awaken or acting confused (e.g., disoriented, slurred speech)   Negative: Passed out (i.e., fainted, collapsed and was not responding)   Negative: Wheezing started suddenly after medicine, an allergic food, or bee sting   Negative: Stridor (harsh sound while breathing in)   Negative: Slow, shallow and weak breathing   Negative: Sounds like a life-threatening emergency to the triager   Negative: Chest pain   Negative: Wheezing (high pitched whistling sound) and previous asthma attacks or use of asthma medicines   Negative: Difficulty breathing and within 14 days of COVID-19 Exposure   Negative: Difficulty breathing and only present when coughing   Negative: Difficulty breathing and only from stuffy nose   Negative: Difficulty breathing and only from stuffy nose or runny nose from common cold   Negative: MODERATE difficulty breathing (e.g., speaks in phrases, SOB even at rest, pulse 100-120) of new-onset or worse than normal   Negative: Oxygen level (e.g., pulse oximetry) 90% or lower   Negative: Wheezing can be heard across the room   Negative: Drooling or spitting out saliva (because can't swallow)   Negative: Any history of prior \"blood clot\" in leg or lungs   Negative: Illness requiring prolonged bedrest in past month (e.g., immobilization, long hospital stay)   Negative: Hip or leg fracture (broken bone) in past month (or had cast on leg or ankle in past month)   Negative: Major surgery in the past month   Negative: Long-distance travel in past month (e.g., car, bus, train, plane; with trip " lasting 6 or more hours)   Negative: Cancer treatment in past six months (or has cancer now)   Negative: Extra heartbeats, irregular heart beating, or heart is beating very fast (i.e., 'palpitations')   Negative: Fever > 103 F (39.4 C)   Negative: Fever > 101 F (38.3 C) and over 60 years of age   Negative: Fever > 100.0 F (37.8 C) and bedridden (e.g., nursing home patient, stroke, chronic illness, recovering from surgery)   Negative: Fever > 100.0 F (37.8 C) and diabetes mellitus or weak immune system (e.g., HIV positive, cancer chemo, splenectomy, organ transplant, chronic steroids)   Negative: Periods where breathing stops and then resumes normally and bedridden (e.g., nursing home patient, CVA)   Negative: Pregnant or postpartum (from 0 to 6 weeks after delivery)   Negative: Patient sounds very sick or weak to the triager    Protocols used: Breathing Difficulty-A-[place]

## 2024-01-30 NOTE — TELEPHONE ENCOUNTER
Patient is being seen at urgent care in [place]. No further action needed at this time.    Cait LOZADA RN  [facility] Triage Team

## 2024-01-31 NOTE — PROGRESS NOTES
Assessment & Plan     Upper respiratory tract infection, unspecified type    You have been diagnosed with a viral URI.  A viral respiratory infection is an infection of the nose, sinuses, or throat caused by a virus. Colds and the flu are common types of viral respiratory infections.  The symptoms of a viral respiratory infection often start quickly. They include a fever, sore throat, and runny nose. You may also just not feel well. Or you may not want to eat much.  Most viral infections can be treated with home care. This may include drinking lots of fluids and taking over-the-counter pain medicine. You will probably feel better in 4 to 10 days.  Antibiotics are not used to treat a viral infection. Antibiotics don't kill viruses, so they won't help cure a viral illness.    - levalbuterol (XOPENEX HFA) 45 MCG/ACT inhaler; Inhale 2 puffs into the lungs every 4 hours as needed for shortness of breath or wheezing    Moderate asthma with exacerbation, unspecified whether persistent      Asthma makes it hard for you to breathe. During an asthma attack, the airways swell and narrow. Severe asthma attacks can be dangerous, but you can usually prevent them. Controlling asthma and treating symptoms before they get bad can help you avoid bad attacks.  Take medications as directed.  Follow up with your family doctor if not improving or go to the ED if symptoms worsen.     - levalbuterol (XOPENEX HFA) 45 MCG/ACT inhaler; Inhale 2 puffs into the lungs every 4 hours as needed for shortness of breath or wheezing  - predniSONE (DELTASONE) 20 MG tablet; Take 1 tablet (20 mg) by mouth 2 times daily    Type 2 diabetes mellitus without complication, without long-term current use of insulin (H)    Patient is type 2 diabetic  Monitor blood sugars as this can elevate with prednisone    Review of external notes as documented elsewhere in note      At today's visit with Grecia Kilgore , we discussed results, diagnosis, medications and  formulated a plan.  We also discussed red flags for immediate return to clinic/ER, as well as indications for follow up with PCP if not improved in 3 days. Patient understood and agreed to plan. Grecia Kilgore was discharged with stable vitals and has no further questions.       No follow-ups on file.    Subjective   Grecia is a 69 year old, presenting for the following health issues:  Urgent Care (Pt presents with trouble breathing, anxiety, pt has a hx of asthma, pt states she have not had an asthma flare up in many years. Pt states she started feeling jittery after using Albuterol inhaler, states she takes anxiety medication. Pt denies chest pain. )    HPI     Review of Systems  Constitutional, HEENT, cardiovascular, pulmonary, gi and gu systems are negative, except as otherwise noted.      Objective    BP (!) 144/80 (BP Location: Left arm, Patient Position: Sitting)   Pulse 64   LMP 01/01/1985   SpO2 95%   There is no height or weight on file to calculate BMI.  Physical Exam   GENERAL: alert and no distress  EYES: Eyes grossly normal to inspection, PERRL and conjunctivae and sclerae normal  HENT: ear canals and TM's normal, nose and mouth without ulcers or lesions  NECK: no adenopathy, no asymmetry, masses, or scars  RESP: lungs clear to auscultation - no rales, rhonchi or wheezes  CV: regular rate and rhythm, normal S1 S2, no S3 or S4, no murmur, click or rub, no peripheral edema  SKIN: no suspicious lesions or rashes  NEURO: Normal strength and tone, mentation intact and speech normal  PSYCH: mentation appears normal, affect normal/bright  LYMPH: no cervical, supraclavicular, axillary, or inguinal adenopathy            Signed Electronically by: Derian Braxton, [facility], PA-C  No results found for any visits on 01/30/24.

## 2024-02-09 ENCOUNTER — OFFICE VISIT (OUTPATIENT)
Dept: ORTHOPEDICS | Facility: CLINIC | Age: 69
End: 2024-02-09
Payer: COMMERCIAL

## 2024-02-09 VITALS — WEIGHT: 155 LBS | BODY MASS INDEX: 24.33 KG/M2 | HEIGHT: 67 IN

## 2024-02-09 DIAGNOSIS — M17.12 LOCALIZED OSTEOARTHRITIS OF LEFT KNEE: Primary | ICD-10-CM

## 2024-02-09 PROCEDURE — 20611 DRAIN/INJ JOINT/BURSA W/US: CPT | Mod: LT | Performed by: STUDENT IN AN ORGANIZED HEALTH CARE EDUCATION/TRAINING PROGRAM

## 2024-02-09 PROCEDURE — 99213 OFFICE O/P EST LOW 20 MIN: CPT | Mod: 25 | Performed by: STUDENT IN AN ORGANIZED HEALTH CARE EDUCATION/TRAINING PROGRAM

## 2024-02-09 RX ORDER — LIDOCAINE HYDROCHLORIDE 10 MG/ML
3 INJECTION, SOLUTION INFILTRATION; PERINEURAL
Status: SHIPPED | OUTPATIENT
Start: 2024-02-09

## 2024-02-09 RX ORDER — TRIAMCINOLONE ACETONIDE 40 MG/ML
40 INJECTION, SUSPENSION INTRA-ARTICULAR; INTRAMUSCULAR
Status: SHIPPED | OUTPATIENT
Start: 2024-02-09

## 2024-02-09 RX ORDER — ROPIVACAINE HYDROCHLORIDE 5 MG/ML
3 INJECTION, SOLUTION EPIDURAL; INFILTRATION; PERINEURAL
Status: SHIPPED | OUTPATIENT
Start: 2024-02-09

## 2024-02-09 RX ADMIN — ROPIVACAINE HYDROCHLORIDE 3 ML: 5 INJECTION, SOLUTION EPIDURAL; INFILTRATION; PERINEURAL at 16:42

## 2024-02-09 RX ADMIN — LIDOCAINE HYDROCHLORIDE 3 ML: 10 INJECTION, SOLUTION INFILTRATION; PERINEURAL at 16:42

## 2024-02-09 RX ADMIN — TRIAMCINOLONE ACETONIDE 40 MG: 40 INJECTION, SUSPENSION INTRA-ARTICULAR; INTRAMUSCULAR at 16:42

## 2024-02-09 NOTE — PATIENT INSTRUCTIONS
Thank you for choosing [facility]!    DR. BARRETO'S CLINIC LOCATIONS:     [place]  TRIAGE LINE: [phone] [address] APPOINTMENTS: [phone]   [place] [address] RADIOLOGY: [phone]   (Mondays & Tuesdays) HAND THERAPY: [phone]    PHYSICAL THERAPY: [phone]   [place] BILLING QUESTIONS: [phone] [address] FAX: [phone]   [place] [address]    (Thursdays & Fridays)      If you have any questions or concerns after your appointment, you can send Dr. Barreto a [employer] message or call the clinic number at [phone] at any time.    Debra Barreto DO, [facility]  Sports Medicine Post-Injection Discharge Instructions    You may shower, however avoid swimming, tub baths or hot tubs for 24 hours following your procedure  You may have a mild to moderate increase in pain for a few days following the injection.  The lidocaine (local numbing medicine) will wear off in several hours. It usually takes 3-5 days for the steroid medication to start working although it may take up to 14 days for full effect.   You may use ice packs for 10-15 minutes, 3 to 4 times a day at the injection site for comfort if needed  You may use extra strength Tylenol for pain control if necessary   If you were fasting, you may resume your normal diet and medications after the procedure  If you have diabetes, your blood sugar may be higher than normal for 10-14 days following a steroid injection. Contact your doctor who manages your diabetes if your blood sugar is significantly higher than usual    If you experience any of the following, call Sports Medicine @ [phone] or [phone]  -Fever over 100 degrees F  -Swelling, bleeding, redness, drainage, warmth at the injection site  -New or significant worsening pain

## 2024-02-09 NOTE — LETTER
2/9/2024         RE: Grecia Kilgore  [id number]  [facility] [address]        Dear Colleague,    Thank you for referring your patient, Grecia Kilgore, to the [facility] [place]. Please see a copy of my visit note below.    ASSESSMENT & PLAN    Grecia was seen today for follow up and follow up.    Diagnoses and all orders for this visit:    Localized osteoarthritis of left knee  -     Physical Therapy Referral; Future    Other orders  -     Large Joint Injection/Arthocentesis: L knee joint      65-year-old female presents to follow-up on left knee pain and stability.  She did get good relief from her Synvisc injection approximately 3 months ago, however unfortunately only provided for a little over 2 months of pain relief.  On exam today, she continues to tenderness to the lateral joint line and complains of some instability as well. Of note, I did observe a lateral parameniscal cyst on ultrasound today while performing her knee injection.  She elected to proceed with an intra-articular corticosteroid injection today.    Plan:  - Left knee intra-articular injection performed in clinic today  - Refer to PT and start home exercise program  - Discussed that we could try a knee brace in the future  - Can repeat Synvisc in 3 months if desired, she would like to plan for this in September before she takes a family vacation  - Consider drainage of lateral parameniscal cyst with subsequent injection in the future versus MRI and possible surgical referral if no improvement in pain/instability    Return if symptoms worsen or fail to improve.      Large Joint Injection/Arthocentesis: L knee joint    Date/Time: 2/9/2024 4:42 PM    Performed by: Debra Barreto DO  Authorized by: Debra Barreto DO    Indications:  Pain and osteoarthritis  Needle Size:  22 G  Guidance: ultrasound    Approach:  Anterolateral  Location:  Knee      Medications:  40 mg  "triamcinolone 40 MG/ML; 3 mL lidocaine 1 %; 3 mL ROPivacaine 5 MG/ML  Outcome:  Tolerated well, no immediate complications  Procedure discussed: discussed risks, benefits, and alternatives    Consent Given by:  Patient  Timeout: timeout called immediately prior to procedure    Prep: patient was prepped and draped in usual sterile fashion     Ultrasound was used to ensure safe and accurate needle placement and injection. Ultrasound images of the procedure were permanently stored. 1ml of 8.4% Sodium Bicarbonate solution was used to buffer the local numbing agent for today's injection        Dr. Debra Barreto, DO  [facility]  Sports Medicine     -----  Chief Complaint   Patient presents with     Left Knee - Follow Up     Right Knee - Follow Up       SUBJECTIVE  Grecia Kilgore is a/an 69 year old female who is seen to follow-up on bilateral knee pain. The patient was last seen 01/04/2024 and got about 2.5 months of relief from Synvisc injection. Since last visit, patient reports that their left knee is in pain on the lateral aspect. Patient also reports the right knee has been painful for 3-4 days on the anterior aspect of the knee. Patient reports using heat and massage.     The patient is seen with her sister.       REVIEW OF SYSTEMS:  Pertinent positives/negative: As stated above in HPI    OBJECTIVE:  Ht 1.702 m (5' 7\")   Wt 70.3 kg (155 lb)   LMP 01/01/1985   BMI 24.28 kg/m     General: Alert and in no distress  Skin: no visable rashes  CV: Extremities appear well perfused   Resp: normal respiratory effort, no conversational dyspnea   Psych: normal mood, affect  MSK:  LEFT KNEE  Inspection:    Normal alignment; no edema, erythema, or ecchymosis present  Palpation:    Tender about the lateral joint line. Remainder of bony and ligamentous landmarks are nontender.    No effusion is present    Patellofemoral crepitus is Absent  Range of Motion:     00 extension to 1200 " flexion  Strength:    Extensor mechanism intact  Special Tests:    Positive: Loco     Negative: Patellar grind, Valgus stress (0 and 30), and Varus stress (0 and 30)      RADIOLOGY:  Final results and radiologist's interpretation available in the [facility] health record.  Images below were personally reviewed and discussed with the patient in the office today.  No new imaging                    Again, thank you for allowing me to participate in the care of your patient.        Sincerely,        Debra Barreto, DO

## 2024-02-09 NOTE — PROGRESS NOTES
ASSESSMENT & PLAN    Grecia was seen today for follow up and follow up.    Diagnoses and all orders for this visit:    Localized osteoarthritis of left knee  -     Physical Therapy Referral; Future    Other orders  -     Large Joint Injection/Arthocentesis: L knee joint      65-year-old female presents to follow-up on left knee pain and stability.  She did get good relief from her Synvisc injection approximately 3 months ago, however unfortunately only provided for a little over 2 months of pain relief.  On exam today, she continues to tenderness to the lateral joint line and complains of some instability as well. Of note, I did observe a lateral parameniscal cyst on ultrasound today while performing her knee injection.  She elected to proceed with an intra-articular corticosteroid injection today.    Plan:  - Left knee intra-articular injection performed in clinic today  - Refer to PT and start home exercise program  - Discussed that we could try a knee brace in the future  - Can repeat Synvisc in 3 months if desired, she would like to plan for this in September before she takes a family vacation  - Consider drainage of lateral parameniscal cyst with subsequent injection in the future versus MRI and possible surgical referral if no improvement in pain/instability    Return if symptoms worsen or fail to improve.      Large Joint Injection/Arthocentesis: L knee joint    Date/Time: 2/9/2024 4:42 PM    Performed by: Debra Barreto DO  Authorized by: Debra Barreto DO    Indications:  Pain and osteoarthritis  Needle Size:  22 G  Guidance: ultrasound    Approach:  Anterolateral  Location:  Knee      Medications:  40 mg triamcinolone 40 MG/ML; 3 mL lidocaine 1 %; 3 mL ROPivacaine 5 MG/ML  Outcome:  Tolerated well, no immediate complications  Procedure discussed: discussed risks, benefits, and alternatives    Consent Given by:  Patient  Timeout: timeout called immediately prior to procedure    Prep:  "patient was prepped and draped in usual sterile fashion     Ultrasound was used to ensure safe and accurate needle placement and injection. Ultrasound images of the procedure were permanently stored. 1ml of 8.4% Sodium Bicarbonate solution was used to buffer the local numbing agent for today's injection        Dr. Debra Barreto, DO  [facility]  Sports Medicine     -----  Chief Complaint   Patient presents with    Left Knee - Follow Up    Right Knee - Follow Up       SUBJECTIVE  Grecia Kilgore is a/an 69 year old female who is seen to follow-up on bilateral knee pain. The patient was last seen 01/04/2024 and got about 2.5 months of relief from Synvisc injection. Since last visit, patient reports that their left knee is in pain on the lateral aspect. Patient also reports the right knee has been painful for 3-4 days on the anterior aspect of the knee. Patient reports using heat and massage.     The patient is seen with her sister.       REVIEW OF SYSTEMS:  Pertinent positives/negative: As stated above in HPI    OBJECTIVE:  Ht 1.702 m (5' 7\")   Wt 70.3 kg (155 lb)   LMP 01/01/1985   BMI 24.28 kg/m     General: Alert and in no distress  Skin: no visable rashes  CV: Extremities appear well perfused   Resp: normal respiratory effort, no conversational dyspnea   Psych: normal mood, affect  MSK:  LEFT KNEE  Inspection:    Normal alignment; no edema, erythema, or ecchymosis present  Palpation:    Tender about the lateral joint line. Remainder of bony and ligamentous landmarks are nontender.    No effusion is present    Patellofemoral crepitus is Absent  Range of Motion:     00 extension to 1200 flexion  Strength:    Extensor mechanism intact  Special Tests:    Positive: Loco     Negative: Patellar grind, Valgus stress (0 and 30), and Varus stress (0 and 30)      RADIOLOGY:  Final results and radiologist's interpretation available in the [facility] health record.  Images below were personally " reviewed and discussed with the patient in the office today.  No new imaging

## 2024-02-12 NOTE — PROGRESS NOTES
PHYSICAL THERAPY EVALUATION  Type of Visit: Evaluation    See electronic medical record for Abuse and Falls Screening details.    Subjective  Pt. complains of L knee pain that has been present greater than 6 months.  Mechanism/History of injury/symptoms: unknown.   Patient s chief complaints: left knee pain.  Symptoms are exacerbated by walking and standing.  Symptoms are relieved by  rest.   Current function restrictions:  walking/standing and steps.  Previous functional status as reported by patient: unlimited.        Presenting condition or subjective complaint: Left knee  Date of onset: 08/13/24    Relevant medical history: Arthritis; Asthma; Cancer; Depression; Diabetes; Fibromyalgia; High blood pressure   Dates & types of surgery:      Prior diagnostic imaging/testing results:       Prior therapy history for the same diagnosis, illness or injury: Yes      Prior Level of Function  Transfers: Independent  Ambulation: Independent  ADL: Independent  IADL:     Living Environment  Social support:     Type of home: Apartment/condo   Stairs to enter the home: No       Ramp: No   Stairs inside the home: No       Help at home: Home management tasks (cooking, cleaning)  Equipment owned:       Employment: No    Hobbies/Interests:      Patient goals for therapy: walk    Pain assessment:  7-8/10     Objective   Right knee active and passive range of motion within normal limits  Left knee passive range of motion 0-0-141 with endrange pain with full extension and flexion  Left knee strength: 4 out of 5 flexion and extension  Right knee strength 4+ out of 5 flexion extension  Right hip strength 4 out of 5 flexion extension and abduction  Left hip strength 3+ out of 5 flexion and abduction, extension 4 out of 5  Negative ligament screening today on left knee      Assessment & Plan   CLINICAL IMPRESSIONS  Medical Diagnosis: L knee pain    Treatment Diagnosis: L knee pain   Impression/Assessment: Patient is a 69 year old female  with L knee complaints.  The following significant findings have been identified: Pain, Decreased ROM/flexibility, Decreased strength, Impaired muscle performance, and Decreased activity tolerance. These impairments interfere with their ability to perform self care tasks, recreational activities, household mobility, and community mobility as compared to previous level of function.     Clinical Decision Making (Complexity):  Clinical Presentation: Stable/Uncomplicated  Clinical Presentation Rationale: based on medical and personal factors listed in PT evaluation  Clinical Decision Making (Complexity): Low complexity    PLAN OF CARE  Treatment Interventions:  Interventions: Neuromuscular Re-education, Therapeutic Activity, Therapeutic Exercise    Long Term Goals     PT Goal 1  Goal Description: Pt will be able to tolerate walking for  60 minutes  Rationale: to maximize safety and independence with performance of ADLs and functional tasks;to maximize safety and independence within the home;to maximize safety and independence within the community  Target Date: 04/09/24      Frequency of Treatment: 1 x week  Duration of Treatment: 8 week    Recommended Referrals to Other Professionals:   Education Assessment:   Learner/Method: Patient;Listening;Demonstration;Pictures/Video  Education Comments: Pt educated on plan of care    Risks and benefits of evaluation/treatment have been explained.   Patient/Family/caregiver agrees with Plan of Care.     Evaluation Time:     PT Eval, Low Complexity Minutes (48423): 10       Signing Clinician: Maxwell Savage, PT      [facility]                                                                                   OUTPATIENT PHYSICAL THERAPY      PLAN OF TREATMENT FOR OUTPATIENT REHABILITATION   Patient's Last Name, First Name, Grecia Maya YOB: 1955   Provider's Name   [facility]   Medical Record  No.  [id number]     Onset Date: 08/13/24  Start of Care Date: 02/13/24     Medical Diagnosis:  L knee pain      PT Treatment Diagnosis:  L knee pain Plan of Treatment  Frequency/Duration: 1 x week/ 8 week    Certification date from 02/13/24 to 04/09/24         See note for plan of treatment details and functional goals     Maxwell Savage, PT                         I CERTIFY THE NEED FOR THESE SERVICES FURNISHED UNDER        THIS PLAN OF TREATMENT AND WHILE UNDER MY CARE     (Physician attestation of this document indicates review and certification of the therapy plan).              Referring Provider:  Debra Barreto    Initial Assessment  See Epic Evaluation- Start of Care Date: 02/13/24

## 2024-02-13 ENCOUNTER — THERAPY VISIT (OUTPATIENT)
Dept: PHYSICAL THERAPY | Facility: CLINIC | Age: 69
End: 2024-02-13
Attending: STUDENT IN AN ORGANIZED HEALTH CARE EDUCATION/TRAINING PROGRAM
Payer: COMMERCIAL

## 2024-02-13 DIAGNOSIS — M17.12 LOCALIZED OSTEOARTHRITIS OF LEFT KNEE: ICD-10-CM

## 2024-02-13 PROCEDURE — 97161 PT EVAL LOW COMPLEX 20 MIN: CPT | Mod: GP | Performed by: PHYSICAL THERAPIST

## 2024-02-13 PROCEDURE — 97110 THERAPEUTIC EXERCISES: CPT | Mod: GP | Performed by: PHYSICAL THERAPIST

## 2024-02-13 ASSESSMENT — ACTIVITIES OF DAILY LIVING (ADL)
RISE FROM A CHAIR: ACTIVITY IS MINIMALLY DIFFICULT
STAND: ACTIVITY IS MINIMALLY DIFFICULT
HOW_WOULD_YOU_RATE_THE_OVERALL_FUNCTION_OF_YOUR_KNEE_DURING_YOUR_USUAL_DAILY_ACTIVITIES?: ABNORMAL
GO DOWN STAIRS: ACTIVITY IS SOMEWHAT DIFFICULT
SQUAT: ACTIVITY IS FAIRLY DIFFICULT
SIT WITH YOUR KNEE BENT: ACTIVITY IS FAIRLY DIFFICULT
WALK: ACTIVITY IS SOMEWHAT DIFFICULT
KNEEL ON THE FRONT OF YOUR KNEE: ACTIVITY IS VERY DIFFICULT
AS_A_RESULT_OF_YOUR_KNEE_INJURY,_HOW_WOULD_YOU_RATE_YOUR_CURRENT_LEVEL_OF_DAILY_ACTIVITY?: ABNORMAL
GO UP STAIRS: ACTIVITY IS SOMEWHAT DIFFICULT
PAIN: THE SYMPTOM AFFECTS MY ACTIVITY SEVERELY
KNEE_ACTIVITY_OF_DAILY_LIVING_SUM: 23

## 2024-02-20 ENCOUNTER — THERAPY VISIT (OUTPATIENT)
Dept: PHYSICAL THERAPY | Facility: CLINIC | Age: 69
End: 2024-02-20
Attending: STUDENT IN AN ORGANIZED HEALTH CARE EDUCATION/TRAINING PROGRAM
Payer: COMMERCIAL

## 2024-02-20 DIAGNOSIS — G89.29 CHRONIC PAIN OF LEFT KNEE: Primary | ICD-10-CM

## 2024-02-20 DIAGNOSIS — M25.562 CHRONIC PAIN OF LEFT KNEE: Primary | ICD-10-CM

## 2024-02-20 PROCEDURE — 97110 THERAPEUTIC EXERCISES: CPT | Mod: GP | Performed by: PHYSICAL THERAPIST

## 2024-02-21 ENCOUNTER — TELEPHONE (OUTPATIENT)
Dept: FAMILY MEDICINE | Facility: CLINIC | Age: 69
End: 2024-02-21
Payer: COMMERCIAL

## 2024-02-21 DIAGNOSIS — N39.3 STRESS INCONTINENCE OF URINE: Primary | ICD-10-CM

## 2024-02-21 NOTE — TELEPHONE ENCOUNTER
Physical therapy for pelvic floor exercises ordered.  I am not sure about the locations that are available for that.  Scheduling team can discuss that with her.  If it does not work out, I can order an alternate referral    Romel Mccall MD  [facility], [place]

## 2024-02-21 NOTE — TELEPHONE ENCOUNTER
Pt states had order in  for physical therapy for pelvic floor , states she leaks a lot, would like it updated and to be able to go to [place] as that is close to home please advise [phone] may leave detailed message pt states can look into doing PDR in [place] says they were very helpful to her , would prefer PDR BUT IF NOT [facility] IS OK

## 2024-02-21 NOTE — TELEPHONE ENCOUNTER
Poke to patient and she was told the referral has been placed.     Lili Bagley RN  [facility]

## 2024-02-22 ENCOUNTER — TELEPHONE (OUTPATIENT)
Dept: ORTHOPEDICS | Facility: CLINIC | Age: 69
End: 2024-02-22
Payer: COMMERCIAL

## 2024-02-22 NOTE — TELEPHONE ENCOUNTER
[facility] Call Center    Phone Message    May a detailed message be left on voicemail: yes     Reason for Call: Pt is requesting a order for PDR clinic's in [place] for physical therapy, please fax to fax# [phone]        Action Taken: Other: bu sports med    Travel Screening: Not Applicable

## 2024-02-22 NOTE — TELEPHONE ENCOUNTER
Order printed off and faxed to the listed phone number.    Angela Fuentes, JHOANA, LAT, ATC  Certified Athletic Trainer

## 2024-02-26 DIAGNOSIS — E78.5 HYPERLIPIDEMIA LDL GOAL <100: ICD-10-CM

## 2024-02-26 RX ORDER — ROSUVASTATIN CALCIUM 5 MG/1
5 TABLET, COATED ORAL DAILY
Qty: 90 TABLET | Refills: 1 | Status: SHIPPED | OUTPATIENT
Start: 2024-02-26 | End: 2024-05-24

## 2024-03-05 ENCOUNTER — TRANSFERRED RECORDS (OUTPATIENT)
Dept: HEALTH INFORMATION MANAGEMENT | Facility: CLINIC | Age: 69
End: 2024-03-05

## 2024-03-08 ENCOUNTER — NURSE TRIAGE (OUTPATIENT)
Dept: FAMILY MEDICINE | Facility: CLINIC | Age: 69
End: 2024-03-08
Payer: COMMERCIAL

## 2024-03-08 NOTE — TELEPHONE ENCOUNTER
"S-(situation): Patient has asthma. She states in the past when she has yellow sputum with cough, her PCP was prescribed medication.     B-(background): Patient had the same symptoms back on 1/30/2024   Patient was prescribed :   Levalbuterol and prednisone.   No chest discomfort with cough but cough is productive with yellow sputum.  Low grade fever of 100.   A-(assessment): Patient has no means of transportation, no virtual visit available.     R-(recommendations): Will send to PCP  for  advice.       Answer Assessment - Initial Assessment Questions  1. ONSET: \"When did the cough begin?\"      A few days ago.   2. SEVERITY: \"How bad is the cough today?\"       Occasional.   3. SPUTUM: \"Describe the color of your sputum\" (none, dry cough; clear, white, yellow, green)      Yellow.   4. HEMOPTYSIS: \"Are you coughing up any blood?\" If so ask: \"How much?\" (flecks, streaks, tablespoons, etc.)      No  5. DIFFICULTY BREATHING: \"Are you having difficulty breathing?\" If Yes, ask: \"How bad is it?\" (e.g., mild, moderate, severe)     - MILD: No SOB at rest, mild SOB with walking, speaks normally in sentences, can lie down, no retractions, pulse < 100.     - MODERATE: SOB at rest, SOB with minimal exertion and prefers to sit, cannot lie down flat, speaks in phrases, mild retractions, audible wheezing, pulse 100-120.     - SEVERE: Very SOB at rest, speaks in single words, struggling to breathe, sitting hunched forward, retractions, pulse > 120       No  6. FEVER: \"Do you have a fever?\" If Yes, ask: \"What is your temperature, how was it measured, and when did it start?\"      100 oral.   7. CARDIAC HISTORY: \"Do you have any history of heart disease?\" (e.g., heart attack, congestive heart failure)       No  8. LUNG HISTORY: \"Do you have any history of lung disease?\"  (e.g., pulmonary embolus, asthma, emphysema)      Asthma  9. PE RISK FACTORS: \"Do you have a history of blood clots?\" (or: recent major surgery, recent prolonged travel, " "bedridden)      No  10. OTHER SYMPTOMS: \"Do you have any other symptoms?\" (e.g., runny nose, wheezing, chest pain)        No  11. PREGNANCY: \"Is there any chance you are pregnant?\" \"When was your last menstrual period?\"        N/A  12. TRAVEL: \"Have you traveled out of the country in the last month?\" (e.g., travel history, exposures)        No    Protocols used: Cough - Acute Non-Productive-A-    Lili Bagley RN  -[facility]     "

## 2024-03-12 NOTE — TELEPHONE ENCOUNTER
Reason for Disposition    HIGH RISK patient (e.g., weak immune system, age > 64 years, obesity with BMI of 30 or higher, pregnant, chronic lung disease or other chronic medical condition) and COVID symptoms (e.g., cough, fever)  (Exceptions: Already seen by doctor or NP/PA and no new or worsening symptoms.)    Additional Information    Negative: SEVERE difficulty breathing (e.g., struggling for each breath, speaks in single words)    Negative: Difficult to awaken or acting confused (e.g., disoriented, slurred speech)    Negative: Bluish (or gray) lips or face now    Negative: Shock suspected (e.g., cold/pale/clammy skin, too weak to stand, low BP, rapid pulse)    Negative: Sounds like a life-threatening emergency to the triager    Negative: Diagnosed or suspected COVID-19 and symptoms lasting 3 or more weeks    Negative: COVID-19 exposure and no symptoms    Negative: COVID-19 vaccine reaction suspected (e.g., fever, headache, muscle aches) occurring 1 to 3 days after getting vaccine    Negative: COVID-19 vaccine, questions about    Negative: Lives with someone known to have influenza (flu test positive) and flu-like symptoms (e.g., cough, runny nose, sore throat, SOB; with or without fever)    Negative: Possible COVID-19 symptoms and triager concerned about severity of symptoms or other causes    Negative: COVID-19 and breastfeeding, questions about    Negative: SEVERE or constant chest pain or pressure  (Exception: Mild central chest pain, present only when coughing.)    Negative: MODERATE difficulty breathing (e.g., speaks in phrases, SOB even at rest, pulse 100-120)    Negative: Headache and stiff neck (can't touch chin to chest)    Negative: Oxygen level (e.g., pulse oximetry) 90% or lower    Negative: Chest pain or pressure  (Exception: MILD central chest pain, present only when coughing.)    Negative: Drinking very little and dehydration suspected (e.g., no urine > 12 hours, very dry mouth, very  "lightheaded)    Negative: Patient sounds very sick or weak to the triager    Negative: MILD difficulty breathing (e.g., minimal/no SOB at rest, SOB with walking, pulse <100)    Negative: Fever > 103 F (39.4 C)    Negative: Fever > 101 F (38.3 C) and over 60 years of age    Negative: Fever > 100.0 F (37.8 C) and bedridden (e.g., CVA, chronic illness, recovering from surgery)    Answer Assessment - Initial Assessment Questions  1. COVID-19 DIAGNOSIS: \"How do you know that you have COVID?\" (e.g., positive lab test or self-test, diagnosed by doctor or NP/PA, symptoms after exposure).      Positive home test 2 days ago.   2. COVID-19 EXPOSURE: \"Was there any known exposure to COVID before the symptoms began?\" [facility] Definition of close contact: within 6 feet (2 meters) for a total of 15 minutes or more over a 24-hour period.       A week ago Saturday 3/2/24  3. ONSET: \"When did the COVID-19 symptoms start?\"       Monday 3/4/24  4. WORST SYMPTOM: \"What is your worst symptom?\" (e.g., cough, fever, shortness of breath, muscle aches)      Last night nausea and migraine. No appetite.   5. COUGH: \"Do you have a cough?\" If Yes, ask: \"How bad is the cough?\"        Cough started Friday. Cough so hard thought she was going to gag on Saturday. Cough has improved. Barely coughing yesterday.   6. FEVER: \"Do you have a fever?\" If Yes, ask: \"What is your temperature, how was it measured, and when did it start?\"      No fever now. Slight fever yesterday but did not check.   7. RESPIRATORY STATUS: \"Describe your breathing?\" (e.g., normal; shortness of breath, wheezing, unable to speak)       Breathing OK, does not feel like asthma is causing trouble.   8. BETTER-SAME-WORSE: \"Are you getting better, staying the same or getting worse compared to yesterday?\"  If getting worse, ask, \"In what way?\"      Just woke up but feels better. Yesterday felt better in the morning but then got worse.   9. OTHER SYMPTOMS: \"Do you have any other symptoms?\"  " "(e.g., chills, fatigue, headache, loss of smell or taste, muscle pain, sore throat)      Productive of clear. Yellow sputum is gone. Chills, fatigue, headache, diarrhea, cough, belching. Has poor taste and smell normally. Gas bad every day. Continuing to have diarrhea. Muscle pain. No sore throat.   10. HIGH RISK DISEASE: \"Do you have any chronic medical problems?\" (e.g., asthma, heart or lung disease, weak immune system, obesity, etc.)        Asthma. Light headed, weak, SOB last Thursday and Friday.   11. VACCINE: \"Have you had the COVID-19 vaccine?\" If Yes, ask: \"Which one, how many shots, when did you get it?\"        First two and never getting another due to panic attack afterward. States that she has heard too many stories of people dying from the vaccine.   12. PREGNANCY: \"Is there any chance you are pregnant?\" \"When was your last menstrual period?\"        NA  13. O2 SATURATION MONITOR:  \"Do you use an oxygen saturation monitor (pulse oximeter) at home?\" If Yes, ask \"What is your reading (oxygen level) today?\" \"What is your usual oxygen saturation reading?\" (e.g., 95%)        No oximeter    Protocols used: Coronavirus (COVID-19) Diagnosed or [id number]    "

## 2024-03-12 NOTE — TELEPHONE ENCOUNTER
Triaged for Covid 19 symptoms. States she is improving and does not need an appointment. Agrees to call back if starts to feel worse. Clarissa Roman RN

## 2024-03-12 NOTE — TELEPHONE ENCOUNTER
Please reach out to the patient to inquire how she is feeling.  I was not in clinic at the time when the message was sent.  If she continues to be symptomatic, I would like to see her in clinic today or do a virtual visit.  We can add her to the schedule if needed.  Please let me know    Romel Mccall MD  [facility], [place]

## 2024-03-19 ENCOUNTER — VIRTUAL VISIT (OUTPATIENT)
Dept: FAMILY MEDICINE | Facility: CLINIC | Age: 69
End: 2024-03-19
Payer: COMMERCIAL

## 2024-03-19 DIAGNOSIS — J45.31 MILD PERSISTENT ASTHMA WITH EXACERBATION: ICD-10-CM

## 2024-03-19 DIAGNOSIS — U07.1 INFECTION DUE TO 2019 NOVEL CORONAVIRUS: Primary | ICD-10-CM

## 2024-03-19 PROCEDURE — 99213 OFFICE O/P EST LOW 20 MIN: CPT | Mod: 95 | Performed by: PHYSICIAN ASSISTANT

## 2024-03-19 RX ORDER — PREDNISONE 20 MG/1
20 TABLET ORAL 2 TIMES DAILY
Qty: 10 TABLET | Refills: 0 | Status: SHIPPED | OUTPATIENT
Start: 2024-03-19

## 2024-03-19 NOTE — PROGRESS NOTES
Grecia is a 69 year old who is being evaluated via a billable video visit.    What phone number would you like to be contacted at?   How would you like to obtain your AVS? Ripdemi Paieg is a 69 year old, presenting for the following health issues:  Cough (Pt states that has been coughing off phloem since dx with COVID. )    Video Start Time: 2:26 PM    HPI     Pt has asthma and having some sob  Tested pos for covid on 3/6/24  Sxs started several days prior to testing  She continues to cough and blow mucus  No fevers  She is using her rescue inhaler  Doesn't think Mucinex works              Objective    Vitals - Patient Reported  Pain Score: No Pain (0)      Vitals:  No vitals were obtained today due to virtual visit.    Physical Exam       No wheezing or difficulty breathing one exam    Assessment and Plan:     (U07.1) Infection due to 2019 novel coronavirus  (primary encounter diagnosis)  Comment: she is mostly bothered by all of the mucus she is coughing and blowing  Plan: predniSONE (DELTASONE) 20 MG tablet        BID x 5d with food.    (J45.31) Mild persistent asthma with exacerbation  Comment:   Plan: pred as above since not getting relief with rescue inhaler      Mary Jane Higgins PA-C        Video-Visit Details    Type of service:  Video Visit   Video End Time:2:39 PM  Originating Location (pt. Location): Home    Distant Location (provider location):  On-site  Platform used for Video Visit: Nick  Signed Electronically by: Mary Jane Higgins PA-C

## 2024-03-19 NOTE — TELEPHONE ENCOUNTER
Patient calling back. States that she has a productive cough that won't go away. Patient requesting antibiotic Scheduled virtual visit for evaluation. Clarissa Roman RN

## 2024-03-22 ENCOUNTER — OFFICE VISIT (OUTPATIENT)
Dept: ORTHOPEDICS | Facility: CLINIC | Age: 69
End: 2024-03-22
Payer: COMMERCIAL

## 2024-03-22 VITALS — HEIGHT: 67 IN | BODY MASS INDEX: 24.28 KG/M2

## 2024-03-22 DIAGNOSIS — M17.0 BILATERAL PRIMARY OSTEOARTHRITIS OF KNEE: Primary | ICD-10-CM

## 2024-03-22 DIAGNOSIS — M17.12 PRIMARY LOCALIZED OSTEOARTHRITIS OF LEFT KNEE: ICD-10-CM

## 2024-03-22 DIAGNOSIS — M23.42 BODIES, LOOSE, JOINT, KNEE, LEFT: ICD-10-CM

## 2024-03-22 PROCEDURE — 99214 OFFICE O/P EST MOD 30 MIN: CPT | Performed by: STUDENT IN AN ORGANIZED HEALTH CARE EDUCATION/TRAINING PROGRAM

## 2024-03-22 NOTE — PATIENT INSTRUCTIONS
1. Bodies, loose, joint, knee, left    2. Primary localized osteoarthritis of left knee    3. Bilateral primary osteoarthritis of knee        Plan:  -Refer to Dr Beavers to discuss left knee arthroscope   -Follow-up before trip in September for repeat Synvisc injections     If you have any questions or concerns after your appointment, please send a [employer] message or call the clinic at [phone]    Debra Coleman DO, CAM  [facility]  Sports Medicine    Thank you for choosing [facility]!    DR. COLEMAN'S CLINIC LOCATIONS:     [place]  TRIAGE LINE: [phone]   [address] [place] [address] APPOINTMENTS: [phone]   [place] [address] RADIOLOGY: [phone]   (Mondays & Tuesdays) HAND THERAPY: [phone]    PHYSICAL THERAPY: [phone]   [place] BILLING QUESTIONS: [phone] [address] FAX: [phone]   [place] [address]    (Thursdays & Fridays)

## 2024-03-22 NOTE — PROGRESS NOTES
ASSESSMENT & PLAN    Grecia was seen today for follow up.    Diagnoses and all orders for this visit:    Bilateral primary osteoarthritis of knee  -     Orthopedic  Referral; Future  -     (PRE-AUTH REQUEST) 48 mg hylan (SYNVISC ONE) injection 48 mg/6mL-ONCE; Future    Primary localized osteoarthritis of left knee  -     Orthopedic  Referral; Future    Bodies, loose, joint, knee, left  -     Orthopedic  Referral; Future      69-year-old female presents to follow-up on chronic left knee pain.  She had some initial improvement in pain after her corticosteroid injection, but has had a recurrent effusion and reports that PT has been causing her increased pain and some mechanical symptoms.  She had MRIs of bilateral knees performed at an outside facility, and left knee MRI is significant for 2 large loose bodies that are intra-articular in nature, superficial to the PCL, diffuse patellofemoral chondromalacia, and lateral tibial plateau chondromalacia with small area of delamination.  We discussed that I feel that her loose bodies may be responsible for many of her symptoms and she would benefit from discussing removal with an orthopedic surgeon +/- patellar resurfacing. Reports required a previous loose body removal many years ago in this knee.    Plan:  -Refer to Dr Beavers to discuss left knee arthroscopy vs TKA. She would also like to discuss patellar resurfacing.   -Continue PT, home exercise program  -Tylenol Extra Strength (1000mg) up to three times daily as needed for pain  -Follow-up before trip in September for repeat Synvisc injections     Return for Synvisc injections.      Dr. Debra Barreto, DO  [facility]  Sports Medicine     -----  Chief Complaint   Patient presents with    Left Knee - Follow Up       SUBJECTIVE  Grecia Kilgore is a/an 69 year old female who is seen to follow-up on chronic left knee pain. The patient was last seen 02/09/24 in  "which they received a left knee steroid injection, which helped initially but efficacy seemed to diminish over time. Corticosteroid injections to the right knee have also only provided minimal relief. Since last visit, in 02/24 she reports doing physical therapy and she aggravated her left knee doing leg press, had increased pain. She reports more swelling in the left knee since. Synvisc injection performed 11/9/23 provided 3 months of good pain relief. Had MRI's of both knees performed at [facility] since last visit ordered by [facility].    She reports increased pain in their right knee. Treatments include icing, elevation, heat, and Tylenol.   The patient is seen alone      REVIEW OF SYSTEMS:  Pertinent positives/negative: As stated above in HPI    OBJECTIVE:  Ht 1.702 m (5' 7\")   LMP 01/01/1985   BMI 24.28 kg/m     General: Alert and in no distress  Skin: no visable rashes  CV: Extremities appear well perfused   Resp: normal respiratory effort, no conversational dyspnea   Psych: normal mood, affect  MSK:  LEFT KNEE  Inspection:    Normal alignment; no edema, erythema, or ecchymosis present  Palpation:    Tender about the medial joint line. Remainder of bony and ligamentous landmarks are nontender.    Small effusion is present    Patellofemoral crepitus is Present  Range of Motion:     00 extension to 1200 flexion  Strength:    Extensor mechanism intact      RADIOLOGY:  Final results and radiologist's interpretation available in the [facility] health record.  Images below were personally reviewed and discussed with the patient in the office today.    MRI of the L knee from 3/20/24    CONCLUSION:  1. No evidence for a meniscal tear.  2. The cruciate ligaments are intact. No other residua of a ligament injury involving the knee.  3. There are 2 ossific loose bodies superficial to the distal PCL, enlarged compared to the prior exam. Few tiny loose bodies appear present of the lateral patellofemoral joint recess.  4. Patellofemoral " chondromalacia includes broad grade III and IV involvement of the midline patella and lateral facet, with subchondral cystic change. Additional grade IV chondromalacia involves the mid to superior periphery of the lateral trochlear groove.  5. There is a small to moderate-sized segment of grade III and IV chondromalacia of the lateral tibial plateau. There is an adjacent small segment of chondral fissuring and delamination. Findings appear new.        40 minutes spent by me on the date of the encounter doing chart review, review of outside records, review of test results, interpretation of tests, patient visit, and documentation          Disclaimer: This note consists of symbols derived from dictation and/or voice recognition software. As a result, there may be errors in the script that have gone undetected. Please consider this when interpreting information found in this chart.

## 2024-03-22 NOTE — LETTER
3/22/2024         RE: Grecia Kilgore  [address]  [facility] [address]        Dear Colleague,    Thank you for referring your patient, Grecia Kilgore, to the [facility] [place]. Please see a copy of my visit note below.    ASSESSMENT & PLAN    Grecia was seen today for follow up.    Diagnoses and all orders for this visit:    Bilateral primary osteoarthritis of knee  -     Orthopedic  Referral; Future  -     (PRE-AUTH REQUEST) 48 mg hylan (SYNVISC ONE) injection 48 mg/6mL-ONCE; Future    Primary localized osteoarthritis of left knee  -     Orthopedic  Referral; Future    Bodies, loose, joint, knee, left  -     Orthopedic  Referral; Future      69-year-old female presents to follow-up on chronic left knee pain.  She had some initial improvement in pain after her corticosteroid injection, but has had a recurrent effusion and reports that PT has been causing her increased pain and some mechanical symptoms.  She had MRIs of bilateral knees performed at an outside facility, and left knee MRI is significant for 2 large loose bodies that are intra-articular in nature, superficial to the PCL, diffuse patellofemoral chondromalacia, and lateral tibial plateau chondromalacia with small area of delamination.  We discussed that I feel that her loose bodies may be responsible for many of her symptoms and she would benefit from discussing removal with an orthopedic surgeon +/- patellar resurfacing. Reports required a previous loose body removal many years ago in this knee.    Plan:  -Refer to Dr Beavers to discuss left knee arthroscopy vs TKA. She would also like to discuss patellar resurfacing.   -Continue PT, home exercise program  -Tylenol Extra Strength (1000mg) up to three times daily as needed for pain  -Follow-up before trip in September for repeat Synvisc injections     Return for Synvisc injections.      Dr. Debra Barreto,  "DO  [facility]  Sports Medicine     -----  Chief Complaint   Patient presents with     Left Knee - Follow Up       SUBJECTIVE  Grecia Kilgore is a/an 69 year old female who is seen to follow-up on chronic left knee pain. The patient was last seen 02/09/24 in which they received a left knee steroid injection, which helped initially but efficacy seemed to diminish over time. Corticosteroid injections to the right knee have also only provided minimal relief. Since last visit, in 02/24 she reports doing physical therapy and she aggravated her left knee doing leg press, had increased pain. She reports more swelling in the left knee since. Synvisc injection performed 11/9/23 provided 3 months of good pain relief. Had MRI's of both knees performed at [facility] since last visit ordered by [facility].    She reports increased pain in their right knee. Treatments include icing, elevation, heat, and Tylenol.   The patient is seen alone      REVIEW OF SYSTEMS:  Pertinent positives/negative: As stated above in HPI    OBJECTIVE:  Ht 1.702 m (5' 7\")   LMP 01/01/1985   BMI 24.28 kg/m     General: Alert and in no distress  Skin: no visable rashes  CV: Extremities appear well perfused   Resp: normal respiratory effort, no conversational dyspnea   Psych: normal mood, affect  MSK:  LEFT KNEE  Inspection:    Normal alignment; no edema, erythema, or ecchymosis present  Palpation:    Tender about the medial joint line. Remainder of bony and ligamentous landmarks are nontender.    Small effusion is present    Patellofemoral crepitus is Present  Range of Motion:     00 extension to 1200 flexion  Strength:    Extensor mechanism intact      RADIOLOGY:  Final results and radiologist's interpretation available in the [facility] health record.  Images below were personally reviewed and discussed with the patient in the office today.    MRI of the L knee from 3/20/24    CONCLUSION:  1. No evidence for a meniscal tear.  2. The " cruciate ligaments are intact. No other residua of a ligament injury involving the knee.  3. There are 2 ossific loose bodies superficial to the distal PCL, enlarged compared to the prior exam. Few tiny loose bodies appear present of the lateral patellofemoral joint recess.  4. Patellofemoral chondromalacia includes broad grade III and IV involvement of the midline patella and lateral facet, with subchondral cystic change. Additional grade IV chondromalacia involves the mid to superior periphery of the lateral trochlear groove.  5. There is a small to moderate-sized segment of grade III and IV chondromalacia of the lateral tibial plateau. There is an adjacent small segment of chondral fissuring and delamination. Findings appear new.        40 minutes spent by me on the date of the encounter doing chart review, review of outside records, review of test results, interpretation of tests, patient visit, and documentation          Disclaimer: This note consists of symbols derived from dictation and/or voice recognition software. As a result, there may be errors in the script that have gone undetected. Please consider this when interpreting information found in this chart.        Again, thank you for allowing me to participate in the care of your patient.        Sincerely,        Debra Barreto, DO

## 2024-03-23 ENCOUNTER — TELEPHONE (OUTPATIENT)
Dept: ORTHOPEDICS | Facility: CLINIC | Age: 69
End: 2024-03-23
Payer: COMMERCIAL

## 2024-03-23 NOTE — TELEPHONE ENCOUNTER
Patient Returning Call    Reason for call:  pt needing SARA for [facility] requesting callback     Information relayed to patient:  te sent to clinic     Patient has additional questions:  No      Okay to leave a detailed message?: Yes at Cell number on file:    Telephone Information:   Mobile [phone]

## 2024-03-25 DIAGNOSIS — E11.9 TYPE 2 DIABETES MELLITUS WITHOUT COMPLICATION, WITHOUT LONG-TERM CURRENT USE OF INSULIN (H): ICD-10-CM

## 2024-03-25 RX ORDER — LISINOPRIL 20 MG/1
30 TABLET ORAL DAILY
Qty: 135 TABLET | Refills: 1 | Status: SHIPPED | OUTPATIENT
Start: 2024-03-25

## 2024-03-25 NOTE — TELEPHONE ENCOUNTER
Reason for call:  Medication   If this is a refill request, has the caller requested the refill from the pharmacy already? No  Will the patient be using a [facility]? No  Name of the pharmacy and phone number for the current request: [facility] [phone]    Name of the medication requested: lisinopril 30mg    Other request: can this be sent to [facility] as soon as possible    *another provider gave her prednisone but she cannot take it, can she get something else to loosen phlegm- maybe a [facility]    Phone number to reach patient:  [phone]   Best Time:  any    Can we leave a detailed message on this number?  YES    Travel screening: Not Applicable

## 2024-03-25 NOTE — TELEPHONE ENCOUNTER
Talked with patient and informed her that she either needs to contact [facility] and go through their online information request or come in to clinic and sign a a piece of paper and we can fax it over. Patient was given our fax number, address and who to give attention to if they fax. Patient will call back if she has further questions.    Flo Kline ATC

## 2024-03-29 NOTE — PROGRESS NOTES
Discharge Note    Progress reporting period is from initial eval to Feb 20, 2024.     Grecia failed to return for next follow up visit and current status is unknown.  Please see information below for last relevant information on current status.  Patient seen for   visits.  SUBJECTIVE  Subjective changes noted by patient:     .  Current pain level is  .     Previous pain level was   .   Changes in function:  Yes (See Goal flowsheet attached for changes in current functional level)  Adverse reaction to treatment or activity: None    OBJECTIVE  Changes noted in objective findings:       ASSESSMENT/PLAN      DIAGP:  The encounter diagnosis was Chronic pain of left knee.  Updated problem list and treatment plan:     STG/LTGs have been met or progress has been made towards goals:  Yes, please see goal flowsheet for most current information  Assessment of Progress: current status is unknown.  Last current status:     Self Management Plans:  HEP  I have re-evaluated this patient and find that the nature, scope, duration and intensity of the therapy is appropriate for the medical condition of the patient.  Grecia continues to require the following intervention to meet STG and LTG's:  HEP.    Recommendations:  Discharge with current home program.  Patient to follow up with MD as needed.    Please refer to the daily flowsheet for treatment today, total treatment time and time spent performing 1:1 timed codes.

## 2024-04-03 ENCOUNTER — TRANSFERRED RECORDS (OUTPATIENT)
Dept: HEALTH INFORMATION MANAGEMENT | Facility: CLINIC | Age: 69
End: 2024-04-03
Payer: COMMERCIAL

## 2024-04-03 NOTE — TELEPHONE ENCOUNTER
Received faxed medical records from [facility]. Upon chart review, Dr. Barreto referred patient to Dr. Beavers for consultation for left knee arthroplasty. Patient has not yet rescheduled appt with Dr. Beavers (was initially scheduled on 3/29). Copy of records placed in provider in-basket for future appt. Copy sent to scan.    Iraida Ritter, ATC

## 2024-04-08 ENCOUNTER — OFFICE VISIT (OUTPATIENT)
Dept: ORTHOPEDICS | Facility: CLINIC | Age: 69
End: 2024-04-08
Attending: STUDENT IN AN ORGANIZED HEALTH CARE EDUCATION/TRAINING PROGRAM
Payer: COMMERCIAL

## 2024-04-08 VITALS — HEIGHT: 67 IN | BODY MASS INDEX: 24.28 KG/M2

## 2024-04-08 DIAGNOSIS — M17.0 BILATERAL PRIMARY OSTEOARTHRITIS OF KNEE: ICD-10-CM

## 2024-04-08 DIAGNOSIS — M17.12 PRIMARY LOCALIZED OSTEOARTHRITIS OF LEFT KNEE: ICD-10-CM

## 2024-04-08 DIAGNOSIS — M23.42 BODIES, LOOSE, JOINT, KNEE, LEFT: ICD-10-CM

## 2024-04-08 PROCEDURE — 99213 OFFICE O/P EST LOW 20 MIN: CPT | Performed by: STUDENT IN AN ORGANIZED HEALTH CARE EDUCATION/TRAINING PROGRAM
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
## 2024-04-10 ENCOUNTER — NURSE TRIAGE (OUTPATIENT)
Dept: FAMILY MEDICINE | Facility: CLINIC | Age: 69
End: 2024-04-10
Payer: COMMERCIAL

## 2024-04-10 NOTE — TELEPHONE ENCOUNTER
Reason for call:  Patient reporting a symptom    Symptom or request: Cough, congestion and nose plugged up. A lot of phlegm. Also states her medications are not all set up to go thru [facility].    Duration (how long have symptoms been present): 2-6-24 she got Covid    Have you been treated for this before? No    Additional comments: Patient states she has left message and no one has returned her call     Phone Number patient can be reached at:  Home number on file [phone] (home)    Best Time:  any    Can we leave a detailed message on this number:  YES    Call taken on 4/10/2024 at 12:46 PM by Brenda Simpson

## 2024-04-12 NOTE — TELEPHONE ENCOUNTER
Triage Patient Outreach    Attempt # 1 home number    Was call answered?  No.   Mail box full and could not accept messages at this time.    Clarissa Roman RN

## 2024-04-12 NOTE — TELEPHONE ENCOUNTER
Triage Patient Outreach    Attempt # 2    Was call answered?  No.  Left voicemail to return call to Triage at [facility]    Clarissa Roman RN

## 2024-04-15 ENCOUNTER — TELEPHONE (OUTPATIENT)
Dept: FAMILY MEDICINE | Facility: CLINIC | Age: 69
End: 2024-04-15
Payer: COMMERCIAL

## 2024-04-15 DIAGNOSIS — E11.9 TYPE 2 DIABETES MELLITUS WITHOUT COMPLICATION, WITHOUT LONG-TERM CURRENT USE OF INSULIN (H): ICD-10-CM

## 2024-04-15 DIAGNOSIS — J30.9 ALLERGIC RHINITIS, UNSPECIFIED SEASONALITY, UNSPECIFIED TRIGGER: ICD-10-CM

## 2024-04-15 RX ORDER — MONTELUKAST SODIUM 10 MG/1
1 TABLET ORAL EVERY MORNING
Qty: 90 TABLET | Refills: 1 | Status: SHIPPED | OUTPATIENT
Start: 2024-04-15

## 2024-04-15 RX ORDER — LISINOPRIL 30 MG/1
30 TABLET ORAL DAILY
Qty: 90 TABLET | Refills: 1 | Status: SHIPPED | OUTPATIENT
Start: 2024-04-15 | End: 2024-06-22

## 2024-04-15 NOTE — TELEPHONE ENCOUNTER
Spoke with pt and relayed providers message. Pt stated she is not having a sinus infection.     Caro Welch RN

## 2024-04-15 NOTE — TELEPHONE ENCOUNTER
"Patient calling to report significant sputum production since 03/06/24. Patient requesting medication for phlegm    Location: both sinuses, but primarily sputum and chest congestion  Onset: 03/06/24 when diagnosed with covid  Severity: moderate  Sputum: \"always clear\"  Other sx: cough  Denies sore throat, cough, earache, difficulty breathing  Interventions: \"mucinex doesn't work\"; pt does not do neti pot; pt states fluticasone has worked in the past but is not as effective at this point    Nurse Triage SBAR    Is this a 2nd Level Triage? NO    Protocol Recommended Disposition:   See in Office Today or Tomorrow    Recommendation:   Writer advised pt to be seen today or tomorrow for symptoms, Patient refuses OV stating, \"it's just a phlegm issue, I should not have to have another bill just to say this all again\".     Patient requests medication for phlegm, again declining OV to evaluate.     PCP, please advise if medication can be provided, or if patient should be advised to be seen in OV/VV/UC.    Routed to provider    Does the patient meet one of the following criteria for ADS visit consideration? 16+ years old, with an [facility] PCP     TIP  Providers, please consider if this condition is appropriate for management at one of our Acute and Diagnostic Services sites.     If patient is a good candidate, please use dotphrase <dot>triageresponse and select Refer to ADS to document.    [facility]erred [facility]: [facility] [place] [facility]    Callback: [phone] ok to leave a detailed vm    Nicki Guardado RN  -[facility]      Reason for Disposition    Sinus congestion (pressure, fullness) present > 10 days    Additional Information    Negative: Sounds like a life-threatening emergency to the triager    Negative: Difficulty breathing, and not from stuffy nose (e.g., not relieved by cleaning out the nose)    Negative: SEVERE headache and has fever    Negative: Patient sounds very sick or weak to " the triager    Negative: SEVERE sinus pain    Negative: SEVERE headache    Negative: Redness or swelling on the cheek, forehead, or around the eye    Negative: Fever > 103 F (39.4 C)    Negative: Fever > 101 F (38.3 C) and over 60 years of age    Negative: Fever > 100.0 F (37.8 C) and has diabetes mellitus or a weak immune system (e.g., HIV positive, cancer chemotherapy, organ transplant, splenectomy, chronic steroids)    Negative: Fever > 100.0 F (37.8 C) and bedridden (e.g., CVA, chronic illness, recovering from surgery)    Negative: Fever present > 3 days (72 hours)    Negative: Sinus pain (not just congestion) and fever    Negative: Fever returns after gone for over 24 hours and symptoms worse or not improved    Negative: Earache    Protocols used: Sinus Pain or Congestion-A-OH

## 2024-04-15 NOTE — TELEPHONE ENCOUNTER
Reason for Disposition    Nasal discharge present > 10 days    Additional Information    Negative: Sounds like a life-threatening emergency to the triager    Negative: Difficulty breathing, and not from stuffy nose (e.g., not relieved by cleaning out the nose)    Negative: SEVERE headache and has fever    Negative: Patient sounds very sick or weak to the triager    Negative: SEVERE sinus pain    Negative: SEVERE headache    Negative: Redness or swelling on the cheek, forehead, or around the eye    Negative: Fever > 103 F (39.4 C)    Negative: Fever > 101 F (38.3 C) and over 60 years of age    Negative: Fever > 100.0 F (37.8 C) and has diabetes mellitus or a weak immune system (e.g., HIV positive, cancer chemotherapy, organ transplant, splenectomy, chronic steroids)    Negative: Fever > 100.0 F (37.8 C) and bedridden (e.g., CVA, chronic illness, recovering from surgery)    Negative: Fever present > 3 days (72 hours)    Negative: Sinus pain (not just congestion) and fever    Negative: Fever returns after gone for over 24 hours and symptoms worse or not improved    Negative: Earache    Negative: Sinus congestion (pressure, fullness) present > 10 days    Protocols used: Sinus Pain or Congestion-A-OH

## 2024-04-15 NOTE — TELEPHONE ENCOUNTER
Medication refills ordered.  Patient is due for recheck on her labs and blood pressure.  Please schedule her visit on the first available opening.    Romel Mccall MD  [facility], [place]

## 2024-04-15 NOTE — TELEPHONE ENCOUNTER
Patient Contact     Attempt # 3      Was call answered?   No   Left non-detailed message to call the clinic back at [phone].      Caro Welch RN

## 2024-04-15 NOTE — TELEPHONE ENCOUNTER
Patient requesting montelukast refill to [facility] (medication not active), as she decided not to do allergy shots. Pt states that she felt as though she had a panic attack after the first allergy shot.    Patient also requesting metformin sent to [facility].    Patient requesting to [facility] lisinopril 30mg, as cutting pills in half is difficult as the tabs are small.    Nicki Guardado RN  -[facility]

## 2024-04-15 NOTE — TELEPHONE ENCOUNTER
The patient think she is dealing with a sinus infection, with tenderness over sinuses, have her start an e-visit.      If it is just nasal congestion and drainage, it might be seasonal.  Continue using nasal saline rinse, fluticasone nasal spray and antiallergy medication.    Romel Mccall MD  [facility], [place]

## 2024-04-19 ENCOUNTER — TELEPHONE (OUTPATIENT)
Dept: ORTHOPEDICS | Facility: CLINIC | Age: 69
End: 2024-04-19
Payer: COMMERCIAL

## 2024-04-19 DIAGNOSIS — M17.12 LOCALIZED OSTEOARTHRITIS OF LEFT KNEE: Primary | ICD-10-CM

## 2024-04-19 NOTE — TELEPHONE ENCOUNTER
Other: pt called would like for care team to call back in regards to what was discussed in her last visit.      Could we send this information to you in [employer] or would you prefer to receive a phone call?:   Patient would prefer a phone call   Okay to leave a detailed message?: Yes at Cell number on file:    Telephone Information:   Mobile [phone]

## 2024-04-19 NOTE — TELEPHONE ENCOUNTER
Other: pt called, per pt provider had stated in previous appts that pt had a cyst, pt would like clarity on if she has that or not would like for care team call her back.       Could we send this information to you in [employer] or would you prefer to receive a phone call?:   Patient would prefer a phone call   Okay to leave a detailed message?: Yes at Cell number on file:    Telephone Information:   Mobile [phone]

## 2024-04-22 NOTE — TELEPHONE ENCOUNTER
Spoke to patient discussed that she did have a small popliteal cyst on her bedside ultrasound and on the MRI.  These are generally a reflection/symptom of her knee osteoarthritis & not the likely source of her pain.  Patient will continue with care recommended by Dr Beavers.    Rj Vyas ATC

## 2024-05-16 DIAGNOSIS — E78.5 HYPERLIPIDEMIA LDL GOAL <100: ICD-10-CM

## 2024-05-16 NOTE — TELEPHONE ENCOUNTER
Reason for Call:  Other prescription    Detailed comments: pt needing med refills prior to apt 7/31 - please contact pt.  Pt needs 90 day supply called to [facility]    Phone Number Patient can be reached at: Cell number on file:    Telephone Information:   Mobile [phone]        Best Time: any    Can we leave a detailed message on this number? YES    Call taken on 5/16/2024 at 4:40 PM by Yoly Nevarez

## 2024-05-17 RX ORDER — ROSUVASTATIN CALCIUM 5 MG/1
5 TABLET, COATED ORAL DAILY
Qty: 90 TABLET | Refills: 1 | OUTPATIENT
Start: 2024-05-17

## 2024-05-24 DIAGNOSIS — E78.5 HYPERLIPIDEMIA LDL GOAL <100: ICD-10-CM

## 2024-05-24 RX ORDER — ROSUVASTATIN CALCIUM 5 MG/1
5 TABLET, COATED ORAL DAILY
Qty: 90 TABLET | Refills: 0 | Status: SHIPPED | OUTPATIENT
Start: 2024-05-24 | End: 2024-08-15

## 2024-05-24 NOTE — TELEPHONE ENCOUNTER
Medication Question or Refill    Contacts         Type Contact Phone/Fax    05/24/2024 01:09 PM CDT Phone (Incoming) Grecia Kilgore (Self) [phone] (M)            What medication are you calling about (include dose and sig)?: rosuvastatin - not sure on the name, or strength - take 1 tablet once a day     Preferred Pharmacy:   [id number] [address] AT [place] [address] & [facility]  [address] [facility] [address]  Phone: [phone] Fax: [phone]          Controlled Substance Agreement on file:   CSA -- Patient Level:    CSA: None found at the patient level.       Who prescribed the medication?: Dr. Mccall     Do you need a refill? Yes    When did you use the medication last? 05/21/2024    Patient offered an appointment? Yes: already has one set up     Do you have any questions or concerns?  Yes: Patient is wanting to get a prescription sent in for the statin medication - patient has currently been out of medication for the past 3 days - would also like a 90 day supply sent to mail order pharmacy       Okay to leave a detailed message?: Yes at Cell number on file:    Telephone Information:   Mobile [phone]      Speech Therapy Daily Treatment  Infant Feeding/Swallow     Admitting diagnosis: Premature infant of 29 weeks gestation [P07.32]   YOB: 2019, 7 week old   Corrected gestational age:37w 0d  Birth Weight: 1 lb 15.8 oz (900 g)  Current hospitalization required due to the following medical needs:  Active Problems:    Prematurity, birth weight 750-999 grams, with 29-30 completed weeks of gestation    Mosaic Conti syndrome    Mild protein-calorie malnutrition (CMS/HCC)    GERD (gastroesophageal reflux disease)  Resolved Problems:    Respiratory distress of     Need for observation and evaluation of  for sepsis    Hypokalemia    Medical changes or updates since last session: see physician notes    SUBJECTIVE   Collaboration with: Nurse Lorie who reports pt with excessive anterior loss inconsistently with feeds. Pt with persistent nasal congestion and elevated RR.    Pain before session:  N-PASS ( Pain, Agitation & Sedation Scale) Pain Assessment  Crying/Irritability 0 - No pain signs   Behavior State 0 - Appropriate for gestational age   Facial Expression  0 - No pain signs   Extremity Tone 0 - No pain signs   Vital Signs 0 - No pain signs   Premature Pain Score 0     OBJECTIVE   Reassessment of neurodevelopmental and oral feeding skills.    Status at onset of session:   Physiologic: Stable autonomic behaviors including  stable heart rate, regular respirations and pink/stable color.  Motor: Stable motor behaviors including  sucking and hand(s) to face.  State: Infant was in a quiet alert state. Stable state behaviors including appropriate responses.    Current Feeding: PO   Respiratory Status: room air    Treatment/Intervention    Oral Motor/ Peripheral Examination  Structural observations: intact  Oral musculature: decreased strength of movement and decreased coordination of movement  Reflexes: age appropriate  Secretion management: within functional limits  Phonation:  N/A  Non-nutritive suck: able to root and initiate a suck pattern but not maintain  Stress cues observed during oral motor:   - Motor: finger splaying, eye brow raise   - Physiological: none noted    Oral Feeding:   - Infant fed by nurse  - Postioning during feeding: Sidelying, Elevated   - Infant consumed 25 mls  via Dr. Brown's Preemie Nipple.      Oral Phase:   - Initiation: able to initiate a suck without stimulation required   - Skills: impaired seal on the nipple, increased work of breathing, nasal congestion increased with feed    - Pattern: unable to to maintain a feeding pattern with frequent pauses, disorganized   Pharyngeal Phase: Uncoordinated suck/swallow/breathe coordination, Extraneous body movement    Stress Cues noted during oral feeding:  Autonomic: none  State: none  Motor: none    Therapy Techniques: elevated side-lying, perioral stimulation to elicit suck     Family Centered Support/Education: Caregiver(s) not present. Will meet and provide teaching strategies as available. RN to notify   ASSESSMENT:   Pt presented with dysphagia impacted by reduced nasal patency resulting in increased WOB and increased RR with feeding    Impressions & Recommendations:  Aspiration Risk Moderate  Factors include: prematurity   Pacing Required max pacing by feeder   Nipple Dr. Michel's Preemie Nipple   Positioning Sidelying, Elevated   Safe feeding strategies found effective PO attempts per feeding cues  Pace q 3-4 sucks  Follow three strikes protocol and discontinue with increased WOB  Non-oral supplement if difficulty persists       Patient will benefit from speech therapy. Habilitative potential is good based on assessment above    Pain after session:  N-PASS ( Pain, Agitation & Sedation Scale) Pain Assessment  Crying/Irritability 0 - No pain signs   Behavior State 0 - Appropriate for gestational age   Facial Expression  0 - No pain signs   Extremity Tone 0 - No pain signs   Vital Signs 0 - No pain signs    Premature Pain Score 0     PLAN:   Suggestions for next session as indicated: ongoing assessment of oral feeding skills and continued parent education    Goals:  Goals  Discharge Goal #1: Pt to establish safe oral feeding skills via least restrictive means.  Discharge Goal #2 : Parents to participate in ongoing education re: neurodevelopment and feeding skills    Frequency: Frequency: 1-2x/week    Documentation completed on following flowsheet: Infant flowsheet

## 2024-05-24 NOTE — TELEPHONE ENCOUNTER
Prescription approved per [facility] Refill Protocol.  Vida Pineda, RN  [facility] Triage Nurse

## 2024-05-28 ENCOUNTER — TELEPHONE (OUTPATIENT)
Dept: ORTHOPEDICS | Facility: CLINIC | Age: 69
End: 2024-05-28
Payer: COMMERCIAL

## 2024-05-28 NOTE — TELEPHONE ENCOUNTER
Patient Returning Call    Reason for call:  pt has fluid build up around the knee and feeling bulge on outside left  of knee and is wanting to be seen tmrw, currently have first available ok to see Estevan (prefers), requesting callback.     Information relayed to patient:  te sent to clinic     Patient has additional questions:  No      Okay to leave a detailed message?: Yes at Cell number on file:    Telephone Information:   Mobile [phone]

## 2024-05-29 NOTE — TELEPHONE ENCOUNTER
HA injection will need to be prior authorized.   There is a previous referral placed on 3/22/24 by Estevan for bilateral Synvisc One injections. There is another referral placed on 4/22/24 by Dr. Beavers for left knee only for Durolane 1mg injection. Neither were completed because an appointment had not been scheduled.     Patient last had HA injection of the left knee on 11/9/23 by Dr. Barreto.     Left message informing patient that Dr. Beavers is happy to see her for an injection, however, she would need to have it prior authorized by insurance. We recommend she cancel the appointment with Dr. Beavers and keep the one with Dr. Barreto so that we have time to get the necessary authorization. Asked that she call back to let us know if she was in agreement.     Message sent to the CAM team for the order for Synvisc by Dr. Barreto.     ROSARIO Maier RN

## 2024-05-30 DIAGNOSIS — M17.0 BILATERAL PRIMARY OSTEOARTHRITIS OF KNEE: Primary | ICD-10-CM

## 2024-05-30 NOTE — PROGRESS NOTES
Patient scheduled with NAHUN Beavers was approved for Dr. Barreto but is not seeing her. PA placed for Dr. Beavers.    Flo Kline ATC

## 2024-05-31 ENCOUNTER — OFFICE VISIT (OUTPATIENT)
Dept: ORTHOPEDICS | Facility: CLINIC | Age: 69
End: 2024-05-31
Payer: COMMERCIAL

## 2024-05-31 ENCOUNTER — OFFICE VISIT (OUTPATIENT)
Dept: CARDIOLOGY | Facility: CLINIC | Age: 69
End: 2024-05-31
Payer: COMMERCIAL

## 2024-05-31 VITALS
WEIGHT: 160 LBS | BODY MASS INDEX: 25.11 KG/M2 | HEIGHT: 67 IN | HEART RATE: 55 BPM | SYSTOLIC BLOOD PRESSURE: 170 MMHG | DIASTOLIC BLOOD PRESSURE: 86 MMHG | OXYGEN SATURATION: 99 %

## 2024-05-31 VITALS — WEIGHT: 155 LBS | BODY MASS INDEX: 24.33 KG/M2 | HEIGHT: 67 IN

## 2024-05-31 DIAGNOSIS — E78.5 HYPERLIPIDEMIA LDL GOAL <100: ICD-10-CM

## 2024-05-31 DIAGNOSIS — I10 PRIMARY HYPERTENSION: ICD-10-CM

## 2024-05-31 DIAGNOSIS — Z13.6 SCREENING FOR CARDIOVASCULAR CONDITION: Primary | ICD-10-CM

## 2024-05-31 DIAGNOSIS — M17.0 BILATERAL PRIMARY OSTEOARTHRITIS OF KNEE: Primary | ICD-10-CM

## 2024-05-31 DIAGNOSIS — R00.2 PALPITATIONS: ICD-10-CM

## 2024-05-31 DIAGNOSIS — E11.9 DIABETES MELLITUS WITHOUT COMPLICATION (H): ICD-10-CM

## 2024-05-31 PROCEDURE — 99204 OFFICE O/P NEW MOD 45 MIN: CPT | Mod: 24 | Performed by: INTERNAL MEDICINE

## 2024-05-31 PROCEDURE — 93246 EXT ECG>7D<15D RECORDING: CPT | Performed by: INTERNAL MEDICINE

## 2024-05-31 PROCEDURE — 20611 DRAIN/INJ JOINT/BURSA W/US: CPT | Mod: LT | Performed by: STUDENT IN AN ORGANIZED HEALTH CARE EDUCATION/TRAINING PROGRAM

## 2024-05-31 PROCEDURE — 93000 ELECTROCARDIOGRAM COMPLETE: CPT | Mod: 59 | Performed by: INTERNAL MEDICINE

## 2024-05-31 NOTE — TELEPHONE ENCOUNTER
Patient saw Dr. Beavers today and a HA injection was given. Closing encounter.     ROSARIO Maier RN

## 2024-05-31 NOTE — PROGRESS NOTES
Grecia Kilgore arrived here on 5/31/2024 11:30 AM for 8-14 Days  Zio monitor placement per ordering provider Dr. Higgins for the diagnosis Palpitations.  Patient s skin was prepped per protocol.  Zio monitor was placed.  Instructions were reviewed with and given to the patient.  Patient verbalized understanding of wear, troubleshooting and monitor return instructions.

## 2024-05-31 NOTE — PATIENT INSTRUCTIONS
[facility]   [address], [address]  [place] [address] [address] [place] [address]   Appointments: [phone] Appointments: [phone]   Fax: [phone] Fax: [phone]       1. Bilateral primary osteoarthritis of knee        INJECTION:  Steroid injection of the left knee was performed today in clinic.  - Do not soak in a hot tub, bath or swimming pool for 48 hours.  - Ok to shower.  - Ice today and only do your normal amounts of activity.  - The lidocaine (what is giving you pain relief right now) will likely stop working in 1-2 hours.  You may then have pain again, similar to before you received the injection. The corticosteroid will not start working until approximately 1-2 weeks from now.  - In a small percentage of people, cortisone can cause flushing/redness in the face. This usually lasts for 1-3 days and resolves. Cool compress and Ibuprofen/Tylenol can help if this happens.  - The steroid can raise your blood sugar for 3-5 days after injection. Diabetic patients are advised to monitor their blood glucose and adjust medication as needed during this time frame. Please call your PCP with further questions regarding medication adjustments.        Follow up with Dr. Barreto in August or September     Call my office with any questions or concerns, [phone].

## 2024-05-31 NOTE — PROGRESS NOTES
CC: Left knee patellofemoral arthritis    HPI: Patient is a 69-year-old female known to my clinic with left knee patellofemoral arthritis.  She had previous undergone a left hyaluronic acid injection in November 2023 and a corticosteroid injection in February 2024.  She feels she got good pain relief from the hyaluronic acid injection but not the corticosteroid injection.  She is interested in repeating a hyaluronic acid injection today.  She feels she has had some increasing effusions in her left knee and anterior knee pain.  She has been doing physical therapy at [facility] for her neck and back.  She would like to start some physical therapy for her leg as well.     Objective:   PE:  LUE: Well-healed arthroscopic surgical incision over the anterior aspect of the knee.  Trace knee effusion today.  0 to 140 degrees of active and passive range of motion of the knee.  Patellar grind positive for pain and crepitus.  Stable to anterior posterior drawer.  Stable varus and valgus.    Procedure:   Written informed consent for Left knee ultrasound-guided aspiration and intra-articular injection was obtained from the patient after discussing the risk and benefits of the procedure.  Risk and benefits including but not limited to bleeding, infection, failure to cure pain and allergic reaction were discussed.  The anterior superior lateral portal was identified by palpation of bony landmarks.  The skin was cleaned with iodine solution.  The ultrasound probe and sterile gel was positioned superior to the patella.  The superior patellar pouch was evaluated.  Minimal joint effusion was noted.  Under sterile technique and under ultrasound guidance, an 18-gauge needle was inserted through the anterior inferolateral portal.  Attempted aspiration was performed including milking of the superior patellar pouch.  No fluid was able to be aspirated.  The 30 cc syringe was removed without removing the needle from the suprapatellar pouch.  The  same needle was utilized to inject the entirety of the Synvisc 1 solution.  Soft dressings were applied.  Patient tolerated the procedure without difficulty.  I counseled the patient on signs and symptoms of allergic reaction and infection.      Large Joint Injection/Arthocentesis: L knee joint    Date/Time: 5/31/2024 9:09 AM    Performed by: Jossue Beavers MD  Authorized by: Jossue Beavers MD    Indications:  Pain and osteoarthritis  Needle Size:  18 G  Guidance: ultrasound    Approach:  Lateral  Location:  Knee      Medications:  48 mg hylan 48 MG/6ML  Aspirate amount (mL):  1  Aspirate:  Clear  Outcome:  Tolerated well, no immediate complications  Procedure discussed: discussed risks, benefits, and alternatives    Consent Given by:  Patient  Timeout: timeout called immediately prior to procedure    Prep: patient was prepped and draped in usual sterile fashion     Ultrasound was used to ensure safe and accurate needle placement and injection. Ultrasound images of the procedure were permanently stored.      A/P:  Patient is a 69-year-old female seen here today in follow-up for left knee patellofemoral arthritis.  She was previously set up to undergo a left knee ultrasound-guided aspiration of a hyaluronic acid injection with my colleague, Dr. Barreto.  She was interested in having this done sooner as she is having significant symptoms of pain and feelings of effusion.  Her last hyaluronic acid injection was in November 2023.  She requested this be performed under ultrasound guidance.  I discussed the risks and benefits of an aspiration and injection as highlighted above.  Specifically, we discussed the risk of allergic reaction, infection, and failure to cure pain.  I counseled her on the signs and symptoms of allergic reaction or infection.  An attempt at ultrasound guided aspiration was performed, but there was no significant joint effusion to be aspirated.  The hyaluronic acid injection was performed under  ultrasound guidance as highlighted above.  She is also interested in trialing an over-the-counter knee brace.  She ultimately selected a compressive knee sleeve.  We will add orders for knee range of motion, glue quad hamstring and core strengthening to her current physical therapy regimen at [facility].  She will follow-up with my colleague, Dr. Barreto, in September to discuss repeat injection prior to a vacation in [place].    Jossue Beavers MD    [facility]   Department of Orthopedic Surgery      Disclaimer: This note consists of symbols derived from keyboarding, dictation and/or voice recognition software. As a result, there may be errors in the script that have gone undetected. Please consider this when interpreting information found in this chart.

## 2024-05-31 NOTE — LETTER
5/31/2024         RE: Grecia Kilgore  [address]  [place] [address]        Dear Colleague,    Thank you for referring your patient, Grecia Kilgore, to the [facility] [place]. Please see a copy of my visit note below.    CC: Left knee patellofemoral arthritis    HPI: Patient is a 69-year-old female known to my clinic with left knee patellofemoral arthritis.  She had previous undergone a left hyaluronic acid injection in November 2023 and a corticosteroid injection in February 2024.  She feels she got good pain relief from the hyaluronic acid injection but not the corticosteroid injection.  She is interested in repeating a hyaluronic acid injection today.  She feels she has had some increasing effusions in her left knee and anterior knee pain.  She has been doing physical therapy at [facility] for her neck and back.  She would like to start some physical therapy for her leg as well.     Objective:   PE:  LUE: Well-healed arthroscopic surgical incision over the anterior aspect of the knee.  Trace knee effusion today.  0 to 140 degrees of active and passive range of motion of the knee.  Patellar grind positive for pain and crepitus.  Stable to anterior posterior drawer.  Stable varus and valgus.    Procedure:   Written informed consent for Left knee ultrasound-guided aspiration and intra-articular injection was obtained from the patient after discussing the risk and benefits of the procedure.  Risk and benefits including but not limited to bleeding, infection, failure to cure pain and allergic reaction were discussed.  The anterior superior lateral portal was identified by palpation of bony landmarks.  The skin was cleaned with iodine solution.  The ultrasound probe and sterile gel was positioned superior to the patella.  The superior patellar pouch was evaluated.  Minimal joint effusion was noted.  Under sterile technique and under ultrasound guidance, an  18-gauge needle was inserted through the anterior inferolateral portal.  Attempted aspiration was performed including milking of the superior patellar pouch.  No fluid was able to be aspirated.  The 30 cc syringe was removed without removing the needle from the suprapatellar pouch.  The same needle was utilized to inject the entirety of the Synvisc 1 solution.  Soft dressings were applied.  Patient tolerated the procedure without difficulty.  I counseled the patient on signs and symptoms of allergic reaction and infection.      Large Joint Injection/Arthocentesis: L knee joint    Date/Time: 5/31/2024 9:09 AM    Performed by: Jossue Beavers MD  Authorized by: Jossue Beavers MD    Indications:  Pain and osteoarthritis  Needle Size:  18 G  Guidance: ultrasound    Approach:  Lateral  Location:  Knee      Medications:  48 mg hylan 48 MG/6ML  Aspirate amount (mL):  1  Aspirate:  Clear  Outcome:  Tolerated well, no immediate complications  Procedure discussed: discussed risks, benefits, and alternatives    Consent Given by:  Patient  Timeout: timeout called immediately prior to procedure    Prep: patient was prepped and draped in usual sterile fashion     Ultrasound was used to ensure safe and accurate needle placement and injection. Ultrasound images of the procedure were permanently stored.      A/P:  Patient is a 69-year-old female seen here today in follow-up for left knee patellofemoral arthritis.  She was previously set up to undergo a left knee ultrasound-guided aspiration of a hyaluronic acid injection with my colleague, Dr. Barreto.  She was interested in having this done sooner as she is having significant symptoms of pain and feelings of effusion.  Her last hyaluronic acid injection was in November 2023.  She requested this be performed under ultrasound guidance.  I discussed the risks and benefits of an aspiration and injection as highlighted above.  Specifically, we discussed the risk of allergic reaction,  infection, and failure to cure pain.  I counseled her on the signs and symptoms of allergic reaction or infection.  An attempt at ultrasound guided aspiration was performed, but there was no significant joint effusion to be aspirated.  The hyaluronic acid injection was performed under ultrasound guidance as highlighted above.  She is also interested in trialing an over-the-counter knee brace.  She ultimately selected a compressive knee sleeve.  We will add orders for knee range of motion, glue quad hamstring and core strengthening to her current physical therapy regimen at [facility].  She will follow-up with my colleague, Dr. Barreto, in September to discuss repeat injection prior to a vacation in [place].    Jossue Beavers MD    [facility]   Department of Orthopedic Surgery      Disclaimer: This note consists of symbols derived from keyboarding, dictation and/or voice recognition software. As a result, there may be errors in the script that have gone undetected. Please consider this when interpreting information found in this chart.         Again, thank you for allowing me to participate in the care of your patient.        Sincerely,        Jossue Beavers MD

## 2024-05-31 NOTE — PROGRESS NOTES
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   Component Value Date     10/11/2023     05/03/2021    POTASSIUM 4.3 10/11/2023    POTASSIUM 4.3 05/31/2022    POTASSIUM 3.8 05/03/2021    CHLORIDE 107 10/11/2023    CHLORIDE 108 05/31/2022    CHLORIDE 111 (H) 05/03/2021    CO2 21 (L) 10/11/2023    CO2 27 05/31/2022    CO2 25 05/03/2021    ANIONGAP 14 10/11/2023    ANIONGAP 9 05/31/2022    ANIONGAP 4 05/03/2021     (H) 10/11/2023    GLC 84 05/31/2022     (H) 05/03/2021    BUN 12.3 10/11/2023    BUN 10 05/31/2022    BUN 12 05/03/2021    CR 0.91 10/11/2023    CR 1.01 05/03/2021    GFRESTIMATED 69 10/11/2023    GFRESTIMATED 59 (L) 05/03/2021    GFRESTBLACK 68 05/03/2021    LOGAN 9.7 10/11/2023    LOGAN 8.8 05/03/2021        A1C RESULTS:  Lab Results   Component Value Date    A1C 6.6 (H) 10/11/2023    A1C 6.2 (H) 01/27/2021       INR RESULTS:  No results found for: \"INR\"        CC  Referred Self, MD  No address on file                "
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
## 2024-06-11 ENCOUNTER — TELEPHONE (OUTPATIENT)
Dept: CARDIOLOGY | Facility: CLINIC | Age: 69
End: 2024-06-11

## 2024-06-11 ENCOUNTER — HOSPITAL ENCOUNTER (OUTPATIENT)
Dept: CARDIOLOGY | Facility: CLINIC | Age: 69
Discharge: HOME | End: 2024-06-11
Attending: INTERNAL MEDICINE | Admitting: INTERNAL MEDICINE
Payer: COMMERCIAL

## 2024-06-11 DIAGNOSIS — Z13.6 SCREENING FOR CARDIOVASCULAR CONDITION: Primary | ICD-10-CM

## 2024-06-11 DIAGNOSIS — I10 PRIMARY HYPERTENSION: ICD-10-CM

## 2024-06-11 DIAGNOSIS — E78.5 HYPERLIPIDEMIA LDL GOAL <100: ICD-10-CM

## 2024-06-11 DIAGNOSIS — R94.31 ABNORMAL ELECTROCARDIOGRAM (ECG) (EKG): ICD-10-CM

## 2024-06-11 DIAGNOSIS — Z13.6 SCREENING FOR CARDIOVASCULAR CONDITION: ICD-10-CM

## 2024-06-11 DIAGNOSIS — E11.9 DIABETES MELLITUS WITHOUT COMPLICATION (H): ICD-10-CM

## 2024-06-11 DIAGNOSIS — R00.2 PALPITATIONS: ICD-10-CM

## 2024-06-11 PROCEDURE — 93016 CV STRESS TEST SUPVJ ONLY: CPT | Performed by: INTERNAL MEDICINE

## 2024-06-11 PROCEDURE — 93018 CV STRESS TEST I&R ONLY: CPT | Performed by: INTERNAL MEDICINE

## 2024-06-11 PROCEDURE — 93017 CV STRESS TEST TRACING ONLY: CPT

## 2024-06-11 NOTE — TELEPHONE ENCOUNTER
Please see stress test results and advise, patient has upcoming OV 6/28/24.    Interpretation Summary     1. CLINICAL: Patient exercised for 8:49 on a Hong protocol (10 METs, above  average exertional tolerance). No chest pain. Hypertensive BP response to  exercise (baseline /88, peak /96).  2. ECG: Resting ECG shows sinus bradycardia with diffuse non-specific T-wave  abnormalities. At peak stress, there is ~ 1.0 mm of upsloping ST depression  in  the inferior and lateral precordial leads.     In summary, this is a borderline ECG-only exercise stress test, with ~ 1.0 mm  of upsloping ST depression in the inferior and lateral precordial leads at  peak stress, in the context of a hypertensive BP response to exercise.     Consider additional ischemic testing if clinically indicated.

## 2024-06-19 ENCOUNTER — TELEPHONE (OUTPATIENT)
Dept: CARDIOLOGY | Facility: CLINIC | Age: 69
End: 2024-06-19

## 2024-06-19 DIAGNOSIS — E11.9 TYPE 2 DIABETES MELLITUS WITHOUT COMPLICATION, WITHOUT LONG-TERM CURRENT USE OF INSULIN (H): ICD-10-CM

## 2024-06-19 PROCEDURE — 93248 EXT ECG>7D<15D REV&INTERPJ: CPT | Performed by: INTERNAL MEDICINE

## 2024-06-19 NOTE — TELEPHONE ENCOUNTER
Results noted. Patient has follow up scheduled with Dr. Higgins on 7/31/24. RN will send to Dr. Higgins to inquire if any further recommendations prior to apt based on results ( of note, patient is have NM stress completed on 7/1/24).        Zio patch results    14-day cardiac monitor.  4 SVT runs, longest 20 beats.  Otherwise rare PACs, PVCs.  Patient activations correlated with sinus rhythm with single ectopic beats.

## 2024-06-19 NOTE — TELEPHONE ENCOUNTER
RN called patient and reviewed with her Dr. Higgins's recommendations and her stress test results. Patient verbalized understanding and Is in agreement with plan to undergo Nuc med stress test. RN will placed orders for stress test and will send to our scheduling team to call patient to arrange.                       Can order stress nuclear study. Diagnosis abnormal cardiovascular stress test.   Dr. Higgins

## 2024-06-22 RX ORDER — LISINOPRIL 30 MG/1
30 TABLET ORAL DAILY
Qty: 100 TABLET | Refills: 0 | Status: SHIPPED | OUTPATIENT
Start: 2024-06-22 | End: 2024-09-03

## 2024-06-23 DIAGNOSIS — J30.9 ALLERGIC RHINITIS, UNSPECIFIED SEASONALITY, UNSPECIFIED TRIGGER: ICD-10-CM

## 2024-06-24 RX ORDER — MONTELUKAST SODIUM 10 MG/1
1 TABLET ORAL EVERY MORNING
Qty: 100 TABLET | Refills: 2 | OUTPATIENT
Start: 2024-06-24

## 2024-06-26 ENCOUNTER — HOSPITAL ENCOUNTER (OUTPATIENT)
Dept: NUCLEAR MEDICINE | Facility: CLINIC | Age: 69
Setting detail: NUCLEAR MEDICINE
Discharge: HOME | End: 2024-06-26
Attending: INTERNAL MEDICINE
Payer: COMMERCIAL

## 2024-06-26 ENCOUNTER — HOSPITAL ENCOUNTER (OUTPATIENT)
Dept: CARDIOLOGY | Facility: CLINIC | Age: 69
Discharge: HOME | End: 2024-06-26
Attending: INTERNAL MEDICINE
Payer: COMMERCIAL

## 2024-06-26 DIAGNOSIS — Z13.6 SCREENING FOR CARDIOVASCULAR CONDITION: ICD-10-CM

## 2024-06-26 DIAGNOSIS — R94.31 ABNORMAL ELECTROCARDIOGRAM (ECG) (EKG): ICD-10-CM

## 2024-06-26 DIAGNOSIS — J45.20 MILD INTERMITTENT ASTHMA WITHOUT COMPLICATION: ICD-10-CM

## 2024-06-26 LAB
CV STRESS MAX HR HE: 95
NUC STRESS EJECTION FRACTION: 73 %
RATE PRESSURE PRODUCT: NORMAL
STRESS ECHO BASELINE DIASTOLIC HE: 88
STRESS ECHO BASELINE HR: 54 BPM
STRESS ECHO BASELINE SYSTOLIC BP: 179
STRESS ECHO CALCULATED PERCENT HR: 62 %
STRESS ECHO LAST STRESS DIASTOLIC BP: 98
STRESS ECHO LAST STRESS SYSTOLIC BP: 167
STRESS ECHO TARGET HR: 153

## 2024-06-26 PROCEDURE — 78452 HT MUSCLE IMAGE SPECT MULT: CPT

## 2024-06-26 PROCEDURE — 93016 CV STRESS TEST SUPVJ ONLY: CPT | Performed by: INTERNAL MEDICINE

## 2024-06-26 PROCEDURE — 250N000011 HC RX IP 250 OP 636: Mod: JZ | Performed by: INTERNAL MEDICINE

## 2024-06-26 PROCEDURE — 78452 HT MUSCLE IMAGE SPECT MULT: CPT | Mod: 26 | Performed by: INTERNAL MEDICINE

## 2024-06-26 PROCEDURE — A9500 TC99M SESTAMIBI: HCPCS | Performed by: INTERNAL MEDICINE

## 2024-06-26 PROCEDURE — 93018 CV STRESS TEST I&R ONLY: CPT | Performed by: INTERNAL MEDICINE

## 2024-06-26 PROCEDURE — 93017 CV STRESS TEST TRACING ONLY: CPT

## 2024-06-26 PROCEDURE — 343N000001 HC RX 343: Performed by: INTERNAL MEDICINE

## 2024-06-26 RX ORDER — ALBUTEROL SULFATE 90 UG/1
2 AEROSOL, METERED RESPIRATORY (INHALATION) EVERY 6 HOURS PRN
Qty: 18 G | Refills: 1 | Status: SHIPPED | OUTPATIENT
Start: 2024-06-26

## 2024-06-26 RX ORDER — REGADENOSON 0.08 MG/ML
INJECTION, SOLUTION INTRAVENOUS
Status: COMPLETED
Start: 2024-06-26 | End: 2024-06-26

## 2024-06-26 RX ORDER — AMINOPHYLLINE 25 MG/ML
INJECTION, SOLUTION INTRAVENOUS
Status: DISCONTINUED
Start: 2024-06-26 | End: 2024-06-26 | Stop reason: WASHOUT

## 2024-06-26 RX ADMIN — Medication 32.8 MILLICURIE: at 10:32

## 2024-06-26 RX ADMIN — REGADENOSON 0.4 MG: 0.08 INJECTION, SOLUTION INTRAVENOUS at 10:13

## 2024-06-26 RX ADMIN — Medication 10.5 MILLICURIE: at 09:06

## 2024-06-26 NOTE — TELEPHONE ENCOUNTER
Prescription approved per [facility] Refill Protocol.     Lili Bagley RN  [facility]

## 2024-06-27 ENCOUNTER — TELEPHONE (OUTPATIENT)
Dept: CARDIOLOGY | Facility: CLINIC | Age: 69
End: 2024-06-27
Payer: COMMERCIAL

## 2024-06-27 NOTE — TELEPHONE ENCOUNTER
Spoke with patient and reviewed stress test results which did not show ischemia.  Patient comforted by receiving these results.  Patient has no other questions at this time.  Apolonia Nj RN on 6/27/2024 at 9:04 AM

## 2024-06-27 NOTE — TELEPHONE ENCOUNTER
[facility] Call Center    Phone Message    May a detailed message be left on voicemail: yes     Reason for Call: Requesting Results     Name/type of test: Lexiscan Stress Test  Date of test: 6.27.24  Was test done at a location other than [facility] [place] : No    NOTE:  pt is filled w/anxiety knowing she will have to wait until July 30 to meet w/Dr. Higgins before she knows the results of the Lexiscan stress test.  Please call pt back to provide the results so she doesn't worry unnecessarily about this.  Thank You    Action Taken: Message routed to:  [facility] cardio    Travel Screening: Not Applicable    Thank you!  [facility]       Date of Service:

## 2024-07-01 ENCOUNTER — TRANSFERRED RECORDS (OUTPATIENT)
Dept: HEALTH INFORMATION MANAGEMENT | Facility: CLINIC | Age: 69
End: 2024-07-01
Payer: COMMERCIAL

## 2024-07-01 LAB — RETINOPATHY: NEGATIVE

## 2024-07-10 ENCOUNTER — TRANSFERRED RECORDS (OUTPATIENT)
Dept: HEALTH INFORMATION MANAGEMENT | Facility: CLINIC | Age: 69
End: 2024-07-10
Payer: COMMERCIAL

## 2024-07-24 ENCOUNTER — TELEPHONE (OUTPATIENT)
Dept: FAMILY MEDICINE | Facility: CLINIC | Age: 69
End: 2024-07-24
Payer: COMMERCIAL

## 2024-07-24 NOTE — TELEPHONE ENCOUNTER
Reason for Call:  Appointment Request    Type of appt:  [facility]- [facility] outreach project    Requested provider: Romel Mccall    Comments: Called pt, LVM requesting callback to schedule appointment in September.    Call taken on 7/24/2024 at 11:37 AM by Angle Ruelas RN

## 2024-08-06 ENCOUNTER — TELEPHONE (OUTPATIENT)
Dept: OTHER | Age: 69
End: 2024-08-06
Payer: COMMERCIAL

## 2024-08-06 DIAGNOSIS — M17.12 OSTEOARTHRITIS OF LEFT KNEE, UNSPECIFIED OSTEOARTHRITIS TYPE: Primary | ICD-10-CM

## 2024-08-06 NOTE — TELEPHONE ENCOUNTER
Other: pt is asking staff of Dr. Barreto to call and discuss a prior auth for her hyaluronic acid injection.  Her ins said it can be sooner than 6 months but a PA needs to be sent in.  Pt is wanting to be sure this will be completed.    Inj appt is scheduled mid Sept.    Could we send this information to you in [employer] or would you prefer to receive a phone call?:   Patient would prefer a phone call   Okay to leave a detailed message?: Yes at Cell number on file:    Telephone Information: call first at .   Mobile [phone]

## 2024-08-06 NOTE — TELEPHONE ENCOUNTER
Called patient and spoke with her.  She would lik prior authorization Placed for Synvisc 1, ahead of her 9- appointment for her left knee.  Last Synvisc injection was performed on 5/31/2024, and provided her 3 months of relief.  In addition to this shot, she has tried recently physical therapy, ice, heat, lidocaine patches, Tylenol, ibuprofen, a cane, a walker, knee braces, and Ace wrap bandages to manage her symptoms.  She is also interested in a new physical therapy order to be placed with an  Keemotion system, to treat her left knee.  Prior authorization for the gel shot initiated.  Physical therapy order signed.  Patient had no further questions.  Vipin Nixon ATC

## 2024-08-07 ENCOUNTER — TELEPHONE (OUTPATIENT)
Dept: ORTHOPEDICS | Facility: CLINIC | Age: 69
End: 2024-08-07
Payer: COMMERCIAL

## 2024-08-15 DIAGNOSIS — E78.5 HYPERLIPIDEMIA LDL GOAL <100: ICD-10-CM

## 2024-08-15 RX ORDER — ROSUVASTATIN CALCIUM 5 MG/1
5 TABLET, COATED ORAL DAILY
Qty: 90 TABLET | Refills: 1 | Status: SHIPPED | OUTPATIENT
Start: 2024-08-15

## 2024-08-15 NOTE — TELEPHONE ENCOUNTER
Pt will be out of Crestor before annual exam. Pt is requesting 90 day jenny refill.             Future Appointments 8/15/2024 - 2/11/2025        Date Visit Type Length Department Provider     10/24/2024  1:00 PM ANNUAL WELLNESS 30 min EC FP/IM/Romel Fitch MD    Location Instructions:     [facility] is located at [address], across the street from [facility]. This is just south of the [address] exit off of [address]. Free lot parking is available.   [facility] is under construction June 2024 through late August 2024, expect delays and detours in getting to the [facility].

## 2024-08-21 DIAGNOSIS — J30.2 SEASONAL ALLERGIC RHINITIS, UNSPECIFIED TRIGGER: ICD-10-CM

## 2024-08-21 RX ORDER — CETIRIZINE HYDROCHLORIDE 10 MG/1
TABLET ORAL
Qty: 180 TABLET | Refills: 1 | Status: SHIPPED | OUTPATIENT
Start: 2024-08-21

## 2024-08-27 ENCOUNTER — TELEPHONE (OUTPATIENT)
Dept: ORTHOPEDICS | Facility: CLINIC | Age: 69
End: 2024-08-27
Payer: COMMERCIAL

## 2024-08-27 NOTE — TELEPHONE ENCOUNTER
Pt would like a CALL BACK to discuss her knee injection (LEFT).    Please CALL pt to discuss. Thank you.

## 2024-08-28 DIAGNOSIS — E11.9 TYPE 2 DIABETES MELLITUS WITHOUT COMPLICATION, WITHOUT LONG-TERM CURRENT USE OF INSULIN (H): ICD-10-CM

## 2024-08-28 NOTE — TELEPHONE ENCOUNTER
I made an outbound phone call to the patient, and went immediately to voicemail.  No consent to communicate on file including the patient's phone number and name, so a brief message was left.  I apologize that we are unable to connect and hear her concerns, but invited her to call back our team at [phone].  No further action needed unless patient calls back.  Vipin Nixon, ATC

## 2024-08-29 NOTE — TELEPHONE ENCOUNTER
I called the patient and was able to connect with her this time.  She stated that her insurance will approve of repeat gel injections more often than once every 6 months as long as we placed the order and there is appropriate documentation attached.  Gel shots are lasting her 2 to 3 months at this point she reports.  Upon chart review, the prior authorization which was placed on 8/6/2024 by me is already authorized so I let her know of this.  I rescheduled her for an 9/12/2024 appointment.  No further action needed.    Vipin Nixon, ATC

## 2024-08-30 ENCOUNTER — HOSPITAL ENCOUNTER (OUTPATIENT)
Dept: CT IMAGING | Facility: CLINIC | Age: 69
Discharge: HOME | End: 2024-08-30
Payer: COMMERCIAL

## 2024-08-30 DIAGNOSIS — R10.30 LOWER ABDOMINAL PAIN OF UNKNOWN ETIOLOGY: ICD-10-CM

## 2024-08-30 LAB
CREAT BLD-MCNC: 0.6 MG/DL (ref 0.5–1)
EGFRCR SERPLBLD CKD-EPI 2021: >60 ML/MIN/1.73M2

## 2024-08-30 PROCEDURE — 250N000009 HC RX 250

## 2024-08-30 PROCEDURE — 74177 CT ABD & PELVIS W/CONTRAST: CPT

## 2024-08-30 PROCEDURE — 82565 ASSAY OF CREATININE: CPT

## 2024-08-30 PROCEDURE — 250N000011 HC RX IP 250 OP 636

## 2024-08-30 RX ORDER — IOPAMIDOL 755 MG/ML
500 INJECTION, SOLUTION INTRAVASCULAR ONCE
Status: COMPLETED | OUTPATIENT
Start: 2024-08-30 | End: 2024-08-30

## 2024-08-30 RX ADMIN — IOPAMIDOL 81 ML: 755 INJECTION, SOLUTION INTRAVENOUS at 15:27

## 2024-08-30 RX ADMIN — SODIUM CHLORIDE 50 ML: 9 INJECTION, SOLUTION INTRAVENOUS at 15:27

## 2024-09-03 RX ORDER — LISINOPRIL 30 MG/1
30 TABLET ORAL DAILY
Qty: 100 TABLET | Refills: 0 | Status: SHIPPED | OUTPATIENT
Start: 2024-09-03

## 2024-09-06 ENCOUNTER — ANCILLARY PROCEDURE (OUTPATIENT)
Dept: MAMMOGRAPHY | Facility: CLINIC | Age: 69
End: 2024-09-06
Attending: FAMILY MEDICINE
Payer: COMMERCIAL

## 2024-09-06 DIAGNOSIS — Z12.31 VISIT FOR SCREENING MAMMOGRAM: ICD-10-CM

## 2024-09-06 PROCEDURE — 77063 BREAST TOMOSYNTHESIS BI: CPT | Mod: TC | Performed by: RADIOLOGY

## 2024-09-06 PROCEDURE — 77067 SCR MAMMO BI INCL CAD: CPT | Mod: TC | Performed by: RADIOLOGY

## 2024-09-11 NOTE — PROGRESS NOTES
Sports Medicine Procedure Note    Grecia was seen today for pain.    Diagnoses and all orders for this visit:    Primary localized osteoarthritis of left knee  -     Large Joint Injection/Arthocentesis: L knee joint        Grecia Kilgore is a/an 69 year old female who is seen for  Durolane  injection of Left knee.   Last injection: 05/31/24 Left Knee Durolane Injection that provided about 2-3 months of good pain relief.  Did better with inferomedial injection location.    Plan:  -Left knee Durolane injection performed in clinic today.  Can repeat every 4 months as needed (willing to do as soon as every 3 months if her insurance will pay for this)  -Post-injection instructions were provided to the patient  -Follow-up: As needed or for repeat injections        Post-Hyaluronic Acid Injection Discharge Instructions    Synvisc One injection was performed today in clinic  - Ok to shower today, but please do not submerge for 48 hours (including bath tub, hot tub or swimming)  - The lidocaine (local numbing medicine) will wear off in several hours. The Synvisc One injection usually takes several weeks to begin working, so you may not notice a difference in pain for 3-6 weeks.  - Synvisc has a small risk of increasing pain/swelling. If this occurs, it generally happens within 24 hours but will usually resolve on its own. You may use ice packs for 15-20 minutes, 3 to 4 times a day at the injection site for comfort if needed, and I recommend limiting your activity. If pain/swelling continues, then please the office.  -Can repeat Synvisc One injection at 6 months with insurance authorization    If you experience any of the following, call Sports Medicine @ [phone] or [phone]  -Fever over 100 degrees F  -Swelling, bleeding, redness, drainage, warmth at the injection site  -New or significant worsening pain      Large Joint Injection/Arthocentesis: L knee joint    Date/Time: 9/12/2024 3:10 PM    Performed by:  Debra Barreto DO  Authorized by: Debra Barreto DO    Needle Size:  22 G  Guidance: ultrasound    Approach:  Anterolateral  Location:  Knee   Location comment:  L knee joint      Medications:  3 mL lidocaine 1 %; 60 mg sodium hyaluronate 60 MG/3ML  Outcome:  Tolerated well, no immediate complications  Procedure discussed: discussed risks, benefits, and alternatives    Consent Given by:  Patient  Timeout: timeout called immediately prior to procedure    Prep: patient was prepped and draped in usual sterile fashion     Ultrasound was used to ensure safe and accurate needle placement and injection. Ultrasound images of the procedure were permanently stored.  1ml of 8.4% Sodium Bicarbonate solution was used to buffer the local numbing agent for today's injection         Dr. Debra Barreto DO  [facility]  Sports Medicine     -----

## 2024-09-12 ENCOUNTER — TELEPHONE (OUTPATIENT)
Dept: ORTHOPEDICS | Facility: CLINIC | Age: 69
End: 2024-09-12

## 2024-09-12 ENCOUNTER — OFFICE VISIT (OUTPATIENT)
Dept: ORTHOPEDICS | Facility: CLINIC | Age: 69
End: 2024-09-12
Payer: COMMERCIAL

## 2024-09-12 DIAGNOSIS — M17.12 PRIMARY LOCALIZED OSTEOARTHRITIS OF LEFT KNEE: Primary | ICD-10-CM

## 2024-09-12 PROCEDURE — 20611 DRAIN/INJ JOINT/BURSA W/US: CPT | Mod: LT | Performed by: STUDENT IN AN ORGANIZED HEALTH CARE EDUCATION/TRAINING PROGRAM

## 2024-09-12 PROCEDURE — 99207 PR NO BILLABLE SERVICE THIS VISIT: CPT | Performed by: STUDENT IN AN ORGANIZED HEALTH CARE EDUCATION/TRAINING PROGRAM

## 2024-09-12 RX ORDER — LIDOCAINE HYDROCHLORIDE 10 MG/ML
3 INJECTION, SOLUTION INFILTRATION; PERINEURAL
Status: SHIPPED | OUTPATIENT
Start: 2024-09-12

## 2024-09-12 RX ADMIN — LIDOCAINE HYDROCHLORIDE 3 ML: 10 INJECTION, SOLUTION INFILTRATION; PERINEURAL at 15:10

## 2024-09-12 NOTE — PATIENT INSTRUCTIONS
1. Primary localized osteoarthritis of left knee        Plan:  -Left knee Durolane injection performed in clinic today.  Can repeat as needed, discussed with [employer] that since her insurance seems to be willing to pay for this every 3 to 4 months I am happy to continue to do injections as long as they are providing good pain relief    If you had imaging performed at your appointment today, you can expect to see your results in [employer] within approximately 48 business hours.     If you have questions/concerns after your appointment, please send my team a [employer] message or call the clinic at [phone].     Dr. Debra Barreto, DO, [facility]  Sports Medicine and Orthopedics    Dr. Barreto's Clinic Locations and Contact Numbers:   [place] APPOINTMENTS: [phone]      [address] [facility] RADIOLOGY: [phone]   [place] [address] PHYSICAL THERAPY: [phone]    HAND THERAPY/OT: [phone]   [place] BILLING QUESTIONS: [phone] [address] FAX: [phone]   [place] [address]         Post-Hyaluronic Acid Injection Discharge Instructions    -Durolane injection was performed today in clinic  - Ok to shower today, but please do not submerge for 48 hours (including bath tub, hot tub or swimming)  - The lidocaine (local numbing medicine) will wear off in several hours. The Durolane injection usually takes several weeks to begin working, so you may not notice a difference in pain for 3-6 weeks.  - Durolane has a small risk of increasing pain/swelling. If this occurs, it generally happens within 24 hours but will usually resolve on its own. You may use ice packs for 15-20 minutes, 3 to 4 times a day at the injection site for comfort if needed, and I recommend limiting your activity. If pain/swelling continues, then please the office.  -Can repeat Durolane injection at 6 months with insurance authorization    If you experience any of the following, call Sports Medicine @ [phone] or  [phone]  -Fever over 100 degrees F  -Swelling, bleeding, redness, drainage, warmth at the injection site  -New or significant worsening pain

## 2024-09-12 NOTE — TELEPHONE ENCOUNTER
Huddled with Dr. Barreto and determined we could accept the patient as late as 4pm for her 3:40pm appointment. Called and relayed this to the patient, who was pleased. Patient asked if she ended up needing to reschedule for some reason if there were any openings, and while looking at Dr. Barreto's schedule, Dr. Barreto stated that we could double book her appointment if needed. Plan was made for patient to call us back while on her way if she predicts she will be late, and we will reevaluate the plan from there.    Maile Winston, AURORA, ATC, LAT

## 2024-09-12 NOTE — LETTER
9/12/2024      Grecia Kilgore  [address]  [place] [facility] [id number]      Dear Colleague,    Thank you for referring your patient, Grecia Kilgore, to the [facility] [place]. Please see a copy of my visit note below.    Sports Medicine Procedure Note    Grecia was seen today for pain.    Diagnoses and all orders for this visit:    Primary localized osteoarthritis of left knee  -     Large Joint Injection/Arthocentesis: L knee joint        Grecia Kilgore is a/an 69 year old female who is seen for  Durolane  injection of Left knee.   Last injection: 05/31/24 Left Knee Durolane Injection that provided about 2-3 months of good pain relief.  Did better with inferomedial injection location.    Plan:  -Left knee Durolane injection performed in clinic today.  Can repeat every 4 months as needed (willing to do as soon as every 3 months if her insurance will pay for this)  -Post-injection instructions were provided to the patient  -Follow-up: As needed or for repeat injections        Post-Hyaluronic Acid Injection Discharge Instructions    Synvisc One injection was performed today in clinic  - Ok to shower today, but please do not submerge for 48 hours (including bath tub, hot tub or swimming)  - The lidocaine (local numbing medicine) will wear off in several hours. The Synvisc One injection usually takes several weeks to begin working, so you may not notice a difference in pain for 3-6 weeks.  - Synvisc has a small risk of increasing pain/swelling. If this occurs, it generally happens within 24 hours but will usually resolve on its own. You may use ice packs for 15-20 minutes, 3 to 4 times a day at the injection site for comfort if needed, and I recommend limiting your activity. If pain/swelling continues, then please the office.  -Can repeat Synvisc One injection at 6 months with insurance authorization    If you experience any of the following, call Sports Medicine  @ [phone] or [phone]  -Fever over 100 degrees F  -Swelling, bleeding, redness, drainage, warmth at the injection site  -New or significant worsening pain      Large Joint Injection/Arthocentesis: L knee joint    Date/Time: 9/12/2024 3:10 PM    Performed by: Debra Barreto DO  Authorized by: Debra Barreto DO    Needle Size:  22 G  Guidance: ultrasound    Approach:  Anterolateral  Location:  Knee   Location comment:  L knee joint      Medications:  3 mL lidocaine 1 %; 60 mg sodium hyaluronate 60 MG/3ML  Outcome:  Tolerated well, no immediate complications  Procedure discussed: discussed risks, benefits, and alternatives    Consent Given by:  Patient  Timeout: timeout called immediately prior to procedure    Prep: patient was prepped and draped in usual sterile fashion     Ultrasound was used to ensure safe and accurate needle placement and injection. Ultrasound images of the procedure were permanently stored.  1ml of 8.4% Sodium Bicarbonate solution was used to buffer the local numbing agent for today's injection         Dr. Debra Barreto DO  [facility]  Sports Medicine     -----      Again, thank you for allowing me to participate in the care of your patient.        Sincerely,        Debra Barreto DO

## 2024-09-12 NOTE — TELEPHONE ENCOUNTER
Pt is scheduled for a 3:40 appointment today in  w/Dr. Barreto.  She has an appointment today at 1p in [place] and is concerned that if she hits traffic or if that appointment runs late that she will miss her appointment with Dr. Barreto.  She has a trip coming up on Sept 19th and need this injection.  She is requesting flexibility with the appointment time today incase she is running late.  Please contact her to discuss options.  Thank you    Phone number [phone] verified.

## 2024-09-16 DIAGNOSIS — J30.2 SEASONAL ALLERGIC RHINITIS, UNSPECIFIED TRIGGER: ICD-10-CM

## 2024-09-16 NOTE — TELEPHONE ENCOUNTER
Medication Question or Refill    Contacts       Contact Date/Time Type Contact Phone/Fax    09/16/2024 06:17 PM CDT Phone (Incoming) Grecia Kilgore (Self) [phone] (M)            What medication are you calling about (include dose and sig)?: fluticasone (FLONASE) 50 MCG/ACT nasal spray    Preferred Pharmacy:     [facility] ([facility] - [address] [address]  Phone: [phone] Fax: [phone]      Controlled Substance Agreement on file:   CSA -- Patient Level:    CSA: None found at the patient level.       Who prescribed the medication?: Romel Mccall    Do you need a refill? Yes    When did you use the medication last? N/A    Patient offered an appointment? No    Do you have any questions or concerns?  No    The patient called today asking for a refill of the fluticasone (FLONASE) 50 MCG/ACT nasal spray.  The patient states running out of this medication.    Okay to leave a detailed message?: Yes at Cell number on file:    Telephone Information:   Mobile [phone]

## 2024-09-17 RX ORDER — FLUTICASONE PROPIONATE 50 MCG
SPRAY, SUSPENSION (ML) NASAL
Qty: 32 G | Refills: 0 | Status: SHIPPED | OUTPATIENT
Start: 2024-09-17

## 2024-09-18 ENCOUNTER — NURSE TRIAGE (OUTPATIENT)
Dept: FAMILY MEDICINE | Facility: CLINIC | Age: 69
End: 2024-09-18

## 2024-09-18 ENCOUNTER — OFFICE VISIT (OUTPATIENT)
Dept: INTERNAL MEDICINE | Facility: CLINIC | Age: 69
End: 2024-09-18
Payer: COMMERCIAL

## 2024-09-18 VITALS — HEIGHT: 67 IN | BODY MASS INDEX: 25.06 KG/M2 | RESPIRATION RATE: 99 BRPM

## 2024-09-18 DIAGNOSIS — F41.9 ANXIETY: ICD-10-CM

## 2024-09-18 DIAGNOSIS — R35.0 URINARY FREQUENCY: Primary | ICD-10-CM

## 2024-09-18 DIAGNOSIS — F39 MOOD DISORDER (H): ICD-10-CM

## 2024-09-18 DIAGNOSIS — I10 ESSENTIAL HYPERTENSION: ICD-10-CM

## 2024-09-18 DIAGNOSIS — Z87.442 HISTORY OF KIDNEY STONES: ICD-10-CM

## 2024-09-18 LAB
ALBUMIN UR-MCNC: NEGATIVE MG/DL
APPEARANCE UR: CLEAR
BACTERIA #/AREA URNS HPF: ABNORMAL /HPF
BILIRUB UR QL STRIP: NEGATIVE
COLOR UR AUTO: YELLOW
GLUCOSE UR STRIP-MCNC: NEGATIVE MG/DL
HGB UR QL STRIP: NEGATIVE
KETONES UR STRIP-MCNC: ABNORMAL MG/DL
LEUKOCYTE ESTERASE UR QL STRIP: NEGATIVE
MUCOUS THREADS #/AREA URNS LPF: PRESENT /LPF
NITRATE UR QL: NEGATIVE
PH UR STRIP: 6 [PH] (ref 5–7)
RBC #/AREA URNS AUTO: ABNORMAL /HPF
SP GR UR STRIP: 1.02 (ref 1–1.03)
SQUAMOUS #/AREA URNS AUTO: ABNORMAL /LPF
UROBILINOGEN UR STRIP-ACNC: 1 E.U./DL
WBC #/AREA URNS AUTO: ABNORMAL /HPF

## 2024-09-18 PROCEDURE — 99214 OFFICE O/P EST MOD 30 MIN: CPT | Performed by: INTERNAL MEDICINE

## 2024-09-18 PROCEDURE — 81001 URINALYSIS AUTO W/SCOPE: CPT | Performed by: INTERNAL MEDICINE

## 2024-09-18 RX ORDER — LORAZEPAM 0.5 MG/1
0.5 TABLET ORAL DAILY PRN
Qty: 20 TABLET | Refills: 0 | Status: SHIPPED | OUTPATIENT
Start: 2024-09-18

## 2024-09-18 ASSESSMENT — ASTHMA QUESTIONNAIRES
QUESTION_1 LAST FOUR WEEKS HOW MUCH OF THE TIME DID YOUR ASTHMA KEEP YOU FROM GETTING AS MUCH DONE AT WORK, SCHOOL OR AT HOME: NONE OF THE TIME
ACT_TOTALSCORE: 24
QUESTION_4 LAST FOUR WEEKS HOW OFTEN HAVE YOU USED YOUR RESCUE INHALER OR NEBULIZER MEDICATION (SUCH AS ALBUTEROL): NOT AT ALL
QUESTION_5 LAST FOUR WEEKS HOW WOULD YOU RATE YOUR ASTHMA CONTROL: WELL CONTROLLED
QUESTION_2 LAST FOUR WEEKS HOW OFTEN HAVE YOU HAD SHORTNESS OF BREATH: NOT AT ALL
ACT_TOTALSCORE: 24
QUESTION_3 LAST FOUR WEEKS HOW OFTEN DID YOUR ASTHMA SYMPTOMS (WHEEZING, COUGHING, SHORTNESS OF BREATH, CHEST TIGHTNESS OR PAIN) WAKE YOU UP AT NIGHT OR EARLIER THAN USUAL IN THE MORNING: NOT AT ALL

## 2024-09-18 ASSESSMENT — ANXIETY QUESTIONNAIRES
7. FEELING AFRAID AS IF SOMETHING AWFUL MIGHT HAPPEN: SEVERAL DAYS
GAD7 TOTAL SCORE: 14
8. IF YOU CHECKED OFF ANY PROBLEMS, HOW DIFFICULT HAVE THESE MADE IT FOR YOU TO DO YOUR WORK, TAKE CARE OF THINGS AT HOME, OR GET ALONG WITH OTHER PEOPLE?: SOMEWHAT DIFFICULT
GAD7 TOTAL SCORE: 14
GAD7 TOTAL SCORE: 14

## 2024-09-18 ASSESSMENT — PATIENT HEALTH QUESTIONNAIRE - PHQ9
10. IF YOU CHECKED OFF ANY PROBLEMS, HOW DIFFICULT HAVE THESE PROBLEMS MADE IT FOR YOU TO DO YOUR WORK, TAKE CARE OF THINGS AT HOME, OR GET ALONG WITH OTHER PEOPLE: SOMEWHAT DIFFICULT
SUM OF ALL RESPONSES TO PHQ QUESTIONS 1-9: 16
SUM OF ALL RESPONSES TO PHQ QUESTIONS 1-9: 16

## 2024-09-18 NOTE — PROGRESS NOTES
Assessment & Plan     Urinary frequency  History of kidney stones  Symptoms of urinary frequency with urgency.  When patient had a bowel movement, symptoms improved.  Has concerns of a possible UTI versus kidney stone, has a history of kidney stones in the past.  Would like to proceed with urine testing  To rule out UTI.  Complete UA.  If positive, treatment with antibiotics is appropriate.  - UA with Microscopic reflex to Culture - lab collect; Future  - UA with Microscopic reflex to Culture - lab collect  - UA Microscopic with Reflex to Culture      Anxiety  Mood disorder (H24)  Patient has underlying history of mood disorder with anxiety.  Has been feeling increased anxiety today due to concerns of miscommunication with the  staff during checking in for the visit today.  Patient feels that the  staff had an attitude towards the patient as well as exhibited rudeness towards the patient.  This resulted in patient having a back-and-forth with the front staff and patient feels that staff should be doing better to handle situations rather than argue back with patients.  As such, patient is feeling very anxious during the visit.  Takes some time to go through the events.  Reassurance was provided to the patient as well as apologies multiple times during the visit and patient was assured that the clinic manager will be in shortly after the visit to talk to the patient.  Does use lorazepam as needed for panic attacks.  Requesting for refills.  - LORazepam (ATIVAN) 0.5 MG tablet; Take 1 tablet (0.5 mg) by mouth daily as needed (panic attack).      Essential hypertension  Has not taken blood pressure medication today and refused for blood pressure to be checked in office today due to fear of having a high blood pressure after the incident with the  staff during check-in that has led to increased anxiety and frustration for the patient.  Patient declined a blood pressure check today.   "Encouraged to continue with antihypertensive medications as prescribed since patient has not yet taken the blood pressure medications today.              BMI  Estimated body mass index is 25.06 kg/m  as calculated from the following:    Height as of this encounter: 1.702 m (5' 7\").    Weight as of 5/31/24: 72.6 kg (160 lb).             Subjective   Grecia is a 69 year old, presenting for the following health issues:  UTI      9/18/2024     3:14 PM   Additional Questions   Roomed by Maria Estheri   Accompanied by Self     UTI    History of Present Illness       Reason for visit:  Pressure urinating   She is taking medications regularly.       Suprapubic pressure like feeling for the past few days with frequency and urgency that developed today.      Review of Systems  Constitutional, HEENT, cardiovascular, pulmonary, gi and gu systems are negative, except as otherwise noted.      Objective    Resp (!) 99   Ht 1.702 m (5' 7\")   LMP 01/01/1985   BMI 25.06 kg/m    Body mass index is 25.06 kg/m .  Physical Exam   GENERAL: alert and no distress  RESP: lungs clear to auscultation - no rales, rhonchi or wheezes  CV: regular rate and rhythm, normal S1 S2  ABDOMEN: soft, nontender, no hepatosplenomegaly, no masses a  MS: no gross musculoskeletal defects noted, no edema  NEURO: Normal strength and tone, mentation intact and speech normal  PSYCH: mentation appears normal, affect normal/bright            Signed Electronically by: Nidhi Tillman MD    "

## 2024-09-18 NOTE — TELEPHONE ENCOUNTER
"UTI/kidney stone    Pt called describing UTI/stone concern. Pt describes pressure but states it does not feel like a full bladder. States the pressure she feels is typical when she has UTI. Pt states this morning she felt the need to urinate and had to \"push a couple times\" to get some urine out. RN expressed concern that this was full bladder and unable to urinate. Pt states it does not feel like full bladder and it was only one time. She states she leaves town tomorrow morning so she wants to have a test and see if she has UTI or concern for stone.     Disposition to be seen today or tomorrow. Offered to schedule today at  but pt states she wants to be seen close to home in [place]. Transferred to [facility]. RN educated if there is no appt today at the locations pt wants to go, pt states understanding at RN instructions to go to urgent care today since going out of town tomorrow.      Reason for Disposition   Patient wants to be seen    Additional Information   Negative: Shock suspected (e.g., cold/pale/clammy skin, too weak to stand, low BP, rapid pulse)   Negative: Sounds like a life-threatening emergency to the triager   Negative: Followed a female genital area injury (e.g., labia, vagina, vulva)   Negative: Followed a male genital area injury (penis, scrotum)   Negative: Vaginal discharge   Negative: Pus (white, yellow) or bloody discharge from end of penis   Negative: Pain or burning with passing urine (urination) and pregnant   Negative: Pain or burning with passing urine (urination) and female   Negative: Pain or burning with passing urine (urination) and male   Negative: Pain or itching in the vulvar area   Negative: Pain in scrotum is main symptom   Negative: Blood in the urine is main symptom   Negative: Symptoms arising from use of a urinary catheter (e.g., Coude, Valle)   Negative: Unable to urinate (or only a few drops) > 4 hours and bladder feels very full (e.g., palpable bladder or strong urge " "to urinate)     States bladder does not feel full   Negative: Decreased urination and drinking very little and dehydration suspected (e.g., dark urine, no urine > 12 hours, very dry mouth, very lightheaded)   Negative: Patient sounds very sick or weak to the triager   Negative: Fever > 100.4 F  (38.0 C)   Negative: Side (flank) or lower back pain present   Negative: Bad or foul-smelling urine   Negative: Urinating more frequently than usual (i.e., frequency)   Negative: Can't control passage of urine (i.e., urinary incontinence) and new-onset (< 2 weeks) or worsening    Answer Assessment - Initial Assessment Questions  1. SYMPTOM: \"What's the main symptom you're concerned about?\" (e.g., frequency, incontinence)      A few days ago noticed a lot of pressure and urinating normally.  States she feels pressure, but states it does not feel like her bladder is full. States this feels like UTI for her. Concerned it's a stone. States today she had to push to have a little urine come out and felt less pressure.   2. ONSET: \"When did the  symptoms  start?\"      Pressure started a few days ago. States she passes very little urine and has to push only this morning.   3. PAIN: \"Is there any pain?\" If Yes, ask: \"How bad is it?\" (Scale: 1-10; mild, moderate, severe)      Denied  4. CAUSE: \"What do you think is causing the symptoms?\"      UTI or stone  5. OTHER SYMPTOMS: \"Do you have any other symptoms?\" (e.g., blood in urine, fever, flank pain, pain with urination)      Denies listed symptoms  6. PREGNANCY: \"Is there any chance you are pregnant?\" \"When was your last menstrual period?\"      No    Protocols used: Urinary Symptoms-A-[place]    "

## 2024-09-20 ENCOUNTER — TELEPHONE (OUTPATIENT)
Dept: INTERNAL MEDICINE | Facility: CLINIC | Age: 69
End: 2024-09-20
Payer: COMMERCIAL

## 2024-09-20 NOTE — TELEPHONE ENCOUNTER
----- Message from Nidhi Tillman sent at 9/18/2024  4:56 PM CDT -----  Team - please call patient with results.  The urine test results are negative for infection or blood in urine. Recommend to continue keeping well hydrated.

## 2024-09-20 NOTE — TELEPHONE ENCOUNTER
[facility] for patient to call back regarding lab results.     Eric Esposito MA on 9/20/2024 at 4:39 PM

## 2024-10-01 ENCOUNTER — TELEPHONE (OUTPATIENT)
Dept: ORTHOPEDICS | Facility: CLINIC | Age: 69
End: 2024-10-01
Payer: COMMERCIAL

## 2024-10-01 NOTE — TELEPHONE ENCOUNTER
Pt scheduled for gel injections on 10/18th.  Per pt her insurance will do Gel injections as often as prescribed by the provider, just need to send in the PA. Pt wants to do both knees.     Thanks.     Last done 09/12

## 2024-10-01 NOTE — TELEPHONE ENCOUNTER
Called patient who did not answer her phone, went straight to [employer].  She does not have MyChart.  We will try again at a different time.  It appears that she is scheduled for repeat hyaluronic acid injections in a few weeks, however she just had a left hyaluronic acid injection on 9/12.  She left a message stating that she was continuing to have knee pain and swelling after her injection and we will need to clarify whether this is a pseudo septic reaction to the hyaluronic acid before going through with repeat injections.  Typically, it is also not reasonable to repeat hyaluronic acid injections 1 month after the last set of injections.

## 2024-10-01 NOTE — TELEPHONE ENCOUNTER
Other: Pt is having pain in her Left knee.  The lower injection site is still hurting.  Pt would like to know how long the pain usually lasts.  She said it feels like there is fluid in it. Her knee is swollen and puffy. She would like to discuss next steps going forward.  Pt would like to speak to Dr. Barreto about this specifically because she used a new site.      Could we send this information to you in [employer] or would you prefer to receive a phone call?:   Patient would prefer a phone call   Okay to leave a detailed message?: Yes at Home number on file [phone] (home)

## 2024-10-18 ENCOUNTER — ANCILLARY PROCEDURE (OUTPATIENT)
Dept: GENERAL RADIOLOGY | Facility: CLINIC | Age: 69
End: 2024-10-18
Attending: STUDENT IN AN ORGANIZED HEALTH CARE EDUCATION/TRAINING PROGRAM
Payer: COMMERCIAL

## 2024-10-18 ENCOUNTER — OFFICE VISIT (OUTPATIENT)
Dept: ORTHOPEDICS | Facility: CLINIC | Age: 69
End: 2024-10-18
Payer: COMMERCIAL

## 2024-10-18 VITALS — HEIGHT: 67 IN | BODY MASS INDEX: 25.06 KG/M2

## 2024-10-18 DIAGNOSIS — M25.562 CHRONIC PAIN OF BOTH KNEES: Primary | ICD-10-CM

## 2024-10-18 DIAGNOSIS — M17.0 BILATERAL PRIMARY OSTEOARTHRITIS OF KNEE: ICD-10-CM

## 2024-10-18 DIAGNOSIS — M17.12 PRIMARY LOCALIZED OSTEOARTHRITIS OF LEFT KNEE: ICD-10-CM

## 2024-10-18 DIAGNOSIS — M23.42 BODIES, LOOSE, JOINT, KNEE, LEFT: ICD-10-CM

## 2024-10-18 DIAGNOSIS — M25.562 CHRONIC PAIN OF BOTH KNEES: ICD-10-CM

## 2024-10-18 DIAGNOSIS — G89.29 CHRONIC PAIN OF BOTH KNEES: ICD-10-CM

## 2024-10-18 DIAGNOSIS — G89.29 CHRONIC PAIN OF BOTH KNEES: Primary | ICD-10-CM

## 2024-10-18 DIAGNOSIS — M25.561 CHRONIC PAIN OF BOTH KNEES: Primary | ICD-10-CM

## 2024-10-18 DIAGNOSIS — M25.561 CHRONIC PAIN OF BOTH KNEES: ICD-10-CM

## 2024-10-18 PROCEDURE — 73562 X-RAY EXAM OF KNEE 3: CPT | Mod: TC | Performed by: INTERNAL MEDICINE

## 2024-10-18 PROCEDURE — 99214 OFFICE O/P EST MOD 30 MIN: CPT | Mod: 25 | Performed by: STUDENT IN AN ORGANIZED HEALTH CARE EDUCATION/TRAINING PROGRAM

## 2024-10-18 PROCEDURE — 20611 DRAIN/INJ JOINT/BURSA W/US: CPT | Mod: 50 | Performed by: STUDENT IN AN ORGANIZED HEALTH CARE EDUCATION/TRAINING PROGRAM

## 2024-10-18 RX ORDER — ROPIVACAINE HYDROCHLORIDE 5 MG/ML
3 INJECTION, SOLUTION EPIDURAL; INFILTRATION; PERINEURAL
Status: COMPLETED | OUTPATIENT
Start: 2024-10-18 | End: 2024-10-18

## 2024-10-18 RX ORDER — LIDOCAINE HYDROCHLORIDE 10 MG/ML
3 INJECTION, SOLUTION INFILTRATION; PERINEURAL
Status: COMPLETED | OUTPATIENT
Start: 2024-10-18 | End: 2024-10-18

## 2024-10-18 RX ORDER — TRIAMCINOLONE ACETONIDE 40 MG/ML
40 INJECTION, SUSPENSION INTRA-ARTICULAR; INTRAMUSCULAR
Status: COMPLETED | OUTPATIENT
Start: 2024-10-18 | End: 2024-10-18

## 2024-10-18 RX ADMIN — LIDOCAINE HYDROCHLORIDE 3 ML: 10 INJECTION, SOLUTION INFILTRATION; PERINEURAL at 17:00

## 2024-10-18 RX ADMIN — TRIAMCINOLONE ACETONIDE 40 MG: 40 INJECTION, SUSPENSION INTRA-ARTICULAR; INTRAMUSCULAR at 17:00

## 2024-10-18 RX ADMIN — ROPIVACAINE HYDROCHLORIDE 3 ML: 5 INJECTION, SOLUTION EPIDURAL; INFILTRATION; PERINEURAL at 17:00

## 2024-10-18 NOTE — LETTER
10/18/2024      Grecia Kilgore  [address]  [facility] [address]      Dear Colleague,    Thank you for referring your patient, Grecia Kilgore, to the [facility] [place]. Please see a copy of my visit note below.    ASSESSMENT & PLAN    Grecia was seen today for recheck and recheck.    Diagnoses and all orders for this visit:    Chronic pain of both knees  -     XR Knee Bilateral 3 vw; Future  -     Large Joint Injection/Arthocentesis: bilateral knee  -     Physical Therapy  Referral; Future    Primary localized osteoarthritis of left knee  -     Physical Therapy  Referral; Future    Bodies, loose, joint, knee, left  -     Large Joint Injection/Arthocentesis: bilateral knee    Bilateral primary osteoarthritis of knee  -     Large Joint Injection/Arthocentesis: bilateral knee  -     Physical Therapy  Referral; Future      Grecia presents to follow-up on bilateral knee pain secondary to primarily patellofemoral arthritis.  She had some increased pain and swelling of her left knee after recent Durolane injection, however reports it is starting to improve at this point but has been compensating with her right knee and now it has been flaring up.  Opted to proceed with bilateral corticosteroid injections.  Discussed that she previously had significant improvement with physical therapy but due to her depression and scheduling has not been continuing her exercises.  Discussed that I highly recommend she return to PT and work on home exercise program which will help keep her accountable with her exercises and help with her arthritis long-term.      Plan:  -Bilateral knee corticosteroid injections performed in clinic today  -Restart physical therapy and home exercise program   -Tylenol Extra Strength (1000mg) up to three times daily as needed for pain  -Return for injections as needed  -Could consider genicular nerve blocks and/or  "radiofrequency ablation in the future before perusing surgical evaluation       Dr. Debra Barreto, DO  [facility]  Sports Medicine     -----  Chief Complaint   Patient presents with     Left Knee - RECHECK     Right Knee - RECHECK       SUBJECTIVE  Grecia Kilgore is a/an 69 year old female who is seen to follow-up on bilateral knee pain. The patient was last seen 09/12/24 in which they received a left knee Durolane injection. Since last visit, they report increased pain. They report increased swelling over their left knee for about a month after previous Durolane injection, however this has improved over the last several days and pain is better overall.    The patient is seen alone        REVIEW OF SYSTEMS:  Pertinent positives/negative: As stated above in HPI    OBJECTIVE:  Ht 1.702 m (5' 7\")   LMP 01/01/1985   BMI 25.06 kg/m     General: Alert and in no distress  Skin: no visable rashes  CV: Extremities appear well perfused   Resp: normal respiratory effort, no conversational dyspnea   Psych: normal mood, affect  MSK:  BILATERAL KNEE  Inspection:    Normal alignment; no edema, erythema, or ecchymosis present  Palpation:    Tender about the medial joint line. Remainder of bony and ligamentous landmarks are nontender.    No effusion is present    Patellofemoral crepitus is Present  Range of Motion:     00 extension to 1200 flexion  Strength:    Extensor mechanism intact         RADIOLOGY:  Final results and radiologist's interpretation available in the [facility] health record.  Images below were personally reviewed and discussed with the patient in the office today.  My personal interpretation of the performed imaging: Bilateral knee x-rays in clinic today reveal bilateral patellofemoral arthritis, more severe on the left side, loose bodies posterior to the left knee, bilateral medial compartment degenerative changes    Large Joint Injection/Arthocentesis: bilateral knee    Date/Time: " 10/18/2024 5:00 PM    Performed by: Debra Barreto DO  Authorized by: Debra Barreto DO    Indications:  Pain and osteoarthritis  Needle Size:  22 G  Guidance: ultrasound    Approach:  Superolateral  Location:  Knee  Laterality:  Bilateral      Medications (Right):  40 mg triamcinolone 40 MG/ML; 3 mL lidocaine 1 %; 3 mL ROPivacaine 5 MG/ML   Medications (Right) comment:  1ml of 8.4% Sodium Bicarbonate solution was used to buffer the local numbing agent for today's injection    Medications (Left):  40 mg triamcinolone 40 MG/ML; 3 mL lidocaine 1 %; 3 mL ROPivacaine 5 MG/ML   Medications (Left) comment:  1ml of 8.4% Sodium Bicarbonate solution was used to buffer the local numbing agent for today's injection    Outcome:  Tolerated well, no immediate complications  Procedure discussed: discussed risks, benefits, and alternatives    Consent Given by:  Patient  Timeout: timeout called immediately prior to procedure    Prep: patient was prepped and draped in usual sterile fashion     Ultrasound was used to ensure safe and accurate needle placement and injection. Ultrasound images of the procedure were permanently stored.                     Disclaimer: This note consists of symbols derived from dictation and/or voice recognition software. As a result, there may be errors in the script that have gone undetected. Please consider this when interpreting information found in this chart.        Again, thank you for allowing me to participate in the care of your patient.        Sincerely,        Debra Barreto DO

## 2024-10-18 NOTE — PATIENT INSTRUCTIONS
1. Chronic pain of both knees    2. Primary localized osteoarthritis of left knee    3. Bodies, loose, joint, knee, left    4. Bilateral primary osteoarthritis of knee        Plan:  -Bilateral knee corticosteroid injections performed in clinic today  -Restart physical therapy and home exercise program   -Tylenol Extra Strength (1000mg) up to three times daily as needed for pain  -Return for injections as needed  -Could consider genicular nerve blocks and/or radiofrequency ablation in the future before perusing surgical evaluation     If you had imaging performed/ordered at your appointment today, you can expect to see your radiology results in [employer] within approximately 48 business hours.     If you have questions/concerns after your appointment, please send my team a [employer] message or call the clinic at [phone].     Dr. Debra Barreto, , [facility]  Sports Medicine and Orthopedics    Dr. Barreto's Clinic Locations and Contact Numbers:   [place] APPOINTMENTS: [phone]      [address] [facility] RADIOLOGY: [phone]   [place] [address] PHYSICAL THERAPY: [phone]    HAND THERAPY/OT: [phone]   [place] BILLING QUESTIONS: [phone] [address] FAX: [phone]   [place] [address]       Non-Operative treatment of Knee Osteoarthritis    To Decrease Stress  Try to get regular exercise and stay active, try lower impact activity such as elliptical, bicycle, swimming, or walking  Muscle Strengthening: Consider physical therapy and start home exercise program  Weight Control  Consider using walking poles/cane or a knee brace to offload knee    To Decrease Pain  Tylenol (Acetaminophen) as needed 2 tablets (1,000 mg) three times per day, not to exceed 3,000 mg per day   Trial topical Voltaren (Diclofenac) gel up to 4x daily to area of pain  Glucosamine chondroitin or Tumeric (Curcumin) supplements. (May try taking for 3 months and if helpful, continue)  Steroid injections  "(no sooner than every 3 months)  \"Gel\" (Viscosupplementation) injections (Synvisc, Euflexxa, etc. are brand names)  Platelet Rich Plasma (Not covered by insurance)    Free Online Resources for Knee Osteoarthritis  My Knee exercise: Online program provides education and a 6-month knee strengthening program: [email or web address]/  My Joint Yoga: Online 12-week yoga program with videos for knee/hip osteoarthritis: https://[email or web address]/     Seated knee exercises for knee osteoarthritis  5 minute exercises: https://[email or web address].com/[email or web address]?v=A4WfxPTNiAu    "

## 2024-10-18 NOTE — PROGRESS NOTES
ASSESSMENT & PLAN    Grecia was seen today for recheck and recheck.    Diagnoses and all orders for this visit:    Chronic pain of both knees  -     XR Knee Bilateral 3 vw; Future  -     Large Joint Injection/Arthocentesis: bilateral knee  -     Physical Therapy  Referral; Future    Primary localized osteoarthritis of left knee  -     Physical Therapy  Referral; Future    Bodies, loose, joint, knee, left  -     Large Joint Injection/Arthocentesis: bilateral knee    Bilateral primary osteoarthritis of knee  -     Large Joint Injection/Arthocentesis: bilateral knee  -     Physical Therapy  Referral; Future      Grecia presents to follow-up on bilateral knee pain secondary to primarily patellofemoral arthritis.  She had some increased pain and swelling of her left knee after recent Durolane injection, however reports it is starting to improve at this point but has been compensating with her right knee and now it has been flaring up.  Opted to proceed with bilateral corticosteroid injections.  Discussed that she previously had significant improvement with physical therapy but due to her depression and scheduling has not been continuing her exercises.  Discussed that I highly recommend she return to PT and work on home exercise program which will help keep her accountable with her exercises and help with her arthritis long-term.      Plan:  -Bilateral knee corticosteroid injections performed in clinic today  -Restart physical therapy and home exercise program   -Tylenol Extra Strength (1000mg) up to three times daily as needed for pain  -Return for injections as needed  -Could consider genicular nerve blocks and/or radiofrequency ablation in the future before perusing surgical evaluation       Dr. Debra Barreto, DO  [facility]  Sports Medicine     -----  Chief Complaint   Patient presents with    Left Knee - RECHECK    Right Knee - RECHECK  "      SUBJECTIVE  Grecia Kilgore is a/an 69 year old female who is seen to follow-up on bilateral knee pain. The patient was last seen 09/12/24 in which they received a left knee Durolane injection. Since last visit, they report increased pain. They report increased swelling over their left knee for about a month after previous Durolane injection, however this has improved over the last several days and pain is better overall.    The patient is seen alone        REVIEW OF SYSTEMS:  Pertinent positives/negative: As stated above in HPI    OBJECTIVE:  Ht 1.702 m (5' 7\")   LMP 01/01/1985   BMI 25.06 kg/m     General: Alert and in no distress  Skin: no visable rashes  CV: Extremities appear well perfused   Resp: normal respiratory effort, no conversational dyspnea   Psych: normal mood, affect  MSK:  BILATERAL KNEE  Inspection:    Normal alignment; no edema, erythema, or ecchymosis present  Palpation:    Tender about the medial joint line. Remainder of bony and ligamentous landmarks are nontender.    No effusion is present    Patellofemoral crepitus is Present  Range of Motion:     00 extension to 1200 flexion  Strength:    Extensor mechanism intact         RADIOLOGY:  Final results and radiologist's interpretation available in the [facility] health record.  Images below were personally reviewed and discussed with the patient in the office today.  My personal interpretation of the performed imaging: Bilateral knee x-rays in clinic today reveal bilateral patellofemoral arthritis, more severe on the left side, loose bodies posterior to the left knee, bilateral medial compartment degenerative changes    Large Joint Injection/Arthocentesis: bilateral knee    Date/Time: 10/18/2024 5:00 PM    Performed by: Debra Barreto DO  Authorized by: Debra Barreto DO    Indications:  Pain and osteoarthritis  Needle Size:  22 G  Guidance: ultrasound    Approach:  Superolateral  Location:  Knee  Laterality:  " Bilateral      Medications (Right):  40 mg triamcinolone 40 MG/ML; 3 mL lidocaine 1 %; 3 mL ROPivacaine 5 MG/ML   Medications (Right) comment:  1ml of 8.4% Sodium Bicarbonate solution was used to buffer the local numbing agent for today's injection    Medications (Left):  40 mg triamcinolone 40 MG/ML; 3 mL lidocaine 1 %; 3 mL ROPivacaine 5 MG/ML   Medications (Left) comment:  1ml of 8.4% Sodium Bicarbonate solution was used to buffer the local numbing agent for today's injection    Outcome:  Tolerated well, no immediate complications  Procedure discussed: discussed risks, benefits, and alternatives    Consent Given by:  Patient  Timeout: timeout called immediately prior to procedure    Prep: patient was prepped and draped in usual sterile fashion     Ultrasound was used to ensure safe and accurate needle placement and injection. Ultrasound images of the procedure were permanently stored.                     Disclaimer: This note consists of symbols derived from dictation and/or voice recognition software. As a result, there may be errors in the script that have gone undetected. Please consider this when interpreting information found in this chart.

## 2024-10-25 ENCOUNTER — TELEPHONE (OUTPATIENT)
Dept: ORTHOPEDICS | Facility: CLINIC | Age: 69
End: 2024-10-25
Payer: COMMERCIAL

## 2024-10-25 NOTE — TELEPHONE ENCOUNTER
Reason for call: Grecia wanted to know if her xrays and information of her surgieres from [facility] were transferred over. She also wanted to know if the PT orders included not only knee exercises but for her glutes, legs ankles and back because they also are hurting due to over compensating from her knee pain.    Phone number to reach patient:  Home number on file [phone] (home)    Best Time: any    Can we leave a detailed message on this number?  NO

## 2024-10-28 NOTE — TELEPHONE ENCOUNTER
Images and records relating to patient's knees have been sent over from [facility]. Writer will communicate with Dr. Barreto on the PT orders prior to calling the patient back.

## 2024-10-28 NOTE — TELEPHONE ENCOUNTER
Outbound call made to patient to discuss her records being sent and her questions relating to PT. Patient was pleased that her records had been sent over and that we now have her previous imaging. Patient inquired about PT, and writer confirmed that PT will do a full comprehensive evaluation and rehabilitation of multiple things that affect the knee, including her glutes and other hip muscles - she was pleased to hear this. All patient questions have been answered at this time.    Maile Winston, AURORA, ATC, LAT

## 2024-11-05 ENCOUNTER — HOSPITAL ENCOUNTER (EMERGENCY)
Facility: CLINIC | Age: 69
Discharge: HOME | End: 2024-11-05
Attending: STUDENT IN AN ORGANIZED HEALTH CARE EDUCATION/TRAINING PROGRAM | Admitting: STUDENT IN AN ORGANIZED HEALTH CARE EDUCATION/TRAINING PROGRAM
Payer: COMMERCIAL

## 2024-11-05 ENCOUNTER — APPOINTMENT (OUTPATIENT)
Dept: GENERAL RADIOLOGY | Facility: CLINIC | Age: 69
End: 2024-11-05
Attending: STUDENT IN AN ORGANIZED HEALTH CARE EDUCATION/TRAINING PROGRAM
Payer: COMMERCIAL

## 2024-11-05 DIAGNOSIS — S92.919A TOE FRACTURE: ICD-10-CM

## 2024-11-05 PROCEDURE — 99284 EMERGENCY DEPT VISIT MOD MDM: CPT | Mod: 25

## 2024-11-05 PROCEDURE — 250N000013 HC RX MED GY IP 250 OP 250 PS 637: Performed by: STUDENT IN AN ORGANIZED HEALTH CARE EDUCATION/TRAINING PROGRAM

## 2024-11-05 PROCEDURE — 73660 X-RAY EXAM OF TOE(S): CPT | Mod: LT

## 2024-11-05 RX ORDER — ACETAMINOPHEN 325 MG/1
975 TABLET ORAL ONCE
Status: COMPLETED | OUTPATIENT
Start: 2024-11-05 | End: 2024-11-05

## 2024-11-05 RX ADMIN — ACETAMINOPHEN 325MG 975 MG: 325 TABLET ORAL at 22:02

## 2024-11-05 ASSESSMENT — ACTIVITIES OF DAILY LIVING (ADL)
ADLS_ACUITY_SCORE: 0
ADLS_ACUITY_SCORE: 0

## 2024-11-06 ENCOUNTER — PATIENT OUTREACH (OUTPATIENT)
Dept: FAMILY MEDICINE | Facility: CLINIC | Age: 69
End: 2024-11-06
Payer: COMMERCIAL

## 2024-11-06 NOTE — ED PROVIDER NOTES
Emergency Department Note      History of Present Illness     Chief Complaint   Toe Pain      HPI   Greciaomar Kilgore is a 69 year old female dents with left great toe pain.  Patient states that last week Wednesday she stubbed her toe into furniture in her bedroom and since has been having increased swelling and discomfort to the left great toe.  She also states that around the nail has been bleeding in length.  History of fusion to this toe many years ago.  She denies any other injuries.    Independent Historian   None    Review of External Notes   None    Past Medical History     Medical History and Problem List   Past Medical History:   Diagnosis Date    Allergic rhinitis     Anemia     Anxiety     Chronic back pain 1/2002    DCIS (ductal carcinoma in situ) 2014    Depression 2003    Depression, major, recurrent, moderate (H) 9/24/2021    Diabetes mellitus type 2     Diverticulosis     Eating disorder     Exploding head syndrome     External hemorrhoids     G6PD deficiency 2015    Gastroesophageal reflux disease     Hepatitis A age 12    Hypercholesterolemia     Laxative abuse     Liver nodule     Migraine     Mild persistent asthma     Mumps     Nephrolithiasis     BOO (obstructive sleep apnea)     Ovarian cyst 11/06    Palpitations     Pancreatitis     Psychophysiological insomnia 9/24/2021    PTSD (post-traumatic stress disorder)     Pure hypercholesterolemia     Recurrent vaginitis 9/24/2021    Sleep apnea     Spina bifida (H)     STD (sexually transmitted disease)     Tuberculosis     Vasomotor symptoms due to menopause 9/24/2021       Medications   albuterol (PROAIR HFA/PROVENTIL HFA/VENTOLIN HFA) 108 (90 Base) MCG/ACT inhaler  cetirizine (ZYRTEC) 10 MG tablet  diclofenac (VOLTAREN) 1 % topical gel  ESTRACE VAGINAL 0.1 MG/GM vaginal cream  fluticasone (FLONASE) 50 MCG/ACT nasal spray  levalbuterol (XOPENEX HFA) 45 MCG/ACT inhaler  Lidocaine (LIDOCARE) 4 % Patch  lisinopril (ZESTRIL) 20 MG  tablet  lisinopril (ZESTRIL) 30 MG tablet  LORazepam (ATIVAN) 0.5 MG tablet  metFORMIN (GLUCOPHAGE) 500 MG tablet  montelukast (SINGULAIR) 10 MG tablet  olopatadine (PATADAY) 0.2 % ophthalmic solution  omeprazole (PRILOSEC OTC) 20 MG EC tablet  ondansetron (ZOFRAN ODT) 4 MG ODT tab  predniSONE (DELTASONE) 20 MG tablet  rosuvastatin (CRESTOR) 5 MG tablet  saccharomyces boulardii (FLORASTOR) 250 MG capsule  sucralfate (CARAFATE) 1 GM/10ML suspension        Surgical History   Past Surgical History:   Procedure Laterality Date    ARTHRODESIS TOE(S)  9/16/2013    Procedure: ARTHRODESIS TOE(S);  BILATERAL GREAT TOE FUSION (C-ARM);  Surgeon: Mary Guan MD;  Location: [facility]    ARTHRODESIS TOE(S) Left 12/19/2016    Procedure: ARTHRODESIS TOE(S);  Surgeon: Mary Guan MD;  Location: [facility]    ARTHROSCOPY KNEE Left 2/2/11    BIOPSY BREAST  2/7/2014    Procedure: BIOPSY BREAST;  Re-excision Left Breast Cavity for Margins ;  Surgeon: Lisset Amador DO;  Location:  OR    BIOPSY BREAST SEED LOCALIZATION  1/22/2014    Procedure: BIOPSY BREAST SEED LOCALIZATION;  Left Breast Seed Localized Excisional Biopsy ;  Surgeon: Lisset Amador DO;  Location:  OR    COLONOSCOPY  2005    nl - repeat 2015    CYSTOSCOPY      CYSTOSCOPY, RETROGRADES, EXTRACT STONE, INSERT STENT, COMBINED Left 2/4/2021    Procedure: Video cystoscopy, balloon dilation of left ureter, left ureteroscopy with stone extraction, standby laser, left double-J stent placement (5-Lithuanian x 24 cm).;  Surgeon: Craig Ferreira MD;  Location:  OR    EXTRACORPOREAL SHOCK WAVE LITHOTRIPSY (ESWL) Left 8/20/2021    Procedure: Left extracorporal shockwave lithotripsy, fluoroscopic interpretation <1 hour physician time;  Surgeon: Keegan Cannon MD;  Location:  OR    FOOT SURGERY Bilateral 2013    HEMORRHOIDECTOMY BANDING      multiple times    HEMORRHOIDECTOMY INTERNAL N/A 7/24/2018    Procedure: HEMORRHOIDECTOMY INTERNAL;  three quadrant  hemorrhoidectomy;  Surgeon: Lisa Patrick MD;  Location: [facility] OR    HYSTERECTOMY  7/1996    fibroids    REMOVE HARDWARE FOOT Left 2015    REPAIR TENDON FOOT Left 12/19/2016    Procedure: REPAIR TENDON FOOT;  Surgeon: Mary Guan MD;  Location: [facility]       Physical Exam     No data found.  Physical Exam  General: Alert and cooperative with exam. Patient in no apparent distress. Normal mentation.  Head:  Scalp is NC/AT  Eyes:  EOM intact  ENT:  The external nose and ears are normal.   Neck:  Normal range of motion without rigidity.  CV:  Warm and well perfused  Resp:  Breathing comfortably on room air  MSK:  Swelling and ecchymosis over dorsal aspect of left great toe. Limited ROM due to history of fusion to this digit. Mild TTP over dorsal aspect of toe. All other toes with normal ROM.   Skin:  Warm and dry, No rash or lesions noted.  Neuro:  Oriented x 3. No gross motor deficits.      Diagnostics     Imaging   XR Toe Left G/E 2 Views   Final Result   IMPRESSION:       There is a small, acute appearing, minimally displaced fracture involving the dorsal base of the great toe distal phalanx with extension to the interphalangeal joint.      There is chronic appearing deformity of the first MTP joint with remodeling of the articular surfaces and joint space narrowing and marginal osteophyte formation. No definite osseous bridging across the first MTP joint.         Independent Interpretation   X-ray of left great toe shows distal phalanx fracture    ED Course      Medications Administered   Medications   acetaminophen (TYLENOL) tablet 975 mg (975 mg Oral $Given 11/5/24 0330)       Procedures   Procedures     Discussion of Management   None    ED Course        Additional Documentation  None    Medical Decision Making / Diagnosis     CMS Diagnoses: None    MIPS       None    MDM   Grecia RANGEL Jame is a 69 year old female presents with left great toe pain after stubbing toe almost 1 week ago.  On exam, dorsal  aspect of left great toe is ecchymotic and swollen.  Tender to the touch.  She has had subungual hematoma however this does appear to have drained spontaneously and she was doing well in that regard.  Limited range of motion of this toe due to prior fusion.  X-ray was obtained of the left toes and confirms distal phalanx fracture to the left great toe, mildly displaced.  Andrew tape was applied and patient was provided a postop shoe.  She is able to weight-bear without difficulty or pain and feels comfortable discharge home.  Recommend follow-up with orthopedic specialist for further evaluation, otherwise continue to wear andrew tape and postop shoe for the next 2 to 3 weeks.  For any worsening pain or other acute concerns, return to ER.    Disposition   The patient was discharged.     Diagnosis     ICD-10-CM    1. Toe fracture  S92.919A Ankle/Foot Bracing Supplies Order Post-op Shoe; Left           Discharge Medications   New Prescriptions    DICLOFENAC (VOLTAREN) 1 % TOPICAL GEL    Apply 2 g topically 4 times daily.         FELICITAS Coffman Lauren R, PA-C  11/05/24 2200

## 2024-11-06 NOTE — DISCHARGE INSTRUCTIONS
Continue to wear the buddy tape connecting your big toe to the adjacent toe to help stabilize the fracture that is present.  I also recommend continuing to wear the postop shoe to help with weight distribution and reduce pain.  Continue ibuprofen and Tylenol as needed.  Follow-up with orthopedic specialist as needed

## 2024-11-06 NOTE — ED TRIAGE NOTES
Pt here stating she stubbed her toe last week and it is still swollen. Refused everything in triage.

## 2024-11-07 DIAGNOSIS — E11.9 TYPE 2 DIABETES MELLITUS WITHOUT COMPLICATION, WITHOUT LONG-TERM CURRENT USE OF INSULIN (H): ICD-10-CM

## 2024-11-11 RX ORDER — LISINOPRIL 30 MG/1
30 TABLET ORAL DAILY
Qty: 100 TABLET | Refills: 2 | Status: SHIPPED | OUTPATIENT
Start: 2024-11-11

## 2024-11-18 ENCOUNTER — TELEPHONE (OUTPATIENT)
Dept: UROLOGY | Facility: CLINIC | Age: 69
End: 2024-11-18
Payer: COMMERCIAL

## 2024-11-18 NOTE — TELEPHONE ENCOUNTER
[facility] Call Center    Phone Message    May a detailed message be left on voicemail: yes     Reason for Call: Other: Patient called in regarding symptoms. States she feels like she is not urinating enough and is concerned if it is kidney stone related. Patient is wondering if she can do her imaging and 1 year follow up sooner than January. Please review and call patient to further discuss current symptoms.       Action Taken: Other: Urology    Travel Screening: Not Applicable

## 2024-11-19 NOTE — TELEPHONE ENCOUNTER
"Patient returned phone call and informed this nurse that she is having trouble peeing, or in her own words, \"Not peeing right\". Patient would like to have imaging moved up and appointment with Provider. Patient denies and flank pain or other symptoms. Will send message to PA to advise.     Samantha Peng LPN    "

## 2024-11-19 NOTE — TELEPHONE ENCOUNTER
"Per Betty Frederick- \"That's fine, but I don't know how far out either are going to be.  Also would get UA/PVR, and if symptoms too bad, will need to go to the ER.\"  Called patient and LM.        "

## 2024-11-21 DIAGNOSIS — J30.2 SEASONAL ALLERGIC RHINITIS, UNSPECIFIED TRIGGER: ICD-10-CM

## 2024-11-21 DIAGNOSIS — K21.9 GASTROESOPHAGEAL REFLUX DISEASE, UNSPECIFIED WHETHER ESOPHAGITIS PRESENT: ICD-10-CM

## 2024-11-21 RX ORDER — CETIRIZINE HYDROCHLORIDE 10 MG/1
TABLET ORAL
Qty: 180 TABLET | Refills: 1 | Status: SHIPPED | OUTPATIENT
Start: 2024-11-21

## 2024-12-04 ENCOUNTER — HOSPITAL ENCOUNTER (OUTPATIENT)
Dept: CT IMAGING | Facility: CLINIC | Age: 69
Discharge: HOME | End: 2024-12-04
Attending: PHYSICIAN ASSISTANT
Payer: COMMERCIAL

## 2024-12-04 DIAGNOSIS — N20.0 NEPHROLITHIASIS: ICD-10-CM

## 2024-12-04 PROCEDURE — 74176 CT ABD & PELVIS W/O CONTRAST: CPT

## 2024-12-23 ENCOUNTER — OFFICE VISIT (OUTPATIENT)
Dept: FAMILY MEDICINE | Facility: CLINIC | Age: 69
End: 2024-12-23
Payer: COMMERCIAL

## 2024-12-23 VITALS
BODY MASS INDEX: 25.05 KG/M2 | HEIGHT: 67 IN | WEIGHT: 159.6 LBS | OXYGEN SATURATION: 98 % | HEART RATE: 63 BPM | TEMPERATURE: 97.4 F | RESPIRATION RATE: 17 BRPM

## 2024-12-23 DIAGNOSIS — H10.13 ALLERGIC CONJUNCTIVITIS, BILATERAL: ICD-10-CM

## 2024-12-23 DIAGNOSIS — E78.5 HYPERLIPIDEMIA LDL GOAL <100: ICD-10-CM

## 2024-12-23 DIAGNOSIS — Z00.00 ENCOUNTER FOR MEDICARE ANNUAL WELLNESS EXAM: Primary | ICD-10-CM

## 2024-12-23 DIAGNOSIS — E11.9 TYPE 2 DIABETES MELLITUS WITHOUT COMPLICATION, WITHOUT LONG-TERM CURRENT USE OF INSULIN (H): ICD-10-CM

## 2024-12-23 DIAGNOSIS — J45.20 MILD INTERMITTENT ASTHMA WITHOUT COMPLICATION: ICD-10-CM

## 2024-12-23 DIAGNOSIS — F33.1 DEPRESSION, MAJOR, RECURRENT, MODERATE (H): ICD-10-CM

## 2024-12-23 DIAGNOSIS — J30.2 SEASONAL ALLERGIC RHINITIS, UNSPECIFIED TRIGGER: ICD-10-CM

## 2024-12-23 LAB
EST. AVERAGE GLUCOSE BLD GHB EST-MCNC: 143 MG/DL
HBA1C MFR BLD: 6.6 % (ref 0–5.6)

## 2024-12-23 PROCEDURE — 84443 ASSAY THYROID STIM HORMONE: CPT | Performed by: FAMILY MEDICINE

## 2024-12-23 PROCEDURE — 80061 LIPID PANEL: CPT | Performed by: FAMILY MEDICINE

## 2024-12-23 PROCEDURE — 83036 HEMOGLOBIN GLYCOSYLATED A1C: CPT | Performed by: FAMILY MEDICINE

## 2024-12-23 PROCEDURE — 99214 OFFICE O/P EST MOD 30 MIN: CPT | Mod: 25 | Performed by: FAMILY MEDICINE

## 2024-12-23 PROCEDURE — 80053 COMPREHEN METABOLIC PANEL: CPT | Performed by: FAMILY MEDICINE

## 2024-12-23 PROCEDURE — 82043 UR ALBUMIN QUANTITATIVE: CPT | Performed by: FAMILY MEDICINE

## 2024-12-23 PROCEDURE — 36415 COLL VENOUS BLD VENIPUNCTURE: CPT | Performed by: FAMILY MEDICINE

## 2024-12-23 PROCEDURE — 82570 ASSAY OF URINE CREATININE: CPT | Performed by: FAMILY MEDICINE

## 2024-12-23 PROCEDURE — G0439 PPPS, SUBSEQ VISIT: HCPCS | Performed by: FAMILY MEDICINE

## 2024-12-23 RX ORDER — OLOPATADINE HYDROCHLORIDE 2 MG/ML
1 SOLUTION/ DROPS OPHTHALMIC DAILY
Qty: 2.5 ML | Refills: 5 | Status: SHIPPED | OUTPATIENT
Start: 2024-12-23

## 2024-12-23 RX ORDER — ROSUVASTATIN CALCIUM 10 MG/1
TABLET, COATED ORAL
COMMUNITY
End: 2024-12-23

## 2024-12-23 RX ORDER — ESTRADIOL 10 UG/1
INSERT VAGINAL
COMMUNITY
Start: 2024-10-28

## 2024-12-23 RX ORDER — ALBUTEROL SULFATE 90 UG/1
2 INHALANT RESPIRATORY (INHALATION) EVERY 6 HOURS PRN
Qty: 18 G | Refills: 3 | Status: SHIPPED | OUTPATIENT
Start: 2024-12-23

## 2024-12-23 RX ORDER — ROSUVASTATIN CALCIUM 5 MG/1
5 TABLET, COATED ORAL DAILY
Qty: 90 TABLET | Refills: 3 | Status: SHIPPED | OUTPATIENT
Start: 2024-12-23 | End: 2024-12-26

## 2024-12-23 RX ORDER — MONTELUKAST SODIUM 10 MG/1
1 TABLET ORAL EVERY MORNING
Qty: 90 TABLET | Refills: 3 | Status: SHIPPED | OUTPATIENT
Start: 2024-12-23

## 2024-12-23 RX ORDER — FLUTICASONE PROPIONATE 50 MCG
SPRAY, SUSPENSION (ML) NASAL
Qty: 32 G | Refills: 1 | Status: SHIPPED | OUTPATIENT
Start: 2024-12-23

## 2024-12-23 SDOH — HEALTH STABILITY: PHYSICAL HEALTH: ON AVERAGE, HOW MANY DAYS PER WEEK DO YOU ENGAGE IN MODERATE TO STRENUOUS EXERCISE (LIKE A BRISK WALK)?: 1 DAY

## 2024-12-23 ASSESSMENT — PATIENT HEALTH QUESTIONNAIRE - PHQ9
10. IF YOU CHECKED OFF ANY PROBLEMS, HOW DIFFICULT HAVE THESE PROBLEMS MADE IT FOR YOU TO DO YOUR WORK, TAKE CARE OF THINGS AT HOME, OR GET ALONG WITH OTHER PEOPLE: VERY DIFFICULT
SUM OF ALL RESPONSES TO PHQ QUESTIONS 1-9: 19
SUM OF ALL RESPONSES TO PHQ QUESTIONS 1-9: 19
5. POOR APPETITE OR OVEREATING: NEARLY EVERY DAY

## 2024-12-23 ASSESSMENT — ANXIETY QUESTIONNAIRES
IF YOU CHECKED OFF ANY PROBLEMS ON THIS QUESTIONNAIRE, HOW DIFFICULT HAVE THESE PROBLEMS MADE IT FOR YOU TO DO YOUR WORK, TAKE CARE OF THINGS AT HOME, OR GET ALONG WITH OTHER PEOPLE: SOMEWHAT DIFFICULT
1. FEELING NERVOUS, ANXIOUS, OR ON EDGE: SEVERAL DAYS
3. WORRYING TOO MUCH ABOUT DIFFERENT THINGS: MORE THAN HALF THE DAYS
7. FEELING AFRAID AS IF SOMETHING AWFUL MIGHT HAPPEN: SEVERAL DAYS
6. BECOMING EASILY ANNOYED OR IRRITABLE: MORE THAN HALF THE DAYS
GAD7 TOTAL SCORE: 14
GAD7 TOTAL SCORE: 14
5. BEING SO RESTLESS THAT IT IS HARD TO SIT STILL: NEARLY EVERY DAY
2. NOT BEING ABLE TO STOP OR CONTROL WORRYING: MORE THAN HALF THE DAYS

## 2024-12-23 ASSESSMENT — PAIN SCALES - GENERAL: PAINLEVEL_OUTOF10: NO PAIN (0)

## 2024-12-23 ASSESSMENT — SOCIAL DETERMINANTS OF HEALTH (SDOH): HOW OFTEN DO YOU GET TOGETHER WITH FRIENDS OR RELATIVES?: NEVER

## 2024-12-23 ASSESSMENT — ASTHMA QUESTIONNAIRES: ACT_TOTALSCORE: 20

## 2024-12-23 NOTE — LETTER
December 26, 2024      Grecia Kilgore  [address]   [facility] [address]        Dear ,      I am glad to notify that your blood work shows well-controlled diabetes.  Diabetic urine check is normal.  Continue metformin 500 mg once daily    Your cholesterol profile is much improved as compared to before but we have slight more room for improvement therefore I would like you to increase the dose of rosuvastatin from 5 to 10 mg daily.  New prescription sent to the pharmacy.      fasting labs in 3 months.  You can do it at your nearby  clinic lab by scheduling a fasting lab appointment.    Blood work shows slight dehydration.  Increase fluid intake.   Liver functions are normal.  Thyroid functions are normal.        If you have any questions or concerns, please call the clinic at the number listed above.       Sincerely,      Romel Mccall MD    Electronically signed

## 2024-12-23 NOTE — PROGRESS NOTES
Preventive Care Visit  [facility] PREM Mccall MD, Family Medicine  Dec 23, 2024      Assessment & Plan     Encounter for Medicare annual wellness exam      Type 2 diabetes mellitus without complication, without long-term current use of insulin (H)  Previously well-managed.  Repeat labs ordered   - metFORMIN (GLUCOPHAGE) 500 MG tablet; Take 1 tablet (500 mg) by mouth daily (with breakfast).  - Albumin Random Urine Quantitative with Creat Ratio; Future  - Hemoglobin A1c; Future  - TSH with free T4 reflex; Future  - Albumin Random Urine Quantitative with Creat Ratio  - Hemoglobin A1c  - TSH with free T4 reflex    Mild intermittent asthma without complication  Well-controlled.  - albuterol (PROAIR HFA/PROVENTIL HFA/VENTOLIN HFA) 108 (90 Base) MCG/ACT inhaler; Inhale 2 puffs into the lungs every 6 hours as needed for shortness of breath, wheezing or cough.    Seasonal allergic rhinitis, unspecified trigger  Symptoms well-managed  - fluticasone (FLONASE) 50 MCG/ACT nasal spray; USE 1 SPRAY IN BOTH  NOSTRILS DAILY AS NEEDED  FOR RHINITIS OR ALLERGIES  - montelukast (SINGULAIR) 10 MG tablet; Take 1 tablet (10 mg) by mouth every morning.    Allergic conjunctivitis, bilateral  Symptoms well-managed with eyedrop.  Requested relief  - olopatadine (PATADAY) 0.2 % ophthalmic solution; Place 0.05 mLs (1 drop) into both eyes daily.    Hyperlipidemia LDL goal <100  Fasting labs ordered.  Await the test results  - rosuvastatin (CRESTOR) 5 MG tablet; Take 1 tablet (5 mg) by mouth daily.  - Lipid panel reflex to direct LDL Fasting; Future  - Comprehensive metabolic panel; Future  - Lipid panel reflex to direct LDL Fasting  - Comprehensive metabolic panel    Depression, major, recurrent, moderate (H)  Patient is symptomatic but does not want to start any medication at this time.  Previously has seen [facility] but  wants to hold off on using medications or doing therapy at this  "time            BMI  Estimated body mass index is 25.05 kg/m  as calculated from the following:    Height as of this encounter: 1.7 m (5' 6.93\").    Weight as of this encounter: 72.4 kg (159 lb 9.6 oz).   Weight management plan: Discussed healthy diet and exercise guidelines    Counseling  Appropriate preventive services were addressed with this patient via screening, questionnaire, or discussion as appropriate for fall prevention, nutrition, physical activity, Tobacco-use cessation, social engagement, weight loss and cognition.  Checklist reviewing preventive services available has been given to the patient.  Reviewed patient's diet, addressing concerns and/or questions.   She is at risk for lack of exercise and has been provided with information to increase physical activity for the benefit of her well-being.   Patient is at risk for social isolation and has been provided with information about the benefit of social connection.   She is at risk for psychosocial distress and has been provided with information to reduce risk.   The patient's PHQ-9 score is consistent with moderate depression. She was provided with information regarding depression.           Subjective   Grecia is a 69 year old, presenting for the following:  Physical (Fasting. )        12/23/2024     4:28 PM   Additional Questions   Roomed by Vannesa HERNANDEZ          Health Care Directive  Patient does not have a Health Care Directive: Discussed advance care planning with patient; however, patient declined at this time.      12/23/2024   General Health   How would you rate your overall physical health? Good   Feel stress (tense, anxious, or unable to sleep) Very much   (!) STRESS CONCERN      12/23/2024   Nutrition   Diet: Diabetic         12/23/2024   Exercise   Days per week of moderate/strenous exercise 1 day   (!) EXERCISE CONCERN      12/23/2024   Social Factors   Frequency of gathering with friends or relatives Never   Worry food " won't last until get money to buy more Yes   Food not last or not have enough money for food? Yes   Do you have housing? (Housing is defined as stable permanent housing and does not include staying ouside in a car, in a tent, in an abandoned building, in an overnight shelter, or couch-surfing.) Yes   Are you worried about losing your housing? No   Lack of transportation? Yes   Unable to get utilities (heat,electricity)? Yes   Want help with housing or utility concern? (!) YES   (!) FOOD SECURITY CONCERN PRESENT (!) TRANSPORTATION CONCERN PRESENT(!) FINANCIAL RESOURCE STRAIN CONCERN(!) SOCIAL CONNECTIONS CONCERN      12/23/2024   Fall Risk   Fallen 2 or more times in the past year? No   Trouble with walking or balance? Yes   Gait Speed Test (Document in seconds) 3.8   Gait Speed Test Interpretation Less than or equal to 5.00 seconds - PASS          12/23/2024   Activities of Daily Living- Home Safety   Needs help with the following daily activites None of the above   Safety concerns in the home None of the above         12/23/2024   Dental   Dentist two times every year? Yes         12/23/2024   Hearing Screening   Hearing concerns? None of the above         12/23/2024   Driving Risk Screening   Patient/family members have concerns about driving No         12/23/2024   General Alertness/Fatigue Screening   Have you been more tired than usual lately? No         12/23/2024   Urinary Incontinence Screening   Bothered by leaking urine in past 6 months No         12/23/2024   TB Screening   Were you born outside of the [place]? No       Today's PHQ-9 Score:       12/23/2024     4:17 PM   PHQ-9 SCORE   PHQ-9 Total Score MyChart 19 (Moderately severe depression)   PHQ-9 Total Score 19        Patient-reported         12/23/2024   Substance Use   Alcohol more than 3/day or more than 7/wk Not Applicable   Do you have a current opioid prescription? No   How severe/bad is pain from 1 to 10? 3/10   Do you use any other substances  recreationally? No    (!) PRESCRIPTION DRUGS    (!) INHALANTS       Multiple values from one day are sorted in reverse-chronological order     Social History     Tobacco Use    Smoking status: Never    Smokeless tobacco: Never   Vaping Use    Vaping status: Never Used   Substance Use Topics    Alcohol use: No     Alcohol/week: 0.0 standard drinks of alcohol    Drug use: No           9/6/2024   LAST FHS-7 RESULTS   1st degree relative breast or ovarian cancer No   Any relative bilateral breast cancer No   Any male have breast cancer No   Any ONE woman have BOTH breast AND ovarian cancer No   Any woman with breast cancer before 50yrs No   2 or more relatives with breast AND/OR ovarian cancer No   2 or more relatives with breast AND/OR bowel cancer No        Mammogram Screening - Mammogram every 1-2 years updated in Health Maintenance based on mutual decision making      History of abnormal Pap smear: No - age 30- 64 PAP with HPV every 5 years recommended        Latest Ref Rng & Units 9/17/2020     2:38 PM 9/17/2020     2:30 PM 4/24/2019    10:45 AM   PAP / HPV   PAP (Historical)  NIL   NIL    HPV 16 DNA NEG^Negative  Negative     HPV 18 DNA NEG^Negative  Negative     Other HR HPV NEG^Negative  Negative       ASCVD Risk   The ASCVD Risk score (Davy MARTIN, et al., 2019) failed to calculate for the following reasons:    The systolic blood pressure is missing            Reviewed and updated as needed this visit by Provider    Allergies                 Patient Active Problem List   Diagnosis    Allergic rhinitis    External hemorrhoids    Vitamin D deficiency    Neck pain    Osteoarthritis, knee    Positive PPD    Panic disorder    Genital herpes    DCIS (ductal carcinoma in situ) of breast    Anxiety    Hyperlipidemia LDL goal <100    Type 2 diabetes mellitus without complication, without long-term current use of insulin (H)    Mild intermittent asthma without complication    BOO (obstructive sleep apnea)    Left  ureteral calculus    Palpitations    Kidney stone on left side    Recurrent vaginitis    Psychophysiological insomnia    Depression, major, recurrent, moderate (H)    PTSD (post-traumatic stress disorder)    Vasomotor symptoms due to menopause    Chronic bilateral low back pain without sciatica    Hip pain, left    Acute right ankle pain    Chronic pain of left knee     Past Surgical History:   Procedure Laterality Date    ARTHRODESIS TOE(S)  9/16/2013    Procedure: ARTHRODESIS TOE(S);  BILATERAL GREAT TOE FUSION (C-ARM);  Surgeon: Mary Guan MD;  Location:  [facility]    ARTHRODESIS TOE(S) Left 12/19/2016    Procedure: ARTHRODESIS TOE(S);  Surgeon: Mary Guan MD;  Location: [facility]    ARTHROSCOPY KNEE Left 2/2/11    BIOPSY BREAST  2/7/2014    Procedure: BIOPSY BREAST;  Re-excision Left Breast Cavity for Margins ;  Surgeon: Lisset Amador DO;  Location:  OR    BIOPSY BREAST SEED LOCALIZATION  1/22/2014    Procedure: BIOPSY BREAST SEED LOCALIZATION;  Left Breast Seed Localized Excisional Biopsy ;  Surgeon: Lisset Amador DO;  Location:  OR    COLONOSCOPY  2005    nl - repeat 2015    CYSTOSCOPY      CYSTOSCOPY, RETROGRADES, EXTRACT STONE, INSERT STENT, COMBINED Left 2/4/2021    Procedure: Video cystoscopy, balloon dilation of left ureter, left ureteroscopy with stone extraction, standby laser, left double-J stent placement (5-Tongan x 24 cm).;  Surgeon: Craig Ferreira MD;  Location:  OR    EXTRACORPOREAL SHOCK WAVE LITHOTRIPSY (ESWL) Left 8/20/2021    Procedure: Left extracorporal shockwave lithotripsy, fluoroscopic interpretation <1 hour physician time;  Surgeon: Keegan Cannon MD;  Location:  OR    FOOT SURGERY Bilateral 2013    HEMORRHOIDECTOMY BANDING      multiple times    HEMORRHOIDECTOMY INTERNAL N/A 7/24/2018    Procedure: HEMORRHOIDECTOMY INTERNAL;  three quadrant hemorrhoidectomy;  Surgeon: Lisa Patrick MD;  Location:  OR    HYSTERECTOMY  7/1996    fibroids     REMOVE HARDWARE FOOT Left 2015    REPAIR TENDON FOOT Left 12/19/2016    Procedure: REPAIR TENDON FOOT;  Surgeon: Mary Guan MD;  Location: [facility]       Social History     Tobacco Use    Smoking status: Never    Smokeless tobacco: Never   Substance Use Topics    Alcohol use: No     Alcohol/week: 0.0 standard drinks of alcohol     Family History   Problem Relation Age of Onset    Diabetes Mother     Breast Cancer Mother     Alcohol/Drug Father     Cancer Father     No Known Problems Sister     No Known Problems Brother     Diabetes Maternal Grandmother     Cerebrovascular Disease Maternal Grandmother     Cancer - colorectal Maternal Grandfather     No Known Problems Paternal Grandmother     No Known Problems Paternal Grandfather     No Known Problems Daughter     No Known Problems Son     No Known Problems Son     No Known Problems Son          Current Outpatient Medications   Medication Sig Dispense Refill    albuterol (PROAIR HFA/PROVENTIL HFA/VENTOLIN HFA) 108 (90 Base) MCG/ACT inhaler Inhale 2 puffs into the lungs every 6 hours as needed for shortness of breath, wheezing or cough. 18 g 3    cetirizine (ZYRTEC) 10 MG tablet TAKE 1 TABLET BY MOUTH TWICE DAILY AS NEEDED FOR ALLERGY 180 tablet 1    diclofenac (VOLTAREN) 1 % topical gel Apply 2 g topically 4 times daily as needed for moderate pain. 100 g 2    estradiol (VAGIFEM) 10 MCG TABS vaginal tablet 1 insert Vaginal Two times /week for 90 days      fluticasone (FLONASE) 50 MCG/ACT nasal spray USE 1 SPRAY IN BOTH  NOSTRILS DAILY AS NEEDED  FOR RHINITIS OR ALLERGIES 32 g 1    lisinopril (ZESTRIL) 30 MG tablet TAKE 1 TABLET BY MOUTH DAILY 100 tablet 2    LORazepam (ATIVAN) 0.5 MG tablet Take 1 tablet (0.5 mg) by mouth daily as needed (panic attack). 20 tablet 0    metFORMIN (GLUCOPHAGE) 500 MG tablet Take 1 tablet (500 mg) by mouth daily (with breakfast). 90 tablet 3    montelukast (SINGULAIR) 10 MG tablet Take 1 tablet (10 mg) by mouth every morning. 90  tablet 3    olopatadine (PATADAY) 0.2 % ophthalmic solution Place 0.05 mLs (1 drop) into both eyes daily. 2.5 mL 5    rosuvastatin (CRESTOR) 5 MG tablet Take 1 tablet (5 mg) by mouth daily. 90 tablet 3     Allergies   Allergen Reactions    Amoxicillin Other (See Comments)     Yeast infection, severe itch within 24hr.    Codeine Nausea    Keflex [Cephalexin] Itching    Morphine Itching    Nitrofuran Derivatives Hives    Oxycodone Hives     Pt reported on 10/19/23    Phenothiazines      sedation    Primatene Mist [Epinephrine Bitartrate] Itching     neck itch with primatene mist    Vicodin [Hydrocodone-Acetaminophen] Nausea    Dilaudid [Hydromorphone] Rash    Latex Itching and Rash     Current providers sharing in care for this patient include:  Patient Care Team:  Romle Mccall MD as PCP - General (Family Medicine)  Romel Mccall MD as Assigned PCP  Betty Frederick PA-C as Physician Assistant (Urology)  Betty Frederick PA-C as Assigned Surgical Provider  Debra Barreto DO as Assigned Neuroscience Provider  Prabhu Little MD as Assigned Allergy Provider  Lilliam Higgins DO as Assigned Heart and Vascular Provider  Jossue Beavers MD as Assigned Musculoskeletal Provider    The following health maintenance items are reviewed in Epic and correct as of today:  Health Maintenance   Topic Date Due    MIGRAINE ACTION PLAN  Never done    ZOSTER IMMUNIZATION (1 of 2) Never done    RSV VACCINE (1 - Risk 60-74 years 1-dose series) Never done    ASTHMA ACTION PLAN  04/26/2020    A1C  04/11/2024    DIABETIC FOOT EXAM  04/17/2024    INFLUENZA VACCINE (1) 09/01/2024    COVID-19 Vaccine (3 - 2024-25 season) 09/01/2024    MEDICARE ANNUAL WELLNESS VISIT  10/11/2024    BMP  10/11/2024    LIPID  10/11/2024    MICROALBUMIN  10/11/2024    ANNUAL REVIEW OF HM ORDERS  10/11/2024    WILL ASSESSMENT  06/23/2025    ASTHMA CONTROL TEST  06/23/2025    PHQ-9  06/23/2025    DEXA  09/28/2025    FALL RISK ASSESSMENT   "12/23/2025    EYE EXAM  07/01/2026    COLORECTAL CANCER SCREENING  07/01/2026    MAMMO SCREENING  09/06/2026    ADVANCE CARE PLANNING  12/23/2029    DTAP/TDAP/TD IMMUNIZATION (3 - Td or Tdap) 01/27/2031    HEPATITIS C SCREENING  Completed    DEPRESSION ACTION PLAN  Completed    Pneumococcal Vaccine: 50+ Years  Completed    HPV IMMUNIZATION  Aged Out    MENINGITIS IMMUNIZATION  Aged Out    RSV MONOCLONAL ANTIBODY  Aged Out    PAP  Discontinued         Review of Systems  CONSTITUTIONAL: NEGATIVE for fever, chills, change in weight  INTEGUMENTARY/SKIN: NEGATIVE for worrisome rashes, moles or lesions  EYES: NEGATIVE for vision changes or irritation  ENT/MOUTH: NEGATIVE for ear, mouth and throat problems  RESP: NEGATIVE for significant cough or SOB  BREAST: NEGATIVE for masses, tenderness or discharge  CV: NEGATIVE for chest pain, palpitations or peripheral edema  GI: NEGATIVE for nausea, abdominal pain, heartburn, or change in bowel habits  : NEGATIVE for frequency, dysuria, or hematuria  MUSCULOSKELETAL: NEGATIVE for significant arthralgias or myalgia  NEURO: NEGATIVE for weakness, dizziness or paresthesias  ENDOCRINE: NEGATIVE for temperature intolerance, skin/hair changes  HEME: NEGATIVE for bleeding problems  PSYCHIATRIC: NEGATIVE for changes in mood or affect     Objective    Exam  Pulse 63   Temp 97.4  F (36.3  C) (Temporal)   Resp 17   Ht 1.7 m (5' 6.93\")   Wt 72.4 kg (159 lb 9.6 oz)   LMP 01/01/1985   SpO2 98%   BMI 25.05 kg/m     Estimated body mass index is 25.05 kg/m  as calculated from the following:    Height as of this encounter: 1.7 m (5' 6.93\").    Weight as of this encounter: 72.4 kg (159 lb 9.6 oz).    Physical Exam  GENERAL: alert and no distress  EYES: Eyes grossly normal to inspection, PERRL and conjunctivae and sclerae normal  HENT: ear canals and TM's normal, nose and mouth without ulcers or lesions  NECK: no adenopathy, no asymmetry, masses, or scars  RESP: lungs clear to auscultation - " no rales, rhonchi or wheezes  CV: regular rate and rhythm, normal S1 S2, no S3 or S4, no murmur, click or rub, no peripheral edema  ABDOMEN: soft, nontender, no hepatosplenomegaly, no masses and bowel sounds normal  MS: no gross musculoskeletal defects noted, no edema  SKIN: no suspicious lesions or rashes  NEURO: Normal strength and tone, mentation intact and speech normal  PSYCH: mentation appears normal, affect normal/bright        12/23/2024   Mini Cog   Clock Draw Score 2 Normal   3 Item Recall 3 objects recalled   Mini Cog Total Score 5          Answers submitted by the patient for this visit:  Patient Health Questionnaire (Submitted on 12/23/2024)  If you checked off any problems, how difficult have these problems made it for you to do your work, take care of things at home, or get along with other people?: Very difficult  PHQ9 TOTAL SCORE: 19      Signed Electronically by: Romel Mccall MD

## 2024-12-24 LAB
ALBUMIN SERPL BCG-MCNC: 4.3 G/DL (ref 3.5–5.2)
ALP SERPL-CCNC: 93 U/L (ref 40–150)
ALT SERPL W P-5'-P-CCNC: 21 U/L (ref 0–50)
ANION GAP SERPL CALCULATED.3IONS-SCNC: 13 MMOL/L (ref 7–15)
AST SERPL W P-5'-P-CCNC: 24 U/L (ref 0–45)
BILIRUB SERPL-MCNC: 0.6 MG/DL
BUN SERPL-MCNC: 12.4 MG/DL (ref 8–23)
CALCIUM SERPL-MCNC: 9.5 MG/DL (ref 8.8–10.4)
CHLORIDE SERPL-SCNC: 105 MMOL/L (ref 98–107)
CHOLEST SERPL-MCNC: 183 MG/DL
CREAT SERPL-MCNC: 0.96 MG/DL (ref 0.51–0.95)
CREAT UR-MCNC: 319 MG/DL
EGFRCR SERPLBLD CKD-EPI 2021: 65 ML/MIN/1.73M2
FASTING STATUS PATIENT QL REPORTED: YES
FASTING STATUS PATIENT QL REPORTED: YES
GLUCOSE SERPL-MCNC: 118 MG/DL (ref 70–99)
HCO3 SERPL-SCNC: 22 MMOL/L (ref 22–29)
HDLC SERPL-MCNC: 45 MG/DL
LDLC SERPL CALC-MCNC: 112 MG/DL
MICROALBUMIN UR-MCNC: <12 MG/L
MICROALBUMIN/CREAT UR: NORMAL MG/G{CREAT}
NONHDLC SERPL-MCNC: 138 MG/DL
POTASSIUM SERPL-SCNC: 4.1 MMOL/L (ref 3.4–5.3)
PROT SERPL-MCNC: 7.1 G/DL (ref 6.4–8.3)
SODIUM SERPL-SCNC: 140 MMOL/L (ref 135–145)
TRIGL SERPL-MCNC: 132 MG/DL
TSH SERPL DL<=0.005 MIU/L-ACNC: 0.76 UIU/ML (ref 0.3–4.2)

## 2024-12-26 DIAGNOSIS — E78.5 HYPERLIPIDEMIA LDL GOAL <100: ICD-10-CM

## 2024-12-26 RX ORDER — ROSUVASTATIN CALCIUM 10 MG/1
10 TABLET, COATED ORAL DAILY
Qty: 90 TABLET | Refills: 3 | Status: SHIPPED | OUTPATIENT
Start: 2024-12-26

## 2025-01-15 NOTE — PROGRESS NOTES
Sports Medicine Procedure Note    Grecia was seen today for pain.    Diagnoses and all orders for this visit:    Bilateral primary osteoarthritis of knee    Chronic pain of both knees    Other orders  -     Large Joint Injection/Arthocentesis: L knee joint        Grecia Kilgore is a/an 70 year old female who is seen for  Durolane  injection of left knee.   Last injection: 10/18/2024  bilateral Cortisone, provided 3 months of relief.   Last Injection: 09/12/2024 left knee Duralane , provided a few months of relief, but did have some swelling after her injection      Plan:  -Bilateral Durolane injections performed in clinic today  - Can repeat as often as insurance allows  - Continue to try and work on home exercise program  -Apply topical Voltaren gel up to every 6 hours as needed over area of pain  -Tylenol Extra Strength (1000mg) up to three times daily as needed for pain  -Post-injection instructions were provided to the patient  -Follow-up: PRN      Post-Injection Discharge Instructions    You may shower, however avoid swimming, tub baths or hot tubs for 24 hours following your procedure  You may have a mild to moderate increase in pain for a few days following the injection.  The lidocaine (local numbing medicine) will wear off in several hours. It usually takes 3-5 days for the steroid medication to start working although it may take up to 14 days for full effect.   You may use ice packs for 10-15 minutes, 3 to 4 times a day at the injection site for comfort if needed  You may use extra strength Tylenol for pain control if necessary   If you were fasting, you may resume your normal diet and medications after the procedure  If you have diabetes, your blood sugar may be higher than normal for 10-14 days following a steroid injection. Contact your doctor who manages your diabetes if your blood sugar is significantly higher than usual    If you experience any of the following, call Sports Medicine @   [phone]  -Fever over 100 degrees F  -Swelling, bleeding, redness, drainage, warmth at the injection site  -New or significant worsening pain    Large Joint Injection/Arthocentesis: L knee joint    Date/Time: 1/16/2025 3:21 PM    Performed by: Debra Barreto DO  Authorized by: Debra Barreto DO    Needle Size:  21 G  Guidance: ultrasound    Approach:  Anterolateral  Location:  Knee   Location comment:  Left knee joint      Medications:  3 mL lidocaine 1 %; 60 mg sodium hyaluronate 60 MG/3ML  Outcome:  Tolerated well, no immediate complications  Procedure discussed: discussed risks, benefits, and alternatives    Consent Given by:  Patient  Timeout: timeout called immediately prior to procedure    Prep: patient was prepped and draped in usual sterile fashion     Ultrasound was used to ensure safe and accurate needle placement and injection. Ultrasound images of the procedure were permanently stored. 1ml of 8.4% Sodium Bicarbonate solution was used to buffer the local numbing agent for today's injection          Dr. Debra Barreto DO  [facility]  Sports Medicine     -----

## 2025-01-16 ENCOUNTER — OFFICE VISIT (OUTPATIENT)
Dept: ORTHOPEDICS | Facility: CLINIC | Age: 70
End: 2025-01-16
Payer: COMMERCIAL

## 2025-01-16 DIAGNOSIS — M17.0 BILATERAL PRIMARY OSTEOARTHRITIS OF KNEE: Primary | ICD-10-CM

## 2025-01-16 DIAGNOSIS — M25.561 CHRONIC PAIN OF BOTH KNEES: ICD-10-CM

## 2025-01-16 DIAGNOSIS — M25.562 CHRONIC PAIN OF BOTH KNEES: ICD-10-CM

## 2025-01-16 DIAGNOSIS — G89.29 CHRONIC PAIN OF BOTH KNEES: ICD-10-CM

## 2025-01-16 RX ORDER — LIDOCAINE HYDROCHLORIDE 10 MG/ML
3 INJECTION, SOLUTION INFILTRATION; PERINEURAL
Status: COMPLETED | OUTPATIENT
Start: 2025-01-16 | End: 2025-01-16

## 2025-01-16 RX ADMIN — LIDOCAINE HYDROCHLORIDE 3 ML: 10 INJECTION, SOLUTION INFILTRATION; PERINEURAL at 15:21

## 2025-01-16 NOTE — LETTER
1/16/2025      Grecia Kilgore  [address]  [place] [address]      Dear Colleague,    Thank you for referring your patient, Grecia Kilgore, to the [facility]TS [facility]IC[facility] [place]. Please see a copy of my visit note below.    Sports Medicine Procedure Note    Grecia was seen today for pain.    Diagnoses and all orders for this visit:    Bilateral primary osteoarthritis of knee    Chronic pain of both knees    Other orders  -     Large Joint Injection/Arthocentesis: L knee joint        Grecia Kilgore is a/an 70 year old female who is seen for  Durolane  injection of left knee.   Last injection: 10/18/2024  bilateral Cortisone, provided 3 months of relief.   Last Injection: 09/12/2024 left knee Duralane , provided a few months of relief, but did have some swelling after her injection      Plan:  -Bilateral Durolane injections performed in clinic today  - Can repeat as often as insurance allows  - Continue to try and work on home exercise program  -Apply topical Voltaren gel up to every 6 hours as needed over area of pain  -Tylenol Extra Strength (1000mg) up to three times daily as needed for pain  -Post-injection instructions were provided to the patient  -Follow-up: PRN      Post-Injection Discharge Instructions    You may shower, however avoid swimming, tub baths or hot tubs for 24 hours following your procedure  You may have a mild to moderate increase in pain for a few days following the injection.  The lidocaine (local numbing medicine) will wear off in several hours. It usually takes 3-5 days for the steroid medication to start working although it may take up to 14 days for full effect.   You may use ice packs for 10-15 minutes, 3 to 4 times a day at the injection site for comfort if needed  You may use extra strength Tylenol for pain control if necessary   If you were fasting, you may resume your normal diet and medications after the procedure  If you have  Group Topic:  Behavioral Activation Group    Date: 3/3/2021  Start Time:  2:00 PM  End Time:  3:00 PM  Facilitators: Bjorn Hathaway LPC    Focus: Goals  Number in attendance: 10    This visit is being performed via video   Clinician Location: [facility]   Pt is in [place] and pt's identity has been established.   Pt understands that we are limiting office visits due to the coronavirus pandemic and was informed that consent to treat includes permission to submit charges to pt's insurance. It was also shared that without being seen and evaluated in person, there is a risk that the information and/or assessment may be incomplete or inaccurate.  60 minutes were spent in this encounter.    Bjorn Hathaway LPC      Attendance: Present  Patient Response: Appropriate feedback, Attentive and Good eye contact    Pt plans to go grocery shopping and do some dishes. For self-care, pt plans to cook a meal. Pt does not want to binge on food. Pt plans to practice reality acceptance and self-compassion.    diabetes, your blood sugar may be higher than normal for 10-14 days following a steroid injection. Contact your doctor who manages your diabetes if your blood sugar is significantly higher than usual    If you experience any of the following, call Sports Medicine @  [phone]  -Fever over 100 degrees F  -Swelling, bleeding, redness, drainage, warmth at the injection site  -New or significant worsening pain    Large Joint Injection/Arthocentesis: L knee joint    Date/Time: 1/16/2025 3:21 PM    Performed by: Debra Barreto DO  Authorized by: Debra Barreto DO    Needle Size:  21 G  Guidance: ultrasound    Approach:  Anterolateral  Location:  Knee   Location comment:  Left knee joint      Medications:  3 mL lidocaine 1 %; 60 mg sodium hyaluronate 60 MG/3ML  Outcome:  Tolerated well, no immediate complications  Procedure discussed: discussed risks, benefits, and alternatives    Consent Given by:  Patient  Timeout: timeout called immediately prior to procedure    Prep: patient was prepped and draped in usual sterile fashion     Ultrasound was used to ensure safe and accurate needle placement and injection. Ultrasound images of the procedure were permanently stored. 1ml of 8.4% Sodium Bicarbonate solution was used to buffer the local numbing agent for today's injection          Dr. Debra Barreto DO  [facility]  Sports Medicine     -----      Again, thank you for allowing me to participate in the care of your patient.        Sincerely,        Debra Barreto DO    Electronically signed

## 2025-01-16 NOTE — PATIENT INSTRUCTIONS
"1. Bilateral primary osteoarthritis of knee    2. Chronic pain of both knees        Plan:  -Bilateral Durolane injections performed in clinic today  - Can repeat as often as insurance allows  - Continue to try and work on home exercise program  -Apply topical Voltaren gel up to every 6 hours as needed over area of pain  -Tylenol Extra Strength (1000mg) up to three times daily as needed for pain    If you had imaging performed/ordered at your appointment today, you can expect to see your radiology results in [employer] within approximately 48 business hours.     If you have questions/concerns after your appointment, please send my team a App[employer]Zoo message or call the clinic at [phone].     Dr. Debra Barreto, DO, [facility]  Sports Medicine and Orthopedics    Dr. Barreto's Clinic Locations and Contact Numbers:   [place] APPOINTMENTS: [phone]      [address] [facility] RADIOLOGY: [phone]   [place] [address] PHYSICAL THERAPY: [phone]    HAND THERAPY/OT: [phone]   [place] BILLING QUESTIONS: [phone] [address] FAX: [phone]   [place] [address]       Non-Operative treatment of Knee Osteoarthritis    To Decrease Stress  Try to get regular exercise and stay active, try lower impact activity such as elliptical, bicycle, swimming, or walking  Muscle Strengthening: Consider physical therapy and start home exercise program  Weight Control  Consider using walking poles/cane or a knee brace to offload knee    To Decrease Pain  Tylenol (Acetaminophen) as needed 2 tablets (1,000 mg) three times per day, not to exceed 3,000 mg per day   Trial topical Voltaren (Diclofenac) gel up to 4x daily to area of pain  Glucosamine chondroitin or Tumeric (Curcumin) supplements. (May try taking for 3 months and if helpful, continue)  Steroid injections (no sooner than every 3 months)  \"Gel\" (Viscosupplementation) injections (Synvisc, Euflexxa, etc. are brand names)  Platelet Rich Plasma " (Not covered by insurance)    Free Online Resources for Knee Osteoarthritis  My Knee exercise: Online program provides education and a 6-month knee strengthening program: [email or web address]  My Joint Yoga: Online 12-week yoga program with videos for knee/hip osteoarthritis: [email or web address]     Seated knee exercises for knee osteoarthritis  5 minute exercises: https://www.youtube.com/watch?v=N6ImxHATwDg

## 2025-01-26 NOTE — PROGRESS NOTES
PHYSICAL THERAPY EVALUATION  Type of Visit: Evaluation       Fall Risk Screen:  Fall screen completed by: PT  Have you fallen 2 or more times in the past year?: No  Have you fallen and had an injury in the past year?: No  Is patient a fall risk?: No    Subjective      Condition type:  Chronic (continuous duration <3 months)  Cause of current episode:  Unspecified     Nature of treatment:  Rehabilitative  Functional ability:  Moderate functional limitations  Documented POC (choose all that apply):  Measurable short and long term/discharge treatment goals related to physical and functional deficits.;Frequency of treatment visits and treatment activities to address deficit areas.;Patient agrees to program participation including home program  Briefly describe symptoms:  Caesar knee pain that is worsened with weight bearing activity  How did the symptoms start:  Gradual onset  Average pain/intensity last 24 hours:  4/10  Average pain/intensity past week:  7/10  Frequency of Symptoms:  Constantly (% of the time)  Symptom impact on ADLs:  Moderately  Condition change since eval:  N/A (first visit)  General health reported by patient:  Very good      Presenting condition or subjective complaint: knees thighs anklesand low back  Date of onset: 10/27/24    Relevant medical history: Arthritis; Asthma; Cancer; Depression; Diabetes; Fibromyalgia; High blood pressure; Incontinence; Menopause; Mental Illness; Migraines or headaches; Sleep disorder like apnea   Dates & types of surgery:      Prior diagnostic imaging/testing results: MRI; Other     Prior therapy history for the same diagnosis, illness or injury:        KEY PT FINDINGS:  1) Pain with end range knee flexion (caesar)  2) Weakness in glute max and glute med  3) Numbness noted in caesar lower extremities     Physical Therapy Initial Evaluation: Subjective History     Injury/Condition Details:  Presenting Complaint Patient presents to therapy with caesar knee pain. Patient  states that she has had pockets of swelling - mainly in the L knee. Pain currently in worse in the L knee - superior patellar pain. Patient states that she will get numbness into danielle lower extremities. This has happen for years.    Onset Timing/Date Worsened over the past 3 months   Mechanism Gradual onset     Symptom Behavior Details    Primary Symptoms Constant symptoms; worsen with activity   Worst Pain 7/10   Symptom Provocators Standing, walking, stairs   Best Pain 0/10    Symptom Relievers Laying down, ice and heat   Time of day dependent? No   Recent symptom change? no change in symptoms     Prior Testing/Intervention for current condition:  Prior Tests  x-ray and MRI   Prior Treatment PT      Lifestyle & General Medical History:  Employment N/A   Usual physical activities  (within past year) N/A   Orthopaedic history No   Notable medical history See Epic Chart   Patient Reported Health good        Living Environment  Social support: Alone   Type of home: Apartment/condo   Stairs to enter the home:         Ramp: No   Stairs inside the home: No       Help at home: None  Equipment owned: Miro[employer] Cane     Employment:      Hobbies/Interests:      Patient goals for therapy: sit stand walk distances and go up and down stairs       Objective     KNEE:    Standing Posture: ,Forward flexed, rounded shoulders    Gait: Normal    Functional:   - Squat/ SL Squat: Pain with increased knee flexion  - Lateral Step Down: Not tested          AROM: (* indicates patient's pain)   PROM:(over pressure)   L  R L R   Hyperextension         Extension   3 degrees of hyperextension 3 degrees of hyperextension     Flexion   152 155 *      Strength:   L R   HIP     Flex 4 4   Ext 4 4   ABd 4 4   KNEE     Flex 4 5   Ext 4 4     Hip PROM:     L R   Flexion WNL WNL   Extension WNL WNL   IR Pain Pain   ER Pain Pain   Jossue's     Hamstring 90-90     Perry           Palpation: Pain to the superior aspect of patella    Patellar tracking:  Hypermobility         Assessment & Plan   CLINICAL IMPRESSIONS  Medical Diagnosis: Chronic pain of both knees (M25.561, M25.562, G89.29), Primary localized osteoarthritis of left knee (M17.12), Bilateral primary osteoarthritis of knee (M17.0)    Treatment Diagnosis: Caesar knee pain with mobility deficits   Impression/Assessment: Patient is a 70 year old female with caesar knee pain complaints.  The following significant findings have been identified: Pain, Decreased ROM/flexibility, Decreased joint mobility, Decreased strength, Impaired muscle performance, and Decreased activity tolerance. These impairments interfere with their ability to perform self care tasks, recreational activities, household mobility, and community mobility as compared to previous level of function.     Clinical Decision Making (Complexity):  Clinical Presentation: Stable/Uncomplicated  Clinical Presentation Rationale: based on medical and personal factors listed in PT evaluation  Clinical Decision Making (Complexity): Low complexity    PLAN OF CARE  Treatment Interventions:  Interventions: Manual Therapy, Neuromuscular Re-education, Therapeutic Activity, Therapeutic Exercise, Self-Care/Home Management    Long Term Goals     PT Goal 1  Goal Identifier: Squatting  Goal Description: Patient will be able squat with <2/10 pain in caesar knees to improve participation with ADLs  Rationale: to maximize safety and independence with performance of ADLs and functional tasks  Target Date: 03/03/25  PT Goal 2  Goal Identifier: Stairs  Goal Description: Patient will be able to ascend and descend 10 stairs with reciprocal gait pattern and no pain in either knee  Rationale: to maximize safety and independence within the community;to maximize safety and independence within the home  Target Date: 03/24/25  PT Goal 3  Goal Identifier: Ambulation  Goal Description: Patient will be able to walk >1 mile without R or L knee pain  Rationale: to maximize safety and  independence with performance of ADLs and functional tasks;to maximize safety and independence within the community  Target Date: 04/21/25      Frequency of Treatment: 1x / week  Duration of Treatment: 12 weeks    Recommended Referrals to Other Professionals:  No referral required, patient appropriate for physical therapy.  Education Assessment:   Learner/Method: Patient;No Barriers to Learning  Education Comments: Discussed relevant anatomy, proper exercise progression, and goals from physical therapy    Risks and benefits of evaluation/treatment have been explained.   Patient/Family/caregiver agrees with Plan of Care.     Evaluation Time:     PT Eval, Low Complexity Minutes (74090): 20     Signing Clinician: ANGIE Perales [facility]                                                                                   OUTPATIENT PHYSICAL THERAPY      PLAN OF TREATMENT FOR OUTPATIENT REHABILITATION   Patient's Last Name, First Name, Grecia Maya YOB: 1955   Provider's Name   [facility]   Medical Record No.  [id number]     Onset Date: 10/27/24  Start of Care Date: 01/27/25     Medical Diagnosis:  Chronic pain of both knees (M25.561, M25.562, G89.29), Primary localized osteoarthritis of left knee (M17.12), Bilateral primary osteoarthritis of knee (M17.0)      PT Treatment Diagnosis:  Caesar knee pain with mobility deficits Plan of Treatment  Frequency/Duration: 1x / week/ 12 weeks    Certification date from 01/27/25 to 04/21/25         See note for plan of treatment details and functional goals     Floyd Torres, PT                         I CERTIFY THE NEED FOR THESE SERVICES FURNISHED UNDER        THIS PLAN OF TREATMENT AND WHILE UNDER MY CARE     (Physician attestation of this document indicates review and certification of the therapy plan).              Referring Provider:  Debra Barreto    Initial  Assessment  See Epic Evaluation- Start of Care Date: 01/27/25

## 2025-01-27 ENCOUNTER — THERAPY VISIT (OUTPATIENT)
Dept: PHYSICAL THERAPY | Facility: CLINIC | Age: 70
End: 2025-01-27
Attending: STUDENT IN AN ORGANIZED HEALTH CARE EDUCATION/TRAINING PROGRAM
Payer: COMMERCIAL

## 2025-01-27 DIAGNOSIS — G89.29 CHRONIC PAIN OF BOTH KNEES: Primary | ICD-10-CM

## 2025-01-27 DIAGNOSIS — M17.12 PRIMARY LOCALIZED OSTEOARTHRITIS OF LEFT KNEE: ICD-10-CM

## 2025-01-27 DIAGNOSIS — M25.561 CHRONIC PAIN OF BOTH KNEES: Primary | ICD-10-CM

## 2025-01-27 DIAGNOSIS — M17.0 BILATERAL PRIMARY OSTEOARTHRITIS OF KNEE: ICD-10-CM

## 2025-01-27 DIAGNOSIS — M25.562 CHRONIC PAIN OF BOTH KNEES: Primary | ICD-10-CM

## 2025-01-27 PROCEDURE — 97161 PT EVAL LOW COMPLEX 20 MIN: CPT | Mod: GP

## 2025-01-27 PROCEDURE — 97110 THERAPEUTIC EXERCISES: CPT | Mod: GP

## 2025-01-27 ASSESSMENT — ACTIVITIES OF DAILY LIVING (ADL)
HOW_WOULD_YOU_RATE_THE_OVERALL_FUNCTION_OF_YOUR_KNEE_DURING_YOUR_USUAL_DAILY_ACTIVITIES?: SEVERELY ABNORMAL
RISE FROM A CHAIR: ACTIVITY IS FAIRLY DIFFICULT
GO DOWN STAIRS: ACTIVITY IS VERY DIFFICULT
AS_A_RESULT_OF_YOUR_KNEE_INJURY,_HOW_WOULD_YOU_RATE_YOUR_CURRENT_LEVEL_OF_DAILY_ACTIVITY?: ABNORMAL
LIMPING: THE SYMPTOM AFFECTS MY ACTIVITY SEVERELY
SIT WITH YOUR KNEE BENT: ACTIVITY IS VERY DIFFICULT
PLEASE_INDICATE_YOR_PRIMARY_REASON_FOR_REFERRAL_TO_THERAPY:: KNEE
STIFFNESS: THE SYMPTOM AFFECTS MY ACTIVITY SEVERELY
STAND: ACTIVITY IS VERY DIFFICULT
KNEE_ACTIVITY_OF_DAILY_LIVING_SCORE: 30
WALK: ACTIVITY IS VERY DIFFICULT
HOW_WOULD_YOU_RATE_THE_OVERALL_FUNCTION_OF_YOUR_KNEE_DURING_YOUR_USUAL_DAILY_ACTIVITIES?: SEVERELY ABNORMAL
SQUAT: I AM UNABLE TO DO THE ACTIVITY
GO UP STAIRS: ACTIVITY IS VERY DIFFICULT
WEAKNESS: I DO NOT HAVE THE SYMPTOM
KNEEL ON THE FRONT OF YOUR KNEE: I AM UNABLE TO DO THE ACTIVITY
SWELLING: THE SYMPTOM AFFECTS MY ACTIVITY SEVERELY
GIVING WAY, BUCKLING OR SHIFTING OF KNEE: I DO NOT HAVE THE SYMPTOM
KNEE_ACTIVITY_OF_DAILY_LIVING_SUM: 21
GO DOWN STAIRS: ACTIVITY IS VERY DIFFICULT
PAIN: THE SYMPTOM AFFECTS MY ACTIVITY SEVERELY
PAIN: THE SYMPTOM AFFECTS MY ACTIVITY SEVERELY
STAND: ACTIVITY IS VERY DIFFICULT
LIMPING: THE SYMPTOM AFFECTS MY ACTIVITY SEVERELY
RAW_SCORE: 21
WALK: ACTIVITY IS VERY DIFFICULT
SIT WITH YOUR KNEE BENT: ACTIVITY IS VERY DIFFICULT
GIVING WAY, BUCKLING OR SHIFTING OF KNEE: I DO NOT HAVE THE SYMPTOM
SQUAT: I AM UNABLE TO DO THE ACTIVITY
STIFFNESS: THE SYMPTOM AFFECTS MY ACTIVITY SEVERELY
KNEEL ON THE FRONT OF YOUR KNEE: I AM UNABLE TO DO THE ACTIVITY
AS_A_RESULT_OF_YOUR_KNEE_INJURY,_HOW_WOULD_YOU_RATE_YOUR_CURRENT_LEVEL_OF_DAILY_ACTIVITY?: ABNORMAL
SWELLING: THE SYMPTOM AFFECTS MY ACTIVITY SEVERELY
RISE FROM A CHAIR: ACTIVITY IS FAIRLY DIFFICULT
GO UP STAIRS: ACTIVITY IS VERY DIFFICULT
WEAKNESS: I DO NOT HAVE THE SYMPTOM

## 2025-02-03 ENCOUNTER — TELEPHONE (OUTPATIENT)
Dept: ORTHOPEDICS | Facility: CLINIC | Age: 70
End: 2025-02-03

## 2025-02-03 DIAGNOSIS — M17.12 LOCALIZED OSTEOARTHRITIS OF LEFT KNEE: ICD-10-CM

## 2025-02-03 DIAGNOSIS — M17.12 PRIMARY OSTEOARTHRITIS OF LEFT KNEE: ICD-10-CM

## 2025-02-03 DIAGNOSIS — M25.561 CHRONIC PAIN OF BOTH KNEES: ICD-10-CM

## 2025-02-03 DIAGNOSIS — M25.562 CHRONIC PAIN OF BOTH KNEES: ICD-10-CM

## 2025-02-03 DIAGNOSIS — M23.42 BODIES, LOOSE, JOINT, KNEE, LEFT: Primary | ICD-10-CM

## 2025-02-03 DIAGNOSIS — G89.29 CHRONIC PAIN OF BOTH KNEES: ICD-10-CM

## 2025-02-03 NOTE — TELEPHONE ENCOUNTER
Other: Patient calling back to state that the injections do work and that her adverse reaction she believes is caused by the physical therapy. Patient states that the physical therapist worked her extensively and that was the reasoning for the swelling. Patient stated that the injections work well and does not want to stop administering them. Patient called mainly to see if she should come in to be evaluated because of the swelling from PT or if she should take a break from PT, has already cancelled next PT appointment. Patient will be scheduled to see Dr. Barreto before next PT appointment     Could we send this information to you in [place] or would you prefer to receive a phone call?:   Patient would prefer a phone call   Okay to leave a detailed message?: Yes at Cell number on file:    Telephone Information:   Mobile [phone]

## 2025-02-03 NOTE — PROGRESS NOTES
ASSESSMENT & PLAN    Grecia was seen today for pain and follow up.    Diagnoses and all orders for this visit:    Primary localized osteoarthritis of left knee    Bodies, loose, joint, knee, left    It band syndrome, left      70-year-old female presents to follow-up on left knee pain after recent Durolane injection and initial PT session.  She reports that she did well during the session but had a lot of knee soreness after the session and wanted some clarification regarding if she should continue therapy.  We discussed at length that I do expect her to have some muscle soreness after therapy, however she should not have any significant pain and if her therapist asks her to do any extremely painful exercises, she should notify them during her visit so that these can be modified.    Discussed that I highly recommend she return to therapy and continue her regular home exercise program, which she is agreeable with.  Can continue to do hyaluronic acid injections as long as they are providing adequate relief, however it seems that she got better response to Synvisc in the past.      Plan:  -Continue PT and home exercise program-can ask therapist to integrate some modalities such as scraping, manual therapy, and taping into her protocol as these seem to have been really helpful for her in the past  -Can continue topical gels such as lidocaine, arnica, or menthol/camphor as needed for pain. I will look into a compound cream for you  -Can repeat hyaluronic acid injections as needed, consider switching to Synvisc   -Take oral tumeric (Curcumin is the active form) daily for help with joint pain    Dr. Debra Barreto, DO  [facility]  Sports Medicine     -----  Chief Complaint   Patient presents with    Left Knee - Pain, Follow Up       SUBJECTIVE  Grecia Kilgore is a/an 70 year old female who is seen to follow-up on left knee. The patient was last seen 01/16/2025 injection of Durolane. Since  last visit, patient  did one session of PT. She did report some soreness after PT with some swelling, which has since improved.    The patient is seen alone        REVIEW OF SYSTEMS:  Pertinent positives/negative: As stated above in HPI    OBJECTIVE:  LMP 01/01/1985    General: Alert and in no distress  Skin: no visable rashes  CV: Extremities appear well perfused   Resp: normal respiratory effort, no conversational dyspnea   Psych: normal mood, affect  MSK:  L knee  TTP left IT band and distal quad, patellofemoral joint     RADIOLOGY:  Final results and radiologist's interpretation available in the [facility] health record.  Images below were personally reviewed and discussed with the patient in the office today.  My personal interpretation of the performed imaging: No new imaging                 Disclaimer: This note consists of symbols derived from dictation and/or voice recognition software. As a result, there may be errors in the script that have gone undetected. Please consider this when interpreting information found in this chart.

## 2025-02-03 NOTE — TELEPHONE ENCOUNTER
Patient last seen 1/16/25 for left knee Durolane injection.     See update and advise on next steps.     Rachele Farley, ATC

## 2025-02-03 NOTE — TELEPHONE ENCOUNTER
"Per Dr. Barreto:  \"I discussed with Grecia that if she is not getting any relief from durolane, it is not worthwhile to continue doing these injections.  We discussed preprior to her most recent injection that she did have some swelling after her last Durolane injection and that if this occurred again I do not recommend repeating this as she may have developed sensitivity to this medication, and unfortunately since she has [employer], there is no real alternative we can try.  At this point, we could either do a corticosteroid injection, refer her for genicular nerve blocks, or send back to orthopedic surgery.  If she would like her knees aspirated and corticosteroid injection performed, she can return anytime for this to help with her swelling and pain.  Can we please relay this to the patient? Thanks!\"    Outbound call to patient to discuss the above. No answer, no consent to communicate on file. Writer left brief message and provided scheduling number for call back.    Maile Winston, MONIQUET, LAT, ATC   "

## 2025-02-03 NOTE — TELEPHONE ENCOUNTER
Reason for call:  Patient reports no relief following knee injection. Started physical therapy which seems to have made it worse.  She canceled her PT appt today because her knee is swollen. Would like to know what to do next.    Cell number on file:    Telephone Information:   Mobile [phone]

## 2025-02-06 ENCOUNTER — OFFICE VISIT (OUTPATIENT)
Dept: ORTHOPEDICS | Facility: CLINIC | Age: 70
End: 2025-02-06
Payer: COMMERCIAL

## 2025-02-06 DIAGNOSIS — M76.32 IT BAND SYNDROME, LEFT: ICD-10-CM

## 2025-02-06 DIAGNOSIS — M23.42 BODIES, LOOSE, JOINT, KNEE, LEFT: ICD-10-CM

## 2025-02-06 DIAGNOSIS — M17.12 PRIMARY LOCALIZED OSTEOARTHRITIS OF LEFT KNEE: Primary | ICD-10-CM

## 2025-02-06 NOTE — LETTER
2/6/2025      Grecia Kilgore  [address]  [place] [facility] [address]      Dear Colleague,    Thank you for referring your patient, Grecia Kilgore, to the [facility] SPORTS MEDICINE CLIN[facility] [place]. Please see a copy of my visit note below.    ASSESSMENT & PLAN    Grecia was seen today for pain and follow up.    Diagnoses and all orders for this visit:    Primary localized osteoarthritis of left knee    Bodies, loose, joint, knee, left    It band syndrome, left      70-year-old female presents to follow-up on left knee pain after recent Durolane injection and initial PT session.  She reports that she did well during the session but had a lot of knee soreness after the session and wanted some clarification regarding if she should continue therapy.  We discussed at length that I do expect her to have some muscle soreness after therapy, however she should not have any significant pain and if her therapist asks her to do any extremely painful exercises, she should notify them during her visit so that these can be modified.    Discussed that I highly recommend she return to therapy and continue her regular home exercise program, which she is agreeable with.  Can continue to do hyaluronic acid injections as long as they are providing adequate relief, however it seems that she got better response to Synvisc in the past.      Plan:  -Continue PT and home exercise program-can ask therapist to integrate some modalities such as scraping, manual therapy, and taping into her protocol as these seem to have been really helpful for her in the past  -Can continue topical gels such as lidocaine, arnica, or menthol/camphor as needed for pain. I will look into a compound cream for you  -Can repeat hyaluronic acid injections as needed, consider switching to Synvisc   -Take oral tumeric (Curcumin is the active form) daily for help with joint pain    Dr. Debra Barreto, DO  [facility]  Physicians  Sports Medicine     -----  Chief Complaint   Patient presents with     Left Knee - Pain, Follow Up       SUBJECTIVE  Grecia Kilgore is a/an 70 year old female who is seen to follow-up on left knee. The patient was last seen 01/16/2025 injection of Durolane. Since last visit, patient  did one session of PT. She did report some soreness after PT with some swelling, which has since improved.    The patient is seen alone        REVIEW OF SYSTEMS:  Pertinent positives/negative: As stated above in HPI    OBJECTIVE:  LMP 01/01/1985    General: Alert and in no distress  Skin: no visable rashes  CV: Extremities appear well perfused   Resp: normal respiratory effort, no conversational dyspnea   Psych: normal mood, affect  MSK:  L knee  TTP left IT band and distal quad, patellofemoral joint     RADIOLOGY:  Final results and radiologist's interpretation available in the [facility] health record.  Images below were personally reviewed and discussed with the patient in the office today.  My personal interpretation of the performed imaging: No new imaging                 Disclaimer: This note consists of symbols derived from dictation and/or voice recognition software. As a result, there may be errors in the script that have gone undetected. Please consider this when interpreting information found in this chart.        Again, thank you for allowing me to participate in the care of your patient.        Sincerely,        Debra Barreto, DO    Electronically signed

## 2025-02-06 NOTE — PATIENT INSTRUCTIONS
"1. Primary localized osteoarthritis of left knee    2. Bodies, loose, joint, knee, left    3. It band syndrome, left        Plan:  -Continue PT and home exercise program  -Can continue topical gels such as lidocaine, arnica, or menthol/camphor as needed for pain. I will look into a compound cream for you  -Can repeat hyaluronic acid injections as needed, consider switching to Synvisc   -Take oral tumeric (Curcumin is the active form) daily for help with joint pain    If you had imaging performed/ordered at your appointment today, you can expect to see your radiology results in [facility] within approximately 48 business hours.     If you have questions/concerns after your appointment, please send my team a [facility] message or call the clinic at [phone].     Dr. Debra Barreto, , [facility]  Sports Medicine and Orthopedics    Dr. Barreto's Clinic Locations and Contact Numbers:   [place] APPOINTMENTS: [phone]      [address] [facility] RADIOLOGY: [phone]   [place] [address] PHYSICAL THERAPY: [phone]    HAND THERAPY/OT: [phone]   [place] BILLING QUESTIONS: [phone] [address] FAX: [phone]   [place] [address]       Non-Operative treatment of Knee Osteoarthritis    To Decrease Stress  Try to get regular exercise and stay active, try lower impact activity such as elliptical, bicycle, swimming, or walking  Muscle Strengthening: Consider physical therapy and start home exercise program  Weight Control  Consider using walking poles/cane or a knee brace to offload knee    To Decrease Pain  Tylenol (Acetaminophen) as needed 2 tablets (1,000 mg) three times per day, not to exceed 3,000 mg per day   Trial topical Voltaren (Diclofenac) gel up to 4x daily to area of pain  Glucosamine chondroitin or Tumeric (Curcumin) supplements. (May try taking for 3 months and if helpful, continue)  Steroid injections (no sooner than every 3 months)  \"Gel\" (Viscosupplementation) " injections (Synvisc, Euflexxa, etc. are brand names)  Platelet Rich Plasma (Not covered by insurance)    Free Online Resources for Knee Osteoarthritis  My Knee exercise: Online program provides education and a 6-month knee strengthening program: [email or web address]  My Joint Yoga: Online 12-week yoga program with videos for knee/hip osteoarthritis: [email or web address]

## 2025-02-13 ENCOUNTER — THERAPY VISIT (OUTPATIENT)
Dept: PHYSICAL THERAPY | Facility: CLINIC | Age: 70
End: 2025-02-13
Payer: COMMERCIAL

## 2025-02-13 DIAGNOSIS — M25.561 CHRONIC PAIN OF BOTH KNEES: Primary | ICD-10-CM

## 2025-02-13 DIAGNOSIS — M17.0 BILATERAL PRIMARY OSTEOARTHRITIS OF KNEE: ICD-10-CM

## 2025-02-13 DIAGNOSIS — M25.562 CHRONIC PAIN OF LEFT KNEE: ICD-10-CM

## 2025-02-13 DIAGNOSIS — G89.29 CHRONIC PAIN OF BOTH KNEES: Primary | ICD-10-CM

## 2025-02-13 DIAGNOSIS — G89.29 CHRONIC PAIN OF LEFT KNEE: ICD-10-CM

## 2025-02-13 DIAGNOSIS — M17.12 PRIMARY LOCALIZED OSTEOARTHRITIS OF LEFT KNEE: ICD-10-CM

## 2025-02-13 DIAGNOSIS — M25.562 CHRONIC PAIN OF BOTH KNEES: Primary | ICD-10-CM

## 2025-02-14 NOTE — TELEPHONE ENCOUNTER
Reason for call: Patient said provider was going to order a compound prescription for her knee. She would like to know if this has been done because she has not heard back from anyone or been notified by any pharmacy.    Cell number on file:    Telephone Information:   Mobile [phone]

## 2025-02-17 ENCOUNTER — THERAPY VISIT (OUTPATIENT)
Dept: PHYSICAL THERAPY | Facility: CLINIC | Age: 70
End: 2025-02-17
Payer: COMMERCIAL

## 2025-02-17 DIAGNOSIS — M17.9 OSTEOARTHRITIS, KNEE: Primary | ICD-10-CM

## 2025-02-17 PROCEDURE — 97140 MANUAL THERAPY 1/> REGIONS: CPT | Mod: GP | Performed by: PHYSICAL THERAPIST

## 2025-02-17 PROCEDURE — 97110 THERAPEUTIC EXERCISES: CPT | Mod: GP | Performed by: PHYSICAL THERAPIST

## 2025-02-17 RX ORDER — LIDOCAINE 50 MG/G
OINTMENT TOPICAL PRN
Qty: 50 G | Refills: 2 | Status: SHIPPED | OUTPATIENT
Start: 2025-02-17 | End: 2025-03-19

## 2025-02-17 NOTE — TELEPHONE ENCOUNTER
Outbound call to patient to discuss. She stated understanding and that she would prefer Voltaren and lidocaine creams be written as a prescription. Pharmacy selected in encounter.    AURORA Reynoso, LAT, ATC

## 2025-02-20 NOTE — TELEPHONE ENCOUNTER
Other: Patient called and stated that the pharmacy needs more info on the patient meds. Please call the patient if you have and question.     Could we send this information to you in [facility] or would you prefer to receive a phone call?:   Patient would prefer a phone call   Okay to leave a detailed message?: Yes at Cell number on file:    Telephone Information:   Mobile [phone]

## 2025-02-20 NOTE — TELEPHONE ENCOUNTER
Form from [facility] received and filled out by Dr. Barreto and faxed back. Outbound call to patient, no answer, writer left detailed message informing her of form being faxed.    AURORA Reynoso, LAT, ATC

## 2025-02-24 ENCOUNTER — THERAPY VISIT (OUTPATIENT)
Dept: PHYSICAL THERAPY | Facility: CLINIC | Age: 70
End: 2025-02-24
Payer: COMMERCIAL

## 2025-02-24 DIAGNOSIS — M25.562 CHRONIC PAIN OF BOTH KNEES: ICD-10-CM

## 2025-02-24 DIAGNOSIS — M25.561 CHRONIC PAIN OF BOTH KNEES: ICD-10-CM

## 2025-02-24 DIAGNOSIS — M17.9 OSTEOARTHRITIS, KNEE: Primary | ICD-10-CM

## 2025-02-24 DIAGNOSIS — M25.562 CHRONIC PAIN OF LEFT KNEE: ICD-10-CM

## 2025-02-24 DIAGNOSIS — G89.29 CHRONIC PAIN OF BOTH KNEES: ICD-10-CM

## 2025-02-24 DIAGNOSIS — M17.12 LOCALIZED OSTEOARTHRITIS OF LEFT KNEE: ICD-10-CM

## 2025-02-24 DIAGNOSIS — G89.29 CHRONIC PAIN OF LEFT KNEE: ICD-10-CM

## 2025-02-24 DIAGNOSIS — M17.12 PRIMARY LOCALIZED OSTEOARTHRITIS OF LEFT KNEE: ICD-10-CM

## 2025-02-24 DIAGNOSIS — M17.0 BILATERAL PRIMARY OSTEOARTHRITIS OF KNEE: ICD-10-CM

## 2025-02-24 PROCEDURE — 97140 MANUAL THERAPY 1/> REGIONS: CPT | Mod: GP | Performed by: PHYSICAL THERAPIST

## 2025-02-24 PROCEDURE — 97110 THERAPEUTIC EXERCISES: CPT | Mod: GP | Performed by: PHYSICAL THERAPIST

## 2025-02-26 NOTE — PROGRESS NOTES
SUBJECTIVE:                                                   Grecia Kilgore is a 65 year old female who presents to clinic today for the following health issue(s):  Patient presents with:  Follow Up: Follow up on previous UTI and group B strep      HPI:  Pt here today for SANTIAGO for both UTI and vaginitis. She feels 100% better. No issues.     Patient's last menstrual period was 1985..     Patient is sexually active, .  Using hysterectomy for contraception.    reports that she has never smoked. She has never used smokeless tobacco.    STD testing offered?  Declined    Health maintenance updated:  yes    Today's PHQ-2 Score:   PHQ-2 (  [employer]) 2018   Q1: Little interest or pleasure in doing things 1   Q2: Feeling down, depressed or hopeless 1   PHQ-2 Score 2     Today's PHQ-9 Score:   PHQ-9 SCORE 2019   PHQ-9 Total Score -   PHQ-9 Total Score 14     Today's WILL-7 Score:   WILL-7 SCORE 2019   Total Score -   Total Score 12       Problem list and histories reviewed & adjusted, as indicated.  Additional history: as documented.    Patient Active Problem List   Diagnosis     Allergic rhinitis     External hemorrhoids     Vitamin D deficiency     Fibromyalgia     Osteoarthritis, knee     Positive PPD     Panic disorder     Genital herpes     Advanced directives, counseling/discussion     DCIS (ductal carcinoma in situ) of breast     Heart palpitations     S/P hysterectomy - fibroids     Anxiety     Major depressive disorder, single episode, moderate (H)     Hyperlipidemia LDL goal <100     Type 2 diabetes mellitus without complication, without long-term current use of insulin (H)     Mild intermittent asthma without complication     BOO (obstructive sleep apnea)     CKD (chronic kidney disease) stage 3, GFR 30-59 ml/min (H)     Past Surgical History:   Procedure Laterality Date     ARTHRODESIS TOE(S)  2013    Procedure: ARTHRODESIS TOE(S);  BILATERAL GREAT TOE FUSION (C-ARM);   "Surgeon: Mary Guan MD;  Location: [facility]     ARTHRODESIS TOE(S) Left 12/19/2016    Procedure: ARTHRODESIS TOE(S);  Surgeon: Mary Guan MD;  Location: [facility]     ARTHROSCOPY KNEE Left 2/2/11     BIOPSY BREAST  2/7/2014    Procedure: BIOPSY BREAST;  Re-excision Left Breast Cavity for Margins ;  Surgeon: Lisset Amador DO;  Location: [facility] OR     BIOPSY BREAST SEED LOCALIZATION  1/22/2014    Procedure: BIOPSY BREAST SEED LOCALIZATION;  Left Breast Seed Localized Excisional Biopsy ;  Surgeon: Lisset Amador DO;  Location: [facility] OR     COLONOSCOPY  2005    nl - repeat 2015     FOOT SURGERY Bilateral 2013     HEMORRHOIDECTOMY BANDING      multiple times     HEMORRHOIDECTOMY INTERNAL N/A 7/24/2018    Procedure: HEMORRHOIDECTOMY INTERNAL;  three quadrant hemorrhoidectomy;  Surgeon: Lisa Patrick MD;  Location: [facility] OR     HYSTERECTOMY  7/1996    fibroids     REMOVE HARDWARE FOOT Left 2015     REPAIR TENDON FOOT Left 12/19/2016    Procedure: REPAIR TENDON FOOT;  Surgeon: Mary Guan MD;  Location: [facility]      Social History     Tobacco Use     Smoking status: Never Smoker     Smokeless tobacco: Never Used   Substance Use Topics     Alcohol use: No     Alcohol/week: 0.0 standard drinks      Problem (# of Occurrences) Relation (Name,Age of Onset)    Alcohol/Drug (1) Father    Breast Cancer (1) Mother    Cancer (1) Father    Cancer - colorectal (1) Maternal Grandfather    Cerebrovascular Disease (1) Maternal Grandmother    Diabetes (2) Mother, Maternal Grandmother            Current Outpatient Medications   Medication Sig     cetirizine (ZYRTEC) 10 MG tablet TAKE 1 TABLET(10 MG) BY MOUTH EVERY EVENING     meloxicam (MOBIC) 15 MG tablet TAKE 1 TABLET EVERY DAY AS NEEDED FOR PAIN     metFORMIN (GLUCOPHAGE) 500 MG tablet TAKE 1 TABLET EVERY DAY WITH BREAKFAST     mirtazapine (REMERON) 15 MG tablet TAKE 1 TABLET EVERY NIGHT AS NEEDED FOR SLEEP     montelukast (SINGULAIR) 10 MG tablet TAKE 1 TABLET  "EVERY DAY AT BEDTIME     simvastatin (ZOCOR) 40 MG tablet Take 1 tablet (40 mg) by mouth At Bedtime     albuterol (PROAIR HFA/PROVENTIL HFA/VENTOLIN HFA) 108 (90 Base) MCG/ACT inhaler Inhale 2 puffs into the lungs every 6 hours as needed for shortness of breath / dyspnea (Patient not taking: Reported on 8/7/2020)     fluticasone (FLONASE) 50 MCG/ACT nasal spray USE 1 SPRAY IN EACH NOSTRIL EVERY DAY (Patient not taking: Reported on 8/7/2020)     hydrOXYzine (ATARAX) 25 MG tablet Take 1 tablet (25 mg) by mouth 3 times daily as needed for anxiety (Patient not taking: Reported on 8/7/2020)     lisinopril (PRINIVIL/ZESTRIL) 5 MG tablet Take 1 tablet (5 mg) by mouth daily (Patient not taking: Reported on 8/7/2020)     methenamine (HIPREX) 1 g tablet Take 1 tablet (1 g) by mouth 2 times daily as needed (Patient not taking: Reported on 8/7/2020)     ranitidine (ZANTAC) 150 MG tablet TAKE 1 TABLET TWICE DAILY  (Patient not taking: Reported on 8/7/2020)     No current facility-administered medications for this visit.      Allergies   Allergen Reactions     Codeine Nausea     Morphine Itching     Nitrofuran Derivatives Hives     Phenothiazines      sedation     Primatene Mist [Epinephrine Bitartrate] Itching     neck itch with primatene mist     Vicodin [Hydrocodone-Acetaminophen] Nausea     Latex Itching and Rash       ROS:  12 point review of systems negative other than symptoms noted below or in the HPI.  No urinary frequency or dysuria, bladder or kidney problems      OBJECTIVE:     /72   Ht 1.702 m (5' 7\")   LMP 01/01/1985   BMI 26.31 kg/m    Body mass index is 26.31 kg/m .    Exam:  Constitutional:  Appearance: Well nourished, well developed alert, in no acute distress  Psychiatric:  Mentation appears normal and affect normal/bright.  Pelvic Exam:  External Genitalia:     Normal appearance for age, no discharge present, no tenderness present, no inflammatory lesions present, color normal  Vagina:     Normal " vaginal vault without central or paravaginal defects, no discharge present, no inflammatory lesions present, no masses present  Bladder:     Nontender to palpation  Urethra:   Urethral Body:  Urethra palpation normal, urethra structural support normal   Urethral Meatus:  No erythema or lesions present  Cervix:     Appearance healthy, no lesions present, nontender to palpation, no bleeding present  Uterus:     Surgically absent  Adnexa:     No adnexal tenderness present, no adnexal masses present  Perineum:     Perineum within normal limits, no evidence of trauma, no rashes or skin lesions present  Anus:     Anus within normal limits, no hemorrhoids present  Inguinal Lymph Nodes:     No lymphadenopathy present  Pubic Hair:     Normal pubic hair distribution for age  Genitalia and Groin:     No rashes present, no lesions present, no areas of discoloration, no masses present       In-Clinic Test Results:  Results for orders placed or performed in visit on 08/07/20 (from the past 24 hour(s))   UA without Microscopic   Result Value Ref Range    Color Urine Yellow     Appearance Urine Clear     Glucose Urine Negative NEG^Negative mg/dL    Bilirubin Urine 1+ (A) NEG^Negative    Ketones Urine Negative NEG^Negative mg/dL    Specific Gravity Urine >1.030 1.003 - 1.035    Blood Urine 1+ (A) NEG^Negative    pH Urine 5.5 5.0 - 7.0 pH    Protein Albumin Urine Negative NEG^Negative mg/dL    Urobilinogen Urine 1.0 0.2 - 1.0 EU/dL    Nitrite Urine Negative NEG^Negative    Leukocyte Esterase Urine Negative NEG^Negative    Source Midstream Urine        ASSESSMENT/PLAN:                                                        ICD-10-CM    1. Recent urinary tract infection  Z87.440 UA without Microscopic     Urine Culture Aerobic Bacterial   2. Subacute vaginitis  N76.1 Gram stain     Yeast culture     Group B strep PCR   3. Cystitis, unspecified without hematuria   N30.90 Urine Culture Aerobic Bacterial       There are no Patient  Instructions on file for this visit.    Normal exam. Minimal to no discharge present. UA+ blood. No leuks or nitrites. Will send UC. RTC PRN    RALPH Cooper CNP  [facility]

## 2025-03-03 ENCOUNTER — THERAPY VISIT (OUTPATIENT)
Dept: PHYSICAL THERAPY | Facility: CLINIC | Age: 70
End: 2025-03-03
Payer: COMMERCIAL

## 2025-03-03 DIAGNOSIS — M17.12 LOCALIZED OSTEOARTHRITIS OF LEFT KNEE: ICD-10-CM

## 2025-03-03 DIAGNOSIS — M25.562 CHRONIC PAIN OF LEFT KNEE: ICD-10-CM

## 2025-03-03 DIAGNOSIS — G89.29 CHRONIC PAIN OF BOTH KNEES: Primary | ICD-10-CM

## 2025-03-03 DIAGNOSIS — G89.29 CHRONIC PAIN OF LEFT KNEE: ICD-10-CM

## 2025-03-03 DIAGNOSIS — M17.9 OSTEOARTHRITIS, KNEE: ICD-10-CM

## 2025-03-03 DIAGNOSIS — M17.12 PRIMARY LOCALIZED OSTEOARTHRITIS OF LEFT KNEE: ICD-10-CM

## 2025-03-03 DIAGNOSIS — M25.562 CHRONIC PAIN OF BOTH KNEES: Primary | ICD-10-CM

## 2025-03-03 DIAGNOSIS — M25.561 CHRONIC PAIN OF BOTH KNEES: Primary | ICD-10-CM

## 2025-03-03 DIAGNOSIS — M17.0 BILATERAL PRIMARY OSTEOARTHRITIS OF KNEE: ICD-10-CM

## 2025-03-03 PROCEDURE — 97110 THERAPEUTIC EXERCISES: CPT | Mod: GP | Performed by: PHYSICAL THERAPIST

## 2025-03-10 ENCOUNTER — THERAPY VISIT (OUTPATIENT)
Dept: PHYSICAL THERAPY | Facility: CLINIC | Age: 70
End: 2025-03-10
Payer: COMMERCIAL

## 2025-03-10 ENCOUNTER — TELEPHONE (OUTPATIENT)
Dept: ORTHOPEDICS | Facility: CLINIC | Age: 70
End: 2025-03-10

## 2025-03-10 DIAGNOSIS — M25.562 CHRONIC PAIN OF LEFT KNEE: Primary | ICD-10-CM

## 2025-03-10 DIAGNOSIS — G89.29 CHRONIC PAIN OF LEFT KNEE: Primary | ICD-10-CM

## 2025-03-10 PROCEDURE — 97110 THERAPEUTIC EXERCISES: CPT | Mod: GP | Performed by: PHYSICAL THERAPIST

## 2025-03-10 PROCEDURE — 97140 MANUAL THERAPY 1/> REGIONS: CPT | Mod: GP | Performed by: PHYSICAL THERAPIST

## 2025-03-10 NOTE — TELEPHONE ENCOUNTER
Other: Grecia called she said her left knee felt like it was going to give out yesterday and she is pain. She would like to know if Dr. Barreto would do a referral for new imaging or does she need to be seen first. She would also like Dr. Barreto to do  prior authorization for the gel injection(the thicker medication) she also said that if her insurance won't cover it she also has [facility] so she can have it done either way. Please call patient to discuss She said she has physical therapy today 03/10 at 5:40, pm so if anyone would like to speak to her they can. Please call patient to discuss    Could we send this information to you in [facility] or would you prefer to receive a phone call?:   Patient would prefer a phone call   Okay to leave a detailed message?: Yes at Other phone number:  [phone] Grecia

## 2025-03-11 NOTE — TELEPHONE ENCOUNTER
"Upon review, Dr. Barreto would like the patient to switch to Synvisc for her hyaluronic acid injections. Writer reached out to member of the PA team to discuss and ensure injection could/ would be approved. It was discussed that per the FDA guidelines, hyaluronic acid injections CAN NOT be done prior to the 6 month gilma. Patient uses a [facility]/Medicare combination insurance, and Medicare strictly follows FDA guidelines.    Upon further review, Dr. Barreto has noted in 9/12/24 visit note that hyaluronic acid injections for this patient could be done every 3-4 months, due to the patient calling [facility] and them stating that the injections will be done every time the provider orders them.    Outbound call to patient to discuss the above, and patient became very frustrated and aggressive about this, including interrupting and talking over the writer. Writer calmly informed patient that there needs to be polite discussion about this, because this is a more in depth topic than the writer just saying \"no\", and patient stated understanding and no longer interrupted or talked over the writer. It was discussed that while writer understands the frustration surrounding the different information, that we need to follow FDA guidelines. It was then that there was discussion about Dr. Barreto noting doing the injection every 3-4 months, as well as switching to Synvisc instead of continuing Durolane, and patient requested that an order be placed, and she will discuss this further with Dr. Barreto at her 3/13/25 visit.    Patient has also requested for a new MRI of her knee to be completed, as the one from [facility] was done around [id number]. She believes that something in her knee is torn, and would like full imaging of it. Writer advised that the MRI will be determined by Dr. Barreto upon her return to clinic, and patient was amicable to this.    Patient dropped off records from [facility] including a disc with imaging to the  . Patient " requested that imaging be uploaded to her chart, and that she receive the imaging disk back at her upcoming appointment. Writer informed her that is possible, and writer has communicated with [facility] team regarding this.    Patient has been scheduled for Synvisc injection per her request, and writer will leave the discussion up to the provider at time of appointment. Appointment note for 3/13/25 will be updated to reflect the need to review this encounter prior to appointment. Order for Synvisc has NOT been pended at this time, due to FDA guidelines being 6 months between hyaluronic acid injections.    Maile Winston, AURORA, LAT, ATC

## 2025-03-11 NOTE — TELEPHONE ENCOUNTER
Patient called again regarding the below message. It was explained that Dr Barreto is out today and patient is hoping to have call back regarding imaging so she can get that scheduled, as well as get the pre auth for injection as she feels those are needed.     Please call at [facility] cell number [phone]    Thank you

## 2025-03-11 NOTE — TELEPHONE ENCOUNTER
Old records from [facility] came into clinic today at [facility].  This includes a CD.  Radiology department unable to have images from CD compatible with PACS, but did get images pushed over from [facility].  Records and CD are placed in the provider's basket to give back to the patient at her next visit.  Vipin Nixon, ATC

## 2025-03-13 ENCOUNTER — TELEPHONE (OUTPATIENT)
Dept: ORTHOPEDICS | Facility: CLINIC | Age: 70
End: 2025-03-13

## 2025-03-13 NOTE — TELEPHONE ENCOUNTER
Consent to communicate on file.    LVM for patient to r/s appointment (3/13) due to provider being out.    Left appointment scheduling phone number.    Dolly Church, Visit Facilitator

## 2025-03-18 NOTE — TELEPHONE ENCOUNTER
Patient has r/s to 3/27/2025.    No further action needed. Please sign encounter.  Dolly Church, Visit Facilitator

## 2025-03-27 ENCOUNTER — TELEPHONE (OUTPATIENT)
Dept: ORTHOPEDICS | Facility: CLINIC | Age: 70
End: 2025-03-27

## 2025-03-27 ENCOUNTER — OFFICE VISIT (OUTPATIENT)
Dept: ORTHOPEDICS | Facility: CLINIC | Age: 70
End: 2025-03-27
Payer: COMMERCIAL

## 2025-03-27 VITALS — HEIGHT: 67 IN | BODY MASS INDEX: 25.11 KG/M2 | WEIGHT: 160 LBS

## 2025-03-27 DIAGNOSIS — M17.12 PRIMARY LOCALIZED OSTEOARTHRITIS OF LEFT KNEE: Primary | ICD-10-CM

## 2025-03-27 DIAGNOSIS — M17.0 BILATERAL PRIMARY OSTEOARTHRITIS OF KNEE: ICD-10-CM

## 2025-03-27 DIAGNOSIS — M17.12 LOCALIZED OSTEOARTHRITIS OF LEFT KNEE: ICD-10-CM

## 2025-03-27 DIAGNOSIS — M23.42 BODIES, LOOSE, JOINT, KNEE, LEFT: ICD-10-CM

## 2025-03-27 NOTE — PROGRESS NOTES
ASSESSMENT & PLAN    Grecia was seen today for pain and pain.    Diagnoses and all orders for this visit:    Primary localized osteoarthritis of left knee  -     MR Knee Left w/o Contrast; Future    Bodies, loose, joint, knee, left  -     MR Knee Left w/o Contrast; Future    Bilateral primary osteoarthritis of knee  -     MR Knee Left w/o Contrast; Future    Localized osteoarthritis of left knee  -     MR Knee Left w/o Contrast; Future          Grecia is a pleasant 70-year-old female who presents to follow-up on her left knee pain and arthritis.  She has had some instability of the left knee and does have a known history of loose bodies inside her knee.  She has had some increased pain over her lateral knee recently.  She has been participating physical therapy and reports this is helping.  She has a small effusion on exam today, and we discussed that as she has not had any advanced imaging in many years it is reasonable to order a new MRI to further evaluate for any loose bodies as well as any meniscal or intra-articular articular pathology given her continued pain and instability.      Plan:  -Continue PT and home exercise program   -MRI of the left knee.  Will likely need repeat MRI of the right knee as well in the future  -Apply topical Voltaren gel up to every 6 hours as needed over area of pain  -Tylenol Extra Strength (1000mg) up to three times daily as needed for pain  -Follow-up after MRI     Dr. Debra Barreto, DO  [facility]  Sports Medicine     -----  Chief Complaint   Patient presents with    Left Knee - Pain    Right Knee - Pain       SUBJECTIVE  Grecia Kilgore is a/an 70 year old female who is seen to follow-up on bilateral knee pain. The patient was last seen 02/06/2025. Since last visit, lumps are better but having left knee, lateral pain.  Has had the new left knee pain for 2 weeks.  Reports no injury.  Swollen.  Has had 4-5 PT appointments completed for her  "knees.    The patient is seen alone        REVIEW OF SYSTEMS:  Pertinent positives/negative: As stated above in HPI    OBJECTIVE:  Ht 1.702 m (5' 7\")   Wt 72.6 kg (160 lb)   LMP 01/01/1985   BMI 25.06 kg/m     General: Alert and in no distress  Skin: no visable rashes  CV: Extremities appear well perfused   Resp: normal respiratory effort, no conversational dyspnea   Psych: normal mood, affect  MSK:  LEFT KNEE  Inspection:    Normal alignment; no edema, erythema, or ecchymosis present  Palpation:    Tender about the lateral joint line. Remainder of bony and ligamentous landmarks are nontender.    Small effusion is present    Patellofemoral crepitus is Present  Range of Motion:     00 extension to 1350 flexion  Strength:    Extensor mechanism intact  Special Tests:    Positive: Patellar grind and Loco     Negative: Valgus stress (0 and 30) and Varus stress (0 and 30)       RADIOLOGY:  Final results and radiologist's interpretation available in the [facility] health record.  Images below were personally reviewed and discussed with the patient in the office today.  My personal interpretation of the performed imaging: No new imaging                 Disclaimer: This note consists of symbols derived from dictation and/or voice recognition software. As a result, there may be errors in the script that have gone undetected. Please consider this when interpreting information found in this chart.    "

## 2025-03-27 NOTE — PATIENT INSTRUCTIONS
1. Primary localized osteoarthritis of left knee    2. Bodies, loose, joint, knee, left    3. Bilateral primary osteoarthritis of knee    4. Localized osteoarthritis of left knee        Plan:  -Continue PT and home exercise program   -MRI of the left knee  -Apply topical Voltaren gel up to every 6 hours as needed over area of pain  -Tylenol Extra Strength (1000mg) up to three times daily as needed for pain  -Follow-up after MRI     If you had imaging performed/ordered at your appointment today, you can expect to see your radiology results in [facility] within approximately 48 business hours.     If you have questions/concerns after your appointment, please send my team a [facility] message or call the clinic at [phone].     Dr. Debra Barreto, , [facility]  Sports Medicine and Orthopedics    Dr. Barreto's Clinic Locations and Contact Numbers:   [place] APPOINTMENTS: [phone]      [address] [facility] RADIOLOGY: [phone]   [place] [address] PHYSICAL THERAPY: [phone]    HAND THERAPY/OT: [phone]   [place] BILLING QUESTIONS: [phone] [address] FAX: [phone]   [place] [address]

## 2025-03-27 NOTE — TELEPHONE ENCOUNTER
Phone call to patient at providers request. Dr. Barreto needs to be out of the office by 4:30 - 4:40 pm so writer called patient to see if they could come in early. Patient stated that they could come in around 3:15/3:20. Patient was inquiring about getting both knees injected. Writer informed patient that Dr. Barreto and her can discuss at their appointment.     Jacek Mo, Visit Facilitator

## 2025-03-27 NOTE — LETTER
3/27/2025      Grecia Kilgore  [address]  [facility] [address]      Dear Colleague,    Thank you for referring your patient, Grecia Kilgore, to the [facility] [place]. Please see a copy of my visit note below.    ASSESSMENT & PLAN    Grecia was seen today for pain and pain.    Diagnoses and all orders for this visit:    Primary localized osteoarthritis of left knee  -     MR Knee Left w/o Contrast; Future    Bodies, loose, joint, knee, left  -     MR Knee Left w/o Contrast; Future    Bilateral primary osteoarthritis of knee  -     MR Knee Left w/o Contrast; Future    Localized osteoarthritis of left knee  -     MR Knee Left w/o Contrast; Future          Grecia is a pleasant 70-year-old female who presents to follow-up on her left knee pain and arthritis.  She has had some instability of the left knee and does have a known history of loose bodies inside her knee.  She has had some increased pain over her lateral knee recently.  She has been participating physical therapy and reports this is helping.  She has a small effusion on exam today, and we discussed that as she has not had any advanced imaging in many years it is reasonable to order a new MRI to further evaluate for any loose bodies as well as any meniscal or intra-articular articular pathology given her continued pain and instability.      Plan:  -Continue PT and home exercise program   -MRI of the left knee.  Will likely need repeat MRI of the right knee as well in the future  -Apply topical Voltaren gel up to every 6 hours as needed over area of pain  -Tylenol Extra Strength (1000mg) up to three times daily as needed for pain  -Follow-up after MRI     Dr. Debra Barreto, DO  [facility]  Sports Medicine     -----  Chief Complaint   Patient presents with     Left Knee - Pain     Right Knee - Pain       SUBJECTIVE  Grecia Kilgore is a/an 70 year old female who is  "seen to follow-up on bilateral knee pain. The patient was last seen 02/06/2025. Since last visit, lumps are better but having left knee, lateral pain.  Has had the new left knee pain for 2 weeks.  Reports no injury.  Swollen.  Has had 4-5 PT appointments completed for her knees.    The patient is seen alone        REVIEW OF SYSTEMS:  Pertinent positives/negative: As stated above in HPI    OBJECTIVE:  Ht 1.702 m (5' 7\")   Wt 72.6 kg (160 lb)   LMP 01/01/1985   BMI 25.06 kg/m     General: Alert and in no distress  Skin: no visable rashes  CV: Extremities appear well perfused   Resp: normal respiratory effort, no conversational dyspnea   Psych: normal mood, affect  MSK:  LEFT KNEE  Inspection:    Normal alignment; no edema, erythema, or ecchymosis present  Palpation:    Tender about the lateral joint line. Remainder of bony and ligamentous landmarks are nontender.    Small effusion is present    Patellofemoral crepitus is Present  Range of Motion:     00 extension to 1350 flexion  Strength:    Extensor mechanism intact  Special Tests:    Positive: Patellar grind and Loco     Negative: Valgus stress (0 and 30) and Varus stress (0 and 30)       RADIOLOGY:  Final results and radiologist's interpretation available in the [facility] health record.  Images below were personally reviewed and discussed with the patient in the office today.  My personal interpretation of the performed imaging: No new imaging                 Disclaimer: This note consists of symbols derived from dictation and/or voice recognition software. As a result, there may be errors in the script that have gone undetected. Please consider this when interpreting information found in this chart.        Again, thank you for allowing me to participate in the care of your patient.        Sincerely,        Debra Barreto, DO    Electronically signed"

## 2025-03-31 ENCOUNTER — THERAPY VISIT (OUTPATIENT)
Dept: PHYSICAL THERAPY | Facility: CLINIC | Age: 70
End: 2025-03-31
Payer: COMMERCIAL

## 2025-03-31 DIAGNOSIS — M17.9 OSTEOARTHRITIS, KNEE: Primary | ICD-10-CM

## 2025-03-31 PROCEDURE — 97110 THERAPEUTIC EXERCISES: CPT | Mod: GP | Performed by: PHYSICAL THERAPIST

## 2025-03-31 PROCEDURE — 97140 MANUAL THERAPY 1/> REGIONS: CPT | Mod: GP | Performed by: PHYSICAL THERAPIST

## 2025-04-09 ENCOUNTER — HOSPITAL ENCOUNTER (OUTPATIENT)
Dept: MRI IMAGING | Facility: CLINIC | Age: 70
Discharge: HOME | End: 2025-04-09
Attending: STUDENT IN AN ORGANIZED HEALTH CARE EDUCATION/TRAINING PROGRAM
Payer: COMMERCIAL

## 2025-04-09 DIAGNOSIS — M17.12 PRIMARY LOCALIZED OSTEOARTHRITIS OF LEFT KNEE: ICD-10-CM

## 2025-04-09 DIAGNOSIS — M17.0 BILATERAL PRIMARY OSTEOARTHRITIS OF KNEE: ICD-10-CM

## 2025-04-09 DIAGNOSIS — M17.12 LOCALIZED OSTEOARTHRITIS OF LEFT KNEE: ICD-10-CM

## 2025-04-09 DIAGNOSIS — M23.42 BODIES, LOOSE, JOINT, KNEE, LEFT: ICD-10-CM

## 2025-04-09 PROCEDURE — 73721 MRI JNT OF LWR EXTRE W/O DYE: CPT | Mod: LT

## 2025-04-14 ENCOUNTER — THERAPY VISIT (OUTPATIENT)
Dept: PHYSICAL THERAPY | Facility: CLINIC | Age: 70
End: 2025-04-14
Payer: COMMERCIAL

## 2025-04-14 ENCOUNTER — TELEPHONE (OUTPATIENT)
Dept: FAMILY MEDICINE | Facility: CLINIC | Age: 70
End: 2025-04-14

## 2025-04-14 DIAGNOSIS — M25.562 CHRONIC PAIN OF LEFT KNEE: Primary | ICD-10-CM

## 2025-04-14 DIAGNOSIS — G89.29 CHRONIC PAIN OF LEFT KNEE: Primary | ICD-10-CM

## 2025-04-14 PROCEDURE — 97140 MANUAL THERAPY 1/> REGIONS: CPT | Mod: GP | Performed by: PHYSICAL THERAPIST

## 2025-04-14 PROCEDURE — 97530 THERAPEUTIC ACTIVITIES: CPT | Mod: GP | Performed by: PHYSICAL THERAPIST

## 2025-04-14 NOTE — TELEPHONE ENCOUNTER
Declined triage. RN offered E-visit, pt declines. States she would like medication sent to pharmacy or VV. No VV available today with Dr. Mccall.     Pt states that she has been sick since January trip, her partner got sick from her and he tested positive for Covid after. States since then she has not gotten over being sick and has excessive sinus drainage. Excessive cough. States that when she talked to Dr. Mccall last that she should call because of her asthma if experiencing symptoms like this. Open to VV with PCP if ok to double book. States that last time this happened she got a Zpack and it helped, pt hoping medication can be sent without visit. RN advised that typically medications are only given with appointment but will route to PCP to review and advise.         Reshma Self RN

## 2025-04-15 ENCOUNTER — VIRTUAL VISIT (OUTPATIENT)
Dept: FAMILY MEDICINE | Facility: CLINIC | Age: 70
End: 2025-04-15
Payer: COMMERCIAL

## 2025-04-15 DIAGNOSIS — J01.90 ACUTE SINUSITIS WITH SYMPTOMS > 10 DAYS: Primary | ICD-10-CM

## 2025-04-15 PROCEDURE — 98005 SYNCH AUDIO-VIDEO EST LOW 20: CPT | Performed by: FAMILY MEDICINE

## 2025-04-15 RX ORDER — AZITHROMYCIN 250 MG/1
TABLET, FILM COATED ORAL
Qty: 6 TABLET | Refills: 0 | Status: SHIPPED | OUTPATIENT
Start: 2025-04-15

## 2025-04-15 ASSESSMENT — ENCOUNTER SYMPTOMS: COUGH: 1

## 2025-04-15 NOTE — PROGRESS NOTES
"Grecia is a 70 year old who is being evaluated via a billable video visit.    How would you like to obtain your AVS? Mail a copy  If the video visit is dropped, the invitation should be resent by: Text to cell phone: [phone]  Will anyone else be joining your video visit? No  {If patient encounters technical issues they should call [phone] :108281}    {PROVIDER CHARTING PREFERENCE:963111}    Subjective   Grecia is a 70 year old, presenting for the following health issues:  Cough        4/15/2025    12:08 PM   Additional Questions   Roomed by Bradford HESS     Video Start Time: {video visit start/end time for provider to select:406219}    Cough          Acute Illness  Acute illness concerns: Ongoing cough  Onset/Duration: 3 months   Symptoms:  Fever: No  Chills/Sweats: No  Headache (location?): No  Sinus Pressure: No  Conjunctivitis:  No  Ear Pain: no  Rhinorrhea: No  Congestion: YES  Sore Throat: No  Cough: YES  Wheeze: No  Decreased Appetite: No  Nausea: No  Vomiting: No  Diarrhea: No  Dysuria/Freq.: No  Dysuria or Hematuria: No  Fatigue/Achiness: YES  Sick/Strep Exposure: YES  Therapies tried and outcome: Afrin nasal, fluticasone, hot tea   {additonal problems for provider to add (Optional):179896}    {ROS Picklists (Optional):835526}      Objective           Vitals:  No vitals were obtained today due to virtual visit.    Physical Exam   {video visit exam brief selected:735705}    {Diagnostic Test Results (Optional):484970}      Video-Visit Details    Type of service:  Video Visit   Video End Time:{video visit start/end time for provider to select:054181}  Originating Location (pt. Location): {video visit patient location:519446::\"Home\"}  {PROVIDER LOCATION On-site should be selected for visits conducted from your clinic location or adjoining [facility] hospital, academic office, or other nearby [facility] building. Off-site should be selected for all other provider locations, including home:8786[address]}  Distant Location " "(provider location):  {virtual location provider:760008}  Platform used for Video Visit: {Virtual Visit Platforms:438068::\"[employer]\"}  Signed Electronically by: Romel Mccall MD  {Email feedback regarding this note to [email or web address]   :805687}  "

## 2025-04-15 NOTE — TELEPHONE ENCOUNTER
Please double book her for a virtual visit at 1 PM today    Romel Mccall MD  [facility], [place]

## 2025-04-30 ENCOUNTER — TRANSFERRED RECORDS (OUTPATIENT)
Dept: HEALTH INFORMATION MANAGEMENT | Facility: CLINIC | Age: 70
End: 2025-04-30
Payer: COMMERCIAL

## 2025-05-19 ENCOUNTER — THERAPY VISIT (OUTPATIENT)
Dept: PHYSICAL THERAPY | Facility: CLINIC | Age: 70
End: 2025-05-19
Payer: COMMERCIAL

## 2025-05-19 DIAGNOSIS — G89.29 CHRONIC PAIN OF LEFT KNEE: Primary | ICD-10-CM

## 2025-05-19 DIAGNOSIS — M25.562 CHRONIC PAIN OF LEFT KNEE: Primary | ICD-10-CM

## 2025-05-19 PROCEDURE — 97110 THERAPEUTIC EXERCISES: CPT | Mod: GP | Performed by: PHYSICAL THERAPIST

## 2025-05-19 NOTE — PROGRESS NOTES
[facility]                                                                                   OUTPATIENT PHYSICAL THERAPY    PLAN OF TREATMENT FOR OUTPATIENT REHABILITATION   Patient's Last Name, First Name, Grecia Maya YOB: 1955   Provider's Name   [facility]   Medical Record No.  [id number]     Onset Date: 10/27/24  Start of Care Date: 01/27/25     Medical Diagnosis:  Chronic pain of both knees (M25.561, M25.562, G89.29), Primary localized osteoarthritis of left knee (M17.12), Bilateral primary osteoarthritis of knee (M17.0)      PT Treatment Diagnosis:  Caesar knee pain with mobility deficits Plan of Treatment  Frequency/Duration: 2 x  month/ 3 months    Certification date from 04/22/25 to 07/22/25         See note for plan of treatment details and functional goals     Jose Guadalupe Kim PT                         I CERTIFY THE NEED FOR THESE SERVICES FURNISHED UNDER        THIS PLAN OF TREATMENT AND WHILE UNDER MY CARE     (Physician attestation of this document indicates review and certification of the therapy plan).              Referring Provider:  Debra Barreto    Initial Assessment  See Epic Evaluation- Start of Care Date: 01/27/25          PLAN  Continue therapy per current plan of care.     05/19/25 0500   Appointment Info   Signing clinician's name / credentials Jose Guadalupe Kim PT, DPT   Total/Authorized Visits PTE&T   Visits Used 7   Medical Diagnosis Chronic pain of both knees (M25.561, M25.562, G89.29), Primary localized osteoarthritis of left knee (M17.12), Bilateral primary osteoarthritis of knee (M17.0)   PT Tx Diagnosis Caesar knee pain with mobility deficits   Other pertinent information 7 min tardy.   Progress Note/Certification   Start of Care Date 01/27/25   Onset of illness/injury or Date of Surgery 10/27/24   Therapy Frequency 2 x  month   Predicted Duration 3 months   Certification date from 04/22/25    Certification date to 07/22/25   Progress Note Due Date 04/07/25   [facility] Authorization Information   Condition type Chronic (continuous duration <3 months)   Cause of current episode Unspecified   Nature of treatment Rehabilitative   Functional ability Moderate functional limitations   Documented POC (choose all that apply) Measurable short and long term/discharge treatment goals related to physical and functional deficits.;Frequency of treatment visits and treatment activities to address deficit areas.;Patient agrees to program participation including home program   Briefly describe symptoms Caesar knee pain left worse than right   How did the symptoms start Gradual onset   Last 24H: avg pain/symptom intensity  2/10   Past wk: avg pain/symptom intensity 2/10   Frequency of Symptoms Intermittently (0-25% of the time)   Symptom impact on ADLs Quite a bit   Condition change since eval Better   General health reported by patient Very good   GOALS   PT Goals 2;3   PT Goal 1   Goal Identifier Squatting   Goal Description Patient will be able squat with <2/10 pain in caesar knees to improve participation with ADLs   Rationale to maximize safety and independence with performance of ADLs and functional tasks   Goal Progress added squats today, progressing, date extended   Target Date 07/22/25   PT Goal 2   Goal Identifier Stairs   Goal Description Patient will be able to ascend and descend 10 stairs with reciprocal gait pattern and no pain in either knee   Rationale to maximize safety and independence within the community;to maximize safety and independence within the home   Goal Progress goal met   Target Date 03/24/25   Date Met 05/19/25   PT Goal 3   Goal Identifier Ambulation   Goal Description Patient will be able to walk >1 mile without R or L knee pain   Rationale to maximize safety and independence with performance of ADLs and functional tasks;to maximize safety and independence within the community   Goal Progress cont  progress date extended   Target Date 07/22/25   Subjective Report   Subjective Report Improvement after Hoffa's fat pad injections. Wants to work on glutes more to support the knee, keeping up with the exercise   Objective Measures   Objective Measures Objective Measure 1   Objective Measure 1   Objective Measure ROM   Details L knee 0-0-155   Treatment Interventions (PT)   Interventions Therapeutic Procedure/Exercise;Manual Therapy;Therapeutic Activity   Therapeutic Procedure/Exercise   Therapeutic Procedures: strength, endurance, ROM, flexibility minutes (48919) 30   Therapeutic Procedures Ther Proc 2;Ther Proc 3   Ther Proc 1 Education   Ther Proc 1 - Details HEP completion, modification for improved consistancy   Ther Proc 2 Leg Press   Ther Proc 2 - Details Single leg 3pl 2 x 20B   Ther Proc 3 Gastroc Block   Ther Proc 3 - Details NT   PTRx Ther Proc 1 Hip Flexion Straight Leg Raise   PTRx Ther Proc 1 - Details x 30B    PTRx Ther Proc 2 Heel Slides   PTRx Ther Proc 2 - Details HEP   PTRx Ther Proc 3 Hip Abduction Straight Leg Raise   PTRx Ther Proc 3 - Details x 20B   PTRx Ther Proc 4 Isometric Quad   PTRx Ther Proc 4 - Details HEP   PTRx Ther Proc 5 Eccentric Gastroc Stretch   PTRx Ther Proc 5 - Details 30s hold   PTRx Ther Proc 6 Education Sheet Ankle/Foot   PTRx Ther Proc 6 - Details HEP   PTRx Ther Proc 7 Lateral Step Down   PTRx Ther Proc 7 - Details progressed to 6inch step x 15B good challenge overall    PTRx Ther Proc 8 Standing Fire Hydrant   PTRx Ther Proc 8 - Details progressed to blue band x 20B tolerates well overall cues for form and control throughout   PTRx Ther Proc 9 Strap Assisted Straight Leg Stretch   PTRx Ther Proc 9 - Details NT   PTRx Ther Proc 10 Bridging #1   PTRx Ther Proc 10 - Details HEP   PTRx Ther Proc 11 Single Leg Bridge   PTRx Ther Proc 11 - Details x 10B with 5 sec holds good challenge overall    PTRx Ther Proc 12 Standing Quadriceps Stretch   PTRx Ther Proc 12 - Details x 30  sec each way    PTRx Ther Proc 13 Standing Gastroc Stretch   PTRx Ther Proc 13 - Details perf each way    PTRx Ther Proc 14 Side Stepping With Theraband   PTRx Ther Proc 14 - Details progressed to black band to fatigue    PTRx Ther Proc 15 Prone Hip Extension With Bent Knee   PTRx Ther Proc 15 - Details x10B fatinging overall   PTRx Ther Proc 16 Pelvic Release with Tennis Ball   PTRx Ther Proc 16 - Details x 10 with 5 sec holds feet together    PTRx Ther Proc 17 Stair Step Ups   PTRx Ther Proc 17 - Details 8 inch step x 20B cues to squeeze the glute   PTRx Ther Proc 18 Squat With Chair   PTRx Ther Proc 18 - Details x 10 with the chair behind cues to squeeze   Skilled Intervention To improve knee stability and strength   Patient Response/Progress Tolerated well   Neuromuscular Re-education   PTRx Neuro Re-ed 1 Lateral Step Down   PTRx Neuro Re-ed 1 - Details NT   Education   Learner/Method Patient;No Barriers to Learning   Education Comments Discussed relevant anatomy, proper exercise progression, and goals from physical therapy   Plan   Home program PTrx - layla   Plan for next session PN, continue PT   Total Session Time   Timed Code Treatment Minutes 30   Total Treatment Time (sum of timed and untimed services) 30       Beginning/End Dates of Progress Note Reporting Period:    to 05/19/2025    Referring Provider:  Debra Barreto

## 2025-06-01 ENCOUNTER — HOSPITAL ENCOUNTER (EMERGENCY)
Facility: CLINIC | Age: 70
Discharge: LEFT BEFORE DISCHARGE | End: 2025-06-01
Payer: COMMERCIAL

## 2025-06-09 ENCOUNTER — THERAPY VISIT (OUTPATIENT)
Dept: PHYSICAL THERAPY | Facility: CLINIC | Age: 70
End: 2025-06-09
Payer: COMMERCIAL

## 2025-06-09 DIAGNOSIS — M25.561 CHRONIC PAIN OF BOTH KNEES: ICD-10-CM

## 2025-06-09 DIAGNOSIS — G89.29 CHRONIC PAIN OF LEFT KNEE: ICD-10-CM

## 2025-06-09 DIAGNOSIS — M25.562 CHRONIC PAIN OF LEFT KNEE: ICD-10-CM

## 2025-06-09 DIAGNOSIS — M17.9 OSTEOARTHRITIS, KNEE: Primary | ICD-10-CM

## 2025-06-09 DIAGNOSIS — G89.29 CHRONIC PAIN OF BOTH KNEES: ICD-10-CM

## 2025-06-09 DIAGNOSIS — M25.562 CHRONIC PAIN OF BOTH KNEES: ICD-10-CM

## 2025-06-09 PROCEDURE — 97110 THERAPEUTIC EXERCISES: CPT | Mod: GP | Performed by: PHYSICAL THERAPIST

## 2025-06-10 NOTE — PROGRESS NOTES
DISCHARGE  Reason for Discharge: Patient has met all goals.  No further expectation of progress.    Equipment Issued: theraband       06/09/25 0500   Appointment Info   Signing clinician's name / credentials Jose Guadalupe Kim, PT, DPT   Total/Authorized Visits PTE&T   Visits Used 8   Medical Diagnosis Chronic pain of both knees (M25.561, M25.562, G89.29), Primary localized osteoarthritis of left knee (M17.12), Bilateral primary osteoarthritis of knee (M17.0)   PT Tx Diagnosis Caesar knee pain with mobility deficits   Progress Note/Certification   Start of Care Date 01/27/25   Onset of illness/injury or Date of Surgery 10/27/24   Therapy Frequency 2 x  month   Predicted Duration 3 months   Certification date from 04/22/25   Certification date to 07/22/25   Progress Note Due Date 04/07/25   Progress Note Completed Date 05/19/25   [facility] Authorization Information   Condition type Chronic (continuous duration <3 months)   Cause of current episode Unspecified   Nature of treatment Rehabilitative   Documented POC (choose all that apply) Measurable short and long term/discharge treatment goals related to physical and functional deficits.;Frequency of treatment visits and treatment activities to address deficit areas.;Patient agrees to program participation including home program   How did the symptoms start Gradual onset   General health reported by patient Very good   GOALS   PT Goals 2;3   PT Goal 1   Goal Identifier Squatting   Goal Description Patient will be able squat with <2/10 pain in caesar knees to improve participation with ADLs   Rationale to maximize safety and independence with performance of ADLs and functional tasks   Goal Progress goal met   Target Date 07/22/25   Date Met 06/10/25   PT Goal 2   Goal Identifier Stairs   Goal Description Patient will be able to ascend and descend 10 stairs with reciprocal gait pattern and no pain in either knee   Rationale to maximize safety and independence within the community;to  maximize safety and independence within the home   Goal Progress goal met   Target Date 03/24/25   Date Met 05/19/25   PT Goal 3   Goal Identifier Ambulation   Goal Description Patient will be able to walk >1 mile without R or L knee pain   Rationale to maximize safety and independence with performance of ADLs and functional tasks;to maximize safety and independence within the community   Goal Progress cont progress date extended   Target Date 07/22/25   Date Met 06/10/25   Subjective Report   Subjective Report 15 min tardy. Knees are feeling good overall. Taking the stairs often without issue. Uses the eliptical now without problem, Agrees with POC and is ready to d/c to HEP   Objective Measures   Objective Measures Objective Measure 1;Objective Measure 2   Objective Measure 1   Objective Measure ROM   Details L knee 0-0-156, R0-0-156   Objective Measure 2   Objective Measure MMT   Details Knee extension 5/5, Knee flexion 5/5 danielle pain free   Treatment Interventions (PT)   Interventions Therapeutic Procedure/Exercise;Manual Therapy;Therapeutic Activity   Therapeutic Procedure/Exercise   Therapeutic Procedures: strength, endurance, ROM, flexibility minutes (20931) 25   Therapeutic Procedures Ther Proc 2;Ther Proc 3   Ther Proc 1 Education   Ther Proc 1 - Details HEP completion, modification for improved consistancy   Ther Proc 2 Leg Press   Ther Proc 2 - Details NT   Ther Proc 3 Gastroc Block   Ther Proc 3 - Details NT   PTRx Ther Proc 1 Hip Flexion Straight Leg Raise   PTRx Ther Proc 1 - Details x 30B tolerates well overall    PTRx Ther Proc 2 Heel Slides   PTRx Ther Proc 2 - Details HEP   PTRx Ther Proc 3 Hip Abduction Straight Leg Raise   PTRx Ther Proc 3 - Details x 20B   PTRx Ther Proc 4 Isometric Quad   PTRx Ther Proc 4 - Details HEP   PTRx Ther Proc 5 Eccentric Gastroc Stretch   PTRx Ther Proc 5 - Details 30s hold   PTRx Ther Proc 6 Education Sheet Ankle/Foot   PTRx Ther Proc 6 - Details HEP   PTRx Ther Proc 7  Lateral Step Down   PTRx Ther Proc 7 - Details progressed to 6inch step x 15B good challenge overall    PTRx Ther Proc 8 Standing Fire Hydrant   PTRx Ther Proc 8 - Details progressed to blue band x 20B tolerates well overall cues for form and control throughout   PTRx Ther Proc 9 Strap Assisted Straight Leg Stretch   PTRx Ther Proc 9 - Details NT   PTRx Ther Proc 10 Bridging #1   PTRx Ther Proc 10 - Details HEP   PTRx Ther Proc 11 Single Leg Bridge   PTRx Ther Proc 11 - Details x 10B with 5 sec holds good challenge overall    PTRx Ther Proc 12 Standing Quadriceps Stretch   PTRx Ther Proc 12 - Details x 30 sec each way    PTRx Ther Proc 13 Standing Gastroc Stretch   PTRx Ther Proc 13 - Details perf each way    PTRx Ther Proc 14 Side Stepping With Theraband   PTRx Ther Proc 14 - Details progressed to black band to fatigue    PTRx Ther Proc 15 Prone Hip Extension With Bent Knee   PTRx Ther Proc 15 - Details x10B fatinging overall   PTRx Ther Proc 16 Pelvic Release with Tennis Ball   PTRx Ther Proc 16 - Details x 10 with 5 sec holds feet together    PTRx Ther Proc 17 Stair Step Ups   PTRx Ther Proc 17 - Details 8 inch step x 20B cues to squeeze the glute   PTRx Ther Proc 18 Squat With Chair   PTRx Ther Proc 18 - Details x 10 with the chair behind cues to squeeze   PTRx Ther Proc 19 Squats with Theraband   PTRx Ther Proc 19 - Details No Notes   PTRx Ther Proc 20 Functional Lunge Forward   PTRx Ther Proc 20 - Details No Notes   Skilled Intervention To improve knee stability and strength   Patient Response/Progress Tolerated well   Neuromuscular Re-education   PTRx Neuro Re-ed 1 Stepdown Forward   PTRx Neuro Re-ed 1 - Details No Notes   Education   Learner/Method Patient;No Barriers to Learning   Education Comments Discussed relevant anatomy, proper exercise progression, and goals from physical therapy   Plan   Home program PTrx - layla   Plan for next session d/c to HEP     Discharge Plan: Patient to continue home  program.    Referring Provider:  Debra Barreto

## 2025-06-15 DIAGNOSIS — J45.20 MILD INTERMITTENT ASTHMA WITHOUT COMPLICATION: ICD-10-CM

## 2025-06-16 RX ORDER — ALBUTEROL SULFATE 90 UG/1
INHALANT RESPIRATORY (INHALATION)
Qty: 34 G | Refills: 2 | Status: SHIPPED | OUTPATIENT
Start: 2025-06-16

## 2025-06-16 NOTE — TELEPHONE ENCOUNTER
Medication passed protocol, however, refill RN could not approve because of the medication warning/interaction. Provider, please approve or deny the prescription.    Cait LOZADA RN  [facility] Triage Team       [Dear  ___] : Dear  [unfilled], [Consult Letter:] : I had the pleasure of evaluating your patient, [unfilled]. [Please see my note below.] : Please see my note below. [Consult Closing:] : Thank you very much for allowing me to participate in the care of this patient.  If you have any questions, please do not hesitate to contact me. [Sincerely,] : Sincerely, [FreeTextEntry3] : Thai Galloway MD\par tel: [phone]\par fax: [phone]\par

## 2025-07-03 ENCOUNTER — NURSE TRIAGE (OUTPATIENT)
Dept: FAMILY MEDICINE | Facility: CLINIC | Age: 70
End: 2025-07-03
Payer: COMMERCIAL

## 2025-07-03 NOTE — TELEPHONE ENCOUNTER
"  Additional Information   Negative: SEVERE difficulty breathing (e.g., struggling for each breath, speaks in single words, pulse > 120)   Negative: Breathing stopped and hasn't returned   Negative: Choking on something   Negative: Bluish (or gray) lips or face   Negative: Difficult to awaken or acting confused (e.g., disoriented, slurred speech)   Negative: Passed out (e.g., fainted, lost consciousness, blacked out and was not responding)   Negative: Wheezing started suddenly after medicine, an allergic food, or bee sting   Negative: Stridor (harsh sound while breathing in)   Negative: Slow, shallow and weak breathing   Negative: Sounds like a life-threatening emergency to the triager   Negative: Chest pain   Negative: Wheezing (high pitched whistling sound) and previous asthma attacks or use of asthma medicines   Negative: Breathing difficulty and within 14 days of COVID-19 EXPOSURE (close contact) with someone diagnosed with COVID-19 (e.g., COVID test positive)   Negative: Breathing difficulty and COVID-19 is widespread in the community   Negative: Breathing diffculty and only present when coughing   Negative: Breathing difficulty and only from stuffy nose   Negative: Breathing diffculty and only from stuffy nose or runny nose from common cold   Negative: MODERATE difficulty breathing (e.g., speaks in phrases, SOB even at rest, pulse 100-120) of new-onset or worse than normal   Negative: Oxygen level (e.g., pulse oximetry) 90% or lower   Negative: Wheezing can be heard across the room   Negative: Drooling or spitting out saliva (because can't swallow)   Negative: Any history of prior \"blood clot\" in leg or lungs   Negative: Illness requiring prolonged bedrest in past month (e.g., immobilization, long hospital stay)   Negative: Hip or leg fracture (broken bone) in past month (or had cast on leg or ankle in past month)   Negative: Major surgery in the past month   Negative: Long-distance travel in past month (e.g., " "car, bus, train, plane; with trip lasting 6 or more hours)   Negative: Cancer treatment in past six months (or has cancer now)   Negative: Extra heartbeats, irregular heart beating, or heart is beating very fast (i.e., 'palpitations')   Negative: Fever > 103 F (39.4 C)   Negative: Fever > 101 F (38.3 C) and over 60 years of age   Negative: Fever > 100 F (37.8 C) and bedridden (e.g., nursing home patient, stroke, chronic illness, recovering from surgery)   Negative: Fever > 100 F (37.8 C) and diabetes mellitus or weak immune system (e.g., HIV positive, cancer chemo, splenectomy, organ transplant, chronic steroids)   Negative: Periods where breathing stops and then resumes normally and bedridden (e.g., nursing home patient, CVA)   Negative: Pregnant or postpartum (from 0 to 6 weeks after delivery)   Negative: Patient sounds very sick or weak to the triager   Negative: MILD difficulty breathing (e.g., minimal/no SOB at rest, SOB with walking, pulse < 100) of new-onset or worse than normal   Negative: Longstanding difficulty breathing (e.g., CHF, COPD, emphysema) and worse than normal   Negative: Longstanding difficulty breathing and not responding to usual therapy   Negative: Continuous (nonstop) coughing   Negative: Patient wants to be seen   Negative: Oxygen level (e.g., pulse oximetry) 91 to 94%   Negative: MODERATE longstanding difficulty breathing (e.g., speaks in phrases, SOB even at rest, pulse 100-120) and SAME as normal   Negative: MILD longstanding difficulty breathing (e.g., minimal/no SOB at rest, SOB with walking, pulse <100) and SAME as normal    Answer Assessment - Initial Assessment Questions  1. RESPIRATORY STATUS: \"Describe your breathing?\" (e.g., wheezing, shortness of breath, unable to speak, severe coughing)       Breathing better. Feet in hot water calms patient down. Patient states that she does have history of anxiety and panic attacks.   2. ONSET: \"When did this breathing problem begin?\"       " "About 2 weeks ago - about same time as the hot weather and allergies.   3. PATTERN \"Does the difficult breathing come and go, or has it been constant since it started?\"       Comes and goes. Yesterday walked down the street yesterday felt like asthma was bothering her.   4. SEVERITY: \"How bad is your breathing?\" (e.g., mild, moderate, severe)       Feels ok at time of call. Speaking in full sentences. No wheezing over the phone. Patient states no wheezing.   5. RECURRENT SYMPTOM: \"Have you had difficulty breathing before?\" If Yes, ask: \"When was the last time?\" and \"What happened that time?\"       Yes, since 4 years old has had asthma. Asthma is worse at this time.   6. CARDIAC HISTORY: \"Do you have any history of heart disease?\" (e.g., heart attack, angina, bypass surgery, angioplasty)       Palpitations. Stress test was fine. Wore heart monitor for 14 days.   7. LUNG HISTORY: \"Do you have any history of lung disease?\"  (e.g., pulmonary embolus, asthma, emphysema)      Asthma. Using rescue inhaler twice today. Many days does not use.   8. CAUSE: \"What do you think is causing the breathing problem?\"       Asthma and anxiety  9. OTHER SYMPTOMS: \"Do you have any other symptoms?\" (e.g., chest pain, cough, dizziness, fever, runny nose)      Denies cough or fever. Runny nose with hot liquids. Denies dizziness and chest pain.   10. O2 SATURATION MONITOR:  \"Do you use an oxygen saturation monitor (pulse oximeter) at home?\" If Yes, ask: \"What is your reading (oxygen level) today?\" \"What is your usual oxygen saturation reading?\" (e.g., 95%)        Does not check.   11. PREGNANCY: \"Is there any chance you are pregnant?\" \"When was your last menstrual period?\"        NA  12. TRAVEL: \"Have you traveled out of the country in the last month?\" (e.g., travel history, exposures)        No    Protocols used: Breathing Difficulty-A-[place]  Clarissa Roman RN    "

## 2025-07-08 ENCOUNTER — VIRTUAL VISIT (OUTPATIENT)
Dept: FAMILY MEDICINE | Facility: CLINIC | Age: 70
End: 2025-07-08
Payer: COMMERCIAL

## 2025-07-08 DIAGNOSIS — F39 MOOD DISORDER: ICD-10-CM

## 2025-07-08 PROCEDURE — 98006 SYNCH AUDIO-VIDEO EST MOD 30: CPT | Performed by: FAMILY MEDICINE

## 2025-07-08 RX ORDER — LORAZEPAM 0.5 MG/1
0.5 TABLET ORAL DAILY PRN
Qty: 20 TABLET | Refills: 0 | Status: CANCELLED | OUTPATIENT
Start: 2025-07-08

## 2025-07-08 RX ORDER — LORAZEPAM 0.5 MG/1
0.5 TABLET ORAL DAILY PRN
Qty: 20 TABLET | Refills: 0 | Status: SHIPPED | OUTPATIENT
Start: 2025-07-08

## 2025-07-08 ASSESSMENT — ASTHMA QUESTIONNAIRES
QUESTION_3 LAST FOUR WEEKS HOW OFTEN DID YOUR ASTHMA SYMPTOMS (WHEEZING, COUGHING, SHORTNESS OF BREATH, CHEST TIGHTNESS OR PAIN) WAKE YOU UP AT NIGHT OR EARLIER THAN USUAL IN THE MORNING: NOT AT ALL
ACT_TOTALSCORE: 23
ACT_TOTALSCORE: 23
QUESTION_4 LAST FOUR WEEKS HOW OFTEN HAVE YOU USED YOUR RESCUE INHALER OR NEBULIZER MEDICATION (SUCH AS ALBUTEROL): ONCE A WEEK OR LESS
QUESTION_5 LAST FOUR WEEKS HOW WOULD YOU RATE YOUR ASTHMA CONTROL: WELL CONTROLLED
QUESTION_2 LAST FOUR WEEKS HOW OFTEN HAVE YOU HAD SHORTNESS OF BREATH: NOT AT ALL
QUESTION_1 LAST FOUR WEEKS HOW MUCH OF THE TIME DID YOUR ASTHMA KEEP YOU FROM GETTING AS MUCH DONE AT WORK, SCHOOL OR AT HOME: NONE OF THE TIME

## 2025-07-08 ASSESSMENT — ANXIETY QUESTIONNAIRES
7. FEELING AFRAID AS IF SOMETHING AWFUL MIGHT HAPPEN: NEARLY EVERY DAY
GAD7 TOTAL SCORE: 21
1. FEELING NERVOUS, ANXIOUS, OR ON EDGE: NEARLY EVERY DAY
4. TROUBLE RELAXING: NEARLY EVERY DAY
3. WORRYING TOO MUCH ABOUT DIFFERENT THINGS: NEARLY EVERY DAY
2. NOT BEING ABLE TO STOP OR CONTROL WORRYING: NEARLY EVERY DAY
5. BEING SO RESTLESS THAT IT IS HARD TO SIT STILL: NEARLY EVERY DAY
6. BECOMING EASILY ANNOYED OR IRRITABLE: NEARLY EVERY DAY
GAD7 TOTAL SCORE: 21

## 2025-07-08 ASSESSMENT — ENCOUNTER SYMPTOMS: NERVOUS/ANXIOUS: 1

## 2025-07-08 ASSESSMENT — PATIENT HEALTH QUESTIONNAIRE - PHQ9: SUM OF ALL RESPONSES TO PHQ QUESTIONS 1-9: 24

## 2025-07-08 NOTE — PROGRESS NOTES
"Grecia is a 70 year old who is being evaluated via a billable video visit.    How would you like to obtain your AVS? Mail a copy  If the video visit is dropped, the invitation should be resent by: Text to cell phone: [phone]  Will anyone else be joining your video visit? No      Assessment & Plan     Mood disorder  Patient has been getting panic attacks which makes her feel short of breath.  Her asthma is well-managed.  I have refilled her lorazepam which she has used previously sparingly to help with panic attacks.  Instructed to start counseling.  Instructed to call [facility] to see if she can schedule an appointment to see psychiatry.  She has reacted to some SSRIs in the past and I do not have records available and therefore I have asked her to contact [facility] as they have managed her mental health care in the past.  - LORazepam (ATIVAN) 0.5 MG tablet; Take 1 tablet (0.5 mg) by mouth daily as needed (panic attack).  - Adult Mental Health  Referral; Future        BMI  Estimated body mass index is 25.06 kg/m  as calculated from the following:    Height as of 4/11/25: 1.702 m (5' 7\").    Weight as of 4/11/25: 72.6 kg (160 lb).           Subjective   Grecia is a 70 year old, presenting for the following health issues:  Asthma and Anxiety        7/8/2025     3:04 PM   Additional Questions   Roomed by [facility]     Video Start Time: 3:45 PM    Anxiety          Anxiety   How are you doing with your anxiety since your last visit? No change  Are you having other symptoms that might be associated with anxiety? Yes:  heart palpitations not in the past 4-5 days - takes magnesium to help   Have you had a significant life event? No - in house all the time, lonely     Patient has if it was hard for her to breathe.  She had panic  attacks.  Recently she had some family conflicts with her children and that has caused a lot of dismay for her.  She thinks that it will improve but she " request counseling.  In the past her counselors have left the practice.  She is going to [facility].  She tells that previously so medications were used to be taken daily but that caused side effects.  I do not have any records from [facility]  review that.    Social History     Tobacco Use    Smoking status: Never    Smokeless tobacco: Never   Vaping Use    Vaping status: Never Used   Substance Use Topics    Alcohol use: No     Alcohol/week: 0.0 standard drinks of alcohol    Drug use: No         9/18/2024     2:59 PM 12/23/2024     5:20 PM 7/8/2025     3:09 PM   WILL-7 SCORE   Total Score 14 (moderate anxiety)     Total Score 14 14 21         9/18/2024     2:58 PM 12/23/2024     4:17 PM 7/8/2025     3:08 PM   PHQ   PHQ-9 Total Score 16 19  24   Q9: Thoughts of better off dead/self-harm past 2 weeks Not at all Not at all Not at all       Patient-reported           Review of Systems  CONSTITUTIONAL: NEGATIVE for fever, chills, change in weight  ENT/MOUTH: NEGATIVE for ear, mouth and throat problemsB  CV: NEGATIVE for chest pain, palpitations or peripheral edema      Objective           Vitals:  No vitals were obtained today due to virtual visit.    Physical Exam   GENERAL: alert and no distress  EYES: Eyes grossly normal to inspection.  No discharge or erythema, or obvious scleral/conjunctival abnormalities.  RESP: No audible wheeze, cough, or visible cyanosis.    SKIN: Visible skin clear. No significant rash, abnormal pigmentation or lesions.  NEURO: Cranial nerves grossly intact.  Mentation and speech appropriate for age.  PSYCH: Appropriate affect, tone, and pace of words          Video-Visit Details    Type of service:  Video Visit   Video End Time:4 pm  Originating Location (pt. Location): Home    Distant Location (provider location):  On-site  Platform used for Video Visit: Nick  Signed Electronically by: Romel Mccall MD

## 2025-07-09 ENCOUNTER — PATIENT OUTREACH (OUTPATIENT)
Dept: CARE COORDINATION | Facility: CLINIC | Age: 70
End: 2025-07-09
Payer: COMMERCIAL

## 2025-07-14 DIAGNOSIS — E11.9 TYPE 2 DIABETES MELLITUS WITHOUT COMPLICATION, WITHOUT LONG-TERM CURRENT USE OF INSULIN (H): ICD-10-CM

## 2025-07-15 DIAGNOSIS — K21.9 GASTROESOPHAGEAL REFLUX DISEASE, UNSPECIFIED WHETHER ESOPHAGITIS PRESENT: ICD-10-CM

## 2025-07-15 RX ORDER — LISINOPRIL 30 MG/1
30 TABLET ORAL DAILY
Qty: 100 TABLET | Refills: 2 | Status: SHIPPED | OUTPATIENT
Start: 2025-07-15

## 2025-07-15 NOTE — TELEPHONE ENCOUNTER
Medication Question or Refill        What medication are you calling about (include dose and sig)?: Sucralsate - for GERD 1GM/10MG     Preferred Pharmacy:   [id number]  [facility] [id number] AT [place] [address] & [facility]  [id number] [place] [id number]  Phone: [phone] Fax: [phone]      Controlled Substance Agreement on file:   CSA -- Patient Level:    CSA: None found at the patient level.       Who prescribed the medication?: pcp    Do you need a refill? Yes    When did you use the medication last? na    Patient offered an appointment? No    Do you have any questions or concerns?  Yes: pt is requesting med refill, and forgot to notify pcp the other day       Okay to leave a detailed message?: Yes at Cell number on file:    Telephone Information:   Mobile [phone]

## 2025-07-16 RX ORDER — SUCRALFATE ORAL 1 G/10ML
1 SUSPENSION ORAL 4 TIMES DAILY PRN
Qty: 414 ML | Refills: 0 | Status: SHIPPED | OUTPATIENT
Start: 2025-07-16

## 2025-07-16 NOTE — TELEPHONE ENCOUNTER
Clinic RN: Please investigate patient's chart or contact patient if the information cannot be found because not on med list    Kaylyn Jackson RN on 7/16/2025 at 8:46 AM

## 2025-07-25 ENCOUNTER — TRANSFERRED RECORDS (OUTPATIENT)
Dept: HEALTH INFORMATION MANAGEMENT | Facility: CLINIC | Age: 70
End: 2025-07-25
Payer: COMMERCIAL

## 2025-07-25 LAB — RETINOPATHY: NORMAL

## 2025-08-20 ENCOUNTER — TELEPHONE (OUTPATIENT)
Dept: ORTHOPEDICS | Facility: CLINIC | Age: 70
End: 2025-08-20
Payer: COMMERCIAL

## 2025-08-20 DIAGNOSIS — M17.0 BILATERAL PRIMARY OSTEOARTHRITIS OF KNEE: Primary | ICD-10-CM

## 2025-08-25 DIAGNOSIS — K21.9 GASTROESOPHAGEAL REFLUX DISEASE, UNSPECIFIED WHETHER ESOPHAGITIS PRESENT: ICD-10-CM

## 2025-08-25 DIAGNOSIS — J30.2 SEASONAL ALLERGIC RHINITIS, UNSPECIFIED TRIGGER: ICD-10-CM

## 2025-08-26 DIAGNOSIS — M25.562 CHRONIC PAIN OF BOTH KNEES: ICD-10-CM

## 2025-08-26 DIAGNOSIS — G89.29 CHRONIC PAIN OF BOTH KNEES: ICD-10-CM

## 2025-08-26 DIAGNOSIS — M17.12 PRIMARY OSTEOARTHRITIS OF LEFT KNEE: ICD-10-CM

## 2025-08-26 DIAGNOSIS — M17.12 LOCALIZED OSTEOARTHRITIS OF LEFT KNEE: ICD-10-CM

## 2025-08-26 DIAGNOSIS — M25.561 CHRONIC PAIN OF BOTH KNEES: ICD-10-CM

## 2025-08-26 DIAGNOSIS — M23.42 BODIES, LOOSE, JOINT, KNEE, LEFT: ICD-10-CM

## 2025-08-26 RX ORDER — SUCRALFATE ORAL 1 G/10ML
SUSPENSION ORAL
Qty: 1242 ML | Refills: 5 | Status: SHIPPED | OUTPATIENT
Start: 2025-08-26

## 2025-08-26 RX ORDER — LIDOCAINE 50 MG/G
OINTMENT TOPICAL PRN
Qty: 50 G | Refills: 2 | Status: SHIPPED | OUTPATIENT
Start: 2025-08-26

## 2025-08-26 RX ORDER — FLUTICASONE PROPIONATE 50 MCG
SPRAY, SUSPENSION (ML) NASAL
Qty: 32 G | Refills: 1 | Status: SHIPPED | OUTPATIENT
Start: 2025-08-26

## 2025-09-03 SDOH — HEALTH STABILITY: PHYSICAL HEALTH: ON AVERAGE, HOW MANY MINUTES DO YOU ENGAGE IN EXERCISE AT THIS LEVEL?: 30 MIN

## 2025-09-03 SDOH — HEALTH STABILITY: PHYSICAL HEALTH: ON AVERAGE, HOW MANY DAYS PER WEEK DO YOU ENGAGE IN MODERATE TO STRENUOUS EXERCISE (LIKE A BRISK WALK)?: 5 DAYS

## 2025-09-04 ENCOUNTER — OFFICE VISIT (OUTPATIENT)
Dept: ORTHOPEDICS | Facility: CLINIC | Age: 70
End: 2025-09-04
Payer: COMMERCIAL

## 2025-09-04 DIAGNOSIS — M17.0 BILATERAL PRIMARY OSTEOARTHRITIS OF KNEE: Primary | ICD-10-CM

## 2025-09-04 RX ORDER — LIDOCAINE HYDROCHLORIDE 10 MG/ML
3 INJECTION, SOLUTION EPIDURAL; INFILTRATION; INTRACAUDAL; PERINEURAL
Status: COMPLETED | OUTPATIENT
Start: 2025-09-04 | End: 2025-09-04

## 2025-09-04 RX ADMIN — LIDOCAINE HYDROCHLORIDE 3 ML: 10 INJECTION, SOLUTION EPIDURAL; INFILTRATION; INTRACAUDAL; PERINEURAL at 15:27

## (undated) DEVICE — LINEN TOWEL PACK X5 5464

## (undated) DEVICE — CATH BALLOON DILATATION UROMAX ULTRA 18FRX4CM M0062251020

## (undated) DEVICE — SOL WATER IRRIG 1000ML BOTTLE 2F7114

## (undated) DEVICE — DRSG KERLIX FLUFFS X5

## (undated) DEVICE — LINEN HALF SHEET 5512

## (undated) DEVICE — LINEN FULL SHEET 5511

## (undated) DEVICE — PACK CYSTO CUSTOM RIDGES

## (undated) DEVICE — SU VICRYL 3-0 SH 27" J316H

## (undated) DEVICE — BAG CLEAR TRASH 1.3M 39X33" P4040C

## (undated) DEVICE — PACK MINOR CUSTOM RIDGES SBA32RMRMA

## (undated) DEVICE — SYR 10ML FINGER CONTROL W/O NDL 309695

## (undated) DEVICE — SYR 10ML LL W/O NDL 302995

## (undated) DEVICE — SUCTION CANISTER MEDIVAC LINER 3000ML W/LID 65651-530

## (undated) DEVICE — BASKET RETRIEVAL 1.9FRX120CM ESCAPE NTNL 4 WIRE 390-201

## (undated) DEVICE — GLOVE PROTEXIS POWDER FREE SMT 7.5  2D72PT75X

## (undated) DEVICE — DRAPE LAP PEDS DISP 29492

## (undated) DEVICE — GUIDEWIRE URO STR STIFF .035"X150CM NITINOL 150NSS35

## (undated) DEVICE — LASER FIBER HOLMIUM 365UM HTB-365

## (undated) DEVICE — COVER FOOTSWITCH W/CINCH 20X24" 923267

## (undated) DEVICE — TUBING IRRIG TUR Y TYPE 96" LF 6543-01

## (undated) DEVICE — SU CHROMIC 3-0 SH 27" G122H

## (undated) DEVICE — SOL WATER IRRIG 3000ML BAG 2B7117

## (undated) DEVICE — DECANTER VIAL 2006S

## (undated) DEVICE — ESU GROUND PAD ADULT W/CORD E7507

## (undated) DEVICE — PANTIES MESH LG/XLG 2PK 706M2

## (undated) DEVICE — SUCTION TIP YANKAUER W/O VENT K86

## (undated) DEVICE — PREP POVIDONE IODINE SOLUTION 10% 4OZ

## (undated) DEVICE — TAPE DURAPORE 3" SILK 1538-3

## (undated) DEVICE — TUBING SUCTION 12"X1/4" N612

## (undated) DEVICE — PREP SKIN SCRUB TRAY 4461A

## (undated) DEVICE — LINEN TOWEL PACK X10 5473

## (undated) DEVICE — GLOVE PROTEXIS W/NEU-THERA 7.0  2D73TE70

## (undated) DEVICE — NDL 25GA 1.5" 305127

## (undated) RX ORDER — FENTANYL CITRATE 50 UG/ML
INJECTION, SOLUTION INTRAMUSCULAR; INTRAVENOUS
Status: DISPENSED
Start: 2021-08-20

## (undated) RX ORDER — DEXAMETHASONE SODIUM PHOSPHATE 4 MG/ML
INJECTION, SOLUTION INTRA-ARTICULAR; INTRALESIONAL; INTRAMUSCULAR; INTRAVENOUS; SOFT TISSUE
Status: DISPENSED
Start: 2021-08-20

## (undated) RX ORDER — ONDANSETRON 2 MG/ML
INJECTION INTRAMUSCULAR; INTRAVENOUS
Status: DISPENSED
Start: 2021-08-20

## (undated) RX ORDER — GLYCOPYRROLATE 0.2 MG/ML
INJECTION INTRAMUSCULAR; INTRAVENOUS
Status: DISPENSED
Start: 2021-08-20

## (undated) RX ORDER — CEFAZOLIN SODIUM 2 G/100ML
INJECTION, SOLUTION INTRAVENOUS
Status: DISPENSED
Start: 2021-02-04

## (undated) RX ORDER — LIDOCAINE HYDROCHLORIDE 10 MG/ML
INJECTION, SOLUTION EPIDURAL; INFILTRATION; INTRACAUDAL; PERINEURAL
Status: DISPENSED
Start: 2018-07-24

## (undated) RX ORDER — FENTANYL CITRATE 50 UG/ML
INJECTION, SOLUTION INTRAMUSCULAR; INTRAVENOUS
Status: DISPENSED
Start: 2018-07-24

## (undated) RX ORDER — CEFAZOLIN SODIUM/WATER 2 G/20 ML
SYRINGE (ML) INTRAVENOUS
Status: DISPENSED
Start: 2021-08-20

## (undated) RX ORDER — BUPIVACAINE HYDROCHLORIDE AND EPINEPHRINE 2.5; 5 MG/ML; UG/ML
INJECTION, SOLUTION EPIDURAL; INFILTRATION; INTRACAUDAL; PERINEURAL
Status: DISPENSED
Start: 2018-07-24

## (undated) RX ORDER — PROPOFOL 10 MG/ML
INJECTION, EMULSION INTRAVENOUS
Status: DISPENSED
Start: 2021-08-20

## (undated) RX ORDER — LIDOCAINE HYDROCHLORIDE 10 MG/ML
INJECTION, SOLUTION EPIDURAL; INFILTRATION; INTRACAUDAL; PERINEURAL
Status: DISPENSED
Start: 2021-08-20

## (undated) RX ORDER — FENTANYL CITRATE 50 UG/ML
INJECTION, SOLUTION INTRAMUSCULAR; INTRAVENOUS
Status: DISPENSED
Start: 2021-02-04

## (undated) RX ORDER — OXYCODONE HYDROCHLORIDE 5 MG/1
TABLET ORAL
Status: DISPENSED
Start: 2018-07-24

## (undated) RX ORDER — DIPHENHYDRAMINE HYDROCHLORIDE 50 MG/ML
INJECTION INTRAMUSCULAR; INTRAVENOUS
Status: DISPENSED
Start: 2021-08-20

## (undated) RX ORDER — KETAMINE HYDROCHLORIDE 10 MG/ML
INJECTION INTRAMUSCULAR; INTRAVENOUS
Status: DISPENSED
Start: 2018-07-24